# Patient Record
Sex: MALE | Race: OTHER | NOT HISPANIC OR LATINO | URBAN - METROPOLITAN AREA
[De-identification: names, ages, dates, MRNs, and addresses within clinical notes are randomized per-mention and may not be internally consistent; named-entity substitution may affect disease eponyms.]

---

## 2017-12-04 ENCOUNTER — INPATIENT (INPATIENT)
Facility: HOSPITAL | Age: 32
LOS: 73 days | Discharge: HOME HEALTH | DRG: 853 | End: 2018-02-16
Attending: INTERNAL MEDICINE | Admitting: INTERNAL MEDICINE
Payer: MEDICARE

## 2017-12-04 VITALS
DIASTOLIC BLOOD PRESSURE: 40 MMHG | SYSTOLIC BLOOD PRESSURE: 72 MMHG | WEIGHT: 160.06 LBS | TEMPERATURE: 99 F | RESPIRATION RATE: 18 BRPM | HEART RATE: 120 BPM | OXYGEN SATURATION: 100 %
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
## 2017-12-04 LAB
ALBUMIN SERPL ELPH-MCNC: 2 G/DL — LOW (ref 3.4–5)
ALP SERPL-CCNC: 123 U/L — HIGH (ref 40–120)
ALT FLD-CCNC: 11 U/L — LOW (ref 12–42)
ANION GAP SERPL CALC-SCNC: 14 MMOL/L — SIGNIFICANT CHANGE UP (ref 9–16)
APPEARANCE UR: CLEAR — SIGNIFICANT CHANGE UP
APTT BLD: 28.8 SEC — SIGNIFICANT CHANGE UP (ref 27.5–36.5)
AST SERPL-CCNC: 6 U/L — LOW (ref 15–37)
BASOPHILS NFR BLD AUTO: 0.4 % — SIGNIFICANT CHANGE UP (ref 0–2)
BILIRUB SERPL-MCNC: 0.7 MG/DL — SIGNIFICANT CHANGE UP (ref 0.2–1.2)
BILIRUB UR-MCNC: NEGATIVE — SIGNIFICANT CHANGE UP
BUN SERPL-MCNC: 20 MG/DL — SIGNIFICANT CHANGE UP (ref 7–23)
CALCIUM SERPL-MCNC: 8.9 MG/DL — SIGNIFICANT CHANGE UP (ref 8.5–10.5)
CHLORIDE SERPL-SCNC: 95 MMOL/L — LOW (ref 96–108)
CO2 SERPL-SCNC: 21 MMOL/L — LOW (ref 22–31)
COLOR SPEC: YELLOW — SIGNIFICANT CHANGE UP
CREAT SERPL-MCNC: 1.16 MG/DL — SIGNIFICANT CHANGE UP (ref 0.5–1.3)
DIFF PNL FLD: (no result)
EOSINOPHIL NFR BLD AUTO: 0.2 % — SIGNIFICANT CHANGE UP (ref 0–6)
GLUCOSE SERPL-MCNC: 228 MG/DL — HIGH (ref 70–99)
GLUCOSE UR QL: NEGATIVE — SIGNIFICANT CHANGE UP
HCT VFR BLD CALC: 29.6 % — LOW (ref 39–50)
HGB BLD-MCNC: 9.1 G/DL — LOW (ref 13–17)
IMM GRANULOCYTES NFR BLD AUTO: 1.1 % — SIGNIFICANT CHANGE UP (ref 0–1.5)
INR BLD: 1.81 — HIGH (ref 0.88–1.16)
KETONES UR-MCNC: NEGATIVE — SIGNIFICANT CHANGE UP
LACTATE SERPL-SCNC: 2.9 MMOL/L — HIGH (ref 0.4–2)
LACTATE SERPL-SCNC: 2.9 MMOL/L — HIGH (ref 0.4–2)
LEUKOCYTE ESTERASE UR-ACNC: (no result)
LIDOCAIN IGE QN: 78 U/L — SIGNIFICANT CHANGE UP (ref 73–393)
LYMPHOCYTES # BLD AUTO: 5.2 % — LOW (ref 13–44)
MCHC RBC-ENTMCNC: 22.9 PG — LOW (ref 27–34)
MCHC RBC-ENTMCNC: 30.7 G/DL — LOW (ref 32–36)
MCV RBC AUTO: 74.6 FL — LOW (ref 80–100)
MONOCYTES NFR BLD AUTO: 6.9 % — SIGNIFICANT CHANGE UP (ref 2–14)
NEUTROPHILS NFR BLD AUTO: 86.2 % — HIGH (ref 43–77)
NITRITE UR-MCNC: POSITIVE
PH UR: 8.5 — HIGH (ref 5–8)
PLATELET # BLD AUTO: 410 K/UL — HIGH (ref 150–400)
POTASSIUM SERPL-MCNC: 4 MMOL/L — SIGNIFICANT CHANGE UP (ref 3.5–5.3)
POTASSIUM SERPL-SCNC: 4 MMOL/L — SIGNIFICANT CHANGE UP (ref 3.5–5.3)
PROT SERPL-MCNC: 6.9 G/DL — SIGNIFICANT CHANGE UP (ref 6.4–8.2)
PROT UR-MCNC: 30 MG/DL
PROTHROM AB SERPL-ACNC: 20.1 SEC — HIGH (ref 9.8–12.7)
RBC # BLD: 3.97 M/UL — LOW (ref 4.2–5.8)
RBC # FLD: 16.9 % — SIGNIFICANT CHANGE UP (ref 10.3–16.9)
SODIUM SERPL-SCNC: 130 MMOL/L — LOW (ref 132–145)
SP GR SPEC: 1.01 — SIGNIFICANT CHANGE UP (ref 1–1.03)
UROBILINOGEN FLD QL: 1 E.U./DL — SIGNIFICANT CHANGE UP
WBC # BLD: 21.8 K/UL — HIGH (ref 3.8–10.5)
WBC # FLD AUTO: 21.8 K/UL — HIGH (ref 3.8–10.5)

## 2017-12-04 PROCEDURE — 93010 ELECTROCARDIOGRAM REPORT: CPT

## 2017-12-04 PROCEDURE — 99291 CRITICAL CARE FIRST HOUR: CPT | Mod: 25

## 2017-12-04 PROCEDURE — 71010: CPT | Mod: 26

## 2017-12-04 RX ORDER — SODIUM CHLORIDE 9 MG/ML
3 INJECTION INTRAMUSCULAR; INTRAVENOUS; SUBCUTANEOUS ONCE
Qty: 0 | Refills: 0 | Status: COMPLETED | OUTPATIENT
Start: 2017-12-04 | End: 2017-12-04

## 2017-12-04 RX ORDER — SODIUM CHLORIDE 9 MG/ML
500 INJECTION INTRAMUSCULAR; INTRAVENOUS; SUBCUTANEOUS
Qty: 0 | Refills: 0 | Status: COMPLETED | OUTPATIENT
Start: 2017-12-04 | End: 2017-12-04

## 2017-12-04 RX ORDER — VANCOMYCIN HCL 1 G
1000 VIAL (EA) INTRAVENOUS ONCE
Qty: 0 | Refills: 0 | Status: COMPLETED | OUTPATIENT
Start: 2017-12-04 | End: 2017-12-04

## 2017-12-04 RX ORDER — SODIUM CHLORIDE 9 MG/ML
2000 INJECTION INTRAMUSCULAR; INTRAVENOUS; SUBCUTANEOUS ONCE
Qty: 0 | Refills: 0 | Status: COMPLETED | OUTPATIENT
Start: 2017-12-04 | End: 2017-12-04

## 2017-12-04 RX ORDER — PIPERACILLIN AND TAZOBACTAM 4; .5 G/20ML; G/20ML
3.38 INJECTION, POWDER, LYOPHILIZED, FOR SOLUTION INTRAVENOUS ONCE
Qty: 0 | Refills: 0 | Status: COMPLETED | OUTPATIENT
Start: 2017-12-04 | End: 2017-12-04

## 2017-12-04 RX ORDER — ACETAMINOPHEN 500 MG
975 TABLET ORAL ONCE
Qty: 0 | Refills: 0 | Status: COMPLETED | OUTPATIENT
Start: 2017-12-04 | End: 2017-12-04

## 2017-12-04 RX ADMIN — SODIUM CHLORIDE 3 MILLILITER(S): 9 INJECTION INTRAMUSCULAR; INTRAVENOUS; SUBCUTANEOUS at 22:24

## 2017-12-04 RX ADMIN — PIPERACILLIN AND TAZOBACTAM 200 GRAM(S): 4; .5 INJECTION, POWDER, LYOPHILIZED, FOR SOLUTION INTRAVENOUS at 22:24

## 2017-12-04 RX ADMIN — SODIUM CHLORIDE 2000 MILLILITER(S): 9 INJECTION INTRAMUSCULAR; INTRAVENOUS; SUBCUTANEOUS at 23:31

## 2017-12-04 RX ADMIN — SODIUM CHLORIDE 2000 MILLILITER(S): 9 INJECTION INTRAMUSCULAR; INTRAVENOUS; SUBCUTANEOUS at 22:59

## 2017-12-04 RX ADMIN — Medication 250 MILLIGRAM(S): at 22:37

## 2017-12-04 RX ADMIN — SODIUM CHLORIDE 2000 MILLILITER(S): 9 INJECTION INTRAMUSCULAR; INTRAVENOUS; SUBCUTANEOUS at 22:14

## 2017-12-04 RX ADMIN — SODIUM CHLORIDE 2000 MILLILITER(S): 9 INJECTION INTRAMUSCULAR; INTRAVENOUS; SUBCUTANEOUS at 21:59

## 2017-12-04 RX ADMIN — SODIUM CHLORIDE 2000 MILLILITER(S): 9 INJECTION INTRAMUSCULAR; INTRAVENOUS; SUBCUTANEOUS at 22:29

## 2017-12-04 RX ADMIN — SODIUM CHLORIDE 2000 MILLILITER(S): 9 INJECTION INTRAMUSCULAR; INTRAVENOUS; SUBCUTANEOUS at 22:40

## 2017-12-04 RX ADMIN — Medication 975 MILLIGRAM(S): at 23:04

## 2017-12-04 NOTE — ED PROVIDER NOTE - OBJECTIVE STATEMENT
Comes to the ED to have his pressure ulcers checked.  Found to have septic vitals in triage and moved to the resus room for rapid evaluation.  Wheelchair bound, Hx of SCI, hep B, DM, indwelling suprapubic cath.  Has not seen a doctor in > 3 months, claims due to insurance coverage.  Last hospitalization in august in [email or web address]  Denies fever, chills, weakness.  Found to be incontinence of stool on arrival.

## 2017-12-04 NOTE — ED PROVIDER NOTE - CRITICAL CARE PROVIDED
documentation/interpretation of diagnostic studies/direct patient care (not related to procedure)/additional history taking/consultation with other physicians

## 2017-12-04 NOTE — ED ADULT NURSE NOTE - OBJECTIVE STATEMENT
pt. came into ER in his motorized wheelchair, for wound check of his sacrum. pt. states he was admitted to a hospital in august, since then pt. has sacral ulcer and suprapubic cathter. code sepsis was called on patient. pt. is fully oriented.

## 2017-12-04 NOTE — ED PROVIDER NOTE - MEDICAL DECISION MAKING DETAILS
septic vitals in triage - comes for wound care.  Spontaneously drainage pressure ulcer in the sacrum.  Hypotensive, tachycardia, low grade fever.  States that his BP is normally 100s/60s.  Denies dizziness, fever, chills, cough.  Found to be incontinent of diarrhea (last hospital admission in august 2017).  Labs, fluids, abx, cxr, ekg, cultures, urine.  Suprapubic catheter dislodged on arrival.

## 2017-12-04 NOTE — ED ADULT NURSE REASSESSMENT NOTE - NS ED NURSE REASSESS COMMENT FT1
EMS unable to take patients wheelchair. Patient sticker applied to wheel chair and left on loading dock of hospital.  of shift aware of situation will move wheelchair when appropriate spot is found.

## 2017-12-04 NOTE — ED PROVIDER NOTE - DIAGNOSTIC INTERPRETATION
ER Physician: Bunny Marti  CHEST XRAY INTERPRETATION: lungs clear, heart shadow normal, bony structures intact

## 2017-12-04 NOTE — ED ADULT NURSE NOTE - FALL HARM RISK TYPE OF ASSESSMENT
Injectable Influenza Immunization Documentation    1.  Is the person to be vaccinated sick today?   No    2. Does the person to be vaccinated have an allergy to a component   of the vaccine?   No    3. Has the person to be vaccinated ever had a serious reaction   to influenza vaccine in the past?   No    4. Has the person to be vaccinated ever had Guillain-Barré syndrome?   No    Form completed by Karlee Bergman MA          Admission

## 2017-12-04 NOTE — ED ADULT NURSE NOTE - CHIEF COMPLAINT QUOTE
pt came in, wheelchair bound, for medical evaluation/wound check. States he has Stage IV pressure ulcers to his sacrum. Pt tachycardic and hypotensive at triage. Unkempt/pale appearance.

## 2017-12-04 NOTE — ED PROVIDER NOTE - PROGRESS NOTE DETAILS
BP improved after about 500 cs of fluid - 140s/80s with HR in the 110s.  Febrile rectally to 103.  Cleaned by staff.  Receiving BS abx - vanco/zosyn.  CXR clear.  Urine with evidence of infection but source likely drainage scaral ulcer.  Patient last admitted about 3 months at [facility] for "placement"  Patient without advanced directives but describes full code.  NO family.  Lives with roommate.  Denies recent antibiotic use.  Do not suspect c. diff - no risk factors acutely.  Discussed with admitting team - will not isolate at this time.  Will attempt to send stool sample here.  elevated wbc, inr, lactate (2.9).  Normal renal function.  H/H 9/30.  Spoke with Dr. Walters - accepted to the stepdown unit. lactate unchanged, BP stable (MAP >65), UO 200cc.  Reached out to Dr. Walters again.  Stable for transfer

## 2017-12-04 NOTE — ED ADULT TRIAGE NOTE - CHIEF COMPLAINT QUOTE
pt came in, wheelchair bound, for medical evaluation/wound check. States he has Stage IV pressure ulcers to his sacrum. pt came in, wheelchair bound, for medical evaluation/wound check. States he has Stage IV pressure ulcers to his sacrum. Pt tachycardic and hypotensive at triage. Unkempt/pale appearance.

## 2017-12-05 LAB
ANION GAP SERPL CALC-SCNC: 16 MMOL/L — SIGNIFICANT CHANGE UP (ref 5–17)
ANION GAP SERPL CALC-SCNC: 16 MMOL/L — SIGNIFICANT CHANGE UP (ref 5–17)
APTT BLD: 27.1 SEC — LOW (ref 27.5–37.4)
BLD GP AB SCN SERPL QL: NEGATIVE — SIGNIFICANT CHANGE UP
BUN SERPL-MCNC: 13 MG/DL — SIGNIFICANT CHANGE UP (ref 7–23)
BUN SERPL-MCNC: 14 MG/DL — SIGNIFICANT CHANGE UP (ref 7–23)
CALCIUM SERPL-MCNC: 7.3 MG/DL — LOW (ref 8.4–10.5)
CALCIUM SERPL-MCNC: 7.6 MG/DL — LOW (ref 8.4–10.5)
CHLORIDE SERPL-SCNC: 103 MMOL/L — SIGNIFICANT CHANGE UP (ref 96–108)
CHLORIDE SERPL-SCNC: 106 MMOL/L — SIGNIFICANT CHANGE UP (ref 96–108)
CO2 SERPL-SCNC: 17 MMOL/L — LOW (ref 22–31)
CO2 SERPL-SCNC: 17 MMOL/L — LOW (ref 22–31)
CREAT ?TM UR-MCNC: 13 MG/DL — SIGNIFICANT CHANGE UP
CREAT SERPL-MCNC: 0.74 MG/DL — SIGNIFICANT CHANGE UP (ref 0.5–1.3)
CREAT SERPL-MCNC: 0.74 MG/DL — SIGNIFICANT CHANGE UP (ref 0.5–1.3)
CRP SERPL-MCNC: 17.4 MG/DL — HIGH (ref 0–0.4)
ERYTHROCYTE [SEDIMENTATION RATE] IN BLOOD: 59 MM/HR — HIGH
GLUCOSE BLDC GLUCOMTR-MCNC: 137 MG/DL — HIGH (ref 70–99)
GLUCOSE BLDC GLUCOMTR-MCNC: 142 MG/DL — HIGH (ref 70–99)
GLUCOSE BLDC GLUCOMTR-MCNC: 165 MG/DL — HIGH (ref 70–99)
GLUCOSE BLDC GLUCOMTR-MCNC: 224 MG/DL — HIGH (ref 70–99)
GLUCOSE SERPL-MCNC: 150 MG/DL — HIGH (ref 70–99)
GLUCOSE SERPL-MCNC: 174 MG/DL — HIGH (ref 70–99)
HCT VFR BLD CALC: 25.8 % — LOW (ref 39–50)
HGB BLD-MCNC: 8 G/DL — LOW (ref 13–17)
INR BLD: 1.76 — HIGH (ref 0.88–1.16)
LACTATE SERPL-SCNC: 2.1 MMOL/L — HIGH (ref 0.5–2)
LYMPHOCYTES # BLD AUTO: 13 % — SIGNIFICANT CHANGE UP (ref 13–44)
MAGNESIUM SERPL-MCNC: 1.3 MG/DL — LOW (ref 1.6–2.6)
MCHC RBC-ENTMCNC: 22.7 PG — LOW (ref 27–34)
MCHC RBC-ENTMCNC: 31 G/DL — LOW (ref 32–36)
MCV RBC AUTO: 73.3 FL — LOW (ref 80–100)
MONOCYTES NFR BLD AUTO: 12 % — SIGNIFICANT CHANGE UP (ref 2–14)
NEUTROPHILS NFR BLD AUTO: 61 % — SIGNIFICANT CHANGE UP (ref 43–77)
OSMOLALITY SERPL: 287 MOSM/KG — SIGNIFICANT CHANGE UP (ref 280–301)
OSMOLALITY UR: 159 MOSMOL/KG — SIGNIFICANT CHANGE UP (ref 100–650)
PHOSPHATE SERPL-MCNC: 3.1 MG/DL — SIGNIFICANT CHANGE UP (ref 2.5–4.5)
PLATELET # BLD AUTO: 386 K/UL — SIGNIFICANT CHANGE UP (ref 150–400)
POTASSIUM SERPL-MCNC: 3.3 MMOL/L — LOW (ref 3.5–5.3)
POTASSIUM SERPL-MCNC: 3.3 MMOL/L — LOW (ref 3.5–5.3)
POTASSIUM SERPL-SCNC: 3.3 MMOL/L — LOW (ref 3.5–5.3)
POTASSIUM SERPL-SCNC: 3.3 MMOL/L — LOW (ref 3.5–5.3)
PROTHROM AB SERPL-ACNC: 19.8 SEC — HIGH (ref 9.8–12.7)
RBC # BLD: 3.52 M/UL — LOW (ref 4.2–5.8)
RBC # FLD: 17.3 % — HIGH (ref 10.3–16.9)
RH IG SCN BLD-IMP: POSITIVE — SIGNIFICANT CHANGE UP
SODIUM SERPL-SCNC: 136 MMOL/L — SIGNIFICANT CHANGE UP (ref 135–145)
SODIUM SERPL-SCNC: 139 MMOL/L — SIGNIFICANT CHANGE UP (ref 135–145)
SODIUM UR-SCNC: 40 MMOL/L — SIGNIFICANT CHANGE UP
WBC # BLD: 25.2 K/UL — HIGH (ref 3.8–10.5)
WBC # FLD AUTO: 25.2 K/UL — HIGH (ref 3.8–10.5)

## 2017-12-05 PROCEDURE — 71010: CPT | Mod: 26

## 2017-12-05 PROCEDURE — 99291 CRITICAL CARE FIRST HOUR: CPT

## 2017-12-05 RX ORDER — PIPERACILLIN AND TAZOBACTAM 4; .5 G/20ML; G/20ML
4.5 INJECTION, POWDER, LYOPHILIZED, FOR SOLUTION INTRAVENOUS EVERY 8 HOURS
Qty: 0 | Refills: 0 | Status: DISCONTINUED | OUTPATIENT
Start: 2017-12-05 | End: 2017-12-05

## 2017-12-05 RX ORDER — INSULIN GLARGINE 100 [IU]/ML
7 INJECTION, SOLUTION SUBCUTANEOUS EVERY MORNING
Qty: 0 | Refills: 0 | Status: DISCONTINUED | OUTPATIENT
Start: 2017-12-05 | End: 2017-12-13

## 2017-12-05 RX ORDER — MAGNESIUM SULFATE 500 MG/ML
4 VIAL (ML) INJECTION ONCE
Qty: 0 | Refills: 0 | Status: COMPLETED | OUTPATIENT
Start: 2017-12-05 | End: 2017-12-05

## 2017-12-05 RX ORDER — INSULIN LISPRO 100/ML
VIAL (ML) SUBCUTANEOUS EVERY 6 HOURS
Qty: 0 | Refills: 0 | Status: DISCONTINUED | OUTPATIENT
Start: 2017-12-05 | End: 2017-12-05

## 2017-12-05 RX ORDER — DEXTROSE 50 % IN WATER 50 %
25 SYRINGE (ML) INTRAVENOUS ONCE
Qty: 0 | Refills: 0 | Status: DISCONTINUED | OUTPATIENT
Start: 2017-12-05 | End: 2017-12-05

## 2017-12-05 RX ORDER — OXYBUTYNIN CHLORIDE 5 MG
5 TABLET ORAL
Qty: 0 | Refills: 0 | Status: DISCONTINUED | OUTPATIENT
Start: 2017-12-05 | End: 2017-12-05

## 2017-12-05 RX ORDER — DEXTROSE 50 % IN WATER 50 %
1 SYRINGE (ML) INTRAVENOUS ONCE
Qty: 0 | Refills: 0 | Status: DISCONTINUED | OUTPATIENT
Start: 2017-12-05 | End: 2018-02-16

## 2017-12-05 RX ORDER — GLUCAGON INJECTION, SOLUTION 0.5 MG/.1ML
1 INJECTION, SOLUTION SUBCUTANEOUS ONCE
Qty: 0 | Refills: 0 | Status: DISCONTINUED | OUTPATIENT
Start: 2017-12-05 | End: 2018-02-16

## 2017-12-05 RX ORDER — POTASSIUM CHLORIDE 20 MEQ
20 PACKET (EA) ORAL
Qty: 0 | Refills: 0 | Status: COMPLETED | OUTPATIENT
Start: 2017-12-05 | End: 2017-12-05

## 2017-12-05 RX ORDER — ATORVASTATIN CALCIUM 80 MG/1
20 TABLET, FILM COATED ORAL AT BEDTIME
Qty: 0 | Refills: 0 | Status: DISCONTINUED | OUTPATIENT
Start: 2017-12-05 | End: 2018-02-16

## 2017-12-05 RX ORDER — PIPERACILLIN AND TAZOBACTAM 4; .5 G/20ML; G/20ML
3.38 INJECTION, POWDER, LYOPHILIZED, FOR SOLUTION INTRAVENOUS EVERY 6 HOURS
Qty: 0 | Refills: 0 | Status: DISCONTINUED | OUTPATIENT
Start: 2017-12-05 | End: 2017-12-15

## 2017-12-05 RX ORDER — OXYBUTYNIN CHLORIDE 5 MG
5 TABLET ORAL
Qty: 0 | Refills: 0 | Status: DISCONTINUED | OUTPATIENT
Start: 2017-12-05 | End: 2018-02-09

## 2017-12-05 RX ORDER — HEPARIN SODIUM 5000 [USP'U]/ML
5000 INJECTION INTRAVENOUS; SUBCUTANEOUS EVERY 8 HOURS
Qty: 0 | Refills: 0 | Status: DISCONTINUED | OUTPATIENT
Start: 2017-12-05 | End: 2018-01-31

## 2017-12-05 RX ORDER — LEVETIRACETAM 250 MG/1
500 TABLET, FILM COATED ORAL
Qty: 0 | Refills: 0 | Status: DISCONTINUED | OUTPATIENT
Start: 2017-12-05 | End: 2018-02-16

## 2017-12-05 RX ORDER — SODIUM CHLORIDE 9 MG/ML
1000 INJECTION, SOLUTION INTRAVENOUS
Qty: 0 | Refills: 0 | Status: DISCONTINUED | OUTPATIENT
Start: 2017-12-05 | End: 2018-02-16

## 2017-12-05 RX ORDER — VANCOMYCIN HCL 1 G
1000 VIAL (EA) INTRAVENOUS EVERY 8 HOURS
Qty: 0 | Refills: 0 | Status: DISCONTINUED | OUTPATIENT
Start: 2017-12-05 | End: 2017-12-05

## 2017-12-05 RX ORDER — NYSTATIN CREAM 100000 [USP'U]/G
1 CREAM TOPICAL
Qty: 0 | Refills: 0 | Status: DISCONTINUED | OUTPATIENT
Start: 2017-12-05 | End: 2018-02-16

## 2017-12-05 RX ORDER — SODIUM CHLORIDE 9 MG/ML
1000 INJECTION, SOLUTION INTRAVENOUS
Qty: 0 | Refills: 0 | Status: DISCONTINUED | OUTPATIENT
Start: 2017-12-05 | End: 2017-12-05

## 2017-12-05 RX ORDER — DEXTROSE 50 % IN WATER 50 %
25 SYRINGE (ML) INTRAVENOUS ONCE
Qty: 0 | Refills: 0 | Status: DISCONTINUED | OUTPATIENT
Start: 2017-12-05 | End: 2018-02-16

## 2017-12-05 RX ORDER — DEXTROSE 50 % IN WATER 50 %
12.5 SYRINGE (ML) INTRAVENOUS ONCE
Qty: 0 | Refills: 0 | Status: DISCONTINUED | OUTPATIENT
Start: 2017-12-05 | End: 2018-02-16

## 2017-12-05 RX ORDER — VANCOMYCIN HCL 1 G
VIAL (EA) INTRAVENOUS
Qty: 0 | Refills: 0 | Status: DISCONTINUED | OUTPATIENT
Start: 2017-12-05 | End: 2017-12-05

## 2017-12-05 RX ORDER — ALBUMIN HUMAN 25 %
250 VIAL (ML) INTRAVENOUS ONCE
Qty: 0 | Refills: 0 | Status: COMPLETED | OUTPATIENT
Start: 2017-12-05 | End: 2017-12-05

## 2017-12-05 RX ORDER — ASCORBIC ACID 60 MG
250 TABLET,CHEWABLE ORAL DAILY
Qty: 0 | Refills: 0 | Status: DISCONTINUED | OUTPATIENT
Start: 2017-12-05 | End: 2018-02-16

## 2017-12-05 RX ORDER — DEXTROSE 50 % IN WATER 50 %
1 SYRINGE (ML) INTRAVENOUS ONCE
Qty: 0 | Refills: 0 | Status: DISCONTINUED | OUTPATIENT
Start: 2017-12-05 | End: 2017-12-05

## 2017-12-05 RX ORDER — NOREPINEPHRINE BITARTRATE/D5W 8 MG/250ML
0.01 PLASTIC BAG, INJECTION (ML) INTRAVENOUS
Qty: 8 | Refills: 0 | Status: DISCONTINUED | OUTPATIENT
Start: 2017-12-05 | End: 2017-12-06

## 2017-12-05 RX ORDER — DEXTROSE 50 % IN WATER 50 %
12.5 SYRINGE (ML) INTRAVENOUS ONCE
Qty: 0 | Refills: 0 | Status: DISCONTINUED | OUTPATIENT
Start: 2017-12-05 | End: 2017-12-05

## 2017-12-05 RX ORDER — GLUCAGON INJECTION, SOLUTION 0.5 MG/.1ML
1 INJECTION, SOLUTION SUBCUTANEOUS ONCE
Qty: 0 | Refills: 0 | Status: DISCONTINUED | OUTPATIENT
Start: 2017-12-05 | End: 2017-12-05

## 2017-12-05 RX ORDER — SODIUM HYPOCHLORITE 0.125 %
1 SOLUTION, NON-ORAL MISCELLANEOUS DAILY
Qty: 0 | Refills: 0 | Status: COMPLETED | OUTPATIENT
Start: 2017-12-05 | End: 2017-12-11

## 2017-12-05 RX ORDER — SODIUM CHLORIDE 9 MG/ML
1000 INJECTION, SOLUTION INTRAVENOUS
Qty: 0 | Refills: 0 | Status: DISCONTINUED | OUTPATIENT
Start: 2017-12-05 | End: 2017-12-06

## 2017-12-05 RX ORDER — INSULIN LISPRO 100/ML
VIAL (ML) SUBCUTANEOUS
Qty: 0 | Refills: 0 | Status: DISCONTINUED | OUTPATIENT
Start: 2017-12-05 | End: 2018-02-16

## 2017-12-05 RX ADMIN — LEVETIRACETAM 500 MILLIGRAM(S): 250 TABLET, FILM COATED ORAL at 18:31

## 2017-12-05 RX ADMIN — Medication 5 MILLIGRAM(S): at 18:29

## 2017-12-05 RX ADMIN — NYSTATIN CREAM 1 APPLICATION(S): 100000 CREAM TOPICAL at 18:30

## 2017-12-05 RX ADMIN — Medication 25 GRAM(S): at 07:04

## 2017-12-05 RX ADMIN — Medication 100 MILLIEQUIVALENT(S): at 08:00

## 2017-12-05 RX ADMIN — PIPERACILLIN AND TAZOBACTAM 200 GRAM(S): 4; .5 INJECTION, POWDER, LYOPHILIZED, FOR SOLUTION INTRAVENOUS at 06:13

## 2017-12-05 RX ADMIN — Medication 4: at 07:03

## 2017-12-05 RX ADMIN — PIPERACILLIN AND TAZOBACTAM 200 GRAM(S): 4; .5 INJECTION, POWDER, LYOPHILIZED, FOR SOLUTION INTRAVENOUS at 11:55

## 2017-12-05 RX ADMIN — Medication 1.36 MICROGRAM(S)/KG/MIN: at 02:57

## 2017-12-05 RX ADMIN — INSULIN GLARGINE 7 UNIT(S): 100 INJECTION, SOLUTION SUBCUTANEOUS at 08:45

## 2017-12-05 RX ADMIN — Medication 250 MILLIGRAM(S): at 06:10

## 2017-12-05 RX ADMIN — ATORVASTATIN CALCIUM 20 MILLIGRAM(S): 80 TABLET, FILM COATED ORAL at 22:43

## 2017-12-05 RX ADMIN — Medication 1.36 MICROGRAM(S)/KG/MIN: at 08:00

## 2017-12-05 RX ADMIN — Medication 2: at 11:55

## 2017-12-05 RX ADMIN — Medication 1 APPLICATION(S): at 14:25

## 2017-12-05 RX ADMIN — Medication 250 MILLIGRAM(S): at 14:24

## 2017-12-05 RX ADMIN — HEPARIN SODIUM 5000 UNIT(S): 5000 INJECTION INTRAVENOUS; SUBCUTANEOUS at 14:25

## 2017-12-05 RX ADMIN — Medication 250 MILLIGRAM(S): at 14:25

## 2017-12-05 RX ADMIN — PIPERACILLIN AND TAZOBACTAM 200 GRAM(S): 4; .5 INJECTION, POWDER, LYOPHILIZED, FOR SOLUTION INTRAVENOUS at 18:30

## 2017-12-05 RX ADMIN — HEPARIN SODIUM 5000 UNIT(S): 5000 INJECTION INTRAVENOUS; SUBCUTANEOUS at 22:43

## 2017-12-05 RX ADMIN — Medication 100 MILLIEQUIVALENT(S): at 05:52

## 2017-12-05 RX ADMIN — SODIUM CHLORIDE 100 MILLILITER(S): 9 INJECTION, SOLUTION INTRAVENOUS at 10:10

## 2017-12-05 RX ADMIN — Medication 100 MILLIEQUIVALENT(S): at 06:42

## 2017-12-05 RX ADMIN — HEPARIN SODIUM 5000 UNIT(S): 5000 INJECTION INTRAVENOUS; SUBCUTANEOUS at 07:00

## 2017-12-05 RX ADMIN — Medication 125 MILLILITER(S): at 02:26

## 2017-12-05 NOTE — H&P ADULT - NSHPREVIEWOFSYSTEMS_GEN_ALL_CORE
REVIEW OF SYSTEMS      General:	(+) fatigue    Skin/Breast:  no rash  	  Ophthalmologic:  no vision change  	  ENMT:	no sore throat, congestion, runny nose    Respiratory and Thorax:   no cough or SOB  	  Cardiovascular:	no CP or palpitations    Gastrointestinal:	no diarrhea, abd pain, vomiting.    Genitourinary:	(+) foul smelling urine    Musculoskeletal:	no joint pain    Neurological:	no headache

## 2017-12-05 NOTE — DIETITIAN INITIAL EVALUATION ADULT. - OTHER INFO
33 y/o male admitted with septic shock.Noted paraplegic.Can feed self.No N/V/D.Noted complaints of pain.

## 2017-12-05 NOTE — H&P ADULT - PMH
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

## 2017-12-05 NOTE — H&P ADULT - NSHPPHYSICALEXAM_GEN_ALL_CORE
General:  NAD  HENT:  EOMI, PERRL.  No sinus tenderness.  oropharynx WNL.  MM dry  Neck:  Trachea midline.  No JVD, LAD, or thyromegaly.  Heart:  S1S2 no MRG  Lungs:  CTAB no wheezing  Abdomen:  NABS.  soft, nontender, nondistended.  no guarding.  no ascites.  no organomegaly.  (+) suprapubic cath without surrounding erythema or purulence.  draining clear yellow urine.  Vascular:  + peripheral pulses  Neuro:  AOx3, no facial asymmetry.  No sensation or strength below nipple line.  UE strength 5/5.  Skin:  No rash.  (+) sacral ulcer General:  NAD  HENT:  EOMI, PERRL.  No sinus tenderness.  oropharynx WNL.  MM dry  Neck:  Trachea midline.  No JVD, LAD, or thyromegaly.  Heart:  S1S2 no MRG  Lungs:  CTAB no wheezing  Abdomen:  NABS.  soft, nontender, nondistended.  no guarding.  no ascites.  no organomegaly.  (+) suprapubic cath without surrounding erythema or purulence.  draining clear yellow urine.  Vascular:  + peripheral pulses  Neuro:  AOx3, no facial asymmetry.  No sensation or strength below nipple line.  UE strength 5/5.  Skin:  No rash.  (+) sacral ulcer with exposed bone.  bandages packed in ulcer with yellow discharge.  (+) surrounding erythema and warmth.

## 2017-12-05 NOTE — DIETITIAN INITIAL EVALUATION ADULT. - NS AS NUTRI INTERV MEALS SNACK
Protein - modified diet/General/healthful diet/Energy - modified diet/advance to [facility] with snack/Carbohydrate - modified diet

## 2017-12-05 NOTE — PROCEDURE NOTE - NSPOSTCAREGUIDE_GEN_A_CORE
Care for catheter as per unit/ICU protocols
Verbal/written post procedure instructions were given to patient/caregiver/Care for catheter as per unit/ICU protocols

## 2017-12-05 NOTE — H&P ADULT - NSHPSOCIALHISTORY_GEN_ALL_CORE
tobacco- 1 PPD x 12 years  Etoh- none  drugs- none  lives with- roommate and 24 hour home health aid  occupation- unemployed

## 2017-12-05 NOTE — CONSULT NOTE ADULT - SUBJECTIVE AND OBJECTIVE BOX
HPI: Asked late this afternoon to see this 32M paraplegic patient with diabetes and sacral and right ischial decubitus ulcers, admitted last night with chills, hyperglycemia, UTI, white count, and pressor requirement, to rule out his decubitus ulcers as a source for sepsis. Patient was treated on vanc and zosyn, IVF, pressors, and fluid. Since admission, he has had improvement in pressor requirement and has been weaned to low dose levophed, sugars are in control in the 150s, and his overall clinical status is much improved. Of note, he has a history of osteomyelitis, for which he was inadequately treated/ didn't complete abx course. He also reports that he didn't get adequate wound care as an outpatient. He has an indwelling catheter and his cxr was clear on admission.    EXAM  afebrile 122/72, HR 90, RR 22, 02 sat 98%  AOx3, comfortable, conversing comfortably    large stage 4 sacral decubitus ulcer measuring 12 x 15 x 20 cm, soiled with stool, no evidence of purulence, no evidence of cellulitis, no evidence of fibrinous exudate. Thoroughly cleaned at bedside and wound re-packed with clean kerlix gauze and duoderm    large stage 4 ischial decubitus ulcer, right hip, measuring 8 cm x 12 cm x 5cm, soiled with stool, no evidence of purulence, no evidence of cellulitis, + fibrinous exudate in wound bed. Thoroughly cleaned at bedside.     BEDSIDE DEBRIDEMENT PERFORMED: fibrinous exudate in right ischial decubitus ulcer sharply debrided with scissors and forceps to rid wound of devitalized tissue. Endpoint was healthy pink granulation tissue. No bleeding, Tolerated well. Wound re-packed with clean kerlix gauze and duoderm.    A/P: The patient's decubitus ulcers are unlikely to be the source of his acute illness/ sepsis. They appear clean and healthy overall, with no evidence of active soft tissue infection.  -recommend 4 times a day wound packing changes-- fecal soilage appears to be an issue here due to the location of the wounds  -empiric abx per micu team  -consider inadequately treated oteomyelitis vs UTI as possible other etiologies of infection  -frequent turning  -pressure ulcer precautions  -duoderm can be helpful to try to occlude the wounds from stool soilage    Iram Early MD  Plastic Surgery

## 2017-12-05 NOTE — PATIENT PROFILE ADULT. - GENERAL INFO COMMENT, PROFILE
Patient experiencing chills and got on the bus with his motorized wheelchair and went to [facility]

## 2017-12-05 NOTE — PROCEDURE NOTE - NSPROCDETAILS_GEN_ALL_CORE
sutured in place/positive blood return obtained via catheter/all materials/supplies accounted for at end of procedure/location identified, draped/prepped, sterile technique used, needle inserted/introduced/connected to a pressurized flush line/hemostasis with direct pressure, dressing applied/Seldinger technique/ultrasound guidance

## 2017-12-05 NOTE — PROCEDURE NOTE - NSINDICATIONS_GEN_A_CORE
monitoring purposes/critical patient
critical illness/emergency venous access/hemodynamic monitoring/volume resuscitation/venous access
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
## 2017-12-05 NOTE — H&P ADULT - ASSESSMENT
Patient is a 32 year old paraplegic M with a history of spinal cord injury (wheelchair bound), sacral pressure ulcer with osteo earlier in 2017,  DM-2, indwelling suprapubic catheter, hepatitis B with septic shock secondary to infected sacral pressure ulcer. Patient is a 32 year old paraplegic M with a history of spinal cord injury (wheelchair bound), sacral pressure ulcer with osteo earlier in 2017,  DM-2, indwelling suprapubic catheter, hepatitis B with septic shock secondary to infected sacral pressure ulcer.    ID:  #septic shock   -secondary to infected sacral ulcer.  -In ED: Tmax 103.7, , BP 72/40, WBC 20k, lactate 2.9.  -s/p 4.5 L NS and vanc/zosyn.  C/w vanc/zosyn.  Vanc trough after 4th dose  -f/u blood cx  -BP improved on levo gtt.  Keep MAPs >65  -C/w IVF at maintenance  -f/u surgery re: debridement  -Tylenol for fever    #UTI  -patient does not have sensation or control of urination, however (+) UA with LE, nitrites, bacteria and WBC  -C/w abx as above  -f/u urine cx and blood cx    #hepatitis B  -obtain collateral from PCP  -AST/ALT WNL    NEURO:  no sedation.    AOx3    #traumatic brain injury / paraplegia  -chronic.    -frequent turning  -wound care    #seizure d/o  -Patient reports history of at least 2 seizures.  Unclear etiology.   -C/w home Keppra    CARDIAC:  #septic shock  -Treat as above    #sinus tachycardia  -Improved with IVF.  Secondary to septic shock.    -f/u EKG    #HTN  -Currently in septic shock.  Holding home regimen (unclear)    RESP:  No respiratory symptoms.  Stable on room air.  f/u CXR    GI:  -Consider rectal tube as patient is incontinent and has sacral ulcer    #elevated alk phos  -unclear etiology.  possibly musculoskeletal.  f/u CMP    No bowel regimen at this time    RENAL:  BUN, creatinine WNL  Monitor UOP  (+) suprapubic cath to bowles    HEME:  Leukocytosis secondary to septic shock.  WBC 20k.  Trend CBC    #microcytic anemia  hgb 9.1.  MCV 74.  No sign of bleeding.  maintain active T&S  -f/u iron studies    ENDO:  #type 2 diabetes mellitus  -f/u A1C  -accuchecks Q6H and ISS for now  -hold home oral antihyperglycemic regimen    No history of thyroidal disease.    F:  None  E:  replete PRN  N:  NPO for now  Ppx:  hep SQ  Dispo:  MICU  Code status:  full code Patient is a 32 year old paraplegic M with a history of spinal cord injury (wheelchair bound), sacral pressure ulcer with osteo earlier in 2017,  DM-2, indwelling suprapubic catheter, hepatitis B with septic shock secondary to infected sacral pressure ulcer.    ID:  #septic shock   -secondary to infected sacral ulcer, with likely sacral osteo  -In ED: Tmax 103.7, , BP 72/40, WBC 20k, lactate 2.9.  -s/p 4.5 L NS and vanc/zosyn.  C/w vanc/zosyn.  Vanc trough after 4th dose  -f/u blood cx  -BP improved on levo gtt.  Keep MAPs >65  -C/w IVF at maintenance  -f/u surgery re: debridement  -Tylenol for fever  -f/u ESR/CRP    #UTI  -patient does not have sensation or control of urination, however (+) UA with LE, nitrites, bacteria and WBC  -C/w abx as above  -f/u urine cx and blood cx    #hepatitis B  -obtain collateral from PCP  -AST/ALT WNL  -INR elevated ~1.8, stable    NEURO:  no sedation.    AOx3    #traumatic brain injury / paraplegia  -chronic.    -frequent turning  -wound care    #seizure d/o  -Patient reports history of at least 2 seizures.  Unclear etiology.   -C/w home Keppra 500 mg BID    CARDIAC:  #septic shock  -Treat as above    #sinus tachycardia  -Improved with IVF.  Secondary to septic shock.    -f/u EKG    #HTN  -Currently in septic shock.  Holding home regimen (unclear)    c/w home lipitor    RESP:  No respiratory symptoms.  Stable on room air.  f/u CXR    GI:  -Consider rectal tube as patient is incontinent and has sacral ulcer    #elevated alk phos  -unclear etiology.  possibly musculoskeletal.  f/u CMP    No bowel regimen at this time    RENAL:  BUN, creatinine WNL  Monitor UOP  (+) suprapubic cath to bowles  c/w home oxybutynin    HEME:  Leukocytosis secondary to septic shock.  WBC 20k.  Trend CBC    #microcytic anemia  hgb 9.1.  MCV 74.  No sign of bleeding.  maintain active T&S  -f/u iron studies    ENDO:  #type 2 diabetes mellitus  -f/u A1C  -accuchecks Q6H and ISS for now.  Dosed Lantus 7 units in a.m.  Will likely need more now that not NPO  -hold home oral antihyperglycemic regimen (metformin, glimperide)    No history of thyroidal disease.    F:  None  E:  replete PRN  N:  consistent carb diet   Ppx:  hep SQ  Dispo:  MICU  Code status:  full code Patient is a 32 year old paraplegic M with a history of spinal cord injury (wheelchair bound), sacral pressure ulcer with osteo earlier in 2017,  DM-2, indwelling suprapubic catheter, hepatitis B with septic shock secondary to infected sacral pressure ulcer.    ID:  #septic shock   -secondary to infected sacral ulcer, with likely sacral osteo  -In ED: Tmax 103.7, , BP 72/40, WBC 20k, lactate 2.9.  -s/p 4.5 L NS and vanc/zosyn.  C/w vanc/zosyn.  Vanc trough after 4th dose  -f/u blood cx  -BP improved on levo gtt.  Keep MAPs >65  -C/w IVF at maintenance  -f/u surgery re: debridement  -Tylenol for fever  -f/u ESR/CRP    #UTI  -patient does not have sensation or control of urination, however (+) UA with LE, nitrites, bacteria and WBC  -C/w abx as above  -f/u urine cx and blood cx    #hepatitis B  -obtain collateral from PCP  -AST/ALT WNL  -INR elevated ~1.8, stable    NEURO:  no sedation.    AOx3    #traumatic brain injury / paraplegia  -chronic.    -frequent turning  -wound care    #seizure d/o  -Patient reports history of at least 2 seizures.  Unclear etiology.   -C/w home Keppra 500 mg BID    CARDIAC:  #septic shock  -Treat as above    #sinus tachycardia  -Improved with IVF.  Secondary to septic shock.    -f/u EKG    #HTN  -Currently in septic shock.  Holding home regimen (unclear)    c/w home lipitor    RESP:  No respiratory symptoms.  Stable on room air.  f/u CXR    GI:  -Consider rectal tube as patient is incontinent and has sacral ulcer    #elevated alk phos  -unclear etiology.  possibly musculoskeletal.  f/u CMP    No bowel regimen at this time    RENAL:  BUN, creatinine WNL  Monitor UOP  (+) suprapubic cath to bowels  c/w home oxybutynin    HEME:  Leukocytosis secondary to septic shock.  WBC 20k.  Trend CBC    #microcytic anemia  hgb 9.1.  MCV 74.  No sign of bleeding.  maintain active T&S  -f/u iron studies    ENDO:  #type 2 diabetes mellitus  -f/u A1C  -accuchecks Q6H and ISS for now.  Dosed Lantus 7 units in a.m.  Will likely need more now that not NPO  -hold home oral antihyperglycemic regimen (metformin, glimperide)    No history of thyroidal disease.    F:  LR at 100/hour  E:  replete PRN  N:  consistent carb diet   Ppx:  hep SQ  Dispo:  MICU  Code status:  full code

## 2017-12-05 NOTE — ADVANCED PRACTICE NURSE CONSULT - REASON FOR CONSULT
[facility] nurse consult to assess multiple pressure ulcers (injuries) that were present on admission. Patient is a 32 year old male with a history of spinal cord injury (paraplegic x ~5 years), sacral pressure ulcer with hx of sacral osteo in April and Aug 2017,  DM-2, indwelling suprapubic catheter, CVA in 2016, hepatitis B who presented for wound check for his pressure ulcers.  Sacral pressure injury diagnosed in April 2017 which developed osteo at that time and a second time in August.

## 2017-12-05 NOTE — H&P ADULT - NSHPLABSRESULTS_GEN_ALL_CORE
12-04    130<L>  |  95<L>  |  20  ----------------------------<  228<H>  4.0   |  21<L>  |  1.16    Ca    8.9      04 Dec 2017 22:22    TPro  6.9  /  Alb  2.0<L>  /  TBili  0.7  /  DBili  x   /  AST  6<L>  /  ALT  11<L>  /  AlkPhos  123<H>  12-04 12-04                          9.1    21.8  )-----------( 410      ( 04 Dec 2017 22:22 )             29.6

## 2017-12-05 NOTE — PROGRESS NOTE ADULT - SUBJECTIVE AND OBJECTIVE BOX
INTERVAL HPI/OVERNIGHT EVENTS: Plastic surgery called regarding debridement for sacral wound; pending recs. Continued vanc/zosyn.     VITAL SIGNS:  T(F): 96.5 (17 @ 13:00)  HR: 74 (17 @ 15:00)  BP: 103/62 (17 @ 14:00)  RR: 17 (17 @ 15:00)  SpO2: 100% (17 @ 15:00)  Wt(kg): --    PHYSICAL EXAM:    Constitutional: Well developed, well nourished  General: laying comfortably  ENMT: moist mucous membranes, no mucosal pallor, clear throat, uvula midline  Neck: supple  Respiratory: CTABL, no rales, no crackles, no wheezing  Cardiovascular: +S1, +S2, no murmurs, rubs or gallops, regular rate and rhythm  Gastrointestinal: abdomen soft, non distended, non tender, +BS  Extremities: no edema, no calf pain to palpation  Vascular: cap refill <3s in all extremities, radial and DP pulses 2+, sacral stage 4 decubitus dressed in clean dry dressing, R ischial wound dressed in clean dry dressing, R foot wrapped in clean, dry dressing  Neurological: AAO x3, no sensation below nipple line, paraplegic  Skin: scar mid-abdomen    MEDICATIONS  (STANDING):  ascorbic acid 250 milliGRAM(s) Oral daily  atorvastatin 20 milliGRAM(s) Oral at bedtime  Dakins Solution - 1/4 Strength 1 Application(s) Topical daily  dextrose 5%. 1000 milliLiter(s) (50 mL/Hr) IV Continuous <Continuous>  dextrose 50% Injectable 12.5 Gram(s) IV Push once  dextrose 50% Injectable 25 Gram(s) IV Push once  dextrose 50% Injectable 25 Gram(s) IV Push once  heparin  Injectable 5000 Unit(s) SubCutaneous every 8 hours  insulin glargine Injectable (LANTUS) 7 Unit(s) SubCutaneous every morning  insulin lispro (HumaLOG) corrective regimen sliding scale   SubCutaneous every 6 hours  lactated ringers. 1000 milliLiter(s) (100 mL/Hr) IV Continuous <Continuous>  levETIRAcetam 500 milliGRAM(s) Oral two times a day  norepinephrine Infusion 0.01 MICROgram(s)/kG/Min (1.361 mL/Hr) IV Continuous <Continuous>  nystatin Powder 1 Application(s) Topical two times a day  oxybutynin 5 milliGRAM(s) Oral two times a day  piperacillin/tazobactam IVPB. 3.375 Gram(s) IV Intermittent every 6 hours  vancomycin  IVPB 1000 milliGRAM(s) IV Intermittent every 8 hours    MEDICATIONS  (PRN):  dextrose Gel 1 Dose(s) Oral once PRN Blood Glucose LESS THAN 70 milliGRAM(s)/deciliter  glucagon  Injectable 1 milliGRAM(s) IntraMuscular once PRN Glucose LESS THAN 70 milligrams/deciliter      Allergies    Capzasin Back and Body (Unknown)    Intolerances        LABS:                        8.0    25.2  )-----------( 386      ( 05 Dec 2017 04:56 )             25.8         139  |  106  |  13  ----------------------------<  174<H>  3.3<L>   |  17<L>  |  0.74    Ca    7.6<L>      05 Dec 2017 04:58  Phos  3.1       Mg     1.3         TPro  6.9  /  Alb  2.0<L>  /  TBili  0.7  /  DBili  x   /  AST  6<L>  /  ALT  11<L>  /  AlkPhos  123<H>  12    PT/INR - ( 05 Dec 2017 07:04 )   PT: 19.8 sec;   INR: 1.76          PTT - ( 05 Dec 2017 07:04 )  PTT:27.1 sec  Urinalysis Basic - ( 04 Dec 2017 22:22 )    Color: Yellow / Appearance: Clear / S.010 / pH: x  Gluc: x / Ketone: NEGATIVE  / Bili: NEGATIVE / Urobili: 1.0 E.U./dL   Blood: x / Protein: 30 mg/dL / Nitrite: POSITIVE   Leuk Esterase: Large / RBC: x / WBC > 10 /HPF   Sq Epi: x / Non Sq Epi: x / Bacteria: Many /HPF        RADIOLOGY & ADDITIONAL TESTS: < from: Xray Chest 1 View AP- PORTABLE-Urgent (17 @ 04:34) >  EXAM:  XR CHEST 1 VIEW PORT URGENT                          PROCEDURE DATE:  2017                     INTERPRETATION:    Portable AP Radiograph dated 2017 4:34 AM    CLINICAL INFORMATION: 32 years, Male, central line placement    PRIOR STUDIES: Chest x-ray 2017 at 10:52 PM    FINDINGS:  Lines, Catheters and Support Devices: Interval placement of a right-sided   central line with its tip projecting over the right atrium.    Heart Size, Mediastinum and Hilar Contours: Heart size is stable. Stable   mediastinal and hilar contours.      Lungs: No focal consolidation. No definite pleural effusions. No evidence   of pneumothorax.     Bones/Soft Tissues: Redemonstrated is multilevel posterior fusion   hardware. Also seen is a smallgeometric metallic density projecting over   the superiormost aspect of the fusion hardware        IMPRESSION:  Interval placement of a right-sided central line with its tip projecting   over the right atrium.    No evidence of pneumothorax.    Lungsremain clear.                "Thank you for the opportunity to participate in the care of this   patient."    DAGMAR CARBAJAL M.D., RADIOLOGY RESIDENT  This document has been electronically signed.  JEN SOUTH M.D., ATTENDING RADIOLOGIST  This document has been electronically signed. Dec  5 2017  2:38PM                  < end of copied text >  Reviewed. INTERVAL HPI/OVERNIGHT EVENTS: Plastic surgery called regarding debridement for sacral wound; pending recs. Continued vanc/zosyn.     VITAL SIGNS:  T(F): 96.5 (17 @ 13:00)  HR: 74 (17 @ 15:00)  BP: 103/62 (17 @ 14:00)  RR: 17 (17 @ 15:00)  SpO2: 100% (17 @ 15:00)  Wt(kg): --    PHYSICAL EXAM:    Constitutional: Well developed, well nourished  General: laying comfortably  ENMT: moist mucous membranes, no mucosal pallor, clear throat, uvula midline  Neck: supple  Respiratory: CTABL, no rales, no crackles, no wheezing  Cardiovascular: +S1, +S2, no murmurs, rubs or gallops, regular rate and rhythm  Gastrointestinal: abdomen soft, non distended, non tender, +BS  Extremities: no edema, no calf pain to palpation  Vascular: cap refill <3s in all extremities, radial and DP pulses 2+, sacral stage 4 decubitus dressed in clean dry dressing, L ischial wound dressed in clean dry dressing, L foot wrapped in clean, dry dressing  Neurological: AAO x3, no sensation below nipple line, paraplegic  Skin: scar mid-abdomen    MEDICATIONS  (STANDING):  ascorbic acid 250 milliGRAM(s) Oral daily  atorvastatin 20 milliGRAM(s) Oral at bedtime  Dakins Solution - 1/4 Strength 1 Application(s) Topical daily  dextrose 5%. 1000 milliLiter(s) (50 mL/Hr) IV Continuous <Continuous>  dextrose 50% Injectable 12.5 Gram(s) IV Push once  dextrose 50% Injectable 25 Gram(s) IV Push once  dextrose 50% Injectable 25 Gram(s) IV Push once  heparin  Injectable 5000 Unit(s) SubCutaneous every 8 hours  insulin glargine Injectable (LANTUS) 7 Unit(s) SubCutaneous every morning  insulin lispro (HumaLOG) corrective regimen sliding scale   SubCutaneous every 6 hours  lactated ringers. 1000 milliLiter(s) (100 mL/Hr) IV Continuous <Continuous>  levETIRAcetam 500 milliGRAM(s) Oral two times a day  norepinephrine Infusion 0.01 MICROgram(s)/kG/Min (1.361 mL/Hr) IV Continuous <Continuous>  nystatin Powder 1 Application(s) Topical two times a day  oxybutynin 5 milliGRAM(s) Oral two times a day  piperacillin/tazobactam IVPB. 3.375 Gram(s) IV Intermittent every 6 hours  vancomycin  IVPB 1000 milliGRAM(s) IV Intermittent every 8 hours    MEDICATIONS  (PRN):  dextrose Gel 1 Dose(s) Oral once PRN Blood Glucose LESS THAN 70 milliGRAM(s)/deciliter  glucagon  Injectable 1 milliGRAM(s) IntraMuscular once PRN Glucose LESS THAN 70 milligrams/deciliter      Allergies    Capzasin Back and Body (Unknown)    Intolerances        LABS:                        8.0    25.2  )-----------( 386      ( 05 Dec 2017 04:56 )             25.8         139  |  106  |  13  ----------------------------<  174<H>  3.3<L>   |  17<L>  |  0.74    Ca    7.6<L>      05 Dec 2017 04:58  Phos  3.1       Mg     1.3         TPro  6.9  /  Alb  2.0<L>  /  TBili  0.7  /  DBili  x   /  AST  6<L>  /  ALT  11<L>  /  AlkPhos  123<H>  12    PT/INR - ( 05 Dec 2017 07:04 )   PT: 19.8 sec;   INR: 1.76          PTT - ( 05 Dec 2017 07:04 )  PTT:27.1 sec  Urinalysis Basic - ( 04 Dec 2017 22:22 )    Color: Yellow / Appearance: Clear / S.010 / pH: x  Gluc: x / Ketone: NEGATIVE  / Bili: NEGATIVE / Urobili: 1.0 E.U./dL   Blood: x / Protein: 30 mg/dL / Nitrite: POSITIVE   Leuk Esterase: Large / RBC: x / WBC > 10 /HPF   Sq Epi: x / Non Sq Epi: x / Bacteria: Many /HPF        RADIOLOGY & ADDITIONAL TESTS: < from: Xray Chest 1 View AP- PORTABLE-Urgent (17 @ 04:34) >  EXAM:  XR CHEST 1 VIEW PORT URGENT                          PROCEDURE DATE:  2017                     INTERPRETATION:    Portable AP Radiograph dated 2017 4:34 AM    CLINICAL INFORMATION: 32 years, Male, central line placement    PRIOR STUDIES: Chest x-ray 2017 at 10:52 PM    FINDINGS:  Lines, Catheters and Support Devices: Interval placement of a right-sided   central line with its tip projecting over the right atrium.    Heart Size, Mediastinum and Hilar Contours: Heart size is stable. Stable   mediastinal and hilar contours.      Lungs: No focal consolidation. No definite pleural effusions. No evidence   of pneumothorax.     Bones/Soft Tissues: Redemonstrated is multilevel posterior fusion   hardware. Also seen is a smallgeometric metallic density projecting over   the superiormost aspect of the fusion hardware        IMPRESSION:  Interval placement of a right-sided central line with its tip projecting   over the right atrium.    No evidence of pneumothorax.    Lungsremain clear.                "Thank you for the opportunity to participate in the care of this   patient."    DAGMAR CARBAJAL M.D., RADIOLOGY RESIDENT  This document has been electronically signed.  JEN SOUTH M.D., ATTENDING RADIOLOGIST  This document has been electronically signed. Dec  5 2017  2:38PM                  < end of copied text >  Reviewed.

## 2017-12-05 NOTE — H&P ADULT - HISTORY OF PRESENT ILLNESS
Patient is a 32 year old M with a history of spinal cord injury (wheelchair bound), sacral pressure ulcer,  DM, indwelling suprapubic catheter, hepatitis B who presented for wound check for his pressure ulcers.  He has not followed up with his PCP in several months due to insurance issues.  He reported diarrhea but denied symptoms including F/C, CP, SOB, abdominal pain, nausea.  In the ED, he was initially afebrile but tachycardic () and hypotensive (72/40), RR 18, 100% on RA.  He had a leukocytosis with WBC 21.8, neutrophil predominance, anemia with hgb 9.1, hyponatremia Na+ 140, bicarb 21, AG 14, alk phos 123, lactate 2.9, and UA positive for leuk esterase, nitrites, WBC and bacteria.  He receieved 500 cc NS and his HR came down to 111 and BP rogers to systolic 140s.  BP then dropped again.  He spiked to 103.7.  He received 4.5 L NS, vanc/zosyn, tylenol and albumin.  He was transferred to [facility].    At [facility], he was started on levo gtt.  HR 84, BP 93/57, RR 20, 97%.  C diff studies were ordered.  Central line placed.          Wheelchair bound, Hx of SCI, hep B, DM, indwelling suprapubic cath. Patient is a 32 year old M with a history of spinal cord injury (paraplegic x ~5 years), sacral pressure ulcer with hx of sacral osteo in April and Aug 2017,  DM-2, indwelling suprapubic catheter, CVA in 2016, hepatitis B who presented for wound check for his pressure ulcers.  He has not followed up with his PCP in several months due to insurance issues.  He denied symptoms including F/C, CP, SOB, abdominal pain, diarrhea, nausea.  A few days ago he noted foul smelling urine but has not noticed changes in its appearance.  He has a sacral ulcer diagnosed in April 2017 which developed osteo at that time and a second time in August.  He was treated most recently with cefpodoxime (he believes).  He refused PICC at that time.  He has a 24 hour home health aid who has not mentioned any changes in his ulcer.  He does not have sensation below the nipple line.  He has fecal incontinence at baseline.  In the ED, he was initially afebrile but tachycardic () and hypotensive (72/40), RR 18, 100% on RA.  He was noted to have a purulent sacral ulcer.  He had a leukocytosis with WBC 21.8, neutrophil predominance, anemia with hgb 9.1, hyponatremia Na+ 140, bicarb 21, AG 14, alk phos 123, lactate 2.9, and UA positive for leuk esterase, nitrites, WBC and bacteria.  He receieved 500 cc NS and his HR came down to 111 and BP rogers to systolic 140s.  BP then dropped again.  He spiked to 103.7.  He received 4.5 L NS, vanc/zosyn, tylenol and albumin.  He was transferred to [facility].    At [facility], he was started on levo gtt.  HR 84, BP 93/57, RR 20, 97%.  C diff studies were ordered.  R IJ and A-line placed.

## 2017-12-05 NOTE — PATIENT PROFILE ADULT. - PRIOR FUNCTIONAL LEVEL COMMENT, PROFILE
Patient is a paraplegic for 8 years and has full use of his hands.  Patient needs assistance with putting on pants and shoes, assisting with bathing backside, patient is able to transfer from bed to wheelchair independently at home.  Patient has 24-hour home care assistance

## 2017-12-05 NOTE — PROGRESS NOTE ADULT - ASSESSMENT
Patient is a 32 year old paraplegic M with a history of spinal cord injury (wheelchair bound), sacral pressure ulcer with osteo x2 earlier in 2017,  DM-2, indwelling suprapubic catheter, hepatitis B with septic shock secondary to infected purulent sacral 4th degree pressure ulcer.    ID:  #septic shock   -secondary to infected 4th degree sacral ulcer, purulent with likely sacral osteo  -wound cultures sent, f/u  -In ED: Tmax 103.7, , BP 72/40, WBC 20k, lactate 2.9.  -s/p 4.5 L NS and vanc/zosyn.  C/w vanc/zosyn.  Vanc trough before 4th dose 12/5 10PM  -f/u blood cx NGTD  -BP improved on levo gtt.  Keep MAPs >65; wean as tolerated  -C/w IVF at maintenance  -f/u plastic surgery re: debridement  -Tylenol for fever  -f/u ESR/CRP    #R/o osteomyelitis of sacrum  -purulent wound on sacrum  --previous osteo in this area x2 in 2017  -plastic surgery consulted for possible debridement    #UTI  -patient does not have sensation or control of urination (neurogenic bladder)  -suprapubic catheter in place  - (+) UA with LE, nitrites, bacteria and WBC  -C/w abx as above  -f/u urine cx and blood cx    #hepatitis B  -obtain collateral from PCP  -AST/ALT WNL  -INR elevated ~1.8, stable    NEURO:  no sedation.    AOx3    #traumatic brain injury / paraplegia  -chronic.    -frequent turning  -wound care following, and recs followed    #seizure d/o  -Patient reports history of at least 2 seizures.  Unclear etiology.   -C/w home Keppra 500 mg BID    #history of CVA  -per patient described as hemorrhagic, call PMD for more info  -hold on ASA for now    CARDIAC:  #septic shock  -Treat as above    #sinus tachycardia - resolved  -Improved with IVF.  Secondary to septic shock.    -f/u EKG    #HTN  -Currently in septic shock. no reported BP home regimen per patient    #HLD  c/w home lipitor    RESP:  No respiratory symptoms.  Stable on room air.   CXR wnl, no further CXR necessary at this point    GI:  -rectal tube as patient is incontinent and has sacral ulcer    #elevated alk phos  -unclear etiology.  possibly musculoskeletal.  f/u CMP; trend    No bowel regimen at this time    RENAL:  #neurogenic bladder 2/2 to paraplegia  BUN, creatinine WNL  Monitor UOP  (+) suprapubic cath to bowles; may need to exchange and get urology if uCX +  c/w home oxybutynin    HEME:  #microcytic anemia  hgb 9.1.  MCV 74.  No sign of bleeding.  maintain active T&S  -f/u iron studies    ENDO:  #type 2 diabetes mellitus  -f/u A1C  -accuchecks Q6H and ISS for now.  Dosed Lantus 7 units in a.m.  Will likely need more when on diet all day, NPO after midnight, for possible debridement, so will keep 7 units for tomorrow  -hold home oral antihyperglycemic regimen (metformin, glimperide)    F:  LR at 100/hour  E:  replete PRN  N:  consistent carb diet no snacks  Ppx:  hep SQ  Dispo:  MICU  Code status:  full code Patient is a 32 year old paraplegic M with a history of spinal cord injury (wheelchair bound), sacral pressure ulcer with osteo x2 earlier in 2017,  DM-2, indwelling suprapubic catheter, hepatitis B with septic shock secondary to infected purulent sacral 4th degree pressure ulcer.    ID:  #septic shock   -secondary to infected 4th degree sacral ulcer, purulent with likely sacral osteo  -wound cultures sent, f/u  -In ED: Tmax 103.7, , BP 72/40, WBC 20k, lactate 2.9.  -s/p 4.5 L NS and vanc/zosyn.  C/w vanc/zosyn.  Vanc trough before 4th dose 12/5 10PM  -f/u blood cx NGTD  -BP improved on levo gtt.  Keep MAPs >65; wean as tolerated  -C/w IVF at maintenance  -f/u plastic surgery re: debridement  -Tylenol for fever  -f/u ESR/CRP    #R/o osteomyelitis of sacrum  -purulent wound on sacrum  --previous osteo in this area x2 in 2017  -plastic surgery consulted for possible debridement    #UTI  -patient does not have sensation or control of urination (neurogenic bladder)  -suprapubic catheter in place  - (+) UA with LE, nitrites, bacteria and WBC  -C/w abx as above  -f/u urine cx and blood cx    #hepatitis B  -obtain collateral from PCP  -AST/ALT WNL  -INR elevated ~1.8, stable    DERMATOLOGY:  #Ulcers  -plastic surgery consulted  -wound cultures sent  -blood cultures sent  Per wound care:  Sacral and ischial tuberosity pressure injuries - irrigate with wound cleanser, pack lightly with wet to damp Dakin's 1/4 strength solution kerlix and cover with foam dressing daily x 7 days.  Left foot lateral aspect ulcer - cleanse with wound cleanser, apply foam dressing every 3 days or prn if soiled or wet.    #Tinea corporis  Per wound care:  Fungal rash on bilateral buttocks - apply nystatin powder, seal with liquid skin barrier.  Fungal rash on bilateral inguinal area - apply nystatin powder.       NEURO:  no sedation.    AOx3    #traumatic brain injury / paraplegia  -chronic.    -frequent turning  -wound care following, and recs followed    #seizure d/o  -Patient reports history of at least 2 seizures.  Unclear etiology.   -C/w home Keppra 500 mg BID    #history of CVA  -per patient described as hemorrhagic, call PMD for more info  -hold on ASA for now    CARDIAC:  #septic shock  -Treat as above    #sinus tachycardia - resolved  -Improved with IVF.  Secondary to septic shock.    -f/u EKG    #HTN  -Currently in septic shock. no reported BP home regimen per patient    #HLD  c/w home lipitor    RESP:  No respiratory symptoms.  Stable on room air.   CXR wnl, no further CXR necessary at this point    GI:  -rectal tube as patient is incontinent and has sacral ulcer    #elevated alk phos  -unclear etiology.  possibly musculoskeletal.  f/u CMP; trend    No bowel regimen at this time    RENAL:  #neurogenic bladder 2/2 to paraplegia  BUN, creatinine WNL  Monitor UOP  (+) suprapubic cath to bowles; may need to exchange and get urology if uCX +  c/w home oxybutynin    HEME:  #microcytic anemia  hgb 9.1.  MCV 74.  No sign of bleeding.  maintain active T&S  -f/u iron studies    ENDO:  #type 2 diabetes mellitus  -f/u A1C  -accuchecks Q6H and ISS for now.  Dosed Lantus 7 units in a.m.  Will likely need more when on diet all day, NPO after midnight, for possible debridement, so will keep 7 units for tomorrow  -hold home oral antihyperglycemic regimen (metformin, glimperide)    F:  LR at 100/hour  E:  replete PRN  N:  consistent carb diet no snacks  Ppx:  hep SQ  Dispo:  MICU  Code status:  full code Patient is a 32 year old paraplegic M with a history of spinal cord injury (wheelchair bound), sacral pressure ulcer with osteo x2 earlier in 2017,  DM-2, indwelling suprapubic catheter, hepatitis B with septic shock secondary to infected purulent sacral 4th degree pressure ulcer.    ID:  #septic shock   -secondary to infected 4th degree sacral ulcer, purulent with likely sacral osteo  -wound cultures sent, f/u  -In ED: Tmax 103.7, , BP 72/40, WBC 20k, lactate 2.9.  -s/p 4.5 L NS and vanc/zosyn.  C/w vanc/zosyn.  Vanc trough before 4th dose 12/5 10PM  -f/u blood cx NGTD  -BP improved on levo gtt.  Keep MAPs >65; wean as tolerated  -C/w IVF at maintenance  -f/u plastic surgery re: debridement  -Tylenol for fever  -f/u ESR/CRP  -collateral from Dr. Alford at [facility] on previous osteo and outpatient tx    #R/o osteomyelitis of sacrum  -purulent wound on sacrum  --previous osteo in this area x2 in 2017  -plastic surgery consulted for possible debridement    #UTI  -patient does not have sensation or control of urination (neurogenic bladder)  -suprapubic catheter in place  - (+) UA with LE, nitrites, bacteria and WBC  -C/w abx as above  -f/u urine cx and blood cx    #hepatitis B  -obtain collateral from PCP  -AST/ALT WNL  -INR elevated ~1.8, stable    DERMATOLOGY:  #Ulcers  -plastic surgery consulted  -wound cultures sent  -blood cultures sent  Per wound care:  Sacral and ischial tuberosity pressure injuries - irrigate with wound cleanser, pack lightly with wet to damp Dakin's 1/4 strength solution kerlix and cover with foam dressing daily x 7 days.  Left foot lateral aspect ulcer - cleanse with wound cleanser, apply foam dressing every 3 days or prn if soiled or wet.    #Tinea corporis  Per wound care:  Fungal rash on bilateral buttocks - apply nystatin powder, seal with liquid skin barrier.  Fungal rash on bilateral inguinal area - apply nystatin powder.       NEURO:  no sedation.    AOx3    #traumatic brain injury / paraplegia  -chronic.    -frequent turning  -wound care following, and recs followed    #seizure d/o  -Patient reports history of at least 2 seizures.  Unclear etiology.   -C/w home Keppra 500 mg BID    #history of CVA  -per patient described as hemorrhagic, call PMD for more info  -hold on ASA for now    CARDIAC:  #septic shock  -Treat as above    #sinus tachycardia - resolved  -Improved with IVF.  Secondary to septic shock.    -f/u EKG    #HTN  -Currently in septic shock. no reported BP home regimen per patient    #HLD  c/w home lipitor    RESP:  No respiratory symptoms.  Stable on room air.   CXR wnl, no further CXR necessary at this point    GI:  -rectal tube as patient is incontinent and has sacral ulcer    #elevated alk phos  -unclear etiology.  possibly musculoskeletal.  f/u CMP; trend    No bowel regimen at this time    RENAL:  #neurogenic bladder 2/2 to paraplegia  BUN, creatinine WNL  Monitor UOP  (+) suprapubic cath to bowles; may need to exchange and get urology if uCX +  c/w home oxybutynin    HEME:  #microcytic anemia  hgb 9.1.  MCV 74.  No sign of bleeding.  maintain active T&S  -f/u iron studies    ENDO:  #type 2 diabetes mellitus  -f/u A1C  -accuchecks Q6H and ISS for now.  Dosed Lantus 7 units in a.m.  Will likely need more when on diet all day, NPO after midnight, for possible debridement, so will keep 7 units for tomorrow  -hold home oral antihyperglycemic regimen (metformin, glimperide)    PSYCH:  #paranoia  -patient states many of the wound care people that come to his home and people he sees as outpatient are corrupt and trying to steal his money.  -monitor  -patient resistant to seeing a psychiatrist    F:  LR at 100/hour  E:  replete PRN  N:  consistent carb diet no snacks  Ppx:  hep SQ  Dispo:  MICU  Code status:  full code Patient is a 32 year old paraplegic M with a history of spinal cord injury (wheelchair bound), sacral pressure ulcer with osteo x2 earlier in 2017,  DM-2, indwelling suprapubic catheter, hepatitis B with septic shock secondary to infected purulent sacral 4th degree pressure ulcer.    ID:  #septic shock   -secondary to infected 4th degree sacral ulcer, purulent with likely sacral osteo  -wound cultures sent, f/u  -In ED: Tmax 103.7, , BP 72/40, WBC 20k, lactate 2.9.  -s/p 4.5 L NS and vanc/zosyn.  C/w vanc/zosyn.  Vanc trough before 4th dose 12/5 10PM  -f/u blood cx NGTD  -BP improved on levo gtt.  Keep MAPs >65; wean as tolerated  -C/w IVF at maintenance  -f/u plastic surgery re: debridement  -Tylenol for fever  -f/u ESR/CRP  -collateral from Dr. Alford at [facility] ([facility] on previous osteo and outpatient tx    #R/o osteomyelitis of sacrum  -purulent wound on sacrum  --previous osteo in this area x2 in 2017  -plastic surgery consulted for possible debridement    #UTI  -patient does not have sensation or control of urination (neurogenic bladder)  -suprapubic catheter in place  - (+) UA with LE, nitrites, bacteria and WBC  -C/w abx as above  -f/u urine cx and blood cx    #hepatitis B  -obtain collateral from PCP  -AST/ALT WNL  -INR elevated ~1.8, stable    DERMATOLOGY:  #Ulcers  -plastic surgery consulted  -wound cultures sent  -blood cultures sent  Per wound care:  Sacral and ischial tuberosity pressure injuries - irrigate with wound cleanser, pack lightly with wet to damp Dakin's 1/4 strength solution kerlix and cover with foam dressing daily x 7 days.  Left foot lateral aspect ulcer - cleanse with wound cleanser, apply foam dressing every 3 days or prn if soiled or wet.    #Tinea corporis  Per wound care:  Fungal rash on bilateral buttocks - apply nystatin powder, seal with liquid skin barrier.  Fungal rash on bilateral inguinal area - apply nystatin powder.       NEURO:  no sedation.    AOx3    #traumatic brain injury / paraplegia  -chronic.    -frequent turning  -wound care following, and recs followed    #seizure d/o  -Patient reports history of at least 2 seizures.  Unclear etiology.   -C/w home Keppra 500 mg BID    #history of CVA  -per patient described as hemorrhagic, call PMD for more info  -hold on ASA for now    CARDIAC:  #septic shock  -Treat as above    #sinus tachycardia - resolved  -Improved with IVF.  Secondary to septic shock.    -f/u EKG    #HTN  -Currently in septic shock. no reported BP home regimen per patient    #HLD  c/w home lipitor    RESP:  No respiratory symptoms.  Stable on room air.   CXR wnl, no further CXR necessary at this point    GI:  -rectal tube as patient is incontinent and has sacral ulcer    #elevated alk phos  -unclear etiology.  possibly musculoskeletal.  f/u CMP; trend    No bowel regimen at this time    RENAL:  #neurogenic bladder 2/2 to paraplegia  BUN, creatinine WNL  Monitor UOP  (+) suprapubic cath to bowles; may need to exchange and get urology if uCX +  c/w home oxybutynin    HEME:  #microcytic anemia  hgb 9.1.  MCV 74.  No sign of bleeding.  maintain active T&S  -f/u iron studies    ENDO:  #type 2 diabetes mellitus  -f/u A1C  -accuchecks Q6H and ISS for now.  Dosed Lantus 7 units in a.m.  Will likely need more when on diet all day, NPO after midnight, for possible debridement, so will keep 7 units for tomorrow  -hold home oral antihyperglycemic regimen (metformin, glimperide)    PSYCH:  #paranoia  -patient states many of the wound care people that come to his home and people he sees as outpatient are corrupt and trying to steal his money.  -monitor  -patient resistant to seeing a psychiatrist  -social work offered to address concerns with home health aide, patient does not want to see     F:  LR at 100/hour  E:  replete PRN  N:  consistent carb diet no snacks  Ppx:  hep SQ  Dispo:  MICU  Code status:  full code Patient is a 32 year old paraplegic M with a history of spinal cord injury (wheelchair bound), sacral pressure ulcer with osteo x2 earlier in 2017,  DM-2, indwelling suprapubic catheter, hepatitis B with septic shock secondary to infected purulent sacral 4th degree pressure ulcer.    ID:  #septic shock   -secondary to infected 4th degree sacral ulcer, purulent with likely sacral osteo  -wound cultures sent, f/u  -In ED: Tmax 103.7, , BP 72/40, WBC 20k, lactate 2.9.  -s/p 4.5 L NS and vanc/zosyn.  C/w vanc/zosyn.  Vanc trough before 4th dose 12/5 10PM  -f/u blood cx NGTD  -BP improved on levo gtt.  Keep MAPs >65; wean as tolerated  -C/w IVF at maintenance  -f/u plastic surgery re: debridement  -Tylenol for fever  -f/u ESR/CRP  -collateral from Dr. Alford at [facility] ([facility] on previous osteo and outpatient tx    #R/o osteomyelitis of sacrum  -purulent wound on sacrum  --previous osteo in this area x2 in 2017  -plastic surgery consulted for possible debridement    #UTI  -patient does not have sensation or control of urination (neurogenic bladder)  -suprapubic catheter in place  - (+) UA with LE, nitrites, bacteria and WBC  -C/w abx as above  -f/u urine cx and blood cx    #hepatitis B  -obtain collateral from PCP  -AST/ALT WNL  -INR elevated ~1.8, stable    DERMATOLOGY:  #Ulcers  -plastic surgery consulted  -wound cultures sent  -blood cultures sent  Per wound care:  Sacral and ischial tuberosity pressure injuries - irrigate with wound cleanser, pack lightly with wet to damp Dakin's 1/4 strength solution kerlix and cover with foam dressing daily x 7 days.  Left foot lateral aspect ulcer - cleanse with wound cleanser, apply foam dressing every 3 days or prn if soiled or wet.    #Tinea corporis  Per wound care:  Fungal rash on bilateral buttocks - apply nystatin powder, seal with liquid skin barrier.  Fungal rash on bilateral inguinal area - apply nystatin powder.       NEURO:  no sedation.    AOx3    #traumatic brain injury / paraplegia  -chronic.    -frequent turning  -wound care following, and recs followed    #seizure d/o  -Patient reports history of at least 2 seizures.  Unclear etiology.   -C/w home Keppra 500 mg BID    #history of CVA  -per patient described as hemorrhagic, call PMD for more info  -hold on ASA for now    CARDIAC:  #septic shock  -Treat as above    #sinus tachycardia - resolved  -Improved with IVF.  Secondary to septic shock.    -f/u EKG    #HTN  -Currently in septic shock. no reported BP home regimen per patient    #HLD  c/w home lipitor    RESP:  No respiratory symptoms.  Stable on room air.   CXR wnl, no further CXR necessary at this point    GI:  -rectal tube as patient is incontinent and has sacral ulcer    #elevated alk phos  -unclear etiology.  possibly musculoskeletal.  f/u CMP; trend    No bowel regimen at this time    RENAL:  #neurogenic bladder 2/2 to paraplegia  BUN, creatinine WNL  Monitor UOP  (+) suprapubic cath to bowles; may need to exchange and get urology if uCX +  c/w home oxybutynin    HEME:  #microcytic anemia  hgb 9.1.  MCV 74.  No sign of bleeding.  maintain active T&S  -f/u iron studies    ENDO:  #type 2 diabetes mellitus  -f/u A1C  -accuchecks Q6H and ISS for now.  Dosed Lantus 7 units in a.m.  Will likely need more when on diet all day, NPO after midnight, for possible debridement, so will keep 7 units for tomorrow  -hold home oral antihyperglycemic regimen (metformin, glimperide)    PSYCH:  #paranoia  -patient states many of the wound care people that come to his home and people he sees as outpatient are corrupt and trying to steal his money.  -monitor  -patient resistant to seeing a psychiatrist  -social work offered to address concerns with home health aide, patient does not want to see     F:  LR at 100/hour  E:  replete PRN  N:  consistent carb diet no snacks  Ppx:  hep SQ  Dispo:  MICU  Code status:  full code  Critical care time rendered 40 minutes
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
## 2017-12-06 DIAGNOSIS — Z93.59 OTHER CYSTOSTOMY STATUS: ICD-10-CM

## 2017-12-06 DIAGNOSIS — A41.9 SEPSIS, UNSPECIFIED ORGANISM: ICD-10-CM

## 2017-12-06 LAB
ANION GAP SERPL CALC-SCNC: 14 MMOL/L — SIGNIFICANT CHANGE UP (ref 5–17)
ANISOCYTOSIS BLD QL: SLIGHT — SIGNIFICANT CHANGE UP
APTT BLD: 27.1 SEC — LOW (ref 27.5–37.4)
BASOPHILS NFR BLD AUTO: 0 % — SIGNIFICANT CHANGE UP (ref 0–2)
BUN SERPL-MCNC: 9 MG/DL — SIGNIFICANT CHANGE UP (ref 7–23)
CALCIUM SERPL-MCNC: 8.1 MG/DL — LOW (ref 8.4–10.5)
CHLORIDE SERPL-SCNC: 99 MMOL/L — SIGNIFICANT CHANGE UP (ref 96–108)
CO2 SERPL-SCNC: 21 MMOL/L — LOW (ref 22–31)
CREAT SERPL-MCNC: 0.79 MG/DL — SIGNIFICANT CHANGE UP (ref 0.5–1.3)
EOSINOPHIL NFR BLD AUTO: 2 % — SIGNIFICANT CHANGE UP (ref 0–6)
GLUCOSE BLDC GLUCOMTR-MCNC: 104 MG/DL — HIGH (ref 70–99)
GLUCOSE BLDC GLUCOMTR-MCNC: 153 MG/DL — HIGH (ref 70–99)
GLUCOSE BLDC GLUCOMTR-MCNC: 176 MG/DL — HIGH (ref 70–99)
GLUCOSE BLDC GLUCOMTR-MCNC: 182 MG/DL — HIGH (ref 70–99)
GLUCOSE SERPL-MCNC: 183 MG/DL — HIGH (ref 70–99)
GRAM STN FLD: SIGNIFICANT CHANGE UP
GRAM STN FLD: SIGNIFICANT CHANGE UP
HCT VFR BLD CALC: 22.8 % — LOW (ref 39–50)
HGB BLD-MCNC: 7.1 G/DL — LOW (ref 13–17)
HYPOCHROMIA BLD QL: SLIGHT — SIGNIFICANT CHANGE UP
INR BLD: 1.42 — HIGH (ref 0.88–1.16)
LYMPHOCYTES # BLD AUTO: 17 % — SIGNIFICANT CHANGE UP (ref 13–44)
MAGNESIUM SERPL-MCNC: 1.9 MG/DL — SIGNIFICANT CHANGE UP (ref 1.6–2.6)
MANUAL DIF COMMENT BLD-IMP: SIGNIFICANT CHANGE UP
MANUAL SMEAR VERIFICATION: SIGNIFICANT CHANGE UP
MCHC RBC-ENTMCNC: 23 PG — LOW (ref 27–34)
MCHC RBC-ENTMCNC: 31.1 G/DL — LOW (ref 32–36)
MCV RBC AUTO: 73.8 FL — LOW (ref 80–100)
MICROCYTES BLD QL: SLIGHT — SIGNIFICANT CHANGE UP
MONOCYTES NFR BLD AUTO: 2 % — SIGNIFICANT CHANGE UP (ref 2–14)
NEUTROPHILS NFR BLD AUTO: 69 % — SIGNIFICANT CHANGE UP (ref 43–77)
NEUTS BAND # BLD: 10 % — SIGNIFICANT CHANGE UP
OVALOCYTES BLD QL SMEAR: SLIGHT — SIGNIFICANT CHANGE UP
PHOSPHATE SERPL-MCNC: 3.1 MG/DL — SIGNIFICANT CHANGE UP (ref 2.5–4.5)
PLAT MORPH BLD: (no result)
PLATELET # BLD AUTO: 305 K/UL — SIGNIFICANT CHANGE UP (ref 150–400)
POIKILOCYTOSIS BLD QL AUTO: SLIGHT — SIGNIFICANT CHANGE UP
POTASSIUM SERPL-MCNC: 3.7 MMOL/L — SIGNIFICANT CHANGE UP (ref 3.5–5.3)
POTASSIUM SERPL-SCNC: 3.7 MMOL/L — SIGNIFICANT CHANGE UP (ref 3.5–5.3)
PROTHROM AB SERPL-ACNC: 15.9 SEC — HIGH (ref 9.8–12.7)
RBC # BLD: 3.09 M/UL — LOW (ref 4.2–5.8)
RBC # FLD: 17.2 % — HIGH (ref 10.3–16.9)
RBC BLD AUTO: (no result)
SODIUM SERPL-SCNC: 134 MMOL/L — LOW (ref 135–145)
SPECIMEN SOURCE: SIGNIFICANT CHANGE UP
SPECIMEN SOURCE: SIGNIFICANT CHANGE UP
SPHEROCYTES BLD QL SMEAR: SLIGHT — SIGNIFICANT CHANGE UP
VANCOMYCIN TROUGH SERPL-MCNC: 16.9 UG/ML — SIGNIFICANT CHANGE UP (ref 10–20)
WBC # BLD: 9.2 K/UL — SIGNIFICANT CHANGE UP (ref 3.8–10.5)
WBC # FLD AUTO: 9.2 K/UL — SIGNIFICANT CHANGE UP (ref 3.8–10.5)

## 2017-12-06 PROCEDURE — 99233 SBSQ HOSP IP/OBS HIGH 50: CPT | Mod: GC

## 2017-12-06 PROCEDURE — 99223 1ST HOSP IP/OBS HIGH 75: CPT | Mod: GC

## 2017-12-06 RX ORDER — VANCOMYCIN HCL 1 G
1000 VIAL (EA) INTRAVENOUS ONCE
Qty: 0 | Refills: 0 | Status: COMPLETED | OUTPATIENT
Start: 2017-12-06 | End: 2017-12-06

## 2017-12-06 RX ORDER — VANCOMYCIN HCL 1 G
VIAL (EA) INTRAVENOUS
Qty: 0 | Refills: 0 | Status: DISCONTINUED | OUTPATIENT
Start: 2017-12-06 | End: 2017-12-07

## 2017-12-06 RX ORDER — POTASSIUM CHLORIDE 20 MEQ
40 PACKET (EA) ORAL ONCE
Qty: 0 | Refills: 0 | Status: COMPLETED | OUTPATIENT
Start: 2017-12-06 | End: 2017-12-06

## 2017-12-06 RX ORDER — VANCOMYCIN HCL 1 G
1000 VIAL (EA) INTRAVENOUS EVERY 8 HOURS
Qty: 0 | Refills: 0 | Status: DISCONTINUED | OUTPATIENT
Start: 2017-12-06 | End: 2017-12-07

## 2017-12-06 RX ADMIN — HEPARIN SODIUM 5000 UNIT(S): 5000 INJECTION INTRAVENOUS; SUBCUTANEOUS at 06:04

## 2017-12-06 RX ADMIN — Medication 250 MILLIGRAM(S): at 11:54

## 2017-12-06 RX ADMIN — HEPARIN SODIUM 5000 UNIT(S): 5000 INJECTION INTRAVENOUS; SUBCUTANEOUS at 13:24

## 2017-12-06 RX ADMIN — ATORVASTATIN CALCIUM 20 MILLIGRAM(S): 80 TABLET, FILM COATED ORAL at 22:32

## 2017-12-06 RX ADMIN — Medication 250 MILLIGRAM(S): at 05:30

## 2017-12-06 RX ADMIN — Medication 40 MILLIEQUIVALENT(S): at 11:54

## 2017-12-06 RX ADMIN — Medication 2: at 11:54

## 2017-12-06 RX ADMIN — INSULIN GLARGINE 7 UNIT(S): 100 INJECTION, SOLUTION SUBCUTANEOUS at 08:00

## 2017-12-06 RX ADMIN — SODIUM CHLORIDE 100 MILLILITER(S): 9 INJECTION, SOLUTION INTRAVENOUS at 03:00

## 2017-12-06 RX ADMIN — Medication 2: at 06:17

## 2017-12-06 RX ADMIN — Medication 250 MILLIGRAM(S): at 13:23

## 2017-12-06 RX ADMIN — Medication 1 APPLICATION(S): at 11:55

## 2017-12-06 RX ADMIN — PIPERACILLIN AND TAZOBACTAM 200 GRAM(S): 4; .5 INJECTION, POWDER, LYOPHILIZED, FOR SOLUTION INTRAVENOUS at 00:03

## 2017-12-06 RX ADMIN — Medication 5 MILLIGRAM(S): at 06:18

## 2017-12-06 RX ADMIN — PIPERACILLIN AND TAZOBACTAM 200 GRAM(S): 4; .5 INJECTION, POWDER, LYOPHILIZED, FOR SOLUTION INTRAVENOUS at 12:22

## 2017-12-06 RX ADMIN — NYSTATIN CREAM 1 APPLICATION(S): 100000 CREAM TOPICAL at 06:18

## 2017-12-06 RX ADMIN — Medication: at 17:47

## 2017-12-06 RX ADMIN — NYSTATIN CREAM 1 APPLICATION(S): 100000 CREAM TOPICAL at 17:48

## 2017-12-06 RX ADMIN — Medication 250 MILLIGRAM(S): at 22:32

## 2017-12-06 RX ADMIN — PIPERACILLIN AND TAZOBACTAM 200 GRAM(S): 4; .5 INJECTION, POWDER, LYOPHILIZED, FOR SOLUTION INTRAVENOUS at 06:36

## 2017-12-06 RX ADMIN — LEVETIRACETAM 500 MILLIGRAM(S): 250 TABLET, FILM COATED ORAL at 06:04

## 2017-12-06 RX ADMIN — Medication 5 MILLIGRAM(S): at 17:18

## 2017-12-06 RX ADMIN — PIPERACILLIN AND TAZOBACTAM 200 GRAM(S): 4; .5 INJECTION, POWDER, LYOPHILIZED, FOR SOLUTION INTRAVENOUS at 19:56

## 2017-12-06 RX ADMIN — HEPARIN SODIUM 5000 UNIT(S): 5000 INJECTION INTRAVENOUS; SUBCUTANEOUS at 22:32

## 2017-12-06 RX ADMIN — LEVETIRACETAM 500 MILLIGRAM(S): 250 TABLET, FILM COATED ORAL at 17:18

## 2017-12-06 NOTE — CONSULT NOTE ADULT - ASSESSMENT
32M w/ recurrent sacral decub and osteomyelitis presents w/ septic shock like 2/2 infected ulcer/osteo vs UTI w/ SP catheter

## 2017-12-06 NOTE — CONSULT NOTE ADULT - SUBJECTIVE AND OBJECTIVE BOX
HPI:  32M h a history of spinal cord injury (paraplegic x ~5 years), sacral pressure ulcer with hx of sacral osteo in April and Aug 2017,  DM-2, indwelling suprapubic catheter, CVA in 2016, hepatitis B who presented for wound check for his pressure ulcers.  He has not followed up with his PCP in several months due to insurance issues.  He denied symptoms including F/C, CP, SOB, abdominal pain, diarrhea, nausea.  A few days ago he noted foul smelling urine but has not noticed changes in its appearance.  He has a sacral ulcer diagnosed in April 2017 which developed osteo at that time and a second time in August.  He was treated most recently with cefpodoxime (he believes).  He refused PICC at that time.  He has a 24 hour home health aid who has not mentioned any changes in his ulcer.  He does not have sensation below the nipple line.  He has fecal incontinence at baseline.  In the ED, he was initially afebrile but tachycardic () and hypotensive (72/40), RR 18, 100% on RA.  He was noted to have a purulent sacral ulcer.  He had a leukocytosis with WBC 21.8, neutrophil predominance, anemia with hgb 9.1, hyponatremia Na+ 140, bicarb 21, AG 14, alk phos 123, lactate 2.9, and UA positive for leuk esterase, nitrites, WBC and bacteria.  He receieved 500 cc NS and his HR came down to 111 and BP rogers to systolic 140s.  BP then dropped again.  He spiked to 103.7.  He received 4.5 L NS, vanc/zosyn, tylenol and albumin.  He was transferred to [facility].    At [facility], he was started on levo gtt.  HR 84, BP 93/57, RR 20, 97%.  C diff studies were ordered.  R IJ and A-line placed. 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 1000 milliLiter(s) (50 mL/Hr) IV Continuous <Continuous>  dextrose 50% Injectable 12.5 Gram(s) IV Push once  dextrose 50% Injectable 25 Gram(s) IV Push once  dextrose 50% Injectable 25 Gram(s) IV Push once  heparin  Injectable 5000 Unit(s) SubCutaneous every 8 hours  insulin glargine Injectable (LANTUS) 7 Unit(s) SubCutaneous every morning  insulin lispro (HumaLOG) corrective regimen sliding scale   SubCutaneous Before meals and at bedtime  lactated ringers. 1000 milliLiter(s) (100 mL/Hr) IV Continuous <Continuous>  levETIRAcetam 500 milliGRAM(s) Oral two times a day  norepinephrine Infusion 0.01 MICROgram(s)/kG/Min (1.361 mL/Hr) IV Continuous <Continuous>  nystatin Powder 1 Application(s) Topical two times a day  oxybutynin 5 milliGRAM(s) Oral two times a day  piperacillin/tazobactam IVPB. 3.375 Gram(s) IV Intermittent every 6 hours  vancomycin  IVPB      vancomycin  IVPB 1000 milliGRAM(s) IV Intermittent every 8 hours    MEDICATIONS  (PRN):  bisacodyl Suppository 10 milliGRAM(s) Rectal daily PRN Constipation  dextrose Gel 1 Dose(s) Oral once PRN Blood Glucose LESS THAN 70 milliGRAM(s)/deciliter  glucagon  Injectable 1 milliGRAM(s) IntraMuscular once PRN Glucose LESS THAN 70 milligrams/deciliter      Allergies    Capzasin Back and Body (Unknown)    Intolerances HPI:  32M w/ previous spinal cord injury (paraplegic x ~5 years), sacral pressure ulcer with hx of sacral osteo in April and Aug 2017,  DM-2, indwelling suprapubic catheter, who presented for wound check for his pressure ulcers and found to be in septic shock, concerning for recurrent infection from sacral ulcer w/ possible osteomyelitis.  He reports getting multiple previous infections and recalls being on zosyn and meropenem.  He cannot recall which bacteria grew.  He expresses a lot of frustration w/ his home care.    At [facility], he was started on levo gtt.  HR 84, BP 93/57, RR 20, 97%.  C diff studies were ordered.  R IJ and A-line placed. 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 1000 milliLiter(s) (50 mL/Hr) IV Continuous <Continuous>  dextrose 50% Injectable 12.5 Gram(s) IV Push once  dextrose 50% Injectable 25 Gram(s) IV Push once  dextrose 50% Injectable 25 Gram(s) IV Push once  heparin  Injectable 5000 Unit(s) SubCutaneous every 8 hours  insulin glargine Injectable (LANTUS) 7 Unit(s) SubCutaneous every morning  insulin lispro (HumaLOG) corrective regimen sliding scale   SubCutaneous Before meals and at bedtime  lactated ringers. 1000 milliLiter(s) (100 mL/Hr) IV Continuous <Continuous>  levETIRAcetam 500 milliGRAM(s) Oral two times a day  norepinephrine Infusion 0.01 MICROgram(s)/kG/Min (1.361 mL/Hr) IV Continuous <Continuous>  nystatin Powder 1 Application(s) Topical two times a day  oxybutynin 5 milliGRAM(s) Oral two times a day  piperacillin/tazobactam IVPB. 3.375 Gram(s) IV Intermittent every 6 hours  vancomycin  IVPB      vancomycin  IVPB 1000 milliGRAM(s) IV Intermittent every 8 hours    MEDICATIONS  (PRN):  bisacodyl Suppository 10 milliGRAM(s) Rectal daily PRN Constipation  dextrose Gel 1 Dose(s) Oral once PRN Blood Glucose LESS THAN 70 milliGRAM(s)/deciliter  glucagon  Injectable 1 milliGRAM(s) IntraMuscular once PRN Glucose LESS THAN 70 milligrams/deciliter      Allergies    Capzasin Back and Body (Unknown)    Intolerances    VITALS  Vital Signs Last 24 Hrs  T(C): 36.6 (06 Dec 2017 09:41), Max: 37.2 (06 Dec 2017 06:00)  T(F): 97.8 (06 Dec 2017 09:41), Max: 99 (06 Dec 2017 06:00)  HR: 80 (06 Dec 2017 10:00) (60 - 100)  BP: 106/56 (06 Dec 2017 10:00) (79/45 - 110/49)  BP(mean): 79 (06 Dec 2017 10:00) (55 - 79)  RR: 21 (06 Dec 2017 10:00) (6 - 28)  SpO2: 99% (06 Dec 2017 10:00) (95% - 100%)    I&O's Summary    05 Dec 2017 07:  -  06 Dec 2017 07:00  --------------------------------------------------------  IN: 3383.4 mL / OUT: 5085 mL / NET: -1701.6 mL    06 Dec 2017 07:01  -  06 Dec 2017 11:07  --------------------------------------------------------  IN: 204 mL / OUT: 750 mL / NET: -546 mL        CAPILLARY BLOOD GLUCOSE      POCT Blood Glucose.: 176 mg/dL (06 Dec 2017 06:07)  POCT Blood Glucose.: 137 mg/dL (05 Dec 2017 22:24)  POCT Blood Glucose.: 142 mg/dL (05 Dec 2017 15:17)    PHYSICAL EXAM  General: A&Ox3; NAD; hungry  Head: NC/AT; PERRL; EOMI; anicteric sclera; MMM  Neck: Supple; no JVD; TLC in place on right  Respiratory: CTA B/L; no wheezes/crackles   Cardiovascular: Regular rhythm/rate; S1/S2   Gastrointestinal: Soft; mild distension at bases; bowel sounds normal  Extremities: WWP; no edema or cyanosis; radial/pedal pulses palpable  Neurological:  Sensation and motor loss below T4-5 level; CNII-XII intact  Skin: Sacral decub ulcer w/ bone exposure and purulence drainage    LABS                        7.1    9.2   )-----------( 305      ( 06 Dec 2017 06:16 )             22.8     12-    134<L>  |  99  |  9   ----------------------------<  183<H>  3.7   |  21<L>  |  0.79    Ca    8.1<L>      06 Dec 2017 06:15  Phos  3.1     12-  Mg     1.9     12-    TPro  6.9  /  Alb  2.0<L>  /  TBili  0.7  /  DBili  x   /  AST  6<L>  /  ALT  11<L>  /  AlkPhos  123<H>  12-04    PT/INR - ( 06 Dec 2017 06:16 )   PT: 15.9 sec;   INR: 1.42          PTT - ( 06 Dec 2017 06:16 )  PTT:27.1 sec  Urinalysis Basic - ( 04 Dec 2017 22:22 )    Color: Yellow / Appearance: Clear / S.010 / pH: x  Gluc: x / Ketone: NEGATIVE  / Bili: NEGATIVE / Urobili: 1.0 E.U./dL   Blood: x / Protein: 30 mg/dL / Nitrite: POSITIVE   Leuk Esterase: Large / RBC: x / WBC > 10 /HPF   Sq Epi: x / Non Sq Epi: x / Bacteria: Many /HPF    Lactate, Blood: 2.1 mmoL/L (17 @ 03:13)  Lactate, Blood: 2.9 mmoL/L (17 @ 23:30)  Lactate, Blood: 2.9 mmoL/L (17 @ 22:22)    Wound culture: Gram negative rods    CXR: Spinal hardware w/ no acute lung pathology

## 2017-12-06 NOTE — CONSULT NOTE ADULT - PROBLEM SELECTOR RECOMMENDATION 9
-draining well  -will need SPT change  -f/u u/a  -continue present care -draining well, changed at [facility] prior to transfer to [facility]  -will reevaluate in am  -f/u u/a  -continue present care -draining well, changed at [facility] prior to transfer to [facility]  -will reevaluate in am- may need upsize of wilbur (used to have 22 [place])  -f/u u/a  -continue present care -upsized to 22F 12/7, no issues  -continue present care

## 2017-12-06 NOTE — CONSULT NOTE ADULT - SUBJECTIVE AND OBJECTIVE BOX
HPI:  Patient is a 32 year old M with a history of spinal cord injury (paraplegic x ~5 years), sacral pressure ulcer with hx of sacral osteo in April and Aug 2017,  DM-2, indwelling suprapubic catheter, CVA in 2016, hepatitis B who presented for wound check for his pressure ulcers.  He has not followed up with his PCP in several months due to insurance issues. Admitted for septic shock from probably sacral ulcer/osteo. +indwelling SPT not followed up recently. Called to evaluate.        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 1000 milliLiter(s) (50 mL/Hr) IV Continuous <Continuous>  dextrose 50% Injectable 12.5 Gram(s) IV Push once  dextrose 50% Injectable 25 Gram(s) IV Push once  dextrose 50% Injectable 25 Gram(s) IV Push once  heparin  Injectable 5000 Unit(s) SubCutaneous every 8 hours  insulin glargine Injectable (LANTUS) 7 Unit(s) SubCutaneous every morning  insulin lispro (HumaLOG) corrective regimen sliding scale   SubCutaneous Before meals and at bedtime  levETIRAcetam 500 milliGRAM(s) Oral two times a day  nystatin Powder 1 Application(s) Topical two times a day  oxybutynin 5 milliGRAM(s) Oral two times a day  piperacillin/tazobactam IVPB. 3.375 Gram(s) IV Intermittent every 6 hours  vancomycin  IVPB      vancomycin  IVPB 1000 milliGRAM(s) IV Intermittent every 8 hours    MEDICATIONS  (PRN):  bisacodyl Suppository 10 milliGRAM(s) Rectal daily PRN Constipation  dextrose Gel 1 Dose(s) Oral once PRN Blood Glucose LESS THAN 70 milliGRAM(s)/deciliter  glucagon  Injectable 1 milliGRAM(s) IntraMuscular once PRN Glucose LESS THAN 70 milligrams/deciliter      Allergies    Capzasin Back and Body (Unknown)    Intolerances        SOCIAL HISTORY:    FAMILY HISTORY:  Family history of brain cancer (Father)      Vital Signs Last 24 Hrs  T(C): 36.7 (06 Dec 2017 18:28), Max: 37.2 (06 Dec 2017 06:00)  T(F): 98.1 (06 Dec 2017 18:28), Max: 99 (06 Dec 2017 06:00)  HR: 78 (06 Dec 2017 21:00) (72 - 100)  BP: 106/56 (06 Dec 2017 10:00) (92/46 - 110/49)  BP(mean): 79 (06 Dec 2017 10:00) (62 - 79)  RR: 18 (06 Dec 2017 21:00) (15 - 28)  SpO2: 100% (06 Dec 2017 21:00) (95% - 100%)    On PE:  General:  Abdomen:    :    EXT:    LABS:                        7.1    9.2   )-----------( 305      ( 06 Dec 2017 06:16 )             22.8     12-06    134<L>  |  99  |  9   ----------------------------<  183<H>  3.7   |  21<L>  |  0.79    Ca    8.1<L>      06 Dec 2017 06:15  Phos  3.1     12-06  Mg     1.9     12-06    TPro  6.9  /  Alb  2.0<L>  /  TBili  0.7  /  DBili  x   /  AST  6<L>  /  ALT  11<L>  /  AlkPhos  123<H>  12-04    PT/INR - ( 06 Dec 2017 06:16 )   PT: 15.9 sec;   INR: 1.42          PTT - ( 06 Dec 2017 06:16 )  PTT:27.1 sec  Urinalysis Basic - ( 04 Dec 2017 22:22 )    Color: Yellow / Appearance: Clear / S.010 / pH: x  Gluc: x / Ketone: NEGATIVE  / Bili: NEGATIVE / Urobili: 1.0 E.U./dL   Blood: x / Protein: 30 mg/dL / Nitrite: POSITIVE   Leuk Esterase: Large / RBC: x / WBC > 10 /HPF   Sq Epi: x / Non Sq Epi: x / Bacteria: Many /HPF        RADIOLOGY & ADDITIONAL STUDIES: HPI:  Patient is a 32 year old M with a history of spinal cord injury (paraplegic x ~5 years), sacral pressure ulcer with hx of sacral osteo in April and Aug 2017,  DM-2, indwelling suprapubic catheter, CVA in 2016, hepatitis B who presented for wound check for his pressure ulcers.  He has not followed up with his PCP in several months due to insurance issues. Admitted for septic shock from probably sacral ulcer/osteo. +indwelling SPT not followed up recently. SPT changed in [facility] prior to transfer to [facility]. Called to evaluate.        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 1000 milliLiter(s) (50 mL/Hr) IV Continuous <Continuous>  dextrose 50% Injectable 12.5 Gram(s) IV Push once  dextrose 50% Injectable 25 Gram(s) IV Push once  dextrose 50% Injectable 25 Gram(s) IV Push once  heparin  Injectable 5000 Unit(s) SubCutaneous every 8 hours  insulin glargine Injectable (LANTUS) 7 Unit(s) SubCutaneous every morning  insulin lispro (HumaLOG) corrective regimen sliding scale   SubCutaneous Before meals and at bedtime  levETIRAcetam 500 milliGRAM(s) Oral two times a day  nystatin Powder 1 Application(s) Topical two times a day  oxybutynin 5 milliGRAM(s) Oral two times a day  piperacillin/tazobactam IVPB. 3.375 Gram(s) IV Intermittent every 6 hours  vancomycin  IVPB      vancomycin  IVPB 1000 milliGRAM(s) IV Intermittent every 8 hours    MEDICATIONS  (PRN):  bisacodyl Suppository 10 milliGRAM(s) Rectal daily PRN Constipation  dextrose Gel 1 Dose(s) Oral once PRN Blood Glucose LESS THAN 70 milliGRAM(s)/deciliter  glucagon  Injectable 1 milliGRAM(s) IntraMuscular once PRN Glucose LESS THAN 70 milligrams/deciliter      Allergies    Capzasin Back and Body (Unknown)    Intolerances        SOCIAL HISTORY:    FAMILY HISTORY:  Family history of brain cancer (Father)      Vital Signs Last 24 Hrs  T(C): 36.7 (06 Dec 2017 18:28), Max: 37.2 (06 Dec 2017 06:00)  T(F): 98.1 (06 Dec 2017 18:28), Max: 99 (06 Dec 2017 06:00)  HR: 78 (06 Dec 2017 21:00) (72 - 100)  BP: 106/56 (06 Dec 2017 10:00) (92/46 - 110/49)  BP(mean): 79 (06 Dec 2017 10:00) (62 - 79)  RR: 18 (06 Dec 2017 21:00) (15 - 28)  SpO2: 100% (06 Dec 2017 21:00) (95% - 100%)    On PE:  General:  Abdomen:     : +SPT      LABS:                        7.1    9.2   )-----------( 305      ( 06 Dec 2017 06:16 )             22.8     12-06    134<L>  |  99  |  9   ----------------------------<  183<H>  3.7   |  21<L>  |  0.79    Ca    8.1<L>      06 Dec 2017 06:15  Phos  3.1     12-06  Mg     1.9     12-06    TPro  6.9  /  Alb  2.0<L>  /  TBili  0.7  /  DBili  x   /  AST  6<L>  /  ALT  11<L>  /  AlkPhos  123<H>  12-04    PT/INR - ( 06 Dec 2017 06:16 )   PT: 15.9 sec;   INR: 1.42          PTT - ( 06 Dec 2017 06:16 )  PTT:27.1 sec  Urinalysis Basic - ( 04 Dec 2017 22:22 )    Color: Yellow / Appearance: Clear / S.010 / pH: x  Gluc: x / Ketone: NEGATIVE  / Bili: NEGATIVE / Urobili: 1.0 E.U./dL   Blood: x / Protein: 30 mg/dL / Nitrite: POSITIVE   Leuk Esterase: Large / RBC: x / WBC > 10 /HPF   Sq Epi: x / Non Sq Epi: x / Bacteria: Many /HPF        RADIOLOGY & ADDITIONAL STUDIES: HPI:  Patient is a 32 year old M with a history of spinal cord injury (paraplegic x ~5 years), sacral pressure ulcer with hx of sacral osteo in April and Aug 2017,  DM-2, indwelling suprapubic catheter, CVA in 2016, hepatitis B who presented for wound check for his pressure ulcers.  He has not followed up with his PCP in several months due to insurance issues. Admitted for septic shock from probably sacral ulcer/osteo. +indwelling SPT not followed up recently. SPT changed in [facility] prior to transfer to [facility]. Called to evaluate leakage around SPT.        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 1000 milliLiter(s) (50 mL/Hr) IV Continuous <Continuous>  dextrose 50% Injectable 12.5 Gram(s) IV Push once  dextrose 50% Injectable 25 Gram(s) IV Push once  dextrose 50% Injectable 25 Gram(s) IV Push once  heparin  Injectable 5000 Unit(s) SubCutaneous every 8 hours  insulin glargine Injectable (LANTUS) 7 Unit(s) SubCutaneous every morning  insulin lispro (HumaLOG) corrective regimen sliding scale   SubCutaneous Before meals and at bedtime  levETIRAcetam 500 milliGRAM(s) Oral two times a day  nystatin Powder 1 Application(s) Topical two times a day  oxybutynin 5 milliGRAM(s) Oral two times a day  piperacillin/tazobactam IVPB. 3.375 Gram(s) IV Intermittent every 6 hours  vancomycin  IVPB      vancomycin  IVPB 1000 milliGRAM(s) IV Intermittent every 8 hours    MEDICATIONS  (PRN):  bisacodyl Suppository 10 milliGRAM(s) Rectal daily PRN Constipation  dextrose Gel 1 Dose(s) Oral once PRN Blood Glucose LESS THAN 70 milliGRAM(s)/deciliter  glucagon  Injectable 1 milliGRAM(s) IntraMuscular once PRN Glucose LESS THAN 70 milligrams/deciliter      Allergies    Capzasin Back and Body (Unknown)    Intolerances        SOCIAL HISTORY:    FAMILY HISTORY:  Family history of brain cancer (Father)      Vital Signs Last 24 Hrs  T(C): 36.7 (06 Dec 2017 18:28), Max: 37.2 (06 Dec 2017 06:00)  T(F): 98.1 (06 Dec 2017 18:28), Max: 99 (06 Dec 2017 06:00)  HR: 78 (06 Dec 2017 21:00) (72 - 100)  BP: 106/56 (06 Dec 2017 10:00) (92/46 - 110/49)  BP(mean): 79 (06 Dec 2017 10:00) (62 - 79)  RR: 18 (06 Dec 2017 21:00) (15 - 28)  SpO2: 100% (06 Dec 2017 21:00) (95% - 100%)    On PE:  General: alert and awake  Abdomen:  soft, ND    : +SPT #18 Uzbek with noted leakage around site of insertion      LABS:                        7.1    9.2   )-----------( 305      ( 06 Dec 2017 06:16 )             22.8     12-    134<L>  |  99  |  9   ----------------------------<  183<H>  3.7   |  21<L>  |  0.79    Ca    8.1<L>      06 Dec 2017 06:15  Phos  3.1     12-06  Mg     1.9     12-06    TPro  6.9  /  Alb  2.0<L>  /  TBili  0.7  /  DBili  x   /  AST  6<L>  /  ALT  11<L>  /  AlkPhos  123<H>  12-04    PT/INR - ( 06 Dec 2017 06:16 )   PT: 15.9 sec;   INR: 1.42          PTT - ( 06 Dec 2017 06:16 )  PTT:27.1 sec  Urinalysis Basic - ( 04 Dec 2017 22:22 )    Color: Yellow / Appearance: Clear / S.010 / pH: x  Gluc: x / Ketone: NEGATIVE  / Bili: NEGATIVE / Urobili: 1.0 E.U./dL   Blood: x / Protein: 30 mg/dL / Nitrite: POSITIVE   Leuk Esterase: Large / RBC: x / WBC > 10 /HPF   Sq Epi: x / Non Sq Epi: x / Bacteria: Many /HPF        RADIOLOGY & ADDITIONAL STUDIES:

## 2017-12-06 NOTE — CONSULT NOTE ADULT - PROBLEM SELECTOR RECOMMENDATION 9
W/ shock, requiring levophed.  Gram negative rods on sacral culture at less than 12 hours.  Concern for MRSA and drug resistant bacteria.  WBC >25 on admission, now normalized indicates that abx choice is appropriate.  - C/w vancomycin and zosyn pending further culture growth and sensitivities.  If decompensates, could consider aminoglycoside for syngergistic coverage of possible MDR G-.  - F/u BCx / wound Cx

## 2017-12-06 NOTE — CONSULT NOTE ADULT - ATTENDING COMMENTS
Agree with above.  Suspect septic shock secondary to acute osteomyelitis. Patient appears to be improving clinically give decrease in pressors and decrease WBC.  Wound culture with gram negative rods on gram stain.  Waiting for blood cultures and preliminary results from wound culture.  If no evidence of gram positive infection, can stop vancomycin.  Continue vancomycin and zosyn now.  Check vancomycin trough prior to 4th dose

## 2017-12-06 NOTE — PROGRESS NOTE ADULT - ASSESSMENT
Patient is a 32 year old paraplegic M with a history of spinal cord injury (wheelchair bound), sacral pressure ulcer with osteo x2 earlier in 2017,  DM-2, indwelling suprapubic catheter, hepatitis B with septic shock secondary to infected purulent sacral 4th degree pressure ulcer.    ID:  #septic shock   -secondary to infected 4th degree sacral ulcer, purulent with likely sacral osteo  -wound cultures sent, f/u  -In ED: Tmax 103.7, , BP 72/40, WBC 20k, lactate 2.9.  -s/p 4.5 L NS and vanc/zosyn.  C/w vanc/zosyn.  Vanc trough before 4th dose 12/5 10PM  -f/u blood cx NGTD  -BP improved on levo gtt.  Keep MAPs >65; wean as tolerated  -C/w IVF at maintenance  -f/u plastic surgery re: debridement  -Tylenol for fever  -f/u ESR/CRP  -collateral from Dr. Alford at [facility] on previous osteo and outpatient tx    #R/o osteomyelitis of sacrum  -purulent wound on sacrum  --previous osteo in this area x2 in 2017  -plastic surgery consulted for possible debridement    #UTI  -patient does not have sensation or control of urination (neurogenic bladder)  -suprapubic catheter in place  - (+) UA with LE, nitrites, bacteria and WBC  -C/w abx as above  -f/u urine cx and blood cx    #hepatitis B  -obtain collateral from PCP  -AST/ALT WNL  -INR elevated ~1.8, stable    DERMATOLOGY:  #Ulcers  -plastic surgery consulted  -wound cultures sent  -blood cultures sent  Per wound care:  Sacral and ischial tuberosity pressure injuries - irrigate with wound cleanser, pack lightly with wet to damp Dakin's 1/4 strength solution kerlix and cover with foam dressing daily x 7 days.  Left foot lateral aspect ulcer - cleanse with wound cleanser, apply foam dressing every 3 days or prn if soiled or wet.    #Tinea corporis  Per wound care:  Fungal rash on bilateral buttocks - apply nystatin powder, seal with liquid skin barrier.  Fungal rash on bilateral inguinal area - apply nystatin powder.       NEURO:  no sedation.    AOx3    #traumatic brain injury / paraplegia  -chronic.    -frequent turning  -wound care following, and recs followed    #seizure d/o  -Patient reports history of at least 2 seizures.  Unclear etiology.   -C/w home Keppra 500 mg BID    #history of CVA  -per patient described as hemorrhagic, call PMD for more info  -hold on ASA for now    CARDIAC:  #septic shock  -Treat as above    #sinus tachycardia - resolved  -Improved with IVF.  Secondary to septic shock.    -f/u EKG    #HTN  -Currently in septic shock. no reported BP home regimen per patient    #HLD  c/w home lipitor    RESP:  No respiratory symptoms.  Stable on room air.   CXR wnl, no further CXR necessary at this point    GI:  -rectal tube as patient is incontinent and has sacral ulcer    #elevated alk phos  -unclear etiology.  possibly musculoskeletal.  f/u CMP; trend    No bowel regimen at this time    RENAL:  #neurogenic bladder 2/2 to paraplegia  BUN, creatinine WNL  Monitor UOP  (+) suprapubic cath to bowles; may need to exchange and get urology if uCX +  c/w home oxybutynin    HEME:  #microcytic anemia  hgb 9.1.  MCV 74.  No sign of bleeding.  maintain active T&S  -f/u iron studies    ENDO:  #type 2 diabetes mellitus  -f/u A1C  -accuchecks Q6H and ISS for now.  Dosed Lantus 7 units in a.m.  Will likely need more when on diet all day, NPO after midnight, for possible debridement, so will keep 7 units for tomorrow  -hold home oral antihyperglycemic regimen (metformin, glimperide)    PSYCH:  #paranoia  -patient states many of the wound care people that come to his home and people he sees as outpatient are corrupt and trying to steal his money.  -monitor  -patient resistant to seeing a psychiatrist  -social work offered to address concerns with home health aide, patient does not want to see     F:  LR at 100/hour  E:  replete PRN  N:  consistent carb diet no snacks  Ppx:  hep SQ  Dispo:  MICU  Code status:  full code  Critical care time rendered 40 minutes Patient is a 32 year old paraplegic M with a history of spinal cord injury (wheelchair bound), sacral pressure ulcer with osteo x2 earlier in 2017,  DM-2, indwelling suprapubic catheter, hepatitis B with septic shock secondary to infected purulent sacral 4th degree pressure ulcer with underlying inadequately treated OM.    ID:  #septic shock   -secondary to infected 4th degree sacral ulcer, purulent with likely sacral osteo  -wound cultures sent growing gram negative rods and gram positive in chains, still concern for MRSA  -In ED: Tmax 103.7, , BP 72/40, WBC 20k, lactate 2.9.  -s/p 4.5 L NS and vanc/zosyn.  C/w vanc/zosyn.  Vanc trough before 4th doses  -f/u blood cx NGTD  -BP improved on levo gtt.  Keep MAPs >65; wean as tolerated  -C/w IVF at maintenance  -plastic surgery debrided the L ischial wound and did not debride the sacral wound. They do not think that there is skin infection in these areas, consider inadequately treated oteomyelitis vs UTI as possible other etiologies of infection  -Tylenol for fever  -ID following: agree with antibiotic, If decompensates, could consider aminoglycoside for syngergistic coverage of possible MDR G-.  -collateral from Dr. Alford at [facility] ([facility] on previous osteo and outpatient tx    #R/o osteomyelitis of sacrum  -purulent wound on sacrum  --previous osteo in this area x2 in 2017  -possible inadequately treated oteomyelitis     #UTI  -patient does not have sensation or control of urination (neurogenic bladder)  -suprapubic catheter in place, per patient exchanged at [facility] before arrival, uro team examined bowles no change since was changed at [facility] per patient  - (+) UA with LE, nitrites, bacteria and WBC  -C/w abx as above  -urine cx and blood cx NGTD    #hepatitis B  -obtain collateral from PCP  -AST/ALT WNL  -INR elevated ~1.8, stable    DERMATOLOGY:  #Ulcers  -plastic surgery consulted - duoderm for sacral wound to prevent stool from getting in it  -wound cultures sent  -blood cultures sent  Per wound care:  Sacral and ischial tuberosity pressure injuries - irrigate with wound cleanser, pack lightly with wet to damp Dakin's 1/4 strength solution kerlix and cover with foam dressing daily x 7 days.  Left foot lateral aspect ulcer - cleanse with wound cleanser, apply foam dressing every 3 days or prn if soiled or wet.    #Tinea corporis  Per wound care:  Fungal rash on bilateral buttocks - apply nystatin powder, seal with liquid skin barrier.  Fungal rash on bilateral inguinal area - apply nystatin powder.       NEURO:  no sedation.    AOx3    #traumatic brain injury / paraplegia  -chronic.    -frequent turning  -wound care following, and recs followed    #seizure d/o  -Patient reports history of at least 2 seizures.  Unclear etiology.   -C/w home Keppra 500 mg BID    #history of CVA  -per patient described as hemorrhagic, call PMD for more info  -hold on ASA for now    CARDIAC:  #septic shock  -Treat as above    #sinus tachycardia - resolved  -Improved with IVF.  Secondary to septic shock.      #HTN  -still on pressors, but very low dose norepi.  no reported BP home regimen per patient    #HLD  c/w home lipitor    RESP:  No respiratory symptoms.  Stable on room air.   CXR wnl, no further CXR necessary at this point    GI:  -rectal tube as patient is incontinent and has sacral ulcer    #elevated alk phos  -unclear etiology.  possibly musculoskeletal.  CMP; trend    #Constipation  dulcolax PRN suppository  -cannot do rectal tube as stool is hard    RENAL:  #neurogenic bladder 2/2 to paraplegia  BUN, creatinine WNL  Monitor UOP  (+) suprapubic cath to bowles; bowles exchanged at [facility] per patient, urology saw patient  c/w home oxybutynin    HEME:  #microcytic anemia  hgb 9.1.  MCV 74.  No sign of bleeding.  maintain active T&S  -f/u iron studies    ENDO:  #type 2 diabetes mellitus  -f/u A1C  -accuchecks Q6H and ISS for now.  Dosed Lantus 7 units in a.m.  Will likely need more when on diet all day, NPO after midnight, for possible debridement, so will keep 7 units for tomorrow  -hold home oral antihyperglycemic regimen (metformin, glimperide)    PSYCH:  #paranoia   -patient states many of the wound care people that come to his home and people he sees as outpatient are corrupt and trying to steal his money.  -monitor  -patient resistant to seeing a psychiatrist  -social work offered to address concerns with home health aide, patient does not want to see     F:  no IVF  E:  replete PRN  N:  consistent carb diet no snacks  Ppx:  hep SQ  Dispo:  MICU  Code status:  full code  Critical care time rendered 40 minutes Patient is a 32 year old paraplegic M with a history of spinal cord injury (wheelchair bound), sacral pressure ulcer with osteo x2 earlier in 2017,  DM-2, indwelling suprapubic catheter, hepatitis B with septic shock secondary to infected purulent sacral 4th degree pressure ulcer with underlying inadequately treated OM.    ID:  #septic shock   -secondary to infected 4th degree sacral ulcer, purulent with likely sacral osteo  -wound cultures sent growing gram negative rods and gram positive in chains, still concern for MRSA  -In ED: Tmax 103.7, , BP 72/40, WBC 20k, lactate 2.9.  -s/p 4.5 L NS and vanc/zosyn.  C/w vanc/zosyn.  Vanc trough before 4th doses  -f/u blood cx NGTD  -BP improved on levo gtt.  Keep MAPs >65; wean as tolerated  -C/w IVF at maintenance  -plastic surgery debrided the R ischial wound and did not debride the sacral wound. They do not think that there is skin infection in these areas, consider inadequately treated oteomyelitis vs UTI as possible other etiologies of infection  -Tylenol for fever  -ID following: agree with antibiotic, If decompensates, could consider aminoglycoside for syngergistic coverage of possible MDR G-.  -collateral from Dr. Alford at [facility] ([facility] on previous osteo and outpatient tx    #R/o osteomyelitis of sacrum  -purulent wound on sacrum  --previous osteo in this area x2 in 2017  -possible inadequately treated oteomyelitis     #UTI  -patient does not have sensation or control of urination (neurogenic bladder)  -suprapubic catheter in place, per patient exchanged at [facility] before arrival, uro team examined bowles no change since was changed at [facility] per patient  - (+) UA with LE, nitrites, bacteria and WBC  -C/w abx as above  -urine cx and blood cx NGTD    #hepatitis B  -obtain collateral from PCP  -AST/ALT WNL  -INR elevated ~1.8, stable    DERMATOLOGY:  #Ulcers  -plastic surgery consulted - duoderm for sacral wound to prevent stool from getting in it  -wound cultures sent  -blood cultures sent  Per wound care:  Sacral and ischial tuberosity pressure injuries - irrigate with wound cleanser, pack lightly with wet to damp Dakin's 1/4 strength solution kerlix and cover with foam dressing daily x 7 days.  Left foot lateral aspect ulcer - cleanse with wound cleanser, apply foam dressing every 3 days or prn if soiled or wet.    #Tinea corporis  Per wound care:  Fungal rash on bilateral buttocks - apply nystatin powder, seal with liquid skin barrier.  Fungal rash on bilateral inguinal area - apply nystatin powder.       NEURO:  no sedation.    AOx3    #traumatic brain injury / paraplegia  -chronic.    -frequent turning  -wound care following, and recs followed    #seizure d/o  -Patient reports history of at least 2 seizures.  Unclear etiology.   -C/w home Keppra 500 mg BID    #history of CVA  -per patient described as hemorrhagic, call PMD for more info  -hold on ASA for now    CARDIAC:  #septic shock  -Treat as above    #sinus tachycardia - resolved  -Improved with IVF.  Secondary to septic shock.      #HTN  -still on pressors, but very low dose norepi.  no reported BP home regimen per patient    #HLD  c/w home lipitor    RESP:  No respiratory symptoms.  Stable on room air.   CXR wnl, no further CXR necessary at this point    GI:  -rectal tube as patient is incontinent and has sacral ulcer    #elevated alk phos  -unclear etiology.  possibly musculoskeletal.  CMP; trend    #Constipation  dulcolax PRN suppository  -cannot do rectal tube as stool is hard    RENAL:  #neurogenic bladder 2/2 to paraplegia  BUN, creatinine WNL  Monitor UOP  (+) suprapubic cath to bowles; bowles exchanged at [facility] per patient, urology saw patient  c/w home oxybutynin    HEME:  #microcytic anemia  hgb 9.1.  MCV 74.  No sign of bleeding.  maintain active T&S  -f/u iron studies    ENDO:  #type 2 diabetes mellitus  -f/u A1C  -accuchecks Q6H and ISS for now.  Dosed Lantus 7 units in a.m.  Will likely need more when on diet all day, NPO after midnight, for possible debridement, so will keep 7 units for tomorrow  -hold home oral antihyperglycemic regimen (metformin, glimperide)    PSYCH:  #paranoia   -patient states many of the wound care people that come to his home and people he sees as outpatient are corrupt and trying to steal his money.  -monitor  -patient resistant to seeing a psychiatrist  -social work offered to address concerns with home health aide, patient does not want to see     F:  no IVF  E:  replete PRN  N:  consistent carb diet no snacks  Ppx:  hep SQ  Dispo:  MICU  Code status:  full code  Critical care time rendered 40 minutes Patient is a 32 year old paraplegic M with a history of spinal cord injury (wheelchair bound), sacral pressure ulcer with osteo x2 (outpatient provider said they have records of March osteo) earlier in 2017,  DM-2, indwelling suprapubic catheter, hepatitis B with septic shock secondary to infected purulent sacral 4th degree pressure ulcer with underlying inadequately treated OM.    ID:  #septic shock   -secondary to infected 4th degree sacral ulcer, purulent with likely sacral osteo  -wound cultures sent growing gram negative rods and gram positive in chains, still concern for MRSA  -In ED: Tmax 103.7, , BP 72/40, WBC 20k, lactate 2.9.  -s/p 4.5 L NS and vanc/zosyn.  C/w vanc/zosyn.  Vanc trough before 4th doses  -f/u blood cx NGTD  -BP improved on levo gtt.  Keep MAPs >65; wean as tolerated  -C/w IVF at maintenance  -plastic surgery debrided the R ischial wound and did not debride the sacral wound. They do not think that there is skin infection in these areas, consider inadequately treated oteomyelitis vs UTI as possible other etiologies of infection  -Tylenol for fever  -ID following: agree with antibiotic, If decompensates, could consider aminoglycoside for syngergistic coverage of possible MDR G-.  -collateral from Dr. Alford at [facility] ([facility]) on previous osteo and outpatient tx - discussed with clinical nurse, per records last seen in October dx in May 2017 with osteomyelitis and treated with daptomycin with PICC. Dr. Timo Laguerre ID follows pt. In October patient requested referral to  for wound care, but clinic unsure if he followed up    #R/o osteomyelitis of sacrum  -purulent wound on sacrum  --previous osteo in this area x2 in 2017  -possible inadequately treated osteomyelitis     #UTI  -patient does not have sensation or control of urination (neurogenic bladder)  -suprapubic catheter in place, per patient exchanged at [facility] before arrival, uro team examined bowles no change since was changed at [facility] per patient  - (+) UA with LE, nitrites, bacteria and WBC  -C/w abx as above  -urine cx and blood cx NGTD  -per outpatient provider, Dr. Mark Millard6 exchanges catheter monthly    #hepatitis B  -obtain collateral from PCP  -AST/ALT WNL  -INR elevated ~1.8, stable    DERMATOLOGY:  #Ulcers  -plastic surgery consulted - duoderm for sacral wound to prevent stool from getting in it  -wound cultures sent  -blood cultures sent  Per wound care:  Sacral and ischial tuberosity pressure injuries - irrigate with wound cleanser, pack lightly with wet to damp Dakin's 1/4 strength solution kerlix and cover with foam dressing daily x 7 days.  Left foot lateral aspect ulcer - cleanse with wound cleanser, apply foam dressing every 3 days or prn if soiled or wet.    #Tinea corporis  Per wound care:  Fungal rash on bilateral buttocks - apply nystatin powder, seal with liquid skin barrier.  Fungal rash on bilateral inguinal area - apply nystatin powder.       NEURO:  no sedation.    AOx3    #traumatic brain injury / paraplegia  -chronic.    -frequent turning  -wound care following, and recs followed    #seizure d/o  -Patient reports history of at least 2 seizures.  Unclear etiology.   -C/w home Keppra 500 mg BID    #history of CVA  -per patient described as hemorrhagic, call PMD for more info  -hold on ASA for now    CARDIAC:  #septic shock  -Treat as above    #sinus tachycardia - resolved  -Improved with IVF.  Secondary to septic shock.      #HTN  -still on pressors, but very low dose norepi.  no reported BP home regimen per patient    #HLD  c/w home lipitor    RESP:  No respiratory symptoms.  Stable on room air.   CXR wnl, no further CXR necessary at this point    GI:  -rectal tube if patient starts to have watery BMs    #elevated alk phos  -unclear etiology.  possibly musculoskeletal.  CMP; trend    #Constipation  dulcolax PRN suppository  -cannot do rectal tube as stool is hard    RENAL:  #neurogenic bladder 2/2 to paraplegia  BUN, creatinine WNL  Monitor UOP  (+) suprapubic cath to bowles; bowles exchanged at [facility] per patient, urology saw patient  -Dr. mark Millard changes monthly as outpatient  c/w home oxybutynin    HEME:  #microcytic anemia  hgb 9.1.  MCV 74.  No sign of bleeding.  maintain active T&S  -f/u iron studies    ENDO:  #type 2 diabetes mellitus  -f/u A1C  -accuchecks Q6H and ISS for now.  Dosed Lantus 7 units in a.m.  Will likely need more when on diet all day, NPO after midnight, for possible debridement, so will keep 7 units for tomorrow  -hold home oral antihyperglycemic regimen (metformin, glimperide)    PSYCH:  #paranoia   -previously patient states many of the wound care people that come to his home and people he sees as outpatient are corrupt and trying to steal his money.  -monitor, today patient not paranoid  -patient resistant to seeing a psychiatrist  -social work offered to address concerns with home health aide, patient does not want to see     F:  no IVF  E:  replete PRN  N:  consistent carb diet no snacks  Ppx:  hep SQ  Dispo:  MICU  Code status:  full code  Critical care time rendered 40 minutes

## 2017-12-06 NOTE — PROGRESS NOTE ADULT - SUBJECTIVE AND OBJECTIVE BOX
**INCOMPLETE    INTERVAL HPI/OVERNIGHT EVENTS: Overnight transferred to MICU from CCU boarding.  Vanc trough WNL.  Dosed vanc.  Per plastics, sacral and ischial wounds unlikely to be source of sepsis.  Cannot rule out osteo.      VITAL SIGNS:  T(F): 97 (17 @ 01:20)  HR: 94 (17 @ 04:00)  BP: 99/45 (17 @ 04:00)  RR: 25 (17 @ 04:00)  SpO2: 95% (17 @ 04:00)  Wt(kg): --    PHYSICAL EXAM:    Constitutional: Well developed, well nourished  General: laying comfortably  Eyes: PERRL  ENMT: moist mucous membranes, no mucosal pallor, clear throat, uvula midline  Neck: supple  Respiratory: CTABL, no rales, no crackles, no wheezing  Cardiovascular: +S1, +S2, no murmurs, rubs or gallops, regular rate and rhythm  Gastrointestinal: abdomen soft, non distended, non tender, +BS  Extremities: no edema, no calf pain to palpation  Vascular: cap refill <3s in all extremities, radial and DP pulses 2+  Neurological: AAO x3  Skin: no rashes    MEDICATIONS  (STANDING):  ascorbic acid 250 milliGRAM(s) Oral daily  atorvastatin 20 milliGRAM(s) Oral at bedtime  Dakins Solution - 1/4 Strength 1 Application(s) Topical daily  dextrose 5%. 1000 milliLiter(s) (50 mL/Hr) IV Continuous <Continuous>  dextrose 50% Injectable 12.5 Gram(s) IV Push once  dextrose 50% Injectable 25 Gram(s) IV Push once  dextrose 50% Injectable 25 Gram(s) IV Push once  heparin  Injectable 5000 Unit(s) SubCutaneous every 8 hours  insulin glargine Injectable (LANTUS) 7 Unit(s) SubCutaneous every morning  insulin lispro (HumaLOG) corrective regimen sliding scale   SubCutaneous Before meals and at bedtime  lactated ringers. 1000 milliLiter(s) (100 mL/Hr) IV Continuous <Continuous>  levETIRAcetam 500 milliGRAM(s) Oral two times a day  norepinephrine Infusion 0.01 MICROgram(s)/kG/Min (1.361 mL/Hr) IV Continuous <Continuous>  nystatin Powder 1 Application(s) Topical two times a day  oxybutynin 5 milliGRAM(s) Oral two times a day  piperacillin/tazobactam IVPB. 3.375 Gram(s) IV Intermittent every 6 hours  vancomycin  IVPB      vancomycin  IVPB 1000 milliGRAM(s) IV Intermittent every 8 hours    MEDICATIONS  (PRN):  dextrose Gel 1 Dose(s) Oral once PRN Blood Glucose LESS THAN 70 milliGRAM(s)/deciliter  glucagon  Injectable 1 milliGRAM(s) IntraMuscular once PRN Glucose LESS THAN 70 milligrams/deciliter      Allergies    Capzasin Back and Body (Unknown)    Intolerances        LABS:                        8.0    25.2  )-----------( 386      ( 05 Dec 2017 04:56 )             25.8     12-    139  |  106  |  13  ----------------------------<  174<H>  3.3<L>   |  17<L>  |  0.74    Ca    7.6<L>      05 Dec 2017 04:58  Phos  3.1     12-  Mg     1.3     12    TPro  6.9  /  Alb  2.0<L>  /  TBili  0.7  /  DBili  x   /  AST  6<L>  /  ALT  11<L>  /  AlkPhos  123<H>  12-04    PT/INR - ( 05 Dec 2017 07:04 )   PT: 19.8 sec;   INR: 1.76          PTT - ( 05 Dec 2017 07:04 )  PTT:27.1 sec  Urinalysis Basic - ( 04 Dec 2017 22:22 )    Color: Yellow / Appearance: Clear / S.010 / pH: x  Gluc: x / Ketone: NEGATIVE  / Bili: NEGATIVE / Urobili: 1.0 E.U./dL   Blood: x / Protein: 30 mg/dL / Nitrite: POSITIVE   Leuk Esterase: Large / RBC: x / WBC > 10 /HPF   Sq Epi: x / Non Sq Epi: x / Bacteria: Many /HPF        RADIOLOGY & ADDITIONAL TESTS: Reviewed. ***incomplete    INTERVAL HPI/OVERNIGHT EVENTS: Overnight transferred to MICU from CCU boarding.  Vanc trough WNL.  Dosed vanc.  Per plastics, sacral and ischial wounds unlikely to be source of sepsis.  Cannot rule out osteo.  The patient this morning is saying he has not had a BM in a while and is requesting a rectal tube.    VITAL SIGNS:  T(F): 97 (17 @ 01:20)  HR: 94 (17 @ 04:00)  BP: 99/45 (17 @ 04:00)  RR: 25 (17 @ 04:00)  SpO2: 95% (17 @ 04:00)  Wt(kg): --    PHYSICAL EXAM:    Constitutional: Well developed, well nourished  General: laying comfortably  Eyes: PERRL  ENMT: moist mucous membranes, no mucosal pallor, clear throat, uvula midline  Neck: supple  Respiratory: CTABL, no rales, no crackles, no wheezing  Cardiovascular: +S1, +S2, no murmurs, rubs or gallops, regular rate and rhythm  Gastrointestinal: abdomen soft, non distended, non tender, +BS midline scar  Extremities: no edema, no calf pain to palpation  Vascular: cap refill <3s in all extremities, radial and DP pulses 2+  Neurological: AAO x3, no sensation below nipple lines or movement  Skin: no rashes, C/D/I dressing on sacrum and L ischium and L foot    MEDICATIONS  (STANDING):  ascorbic acid 250 milliGRAM(s) Oral daily  atorvastatin 20 milliGRAM(s) Oral at bedtime  Dakins Solution - 1/4 Strength 1 Application(s) Topical daily  dextrose 5%. 1000 milliLiter(s) (50 mL/Hr) IV Continuous <Continuous>  dextrose 50% Injectable 12.5 Gram(s) IV Push once  dextrose 50% Injectable 25 Gram(s) IV Push once  dextrose 50% Injectable 25 Gram(s) IV Push once  heparin  Injectable 5000 Unit(s) SubCutaneous every 8 hours  insulin glargine Injectable (LANTUS) 7 Unit(s) SubCutaneous every morning  insulin lispro (HumaLOG) corrective regimen sliding scale   SubCutaneous Before meals and at bedtime  lactated ringers. 1000 milliLiter(s) (100 mL/Hr) IV Continuous <Continuous>  levETIRAcetam 500 milliGRAM(s) Oral two times a day  norepinephrine Infusion 0.01 MICROgram(s)/kG/Min (1.361 mL/Hr) IV Continuous <Continuous>  nystatin Powder 1 Application(s) Topical two times a day  oxybutynin 5 milliGRAM(s) Oral two times a day  piperacillin/tazobactam IVPB. 3.375 Gram(s) IV Intermittent every 6 hours  vancomycin  IVPB      vancomycin  IVPB 1000 milliGRAM(s) IV Intermittent every 8 hours    MEDICATIONS  (PRN):  dextrose Gel 1 Dose(s) Oral once PRN Blood Glucose LESS THAN 70 milliGRAM(s)/deciliter  glucagon  Injectable 1 milliGRAM(s) IntraMuscular once PRN Glucose LESS THAN 70 milligrams/deciliter      Allergies    Capzasin Back and Body (Unknown)    Intolerances        LABS:                        8.0    25.2  )-----------( 386      ( 05 Dec 2017 04:56 )             25.8     12-    139  |  106  |  13  ----------------------------<  174<H>  3.3<L>   |  17<L>  |  0.74    Ca    7.6<L>      05 Dec 2017 04:58  Phos  3.1     12-  Mg     1.3         TPro  6.9  /  Alb  2.0<L>  /  TBili  0.7  /  DBili  x   /  AST  6<L>  /  ALT  11<L>  /  AlkPhos  123<H>  12-    PT/INR - ( 05 Dec 2017 07:04 )   PT: 19.8 sec;   INR: 1.76          PTT - ( 05 Dec 2017 07:04 )  PTT:27.1 sec  Urinalysis Basic - ( 04 Dec 2017 22:22 )    Color: Yellow / Appearance: Clear / S.010 / pH: x  Gluc: x / Ketone: NEGATIVE  / Bili: NEGATIVE / Urobili: 1.0 E.U./dL   Blood: x / Protein: 30 mg/dL / Nitrite: POSITIVE   Leuk Esterase: Large / RBC: x / WBC > 10 /HPF   Sq Epi: x / Non Sq Epi: x / Bacteria: Many /HPF        RADIOLOGY & ADDITIONAL TESTS: Reviewed. ***incomplete    INTERVAL HPI/OVERNIGHT EVENTS: Overnight transferred to MICU from CCU boarding.  Vanc trough WNL.  Dosed vanc.  Per plastics, sacral and ischial wounds unlikely to be source of sepsis.  Cannot rule out osteo.  The patient this morning is saying he has not had a BM in a while and is requesting a rectal tube.    VITAL SIGNS:  T(F): 97 (17 @ 01:20)  HR: 94 (17 @ 04:00)  BP: 99/45 (17 @ 04:00)  RR: 25 (17 @ 04:00)  SpO2: 95% (17 @ 04:00)  Wt(kg): --    PHYSICAL EXAM:    Constitutional: Well developed, well nourished  General: laying comfortably  Eyes: PERRL  ENMT: moist mucous membranes, no mucosal pallor, clear throat, uvula midline  Neck: supple  Respiratory: CTABL, no rales, no crackles, no wheezing  Cardiovascular: +S1, +S2, no murmurs, rubs or gallops, regular rate and rhythm  Gastrointestinal: abdomen soft, non distended, non tender, +BS midline scar  Extremities: no edema, no calf pain to palpation  Vascular: cap refill <3s in all extremities, radial and DP pulses 2+  Neurological: AAO x3, no sensation below nipple lines or movement  Skin: no rashes, C/D/I dressing on sacrum and R ischium and L foot    MEDICATIONS  (STANDING):  ascorbic acid 250 milliGRAM(s) Oral daily  atorvastatin 20 milliGRAM(s) Oral at bedtime  Dakins Solution - 1/4 Strength 1 Application(s) Topical daily  dextrose 5%. 1000 milliLiter(s) (50 mL/Hr) IV Continuous <Continuous>  dextrose 50% Injectable 12.5 Gram(s) IV Push once  dextrose 50% Injectable 25 Gram(s) IV Push once  dextrose 50% Injectable 25 Gram(s) IV Push once  heparin  Injectable 5000 Unit(s) SubCutaneous every 8 hours  insulin glargine Injectable (LANTUS) 7 Unit(s) SubCutaneous every morning  insulin lispro (HumaLOG) corrective regimen sliding scale   SubCutaneous Before meals and at bedtime  lactated ringers. 1000 milliLiter(s) (100 mL/Hr) IV Continuous <Continuous>  levETIRAcetam 500 milliGRAM(s) Oral two times a day  norepinephrine Infusion 0.01 MICROgram(s)/kG/Min (1.361 mL/Hr) IV Continuous <Continuous>  nystatin Powder 1 Application(s) Topical two times a day  oxybutynin 5 milliGRAM(s) Oral two times a day  piperacillin/tazobactam IVPB. 3.375 Gram(s) IV Intermittent every 6 hours  vancomycin  IVPB      vancomycin  IVPB 1000 milliGRAM(s) IV Intermittent every 8 hours    MEDICATIONS  (PRN):  dextrose Gel 1 Dose(s) Oral once PRN Blood Glucose LESS THAN 70 milliGRAM(s)/deciliter  glucagon  Injectable 1 milliGRAM(s) IntraMuscular once PRN Glucose LESS THAN 70 milligrams/deciliter      Allergies    Capzasin Back and Body (Unknown)    Intolerances        LABS:                        8.0    25.2  )-----------( 386      ( 05 Dec 2017 04:56 )             25.8     12-    139  |  106  |  13  ----------------------------<  174<H>  3.3<L>   |  17<L>  |  0.74    Ca    7.6<L>      05 Dec 2017 04:58  Phos  3.1     12-  Mg     1.3         TPro  6.9  /  Alb  2.0<L>  /  TBili  0.7  /  DBili  x   /  AST  6<L>  /  ALT  11<L>  /  AlkPhos  123<H>  12-    PT/INR - ( 05 Dec 2017 07:04 )   PT: 19.8 sec;   INR: 1.76          PTT - ( 05 Dec 2017 07:04 )  PTT:27.1 sec  Urinalysis Basic - ( 04 Dec 2017 22:22 )    Color: Yellow / Appearance: Clear / S.010 / pH: x  Gluc: x / Ketone: NEGATIVE  / Bili: NEGATIVE / Urobili: 1.0 E.U./dL   Blood: x / Protein: 30 mg/dL / Nitrite: POSITIVE   Leuk Esterase: Large / RBC: x / WBC > 10 /HPF   Sq Epi: x / Non Sq Epi: x / Bacteria: Many /HPF        RADIOLOGY & ADDITIONAL TESTS: Reviewed. INTERVAL HPI/OVERNIGHT EVENTS: Overnight transferred to MICU from CCU boarding.  Vanc trough WNL.  Dosed vanc.  Per plastics, sacral and ischial wounds unlikely to be source of sepsis.  Cannot rule out osteo.  The patient this morning is saying he has not had a BM in a while and is requesting a rectal tube.    VITAL SIGNS:  T(F): 97 (17 @ 01:20)  HR: 94 (17 @ 04:00)  BP: 99/45 (17 @ 04:00)  RR: 25 (17 @ 04:00)  SpO2: 95% (17 @ 04:00)  Wt(kg): --    PHYSICAL EXAM:    Constitutional: Well developed, well nourished  General: laying comfortably  Eyes: PERRL  ENMT: moist mucous membranes, no mucosal pallor, clear throat, uvula midline  Neck: supple  Respiratory: CTABL, no rales, no crackles, no wheezing  Cardiovascular: +S1, +S2, no murmurs, rubs or gallops, regular rate and rhythm  Gastrointestinal: abdomen soft, non distended, non tender, +BS midline scar  Extremities: no edema, no calf pain to palpation  Vascular: cap refill <3s in all extremities, radial and DP pulses 2+  Neurological: AAO x3, no sensation below nipple lines or movement  Skin: no rashes, C/D/I dressing on sacrum and R ischium and L foot    MEDICATIONS  (STANDING):  ascorbic acid 250 milliGRAM(s) Oral daily  atorvastatin 20 milliGRAM(s) Oral at bedtime  Dakins Solution - 1/4 Strength 1 Application(s) Topical daily  dextrose 5%. 1000 milliLiter(s) (50 mL/Hr) IV Continuous <Continuous>  dextrose 50% Injectable 12.5 Gram(s) IV Push once  dextrose 50% Injectable 25 Gram(s) IV Push once  dextrose 50% Injectable 25 Gram(s) IV Push once  heparin  Injectable 5000 Unit(s) SubCutaneous every 8 hours  insulin glargine Injectable (LANTUS) 7 Unit(s) SubCutaneous every morning  insulin lispro (HumaLOG) corrective regimen sliding scale   SubCutaneous Before meals and at bedtime  lactated ringers. 1000 milliLiter(s) (100 mL/Hr) IV Continuous <Continuous>  levETIRAcetam 500 milliGRAM(s) Oral two times a day  norepinephrine Infusion 0.01 MICROgram(s)/kG/Min (1.361 mL/Hr) IV Continuous <Continuous>  nystatin Powder 1 Application(s) Topical two times a day  oxybutynin 5 milliGRAM(s) Oral two times a day  piperacillin/tazobactam IVPB. 3.375 Gram(s) IV Intermittent every 6 hours  vancomycin  IVPB      vancomycin  IVPB 1000 milliGRAM(s) IV Intermittent every 8 hours    MEDICATIONS  (PRN):  dextrose Gel 1 Dose(s) Oral once PRN Blood Glucose LESS THAN 70 milliGRAM(s)/deciliter  glucagon  Injectable 1 milliGRAM(s) IntraMuscular once PRN Glucose LESS THAN 70 milligrams/deciliter      Allergies    Capzasin Back and Body (Unknown)    Intolerances        LABS:                        8.0    25.2  )-----------( 386      ( 05 Dec 2017 04:56 )             25.8     12-    139  |  106  |  13  ----------------------------<  174<H>  3.3<L>   |  17<L>  |  0.74    Ca    7.6<L>      05 Dec 2017 04:58  Phos  3.1     12-  Mg     1.3     12-    TPro  6.9  /  Alb  2.0<L>  /  TBili  0.7  /  DBili  x   /  AST  6<L>  /  ALT  11<L>  /  AlkPhos  123<H>  12-04    PT/INR - ( 05 Dec 2017 07:04 )   PT: 19.8 sec;   INR: 1.76          PTT - ( 05 Dec 2017 07:04 )  PTT:27.1 sec  Urinalysis Basic - ( 04 Dec 2017 22:22 )    Color: Yellow / Appearance: Clear / S.010 / pH: x  Gluc: x / Ketone: NEGATIVE  / Bili: NEGATIVE / Urobili: 1.0 E.U./dL   Blood: x / Protein: 30 mg/dL / Nitrite: POSITIVE   Leuk Esterase: Large / RBC: x / WBC > 10 /HPF   Sq Epi: x / Non Sq Epi: x / Bacteria: Many /HPF        RADIOLOGY & ADDITIONAL TESTS: Reviewed.

## 2017-12-07 DIAGNOSIS — Z29.9 ENCOUNTER FOR PROPHYLACTIC MEASURES, UNSPECIFIED: ICD-10-CM

## 2017-12-07 DIAGNOSIS — N31.9 NEUROMUSCULAR DYSFUNCTION OF BLADDER, UNSPECIFIED: ICD-10-CM

## 2017-12-07 DIAGNOSIS — M46.28 OSTEOMYELITIS OF VERTEBRA, SACRAL AND SACROCOCCYGEAL REGION: ICD-10-CM

## 2017-12-07 DIAGNOSIS — N39.0 URINARY TRACT INFECTION, SITE NOT SPECIFIED: ICD-10-CM

## 2017-12-07 DIAGNOSIS — B19.10 UNSPECIFIED VIRAL HEPATITIS B WITHOUT HEPATIC COMA: ICD-10-CM

## 2017-12-07 DIAGNOSIS — G82.20 PARAPLEGIA, UNSPECIFIED: ICD-10-CM

## 2017-12-07 DIAGNOSIS — D50.9 IRON DEFICIENCY ANEMIA, UNSPECIFIED: ICD-10-CM

## 2017-12-07 DIAGNOSIS — E78.5 HYPERLIPIDEMIA, UNSPECIFIED: ICD-10-CM

## 2017-12-07 DIAGNOSIS — E11.9 TYPE 2 DIABETES MELLITUS WITHOUT COMPLICATIONS: ICD-10-CM

## 2017-12-07 DIAGNOSIS — R63.8 OTHER SYMPTOMS AND SIGNS CONCERNING FOOD AND FLUID INTAKE: ICD-10-CM

## 2017-12-07 LAB
-  AMIKACIN: SIGNIFICANT CHANGE UP
-  AMPICILLIN/SULBACTAM: SIGNIFICANT CHANGE UP
-  AMPICILLIN: SIGNIFICANT CHANGE UP
-  AZTREONAM: SIGNIFICANT CHANGE UP
-  CEFAZOLIN: SIGNIFICANT CHANGE UP
-  CEFEPIME: SIGNIFICANT CHANGE UP
-  CEFOXITIN: SIGNIFICANT CHANGE UP
-  CEFTAZIDIME: SIGNIFICANT CHANGE UP
-  CEFTRIAXONE: SIGNIFICANT CHANGE UP
-  CIPROFLOXACIN: SIGNIFICANT CHANGE UP
-  ERTAPENEM: SIGNIFICANT CHANGE UP
-  GENTAMICIN: SIGNIFICANT CHANGE UP
-  LEVOFLOXACIN: SIGNIFICANT CHANGE UP
-  MEROPENEM: SIGNIFICANT CHANGE UP
-  NITROFURANTOIN: SIGNIFICANT CHANGE UP
-  PIPERACILLIN/TAZOBACTAM: SIGNIFICANT CHANGE UP
-  TOBRAMYCIN: SIGNIFICANT CHANGE UP
-  TRIMETHOPRIM/SULFAMETHOXAZOLE: SIGNIFICANT CHANGE UP
ALBUMIN SERPL ELPH-MCNC: 2.5 G/DL — LOW (ref 3.3–5)
ALP SERPL-CCNC: 78 U/L — SIGNIFICANT CHANGE UP (ref 40–120)
ALT FLD-CCNC: 7 U/L — LOW (ref 10–45)
ANION GAP SERPL CALC-SCNC: 12 MMOL/L — SIGNIFICANT CHANGE UP (ref 5–17)
APTT BLD: 29.5 SEC — SIGNIFICANT CHANGE UP (ref 27.5–37.4)
AST SERPL-CCNC: 7 U/L — LOW (ref 10–40)
BILIRUB SERPL-MCNC: 0.2 MG/DL — SIGNIFICANT CHANGE UP (ref 0.2–1.2)
BLD GP AB SCN SERPL QL: NEGATIVE — SIGNIFICANT CHANGE UP
BUN SERPL-MCNC: 6 MG/DL — LOW (ref 7–23)
CALCIUM SERPL-MCNC: 8.4 MG/DL — SIGNIFICANT CHANGE UP (ref 8.4–10.5)
CHLORIDE SERPL-SCNC: 104 MMOL/L — SIGNIFICANT CHANGE UP (ref 96–108)
CO2 SERPL-SCNC: 24 MMOL/L — SIGNIFICANT CHANGE UP (ref 22–31)
CREAT SERPL-MCNC: 0.71 MG/DL — SIGNIFICANT CHANGE UP (ref 0.5–1.3)
FERRITIN SERPL-MCNC: 141.3 NG/ML — SIGNIFICANT CHANGE UP (ref 30–400)
GLUCOSE BLDC GLUCOMTR-MCNC: 138 MG/DL — HIGH (ref 70–99)
GLUCOSE BLDC GLUCOMTR-MCNC: 147 MG/DL — HIGH (ref 70–99)
GLUCOSE BLDC GLUCOMTR-MCNC: 167 MG/DL — HIGH (ref 70–99)
GLUCOSE BLDC GLUCOMTR-MCNC: 173 MG/DL — HIGH (ref 70–99)
GLUCOSE SERPL-MCNC: 108 MG/DL — HIGH (ref 70–99)
HBA1C BLD-MCNC: 6 % — HIGH (ref 4–5.6)
HCT VFR BLD CALC: 23.6 % — LOW (ref 39–50)
HGB BLD-MCNC: 7.3 G/DL — LOW (ref 13–17)
INR BLD: 1.32 — HIGH (ref 0.88–1.16)
IRON SATN MFR SERPL: 29 % — SIGNIFICANT CHANGE UP (ref 16–55)
IRON SATN MFR SERPL: 46 UG/DL — SIGNIFICANT CHANGE UP (ref 45–165)
MAGNESIUM SERPL-MCNC: 1.8 MG/DL — SIGNIFICANT CHANGE UP (ref 1.6–2.6)
MCHC RBC-ENTMCNC: 23.2 PG — LOW (ref 27–34)
MCHC RBC-ENTMCNC: 30.9 G/DL — LOW (ref 32–36)
MCV RBC AUTO: 74.9 FL — LOW (ref 80–100)
METHOD TYPE: SIGNIFICANT CHANGE UP
PHOSPHATE SERPL-MCNC: 4.1 MG/DL — SIGNIFICANT CHANGE UP (ref 2.5–4.5)
PLATELET # BLD AUTO: 296 K/UL — SIGNIFICANT CHANGE UP (ref 150–400)
POTASSIUM SERPL-MCNC: 4.1 MMOL/L — SIGNIFICANT CHANGE UP (ref 3.5–5.3)
POTASSIUM SERPL-SCNC: 4.1 MMOL/L — SIGNIFICANT CHANGE UP (ref 3.5–5.3)
PROT SERPL-MCNC: 6.1 G/DL — SIGNIFICANT CHANGE UP (ref 6–8.3)
PROTHROM AB SERPL-ACNC: 14.7 SEC — HIGH (ref 9.8–12.7)
RBC # BLD: 3.15 M/UL — LOW (ref 4.2–5.8)
RBC # FLD: 17.2 % — HIGH (ref 10.3–16.9)
RH IG SCN BLD-IMP: POSITIVE — SIGNIFICANT CHANGE UP
SODIUM SERPL-SCNC: 140 MMOL/L — SIGNIFICANT CHANGE UP (ref 135–145)
TIBC SERPL-MCNC: 160 UG/DL — LOW (ref 220–430)
UIBC SERPL-MCNC: 114 UG/DL — SIGNIFICANT CHANGE UP (ref 110–370)
VANCOMYCIN TROUGH SERPL-MCNC: 22.6 UG/ML — HIGH (ref 10–20)
WBC # BLD: 6.2 K/UL — SIGNIFICANT CHANGE UP (ref 3.8–10.5)
WBC # FLD AUTO: 6.2 K/UL — SIGNIFICANT CHANGE UP (ref 3.8–10.5)

## 2017-12-07 PROCEDURE — 99233 SBSQ HOSP IP/OBS HIGH 50: CPT | Mod: GC

## 2017-12-07 PROCEDURE — 99233 SBSQ HOSP IP/OBS HIGH 50: CPT

## 2017-12-07 RX ORDER — MAGNESIUM SULFATE 500 MG/ML
1 VIAL (ML) INJECTION ONCE
Qty: 0 | Refills: 0 | Status: COMPLETED | OUTPATIENT
Start: 2017-12-07 | End: 2017-12-07

## 2017-12-07 RX ORDER — VANCOMYCIN HCL 1 G
750 VIAL (EA) INTRAVENOUS EVERY 8 HOURS
Qty: 0 | Refills: 0 | Status: COMPLETED | OUTPATIENT
Start: 2017-12-07 | End: 2017-12-07

## 2017-12-07 RX ORDER — VANCOMYCIN HCL 1 G
750 VIAL (EA) INTRAVENOUS ONCE
Qty: 0 | Refills: 0 | Status: COMPLETED | OUTPATIENT
Start: 2017-12-07 | End: 2017-12-07

## 2017-12-07 RX ADMIN — NYSTATIN CREAM 1 APPLICATION(S): 100000 CREAM TOPICAL at 06:53

## 2017-12-07 RX ADMIN — LEVETIRACETAM 500 MILLIGRAM(S): 250 TABLET, FILM COATED ORAL at 06:49

## 2017-12-07 RX ADMIN — LEVETIRACETAM 500 MILLIGRAM(S): 250 TABLET, FILM COATED ORAL at 18:17

## 2017-12-07 RX ADMIN — PIPERACILLIN AND TAZOBACTAM 200 GRAM(S): 4; .5 INJECTION, POWDER, LYOPHILIZED, FOR SOLUTION INTRAVENOUS at 18:16

## 2017-12-07 RX ADMIN — HEPARIN SODIUM 5000 UNIT(S): 5000 INJECTION INTRAVENOUS; SUBCUTANEOUS at 15:47

## 2017-12-07 RX ADMIN — Medication 5 MILLIGRAM(S): at 18:17

## 2017-12-07 RX ADMIN — Medication 150 MILLIGRAM(S): at 07:28

## 2017-12-07 RX ADMIN — Medication 150 MILLIGRAM(S): at 15:39

## 2017-12-07 RX ADMIN — HEPARIN SODIUM 5000 UNIT(S): 5000 INJECTION INTRAVENOUS; SUBCUTANEOUS at 22:28

## 2017-12-07 RX ADMIN — PIPERACILLIN AND TAZOBACTAM 200 GRAM(S): 4; .5 INJECTION, POWDER, LYOPHILIZED, FOR SOLUTION INTRAVENOUS at 06:48

## 2017-12-07 RX ADMIN — INSULIN GLARGINE 7 UNIT(S): 100 INJECTION, SOLUTION SUBCUTANEOUS at 07:28

## 2017-12-07 RX ADMIN — ATORVASTATIN CALCIUM 20 MILLIGRAM(S): 80 TABLET, FILM COATED ORAL at 22:28

## 2017-12-07 RX ADMIN — Medication 5 MILLIGRAM(S): at 06:49

## 2017-12-07 RX ADMIN — PIPERACILLIN AND TAZOBACTAM 200 GRAM(S): 4; .5 INJECTION, POWDER, LYOPHILIZED, FOR SOLUTION INTRAVENOUS at 00:56

## 2017-12-07 RX ADMIN — Medication 250 MILLIGRAM(S): at 11:24

## 2017-12-07 RX ADMIN — PIPERACILLIN AND TAZOBACTAM 200 GRAM(S): 4; .5 INJECTION, POWDER, LYOPHILIZED, FOR SOLUTION INTRAVENOUS at 11:27

## 2017-12-07 RX ADMIN — Medication 1 APPLICATION(S): at 11:00

## 2017-12-07 RX ADMIN — HEPARIN SODIUM 5000 UNIT(S): 5000 INJECTION INTRAVENOUS; SUBCUTANEOUS at 06:49

## 2017-12-07 RX ADMIN — Medication 2: at 11:43

## 2017-12-07 RX ADMIN — NYSTATIN CREAM 1 APPLICATION(S): 100000 CREAM TOPICAL at 22:29

## 2017-12-07 RX ADMIN — Medication 100 GRAM(S): at 07:28

## 2017-12-07 NOTE — PROGRESS NOTE ADULT - SUBJECTIVE AND OBJECTIVE BOX
TRANSFER NOTE:     HOSPITAL COURSE:   Hospital Course:  32 year old paraplegic M with a history of spinal cord injury (wheelchair bound), sacral pressure ulcer with osteo x2 (outpatient provider said they have records of March osteo s/p daptomycin of unknown length) earlier in 2017,  DM-2, indwelling suprapubic catheter, hepatitis B with septic shock secondary to infected purulent sacral 4th degree pressure ulcer with underlying inadequately treated OM. The patient was initially admitted to the MICU because he required levophed for hypotension. A line and central line were placed. Blood cultures NGTD and wound cultures were are growing gram negative rods and gram positive cocci in chains. Urine culture grew >100,000 proteus and the patient stated he has a staghorn calculus. The patient was initiated on vancomycin and zosyn.  Plastic surgery is following and debrided the R ischial wound at bedside, they did not need to debride the sacral wound. Per nursing reports, the patient has stool coming saturating his packing of his sacral wound which is adjacent to his anus, plastics was notified there may be a concern for fistula. A line and central line have been removed. The patient is stable and ready for step-down to the floor.     INTERVAL HPI/OVERNIGHT EVENTS:     VITAL SIGNS:  T(F): 97.5 (12-07-17 @ 14:47)  HR: 66 (12-07-17 @ 14:00)  BP: 121/57 (12-07-17 @ 14:00)  RR: 24 (12-07-17 @ 14:00)  SpO2: 96% (12-07-17 @ 14:00)  Wt(kg): --    PHYSICAL EXAM:  General: No acute distress.   HEENT: Normocephalic, Atraumtic. PEERL. MMM  Respiratory: Normal breath sounds b/l. No wheezes, crackles, rhonchi.  Cardiovascular: Normal S1 and S2. RRR. No rubs, murmurs.   Gastrointestinal: Soft, non-distended, non-tender. Positive bowel sounds.   Extremities: No LE edema.   Skin: No rashes or ulcers. No clubbing/cyanosis.   Vascular: 2+ dorsalis pedis pulses b/l  Neurological: No focal deficits. AAOx3.       MEDICATIONS  (STANDING):  ascorbic acid 250 milliGRAM(s) Oral daily  atorvastatin 20 milliGRAM(s) Oral at bedtime  Dakins Solution - 1/4 Strength 1 Application(s) Topical daily  dextrose 5%. 1000 milliLiter(s) (50 mL/Hr) IV Continuous <Continuous>  dextrose 50% Injectable 12.5 Gram(s) IV Push once  dextrose 50% Injectable 25 Gram(s) IV Push once  dextrose 50% Injectable 25 Gram(s) IV Push once  heparin  Injectable 5000 Unit(s) SubCutaneous every 8 hours  insulin glargine Injectable (LANTUS) 7 Unit(s) SubCutaneous every morning  insulin lispro (HumaLOG) corrective regimen sliding scale   SubCutaneous Before meals and at bedtime  levETIRAcetam 500 milliGRAM(s) Oral two times a day  nystatin Powder 1 Application(s) Topical two times a day  oxybutynin 5 milliGRAM(s) Oral two times a day  piperacillin/tazobactam IVPB. 3.375 Gram(s) IV Intermittent every 6 hours  vancomycin  IVPB 750 milliGRAM(s) IV Intermittent every 8 hours    MEDICATIONS  (PRN):  bisacodyl Suppository 10 milliGRAM(s) Rectal daily PRN Constipation  dextrose Gel 1 Dose(s) Oral once PRN Blood Glucose LESS THAN 70 milliGRAM(s)/deciliter  glucagon  Injectable 1 milliGRAM(s) IntraMuscular once PRN Glucose LESS THAN 70 milligrams/deciliter      Allergies    Capzasin Back and Body (Unknown)    Intolerances        LABS:                        7.3    6.2   )-----------( 296      ( 07 Dec 2017 05:35 )             23.6     12-07    140  |  104  |  6<L>  ----------------------------<  108<H>  4.1   |  24  |  0.71    Ca    8.4      07 Dec 2017 05:36  Phos  4.1     12-07  Mg     1.8     12-07    TPro  6.1  /  Alb  2.5<L>  /  TBili  0.2  /  DBili  x   /  AST  7<L>  /  ALT  7<L>  /  AlkPhos  78  12-07    PT/INR - ( 07 Dec 2017 05:35 )   PT: 14.7 sec;   INR: 1.32          PTT - ( 07 Dec 2017 05:35 )  PTT:29.5 sec      RADIOLOGY & ADDITIONAL TESTS: TRANSFER TO [email or web address]]:     HOSPITAL COURSE:   32 year old paraplegic M with a history of spinal cord injury (wheelchair bound), sacral pressure ulcer with osteo x2 (outpatient provider said they have records of March osteo s/p daptomycin of unknown length) earlier in 2017,  DM-2, indwelling suprapubic catheter, hepatitis B with septic shock secondary to infected purulent sacral 4th degree pressure ulcer with underlying inadequately treated OM. The patient was initially admitted to the MICU because he required levophed for hypotension. A line and central line were placed. Blood cultures NGTD and wound cultures were are growing gram negative rods and gram positive cocci in chains. Urine culture grew >100,000 proteus and the patient stated he has a staghorn calculus. The patient was initiated on vancomycin and zosyn.  Plastic surgery is following and debrided the R ischial wound at bedside, they did not need to debride the sacral wound. Per nursing reports, the patient has stool coming saturating his packing of his sacral wound which is adjacent to his anus, plastics is not concerned for fistula, but is concerned the area is difficult to keep clean and the patient may benefit from elective colostomy. Gen surg consulted for possible elective colostomy. A line and central line have been removed. The patient is stable and ready for step-down to the floor.     Subjective: Patient seen and examined at bedside. Patient in no acute distress. Pt denies all ROS.     VITAL SIGNS:  T(F): 97.5 (12-07-17 @ 14:47)  HR: 66 (12-07-17 @ 14:00)  BP: 121/57 (12-07-17 @ 14:00)  RR: 24 (12-07-17 @ 14:00)  SpO2: 96% (12-07-17 @ 14:00)  Wt(kg): --    PHYSICAL EXAM:  General: Young gentleman sitting in bed comfortably.   HEENT: Normocephalic, Atraumtic. PEERL. MMM  Respiratory: Normal breath sounds b/l. No wheezes, crackles, rhonchi.  Cardiovascular: Normal S1 and S2. RRR. No rubs, murmurs.   Gastrointestinal: Soft, non-distended, non-tender. Healed midline abdominal incision.   Extremities: No LE edema.   Skin: No rashes or ulcers. No clubbing/cyanosis.   Vascular: 2+ dorsalis pedis pulses b/l  Neurological: Paraplegic, no sensation below the nipples. Pt able to move b/l upper extremities.       MEDICATIONS  (STANDING):  ascorbic acid 250 milliGRAM(s) Oral daily  atorvastatin 20 milliGRAM(s) Oral at bedtime  Dakins Solution - 1/4 Strength 1 Application(s) Topical daily  dextrose 5%. 1000 milliLiter(s) (50 mL/Hr) IV Continuous <Continuous>  dextrose 50% Injectable 12.5 Gram(s) IV Push once  dextrose 50% Injectable 25 Gram(s) IV Push once  dextrose 50% Injectable 25 Gram(s) IV Push once  heparin  Injectable 5000 Unit(s) SubCutaneous every 8 hours  insulin glargine Injectable (LANTUS) 7 Unit(s) SubCutaneous every morning  insulin lispro (HumaLOG) corrective regimen sliding scale   SubCutaneous Before meals and at bedtime  levETIRAcetam 500 milliGRAM(s) Oral two times a day  nystatin Powder 1 Application(s) Topical two times a day  oxybutynin 5 milliGRAM(s) Oral two times a day  piperacillin/tazobactam IVPB. 3.375 Gram(s) IV Intermittent every 6 hours  vancomycin  IVPB 750 milliGRAM(s) IV Intermittent every 8 hours    MEDICATIONS  (PRN):  bisacodyl Suppository 10 milliGRAM(s) Rectal daily PRN Constipation  dextrose Gel 1 Dose(s) Oral once PRN Blood Glucose LESS THAN 70 milliGRAM(s)/deciliter  glucagon  Injectable 1 milliGRAM(s) IntraMuscular once PRN Glucose LESS THAN 70 milligrams/deciliter      Allergies    Capzasin Back and Body (Unknown)    Intolerances        LABS:                        7.3    6.2   )-----------( 296      ( 07 Dec 2017 05:35 )             23.6     12-07    140  |  104  |  6<L>  ----------------------------<  108<H>  4.1   |  24  |  0.71    Ca    8.4      07 Dec 2017 05:36  Phos  4.1     12-07  Mg     1.8     12-07    TPro  6.1  /  Alb  2.5<L>  /  TBili  0.2  /  DBili  x   /  AST  7<L>  /  ALT  7<L>  /  AlkPhos  78  12-07    PT/INR - ( 07 Dec 2017 05:35 )   PT: 14.7 sec;   INR: 1.32          PTT - ( 07 Dec 2017 05:35 )  PTT:29.5 sec      RADIOLOGY & ADDITIONAL TESTS: TRANSFER TO [facility]:     HOSPITAL COURSE:   32 year old paraplegic M with a history of spinal cord injury (wheelchair bound), sacral pressure ulcer with osteo x2 (outpatient provider said they have records of March osteo s/p daptomycin of unknown length) earlier in 2017,  DM-2, indwelling suprapubic catheter, hepatitis B with septic shock secondary to infected purulent sacral 4th degree pressure ulcer with underlying inadequately treated OM. The patient was initially admitted to the MICU because he required levophed for hypotension. A line and central line were placed. Blood cultures NGTD and wound cultures were are growing gram negative rods and gram positive cocci in chains. Urine culture grew >100,000 proteus and the patient stated he has a staghorn calculus. The patient was initiated on vancomycin and zosyn.  Plastic surgery is following and debrided the R ischial wound at bedside, they did not need to debride the sacral wound. Per nursing reports, the patient has stool coming saturating his packing of his sacral wound which is adjacent to his anus, plastics is not concerned for fistula, but is concerned the area is difficult to keep clean and the patient may benefit from elective colostomy. Gen surg consulted for possible elective colostomy. A line and central line have been removed. The patient is stable and ready for step-down to the floor.     Subjective: Patient seen and examined at bedside. Patient in no acute distress. Pt denies all ROS.     VITAL SIGNS:  T(F): 97.5 (12-07-17 @ 14:47)  HR: 66 (12-07-17 @ 14:00)  BP: 121/57 (12-07-17 @ 14:00)  RR: 24 (12-07-17 @ 14:00)  SpO2: 96% (12-07-17 @ 14:00)  Wt(kg): --    PHYSICAL EXAM:  General: Young gentleman sitting in bed comfortably.   HEENT: Normocephalic, Atraumtic. PEERL. MMM  Respiratory: Normal breath sounds b/l. No wheezes, crackles, rhonchi.  Cardiovascular: Normal S1 and S2. RRR. No rubs, murmurs.   Gastrointestinal: Soft, non-distended, non-tender. Healed midline abdominal incision.   Extremities: No LE edema.   Skin: stage 4 sacral ulcer packed w/ gauze.   Vascular: 2+ dorsalis pedis pulses b/l  Neurological: Paraplegic, no sensation below the nipples. Pt able to move b/l upper extremities.       MEDICATIONS  (STANDING):  ascorbic acid 250 milliGRAM(s) Oral daily  atorvastatin 20 milliGRAM(s) Oral at bedtime  Dakins Solution - 1/4 Strength 1 Application(s) Topical daily  dextrose 5%. 1000 milliLiter(s) (50 mL/Hr) IV Continuous <Continuous>  dextrose 50% Injectable 12.5 Gram(s) IV Push once  dextrose 50% Injectable 25 Gram(s) IV Push once  dextrose 50% Injectable 25 Gram(s) IV Push once  heparin  Injectable 5000 Unit(s) SubCutaneous every 8 hours  insulin glargine Injectable (LANTUS) 7 Unit(s) SubCutaneous every morning  insulin lispro (HumaLOG) corrective regimen sliding scale   SubCutaneous Before meals and at bedtime  levETIRAcetam 500 milliGRAM(s) Oral two times a day  nystatin Powder 1 Application(s) Topical two times a day  oxybutynin 5 milliGRAM(s) Oral two times a day  piperacillin/tazobactam IVPB. 3.375 Gram(s) IV Intermittent every 6 hours  vancomycin  IVPB 750 milliGRAM(s) IV Intermittent every 8 hours    MEDICATIONS  (PRN):  bisacodyl Suppository 10 milliGRAM(s) Rectal daily PRN Constipation  dextrose Gel 1 Dose(s) Oral once PRN Blood Glucose LESS THAN 70 milliGRAM(s)/deciliter  glucagon  Injectable 1 milliGRAM(s) IntraMuscular once PRN Glucose LESS THAN 70 milligrams/deciliter      Allergies    Capzasin Back and Body (Unknown)    Intolerances        LABS:                        7.3    6.2   )-----------( 296      ( 07 Dec 2017 05:35 )             23.6     12-07    140  |  104  |  6<L>  ----------------------------<  108<H>  4.1   |  24  |  0.71    Ca    8.4      07 Dec 2017 05:36  Phos  4.1     12-07  Mg     1.8     12-07    TPro  6.1  /  Alb  2.5<L>  /  TBili  0.2  /  DBili  x   /  AST  7<L>  /  ALT  7<L>  /  AlkPhos  78  12-07    PT/INR - ( 07 Dec 2017 05:35 )   PT: 14.7 sec;   INR: 1.32          PTT - ( 07 Dec 2017 05:35 )  PTT:29.5 sec      RADIOLOGY & ADDITIONAL TESTS: Reviewed.

## 2017-12-07 NOTE — PHYSICAL THERAPY INITIAL EVALUATION ADULT - GENERAL OBSERVATIONS, REHAB EVAL
Received semi-supine in bed with +suprapubic cathter, on room air, +IV, in no apparent distress. Patient denies pain

## 2017-12-07 NOTE — PROGRESS NOTE ADULT - PROBLEM SELECTOR PLAN 4
Pt w/ AST/ALT within normal limits. INR elevated 1.81 on admission  -INR of 1.32 today  -Stable  -will need to obtain Pt reports that he got hepatitis B in 2012 while at a nursing home via feculent matter. Reports that he has never been treated. AST/ALT within normal limits. INR elevated 1.81 on admission  -INR of 1.32 today  -will check hepatitis panel

## 2017-12-07 NOTE — PROGRESS NOTE ADULT - ASSESSMENT
Patient is a 32 year old paraplegic M with a history of spinal cord injury (wheelchair bound), sacral pressure ulcer with osteo x2 (outpatient provider said they have records of March osteo s/p daptomycin of unknown length) earlier in 2017,  DM-2, indwelling suprapubic catheter, hepatitis B with septic shock secondary to infected purulent sacral 4th degree pressure ulcer with underlying inadequately treated OM.    ID:  #septic shock   -secondary to infected 4th degree sacral ulcer, purulent with likely sacral osteo  -wound cultures sent growing gram negative rods and gram positive in chains, still concern for MRSA  -In ED: Tmax 103.7, , BP 72/40, WBC 20k, lactate 2.9.  -s/p 4.5 L NS and vanc/zosyn.  C/w vanc/zosyn.  Vanc trough before 4th doses  -f/u blood cx NGTD  -BP improved on levo gtt.  Keep MAPs >65; wean as tolerated  -C/w IVF at maintenance  -plastic surgery debrided the R ischial wound and did not debride the sacral wound. They do not think that there is skin infection in these areas, consider inadequately treated oteomyelitis vs UTI as possible other etiologies of infection  -Tylenol for fever  -ID following: agree with antibiotic, If decompensates, could consider aminoglycoside for syngergistic coverage of possible MDR G-.  -collateral from Dr. Alford at [facility] ([facility]) on previous osteo and outpatient tx - discussed with clinical nurse, per records last seen in October dx in May 2017 with osteomyelitis and treated with daptomycin with PICC. Dr. Timo Laguerre ID follows pt. In October patient requested referral to  for wound care, but clinic unsure if he followed up    #R/o osteomyelitis of sacrum  -purulent wound on sacrum  --previous osteo in this area x2 in 2017  -possible inadequately treated osteomyelitis     #UTI  -patient does not have sensation or control of urination (neurogenic bladder)  -suprapubic catheter in place, per patient exchanged at [facility] before arrival, uro team examined bowles no change since was changed at [facility] per patient  - (+) UA with LE, nitrites, bacteria and WBC  -C/w abx as above  -urine cx and blood cx NGTD  -per outpatient provider, Dr. Mark Millard6 exchanges catheter monthly    #hepatitis B  -obtain collateral from PCP  -AST/ALT WNL  -INR elevated ~1.8, stable    DERMATOLOGY:  #Ulcers  -plastic surgery consulted - duoderm for sacral wound to prevent stool from getting in it  -wound cultures sent  -blood cultures sent  Per wound care:  Sacral and ischial tuberosity pressure injuries - irrigate with wound cleanser, pack lightly with wet to damp Dakin's 1/4 strength solution kerlix and cover with foam dressing daily x 7 days.  Left foot lateral aspect ulcer - cleanse with wound cleanser, apply foam dressing every 3 days or prn if soiled or wet.    #Tinea corporis  Per wound care:  Fungal rash on bilateral buttocks - apply nystatin powder, seal with liquid skin barrier.  Fungal rash on bilateral inguinal area - apply nystatin powder.       NEURO:  no sedation.    AOx3    #traumatic brain injury / paraplegia  -chronic.    -frequent turning  -wound care following, and recs followed    #seizure d/o  -Patient reports history of at least 2 seizures.  Unclear etiology.   -C/w home Keppra 500 mg BID    #history of CVA  -per patient described as hemorrhagic, call PMD for more info  -hold on ASA for now    CARDIAC:  #septic shock  -Treat as above    #sinus tachycardia - resolved  -Improved with IVF.  Secondary to septic shock.      #HTN  -still on pressors, but very low dose norepi.  no reported BP home regimen per patient    #HLD  c/w home lipitor    RESP:  No respiratory symptoms.  Stable on room air.   CXR wnl, no further CXR necessary at this point    GI:  -rectal tube if patient starts to have watery BMs    #elevated alk phos  -unclear etiology.  possibly musculoskeletal.  CMP; trend    #Constipation  dulcolax PRN suppository  -cannot do rectal tube as stool is hard    RENAL:  #neurogenic bladder 2/2 to paraplegia  BUN, creatinine WNL  Monitor UOP  (+) suprapubic cath to bowles; bowles exchanged at [facility] per patient, urology saw patient  -Dr. mark Millard changes monthly as outpatient  c/w home oxybutynin    HEME:  #microcytic anemia  hgb 9.1.  MCV 74.  No sign of bleeding.  maintain active T&S  -f/u iron studies    ENDO:  #type 2 diabetes mellitus  -f/u A1C  -accuchecks Q6H and ISS for now.  Dosed Lantus 7 units in a.m.  Will likely need more when on diet all day, NPO after midnight, for possible debridement, so will keep 7 units for tomorrow  -hold home oral antihyperglycemic regimen (metformin, glimperide)    PSYCH:  #paranoia   -previously patient states many of the wound care people that come to his home and people he sees as outpatient are corrupt and trying to steal his money.  -monitor, today patient not paranoid  -patient resistant to seeing a psychiatrist  -social work offered to address concerns with home health aide, patient does not want to see     F:  no IVF  E:  replete PRN  N:  consistent carb diet no snacks  Ppx:  hep SQ  Dispo:  MICU  Code status:  full code  Critical care time rendered 40 minutes Patient is a 32 year old paraplegic M with a history of spinal cord injury (wheelchair bound), sacral pressure ulcer with osteo x2 (outpatient provider said they have records of March osteo s/p daptomycin of unknown length) earlier in 2017,  DM-2, indwelling suprapubic catheter, hepatitis B with septic shock secondary to infected purulent sacral 4th degree pressure ulcer with underlying inadequately treated OM being treated with vancomycin and zosyn.    ID:  #septic shock   -In ED: Tmax 103.7, , BP 72/40, WBC 20k, lactate 2.9. s/p 4.5 L NS and vanc/zosyn. patient initiated on levophed, but now has been tapered off  -secondary to infected 4th degree sacral ulcer, purulent with likely sacral osteo  -wound cultures sent growing gram negative rods and gram positive in chains, still concern for MRSA  - C/w vanc/zosyn. Afebrile  -blood cx NGTD  -plastic surgery debrided the R ischial wound and did not debride the sacral wound. They do not think that there is skin infection in these areas, consider inadequately treated oteomyelitis vs UTI as possible other etiologies of infection  -ID following: agree with antibiotic, If decompensates, could consider aminoglycoside for syngergistic coverage of possible MDR G-.  -collateral from Dr. Alford at [facility] ([facility]) on previous osteo and outpatient tx - discussed with clinical nurse, per records last seen in October dx in May 2017 with osteomyelitis and treated with daptomycin with PICC. Dr. Timo Laguerre ID follows pt. In October patient requested referral to  for wound care, but clinic unsure if he followed up    #R/o osteomyelitis of sacrum  -purulent wound on sacrum  --previous osteo in this area x2 in 2017  -possible inadequately treated osteomyelitis   -c/w vanc/zosyn  -based on nurse reports the packing of sacral wound had stool coming from the inside of wound, concern for possible fistula, will discuss with plastics if they want CT or imaging to r/o fistula    #UTI  -patient does not have sensation or control of urination (neurogenic bladder)  -suprapubic catheter in place, per patient exchanged at [facility] before arrival, uro team examined bowles no change since was changed at [facility] per patient  - (+) UA with LE, nitrites, bacteria and WBC  -Ucx + Porteus >100,000, per patient has history of staghorn calculus  -urology changed bowles as it was leaking  -C/w abx as above  -urine cx and blood cx NGTD  -per outpatient provider, Dr. Mark Millard exchanges catheter monthly    #hepatitis B  -AST/ALT WNL  -INR elevated ~1.8, stable    DERMATOLOGY:  #Ulcers  -plastic surgery consulted - duoderm for sacral wound to prevent stool from getting in it  -wound cultures gram negative rods and gram positive cocci in chains  -blood cultures NGTD  Per wound care:  Sacral and ischial tuberosity pressure injuries - irrigate with wound cleanser, pack lightly with wet to damp Dakin's 1/4 strength solution kerlix and cover with foam dressing daily x 7 days.  Left foot lateral aspect ulcer - cleanse with wound cleanser, apply foam dressing every 3 days or prn if soiled or wet.    #Tinea corporis  Per wound care:  Fungal rash on bilateral buttocks - apply nystatin powder, seal with liquid skin barrier.  Fungal rash on bilateral inguinal area - apply nystatin powder.       NEURO:  no sedation.    AOx3    #traumatic brain injury / paraplegia  -chronic.    -frequent turning  -wound care following, and recs followed    #seizure d/o  -Patient reports history of at least 2 seizures.  Unclear etiology.   -C/w home Keppra 500 mg BID    #history of CVA  -per patient described as hemorrhagic  -hold on ASA for now    CARDIAC:  #septic shock  -Treat as above    #sinus tachycardia - resolved  -Improved with IVF.  Secondary to septic shock.      #HTN  -off pressors  no reported BP home regimen per patient    #HLD  c/w home lipitor    RESP:  No respiratory symptoms.  Stable on room air.   CXR wnl, no further CXR necessary at this point    GI:  -rectal tube if patient starts to have watery BMs    #elevated alk phos  -unclear etiology.  possibly musculoskeletal.  CMP; trend    #Constipation  dulcolax PRN suppository  -cannot do rectal tube as stool is hard    RENAL:  #neurogenic bladder 2/2 to paraplegia  BUN, creatinine WNL  Monitor UOP  (+) suprapubic cath to bowles; bowles exchanged by urology today as it was leaking  -Dr. mark Millard changes monthly as outpatient  c/w home oxybutynin    HEME:  #microcytic anemia  hgb 9.1.  MCV 74.  No sign of bleeding.  maintain active T&S  - iron studies unremarkable    ENDO:  #type 2 diabetes mellitus  -f/u A1C  -accuchecks Q6H and ISS for now.  Dosed Lantus 7 units in a.m, required 2 Units additional today  -hold home oral antihyperglycemic regimen (metformin, glimperide)    F:  no IVF  E:  replete PRN  N:  consistent carb diet no snacks  Ppx:  hep SQ  Dispo:  MICU stepdown to [facility], SCDs  Code status:  full code  Critical care time rendered 40 minutes Patient is a 32 year old paraplegic M with a history of spinal cord injury (wheelchair bound), sacral pressure ulcer with osteo x2 (outpatient provider said they have records of March osteo s/p daptomycin of unknown length) earlier in 2017,  DM-2, indwelling suprapubic catheter, hepatitis B with septic shock secondary to infected purulent sacral 4th degree pressure ulcer with underlying inadequately treated OM being treated with vancomycin and zosyn.    ID:  #septic shock   -In ED: Tmax 103.7, , BP 72/40, WBC 20k, lactate 2.9. s/p 4.5 L NS and vanc/zosyn. patient initiated on levophed, but now has been tapered off  -secondary to infected 4th degree sacral ulcer, purulent with likely sacral osteo  -wound cultures sent growing gram negative rods and gram positive in chains, still concern for MRSA  - C/w vanc/zosyn. Afebrile  -blood cx NGTD  -plastic surgery debrided the R ischial wound and did not debride the sacral wound. They do not think that there is skin infection in these areas, consider inadequately treated oteomyelitis vs UTI as possible other etiologies of infection  -ID following: agree with antibiotic, If decompensates, could consider aminoglycoside for syngergistic coverage of possible MDR G-.  -collateral from Dr. Alford at [facility] ([facility]) on previous osteo and outpatient tx - discussed with clinical nurse, per records last seen in October dx in May 2017 with osteomyelitis and treated with daptomycin with PICC. Dr. Timo Laguerre ID follows pt. In October patient requested referral to  for wound care, but clinic unsure if he followed up    #R/o osteomyelitis of sacrum  -purulent wound on sacrum  --previous osteo in this area x2 in 2017  -possible inadequately treated osteomyelitis   -c/w vanc/zosyn  -based on nurse reports the packing of sacral wound had stool coming from the inside of wound, concern for possible fistula, will discuss with plastics if they want CT or imaging to r/o fistula    #UTI  -patient does not have sensation or control of urination (neurogenic bladder)  -suprapubic catheter in place, per patient exchanged at [facility] before arrival, uro team examined bowles no change since was changed at [facility] per patient  - (+) UA with LE, nitrites, bacteria and WBC  -Ucx + Porteus >100,000, per patient has history of staghorn calculus  -urology changed bowles as it was leaking  -C/w abx as above  -urine cx and blood cx NGTD  -per outpatient provider, Dr. Mark Millard exchanges catheter monthly    #hepatitis B  -AST/ALT WNL  -INR elevated ~1.8, stable    DERMATOLOGY:  #Ulcers  -plastic surgery consulted - duoderm for sacral wound to prevent stool from getting in it  -wound cultures gram negative rods and gram positive cocci in chains  -blood cultures NGTD  Per wound care:  Sacral and ischial tuberosity pressure injuries - irrigate with wound cleanser, pack lightly with wet to damp Dakin's 1/4 strength solution kerlix and cover with foam dressing daily x 7 days.  Left foot lateral aspect ulcer - cleanse with wound cleanser, apply foam dressing every 3 days or prn if soiled or wet.    #Tinea corporis  Per wound care:  Fungal rash on bilateral buttocks - apply nystatin powder, seal with liquid skin barrier.  Fungal rash on bilateral inguinal area - apply nystatin powder.       NEURO:  no sedation.    AOx3    #traumatic brain injury / paraplegia  -chronic.    -frequent turning  -wound care following, and recs followed    #seizure d/o  -Patient reports history of at least 2 seizures.  Unclear etiology.   -C/w home Keppra 500 mg BID    #history of CVA  -per patient described as hemorrhagic  -hold on ASA for now    CARDIAC:  #septic shock, resolving  -Treat as above    #sinus tachycardia - resolved  -Improved with IVF.  Secondary to septic shock.      #HTN  -off pressors  no reported BP home regimen per patient    #HLD  c/w home lipitor    RESP:  No respiratory symptoms.  Stable on room air.   CXR wnl, no further CXR necessary at this point    GI:  -rectal tube if patient starts to have watery BMs    #elevated alk phos  -unclear etiology.  possibly musculoskeletal.  CMP; trend    #Constipation  dulcolax PRN suppository  -cannot do rectal tube as stool is hard    RENAL:  #neurogenic bladder 2/2 to paraplegia  BUN, creatinine WNL  Monitor UOP  (+) suprapubic cath to bowles; bowles exchanged by urology today as it was leaking  -Dr. mark Millard changes monthly as outpatient  c/w home oxybutynin    HEME:  #microcytic anemia  hgb 9.1.  MCV 74.  No sign of bleeding.  maintain active T&S  - iron studies unremarkable    ENDO:  #type 2 diabetes mellitus  -f/u A1C  -accuchecks Q6H and ISS for now.  Dosed Lantus 7 units in a.m, required 2 Units additional today  -hold home oral antihyperglycemic regimen (metformin, glimperide)    F:  no IVF  E:  replete PRN  N:  consistent carb diet no snacks  Ppx:  hep SQ  Dispo:  MICU stepdown to [facility], SCDs  Code status:  full code  Critical care time rendered 40 minutes Patient is a 32 year old paraplegic M with a history of spinal cord injury (wheelchair bound), sacral pressure ulcer with osteo x2 (outpatient provider said they have records of March osteo s/p daptomycin of unknown length) earlier in 2017,  DM-2, indwelling suprapubic catheter, hepatitis B with septic shock secondary to infected purulent sacral 4th degree pressure ulcer with underlying inadequately treated OM being treated with vancomycin and zosyn.    ID:  #septic shock   -In ED: Tmax 103.7, , BP 72/40, WBC 20k, lactate 2.9. s/p 4.5 L NS and vanc/zosyn. patient initiated on levophed, but now has been tapered off  -secondary to infected 4th degree sacral ulcer, purulent with likely sacral osteo  -wound cultures sent growing gram negative rods and gram positive in chains, still concern for MRSA  - C/w vanc/zosyn. Afebrile  -blood cx NGTD  -plastic surgery debrided the R ischial wound and did not debride the sacral wound. They do not think that there is skin infection in these areas, consider inadequately treated oteomyelitis vs UTI as possible other etiologies of infection  -ID following: agree with antibiotic, If decompensates, could consider aminoglycoside for syngergistic coverage of possible MDR G-.  -collateral from Dr. Alford at [facility] ([facility]) on previous osteo and outpatient tx - discussed with clinical nurse, per records last seen in October dx in May 2017 with osteomyelitis and treated with daptomycin with PICC. Dr. Timo Laguerre ID follows pt. In October patient requested referral to  for wound care, but clinic unsure if he followed up    #R/o osteomyelitis of sacrum  -purulent wound on sacrum  --previous osteo in this area x2 in 2017  -possible inadequately treated osteomyelitis   -c/w vanc/zosyn  -based on nurse reports the packing of sacral wound had stool coming from the inside of wound, concern for possible fistula, will discussed with plastics, not concerned for fistula at this time based on exam but do say wound is exceptionally difficult to keep clean given the area of the wound and the depth of the wound, will c/s gen surg for possible elective colostomy if this might be an option to prevent more recurrent OM infections    #UTI  -patient does not have sensation or control of urination (neurogenic bladder)  -suprapubic catheter in place, per patient exchanged at [facility] before arrival, uro team examined bowles no change since was changed at [facility] per patient  - (+) UA with LE, nitrites, bacteria and WBC  -Ucx + Porteus >100,000, per patient has history of staghorn calculus  -urology changed bowles as it was leaking  -C/w abx as above  -urine cx and blood cx NGTD  -per outpatient provider, Dr. Mark Millard exchanges catheter monthly    #hepatitis B  -AST/ALT WNL  -INR elevated ~1.8, stable    DERMATOLOGY:  #Ulcers  -plastic surgery consulted - duoderm for sacral wound to prevent stool from getting in it  -wound cultures gram negative rods and gram positive cocci in chains  -blood cultures NGTD  Per wound care:  Sacral and ischial tuberosity pressure injuries - irrigate with wound cleanser, pack lightly with wet to damp Dakin's 1/4 strength solution kerlix and cover with foam dressing daily x 7 days.  Left foot lateral aspect ulcer - cleanse with wound cleanser, apply foam dressing every 3 days or prn if soiled or wet.    #Tinea corporis  Per wound care:  Fungal rash on bilateral buttocks - apply nystatin powder, seal with liquid skin barrier.  Fungal rash on bilateral inguinal area - apply nystatin powder.       NEURO:  no sedation.    AOx3    #traumatic brain injury / paraplegia  -chronic.    -frequent turning  -wound care following, and recs followed    #seizure d/o  -Patient reports history of at least 2 seizures.  Unclear etiology.   -C/w home Keppra 500 mg BID    #history of CVA  -per patient described as hemorrhagic  -hold on ASA for now    CARDIAC:  #septic shock, resolving  -Treat as above    #sinus tachycardia - resolved  -Improved with IVF.  Secondary to septic shock.      #HTN  -off pressors  no reported BP home regimen per patient    #HLD  c/w home lipitor    RESP:  No respiratory symptoms.  Stable on room air.   CXR wnl, no further CXR necessary at this point    GI:  -rectal tube if patient starts to have watery BMs    #elevated alk phos  -unclear etiology.  possibly musculoskeletal.  CMP; trend    #Constipation  dulcolax PRN suppository  -cannot do rectal tube as stool is hard    RENAL:  #neurogenic bladder 2/2 to paraplegia  BUN, creatinine WNL  Monitor UOP  (+) suprapubic cath to bowles; bowles exchanged by urology today as it was leaking  -Dr. mark Millard changes monthly as outpatient  c/w home oxybutynin    HEME:  #microcytic anemia  hgb 9.1.  MCV 74.  No sign of bleeding.  maintain active T&S  - iron studies unremarkable    ENDO:  #type 2 diabetes mellitus  -f/u A1C  -accuchecks Q6H and ISS for now.  Dosed Lantus 7 units in a.m, required 2 Units additional today  -hold home oral antihyperglycemic regimen (metformin, glimperide)    F:  no IVF  E:  replete PRN  N:  consistent carb diet no snacks  Ppx:  hep SQ  Dispo:  MICU stepdown to [facility], SCDs  Code status:  full code  Critical care time rendered 40 minutes

## 2017-12-07 NOTE — PROGRESS NOTE ADULT - SUBJECTIVE AND OBJECTIVE BOX
INTERVAL HPI/OVERNIGHT EVENTS:    Patient was seen and examined at bedside.  Feels well today.  Off pressors.  Concerned about his "Hep B" infection    CONSTITUTIONAL:  Negative fever or chills, feels well, good appetite  EYES:  Negative  blurry vision or double vision  CARDIOVASCULAR:  Negative for chest pain or palpitations  RESPIRATORY:  Negative for cough, wheezing, or SOB   GASTROINTESTINAL:  Negative for nausea, vomiting, diarrhea, constipation, or abdominal pain  GENITOURINARY:  Negative frequency, urgency or dysuria  NEUROLOGIC:  No headache, confusion, dizziness, lightheadedness      ANTIBIOTICS/RELEVANT:    MEDICATIONS  (STANDING):  ascorbic acid 250 milliGRAM(s) Oral daily  atorvastatin 20 milliGRAM(s) Oral at bedtime  Dakins Solution - 1/4 Strength 1 Application(s) Topical daily  dextrose 5%. 1000 milliLiter(s) (50 mL/Hr) IV Continuous <Continuous>  dextrose 50% Injectable 12.5 Gram(s) IV Push once  dextrose 50% Injectable 25 Gram(s) IV Push once  dextrose 50% Injectable 25 Gram(s) IV Push once  heparin  Injectable 5000 Unit(s) SubCutaneous every 8 hours  insulin glargine Injectable (LANTUS) 7 Unit(s) SubCutaneous every morning  insulin lispro (HumaLOG) corrective regimen sliding scale   SubCutaneous Before meals and at bedtime  levETIRAcetam 500 milliGRAM(s) Oral two times a day  nystatin Powder 1 Application(s) Topical two times a day  oxybutynin 5 milliGRAM(s) Oral two times a day  piperacillin/tazobactam IVPB. 3.375 Gram(s) IV Intermittent every 6 hours  vancomycin  IVPB 750 milliGRAM(s) IV Intermittent every 8 hours    MEDICATIONS  (PRN):  bisacodyl Suppository 10 milliGRAM(s) Rectal daily PRN Constipation  dextrose Gel 1 Dose(s) Oral once PRN Blood Glucose LESS THAN 70 milliGRAM(s)/deciliter  glucagon  Injectable 1 milliGRAM(s) IntraMuscular once PRN Glucose LESS THAN 70 milligrams/deciliter        Vital Signs Last 24 Hrs  T(C): 36.3 (07 Dec 2017 09:24), Max: 36.8 (06 Dec 2017 22:16)  T(F): 97.3 (07 Dec 2017 09:24), Max: 98.2 (06 Dec 2017 22:16)  HR: 72 (07 Dec 2017 12:00) (60 - 92)  BP: 111/63 (07 Dec 2017 12:00) (82/62 - 115/53)  BP(mean): 88 (07 Dec 2017 12:00) (65 - 88)  RR: 26 (07 Dec 2017 12:00) (17 - 31)  SpO2: 99% (07 Dec 2017 12:00) (96% - 100%)    PHYSICAL EXAM:  Constitutional: no distress  Eyes:  non-toxic   Ear/Nose/Throat: no sinus tenderness on percussion	  Neck:  supple  Respiratory: clear to auscultation   Cardiovascular: S1S2 RRR, no murmurs  Gastrointestinal:soft, (+) BS, no HSM, non-tender  Extremities:no edema  Vascular: DP Pulse:	right normal; left normal      LABS:                        7.3    6.2   )-----------( 296      ( 07 Dec 2017 05:35 )             23.6     12-07    140  |  104  |  6<L>  ----------------------------<  108<H>  4.1   |  24  |  0.71    Ca    8.4      07 Dec 2017 05:36  Phos  4.1     12-07  Mg     1.8     12-07    TPro  6.1  /  Alb  2.5<L>  /  TBili  0.2  /  DBili  x   /  AST  7<L>  /  ALT  7<L>  /  AlkPhos  78  12-07    PT/INR - ( 07 Dec 2017 05:35 )   PT: 14.7 sec;   INR: 1.32          PTT - ( 07 Dec 2017 05:35 )  PTT:29.5 sec      MICROBIOLOGY:  Culture - Abscess with Gram Stain (12.05.17 @ 17:06)    Gram Stain:   Rare Gram Negative Rods  Numerous White blood cells    Specimen Source: .Abscess sacrum    Culture Results:   Few Gram Negative Rods (Three Types)  Rare Enterococcus species  More than three types of organisms recovered may indicate colonization  and/or contamination.  Please call Microbiology immediately if identification and/or  suceptibility is indicated.      Culture - Abscess with Gram Stain (12.05.17 @ 16:59)    Gram Stain:   Moderate Gram positive cocci in pairs  Few Gram Positive Rods  Rare Gram Negative Rods  Moderate White blood cells    Specimen Source: .Abscess R ischium    Culture Results:   Few Gram Negative Rods (Three Types)  Few Enterococcus species  Few Corynebacterium species  More than three types of organisms recovered may indicate colonization  and/or contamination.  Please call Microbiology immediately if identification and/or  suceptibility is indicated.    Culture - Urine (12.05.17 @ 16:13)    Specimen Source: .Urine Clean Catch (Midstream)    Culture Results:   >100,000 CFU/ml Proteus mirabilis        Culture - Blood (12.05.17 @ 14:48)    Specimen Source: .Blood Blood-Peripheral    Culture Results:   No growth to date.    RADIOLOGY & ADDITIONAL STUDIES:

## 2017-12-07 NOTE — PROGRESS NOTE ADULT - PROBLEM SELECTOR PLAN 3
Pt w/ no sensation or control of urination with suprapubic catheter changed once a month.   -Urology changed bowles today as it was leaking   -c/w oxybutynin 5mg BID

## 2017-12-07 NOTE — PHYSICAL THERAPY INITIAL EVALUATION ADULT - MANUAL MUSCLE TESTING RESULTS, REHAB EVAL
Bilateral Upper Extremity Strength 5/5 throughout based on manual muscle testing; Patient with no trunk control, and no active movement below T4 level

## 2017-12-07 NOTE — PROGRESS NOTE ADULT - PROBLEM SELECTOR PLAN 1
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 Wound cultures growing gram negative rods and gram positive in chains. Upon arrival pts wound was packed w/ feculent material and required debridement by plastic surgery. Concern for possible Pt w/ a stage 4 infected sacral ulcer w/ purulence. Wound cultures growing gram negative rods and gram positive in chains. Upon arrival pts wound was packed w/ feculent material and required debridement by plastic surgery.   -will continue w/ Vancomycin + Zosyn   -awaiting specificities of wound cultures   -possible concern for possible fistula given nurse was concerned there may be stool coming from inside the wound. General Surgery consulted if there is a role in having an elective colostomy to prevent recurrent OM infections.

## 2017-12-07 NOTE — CONSULT NOTE ADULT - ASSESSMENT
32M paraplegic with multiple stage 4 pressure wounds, admitted to the MICU with sepsis most likely 2/2 UTI.  - No evidence of fistula between anorectum and sacral wound from exam.  - fecal soilage from close proximity.  - Continue wound care as per Plastics and Wound RN  - No acute surgical intervention at this point in time  - Possible placement of diverting transverse colostomy in the future.  - Pt is currently malnourished and poor surgical candidate.  - Optimize nutrition and DM control.  - Would recommend air mattress.  - Pt seen with ACS chief and discussed with Dr. Garcia  - General surgery team 4c will continue to follow. 32M paraplegic with multiple stage 4 pressure wounds, admitted to the MICU with sepsis most likely 2/2 UTI.  - No evidence of fistula between anorectum and sacral wound from exam.  - fecal soilage from close proximity.  - Continue wound care as per Plastics and Wound RN  - No acute surgical intervention at this point in time  - Possible placement of diverting transverse colostomy in the future.  - Would require CT A/P for preop planning.  - Pt is currently malnourished and poor surgical candidate.  - Optimize nutrition and DM control.  - Would recommend air mattress.  - Pt seen with ACS chief and discussed with Dr. Garcia  - General surgery team 4c will continue to follow.

## 2017-12-07 NOTE — PROGRESS NOTE ADULT - PROBLEM SELECTOR PLAN 5
Patient on metformin and glimeperide at home.   -ISS   -Lantus 7 units in AM Patient on metformin and glimeperide at home. HgA1C of 6.0.   -ISS   -Lantus 7 units in AM

## 2017-12-07 NOTE — CONSULT NOTE ADULT - SUBJECTIVE AND OBJECTIVE BOX
HPI:  32M paraplegic with diabetes and two stage 4 pressure wounds, admitted to the MICU with sepsis requiring pressor support. Original concern for sepsis 2/2 sacral wounds.  Patient was treated on vanc and zosyn, IVF, pressors, and fluid. He is now off pressors, tolerating a diet, hd stable and afebrile.  His Blood cultures from admission have had no growth to date, his wound cx are polymicrobial and most likely contaminated by the feculent spillage, and the urine culture has grown proteus >100K.  Urology has upsized his suprapubic catheter but he currently reports that he continues to leak around the catheter. He currently denies any F/C/N/V/CP/SOB.  Of note pt reports inadequate nursing care at home and constantly struggling with keeping his wounds clean and cared for.        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 1000 milliLiter(s) (50 mL/Hr) IV Continuous <Continuous>  dextrose 50% Injectable 12.5 Gram(s) IV Push once  dextrose 50% Injectable 25 Gram(s) IV Push once  dextrose 50% Injectable 25 Gram(s) IV Push once  heparin  Injectable 5000 Unit(s) SubCutaneous every 8 hours  insulin glargine Injectable (LANTUS) 7 Unit(s) SubCutaneous every morning  insulin lispro (HumaLOG) corrective regimen sliding scale   SubCutaneous Before meals and at bedtime  levETIRAcetam 500 milliGRAM(s) Oral two times a day  nystatin Powder 1 Application(s) Topical two times a day  oxybutynin 5 milliGRAM(s) Oral two times a day  piperacillin/tazobactam IVPB. 3.375 Gram(s) IV Intermittent every 6 hours  vancomycin  IVPB 750 milliGRAM(s) IV Intermittent every 8 hours    MEDICATIONS  (PRN):  bisacodyl Suppository 10 milliGRAM(s) Rectal daily PRN Constipation  dextrose Gel 1 Dose(s) Oral once PRN Blood Glucose LESS THAN 70 milliGRAM(s)/deciliter  glucagon  Injectable 1 milliGRAM(s) IntraMuscular once PRN Glucose LESS THAN 70 milligrams/deciliter      Vital Signs Last 24 Hrs  T(C): 36.4 (07 Dec 2017 14:47), Max: 36.8 (06 Dec 2017 22:16)  T(F): 97.5 (07 Dec 2017 14:47), Max: 98.2 (06 Dec 2017 22:16)  HR: 66 (07 Dec 2017 14:00) (60 - 102)  BP: 121/57 (07 Dec 2017 14:00) (82/62 - 121/57)  BP(mean): 79 (07 Dec 2017 14:00) (65 - 88)  RR: 24 (07 Dec 2017 14:00) (17 - 31)  SpO2: 96% (07 Dec 2017 14:00) (96% - 100%)    PHYSICAL EXAM:  Gen: Age appropriate, unkempt male in NAD  CV: RRR  Resp: No increased work of breathing.  Abd: midline scar xiphoid-pubis well healed, soft/nd (patient is insensate below nipple line)  Ext: WWP, no peripheral edema  Perineum: large stage 4 sacral decubitus ulcer tracking b/l, good granulating bed throughout with healthy bleeding tissure, no surrounding induration however irritated skin throughout perineum.  Sacral ulcer  from anus by 2-3cm tissue, on digital rectal exam no fistulous tract appreciated between anus and wound. When dressing changed only minimal feculant material noted on packing, that of which was closest in proximity to anus.  large stage 4 ischial decubitus ulcer, clean with healthy granulation bed.  Both wounds repacked and dressed.        LABS:                        7.3    6.2   )-----------( 296      ( 07 Dec 2017 05:35 )             23.6     12-07    140  |  104  |  6<L>  ----------------------------<  108<H>  4.1   |  24  |  0.71    Ca    8.4      07 Dec 2017 05:36  Phos  4.1     12-07  Mg     1.8     12-07    TPro  6.1  /  Alb  2.5<L>  /  TBili  0.2  /  DBili  x   /  AST  7<L>  /  ALT  7<L>  /  AlkPhos  78  12-07    PT/INR - ( 07 Dec 2017 05:35 )   PT: 14.7 sec;   INR: 1.32          PTT - ( 07 Dec 2017 05:35 )  PTT:29.5 sec

## 2017-12-07 NOTE — PROGRESS NOTE ADULT - ASSESSMENT
Patient is a 32 year old paraplegic M with a history of spinal cord injury (wheelchair bound), sacral pressure ulcer with osteo x2 (outpatient provider said they have records of March osteo s/p daptomycin of unknown length) earlier in 2017,  DM-2, indwelling suprapubic catheter, hepatitis B with septic shock secondary to infected purulent sacral 4th degree pressure ulcer with underlying inadequately treated OM being treated with vancomycin and zosyn. Patient is a 32 year old paraplegic M with a history of spinal cord injury (wheelchair bound), sacral pressure ulcer with osteo x2 (outpatient provider said they have records of March osteo s/p daptomycin of unknown length) earlier in 2017,  DM-2, indwelling suprapubic catheter, hepatitis B who presented w/ septic shock secondary to infected purulent sacral 4th degree pressure ulcer with underlying inadequately treated OM being treated with vancomycin and zosyn.

## 2017-12-07 NOTE — PROGRESS NOTE ADULT - SUBJECTIVE AND OBJECTIVE BOX
**INCOMPLETE    INTERVAL HPI/OVERNIGHT EVENTS: patient noted to have urine leakage from cath site.  [place] still draining >100/hour.  Was given 18 Korean in ED per patient.  Refused exchange 12/6 but uro left a 14F at bedside.  uro called, will reassess in am as it is draining ok.  recc UA but we have a positive UA...UrCx grew >100k proteus.  patient says he has a staghorn calculus.  A-line not functioning right; reading BP lower than manual.      VITAL SIGNS:  T(F): 97.8 (12-07-17 @ 01:28)  HR: 80 (12-07-17 @ 06:00)  BP: 94/49 (12-07-17 @ 06:00)  RR: 23 (12-07-17 @ 06:00)  SpO2: 98% (12-07-17 @ 06:00)  Wt(kg): --    PHYSICAL EXAM:    Constitutional: Well developed, well nourished  General: laying comfortably  Eyes: PERRL  ENMT: moist mucous membranes, no mucosal pallor, clear throat, uvula midline  Neck: supple  Respiratory: CTABL, no rales, no crackles, no wheezing  Cardiovascular: +S1, +S2, no murmurs, rubs or gallops, regular rate and rhythm  Gastrointestinal: abdomen soft, non distended, non tender, +BS  Extremities: no edema, no calf pain to palpation  Vascular: cap refill <3s in all extremities, radial and DP pulses 2+  Neurological: AAO x3, no sensation under nipple line  Skin: C/D/I R ischial dressing, sacral dressing and L foot dressing, midline scar on abdomen    MEDICATIONS  (STANDING):  ascorbic acid 250 milliGRAM(s) Oral daily  atorvastatin 20 milliGRAM(s) Oral at bedtime  Dakins Solution - 1/4 Strength 1 Application(s) Topical daily  dextrose 5%. 1000 milliLiter(s) (50 mL/Hr) IV Continuous <Continuous>  dextrose 50% Injectable 12.5 Gram(s) IV Push once  dextrose 50% Injectable 25 Gram(s) IV Push once  dextrose 50% Injectable 25 Gram(s) IV Push once  heparin  Injectable 5000 Unit(s) SubCutaneous every 8 hours  insulin glargine Injectable (LANTUS) 7 Unit(s) SubCutaneous every morning  insulin lispro (HumaLOG) corrective regimen sliding scale   SubCutaneous Before meals and at bedtime  levETIRAcetam 500 milliGRAM(s) Oral two times a day  nystatin Powder 1 Application(s) Topical two times a day  oxybutynin 5 milliGRAM(s) Oral two times a day  piperacillin/tazobactam IVPB. 3.375 Gram(s) IV Intermittent every 6 hours  vancomycin  IVPB      vancomycin  IVPB 1000 milliGRAM(s) IV Intermittent every 8 hours    MEDICATIONS  (PRN):  bisacodyl Suppository 10 milliGRAM(s) Rectal daily PRN Constipation  dextrose Gel 1 Dose(s) Oral once PRN Blood Glucose LESS THAN 70 milliGRAM(s)/deciliter  glucagon  Injectable 1 milliGRAM(s) IntraMuscular once PRN Glucose LESS THAN 70 milligrams/deciliter      Allergies    Capzasin Back and Body (Unknown)    Intolerances        LABS:                        7.3    6.2   )-----------( 296      ( 07 Dec 2017 05:35 )             23.6     12-06    134<L>  |  99  |  9   ----------------------------<  183<H>  3.7   |  21<L>  |  0.79    Ca    8.1<L>      06 Dec 2017 06:15  Phos  3.1     12-06  Mg     1.9     12-06      PT/INR - ( 07 Dec 2017 05:35 )   PT: 14.7 sec;   INR: 1.32          PTT - ( 07 Dec 2017 05:35 )  PTT:29.5 sec      RADIOLOGY & ADDITIONAL TESTS: Reviewed. **INCOMPLETE    INTERVAL HPI/OVERNIGHT EVENTS: patient noted to have urine leakage from cath site.  Hig[place]both[place] still draining >100/hour.  Was given 18 Kinyarwanda in ED per patient.  Refused exchange 12/6 but uro left a 14F at bedside.  uro called, will reassess in am as it is draining ok.  recc UA but we have a positive UA...UrCx grew >100k proteus.  patient says he has a staghorn calculus.  A-line not functioning right; reading BP lower than manual.  Vancomycin dosing adjusted.    VITAL SIGNS:  T(F): 97.8 (12-07-17 @ 01:28)  HR: 80 (12-07-17 @ 06:00)  BP: 94/49 (12-07-17 @ 06:00)  RR: 23 (12-07-17 @ 06:00)  SpO2: 98% (12-07-17 @ 06:00)  Wt(kg): --    PHYSICAL EXAM:    Constitutional: Well developed, well nourished  General: laying comfortably  Eyes: PERRL  ENMT: moist mucous membranes, no mucosal pallor, clear throat, uvula midline  Neck: supple  Respiratory: CTABL, no rales, no crackles, no wheezing  Cardiovascular: +S1, +S2, no murmurs, rubs or gallops, regular rate and rhythm  Gastrointestinal: abdomen soft, non distended, non tender, +BS  Extremities: no edema, no calf pain to palpation  Vascular: cap refill <3s in all extremities, radial and DP pulses 2+  Neurological: AAO x3, no sensation under nipple line  Skin: C/D/I R ischial dressing, sacral dressing and L foot dressing, midline scar on abdomen    MEDICATIONS  (STANDING):  ascorbic acid 250 milliGRAM(s) Oral daily  atorvastatin 20 milliGRAM(s) Oral at bedtime  Dakins Solution - 1/4 Strength 1 Application(s) Topical daily  dextrose 5%. 1000 milliLiter(s) (50 mL/Hr) IV Continuous <Continuous>  dextrose 50% Injectable 12.5 Gram(s) IV Push once  dextrose 50% Injectable 25 Gram(s) IV Push once  dextrose 50% Injectable 25 Gram(s) IV Push once  heparin  Injectable 5000 Unit(s) SubCutaneous every 8 hours  insulin glargine Injectable (LANTUS) 7 Unit(s) SubCutaneous every morning  insulin lispro (HumaLOG) corrective regimen sliding scale   SubCutaneous Before meals and at bedtime  levETIRAcetam 500 milliGRAM(s) Oral two times a day  nystatin Powder 1 Application(s) Topical two times a day  oxybutynin 5 milliGRAM(s) Oral two times a day  piperacillin/tazobactam IVPB. 3.375 Gram(s) IV Intermittent every 6 hours  vancomycin  IVPB      vancomycin  IVPB 1000 milliGRAM(s) IV Intermittent every 8 hours    MEDICATIONS  (PRN):  bisacodyl Suppository 10 milliGRAM(s) Rectal daily PRN Constipation  dextrose Gel 1 Dose(s) Oral once PRN Blood Glucose LESS THAN 70 milliGRAM(s)/deciliter  glucagon  Injectable 1 milliGRAM(s) IntraMuscular once PRN Glucose LESS THAN 70 milligrams/deciliter      Allergies    Capzasin Back and Body (Unknown)    Intolerances        LABS:                        7.3    6.2   )-----------( 296      ( 07 Dec 2017 05:35 )             23.6     12-06    134<L>  |  99  |  9   ----------------------------<  183<H>  3.7   |  21<L>  |  0.79    Ca    8.1<L>      06 Dec 2017 06:15  Phos  3.1     12-06  Mg     1.9     12-06      PT/INR - ( 07 Dec 2017 05:35 )   PT: 14.7 sec;   INR: 1.32          PTT - ( 07 Dec 2017 05:35 )  PTT:29.5 sec      RADIOLOGY & ADDITIONAL TESTS: Reviewed. **INCOMPLETE  TRANSFER NOTE:  Hospital Course:  32 year old paraplegic M with a history of spinal cord injury (wheelchair bound), sacral pressure ulcer with osteo x2 (outpatient provider said they have records of March osteo s/p daptomycin of unknown length) earlier in 2017,  DM-2, indwelling suprapubic catheter, hepatitis B with septic shock secondary to infected purulent sacral 4th degree pressure ulcer with underlying inadequately treated OM. Plastic surgery is following and debrided the R ischial wound at bedside, they did not need to debride the sacral wound. Per nursing reports, the patient       INTERVAL HPI/OVERNIGHT EVENTS: patient noted to have urine leakage from cath site.  Hig[place]botham still draining >100/hour.  Was given 18 Qatari in ED per patient.  Refused exchange 12/6 but uro left a 14F at bedside.  uro called, will reassess in am as it is draining ok.  recc UA but we have a positive UA...UrCx grew >100k proteus.  patient says he has a staghorn calculus.  A-line not functioning right; reading BP lower than manual.  Vancomycin dosing adjusted.    VITAL SIGNS:  T(F): 97.8 (12-07-17 @ 01:28)  HR: 80 (12-07-17 @ 06:00)  BP: 94/49 (12-07-17 @ 06:00)  RR: 23 (12-07-17 @ 06:00)  SpO2: 98% (12-07-17 @ 06:00)  Wt(kg): --    PHYSICAL EXAM:    Constitutional: Well developed, well nourished  General: laying comfortably  Eyes: PERRL  ENMT: moist mucous membranes, no mucosal pallor, clear throat, uvula midline  Neck: supple  Respiratory: CTABL, no rales, no crackles, no wheezing  Cardiovascular: +S1, +S2, no murmurs, rubs or gallops, regular rate and rhythm  Gastrointestinal: abdomen soft, non distended, non tender, +BS  Extremities: no edema, no calf pain to palpation  Vascular: cap refill <3s in all extremities, radial and DP pulses 2+  Neurological: AAO x3, no sensation under nipple line  Skin: C/D/I R ischial dressing, sacral dressing and L foot dressing, midline scar on abdomen    MEDICATIONS  (STANDING):  ascorbic acid 250 milliGRAM(s) Oral daily  atorvastatin 20 milliGRAM(s) Oral at bedtime  Dakins Solution - 1/4 Strength 1 Application(s) Topical daily  dextrose 5%. 1000 milliLiter(s) (50 mL/Hr) IV Continuous <Continuous>  dextrose 50% Injectable 12.5 Gram(s) IV Push once  dextrose 50% Injectable 25 Gram(s) IV Push once  dextrose 50% Injectable 25 Gram(s) IV Push once  heparin  Injectable 5000 Unit(s) SubCutaneous every 8 hours  insulin glargine Injectable (LANTUS) 7 Unit(s) SubCutaneous every morning  insulin lispro (HumaLOG) corrective regimen sliding scale   SubCutaneous Before meals and at bedtime  levETIRAcetam 500 milliGRAM(s) Oral two times a day  nystatin Powder 1 Application(s) Topical two times a day  oxybutynin 5 milliGRAM(s) Oral two times a day  piperacillin/tazobactam IVPB. 3.375 Gram(s) IV Intermittent every 6 hours  vancomycin  IVPB      vancomycin  IVPB 1000 milliGRAM(s) IV Intermittent every 8 hours    MEDICATIONS  (PRN):  bisacodyl Suppository 10 milliGRAM(s) Rectal daily PRN Constipation  dextrose Gel 1 Dose(s) Oral once PRN Blood Glucose LESS THAN 70 milliGRAM(s)/deciliter  glucagon  Injectable 1 milliGRAM(s) IntraMuscular once PRN Glucose LESS THAN 70 milligrams/deciliter      Allergies    Capzasin Back and Body (Unknown)    Intolerances        LABS:                        7.3    6.2   )-----------( 296      ( 07 Dec 2017 05:35 )             23.6     12-06    134<L>  |  99  |  9   ----------------------------<  183<H>  3.7   |  21<L>  |  0.79    Ca    8.1<L>      06 Dec 2017 06:15  Phos  3.1     12-06  Mg     1.9     12-06      PT/INR - ( 07 Dec 2017 05:35 )   PT: 14.7 sec;   INR: 1.32          PTT - ( 07 Dec 2017 05:35 )  PTT:29.5 sec      RADIOLOGY & ADDITIONAL TESTS: Reviewed. TRANSFER NOTE:  Hospital Course:  32 year old paraplegic M with a history of spinal cord injury (wheelchair bound), sacral pressure ulcer with osteo x2 (outpatient provider said they have records of March osteo s/p daptomycin of unknown length) earlier in 2017,  DM-2, indwelling suprapubic catheter, hepatitis B with septic shock secondary to infected purulent sacral 4th degree pressure ulcer with underlying inadequately treated OM. The patient was initially admitted to the MICU because he required levophed for hypotension. A line and central line were placed. Blood cultures NGTD and wound cultures were are growing gram negative rods and gram positive cocci in chains. Urine culture grew >100,000 proteus and the patient stated he has a staghorn calculus. The patient was initiated on vancomycin and zosyn.  Plastic surgery is following and debrided the R ischial wound at bedside, they did not need to debride the sacral wound. Per nursing reports, the patient has stool coming saturating his packing of his sacral wound which is adjacent to his anus, plastics was notified there may be a concern for fistula. A line and central line have been removed. The patient is stable and ready for step-down to the floor.       INTERVAL HPI/OVERNIGHT EVENTS: patient noted to have urine leakage from cath site.  Bowles still draining >100/hour.  Was given 18 Yoruba in ED per patient.  Refused exchange 12/6 but uro left a 14F at bedside.  uro called, will reassess in am as it is draining ok.  recc UA but we have a positive UA...UrCx grew >100k proteus.  patient says he has a staghorn calculus.  A-line not functioning right; reading BP lower than manual.  Vancomycin dosing adjusted.    VITAL SIGNS:  T(F): 97.8 (12-07-17 @ 01:28)  HR: 80 (12-07-17 @ 06:00)  BP: 94/49 (12-07-17 @ 06:00)  RR: 23 (12-07-17 @ 06:00)  SpO2: 98% (12-07-17 @ 06:00)  Wt(kg): --    PHYSICAL EXAM:    Constitutional: Well developed, well nourished  General: laying comfortably  Eyes: PERRL  ENMT: moist mucous membranes, no mucosal pallor, clear throat, uvula midline  Neck: supple  Respiratory: CTABL, no rales, no crackles, no wheezing  Cardiovascular: +S1, +S2, no murmurs, rubs or gallops, regular rate and rhythm  Gastrointestinal: abdomen soft, non distended, non tender, +BS  : suprapubic bowles in place with towel saturated with urine  Extremities: no edema, no calf pain to palpation  Vascular: cap refill <3s in all extremities, radial and DP pulses 2+  Neurological: AAO x3, no sensation under nipple line, not moving legs, moving arms b/l  Skin: C/D/I R ischial dressing, sacral dressing and L foot dressing, midline scar on abdomen    MEDICATIONS  (STANDING):  ascorbic acid 250 milliGRAM(s) Oral daily  atorvastatin 20 milliGRAM(s) Oral at bedtime  Dakins Solution - 1/4 Strength 1 Application(s) Topical daily  dextrose 5%. 1000 milliLiter(s) (50 mL/Hr) IV Continuous <Continuous>  dextrose 50% Injectable 12.5 Gram(s) IV Push once  dextrose 50% Injectable 25 Gram(s) IV Push once  dextrose 50% Injectable 25 Gram(s) IV Push once  heparin  Injectable 5000 Unit(s) SubCutaneous every 8 hours  insulin glargine Injectable (LANTUS) 7 Unit(s) SubCutaneous every morning  insulin lispro (HumaLOG) corrective regimen sliding scale   SubCutaneous Before meals and at bedtime  levETIRAcetam 500 milliGRAM(s) Oral two times a day  nystatin Powder 1 Application(s) Topical two times a day  oxybutynin 5 milliGRAM(s) Oral two times a day  piperacillin/tazobactam IVPB. 3.375 Gram(s) IV Intermittent every 6 hours  vancomycin  IVPB      vancomycin  IVPB 1000 milliGRAM(s) IV Intermittent every 8 hours    MEDICATIONS  (PRN):  bisacodyl Suppository 10 milliGRAM(s) Rectal daily PRN Constipation  dextrose Gel 1 Dose(s) Oral once PRN Blood Glucose LESS THAN 70 milliGRAM(s)/deciliter  glucagon  Injectable 1 milliGRAM(s) IntraMuscular once PRN Glucose LESS THAN 70 milligrams/deciliter      Allergies    Capzasin Back and Body (Unknown)    Intolerances        LABS:                        7.3    6.2   )-----------( 296      ( 07 Dec 2017 05:35 )             23.6     12-06    134<L>  |  99  |  9   ----------------------------<  183<H>  3.7   |  21<L>  |  0.79    Ca    8.1<L>      06 Dec 2017 06:15  Phos  3.1     12-06  Mg     1.9     12-06      PT/INR - ( 07 Dec 2017 05:35 )   PT: 14.7 sec;   INR: 1.32          PTT - ( 07 Dec 2017 05:35 )  PTT:29.5 sec      RADIOLOGY & ADDITIONAL TESTS: Reviewed. TRANSFER NOTE:  Hospital Course:  32 year old paraplegic M with a history of spinal cord injury (wheelchair bound), sacral pressure ulcer with osteo x2 (outpatient provider said they have records of March osteo s/p daptomycin of unknown length) earlier in 2017,  DM-2, indwelling suprapubic catheter, hepatitis B with septic shock secondary to infected purulent sacral 4th degree pressure ulcer with underlying inadequately treated OM. The patient was initially admitted to the MICU because he required levophed for hypotension. A line and central line were placed. Blood cultures NGTD and wound cultures were are growing gram negative rods and gram positive cocci in chains. Urine culture grew >100,000 proteus and the patient stated he has a staghorn calculus. The patient was initiated on vancomycin and zosyn.  Plastic surgery is following and debrided the R ischial wound at bedside, they did not need to debride the sacral wound. Per nursing reports, the patient has stool coming saturating his packing of his sacral wound which is adjacent to his anus, plastics is not concerned for fistula, but is concerned the area is difficult to keep clean and the patient may benefit from elective colostomy. Gen surg consulted for possible elective colostomy. A line and central line have been removed. The patient is stable and ready for step-down to the floor.       INTERVAL HPI/OVERNIGHT EVENTS: patient noted to have urine leakage from cath site.  Bowles still draining >100/hour.  Was given 18 Belarusian in ED per patient.  Refused exchange 12/6 but uro left a 14F at bedside.  uro called, will reassess in am as it is draining ok.  recc UA but we have a positive UA...UrCx grew >100k proteus.  patient says he has a staghorn calculus.  A-line not functioning right; reading BP lower than manual.  Vancomycin dosing adjusted.    VITAL SIGNS:  T(F): 97.8 (12-07-17 @ 01:28)  HR: 80 (12-07-17 @ 06:00)  BP: 94/49 (12-07-17 @ 06:00)  RR: 23 (12-07-17 @ 06:00)  SpO2: 98% (12-07-17 @ 06:00)  Wt(kg): --    PHYSICAL EXAM:    Constitutional: Well developed, well nourished  General: laying comfortably  Eyes: PERRL  ENMT: moist mucous membranes, no mucosal pallor, clear throat, uvula midline  Neck: supple  Respiratory: CTABL, no rales, no crackles, no wheezing  Cardiovascular: +S1, +S2, no murmurs, rubs or gallops, regular rate and rhythm  Gastrointestinal: abdomen soft, non distended, non tender, +BS  : suprapubic bowles in place with towel saturated with urine  Extremities: no edema, no calf pain to palpation  Vascular: cap refill <3s in all extremities, radial and DP pulses 2+  Neurological: AAO x3, no sensation under nipple line, not moving legs, moving arms b/l  Skin: C/D/I R ischial dressing, sacral dressing and L foot dressing, midline scar on abdomen    MEDICATIONS  (STANDING):  ascorbic acid 250 milliGRAM(s) Oral daily  atorvastatin 20 milliGRAM(s) Oral at bedtime  Dakins Solution - 1/4 Strength 1 Application(s) Topical daily  dextrose 5%. 1000 milliLiter(s) (50 mL/Hr) IV Continuous <Continuous>  dextrose 50% Injectable 12.5 Gram(s) IV Push once  dextrose 50% Injectable 25 Gram(s) IV Push once  dextrose 50% Injectable 25 Gram(s) IV Push once  heparin  Injectable 5000 Unit(s) SubCutaneous every 8 hours  insulin glargine Injectable (LANTUS) 7 Unit(s) SubCutaneous every morning  insulin lispro (HumaLOG) corrective regimen sliding scale   SubCutaneous Before meals and at bedtime  levETIRAcetam 500 milliGRAM(s) Oral two times a day  nystatin Powder 1 Application(s) Topical two times a day  oxybutynin 5 milliGRAM(s) Oral two times a day  piperacillin/tazobactam IVPB. 3.375 Gram(s) IV Intermittent every 6 hours  vancomycin  IVPB      vancomycin  IVPB 1000 milliGRAM(s) IV Intermittent every 8 hours    MEDICATIONS  (PRN):  bisacodyl Suppository 10 milliGRAM(s) Rectal daily PRN Constipation  dextrose Gel 1 Dose(s) Oral once PRN Blood Glucose LESS THAN 70 milliGRAM(s)/deciliter  glucagon  Injectable 1 milliGRAM(s) IntraMuscular once PRN Glucose LESS THAN 70 milligrams/deciliter      Allergies    Capzasin Back and Body (Unknown)    Intolerances        LABS:                        7.3    6.2   )-----------( 296      ( 07 Dec 2017 05:35 )             23.6     12-06    134<L>  |  99  |  9   ----------------------------<  183<H>  3.7   |  21<L>  |  0.79    Ca    8.1<L>      06 Dec 2017 06:15  Phos  3.1     12-06  Mg     1.9     12-06      PT/INR - ( 07 Dec 2017 05:35 )   PT: 14.7 sec;   INR: 1.32          PTT - ( 07 Dec 2017 05:35 )  PTT:29.5 sec      RADIOLOGY & ADDITIONAL TESTS: Reviewed.

## 2017-12-07 NOTE — PROGRESS NOTE ADULT - PROBLEM SELECTOR PLAN 8
-c/w home lipitor medication    #Seizure Disorder:   pt w/ a reported hx of seizure disorder  -c/w home medication of keppra 500mg BID

## 2017-12-07 NOTE — PHYSICAL THERAPY INITIAL EVALUATION ADULT - PATIENT/FAMILY/SIGNIFICANT OTHER GOALS STATEMENT, PT EVAL
Patient is willing and motivated to participate in physical therapy and would like to practice transfers to his wheelchair
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
## 2017-12-07 NOTE — PHYSICAL THERAPY INITIAL EVALUATION ADULT - CRITERIA FOR SKILLED THERAPEUTIC INTERVENTIONS
impairments found/therapy frequency/functional limitations in following categories/risk reduction/prevention/anticipated equipment needs at discharge/rehab potential/anticipated discharge recommendation

## 2017-12-07 NOTE — PHYSICAL THERAPY INITIAL EVALUATION ADULT - PERTINENT HX OF CURRENT PROBLEM, REHAB EVAL
Patient is a 32 year old M who presents to the MICU with sepsis requiring pressor support. Original concern for sepsis 2/2 sacral wounds. Relevant PMH includes complete T4 spinal cord injury, diabetes and two stage 4 pressure wounds

## 2017-12-07 NOTE — PHYSICAL THERAPY INITIAL EVALUATION ADULT - IMPAIRMENTS FOUND, PT EVAL
gait, locomotion, and balance/integumentary integrity/aerobic capacity/endurance/neuromotor development and sensory integration/muscle strength/sensory integrity

## 2017-12-07 NOTE — PROGRESS NOTE ADULT - PROBLEM SELECTOR PLAN 2
Pt w/ a positive UA on admission with LE, nitrites, bacteria and WBC. Urine culture positive for proteus >100,000. Per patient he has a history of staghorn calculus.   -c/w vanc + zosyn   -pt w/ suprapubic catheter changed by urology today.   -as per outpatient provider, Dr. Mark Millard he exchanges the catheter monthly
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
## 2017-12-07 NOTE — PROGRESS NOTE ADULT - PROBLEM SELECTOR PLAN 7
Pt w/ a Hgb of 9.1 and MCV of 74 on admission. Hgb has been slowly downtrending but no active signs of bleeding. Iron studies showing iron deficiency.   -will maintain active type and screen  -Hgb of 7.3 today  -will transfuse for Hgb <7

## 2017-12-07 NOTE — PHYSICAL THERAPY INITIAL EVALUATION ADULT - ADDITIONAL COMMENTS
Patient lives with a roommate in an elevator apartment building with a handicapped accessible ramp to enter. Prior to admission, patient required assistance from his aide for all mobility and ADLs, and endorses using a standing column and Azucena lift for mobility. Patient states he hasn't performed a slide board transfer to his wheelchair since June as he states he has been back and forth between different hospitals and rehab

## 2017-12-07 NOTE — PROGRESS NOTE ADULT - ASSESSMENT
IMPRESSION:  Sepsis secondary to infected sacral ulcer    Recommend:  1.  Continue Vancomycin and Zosyn.  Agree with vancomycin dose of 750 mg IV q12 given elevated trough  2.  Abscess cultures show multiple decubiti, I have asked micro lab to speciate this out.

## 2017-12-08 DIAGNOSIS — F25.9 SCHIZOAFFECTIVE DISORDER, UNSPECIFIED: ICD-10-CM

## 2017-12-08 DIAGNOSIS — F29 UNSPECIFIED PSYCHOSIS NOT DUE TO A SUBSTANCE OR KNOWN PHYSIOLOGICAL CONDITION: ICD-10-CM

## 2017-12-08 DIAGNOSIS — E11.65 TYPE 2 DIABETES MELLITUS WITH HYPERGLYCEMIA: ICD-10-CM

## 2017-12-08 LAB
-  AMIKACIN: SIGNIFICANT CHANGE UP
-  AMPICILLIN/SULBACTAM: SIGNIFICANT CHANGE UP
-  AMPICILLIN: SIGNIFICANT CHANGE UP
-  AZTREONAM: SIGNIFICANT CHANGE UP
-  CEFAZOLIN: SIGNIFICANT CHANGE UP
-  CEFEPIME: SIGNIFICANT CHANGE UP
-  CEFOXITIN: SIGNIFICANT CHANGE UP
-  CEFTAZIDIME: SIGNIFICANT CHANGE UP
-  CEFTRIAXONE: SIGNIFICANT CHANGE UP
-  CIPROFLOXACIN: SIGNIFICANT CHANGE UP
-  ERTAPENEM: SIGNIFICANT CHANGE UP
-  GENTAMICIN: SIGNIFICANT CHANGE UP
-  IMIPENEM: SIGNIFICANT CHANGE UP
-  LEVOFLOXACIN: SIGNIFICANT CHANGE UP
-  MEROPENEM: SIGNIFICANT CHANGE UP
-  NITROFURANTOIN: SIGNIFICANT CHANGE UP
-  PIPERACILLIN/TAZOBACTAM: SIGNIFICANT CHANGE UP
-  TOBRAMYCIN: SIGNIFICANT CHANGE UP
-  TRIMETHOPRIM/SULFAMETHOXAZOLE: SIGNIFICANT CHANGE UP
-  VANCOMYCIN: SIGNIFICANT CHANGE UP
-  VANCOMYCIN: SIGNIFICANT CHANGE UP
ANION GAP SERPL CALC-SCNC: 14 MMOL/L — SIGNIFICANT CHANGE UP (ref 5–17)
BUN SERPL-MCNC: 4 MG/DL — LOW (ref 7–23)
CALCIUM SERPL-MCNC: 8.8 MG/DL — SIGNIFICANT CHANGE UP (ref 8.4–10.5)
CHLORIDE SERPL-SCNC: 99 MMOL/L — SIGNIFICANT CHANGE UP (ref 96–108)
CO2 SERPL-SCNC: 24 MMOL/L — SIGNIFICANT CHANGE UP (ref 22–31)
CREAT SERPL-MCNC: 0.71 MG/DL — SIGNIFICANT CHANGE UP (ref 0.5–1.3)
CULTURE RESULTS: SIGNIFICANT CHANGE UP
GLUCOSE BLDC GLUCOMTR-MCNC: 144 MG/DL — HIGH (ref 70–99)
GLUCOSE BLDC GLUCOMTR-MCNC: 147 MG/DL — HIGH (ref 70–99)
GLUCOSE BLDC GLUCOMTR-MCNC: 175 MG/DL — HIGH (ref 70–99)
GLUCOSE BLDC GLUCOMTR-MCNC: 289 MG/DL — HIGH (ref 70–99)
GLUCOSE SERPL-MCNC: 148 MG/DL — HIGH (ref 70–99)
HAV IGM SER-ACNC: SIGNIFICANT CHANGE UP
HBV CORE IGM SER-ACNC: SIGNIFICANT CHANGE UP
HBV SURFACE AG SER-ACNC: SIGNIFICANT CHANGE UP
HCT VFR BLD CALC: 26.3 % — LOW (ref 39–50)
HCV AB S/CO SERPL IA: 0.16 S/CO — SIGNIFICANT CHANGE UP
HCV AB SERPL-IMP: SIGNIFICANT CHANGE UP
HGB BLD-MCNC: 7.8 G/DL — LOW (ref 13–17)
MAGNESIUM SERPL-MCNC: 1.8 MG/DL — SIGNIFICANT CHANGE UP (ref 1.6–2.6)
MCHC RBC-ENTMCNC: 22.7 PG — LOW (ref 27–34)
MCHC RBC-ENTMCNC: 29.7 G/DL — LOW (ref 32–36)
MCV RBC AUTO: 76.7 FL — LOW (ref 80–100)
METHOD TYPE: SIGNIFICANT CHANGE UP
ORGANISM # SPEC MICROSCOPIC CNT: SIGNIFICANT CHANGE UP
PHOSPHATE SERPL-MCNC: 4.3 MG/DL — SIGNIFICANT CHANGE UP (ref 2.5–4.5)
PLATELET # BLD AUTO: 345 K/UL — SIGNIFICANT CHANGE UP (ref 150–400)
POTASSIUM SERPL-MCNC: 4.1 MMOL/L — SIGNIFICANT CHANGE UP (ref 3.5–5.3)
POTASSIUM SERPL-SCNC: 4.1 MMOL/L — SIGNIFICANT CHANGE UP (ref 3.5–5.3)
RBC # BLD: 3.43 M/UL — LOW (ref 4.2–5.8)
RBC # FLD: 17.2 % — HIGH (ref 10.3–16.9)
SODIUM SERPL-SCNC: 137 MMOL/L — SIGNIFICANT CHANGE UP (ref 135–145)
SPECIMEN SOURCE: SIGNIFICANT CHANGE UP
VANCOMYCIN TROUGH SERPL-MCNC: 22.7 UG/ML — HIGH (ref 10–20)
WBC # BLD: 5.7 K/UL — SIGNIFICANT CHANGE UP (ref 3.8–10.5)
WBC # FLD AUTO: 5.7 K/UL — SIGNIFICANT CHANGE UP (ref 3.8–10.5)

## 2017-12-08 PROCEDURE — 99233 SBSQ HOSP IP/OBS HIGH 50: CPT | Mod: GC

## 2017-12-08 PROCEDURE — 99223 1ST HOSP IP/OBS HIGH 75: CPT

## 2017-12-08 PROCEDURE — 99233 SBSQ HOSP IP/OBS HIGH 50: CPT

## 2017-12-08 RX ORDER — ARIPIPRAZOLE 15 MG/1
5 TABLET ORAL AT BEDTIME
Qty: 0 | Refills: 0 | Status: DISCONTINUED | OUTPATIENT
Start: 2017-12-08 | End: 2018-01-04

## 2017-12-08 RX ADMIN — PIPERACILLIN AND TAZOBACTAM 200 GRAM(S): 4; .5 INJECTION, POWDER, LYOPHILIZED, FOR SOLUTION INTRAVENOUS at 00:32

## 2017-12-08 RX ADMIN — INSULIN GLARGINE 7 UNIT(S): 100 INJECTION, SOLUTION SUBCUTANEOUS at 09:19

## 2017-12-08 RX ADMIN — Medication 5 MILLIGRAM(S): at 07:22

## 2017-12-08 RX ADMIN — LEVETIRACETAM 500 MILLIGRAM(S): 250 TABLET, FILM COATED ORAL at 07:22

## 2017-12-08 RX ADMIN — Medication 2: at 22:57

## 2017-12-08 RX ADMIN — HEPARIN SODIUM 5000 UNIT(S): 5000 INJECTION INTRAVENOUS; SUBCUTANEOUS at 13:10

## 2017-12-08 RX ADMIN — Medication 1 APPLICATION(S): at 12:11

## 2017-12-08 RX ADMIN — NYSTATIN CREAM 1 APPLICATION(S): 100000 CREAM TOPICAL at 17:23

## 2017-12-08 RX ADMIN — PIPERACILLIN AND TAZOBACTAM 200 GRAM(S): 4; .5 INJECTION, POWDER, LYOPHILIZED, FOR SOLUTION INTRAVENOUS at 19:19

## 2017-12-08 RX ADMIN — ATORVASTATIN CALCIUM 20 MILLIGRAM(S): 80 TABLET, FILM COATED ORAL at 22:56

## 2017-12-08 RX ADMIN — PIPERACILLIN AND TAZOBACTAM 200 GRAM(S): 4; .5 INJECTION, POWDER, LYOPHILIZED, FOR SOLUTION INTRAVENOUS at 07:22

## 2017-12-08 RX ADMIN — HEPARIN SODIUM 5000 UNIT(S): 5000 INJECTION INTRAVENOUS; SUBCUTANEOUS at 07:22

## 2017-12-08 RX ADMIN — Medication 250 MILLIGRAM(S): at 12:11

## 2017-12-08 RX ADMIN — Medication 150 MILLIGRAM(S): at 00:54

## 2017-12-08 RX ADMIN — LEVETIRACETAM 500 MILLIGRAM(S): 250 TABLET, FILM COATED ORAL at 17:23

## 2017-12-08 RX ADMIN — HEPARIN SODIUM 5000 UNIT(S): 5000 INJECTION INTRAVENOUS; SUBCUTANEOUS at 22:56

## 2017-12-08 RX ADMIN — Medication 2: at 00:03

## 2017-12-08 RX ADMIN — NYSTATIN CREAM 1 APPLICATION(S): 100000 CREAM TOPICAL at 07:22

## 2017-12-08 RX ADMIN — PIPERACILLIN AND TAZOBACTAM 200 GRAM(S): 4; .5 INJECTION, POWDER, LYOPHILIZED, FOR SOLUTION INTRAVENOUS at 13:10

## 2017-12-08 RX ADMIN — Medication 5 MILLIGRAM(S): at 17:23

## 2017-12-08 RX ADMIN — Medication 6: at 13:11

## 2017-12-08 NOTE — PROGRESS NOTE ADULT - PROBLEM SELECTOR PLAN 3
Pt w/ no sensation or control of urination with suprapubic catheter changed once a month.   -Urology changed bowles on 12/7 as it was leaking   -c/w oxybutynin 5mg BID
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
## 2017-12-08 NOTE — PROGRESS NOTE ADULT - SUBJECTIVE AND OBJECTIVE BOX
INTERVAL HPI/OVERNIGHT EVENTS: RAHEL    CONSTITUTIONAL: No weakness, fevers or chills  EYES/ENT: No visual changes;  No vertigo or throat pain   NECK: No pain or stiffness  RESPIRATORY: No cough, wheezing, hemoptysis; No shortness of breath  CARDIOVASCULAR: No chest pain or palpitations  GASTROINTESTINAL: No sensation below with chest; denies n/v/d/c  GENITOURINARY: SP cath  NEUROLOGICAL: Numbness below chest  SKIN: Stage 4 sacral decub    ANTIBIOTICS/RELEVANT:    MEDICATIONS  (STANDING):  ascorbic acid 250 milliGRAM(s) Oral daily  atorvastatin 20 milliGRAM(s) Oral at bedtime  Dakins Solution - 1/4 Strength 1 Application(s) Topical daily  dextrose 5%. 1000 milliLiter(s) (50 mL/Hr) IV Continuous <Continuous>  dextrose 50% Injectable 12.5 Gram(s) IV Push once  dextrose 50% Injectable 25 Gram(s) IV Push once  dextrose 50% Injectable 25 Gram(s) IV Push once  heparin  Injectable 5000 Unit(s) SubCutaneous every 8 hours  insulin glargine Injectable (LANTUS) 7 Unit(s) SubCutaneous every morning  insulin lispro (HumaLOG) corrective regimen sliding scale   SubCutaneous Before meals and at bedtime  levETIRAcetam 500 milliGRAM(s) Oral two times a day  nystatin Powder 1 Application(s) Topical two times a day  oxybutynin 5 milliGRAM(s) Oral two times a day  piperacillin/tazobactam IVPB. 3.375 Gram(s) IV Intermittent every 6 hours    MEDICATIONS  (PRN):  bisacodyl Suppository 10 milliGRAM(s) Rectal daily PRN Constipation  dextrose Gel 1 Dose(s) Oral once PRN Blood Glucose LESS THAN 70 milliGRAM(s)/deciliter  glucagon  Injectable 1 milliGRAM(s) IntraMuscular once PRN Glucose LESS THAN 70 milligrams/deciliter    VITALS  Vital Signs Last 24 Hrs  T(C): 36.4 (08 Dec 2017 09:30), Max: 37.1 (07 Dec 2017 21:00)  T(F): 97.6 (08 Dec 2017 09:30), Max: 98.8 (07 Dec 2017 21:00)  HR: 62 (08 Dec 2017 09:30) (62 - 102)  BP: 113/65 (08 Dec 2017 09:30) (100/55 - 121/57)  BP(mean): 83 (07 Dec 2017 15:30) (78 - 88)  RR: 18 (08 Dec 2017 09:30) (18 - 32)  SpO2: 100% (08 Dec 2017 09:30) (92% - 100%)    I&O's Summary    07 Dec 2017 07:01  -  08 Dec 2017 07:00  --------------------------------------------------------  IN: 500 mL / OUT: 3665 mL / NET: -3165 mL        CAPILLARY BLOOD GLUCOSE      POCT Blood Glucose.: 147 mg/dL (08 Dec 2017 08:00)  POCT Blood Glucose.: 173 mg/dL (07 Dec 2017 23:34)  POCT Blood Glucose.: 147 mg/dL (07 Dec 2017 18:17)  POCT Blood Glucose.: 167 mg/dL (07 Dec 2017 11:35)    PHYSICAL EXAM  General: A&Ox3; NAD; hungry  Head: NC/AT; PERRL; EOMI; anicteric sclera; MMM  Neck: Supple; no JVD; TLC in place on right  Respiratory: CTA B/L; no wheezes/crackles   Cardiovascular: Regular rhythm/rate; S1/S2   Gastrointestinal: Soft; mild distension at bases; bowel sounds normal  Extremities: WWP; no edema or cyanosis; radial/pedal pulses palpable  Neurological:  Sensation and motor loss below T4-5 level; CNII-XII intact  Skin: Sacral decub ulcer w/ bone exposure and purulence drainage      LABS:                        7.8    5.7   )-----------( 345      ( 08 Dec 2017 08:34 )             26.3     12-08    137  |  99  |  4<L>  ----------------------------<  148<H>  4.1   |  24  |  0.71    Ca    8.8      08 Dec 2017 08:32  Phos  4.3     12-08  Mg     1.8     12-08    TPro  6.1  /  Alb  2.5<L>  /  TBili  0.2  /  DBili  x   /  AST  7<L>  /  ALT  7<L>  /  AlkPhos  78  12-07    PT/INR - ( 07 Dec 2017 05:35 )   PT: 14.7 sec;   INR: 1.32          PTT - ( 07 Dec 2017 05:35 )  PTT:29.5 sec    MICROBIOLOGY:  Culture - Abscess with Gram Stain (12.05.17 @ 17:06)    Gram Stain:   Rare Gram Negative Rods  Numerous White blood cells    Specimen Source: .Abscess sacrum    Culture Results:   Few Gram Negative Rods (Three Types)  Rare Enterococcus species  More than three types of organisms recovered may indicate colonization  and/or contamination.  Please call Microbiology immediately if identification and/or  suceptibility is indicated.    Culture - Urine (12.05.17 @ 16:13)    -  Amikacin: S 16    -  Ampicillin: R >16    -  Ampicillin/Sulbactam: S 8/4    -  Aztreonam: S <=4    -  Cefazolin: S 8    -  Cefepime: S <=2    -  Cefoxitin: S <=4    -  Ceftazidime: S <=1    -  Ceftriaxone: S <=1    -  Ciprofloxacin: R >2    -  Ertapenem: S <=0.5    -  Gentamicin: I 8    -  Levofloxacin: R >4    -  Meropenem: S <=1    -  Nitrofurantoin: R >64    -  Piperacillin/Tazobactam: S <=8    -  Tobramycin: R >8    -  Trimethoprim/Sulfamethoxazole: S <=0.5/9.5    Specimen Source: .Urine Clean Catch (Midstream)    Culture Results:   >100,000 CFU/ml Proteus mirabilis  >100,000 CFU/ml Klebsiella pneumoniae    Organism Identification: Proteus mirabilis    Organism: Proteus mirabilis    Method Type: YENI INTERVAL HPI/OVERNIGHT EVENTS: RAHEL    CONSTITUTIONAL: No weakness, fevers or chills  EYES/ENT: No visual changes;  No vertigo or throat pain   NECK: No pain or stiffness  RESPIRATORY: No cough, wheezing, hemoptysis; No shortness of breath  CARDIOVASCULAR: No chest pain or palpitations  GASTROINTESTINAL: No sensation below with chest; denies n/v/d/c  GENITOURINARY: SP cath  NEUROLOGICAL: Numbness below chest  SKIN: Stage 4 sacral decub    ANTIBIOTICS/RELEVANT:    MEDICATIONS  (STANDING):  ascorbic acid 250 milliGRAM(s) Oral daily  atorvastatin 20 milliGRAM(s) Oral at bedtime  Dakins Solution - 1/4 Strength 1 Application(s) Topical daily  dextrose 5%. 1000 milliLiter(s) (50 mL/Hr) IV Continuous <Continuous>  dextrose 50% Injectable 12.5 Gram(s) IV Push once  dextrose 50% Injectable 25 Gram(s) IV Push once  dextrose 50% Injectable 25 Gram(s) IV Push once  heparin  Injectable 5000 Unit(s) SubCutaneous every 8 hours  insulin glargine Injectable (LANTUS) 7 Unit(s) SubCutaneous every morning  insulin lispro (HumaLOG) corrective regimen sliding scale   SubCutaneous Before meals and at bedtime  levETIRAcetam 500 milliGRAM(s) Oral two times a day  nystatin Powder 1 Application(s) Topical two times a day  oxybutynin 5 milliGRAM(s) Oral two times a day  piperacillin/tazobactam IVPB. 3.375 Gram(s) IV Intermittent every 6 hours    MEDICATIONS  (PRN):  bisacodyl Suppository 10 milliGRAM(s) Rectal daily PRN Constipation  dextrose Gel 1 Dose(s) Oral once PRN Blood Glucose LESS THAN 70 milliGRAM(s)/deciliter  glucagon  Injectable 1 milliGRAM(s) IntraMuscular once PRN Glucose LESS THAN 70 milligrams/deciliter    VITALS  Vital Signs Last 24 Hrs  T(C): 36.4 (08 Dec 2017 09:30), Max: 37.1 (07 Dec 2017 21:00)  T(F): 97.6 (08 Dec 2017 09:30), Max: 98.8 (07 Dec 2017 21:00)  HR: 62 (08 Dec 2017 09:30) (62 - 102)  BP: 113/65 (08 Dec 2017 09:30) (100/55 - 121/57)  BP(mean): 83 (07 Dec 2017 15:30) (78 - 88)  RR: 18 (08 Dec 2017 09:30) (18 - 32)  SpO2: 100% (08 Dec 2017 09:30) (92% - 100%)    I&O's Summary    07 Dec 2017 07:01  -  08 Dec 2017 07:00  --------------------------------------------------------  IN: 500 mL / OUT: 3665 mL / NET: -3165 mL        CAPILLARY BLOOD GLUCOSE      POCT Blood Glucose.: 147 mg/dL (08 Dec 2017 08:00)  POCT Blood Glucose.: 173 mg/dL (07 Dec 2017 23:34)  POCT Blood Glucose.: 147 mg/dL (07 Dec 2017 18:17)  POCT Blood Glucose.: 167 mg/dL (07 Dec 2017 11:35)    PHYSICAL EXAM  General: A&Ox3; NAD; hungry  Head: NC/AT; PERRL; EOMI; anicteric sclera; MMM  Neck: Supple; no JVD; TLC in place on right  Respiratory: CTA B/L; no wheezes/crackles   Cardiovascular: Regular rhythm/rate; S1/S2   Gastrointestinal: Soft; mild distension at bases; bowel sounds normal  Extremities: WWP; no edema or cyanosis; radial/pedal pulses palpable  Neurological:  Sensation and motor loss below T4-5 level; CNII-XII intact  Skin: Sacral decub ulcer w/ bone exposure and purulence drainage      LABS:                        7.8    5.7   )-----------( 345      ( 08 Dec 2017 08:34 )             26.3     12-08    137  |  99  |  4<L>  ----------------------------<  148<H>  4.1   |  24  |  0.71    Ca    8.8      08 Dec 2017 08:32  Phos  4.3     12-08  Mg     1.8     12-08    TPro  6.1  /  Alb  2.5<L>  /  TBili  0.2  /  DBili  x   /  AST  7<L>  /  ALT  7<L>  /  AlkPhos  78  12-07    PT/INR - ( 07 Dec 2017 05:35 )   PT: 14.7 sec;   INR: 1.32          PTT - ( 07 Dec 2017 05:35 )  PTT:29.5 sec    MICROBIOLOGY:  Culture - Abscess with Gram Stain (12.05.17 @ 17:06)    Gram Stain:   Rare Gram Negative Rods  Numerous White blood cells    -  Ampicillin: R >16    -  Ampicillin: S <=8    -  Ampicillin: R >16    -  Ampicillin/Sulbactam: S <=8/4    -  Ampicillin/Sulbactam: S <=8/4    -  Ampicillin/Sulbactam: S <=8/4    -  Cefazolin: S <=8    -  Cefazolin: R >16    -  Cefazolin: R >16    -  Ceftriaxone: S <=1    -  Ceftriaxone: S <=1    -  Ceftriaxone: S <=1    -  Ciprofloxacin: S <=1    -  Ciprofloxacin: S <=1    -  Ciprofloxacin: S <=1    -  Gentamicin: S <=4    -  Gentamicin: S <=4    -  Gentamicin: S <=4    -  Piperacillin/Tazobactam: S <=16    -  Piperacillin/Tazobactam: S <=16    -  Piperacillin/Tazobactam: S <=16    -  Tobramycin: S <=4    -  Tobramycin: S <=4    -  Tobramycin: S <=4    -  Trimethoprim/Sulfamethoxazole: S <=2/38    -  Trimethoprim/Sulfamethoxazole: S <=2/38    -  Trimethoprim/Sulfamethoxazole: S <=2/38    Specimen Source: .Abscess sacrum    Culture Results:   Few Citrobacter braakii  Rare Enterococcus species  Identification and susceptibility to follow.  Few Klebsiella ozaenae  Few Citrobacter youngae    Organism Identification: Citrobacter braakii  Klebsiella ozaenae  Citrobacter youngae    Organism: Klebsiella ozaenae    Organism: Citrobacter braakii    Organism: Citrobacter youngae    Method Type: YENI    Method Type: YENI    Method Type: YENI        Culture - Urine (12.05.17 @ 16:13)    -  Amikacin: S 16    -  Ampicillin: R >16    -  Ampicillin/Sulbactam: S 8/4    -  Aztreonam: S <=4    -  Cefazolin: S 8    -  Cefepime: S <=2    -  Cefoxitin: S <=4    -  Ceftazidime: S <=1    -  Ceftriaxone: S <=1    -  Ciprofloxacin: R >2    -  Ertapenem: S <=0.5    -  Gentamicin: I 8    -  Levofloxacin: R >4    -  Meropenem: S <=1    -  Nitrofurantoin: R >64    -  Piperacillin/Tazobactam: S <=8    -  Tobramycin: R >8    -  Trimethoprim/Sulfamethoxazole: S <=0.5/9.5    Specimen Source: .Urine Clean Catch (Midstream)    Culture Results:   >100,000 CFU/ml Proteus mirabilis  >100,000 CFU/ml Klebsiella pneumoniae    Organism Identification: Proteus mirabilis    Organism: Proteus mirabilis    Method Type: YENI

## 2017-12-08 NOTE — PROGRESS NOTE ADULT - ATTENDING COMMENTS
Agree with above.  I presume the sepsis is secondary to acute osteomyelitis.  He has improved clinically.  IV antibiotics are usually preferred in these infections, especially since he presented in septic shock.  He is adamant about going home on oral antibiotics.      He can stay on zosyn while in hospital    Based on culture data, can be discharged on cipro 500 mg PO q12 + Augmentin 875 mg PO q12 with close outpatient follow up.  Would treat for another 3 weeks, maybe longer if needed    He should follow up with his ID doctor or if he choses, he has my card and can follow up with me on [address]    Will sign off.  Please reconsult with any questions

## 2017-12-08 NOTE — BEHAVIORAL HEALTH ASSESSMENT NOTE - SUMMARY
Based on my assessment it appears that patient exhibit acute psychotic symptoms including paranoia in context of medication noncompliance, he has been refusing home aid although he is not able to care for himself due to his medical condition which questions his capacity to understand the consequences of his decision at this time. As the result he has put him in a dangerous and potentially fatal condition which led to current admission. It is unclear whether patient endorsed suicidal ideation as he refuses to assessment, however this needs to be investigated further.  At this time patient seems to be an imminent danger to himself due to his possible psychotic episode/suicidality. Therefore he is not cleared to go home unto further psychiatric evaluation and diagnosis is obtained.

## 2017-12-08 NOTE — BEHAVIORAL HEALTH ASSESSMENT NOTE - CASE SUMMARY
Pt seen; chart reviewed and discussed with team.  Pt with longstanding psychiatric illness (schizoaffective disorder),one prior suicide attempt, h/o paraplegia (x5 five years), currently admitted to hospital due to sepsis secondary to osteomyelitis of sacral area, with reported noncompliance with at-home care. Although pt is minimally engageable at present time, per team, he is taking his medications. Hopefully, if he takes his maintenance psychiatric medication, his mood ,affect and cooperativeness will improve, thus avoiding need for inpt psychiatric placement.   If, however, pt does not improve sufficiently to make it safe for him to return home with home care, will need to explore possible admit to medical-psychiatric inpt unit (not available at [facility]).

## 2017-12-08 NOTE — PROGRESS NOTE ADULT - PROBLEM SELECTOR PLAN 4
Hepatitis B ruled out. Pt reports that he got hepatitis B in 2012 while at a nursing home via feculent matter. Reports that he has never been treated. AST/ALT within normal limits. INR elevated 1.81 on admission  -Patient is hepatitis B negative.

## 2017-12-08 NOTE — PROGRESS NOTE ADULT - PROBLEM SELECTOR PLAN 1
w/ shock, now resolved.  Gram negative rods on sacral culture at less than 12 hours.  Concern for MRSA and drug resistant bacteria.  WBC >25 on admission, now normalized indicates that abx choice is likely appropriate but spec/sen is still pending.  Wound is polymicrobial  - C/w vancomycin and zosyn pending spec/sens.    - If decompensates, could consider aminoglycoside for synergistic coverage of possible MDR G-.  - F/u BCx / wound Cx.  - ID will continue to follow  - Patient has already mentioned that he will decide against PICC if recommended, long term abx choice will depend on spec/sens. w/ shock, now resolved.  Gram negative rods on sacral culture at less than 12 hours.  Concern for MRSA and drug resistant bacteria.  WBC >25 on admission, now normalized indicates that abx choice is likely appropriate.  - Citrobacter braakii, Citrobacter youngae, Klebsiella ozaenae all sensitive to zosyn, ceftriaxone, cipro  - Entrococcus and cornybacterium (sens/spec pending)  - C/w vancomycin and zosyn -- may consider deescalating zosyn, will d/w attending  - F/u BCx / wound Cx  - ID will continue to follow  - Patient has already mentioned that he will decide against PICC if recommended, long term abx choice will depend on spec/sens. w/ shock, now resolved.  Gram negative rods on sacral culture at less than 12 hours.  Concern for MRSA and drug resistant bacteria.  WBC >25 on admission, now normalized indicates that abx choice is likely appropriate.  - Citrobacter braakii, Citrobacter youngae, Klebsiella ozaenae all sensitive to zosyn, ceftriaxone, cipro  - Entrococcus and cornybacterium (sens/spec pending) -- likely sensitive to zosyn  - D/c vancomycin; c/w zosyn pending G+ spec/sen   - Likely osteo, will need long term IV abx but patient already refusing PICC line.  Will discuss w/ team re: abx regimen choices.  - F/u BCx / wound Cx  - ID will continue to follow

## 2017-12-08 NOTE — PROGRESS NOTE ADULT - PROBLEM SELECTOR PLAN 7
Pt w/ a Hgb of 9.1 and MCV of 74 on admission. Hgb has been slowly downtrending but no active signs of bleeding. Iron studies showing iron deficiency.   -will maintain active type and screen  -Hgb of 7.8 today  -will transfuse for Hgb <7

## 2017-12-08 NOTE — PROGRESS NOTE ADULT - SUBJECTIVE AND OBJECTIVE BOX
INTERVAL HPI/OVERNIGHT EVENTS: No acute events overnight.     Subjective: patient seen and examined at bedside. No acute distress. Patient denies all ROS.     VITAL SIGNS:  T(F): 97.6 (12-08-17 @ 09:30)  HR: 62 (12-08-17 @ 09:30)  BP: 113/65 (12-08-17 @ 09:30)  RR: 18 (12-08-17 @ 09:30)  SpO2: 100% (12-08-17 @ 09:30)  Wt(kg): --    PHYSICAL EXAM:  General: No acute distress.   HEENT: Normocephalic, Atraumtic. PEERL. MMM  Respiratory: Normal breath sounds b/l. No wheezes, crackles, rhonchi.  Cardiovascular: Normal S1 and S2. RRR. No rubs, murmurs.   Gastrointestinal: Soft, non-distended, non-tender. Positive bowel sounds.   Extremities: No LE edema.   Skin: No rashes or ulcers. No clubbing/cyanosis.   Vascular: 2+ dorsalis pedis pulses b/l  Neurological: No focal deficits. AAOx3.       MEDICATIONS  (STANDING):  ascorbic acid 250 milliGRAM(s) Oral daily  atorvastatin 20 milliGRAM(s) Oral at bedtime  Dakins Solution - 1/4 Strength 1 Application(s) Topical daily  dextrose 5%. 1000 milliLiter(s) (50 mL/Hr) IV Continuous <Continuous>  dextrose 50% Injectable 12.5 Gram(s) IV Push once  dextrose 50% Injectable 25 Gram(s) IV Push once  dextrose 50% Injectable 25 Gram(s) IV Push once  heparin  Injectable 5000 Unit(s) SubCutaneous every 8 hours  insulin glargine Injectable (LANTUS) 7 Unit(s) SubCutaneous every morning  insulin lispro (HumaLOG) corrective regimen sliding scale   SubCutaneous Before meals and at bedtime  levETIRAcetam 500 milliGRAM(s) Oral two times a day  nystatin Powder 1 Application(s) Topical two times a day  oxybutynin 5 milliGRAM(s) Oral two times a day  piperacillin/tazobactam IVPB. 3.375 Gram(s) IV Intermittent every 6 hours    MEDICATIONS  (PRN):  bisacodyl Suppository 10 milliGRAM(s) Rectal daily PRN Constipation  dextrose Gel 1 Dose(s) Oral once PRN Blood Glucose LESS THAN 70 milliGRAM(s)/deciliter  glucagon  Injectable 1 milliGRAM(s) IntraMuscular once PRN Glucose LESS THAN 70 milligrams/deciliter      Allergies    Capzasin Back and Body (Unknown)    Intolerances        LABS:                        7.8    5.7   )-----------( 345      ( 08 Dec 2017 08:34 )             26.3     12-08    137  |  99  |  4<L>  ----------------------------<  148<H>  4.1   |  24  |  0.71    Ca    8.8      08 Dec 2017 08:32  Phos  4.3     12-08  Mg     1.8     12-08    TPro  6.1  /  Alb  2.5<L>  /  TBili  0.2  /  DBili  x   /  AST  7<L>  /  ALT  7<L>  /  AlkPhos  78  12-07    PT/INR - ( 07 Dec 2017 05:35 )   PT: 14.7 sec;   INR: 1.32          PTT - ( 07 Dec 2017 05:35 )  PTT:29.5 sec      RADIOLOGY & ADDITIONAL TESTS:

## 2017-12-08 NOTE — PROGRESS NOTE ADULT - ASSESSMENT
Patient is a 32 year old paraplegic M with a history of spinal cord injury (wheelchair bound), sacral pressure ulcer with osteo x2 (outpatient provider said they have records of March osteo s/p daptomycin of unknown length) earlier in 2017,  DM-2, indwelling suprapubic catheter, hepatitis B who presented w/ septic shock secondary to infected purulent sacral 4th degree pressure ulcer with underlying inadequately treated OM being treated with vancomycin and zosyn.

## 2017-12-08 NOTE — PROGRESS NOTE ADULT - PROBLEM SELECTOR PLAN 1
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 Wound cultures growing gram negative rods and gram positive in chains. Upon arrival pts wound was packed w/ feculent material and required debridement by plastic surgery.   -will continue w/ Vancomycin + Zosyn   -awaiting specificities of wound cultures   -general surgery does not believe there is a fistula between wound and rectum causing leakage of stool. --No surgical intervention at this time. 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 Wound cultures growing gram negative rods and gram positive in chains. Upon arrival pts wound was packed w/ feculent material and required debridement by plastic surgery.   -will continue w/ Zosyn. vancomycin discontinued.   -Wound cultures sensitive to Zosyn. Vanc Discontinued.   -general surgery does not believe there is a fistula between wound and rectum causing leakage of stool. --No surgical intervention at this time.

## 2017-12-08 NOTE — BEHAVIORAL HEALTH ASSESSMENT NOTE - HPI (INCLUDE ILLNESS QUALITY, SEVERITY, DURATION, TIMING, CONTEXT, MODIFYING FACTORS, ASSOCIATED SIGNS AND SYMPTOMS)
Patient is a 32 year old male, single, domiciled, PPH of schizoaffective (as per charts), one prior SA, h/o paraplegia (x5 five years), currently admitted to hospital due to sepsis secondary to osteomyelitis of sacral area. A psychiatric consult is requested by primary care to r/o depression, suicidality and psychosis.    As per chart review and collaterals: Patient has history of schizoaffective disorder and (at least) one suicide attempt by jumping off the window in a psychiatric unit ([facility]) which led to current paraplegia. He has been living in his apartment which share with a roommate, his aunt and uncle living same building and have been main caregivers taking care of his medication and daily needs. He also has 2 home aid (24hrs/7days) provided by agency ([facility],  a brach of medicaid). Reportedly patient has been non compliant with home regimen of 25 mg of Abilify daily (unknown period) and has been progressively acting disorganized and paranoid lately: refusing to let his home-aids in x 5 days (although he can not take care of self due to paraplegia), throwing stuff and screaming at aids when tried to come in. Crisis team has been called and patient refused to let in as well. Eventually SW called 911 who entered the house and found the patient covered in his feces and brought him to the hospital. Patient was found to have infected bed-sore, osteomyelitis (this is second time for same pattern), and was in sepsis. He has been guarded to primary team, although let the surgeons do debridement and takes Abx. but is refusing to go to Rehab Afterwards. He also has been refusing to call a psychiatrist or family for collateral stating that his aunt and uncle would like to harm him. There also has been a report one point patient told one of home aids that he keeps a knife in his bed and collecting his pills (unclear details).    On bedside assessment: patient is calm, entitled refuses to talk once he hears that riders from psychiatric service. Repeatedly asked the writer to leave the room and refuses to answer any question. No suicidal or homicidal statement was made, patient did not appear internally preoccupied or responding to internal stimuli.    Collateral: PCP Dr. Carie Agarwal ([phone]) called and the message was left on answering with callback number.  Chela Bourgeois (JUAN PABLO): [phone].

## 2017-12-08 NOTE — PROGRESS NOTE ADULT - SUBJECTIVE AND OBJECTIVE BOX
SUBJECTIVE: Pt seen and examined at bedside. No overnight events. Currently no issues or complaints    Vital Signs Last 24 Hrs  T(C): 37 (08 Dec 2017 15:33), Max: 37.1 (07 Dec 2017 21:00)  T(F): 98.6 (08 Dec 2017 15:33), Max: 98.8 (07 Dec 2017 21:00)  HR: 52 (08 Dec 2017 15:33) (52 - 90)  BP: 136/61 (08 Dec 2017 15:33) (100/55 - 136/61)  BP(mean): --  RR: 18 (08 Dec 2017 15:33) (18 - 22)  SpO2: 97% (08 Dec 2017 15:33) (97% - 100%)    PHYSICAL EXAM  Gen: in no acute distress  CV: normocardic, normotensive  Resp: non-labored breathing on RA  Abd: soft, ND, NT throughot, well healed midline scar xiphoid-pubis   Ext: WWP, no peripheral edema    LABS:                        7.8    5.7   )-----------( 345      ( 08 Dec 2017 08:34 )             26.3     12-08    137  |  99  |  4<L>  ----------------------------<  148<H>  4.1   |  24  |  0.71    Ca    8.8      08 Dec 2017 08:32  Phos  4.3     12-08  Mg     1.8     12-08    TPro  6.1  /  Alb  2.5<L>  /  TBili  0.2  /  DBili  x   /  AST  7<L>  /  ALT  7<L>  /  AlkPhos  78  12-07    PT/INR - ( 07 Dec 2017 05:35 )   PT: 14.7 sec;   INR: 1.32          PTT - ( 07 Dec 2017 05:35 )  PTT:29.5 sec    ASSESSMENT AND PLAN  32M paraplegic with multiple stage 4 pressure wounds, admitted to the MICU with sepsis most likely 2/2 UTI on 12/4/2017.   Condition improving, patient currently hemodynamically stable, afebrile.     - Continue wound care as per Plastics and Wound RN  - No acute surgical intervention at this point  - will discuss possible placement of diverting transverse colostomy in the future (currently patient is a poor candidate giving malnutrition)  - Would require CT A/P for preop planning.  - Would appreciate ID recs  - Continue excellent care per primary team  - Surgery team 4 following, please call with any questions  - Discussed with the attending

## 2017-12-08 NOTE — BEHAVIORAL HEALTH ASSESSMENT NOTE - NSBHATTESTSEENBY_PSY_A_CORE
NP with telephonic supervision from Attending Psychiatrist Attending Psychiatrist supervising NP/Trainee, meeting pt...

## 2017-12-08 NOTE — PROGRESS NOTE ADULT - PROBLEM SELECTOR PLAN 2
Pt w/ a positive UA on admission with LE, nitrites, bacteria and WBC. Urine culture positive for proteus >100,000. Per patient he has a history of staghorn calculus.   -c/w vanc + zosyn   -pt w/ suprapubic catheter changed by urology today.   -as per outpatient provider, Dr. Mark Millard he exchanges the catheter monthly Pt w/ a positive UA on admission with LE, nitrites, bacteria and WBC. Urine culture positive for proteus >100,000. Per patient he has a history of staghorn calculus.   -c/w Zosyn   -pt w/ suprapubic catheter changed by urology today.   -as per outpatient provider, Dr. Mark Millard he exchanges the catheter monthly

## 2017-12-08 NOTE — PROGRESS NOTE ADULT - ATTENDING COMMENTS
Pt seen and examined, VSS, exam as above, labs reviewed, agree with plan above.  1. Septic shock from stage 4 decub with osteomyelitis- refusing PICC line and IV abx, ID agreeable to cipro/augmentin  2. Staghorn renal stone- urology eval  3. Neurogenic bladder- will d/w urology if pt can resume self cath'ing  4. Paraplegia- pt wants to go home with home care, outpt  has brought up concerns regarding safety at home- will d/w psych and ethics

## 2017-12-08 NOTE — BEHAVIORAL HEALTH ASSESSMENT NOTE - PROBLEM SELECTOR PLAN 1
-Start Abilify 5mg QHS PO  -Pt needs further psychiatric evaluation and is not clear to leave the hospital at this time.  -Psychiatry will follow. Call with questions.

## 2017-12-09 LAB
ANION GAP SERPL CALC-SCNC: 12 MMOL/L — SIGNIFICANT CHANGE UP (ref 5–17)
BUN SERPL-MCNC: 5 MG/DL — LOW (ref 7–23)
CALCIUM SERPL-MCNC: 9.1 MG/DL — SIGNIFICANT CHANGE UP (ref 8.4–10.5)
CHLORIDE SERPL-SCNC: 98 MMOL/L — SIGNIFICANT CHANGE UP (ref 96–108)
CO2 SERPL-SCNC: 24 MMOL/L — SIGNIFICANT CHANGE UP (ref 22–31)
CREAT SERPL-MCNC: 0.72 MG/DL — SIGNIFICANT CHANGE UP (ref 0.5–1.3)
GLUCOSE BLDC GLUCOMTR-MCNC: 131 MG/DL — HIGH (ref 70–99)
GLUCOSE BLDC GLUCOMTR-MCNC: 160 MG/DL — HIGH (ref 70–99)
GLUCOSE BLDC GLUCOMTR-MCNC: 181 MG/DL — HIGH (ref 70–99)
GLUCOSE BLDC GLUCOMTR-MCNC: 284 MG/DL — HIGH (ref 70–99)
GLUCOSE SERPL-MCNC: 119 MG/DL — HIGH (ref 70–99)
HCT VFR BLD CALC: 26 % — LOW (ref 39–50)
HGB BLD-MCNC: 7.8 G/DL — LOW (ref 13–17)
MAGNESIUM SERPL-MCNC: 1.8 MG/DL — SIGNIFICANT CHANGE UP (ref 1.6–2.6)
MCHC RBC-ENTMCNC: 22.6 PG — LOW (ref 27–34)
MCHC RBC-ENTMCNC: 30 G/DL — LOW (ref 32–36)
MCV RBC AUTO: 75.4 FL — LOW (ref 80–100)
PHOSPHATE SERPL-MCNC: 4.3 MG/DL — SIGNIFICANT CHANGE UP (ref 2.5–4.5)
PLATELET # BLD AUTO: 310 K/UL — SIGNIFICANT CHANGE UP (ref 150–400)
POTASSIUM SERPL-MCNC: 4 MMOL/L — SIGNIFICANT CHANGE UP (ref 3.5–5.3)
POTASSIUM SERPL-SCNC: 4 MMOL/L — SIGNIFICANT CHANGE UP (ref 3.5–5.3)
RBC # BLD: 3.45 M/UL — LOW (ref 4.2–5.8)
RBC # FLD: 17.2 % — HIGH (ref 10.3–16.9)
SODIUM SERPL-SCNC: 134 MMOL/L — LOW (ref 135–145)
WBC # BLD: 6.6 K/UL — SIGNIFICANT CHANGE UP (ref 3.8–10.5)
WBC # FLD AUTO: 6.6 K/UL — SIGNIFICANT CHANGE UP (ref 3.8–10.5)

## 2017-12-09 PROCEDURE — 99233 SBSQ HOSP IP/OBS HIGH 50: CPT | Mod: GC

## 2017-12-09 RX ORDER — MAGNESIUM SULFATE 500 MG/ML
1 VIAL (ML) INJECTION ONCE
Qty: 0 | Refills: 0 | Status: COMPLETED | OUTPATIENT
Start: 2017-12-09 | End: 2017-12-09

## 2017-12-09 RX ADMIN — Medication 2: at 21:10

## 2017-12-09 RX ADMIN — INSULIN GLARGINE 7 UNIT(S): 100 INJECTION, SOLUTION SUBCUTANEOUS at 08:32

## 2017-12-09 RX ADMIN — LEVETIRACETAM 500 MILLIGRAM(S): 250 TABLET, FILM COATED ORAL at 17:10

## 2017-12-09 RX ADMIN — HEPARIN SODIUM 5000 UNIT(S): 5000 INJECTION INTRAVENOUS; SUBCUTANEOUS at 05:23

## 2017-12-09 RX ADMIN — Medication 5 MILLIGRAM(S): at 17:10

## 2017-12-09 RX ADMIN — ATORVASTATIN CALCIUM 20 MILLIGRAM(S): 80 TABLET, FILM COATED ORAL at 21:10

## 2017-12-09 RX ADMIN — HEPARIN SODIUM 5000 UNIT(S): 5000 INJECTION INTRAVENOUS; SUBCUTANEOUS at 21:10

## 2017-12-09 RX ADMIN — LEVETIRACETAM 500 MILLIGRAM(S): 250 TABLET, FILM COATED ORAL at 05:23

## 2017-12-09 RX ADMIN — Medication 1 APPLICATION(S): at 11:19

## 2017-12-09 RX ADMIN — PIPERACILLIN AND TAZOBACTAM 200 GRAM(S): 4; .5 INJECTION, POWDER, LYOPHILIZED, FOR SOLUTION INTRAVENOUS at 13:00

## 2017-12-09 RX ADMIN — PIPERACILLIN AND TAZOBACTAM 200 GRAM(S): 4; .5 INJECTION, POWDER, LYOPHILIZED, FOR SOLUTION INTRAVENOUS at 19:00

## 2017-12-09 RX ADMIN — NYSTATIN CREAM 1 APPLICATION(S): 100000 CREAM TOPICAL at 23:00

## 2017-12-09 RX ADMIN — Medication 1 ENEMA: at 10:55

## 2017-12-09 RX ADMIN — Medication 2: at 17:12

## 2017-12-09 RX ADMIN — PIPERACILLIN AND TAZOBACTAM 200 GRAM(S): 4; .5 INJECTION, POWDER, LYOPHILIZED, FOR SOLUTION INTRAVENOUS at 07:47

## 2017-12-09 RX ADMIN — Medication 100 GRAM(S): at 09:12

## 2017-12-09 RX ADMIN — Medication 5 MILLIGRAM(S): at 05:23

## 2017-12-09 RX ADMIN — Medication 6: at 12:05

## 2017-12-09 RX ADMIN — NYSTATIN CREAM 1 APPLICATION(S): 100000 CREAM TOPICAL at 09:00

## 2017-12-09 RX ADMIN — HEPARIN SODIUM 5000 UNIT(S): 5000 INJECTION INTRAVENOUS; SUBCUTANEOUS at 14:00

## 2017-12-09 RX ADMIN — PIPERACILLIN AND TAZOBACTAM 200 GRAM(S): 4; .5 INJECTION, POWDER, LYOPHILIZED, FOR SOLUTION INTRAVENOUS at 01:40

## 2017-12-09 RX ADMIN — Medication 250 MILLIGRAM(S): at 10:56

## 2017-12-09 NOTE — PROGRESS NOTE ADULT - PROBLEM SELECTOR PLAN 2
Pt w/ a positive UA on admission with LE, nitrites, bacteria and WBC. Urine culture positive for proteus >100,000. Per patient he has a history of staghorn calculus.   -c/w Zosyn   -pt w/ suprapubic catheter changed by urology today.   -as per outpatient provider, Dr. Mark Millard he exchanges the catheter monthly

## 2017-12-09 NOTE — PROGRESS NOTE ADULT - PROBLEM SELECTOR PLAN 1
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 Wound cultures growing gram negative rods and gram positive in chains. Upon arrival pts wound was packed w/ feculent material and required debridement by plastic surgery.   -will continue w/ Zosyn. vancomycin discontinued.   -Wound cultures sensitive to Zosyn. Vanc Discontinued.   -general surgery does not believe there is a fistula between wound and rectum causing leakage of stool. --No surgical intervention at this time.

## 2017-12-09 NOTE — PROGRESS NOTE ADULT - PROBLEM SELECTOR PLAN 8
-c/w home lipitor medication    #Seizure Disorder:   pt w/ a reported hx of seizure disorder  -c/w home medication of keppra 500mg BID    #Psychiatry Eval: Pt evaluated by psych and recommended he needs to be admitted to medical-psychiatric inpatient unit.   -pt started on Aripiprazole 5mg daily --pt refuses to take medications reporting "I am sane"

## 2017-12-10 DIAGNOSIS — F25.9 SCHIZOAFFECTIVE DISORDER, UNSPECIFIED: ICD-10-CM

## 2017-12-10 DIAGNOSIS — G40.909 EPILEPSY, UNSPECIFIED, NOT INTRACTABLE, WITHOUT STATUS EPILEPTICUS: ICD-10-CM

## 2017-12-10 LAB
ANION GAP SERPL CALC-SCNC: 14 MMOL/L — SIGNIFICANT CHANGE UP (ref 5–17)
BUN SERPL-MCNC: 8 MG/DL — SIGNIFICANT CHANGE UP (ref 7–23)
CALCIUM SERPL-MCNC: 9.1 MG/DL — SIGNIFICANT CHANGE UP (ref 8.4–10.5)
CHLORIDE SERPL-SCNC: 98 MMOL/L — SIGNIFICANT CHANGE UP (ref 96–108)
CO2 SERPL-SCNC: 23 MMOL/L — SIGNIFICANT CHANGE UP (ref 22–31)
CREAT SERPL-MCNC: 0.77 MG/DL — SIGNIFICANT CHANGE UP (ref 0.5–1.3)
CULTURE RESULTS: SIGNIFICANT CHANGE UP
CULTURE RESULTS: SIGNIFICANT CHANGE UP
GLUCOSE BLDC GLUCOMTR-MCNC: 134 MG/DL — HIGH (ref 70–99)
GLUCOSE BLDC GLUCOMTR-MCNC: 135 MG/DL — HIGH (ref 70–99)
GLUCOSE BLDC GLUCOMTR-MCNC: 182 MG/DL — HIGH (ref 70–99)
GLUCOSE BLDC GLUCOMTR-MCNC: 277 MG/DL — HIGH (ref 70–99)
GLUCOSE SERPL-MCNC: 157 MG/DL — HIGH (ref 70–99)
HCT VFR BLD CALC: 28.3 % — LOW (ref 39–50)
HGB BLD-MCNC: 8.2 G/DL — LOW (ref 13–17)
MAGNESIUM SERPL-MCNC: 2 MG/DL — SIGNIFICANT CHANGE UP (ref 1.6–2.6)
MCHC RBC-ENTMCNC: 22.2 PG — LOW (ref 27–34)
MCHC RBC-ENTMCNC: 29 G/DL — LOW (ref 32–36)
MCV RBC AUTO: 76.5 FL — LOW (ref 80–100)
PLATELET # BLD AUTO: 340 K/UL — SIGNIFICANT CHANGE UP (ref 150–400)
POTASSIUM SERPL-MCNC: 4.9 MMOL/L — SIGNIFICANT CHANGE UP (ref 3.5–5.3)
POTASSIUM SERPL-SCNC: 4.9 MMOL/L — SIGNIFICANT CHANGE UP (ref 3.5–5.3)
RBC # BLD: 3.7 M/UL — LOW (ref 4.2–5.8)
RBC # FLD: 17.5 % — HIGH (ref 10.3–16.9)
SODIUM SERPL-SCNC: 135 MMOL/L — SIGNIFICANT CHANGE UP (ref 135–145)
SPECIMEN SOURCE: SIGNIFICANT CHANGE UP
SPECIMEN SOURCE: SIGNIFICANT CHANGE UP
WBC # BLD: 10.2 K/UL — SIGNIFICANT CHANGE UP (ref 3.8–10.5)
WBC # FLD AUTO: 10.2 K/UL — SIGNIFICANT CHANGE UP (ref 3.8–10.5)

## 2017-12-10 PROCEDURE — 99233 SBSQ HOSP IP/OBS HIGH 50: CPT

## 2017-12-10 RX ADMIN — ATORVASTATIN CALCIUM 20 MILLIGRAM(S): 80 TABLET, FILM COATED ORAL at 21:42

## 2017-12-10 RX ADMIN — NYSTATIN CREAM 1 APPLICATION(S): 100000 CREAM TOPICAL at 18:00

## 2017-12-10 RX ADMIN — Medication 6: at 11:56

## 2017-12-10 RX ADMIN — HEPARIN SODIUM 5000 UNIT(S): 5000 INJECTION INTRAVENOUS; SUBCUTANEOUS at 14:00

## 2017-12-10 RX ADMIN — LEVETIRACETAM 500 MILLIGRAM(S): 250 TABLET, FILM COATED ORAL at 05:11

## 2017-12-10 RX ADMIN — NYSTATIN CREAM 1 APPLICATION(S): 100000 CREAM TOPICAL at 05:11

## 2017-12-10 RX ADMIN — HEPARIN SODIUM 5000 UNIT(S): 5000 INJECTION INTRAVENOUS; SUBCUTANEOUS at 21:42

## 2017-12-10 RX ADMIN — Medication 250 MILLIGRAM(S): at 11:12

## 2017-12-10 RX ADMIN — HEPARIN SODIUM 5000 UNIT(S): 5000 INJECTION INTRAVENOUS; SUBCUTANEOUS at 05:11

## 2017-12-10 RX ADMIN — Medication 5 MILLIGRAM(S): at 05:11

## 2017-12-10 RX ADMIN — PIPERACILLIN AND TAZOBACTAM 200 GRAM(S): 4; .5 INJECTION, POWDER, LYOPHILIZED, FOR SOLUTION INTRAVENOUS at 07:36

## 2017-12-10 RX ADMIN — PIPERACILLIN AND TAZOBACTAM 200 GRAM(S): 4; .5 INJECTION, POWDER, LYOPHILIZED, FOR SOLUTION INTRAVENOUS at 00:45

## 2017-12-10 RX ADMIN — Medication 1 APPLICATION(S): at 12:00

## 2017-12-10 RX ADMIN — Medication 5 MILLIGRAM(S): at 17:00

## 2017-12-10 RX ADMIN — PIPERACILLIN AND TAZOBACTAM 200 GRAM(S): 4; .5 INJECTION, POWDER, LYOPHILIZED, FOR SOLUTION INTRAVENOUS at 13:00

## 2017-12-10 RX ADMIN — PIPERACILLIN AND TAZOBACTAM 200 GRAM(S): 4; .5 INJECTION, POWDER, LYOPHILIZED, FOR SOLUTION INTRAVENOUS at 19:00

## 2017-12-10 RX ADMIN — INSULIN GLARGINE 7 UNIT(S): 100 INJECTION, SOLUTION SUBCUTANEOUS at 08:05

## 2017-12-10 RX ADMIN — LEVETIRACETAM 500 MILLIGRAM(S): 250 TABLET, FILM COATED ORAL at 17:00

## 2017-12-10 RX ADMIN — Medication 2: at 21:42

## 2017-12-10 NOTE — PROGRESS NOTE ADULT - SUBJECTIVE AND OBJECTIVE BOX
Patient is a 32y old  Male who presents with a chief complaint of Patient experiencing symptoms of infection "chills" that has happened before when his sacral wound became infected.  Patient has been paraplegic, after sustaining a fall out of a window, since 2011.  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  Sacral ulcer developed April 2017 and Ulcer to the right ischium and left foot. (05 Dec 2017 09:11)      INTERVAL HPI/OVERNIGHT EVENTS:    suprapubic cath leaking, no pain/fevers.   wants to go home.     ROS  (- ) headache  ( -  )fevers/chills  ( - ) dyspnea  (  - ) cough  (  - ) chest pain  (  - ) palpatations  ( - ) dizziness/lightheadedness  (  - ) nausea/vomiting  (  - ) abd pain  (  - ) diarrhea  (  - ) melena  (  - ) hematochezia  (  - ) dysuria   ( - ) hematuria  ( - ) back pain  ( + ) tolerating POs  ( + ) BM  ROS: 12 point review of systems otherwise negative              MEDICATIONS  (STANDING):  ARIPiprazole 5 milliGRAM(s) Oral at bedtime  ascorbic acid 250 milliGRAM(s) Oral daily  atorvastatin 20 milliGRAM(s) Oral at bedtime  Dakins Solution - 1/4 Strength 1 Application(s) Topical daily  dextrose 5%. 1000 milliLiter(s) (50 mL/Hr) IV Continuous <Continuous>  dextrose 50% Injectable 12.5 Gram(s) IV Push once  dextrose 50% Injectable 25 Gram(s) IV Push once  dextrose 50% Injectable 25 Gram(s) IV Push once  heparin  Injectable 5000 Unit(s) SubCutaneous every 8 hours  insulin glargine Injectable (LANTUS) 7 Unit(s) SubCutaneous every morning  insulin lispro (HumaLOG) corrective regimen sliding scale   SubCutaneous Before meals and at bedtime  levETIRAcetam 500 milliGRAM(s) Oral two times a day  nystatin Powder 1 Application(s) Topical two times a day  oxybutynin 5 milliGRAM(s) Oral two times a day  piperacillin/tazobactam IVPB. 3.375 Gram(s) IV Intermittent every 6 hours    MEDICATIONS  (PRN):  bisacodyl Suppository 10 milliGRAM(s) Rectal daily PRN Constipation  dextrose Gel 1 Dose(s) Oral once PRN Blood Glucose LESS THAN 70 milliGRAM(s)/deciliter  glucagon  Injectable 1 milliGRAM(s) IntraMuscular once PRN Glucose LESS THAN 70 milligrams/deciliter      Allergies    Capzasin Back and Body (Unknown)    Intolerances          Vital Signs Last 24 Hrs  T(C): 36.9 (10 Dec 2017 08:52), Max: 36.9 (10 Dec 2017 08:52)  T(F): 98.4 (10 Dec 2017 08:52), Max: 98.4 (10 Dec 2017 08:52)  HR: 85 (10 Dec 2017 08:52) (78 - 85)  BP: 100/60 (10 Dec 2017 08:52) (98/63 - 101/63)  BP(mean): --  RR: 14 (10 Dec 2017 08:52) (14 - 16)  SpO2: 99% (10 Dec 2017 08:52) (98% - 99%)  CAPILLARY BLOOD GLUCOSE      POCT Blood Glucose.: 277 mg/dL (10 Dec 2017 11:24)  POCT Blood Glucose.: 135 mg/dL (10 Dec 2017 07:28)  POCT Blood Glucose.: 181 mg/dL (09 Dec 2017 20:54)      12-09 @ 07:01  -  12-10 @ 07:00  --------------------------------------------------------  IN: 0 mL / OUT: 1700 mL / NET: -1700 mL    12-10 @ 07:01  -  12-10 @ 16:50  --------------------------------------------------------  IN: 0 mL / OUT: 1500 mL / NET: -1500 mL        Physical Exam:    Daily     Daily   General:well appearing  HEENT: PEERL. MMM  Respiratory: cta bl, Normal breath sounds b/l. No wheezes, crackles, rhonchi.  Cardiovascular: Normal S1 and S2. RRR. No rubs, murmurs.   Gastrointestinal: Soft, non-distended, non-tender. Positive bowel sounds.   Extremities: No LE edema.   Skin: Stage 4 sacral ulcer w/ gauze packing    Vascular: 2+ dorsalis pedis pulses b/l  : SP catheter in place  Neurological: cn ii-xii intact, aao x 3, no sensation below t12, 4+/5 str upper ext. LE 0/5 str ( paraplegic).     LABS:                        8.2    10.2  )-----------( 340      ( 10 Dec 2017 06:28 )             28.3     12-10    135  |  98  |  8   ----------------------------<  157<H>  4.9   |  23  |  0.77    Ca    9.1      10 Dec 2017 06:30  Phos  4.3     12-09  Mg     2.0     12-10              RADIOLOGY & ADDITIONAL TESTS:

## 2017-12-10 NOTE — PROGRESS NOTE ADULT - PROBLEM SELECTOR PLAN 2
doubt true UTI as klebsiella was resistant to pip-tazo and pt is clinically improved.   suprapubic catheter replaced , but now leaking today.  will have  reassess

## 2017-12-11 DIAGNOSIS — F48.9 NONPSYCHOTIC MENTAL DISORDER, UNSPECIFIED: ICD-10-CM

## 2017-12-11 LAB
ANION GAP SERPL CALC-SCNC: 12 MMOL/L — SIGNIFICANT CHANGE UP (ref 5–17)
BUN SERPL-MCNC: 13 MG/DL — SIGNIFICANT CHANGE UP (ref 7–23)
CALCIUM SERPL-MCNC: 9.1 MG/DL — SIGNIFICANT CHANGE UP (ref 8.4–10.5)
CHLORIDE SERPL-SCNC: 96 MMOL/L — SIGNIFICANT CHANGE UP (ref 96–108)
CO2 SERPL-SCNC: 24 MMOL/L — SIGNIFICANT CHANGE UP (ref 22–31)
CREAT SERPL-MCNC: 0.8 MG/DL — SIGNIFICANT CHANGE UP (ref 0.5–1.3)
GLUCOSE BLDC GLUCOMTR-MCNC: 137 MG/DL — HIGH (ref 70–99)
GLUCOSE BLDC GLUCOMTR-MCNC: 155 MG/DL — HIGH (ref 70–99)
GLUCOSE BLDC GLUCOMTR-MCNC: 248 MG/DL — HIGH (ref 70–99)
GLUCOSE BLDC GLUCOMTR-MCNC: 271 MG/DL — HIGH (ref 70–99)
GLUCOSE SERPL-MCNC: 162 MG/DL — HIGH (ref 70–99)
HCT VFR BLD CALC: 28.9 % — LOW (ref 39–50)
HGB BLD-MCNC: 8.5 G/DL — LOW (ref 13–17)
MAGNESIUM SERPL-MCNC: 1.9 MG/DL — SIGNIFICANT CHANGE UP (ref 1.6–2.6)
MCHC RBC-ENTMCNC: 22.7 PG — LOW (ref 27–34)
MCHC RBC-ENTMCNC: 29.4 G/DL — LOW (ref 32–36)
MCV RBC AUTO: 77.3 FL — LOW (ref 80–100)
PLATELET # BLD AUTO: 312 K/UL — SIGNIFICANT CHANGE UP (ref 150–400)
POTASSIUM SERPL-MCNC: 4.5 MMOL/L — SIGNIFICANT CHANGE UP (ref 3.5–5.3)
POTASSIUM SERPL-SCNC: 4.5 MMOL/L — SIGNIFICANT CHANGE UP (ref 3.5–5.3)
RBC # BLD: 3.74 M/UL — LOW (ref 4.2–5.8)
RBC # FLD: 18.1 % — HIGH (ref 10.3–16.9)
SODIUM SERPL-SCNC: 132 MMOL/L — LOW (ref 135–145)
WBC # BLD: 8.6 K/UL — SIGNIFICANT CHANGE UP (ref 3.8–10.5)
WBC # FLD AUTO: 8.6 K/UL — SIGNIFICANT CHANGE UP (ref 3.8–10.5)

## 2017-12-11 PROCEDURE — 99233 SBSQ HOSP IP/OBS HIGH 50: CPT

## 2017-12-11 RX ADMIN — PIPERACILLIN AND TAZOBACTAM 200 GRAM(S): 4; .5 INJECTION, POWDER, LYOPHILIZED, FOR SOLUTION INTRAVENOUS at 15:13

## 2017-12-11 RX ADMIN — NYSTATIN CREAM 1 APPLICATION(S): 100000 CREAM TOPICAL at 11:22

## 2017-12-11 RX ADMIN — Medication: at 12:55

## 2017-12-11 RX ADMIN — LEVETIRACETAM 500 MILLIGRAM(S): 250 TABLET, FILM COATED ORAL at 05:17

## 2017-12-11 RX ADMIN — Medication 250 MILLIGRAM(S): at 11:22

## 2017-12-11 RX ADMIN — NYSTATIN CREAM 1 APPLICATION(S): 100000 CREAM TOPICAL at 17:35

## 2017-12-11 RX ADMIN — PIPERACILLIN AND TAZOBACTAM 200 GRAM(S): 4; .5 INJECTION, POWDER, LYOPHILIZED, FOR SOLUTION INTRAVENOUS at 01:00

## 2017-12-11 RX ADMIN — HEPARIN SODIUM 5000 UNIT(S): 5000 INJECTION INTRAVENOUS; SUBCUTANEOUS at 05:17

## 2017-12-11 RX ADMIN — LEVETIRACETAM 500 MILLIGRAM(S): 250 TABLET, FILM COATED ORAL at 17:35

## 2017-12-11 RX ADMIN — Medication 5 MILLIGRAM(S): at 05:17

## 2017-12-11 RX ADMIN — PIPERACILLIN AND TAZOBACTAM 200 GRAM(S): 4; .5 INJECTION, POWDER, LYOPHILIZED, FOR SOLUTION INTRAVENOUS at 07:27

## 2017-12-11 RX ADMIN — PIPERACILLIN AND TAZOBACTAM 200 GRAM(S): 4; .5 INJECTION, POWDER, LYOPHILIZED, FOR SOLUTION INTRAVENOUS at 18:37

## 2017-12-11 RX ADMIN — Medication 1 APPLICATION(S): at 11:22

## 2017-12-11 RX ADMIN — INSULIN GLARGINE 7 UNIT(S): 100 INJECTION, SOLUTION SUBCUTANEOUS at 09:17

## 2017-12-11 RX ADMIN — HEPARIN SODIUM 5000 UNIT(S): 5000 INJECTION INTRAVENOUS; SUBCUTANEOUS at 15:08

## 2017-12-11 RX ADMIN — Medication 5 MILLIGRAM(S): at 17:35

## 2017-12-11 RX ADMIN — Medication: at 09:18

## 2017-12-11 NOTE — PROGRESS NOTE ADULT - PROBLEM SELECTOR PLAN 2
Pt w/ a positive UA on admission with LE, nitrites, bacteria and WBC. Urine culture positive for proteus >100,000. Per patient he has a history of staghorn calculus.   -c/w Zosyn   -pt w/ suprapubic catheter --will call urology today for concern of leakage  -as per outpatient provider, Dr. Mark Millard he exchanges the catheter monthly

## 2017-12-11 NOTE — PROGRESS NOTE BEHAVIORAL HEALTH - CASE SUMMARY
See above Pt seen with Dr. Dumont; chart reviewed; case discussed with team. Pt remains irritable, sarcastic and denigrating regarding care being rendered. Threatens to kamille clinicians and homecare personnel. As stated above, he is currently compliant with IV antibiotics, though refusing PICC line, thus complicating discharge planning. He is also refusing Abilify. Despite this, pt with no evidence of suicidal or homicidal ideation/intent/plan, is neither verbally nor physically threatening. Also, it is unclear regarding whether this is pt's baseline. Pt does not appear to be appropriate for treatment (rx) over objection or involuntary psychiatric admission at present time, though a case might be made for inability to care for self.  Recommend requesting Ethics evaluation regarding dispo planning.  Also recommend SW involvement to ascertain whether pt will be accepted by an alternative homecare agency should he go directly home.

## 2017-12-11 NOTE — PROGRESS NOTE ADULT - ASSESSMENT
33yo paraplegic M with a history of SCI (wheelchair bound), sacral pressure ulcer with osteo, DM, indwelling suprapubic catheter, hepatitis B admitted with septic shock likely 2/2 infected sacral wounds s/p debridement    Pt doing well. No general surgical intervention. Recommend wound care consult for long term management of decubitus ulcers  If concern for new fistula, please re-consult General Surgery  Will sign off for now. Please re-consult with any questions or concerns. Pt may follow up with Dr. Garcia as an outpatient

## 2017-12-11 NOTE — PROGRESS NOTE BEHAVIORAL HEALTH - OTHER
Entitled, Sarcastic, Unpleasant Paraplegic, deferred Frustrated Pt uncooperative, unable to assess. Malodorous Entitled, Sarcastic, Unpleasant, but not threatening Able to move hands and arms, is paraplegic Pt uncooperative, unable to assess. Does not appear to be responding to internal stimuli

## 2017-12-11 NOTE — PROGRESS NOTE ADULT - SUBJECTIVE AND OBJECTIVE BOX
INTERVAL HPI/OVERNIGHT EVENTS: No acute events overnight.     Subjective: Patient seen and examined at bedside this morning. Patient in no acute distress. Patient denies all ROS. Complaining of leakage at the suprapubic catheter site.     VITAL SIGNS:  T(F): 97.5 (12-11-17 @ 05:24)  HR: 74 (12-11-17 @ 05:24)  BP: 106/68 (12-11-17 @ 05:24)  RR: 16 (12-11-17 @ 05:24)  SpO2: 99% (12-11-17 @ 05:24)  Wt(kg): --    PHYSICAL EXAM:  General: No acute distress.   HEENT: Normocephalic, Atraumtic. PEERL. MMM  Respiratory: Normal breath sounds b/l. No wheezes, crackles, rhonchi.  Cardiovascular: Normal S1 and S2. RRR. No rubs, murmurs.   Gastrointestinal: Soft, non-distended, non-tender. Positive bowel sounds.   Extremities: No LE edema.   Skin: No rashes or ulcers. No clubbing/cyanosis.   Vascular: 2+ dorsalis pedis pulses b/l  Neurological: No focal deficits. AAOx3.       MEDICATIONS  (STANDING):  ARIPiprazole 5 milliGRAM(s) Oral at bedtime  ascorbic acid 250 milliGRAM(s) Oral daily  atorvastatin 20 milliGRAM(s) Oral at bedtime  Dakins Solution - 1/4 Strength 1 Application(s) Topical daily  dextrose 5%. 1000 milliLiter(s) (50 mL/Hr) IV Continuous <Continuous>  dextrose 50% Injectable 12.5 Gram(s) IV Push once  dextrose 50% Injectable 25 Gram(s) IV Push once  dextrose 50% Injectable 25 Gram(s) IV Push once  heparin  Injectable 5000 Unit(s) SubCutaneous every 8 hours  insulin glargine Injectable (LANTUS) 7 Unit(s) SubCutaneous every morning  insulin lispro (HumaLOG) corrective regimen sliding scale   SubCutaneous Before meals and at bedtime  levETIRAcetam 500 milliGRAM(s) Oral two times a day  nystatin Powder 1 Application(s) Topical two times a day  oxybutynin 5 milliGRAM(s) Oral two times a day  piperacillin/tazobactam IVPB. 3.375 Gram(s) IV Intermittent every 6 hours    MEDICATIONS  (PRN):  bisacodyl Suppository 10 milliGRAM(s) Rectal daily PRN Constipation  dextrose Gel 1 Dose(s) Oral once PRN Blood Glucose LESS THAN 70 milliGRAM(s)/deciliter  glucagon  Injectable 1 milliGRAM(s) IntraMuscular once PRN Glucose LESS THAN 70 milligrams/deciliter      Allergies    Capzasin Back and Body (Unknown)    Intolerances        LABS:                        8.5    8.6   )-----------( 312      ( 11 Dec 2017 06:29 )             28.9     12-11    132<L>  |  96  |  13  ----------------------------<  162<H>  4.5   |  24  |  0.80    Ca    9.1      11 Dec 2017 06:29  Mg     1.9     12-11        RADIOLOGY & ADDITIONAL TESTS: No new images.

## 2017-12-11 NOTE — PROGRESS NOTE BEHAVIORAL HEALTH - RISK ASSESSMENT
Moderate risk to self: Unable to care, refuses home care, unable to elaborate for an alternative plan.  Low risk to others Moderate risk to self: Unable to care, refuses home care, unable to elaborate for an alternative plan.  Low risk to others.

## 2017-12-11 NOTE — PROGRESS NOTE ADULT - ATTENDING COMMENTS
Pt seen and examined, VSS, exam as above, labs reviewed, agree with plan above.  1. Septic shock from stage 4 decub with osteomyelitis- refusing PICC line and IV abx, ID agreeable to cipro/augmentin as outpt but IV while inpatient  2. Staghorn renal stone- urology eval  3. Neurogenic bladder- will d/w urology if pt can resume self cath'ing vs suprapubic  4. Paraplegia- pt wants to go home with home care, outpt  has brought up concerns regarding safety at home- will d/w psych regarding capacity  5. Schizoaffective- paranoid, not taking his meds, awaiting psych re-eval  Rest of plan as above

## 2017-12-11 NOTE — PROGRESS NOTE BEHAVIORAL HEALTH - NSBHFUPINTERVALHXFT_PSY_A_CORE
Patient was evaluated at bedside with attending, charts reviewed, and collateral obtained from primary team.     Patient continues to refuse Abilify but is taking his IV antibiotics, and comply with wound care. As per primary team he is entitled, and difficult at times, refuses Sub Acute Rehab (post discharge care) or to have IV line placed to continue the 6 weeks of IV antibiotic at home reportedly stating "It is going to get infected (sacral ulcer) again, so what is the point", but has not elaborated further about alternative plans. No report of agitation, aggression or self-injurious behaviour. No report of SI/HI.     On bedside assessment pt remains calm but is extremely guarded, unwilling to cooperate with psychiatric evaluation and repeatedly asks the writer and psychiatry attending to leave "I don't need psychiatrists, so leave!". Several attempts made to explain the reason for psychiatric evaluation (e.g safety concerns, medication adjustment etc) but he refuses to answer any questions. When asked if there is any particular concerns/reason that he refuses the interview he does not provide reasons. He refuses to answer/explain why he has been on Abilify at [facility], denies his past psychiatric history, stating he has been sent to psychiatric hospital (in past) because psychiatrist randomly went to his home "they need to make a living right?", states that the psychiatrists are not actual doctors and when writer (and attending) explain that they are MD he laughs and make sarcastic statements. He also accuses HomeCare agency(ies) of neglect and that he is planing to get a  and kamille them. As per medical team he has been making these allegations in past few days as well. Patient was evaluated at bedside with attending, charts reviewed, and collateral obtained from primary team.     Patient continues to refuse Abilify but is taking his IV antibiotics, and complying with wound care. As per primary team, he is entitled, and difficult at times, refuses Sub Acute Rehab (post discharge care) or to have IV line placed to continue the 6 weeks of IV antibiotic at home reportedly stating "It is going to get infected (sacral ulcer) again, so what is the point", but has not elaborated further about alternative plans. No report of agitation, aggression or self-injurious behaviour. No report of SI/HI.     On bedside assessment pt remains calm but is extremely guarded, unwilling to cooperate with psychiatric evaluation and repeatedly asks the writer and psychiatry attending to leave "I don't need psychiatrists, so leave!". Several attempts made to explain the reason for psychiatric evaluation (e.g safety concerns, medication adjustment etc) but he refuses to answer any questions. When asked if there is any particular concerns/reason that he refuses the interview he does not provide reasons. He refuses to answer/explain why he has been on Abilify at first place, denies his past psychiatric history, stating he has been sent to psychiatric hospital (in past) because psychiatrist randomly went to his home "they need to make a living right?", states that the psychiatrists are not actual doctors and when writer (and attending) explain that they are MD's, he laughs and make sarcastic statements. He also accuses HomeCare agency(ies) of neglect and states that he is planning to get a  and kamille them. As per medical team he has been making these allegations in past few days as well.

## 2017-12-11 NOTE — PROGRESS NOTE ADULT - SUBJECTIVE AND OBJECTIVE BOX
SUBJECTIVE: Patient seen and examined bedside. No complaints this AM. Denies pain in the affected area--largely insensate. No nausea, vomiting. Afebrile.     heparin  Injectable 5000 Unit(s) SubCutaneous every 8 hours  piperacillin/tazobactam IVPB. 3.375 Gram(s) IV Intermittent every 6 hours      Vital Signs Last 24 Hrs  T(C): 36.4 (11 Dec 2017 05:24), Max: 36.8 (10 Dec 2017 17:00)  T(F): 97.5 (11 Dec 2017 05:24), Max: 98.2 (10 Dec 2017 17:00)  HR: 74 (11 Dec 2017 05:24) (74 - 98)  BP: 106/68 (11 Dec 2017 05:24) (91/60 - 106/68)  BP(mean): --  RR: 16 (11 Dec 2017 05:24) (15 - 16)  SpO2: 99% (11 Dec 2017 05:24) (98% - 99%)  I&O's Detail    10 Dec 2017 07:01  -  11 Dec 2017 07:00  --------------------------------------------------------  IN:  Total IN: 0 mL    OUT:    Indwelling Catheter - Suprapubic: 3100 mL  Total OUT: 3100 mL    Total NET: -3100 mL          General: NAD, resting comfortably in bed. Cachectic  Pulm: Nonlabored breathing, no respiratory distress on RA  Abd: soft, NT/ND. Large well-healed exlap incision.  : Extensive sacral ulcers, appears clean and well debrided. WTD dressings in place. No feculent or purulent drainage          LABS:                        8.5    8.6   )-----------( 312      ( 11 Dec 2017 06:29 )             28.9     12-11    132<L>  |  96  |  13  ----------------------------<  162<H>  4.5   |  24  |  0.80    Ca    9.1      11 Dec 2017 06:29  Mg     1.9     12-11            RADIOLOGY & ADDITIONAL STUDIES:

## 2017-12-11 NOTE — PROGRESS NOTE BEHAVIORAL HEALTH - SUMMARY
Based on my assessment that patient appear to endorse paranoid thoughts, is difficult to assess and uncooperative. However it's not clear whether his paranoia is secondary to a psychotic episode or if it is more due to his personality traits. He does not refuse medical care at this time although exhibit poor judgment and insight for post-discharge care. He has not verbalized active or passive SI/HI throughout hospital course but is unable to appreciate his previous actions/decisions (refusing help) which led to current admission secondary to a potentially fatal wound-infection. While at the hospital, he has not made any gestures/comment to qualify himself as an acute danger to self or others. However it is unclear whether his previous refusal of treatment/care (while at home) which subsequently had put his life in danger, was associated with passive wishes to die.     Further investigation and evaluation would be required to clarify patients condition. Although he does not meet the criteria for involuntary administration of psychotropic medication at this time, but he is not psychiatricly clear to be discharged home unless he is able to understand/elaborate for a safe discharge plan in place.

## 2017-12-11 NOTE — CHART NOTE - NSCHARTNOTEFT_GEN_A_CORE
Admitting Diagnosis:   Patient is a 32y old  Male who presents with a chief complaint of Patient experiencing symptoms of infection "chills" that has happened before when his sacral wound became infected.  Patient has been paraplegic, after sustaining a fall out of a window, since 2011.  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  Sacral ulcer developed April 2017 and Ulcer to the right ischium and left foot. 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  Paraplegia  Type 2 diabetes mellitus with hyperglycemia, without long-term current use of insulin      Current Nutrition Order:DASH with glucerna shakex3       PO Intake: Good (%) [   ]  Fair (50-75%) [ x  ] Poor (<25%) [   ]    GI Issues: none    Pain:yes    Skin Integrity:stage4 reported    Labs:   12-11    132<L>  |  96  |  13  ----------------------------<  162<H>  4.5   |  24  |  0.80    Ca    9.1      11 Dec 2017 06:29  Mg     1.9     12-11      CAPILLARY BLOOD GLUCOSE      POCT Blood Glucose.: 271 mg/dL (11 Dec 2017 11:33)  POCT Blood Glucose.: 155 mg/dL (11 Dec 2017 07:38)  POCT Blood Glucose.: 182 mg/dL (10 Dec 2017 21:20)  POCT Blood Glucose.: 134 mg/dL (10 Dec 2017 18:36)      Medications:  MEDICATIONS  (STANDING):  ARIPiprazole 5 milliGRAM(s) Oral at bedtime  ascorbic acid 250 milliGRAM(s) Oral daily  atorvastatin 20 milliGRAM(s) Oral at bedtime  dextrose 5%. 1000 milliLiter(s) (50 mL/Hr) IV Continuous <Continuous>  dextrose 50% Injectable 12.5 Gram(s) IV Push once  dextrose 50% Injectable 25 Gram(s) IV Push once  dextrose 50% Injectable 25 Gram(s) IV Push once  heparin  Injectable 5000 Unit(s) SubCutaneous every 8 hours  insulin glargine Injectable (LANTUS) 7 Unit(s) SubCutaneous every morning  insulin lispro (HumaLOG) corrective regimen sliding scale   SubCutaneous Before meals and at bedtime  levETIRAcetam 500 milliGRAM(s) Oral two times a day  nystatin Powder 1 Application(s) Topical two times a day  oxybutynin 5 milliGRAM(s) Oral two times a day  piperacillin/tazobactam IVPB. 3.375 Gram(s) IV Intermittent every 6 hours    MEDICATIONS  (PRN):  bisacodyl Suppository 10 milliGRAM(s) Rectal daily PRN Constipation  dextrose Gel 1 Dose(s) Oral once PRN Blood Glucose LESS THAN 70 milliGRAM(s)/deciliter  glucagon  Injectable 1 milliGRAM(s) IntraMuscular once PRN Glucose LESS THAN 70 milligrams/deciliter      Weight:72.6kg on admission  Daily   no updated weights  Daily     Weight Change: no updated weights    Estimated energy needs: based on IBW:51kg g32-40vrom:1275-1530kcal and 1.2-1.4gmprotein:61-71gmprotein and 30-35 cc fluid:1535-1785cc    Subjective: 33 y/o male with pain from PU.Eating fair amounts.No N/V/D reported    Previous Nutrition Diagnosis:Increased nutrient needs r/t increased demand for protein and nutrients AEB: PU    Active [x  ]  Resolved [   ]    If resolved, new PES:     Goal:Meet 80% consistently    Recommendations:Updated weights    Education: reviewed    Risk Level: High [   ] Moderate [  x ] Low [   ]

## 2017-12-11 NOTE — PROGRESS NOTE ADULT - PROBLEM SELECTOR PLAN 1
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 Wound cultures growing gram negative rods and gram positive in chains. Upon arrival pts wound was packed w/ feculent material and required debridement by plastic surgery.   -will continue w/ Zosyn  -general surgery does not believe there is a fistula between wound and rectum causing leakage of stool. --No surgical intervention at this time.

## 2017-12-11 NOTE — PROGRESS NOTE BEHAVIORAL HEALTH - NSBHFUPINTERVALCCFT_PSY_A_CORE
Patient has been refusing Abilify (home regimen, non-complaint) but continues to comply with medical care on the unit (wound care, labs, antibiotics). Patient has been refusing Abilify (home regimen, non-compliant) but continues to comply with medical care on the unit (wound care, labs, antibiotics).

## 2017-12-12 LAB
GLUCOSE BLDC GLUCOMTR-MCNC: 163 MG/DL — HIGH (ref 70–99)
GLUCOSE BLDC GLUCOMTR-MCNC: 180 MG/DL — HIGH (ref 70–99)
GLUCOSE BLDC GLUCOMTR-MCNC: 307 MG/DL — HIGH (ref 70–99)
GLUCOSE BLDC GLUCOMTR-MCNC: 343 MG/DL — HIGH (ref 70–99)

## 2017-12-12 PROCEDURE — 99233 SBSQ HOSP IP/OBS HIGH 50: CPT

## 2017-12-12 RX ADMIN — LEVETIRACETAM 500 MILLIGRAM(S): 250 TABLET, FILM COATED ORAL at 18:51

## 2017-12-12 RX ADMIN — Medication 2: at 09:30

## 2017-12-12 RX ADMIN — NYSTATIN CREAM 1 APPLICATION(S): 100000 CREAM TOPICAL at 18:52

## 2017-12-12 RX ADMIN — Medication 4: at 01:08

## 2017-12-12 RX ADMIN — PIPERACILLIN AND TAZOBACTAM 200 GRAM(S): 4; .5 INJECTION, POWDER, LYOPHILIZED, FOR SOLUTION INTRAVENOUS at 18:51

## 2017-12-12 RX ADMIN — Medication 5 MILLIGRAM(S): at 06:21

## 2017-12-12 RX ADMIN — INSULIN GLARGINE 7 UNIT(S): 100 INJECTION, SOLUTION SUBCUTANEOUS at 09:30

## 2017-12-12 RX ADMIN — ATORVASTATIN CALCIUM 20 MILLIGRAM(S): 80 TABLET, FILM COATED ORAL at 22:01

## 2017-12-12 RX ADMIN — HEPARIN SODIUM 5000 UNIT(S): 5000 INJECTION INTRAVENOUS; SUBCUTANEOUS at 06:21

## 2017-12-12 RX ADMIN — PIPERACILLIN AND TAZOBACTAM 200 GRAM(S): 4; .5 INJECTION, POWDER, LYOPHILIZED, FOR SOLUTION INTRAVENOUS at 12:32

## 2017-12-12 RX ADMIN — Medication 8: at 22:01

## 2017-12-12 RX ADMIN — PIPERACILLIN AND TAZOBACTAM 200 GRAM(S): 4; .5 INJECTION, POWDER, LYOPHILIZED, FOR SOLUTION INTRAVENOUS at 07:22

## 2017-12-12 RX ADMIN — HEPARIN SODIUM 5000 UNIT(S): 5000 INJECTION INTRAVENOUS; SUBCUTANEOUS at 15:10

## 2017-12-12 RX ADMIN — HEPARIN SODIUM 5000 UNIT(S): 5000 INJECTION INTRAVENOUS; SUBCUTANEOUS at 01:09

## 2017-12-12 RX ADMIN — PIPERACILLIN AND TAZOBACTAM 200 GRAM(S): 4; .5 INJECTION, POWDER, LYOPHILIZED, FOR SOLUTION INTRAVENOUS at 01:52

## 2017-12-12 RX ADMIN — ATORVASTATIN CALCIUM 20 MILLIGRAM(S): 80 TABLET, FILM COATED ORAL at 00:59

## 2017-12-12 RX ADMIN — Medication 5 MILLIGRAM(S): at 18:52

## 2017-12-12 RX ADMIN — LEVETIRACETAM 500 MILLIGRAM(S): 250 TABLET, FILM COATED ORAL at 06:21

## 2017-12-12 RX ADMIN — HEPARIN SODIUM 5000 UNIT(S): 5000 INJECTION INTRAVENOUS; SUBCUTANEOUS at 22:01

## 2017-12-12 RX ADMIN — Medication 2: at 18:51

## 2017-12-12 RX ADMIN — Medication 250 MILLIGRAM(S): at 12:15

## 2017-12-12 RX ADMIN — NYSTATIN CREAM 1 APPLICATION(S): 100000 CREAM TOPICAL at 09:31

## 2017-12-12 RX ADMIN — Medication 8: at 12:15

## 2017-12-12 NOTE — PROGRESS NOTE ADULT - SUBJECTIVE AND OBJECTIVE BOX
INTERVAL HPI/OVERNIGHT EVENTS: No acute events overnight. Patient refusing to be cleaned last night as per evening nurse.     Subjective: Patient seen and examined at bedside this morning. Patient in no acute distress. Patient denies all ROS.     VITAL SIGNS:  T(F): 98.2 (12-12-17 @ 09:11)  HR: 102 (12-12-17 @ 09:11)  BP: 98/60 (12-12-17 @ 09:11)  RR: 19 (12-12-17 @ 09:11)  SpO2: 98% (12-12-17 @ 09:11)  Wt(kg): --    PHYSICAL EXAM:  General: No acute distress.   HEENT: Normocephalic, Atraumtic. PEERL. MMM  Respiratory: Normal breath sounds b/l. No wheezes, crackles, rhonchi.  Cardiovascular: Normal S1 and S2. RRR. No rubs, murmurs.   Gastrointestinal: Soft, non-distended, non-tender. Positive bowel sounds.   Extremities: No LE edema.   Skin: Stage 4 sacral ulcer packed w/ gauze.   Vascular: 2+ dorsalis pedis pulses b/l  Neurological: Flat affect, not interested in engaging. AAOx3. Paraplegia. No sensation below nipple line. Strength 5/5 b/l upper extremities.       MEDICATIONS  (STANDING):  ARIPiprazole 5 milliGRAM(s) Oral at bedtime  ascorbic acid 250 milliGRAM(s) Oral daily  atorvastatin 20 milliGRAM(s) Oral at bedtime  dextrose 5%. 1000 milliLiter(s) (50 mL/Hr) IV Continuous <Continuous>  dextrose 50% Injectable 12.5 Gram(s) IV Push once  dextrose 50% Injectable 25 Gram(s) IV Push once  dextrose 50% Injectable 25 Gram(s) IV Push once  heparin  Injectable 5000 Unit(s) SubCutaneous every 8 hours  insulin glargine Injectable (LANTUS) 7 Unit(s) SubCutaneous every morning  insulin lispro (HumaLOG) corrective regimen sliding scale   SubCutaneous Before meals and at bedtime  levETIRAcetam 500 milliGRAM(s) Oral two times a day  nystatin Powder 1 Application(s) Topical two times a day  oxybutynin 5 milliGRAM(s) Oral two times a day  piperacillin/tazobactam IVPB. 3.375 Gram(s) IV Intermittent every 6 hours    MEDICATIONS  (PRN):  bisacodyl Suppository 10 milliGRAM(s) Rectal daily PRN Constipation  dextrose Gel 1 Dose(s) Oral once PRN Blood Glucose LESS THAN 70 milliGRAM(s)/deciliter  glucagon  Injectable 1 milliGRAM(s) IntraMuscular once PRN Glucose LESS THAN 70 milligrams/deciliter      Allergies    Capzasin Back and Body (Unknown)    Intolerances        LABS:                        8.5    8.6   )-----------( 312      ( 11 Dec 2017 06:29 )             28.9     12-11    132<L>  |  96  |  13  ----------------------------<  162<H>  4.5   |  24  |  0.80    Ca    9.1      11 Dec 2017 06:29  Mg     1.9     12-11            RADIOLOGY & ADDITIONAL TESTS: No new images.

## 2017-12-12 NOTE — PROGRESS NOTE ADULT - ASSESSMENT
Patient is a 32 year old paraplegic M with a history of spinal cord injury (wheelchair bound), sacral pressure ulcer with osteo x2 (outpatient provider said they have records of March osteo s/p daptomycin of unknown length) earlier in 2017,  DM-2, indwelling suprapubic catheter, hepatitis B who presented w/ septic shock secondary to infected purulent sacral 4th degree pressure ulcer with underlying inadequately treated OM being treated with zosyn.

## 2017-12-12 NOTE — PROGRESS NOTE BEHAVIORAL HEALTH - NSBHCONSULTRECOMMENDOTHER_PSY_A_CORE FT
Please confirm if possible whether pt's aunt is his HCP.  Follow up Dr. Hogna's recommendations.  Please contact [facility] Legal Services/Risk Management to initiate Treatment over Objection Process. This will likely require hospital-appointed  to provide papers for submission; bedside evaluation by ; appointment of surrogate (possibly aunt).

## 2017-12-12 NOTE — DISCHARGE NOTE ADULT - HOSPITAL COURSE
32 year old paraplegic M with a history of spinal cord injury (wheelchair bound), sacral pressure ulcer with osteo x2 (outpatient provider said they have records of March osteo s/p daptomycin of unknown length) earlier in 2017,  DM-2, indwelling suprapubic catheter, reported history of hepatitis B with septic shock secondary to infected purulent sacral 4th degree pressure ulcer with underlying inadequately treated OM. The patient was initially admitted to the MICU because he required levophed for hypotension. A line and central line were placed. Blood cultures NGTD and wound cultures were are growing gram negative rods and gram positive cocci in chains. Urine culture grew >100,000 proteus and the patient stated he has a staghorn calculus. The patient was initiated on vancomycin and zosyn and ultimately vancomycin was discontinue.  Plastic surgery debrided the R ischial wound at bedside, they did not need to debride the sacral wound. There was a concern that there may have been a fistula therefore general surgery was consulted. Surgery did not believe there to be a fistula and did not recommend any surgical intervention at this time. Once improved patient was deemed stable for transfer to the regional medical floors. Patient was continued on his Zosyn. It was recommended patient receive a picc line and go to subacute rehab to finish the duration of his antibiotics but he refused saying that he would like to go home. Psych was consulted given concern about patients mental status and they believed him to be extremely paranoid and refusing to engage in conversation. Pt was started on Abilify for which he refused to take. Psych recommended with the assistance of ethics that patient be deemed for treatment over objection of medicine with the assistance of the Mental [employer] Legal Service . A 32M paraplegic with PMH of spinal cord injury (wheelchair bound), sacral pressure ulcer with osteo x2 (outpatient provider said they have records of March osteo s/p daptomycin of unknown length) earlier in 2017,  DM2, indwelling suprapubic catheter who presented w/ septic shock secondary to infected purulent sacral 4th degree pressure ulcer with underlying inadequately treated OM with septic shock now resolved. Patient was previously on RMF for extended period of time with persistent OM treated with antibiotics including Vanc, Zosyn, Cipro, Augmentin, but currently being treated with Zosyn (since 1/29). Prior wound cultures have grown citrobacter, enterococcus, klebsiella.  Recent blood cultures negative.  Decision was made for patient to have colostomy to prevent stool contamination of sacral wound.  Now s/p colostomy c/b ileus requiring NGT decompression.  Ostomy has had nearly no stool output.  Followed by surgical team which recommended medical management.  At this point no improvement of severe abdominal distention (CT shows severely dilated colon full of stool) transferred to  for cholinergic therapy.  Given neostigmine 2/8 without output from ostomy.  Surgery attempted to disimpact, unsuccessful.  Started q4 tap water enemas with some improvement.  Holding oxybutynin 5mg BID d/t cholinergic effect.  Received second dose of neostigmine today without complication, large bowel movement (roughly 1L stool.)  Patient hemodynamically stable and ready for transfer to 7U for further management of care. 32M paraplegic with PMH of spinal cord injury (wheelchair bound), sacral pressure ulcer with osteo x2 (outpatient provider said they have records of March osteo s/p daptomycin of unknown length) earlier in 2017,  DM2, indwelling suprapubic catheter who presented w/ septic shock secondary to infected purulent sacral 4th degree pressure ulcer with underlying inadequately treated OM with septic shock now resolved. Patient was previously on RMF for extended period of time with persistent OM treated with antibiotics including Vanc, Zosyn, Cipro, Augmentin.  Last abx Zosyn (1/29-2/12). Prior wound cultures have grown citrobacter, enterococcus, klebsiella.  Decision was made for patient to have colostomy to prevent stool contamination of sacral wound.  Now s/p colostomy c/b ileus requiring NGT decompression.  Ostomy has had nearly no stool output.  Followed by surgical team which recommended medical management.  At this point no improvement of severe abdominal distention (CT shows severely dilated colon full of stool) transferred to  for cholinergic therapy.  Given neostigmine 2/8 without output from ostomy.  Surgery attempted to disimpact, unsuccessful.  Started q4 tap water enemas with some improvement.  Holding oxybutynin 5mg BID d/t cholinergic effect.  Received second dose of neostigmine without complication, large bowel movement (roughly 1L stool.)  Stepped down to [facility] where antibiotic course completed and continued to pass adequate stool from colostomy. 32M paraplegic with PMH of spinal cord injury (wheelchair bound), sacral pressure ulcer with osteo x2 (outpatient provider said they have records of March osteo s/p daptomycin of unknown length) earlier in 2017,  DM2, indwelling suprapubic catheter who presented w/ septic shock secondary to infected purulent sacral 4th degree pressure ulcer with underlying inadequately treated OM with septic shock now resolved. Patient was previously on RMF for extended period of time with persistent OM treated with antibiotics including Vanc, Zosyn, Cipro, Augmentin.  Last abx Zosyn (1/29-2/12). Prior wound cultures have grown citrobacter, enterococcus, klebsiella, corynebacterium.  Course c/b ESBL Klebsiella and Proteus UTI. Decision was made for patient to have colostomy to prevent stool contamination of sacral wound.  Now s/p colostomy c/b ileus requiring NGT decompression.  Ostomy has had nearly no stool output.  Followed by surgical team which recommended medical management.  At this point no improvement of severe abdominal distention (CT shows severely dilated colon full of stool) transferred to MICU for cholinergic therapy.  Given neostigmine 2/8 without output from ostomy.  Surgery attempted to disimpact, unsuccessful.  Started q4 tap water enemas with some improvement.  Holding oxybutynin 5mg BID d/t cholinergic effect.  Received second dose of neostigmine without complication, large bowel movement (roughly 1L stool.)  Stepped down to [facility] where antibiotic course completed and continued to pass adequate stool from colostomy. 32M paraplegic with PMH of spinal cord injury (wheelchair bound), sacral pressure ulcer with osteo x2 (outpatient provider said they have records of March osteo s/p daptomycin of unknown length) earlier in 2017,  DM2, indwelling suprapubic catheter who presented w/ septic shock secondary to infected purulent sacral 4th degree pressure ulcer with underlying inadequately treated OM with septic shock now resolved. Patient was previously on RMF for extended period of time with persistent OM treated with antibiotics including Vanc, Zosyn, Cipro, Augmentin.  Last abx Zosyn (1/29-2/12). Prior wound cultures have grown citrobacter, enterococcus, klebsiella, corynebacterium.  Course c/b ESBL Klebsiella and Proteus UTI. Decision was made for patient to have colostomy to prevent stool contamination of sacral wound.  Now s/p colostomy c/b ileus requiring NGT decompression.  Ostomy has had nearly no stool output.  Followed by surgical team which recommended medical management.  At this point no improvement of severe abdominal distention (CT shows severely dilated colon full of stool) transferred to MICU for cholinergic therapy.  Given neostigmine 2/8 without output from ostomy.  Surgery attempted to disimpact, unsuccessful.  Started q4 tap water enemas with some improvement.  Holding oxybutynin 5mg BID d/t cholinergic effect.  Received second dose of neostigmine without complication, large bowel movement (roughly 1L stool.)  Stepped down to [facility] where antibiotic course completed and continued to pass adequate stool from colostomy.  Hemodynamically stable for discharge home with home care, wound care, and close follow up.

## 2017-12-12 NOTE — PROGRESS NOTE BEHAVIORAL HEALTH - SUMMARY
32-year-old male with hx of schizoaffective disorder, paraplegia, sepsis, sacral decubitus, now admitted wi 32-year-old male with hx of schizoaffective disorder, paraplegia/wheelchair bound, sepsis, sacral decubitus, sacral pressure ulcer with osteomyelitis x 2, again admitted with osteomyelitis. Pt initially required ICU for sepsis/hypotension, is now on IV antibiotics. Pt refusing PICC line and MAURILIO for completion of 6-week antibiotic course, wants to go home. Although compliant with IV antibiotics and general medical care, pt is increasingly paranoid, refusing antipsychotic medication.  Pt's insight and judgement regarding his medical situation and need for appropriate dispo/d/c planning is impaired.  Pt will likely require treatment (antipsychotic rx) over objection (TOO) while he remains a patient on medicine unit.  Team advised regarding protocol for initiating TOO.

## 2017-12-12 NOTE — DISCHARGE NOTE ADULT - PROVIDER TOKENS
FREE:[LAST:[Prashanth],FIRST:[Vinicius Youngblood],PHONE:[phone]],FAX:[(   [phone]    -],[facility]

## 2017-12-12 NOTE — PROGRESS NOTE BEHAVIORAL HEALTH - AXIS III
Sepsis, paraplegia, sacral bed sore Sepsis, paraplegia, sacral decubitus Sepsis, paraplegia, sacral decubitus, DM2

## 2017-12-12 NOTE — PROGRESS NOTE ADULT - PROBLEM SELECTOR PLAN 1
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 Wound cultures growing gram negative rods and gram positive in chains. Upon arrival pts wound was packed w/ feculent material and required debridement by plastic surgery.   -will continue w/ Zosyn  -general surgery does not believe there is a fistula between wound and rectum causing leakage of stool. --No surgical intervention at this time.  -wound care consulted--appreciate recs

## 2017-12-12 NOTE — PROGRESS NOTE ADULT - PROBLEM SELECTOR PLAN 2
Pt w/ a positive UA on admission with LE, nitrites, bacteria and WBC. Urine culture positive for proteus >100,000. Per patient he has a history of staghorn calculus.   -c/w Zosyn   -pt w/ suprapubic catheter  -as per outpatient provider, Dr. Mark Millard he exchanges the catheter monthly

## 2017-12-12 NOTE — PROGRESS NOTE BEHAVIORAL HEALTH - NSBHFUPINTERVALHXFT_PSY_A_CORE
Pt seen; chart reviewed. Discussed with Dr. Huddleston and team and Dr. Hogan. This writer also discussed protocol for treatment over objection (TOO) of medicine patients with  from [employer] Legal Service ( handles cases on inpatient psychiatry only.)  Per team, patient remains ornery, sarcastic, increasingly paranoid regarding care being rendered. He continues to refuse antipsychotic medication (Abilify), still refusing PICC line, though is compliant with non-psychiatric medications including IV antibiotic.  On interview now, pt observed listening to music with headphones. Efforts to engage patient quickly rejected: "You are from psychiatry. I have nothing to say to you. Now leave!"

## 2017-12-12 NOTE — DISCHARGE NOTE ADULT - NSFTFATTENDCERTRD_GEN_ALL_CORE
My signature below certifies that the above stated patient is homebound and upon completion of the Face-To-Face encounter, has the need for intermittent skilled nursing, physical therapy and/or speech or occupational therapy services in their home for their current diagnosis as outlined in their initial plan of care. These services will continue to be monitored by myself or another physician.

## 2017-12-12 NOTE — DISCHARGE NOTE ADULT - ADDITIONAL INSTRUCTIONS
You have an appointment with [facility] 2/21/18 at 2:30pm. You will have home care also.  It is vital that you regularly follow up with your primary care provider.  Take all medications as prescribed. You have an appointment with [facility] 2/21/18 at 2:30pm. You will have home care also.  It is vital that you regularly follow up with your primary care provider(Dr. Alford, information below).  Take all medications as prescribed. You have an appointment with [facility] 2/21/18 at 2:30pm. You will have home care also.  It is vital that you regularly follow up with your primary care provider(Dr. Alford, 2/26/18 at 11:45am).  Take all medications as prescribed.

## 2017-12-12 NOTE — DISCHARGE NOTE ADULT - MEDICATION SUMMARY - MEDICATIONS TO TAKE
I will START or STAY ON the medications listed below when I get home from the hospital:    levETIRAcetam 500 mg oral tablet  -- 1 tab(s) by mouth 2 times a day  -- Indication: For Seizures    metFORMIN 500 mg oral tablet  -- 1 tab(s) by mouth 2 times a day (with meals)  -- Indication: For Diabetes    insulin glargine  -- 12 unit(s) subcutaneous once a day (at bedtime)  -- Indication: For Diabetes    atorvastatin 20 mg oral tablet  -- 1 tab(s) by mouth once a day (at bedtime)  -- Indication: For Hyperlipidemia    haloperidol 10 mg oral tablet  -- 1 tab(s) by mouth once a day (at bedtime)  -- Indication: For Paranoia (psychosis)    famotidine 20 mg oral tablet  -- 1 tab(s) by mouth once a day  -- Indication: For GERD    polyethylene glycol 3350 oral powder for reconstitution  -- 17 gram(s) by mouth 2 times a day  -- Indication: For Constipation, unspecified constipation type    ascorbic acid 250 mg oral tablet  -- 1 tab(s) by mouth once a day  -- Indication: For WOUND Care I will START or STAY ON the medications listed below when I get home from the hospital:    Keppra 500 mg oral tablet  -- 1 tab(s) by mouth 2 times a day  -- Indication: For Psychosis    Glucophage 1000 mg oral tablet  -- 1 tab(s) by mouth 2 times a day  -- Indication: For Diabetes Mellitus    Lipitor 20 mg oral tablet  -- 1 tab(s) by mouth once a day (at bedtime)  -- Indication: For Hyperlipidemia    haloperidol 10 mg oral tablet  -- 1 tab(s) by mouth once a day (at bedtime)  -- Indication: For Psychosis    famotidine 20 mg oral tablet  -- 1 tab(s) by mouth once a day  -- Indication: For GERD    polyethylene glycol 3350 oral powder for reconstitution  -- 17 gram(s) by mouth once a day   -- Indication: For Constipation, unspecified constipation type    ascorbic acid 250 mg oral tablet  -- 1 tab(s) by mouth once a day  -- Indication: For WOUND Care

## 2017-12-12 NOTE — DISCHARGE NOTE ADULT - PATIENT PORTAL LINK FT
“You can access the Follow[facility] Patient Portal, offered by [facility], by registering with the following website: [email or web address]”

## 2017-12-12 NOTE — DISCHARGE NOTE ADULT - HOME CARE AGENCY
[facility] [phone] ([facility]) [facility] [phone] ([facility]) [facility] [phone] (Home /[facility], start of care date 2/17/18) [facility] [phone] (Home PT/[facility], start of care date 2/17/18)

## 2017-12-12 NOTE — DISCHARGE NOTE ADULT - MEDICATION SUMMARY - MEDICATIONS TO STOP TAKING
I will STOP taking the medications listed below when I get home from the hospital:    glimepiride 1 mg oral tablet  -- 1 tab(s) by mouth once a day I will STOP taking the medications listed below when I get home from the hospital:    oxybutynin extended release  -- 15 milligram(s) by mouth 2 times a day    glimepiride 1 mg oral tablet  -- 1 tab(s) by mouth once a day

## 2017-12-12 NOTE — PROGRESS NOTE BEHAVIORAL HEALTH - NSBHFUPINTERVALCCFT_PSY_A_CORE
"You are from psychiatry. I have nothing to say to you. Now leave!" "You are from psychiatry. I have nothing to say to you. This will be included in my report to my . Now leave!"

## 2017-12-12 NOTE — PROGRESS NOTE ADULT - ATTENDING COMMENTS
Pt seen and examined, VSS, exam as above, labs reviewed, agree with plan above.  1. Septic shock from stage 4 decub with osteomyelitis- refusing PICC line and IV abx, ID agreeable to cipro/augmentin as outpt but IV while inpatient  2. Staghorn renal stone- urology eval  3. Neurogenic bladder- suprapubic  4. Paraplegia- pt has home care with aids 24 hrs per day which he refuses and a wheelchair; unsafe to return home at this time  5. Schizoaffective- paranoid, psychotic, not taking his meds- will plan with legal services a court date or  to come inpatient to discuss treatment over objection  6. Uncontrolled DM with hyperglycemia- will uptitrate his insulin  Rest of plan as above

## 2017-12-12 NOTE — DISCHARGE NOTE ADULT - OTHER SIGNIFICANT FINDINGS
< from: CT Abdomen and Pelvis w/ Oral Cont (02.07.18 @ 15:03) >  IMPRESSION: Status post Bravo's procedure with diverting descending   colon colostomy. Markedly dilated entire colon which could be due to   ostomy malfunction or ileus. Probable  competent ileocecal valve. Small   amount of ascites.  Flat IVC likely due to hypovolemia.    < end of copied text >  < from: CT Pelvis w/ IV Cont (01.27.18 @ 14:27) >  Impression: Findings consistent with abscess replacing the lower sacrum   and coccyx. Osteomyelitis of the adjacent S3 segment cannot be excluded.   The collection appears to be communicating with the skin surface in the   inferior gluteal cleft.    < end of copied text >

## 2017-12-12 NOTE — DISCHARGE NOTE ADULT - CARE PLAN
Principal Discharge DX:	Sacral osteomyelitis  Goal:	To provide treatment.  Instructions for follow-up, activity and diet:	You have a stage 4 infected sacral ulcer with purulence. You have had osteomy  Secondary Diagnosis:	Neurogenic bladder  Secondary Diagnosis:	Type 2 diabetes mellitus  Secondary Diagnosis:	Urinary tract infection Principal Discharge DX:	Sacral osteomyelitis  Goal:	To provide treatment.  Instructions for follow-up, activity and diet:	You have a stage 4 infected sacral ulcer with purulence. You have had ostomy  Secondary Diagnosis:	Neurogenic bladder  Secondary Diagnosis:	Type 2 diabetes mellitus  Secondary Diagnosis:	Urinary tract infection Principal Discharge DX:	Sacral osteomyelitis  Goal:	To provide treatment.  Assessment and plan of treatment:	You have a stage 4 infected sacral ulcer with purulence. You have had ostomy  Secondary Diagnosis:	Neurogenic bladder  Secondary Diagnosis:	Type 2 diabetes mellitus  Secondary Diagnosis:	Urinary tract infection Principal Discharge DX:	Sacral osteomyelitis  Goal:	To provide treatment.  Assessment and plan of treatment:	You have a stage 4 infected sacral ulcer with purulence. Your infection has been treated with several different antibiotics and we believe that it no longer requires antibiotics at this time.  You will require continued wound care and additional care to prevent further progression of your wound  Secondary Diagnosis:	Neurogenic bladder  Assessment and plan of treatment:	You have neurogenic bladder with a suprapubic catheter.  Your catheter was changed by our urology team.  It will have to be changed in frequent intervals when you go home  Secondary Diagnosis:	Type 2 diabetes mellitus  Assessment and plan of treatment:	Please take all medications as prescribed and follow up with your outpatient provider  Secondary Diagnosis:	Urinary tract infection  Assessment and plan of treatment:	You were treated for a urinary tract infection while you were in the hospital.  You no longer require antibioitics.  Secondary Diagnosis:	Colostomy in place  Assessment and plan of treatment:	You had a colostomy placed to prevent excess stool from contaminating your sacral wound.  You will have to replace your colostomy bag when it fills.  Secondary Diagnosis:	Septic shock  Assessment and plan of treatment:	You had septic shock(life threatening condition related to infection) as a result of your infected sacral wound.  Your condition has resolved and you no longer have sepsis.  We believe that you are stable for discharge. Principal Discharge DX:	Sacral osteomyelitis  Goal:	To provide treatment.  Assessment and plan of treatment:	You have a stage 4 infected sacral ulcer with purulence. Your infection has been treated with several different antibiotics and we believe that it no longer requires antibiotics at this time.  You will require continued wound care and additional care to prevent further progression of your wound. We have setup an appointment for your wound care with time listed below.  Secondary Diagnosis:	Neurogenic bladder  Assessment and plan of treatment:	You have neurogenic bladder with a suprapubic catheter.  Your catheter was changed by our urology team.  It will have to be changed in frequent intervals when you go home  Secondary Diagnosis:	Type 2 diabetes mellitus  Assessment and plan of treatment:	Please take all medications as prescribed and follow up with your outpatient provider  Secondary Diagnosis:	Urinary tract infection  Assessment and plan of treatment:	You were treated for a urinary tract infection while you were in the hospital.  You no longer require antibioitics.  Secondary Diagnosis:	Colostomy in place  Assessment and plan of treatment:	You had a colostomy placed to prevent excess stool from contaminating your sacral wound.  You will have to replace your colostomy bag when it fills.  Secondary Diagnosis:	Septic shock  Assessment and plan of treatment:	You had septic shock(life threatening condition related to infection) as a result of your infected sacral wound.  Your condition has resolved and you no longer have sepsis.  We believe that you are stable for discharge.

## 2017-12-12 NOTE — CHART NOTE - NSCHARTNOTEFT_GEN_A_CORE
HOSPITAL COURSE:     32 year old paraplegic M with a history of spinal cord injury (wheelchair bound), sacral pressure ulcer with osteo x2 (outpatient provider said they have records of March osteo s/p daptomycin of unknown length) earlier in 2017,  DM-2, indwelling suprapubic catheter, reported history of hepatitis B with septic shock secondary to infected purulent sacral 4th degree pressure ulcer with underlying inadequately treated OM. The patient was initially admitted to the MICU because he required levophed for hypotension. A line and central line were placed. Blood cultures NGTD and wound cultures were are growing gram negative rods and gram positive cocci in chains. Urine culture grew >100,000 proteus and the patient stated he has a staghorn calculus. The patient was initiated on vancomycin and zosyn and ultimately vancomycin was discontinue.  Plastic surgery debrided the R ischial wound at bedside, they did not need to debride the sacral wound. There was a concern that there may have been a fistula therefore general surgery was consulted. Surgery did not believe there to be a fistula and did not recommend any surgical intervention at this time. Once improved patient was deemed stable for transfer to the regional medical floors. Patient was continued on his Zosyn. It was recommended patient receive a picc line and go to subacute rehab to finish the duration of his antibiotics but he refused saying that he would like to go home. Psych was consulted given concern about patients mental status and they believed him to be extremely paranoid and refusing to engage in conversation. Pt was started on Abilify for which he refused to take. Psych recommended with the assistance of ethics that patient be deemed for treatment over objection of medicine with the assistance of the Mental Hygiene Legal Service .

## 2017-12-12 NOTE — PROGRESS NOTE BEHAVIORAL HEALTH - OTHER
Entitled, Sarcastic, Unpleasant, but not threatening Able to move hands and arms, is paraplegic Paraplegic, deferred Frustrated Pt uncooperative, unable to assess. Does not appear to be responding to internal stimuli "I am fine!!!" Sarcastic

## 2017-12-13 LAB
ANION GAP SERPL CALC-SCNC: 12 MMOL/L — SIGNIFICANT CHANGE UP (ref 5–17)
BUN SERPL-MCNC: 16 MG/DL — SIGNIFICANT CHANGE UP (ref 7–23)
CALCIUM SERPL-MCNC: 9.4 MG/DL — SIGNIFICANT CHANGE UP (ref 8.4–10.5)
CHLORIDE SERPL-SCNC: 99 MMOL/L — SIGNIFICANT CHANGE UP (ref 96–108)
CO2 SERPL-SCNC: 24 MMOL/L — SIGNIFICANT CHANGE UP (ref 22–31)
CREAT SERPL-MCNC: 0.78 MG/DL — SIGNIFICANT CHANGE UP (ref 0.5–1.3)
GLUCOSE BLDC GLUCOMTR-MCNC: 135 MG/DL — HIGH (ref 70–99)
GLUCOSE BLDC GLUCOMTR-MCNC: 201 MG/DL — HIGH (ref 70–99)
GLUCOSE BLDC GLUCOMTR-MCNC: 289 MG/DL — HIGH (ref 70–99)
GLUCOSE BLDC GLUCOMTR-MCNC: 340 MG/DL — HIGH (ref 70–99)
GLUCOSE BLDC GLUCOMTR-MCNC: 355 MG/DL — HIGH (ref 70–99)
GLUCOSE SERPL-MCNC: 185 MG/DL — HIGH (ref 70–99)
HCT VFR BLD CALC: 28 % — LOW (ref 39–50)
HGB BLD-MCNC: 8.2 G/DL — LOW (ref 13–17)
MAGNESIUM SERPL-MCNC: 2 MG/DL — SIGNIFICANT CHANGE UP (ref 1.6–2.6)
MCHC RBC-ENTMCNC: 23.2 PG — LOW (ref 27–34)
MCHC RBC-ENTMCNC: 29.3 G/DL — LOW (ref 32–36)
MCV RBC AUTO: 79.1 FL — LOW (ref 80–100)
PLATELET # BLD AUTO: 300 K/UL — SIGNIFICANT CHANGE UP (ref 150–400)
POTASSIUM SERPL-MCNC: 4.8 MMOL/L — SIGNIFICANT CHANGE UP (ref 3.5–5.3)
POTASSIUM SERPL-SCNC: 4.8 MMOL/L — SIGNIFICANT CHANGE UP (ref 3.5–5.3)
RBC # BLD: 3.54 M/UL — LOW (ref 4.2–5.8)
RBC # FLD: 19.4 % — HIGH (ref 10.3–16.9)
SODIUM SERPL-SCNC: 135 MMOL/L — SIGNIFICANT CHANGE UP (ref 135–145)
WBC # BLD: 8 K/UL — SIGNIFICANT CHANGE UP (ref 3.8–10.5)
WBC # FLD AUTO: 8 K/UL — SIGNIFICANT CHANGE UP (ref 3.8–10.5)

## 2017-12-13 PROCEDURE — 99233 SBSQ HOSP IP/OBS HIGH 50: CPT

## 2017-12-13 PROCEDURE — 93010 ELECTROCARDIOGRAM REPORT: CPT

## 2017-12-13 RX ORDER — ONDANSETRON 8 MG/1
4 TABLET, FILM COATED ORAL ONCE
Qty: 0 | Refills: 0 | Status: COMPLETED | OUTPATIENT
Start: 2017-12-13 | End: 2017-12-13

## 2017-12-13 RX ORDER — ONDANSETRON 8 MG/1
4 TABLET, FILM COATED ORAL ONCE
Qty: 0 | Refills: 0 | Status: DISCONTINUED | OUTPATIENT
Start: 2017-12-13 | End: 2017-12-13

## 2017-12-13 RX ORDER — INSULIN GLARGINE 100 [IU]/ML
17 INJECTION, SOLUTION SUBCUTANEOUS EVERY MORNING
Qty: 0 | Refills: 0 | Status: DISCONTINUED | OUTPATIENT
Start: 2017-12-14 | End: 2018-01-12

## 2017-12-13 RX ORDER — INSULIN GLARGINE 100 [IU]/ML
10 INJECTION, SOLUTION SUBCUTANEOUS ONCE
Qty: 0 | Refills: 0 | Status: COMPLETED | OUTPATIENT
Start: 2017-12-13 | End: 2017-12-13

## 2017-12-13 RX ADMIN — PIPERACILLIN AND TAZOBACTAM 200 GRAM(S): 4; .5 INJECTION, POWDER, LYOPHILIZED, FOR SOLUTION INTRAVENOUS at 01:05

## 2017-12-13 RX ADMIN — PIPERACILLIN AND TAZOBACTAM 200 GRAM(S): 4; .5 INJECTION, POWDER, LYOPHILIZED, FOR SOLUTION INTRAVENOUS at 18:39

## 2017-12-13 RX ADMIN — LEVETIRACETAM 500 MILLIGRAM(S): 250 TABLET, FILM COATED ORAL at 06:28

## 2017-12-13 RX ADMIN — INSULIN GLARGINE 7 UNIT(S): 100 INJECTION, SOLUTION SUBCUTANEOUS at 09:27

## 2017-12-13 RX ADMIN — ATORVASTATIN CALCIUM 20 MILLIGRAM(S): 80 TABLET, FILM COATED ORAL at 22:42

## 2017-12-13 RX ADMIN — Medication 5 MILLIGRAM(S): at 17:04

## 2017-12-13 RX ADMIN — HEPARIN SODIUM 5000 UNIT(S): 5000 INJECTION INTRAVENOUS; SUBCUTANEOUS at 13:25

## 2017-12-13 RX ADMIN — PIPERACILLIN AND TAZOBACTAM 200 GRAM(S): 4; .5 INJECTION, POWDER, LYOPHILIZED, FOR SOLUTION INTRAVENOUS at 12:32

## 2017-12-13 RX ADMIN — INSULIN GLARGINE 10 UNIT(S): 100 INJECTION, SOLUTION SUBCUTANEOUS at 13:24

## 2017-12-13 RX ADMIN — ONDANSETRON 4 MILLIGRAM(S): 8 TABLET, FILM COATED ORAL at 03:22

## 2017-12-13 RX ADMIN — NYSTATIN CREAM 1 APPLICATION(S): 100000 CREAM TOPICAL at 06:30

## 2017-12-13 RX ADMIN — HEPARIN SODIUM 5000 UNIT(S): 5000 INJECTION INTRAVENOUS; SUBCUTANEOUS at 22:42

## 2017-12-13 RX ADMIN — Medication 5 MILLIGRAM(S): at 06:27

## 2017-12-13 RX ADMIN — NYSTATIN CREAM 1 APPLICATION(S): 100000 CREAM TOPICAL at 17:04

## 2017-12-13 RX ADMIN — PIPERACILLIN AND TAZOBACTAM 200 GRAM(S): 4; .5 INJECTION, POWDER, LYOPHILIZED, FOR SOLUTION INTRAVENOUS at 06:35

## 2017-12-13 RX ADMIN — Medication 250 MILLIGRAM(S): at 12:28

## 2017-12-13 RX ADMIN — Medication 4: at 09:25

## 2017-12-13 RX ADMIN — LEVETIRACETAM 500 MILLIGRAM(S): 250 TABLET, FILM COATED ORAL at 17:04

## 2017-12-13 RX ADMIN — Medication 8: at 13:23

## 2017-12-13 RX ADMIN — HEPARIN SODIUM 5000 UNIT(S): 5000 INJECTION INTRAVENOUS; SUBCUTANEOUS at 06:27

## 2017-12-13 RX ADMIN — Medication 10: at 22:42

## 2017-12-13 NOTE — PROGRESS NOTE BEHAVIORAL HEALTH - OTHER
Entitled, Sarcastic, Unpleasant, but not threatening Able to move hands and arms, is paraplegic Paraplegic, deferred "I am fine!!!" Sarcastic Pt uncooperative, unable to assess. Does not appear to be responding to internal stimuli Averts eyes

## 2017-12-13 NOTE — PROGRESS NOTE BEHAVIORAL HEALTH - NSBHFUPINTERVALHXFT_PSY_A_CORE
Pt seen; chart reviewed; Pt observed listening to music with headphones on. Unengageable, immediately asking me to leave. Pt seen; chart reviewed; case discussed at length with Shahriar Black Esq., earlier today.     Pt observed listening to music with headphones on. Unengageable, averts his eyes, acts as if there is nobody else in the room. Then says, "Leave!"    Per team, is increasingly more ornery and resistant to care, today did not allow Dr. Huddleston to inspect his wound. Pt seen; chart reviewed; case discussed at length with Shahriar Black Esq., earlier today.     Pt observed listening to music with headphones on. Unengageable, averts his eyes, acts as if there is nobody else in the room. Then says, "Leave!"    Per team, is increasingly more ornery and resistant to care, today did not allow Dr. Huddleston to inspect his wound.    Case discussed at length via phone with  Shahriar Black. Reasons for pursuing treatment over objection (impaired capacity regarding medical care, as well Pt seen; chart reviewed; case discussed at length with Shahriar Black Esq., earlier today.     Pt observed listening to music with headphones on. Unengageable, averts his eyes, acts as if there is nobody else in the room. Then says, "Leave!"    Per team, is increasingly more ornery and resistant to care, today did not allow Dr. Huddleston to inspect his wound.    Case discussed at length via phone with  Shahriar Black. This writer and Mr. Black explored reasons for pursuing treatment over objection (impaired capacity regarding medical care, as well as dispo/discharge planning); potential medications (po/IV/long-acting injectable antipsychotics, benztropine, lorazepam); and protocol for bedside hearing.

## 2017-12-13 NOTE — PROGRESS NOTE ADULT - PROBLEM SELECTOR PLAN 3
Pt w/ no sensation or control of urination with suprapubic catheter changed once a month.   -Urology changed bowles on 12/7 as it was leaking; leaking again  -c/w oxybutynin 5mg BID

## 2017-12-13 NOTE — PROGRESS NOTE ADULT - ATTENDING COMMENTS
Pt seen, refused any discussion or exam.  1. Septic shock from stage 4 decub with osteomyelitis- refusing PICC line and IV abx, ID agreeable to cipro/augmentin as outpt but IV while inpatient.  Refusing wound care and foul smelling today  2. Staghorn renal stone- urology eval on hold as pt refusing basic care  3. Neurogenic bladder- suprapubic cath  4. Paraplegia- pt has home care with aids 24 hrs per day which he refuses and a wheelchair; unsafe to return home at this time  5. Schizoaffective- paranoid, psychotic, not taking his meds- d/w psych and legal services, will have  to come inpatient to discuss treatment over objection. Refusing to talk to me or obtain basic care for his wound.  Asked for new drs.  Pt is paranoid and psychotic.  6. Uncontrolled DM with hyperglycemia- will uptitrate his insulin today  Rest of plan as above

## 2017-12-13 NOTE — PROGRESS NOTE ADULT - PROBLEM SELECTOR PLAN 2
Pt w/ a positive UA on admission with LE, nitrites, bacteria and WBC. Urine culture positive for proteus >100,000. Per patient he has a history of staghorn calculus.   -c/w Zosyn   -pt w/ suprapubic catheter  -as per outpatient provider, Dr. Mark Millard he exchanges the catheter monthly  -Will consult Urology today about possible change

## 2017-12-13 NOTE — PROGRESS NOTE ADULT - SUBJECTIVE AND OBJECTIVE BOX
O/N Events:  Subjective/ROS: Denies HA, CP, SOB, n/v, changes in bowel/urinary habits.  12pt ROS otherwise negative.    VITALS  Vital Signs Last 24 Hrs  T(C): 36.8 (13 Dec 2017 06:15), Max: 36.8 (13 Dec 2017 06:15)  T(F): 98.3 (13 Dec 2017 06:15), Max: 98.3 (13 Dec 2017 06:15)  HR: 83 (13 Dec 2017 06:15) (83 - 91)  BP: 100/63 (13 Dec 2017 06:15) (100/60 - 100/69)  BP(mean): --  RR: 19 (13 Dec 2017 06:15) (16 - 19)  SpO2: 96% (13 Dec 2017 06:15) (96% - 99%)    CAPILLARY BLOOD GLUCOSE      POCT Blood Glucose.: 201 mg/dL (13 Dec 2017 07:41)  POCT Blood Glucose.: 289 mg/dL (13 Dec 2017 00:01)  POCT Blood Glucose.: 343 mg/dL (12 Dec 2017 21:25)  POCT Blood Glucose.: 180 mg/dL (12 Dec 2017 17:10)  POCT Blood Glucose.: 307 mg/dL (12 Dec 2017 11:57)      PHYSICAL EXAM  General: A&Ox3; NAD  Head: NC/AT; MMM; PERRL; EOMI;  Neck: Supple; no JVD  Respiratory: CTA B/L; no wheezes/crackles   Cardiovascular: Regular rhythm/rate; S1/S2   Gastrointestinal: Soft; NTND; normoactive BS  Extremities: WWP; no edema/cyanosis  Neurological:  CNII-XII grossly intact; no obvious focal deficits    MEDICATIONS  (STANDING):  ARIPiprazole 5 milliGRAM(s) Oral at bedtime  ascorbic acid 250 milliGRAM(s) Oral daily  atorvastatin 20 milliGRAM(s) Oral at bedtime  dextrose 5%. 1000 milliLiter(s) (50 mL/Hr) IV Continuous <Continuous>  dextrose 50% Injectable 12.5 Gram(s) IV Push once  dextrose 50% Injectable 25 Gram(s) IV Push once  dextrose 50% Injectable 25 Gram(s) IV Push once  heparin  Injectable 5000 Unit(s) SubCutaneous every 8 hours  insulin glargine Injectable (LANTUS) 7 Unit(s) SubCutaneous every morning  insulin lispro (HumaLOG) corrective regimen sliding scale   SubCutaneous Before meals and at bedtime  levETIRAcetam 500 milliGRAM(s) Oral two times a day  nystatin Powder 1 Application(s) Topical two times a day  oxybutynin 5 milliGRAM(s) Oral two times a day  piperacillin/tazobactam IVPB. 3.375 Gram(s) IV Intermittent every 6 hours    MEDICATIONS  (PRN):  bisacodyl Suppository 10 milliGRAM(s) Rectal daily PRN Constipation  dextrose Gel 1 Dose(s) Oral once PRN Blood Glucose LESS THAN 70 milliGRAM(s)/deciliter  glucagon  Injectable 1 milliGRAM(s) IntraMuscular once PRN Glucose LESS THAN 70 milligrams/deciliter      Capzasin Back and Body (Unknown)      LABS                        8.2    8.0   )-----------( 300      ( 13 Dec 2017 06:31 )             28.0     12-13    135  |  99  |  16  ----------------------------<  185<H>  4.8   |  24  |  0.78    Ca    9.4      13 Dec 2017 06:31  Mg     2.0     12-13                IMAGING/EKG/ETC  EKG:  Xray:  CT:  MRI: O/N Events: RAHEL   Subjective/ROS: Denies HA, CP, SOB, n/v, changes in bowel/urinary habits.  12pt ROS otherwise negative.    VITALS  Vital Signs Last 24 Hrs  T(C): 36.8 (13 Dec 2017 06:15), Max: 36.8 (13 Dec 2017 06:15)  T(F): 98.3 (13 Dec 2017 06:15), Max: 98.3 (13 Dec 2017 06:15)  HR: 83 (13 Dec 2017 06:15) (83 - 91)  BP: 100/63 (13 Dec 2017 06:15) (100/60 - 100/69)  BP(mean): --  RR: 19 (13 Dec 2017 06:15) (16 - 19)  SpO2: 96% (13 Dec 2017 06:15) (96% - 99%)    CAPILLARY BLOOD GLUCOSE      POCT Blood Glucose.: 201 mg/dL (13 Dec 2017 07:41)  POCT Blood Glucose.: 289 mg/dL (13 Dec 2017 00:01)  POCT Blood Glucose.: 343 mg/dL (12 Dec 2017 21:25)  POCT Blood Glucose.: 180 mg/dL (12 Dec 2017 17:10)  POCT Blood Glucose.: 307 mg/dL (12 Dec 2017 11:57)      PHYSICAL EXAM  General: A&Ox3; NAD  Head: NC/AT; MMM; PERRL; EOMI;  Neck: Supple; no JVD  Respiratory: CTA B/L; no wheezes/crackles   Cardiovascular: Regular rhythm/rate; S1/S2   Gastrointestinal: Soft; NTND; normoactive BS  Back: Patient refusing to allow inspection of wound at this time  Extremities: WWP; no edema/cyanosis  Neurological:  Flat affect, not interested in engaging. Paraplegia. No sensation below nipple line. Strength 5/5 b/l upper extremities.     MEDICATIONS  (STANDING):  ARIPiprazole 5 milliGRAM(s) Oral at bedtime  ascorbic acid 250 milliGRAM(s) Oral daily  atorvastatin 20 milliGRAM(s) Oral at bedtime  dextrose 5%. 1000 milliLiter(s) (50 mL/Hr) IV Continuous <Continuous>  dextrose 50% Injectable 12.5 Gram(s) IV Push once  dextrose 50% Injectable 25 Gram(s) IV Push once  dextrose 50% Injectable 25 Gram(s) IV Push once  heparin  Injectable 5000 Unit(s) SubCutaneous every 8 hours  insulin glargine Injectable (LANTUS) 7 Unit(s) SubCutaneous every morning  insulin lispro (HumaLOG) corrective regimen sliding scale   SubCutaneous Before meals and at bedtime  levETIRAcetam 500 milliGRAM(s) Oral two times a day  nystatin Powder 1 Application(s) Topical two times a day  oxybutynin 5 milliGRAM(s) Oral two times a day  piperacillin/tazobactam IVPB. 3.375 Gram(s) IV Intermittent every 6 hours    MEDICATIONS  (PRN):  bisacodyl Suppository 10 milliGRAM(s) Rectal daily PRN Constipation  dextrose Gel 1 Dose(s) Oral once PRN Blood Glucose LESS THAN 70 milliGRAM(s)/deciliter  glucagon  Injectable 1 milliGRAM(s) IntraMuscular once PRN Glucose LESS THAN 70 milligrams/deciliter      Capzasin Back and Body (Unknown)      LABS                        8.2    8.0   )-----------( 300      ( 13 Dec 2017 06:31 )             28.0     12-13    135  |  99  |  16  ----------------------------<  185<H>  4.8   |  24  |  0.78    Ca    9.4      13 Dec 2017 06:31  Mg     2.0     12-13                IMAGING/EKG/ETC  EKG:  Xray:  CT:  MRI:

## 2017-12-13 NOTE — PROGRESS NOTE ADULT - PROBLEM SELECTOR PLAN 7
Pt w/ a Hgb of 9.1 and MCV of 74 on admission. Hgb has been slowly downtrending but no active signs of bleeding. Iron studies showing iron deficiency.   -will maintain active type and screen  -Hgb of 8.2 today  -will transfuse for Hgb <7

## 2017-12-14 DIAGNOSIS — F20.9 SCHIZOPHRENIA, UNSPECIFIED: ICD-10-CM

## 2017-12-14 LAB
GLUCOSE BLDC GLUCOMTR-MCNC: 169 MG/DL — HIGH (ref 70–99)
GLUCOSE BLDC GLUCOMTR-MCNC: 195 MG/DL — HIGH (ref 70–99)
GLUCOSE BLDC GLUCOMTR-MCNC: 204 MG/DL — HIGH (ref 70–99)
GLUCOSE BLDC GLUCOMTR-MCNC: 232 MG/DL — HIGH (ref 70–99)

## 2017-12-14 PROCEDURE — 99233 SBSQ HOSP IP/OBS HIGH 50: CPT

## 2017-12-14 RX ADMIN — NYSTATIN CREAM 1 APPLICATION(S): 100000 CREAM TOPICAL at 10:45

## 2017-12-14 RX ADMIN — HEPARIN SODIUM 5000 UNIT(S): 5000 INJECTION INTRAVENOUS; SUBCUTANEOUS at 21:30

## 2017-12-14 RX ADMIN — HEPARIN SODIUM 5000 UNIT(S): 5000 INJECTION INTRAVENOUS; SUBCUTANEOUS at 05:29

## 2017-12-14 RX ADMIN — HEPARIN SODIUM 5000 UNIT(S): 5000 INJECTION INTRAVENOUS; SUBCUTANEOUS at 14:47

## 2017-12-14 RX ADMIN — PIPERACILLIN AND TAZOBACTAM 200 GRAM(S): 4; .5 INJECTION, POWDER, LYOPHILIZED, FOR SOLUTION INTRAVENOUS at 06:56

## 2017-12-14 RX ADMIN — Medication 250 MILLIGRAM(S): at 13:11

## 2017-12-14 RX ADMIN — LEVETIRACETAM 500 MILLIGRAM(S): 250 TABLET, FILM COATED ORAL at 18:22

## 2017-12-14 RX ADMIN — Medication 4: at 18:22

## 2017-12-14 RX ADMIN — PIPERACILLIN AND TAZOBACTAM 200 GRAM(S): 4; .5 INJECTION, POWDER, LYOPHILIZED, FOR SOLUTION INTRAVENOUS at 14:47

## 2017-12-14 RX ADMIN — Medication 5 MILLIGRAM(S): at 18:22

## 2017-12-14 RX ADMIN — Medication 5 MILLIGRAM(S): at 05:29

## 2017-12-14 RX ADMIN — NYSTATIN CREAM 1 APPLICATION(S): 100000 CREAM TOPICAL at 18:45

## 2017-12-14 RX ADMIN — INSULIN GLARGINE 17 UNIT(S): 100 INJECTION, SOLUTION SUBCUTANEOUS at 09:31

## 2017-12-14 RX ADMIN — Medication 2: at 09:31

## 2017-12-14 RX ADMIN — PIPERACILLIN AND TAZOBACTAM 200 GRAM(S): 4; .5 INJECTION, POWDER, LYOPHILIZED, FOR SOLUTION INTRAVENOUS at 01:21

## 2017-12-14 RX ADMIN — PIPERACILLIN AND TAZOBACTAM 200 GRAM(S): 4; .5 INJECTION, POWDER, LYOPHILIZED, FOR SOLUTION INTRAVENOUS at 20:43

## 2017-12-14 RX ADMIN — ATORVASTATIN CALCIUM 20 MILLIGRAM(S): 80 TABLET, FILM COATED ORAL at 21:30

## 2017-12-14 RX ADMIN — Medication 4: at 13:11

## 2017-12-14 RX ADMIN — LEVETIRACETAM 500 MILLIGRAM(S): 250 TABLET, FILM COATED ORAL at 05:29

## 2017-12-14 RX ADMIN — Medication 2: at 21:30

## 2017-12-14 NOTE — PROGRESS NOTE ADULT - ASSESSMENT
32M paraplegic PMH spinal cord injury (wheelchair bound), sacral pressure ulcer with osteo x2 (outpatient provider said they have records of March osteo s/p daptomycin of unknown length) earlier in 2017,  DM2, indwelling suprapubic catheter who presented w/ septic shock secondary to infected purulent sacral 4th degree pressure ulcer with underlying inadequately treated OM, hemodynamically stable being treated for sacral osteomyelitis

## 2017-12-14 NOTE — PROGRESS NOTE ADULT - ATTENDING COMMENTS
Pt seen, refused any discussion or exam.  1. Septic shock from stage 4 decub with osteomyelitis- refusing PICC line and IV abx, ID agreeable to cipro/augmentin as outpt but IV while inpatient.  Refusing wound care again and foul smelling down dia b/c of lack of care x 3 days  2. Staghorn renal stone- urology eval on hold as pt refusing basic care  3. Neurogenic bladder- suprapubic cath  4. Paraplegia- pt has home care with aids 24 hrs per day which he refuses and a wheelchair; unsafe to return home at this time  5. Schizoaffective- paranoid, psychotic, not taking his meds- d/w psych and legal services, will have  to come inpatient to discuss treatment over objection. Refusing to talk to me or obtain basic care for his wound.  Asked for new drs.  Pt is paranoid and psychotic.  Discussed with psych who will eval him for med-psych unit as well  6. Uncontrolled DM with hyperglycemia- will uptitrate his insulin today as hyperglycemic  Rest of plan as above

## 2017-12-14 NOTE — PROGRESS NOTE BEHAVIORAL HEALTH - NSBHATTESTSEENBY_PSY_A_CORE
Trainee with telephonic supervision from Attending Psychiatrist Attending Psychiatrist supervising NP/Trainee, meeting pt...

## 2017-12-14 NOTE — PROGRESS NOTE ADULT - PROBLEM SELECTOR PLAN 8
F: None  E: Replete PRN  N: Regular Diet F: no IVF  E: K>4 Mg>2, monitor and replete as needed  N: diabetic diet  Ppx: SQH  D: RMF  FULL CODE

## 2017-12-14 NOTE — PROGRESS NOTE BEHAVIORAL HEALTH - SUMMARY
Based on evaluation and collaterals, patient ape ars to endorse active psychotic symptoms including "sever" paranoia in context of non-compliance, and relapse of psychotic episode. He exhibit limited understanding about the seriousness of  his behaviour (refusal to accept/seek help) at home that lef to potentially fatal wound infection. He is uncooperative with evaluation despite several attempts and denies past psychiatric hx (despite having SA and h/o admission).     Base on my assessment and clinical judgment, I believe he impose a danger to himself due to inability to care for himself in context of psychotic episode and questionable capacity to make decision for his mental health. Patient fulfils the criteria for involuntary psychiatric admission at this point for safety purposes, however he requires a medical-psychiatric unit due to his medical needs or to continue psychiatric treatment at bedside in medical unit. Hence request for court order for treatment over objection is requested.

## 2017-12-14 NOTE — PROGRESS NOTE BEHAVIORAL HEALTH - CASE SUMMARY
Pt seen; chart reviewed and discussed with team (Dr.'s Huddleston and Jabier). Agree with above. Pt with increasing behavioral instability as well as care-refusal, based on paranoia and psychotic distortions of his medical situation.  Plan to pursue TOO via bedside evaluation.  At the same time, agree with plan to attempt 2-PC admit to med-psych unit.

## 2017-12-14 NOTE — PROGRESS NOTE ADULT - PROBLEM SELECTOR PLAN 1
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 Wound cultures growing gram negative rods and gram positive in chains. Upon arrival pts wound was packed w/ feculent material and required debridement by plastic surgery.   -c/w Zosyn(12/5-)  -general surgery does not believe there is a fistula between wound and rectum causing leakage of stool, no surgical intervention at this time.  -wound care consulted and stated that no consulted needed since surgical team is following

## 2017-12-14 NOTE — CHART NOTE - NSCHARTNOTEFT_GEN_A_CORE
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  Paraplegia  Type 2 diabetes mellitus with hyperglycemia, without long-term current use of insulin      Current Nutrition Order:DASH/ with glucerna apulakeTID(660kcal and 30gmprotein       PO Intake: Good (%) [   ]  Fair (50-75%) [ x  ] Poor (<25%) [   ]    GI Issues: complaints of N/V    Pain:yes    Skin Integrity:stage 4    Labs:   12-13    135  |  99  |  16  ----------------------------<  185<H>  4.8   |  24  |  0.78    Ca    9.4      13 Dec 2017 06:31  Mg     2.0     12-13      CAPILLARY BLOOD GLUCOSE      POCT Blood Glucose.: 232 mg/dL (14 Dec 2017 11:29)  POCT Blood Glucose.: 169 mg/dL (14 Dec 2017 07:45)  POCT Blood Glucose.: 355 mg/dL (13 Dec 2017 22:00)  POCT Blood Glucose.: 135 mg/dL (13 Dec 2017 17:06)      Medications:  MEDICATIONS  (STANDING):  ARIPiprazole 5 milliGRAM(s) Oral at bedtime  ascorbic acid 250 milliGRAM(s) Oral daily  atorvastatin 20 milliGRAM(s) Oral at bedtime  dextrose 5%. 1000 milliLiter(s) (50 mL/Hr) IV Continuous <Continuous>  dextrose 50% Injectable 12.5 Gram(s) IV Push once  dextrose 50% Injectable 25 Gram(s) IV Push once  dextrose 50% Injectable 25 Gram(s) IV Push once  heparin  Injectable 5000 Unit(s) SubCutaneous every 8 hours  insulin glargine Injectable (LANTUS) 17 Unit(s) SubCutaneous every morning  insulin lispro (HumaLOG) corrective regimen sliding scale   SubCutaneous Before meals and at bedtime  levETIRAcetam 500 milliGRAM(s) Oral two times a day  nystatin Powder 1 Application(s) Topical two times a day  oxybutynin 5 milliGRAM(s) Oral two times a day  piperacillin/tazobactam IVPB. 3.375 Gram(s) IV Intermittent every 6 hours    MEDICATIONS  (PRN):  bisacodyl Suppository 10 milliGRAM(s) Rectal daily PRN Constipation  dextrose Gel 1 Dose(s) Oral once PRN Blood Glucose LESS THAN 70 milliGRAM(s)/deciliter  glucagon  Injectable 1 milliGRAM(s) IntraMuscular once PRN Glucose LESS THAN 70 milligrams/deciliter      Weight:72.6kg 12/5  Daily   no updated weights  Daily   IBW used    Estimated energy needs: Based on IBW:51kg [id number]:1530kcal-1785kcal and 1.4gmprotein:71gmprotein and 30-35cc fluids:1535-1781cc flds     Subjective: 33 y/o male admitted with sepsis from stage 4 PU. Noted Psych intervention and refusal of care Patient diabetic and has refused to comply with diabetic diet.  Previous Nutrition Diagnosis Increased nutrient needs r/t increased demand for kcal/protein and nutrients AEB: Stage 4    Active [ x  ]  Resolved [   ]    If resolved, new PES:     Goal: meet 80% consistently    Recommendations:Change to CCHO diet with snacks and glucerna shake BID,Add 2 pro-Stat(200kcal and 30gmprotein  Education: unable to participate    Risk Level: High [   ] Moderate [ x  ] Low [   ]

## 2017-12-14 NOTE — PROGRESS NOTE ADULT - SUBJECTIVE AND OBJECTIVE BOX
INTERVAL HPI/OVERNIGHT EVENTS:  Overnight patient refused medication and refused bathing.  At the bedside the morning patient is angry and refusing exam.  States "I am fine."     ICU Vital Signs Last 24 Hrs  T(C): 36.4 (14 Dec 2017 09:29), Max: 37 (13 Dec 2017 15:53)  T(F): 97.6 (14 Dec 2017 09:29), Max: 98.6 (13 Dec 2017 15:53)  HR: 88 (14 Dec 2017 09:29) (71 - 88)  BP: 96/59 (14 Dec 2017 09:29) (96/59 - 108/68)  BP(mean): --  ABP: --  ABP(mean): --  RR: 17 (14 Dec 2017 09:29) (16 - 18)  SpO2: 100% (14 Dec 2017 09:29) (97% - 100%)    PHYSICAL EXAM:  Constitutional: NAD, lying in bed comfortably, non-toxic   PATIENT REFUSED PHYSICAL EXAM, but general appearance is that he is in no distress, non-toxic and comfortable.  	    MEDICATIONS  (STANDING):  ARIPiprazole 5 milliGRAM(s) Oral at bedtime  ascorbic acid 250 milliGRAM(s) Oral daily  atorvastatin 20 milliGRAM(s) Oral at bedtime  dextrose 5%. 1000 milliLiter(s) (50 mL/Hr) IV Continuous <Continuous>  dextrose 50% Injectable 12.5 Gram(s) IV Push once  dextrose 50% Injectable 25 Gram(s) IV Push once  dextrose 50% Injectable 25 Gram(s) IV Push once  heparin  Injectable 5000 Unit(s) SubCutaneous every 8 hours  insulin glargine Injectable (LANTUS) 17 Unit(s) SubCutaneous every morning  insulin lispro (HumaLOG) corrective regimen sliding scale   SubCutaneous Before meals and at bedtime  levETIRAcetam 500 milliGRAM(s) Oral two times a day  nystatin Powder 1 Application(s) Topical two times a day  oxybutynin 5 milliGRAM(s) Oral two times a day  piperacillin/tazobactam IVPB. 3.375 Gram(s) IV Intermittent every 6 hours    MEDICATIONS  (PRN):  bisacodyl Suppository 10 milliGRAM(s) Rectal daily PRN Constipation  dextrose Gel 1 Dose(s) Oral once PRN Blood Glucose LESS THAN 70 milliGRAM(s)/deciliter  glucagon  Injectable 1 milliGRAM(s) IntraMuscular once PRN Glucose LESS THAN 70 milligrams/deciliter      Allergies    Capzasin Back and Body (Unknown)    Intolerances        LABS:                        8.2    8.0   )-----------( 300      ( 13 Dec 2017 06:31 )             28.0     12-13    135  |  99  |  16  ----------------------------<  185<H>  4.8   |  24  |  0.78    Ca    9.4      13 Dec 2017 06:31  Mg     2.0     12-13    RADIOLOGY & ADDITIONAL TESTS: Reviewed

## 2017-12-14 NOTE — PROGRESS NOTE ADULT - PROBLEM SELECTOR PLAN 3
Pt w/ no sensation or control of urination with suprapubic catheter changed once a month.   -Urology changed cath on 12/7 as it was leaking; leaking again  -c/w oxybutynin 5mg BID

## 2017-12-14 NOTE — PROGRESS NOTE ADULT - PROBLEM SELECTOR PLAN 4
Patient on metformin and glimeperide at home. HgA1C of 6.0.   -ISS   -Lantus 7 units in AM Patient on metformin and glimeperide at home. HgA1C of 6.0.   -ISS+Lantus 17U  -monitor FSG

## 2017-12-14 NOTE — PROGRESS NOTE ADULT - PROBLEM SELECTOR PLAN 2
Pt w/ a positive UA on admission with LE, nitrites, bacteria and WBC. Urine culture positive for proteus >100,000. Per patient he has a history of staghorn calculus.   -c/w Zosyn   -pt w/ suprapubic catheter  -as per outpatient provider, Dr. Mark Millard he exchanges the catheter monthly  -Catheter changed on 12/7

## 2017-12-14 NOTE — PROGRESS NOTE BEHAVIORAL HEALTH - NSBHFUPINTERVALHXFT_PSY_A_CORE
Patient is evaluated at bedside, MR is reviewed and collateral obtained from primary team .     Pt is malodorous, uncooperative refuses psychiatric evaluation despite being explained about the reason and importance (r/o disorganized thoughts, psychosis, having capacity to care) specially in light of recent incident (sepsis due to refusal of home healthcare etc). Patient continues to remain guarded, acuses the home aid about "using drugs" in his house "there was a white powder there on table", or taking off the doorknob "they have done criminal activities in my house and I have evidence". But when writer asked why he did not ask anyone else/call 911 for help when he was sitting in his feces (hence having infected sacral ulcer) he is unable to elaborate and states "I don't want to talk about it, so leave". He continues to perseverate that he is going to [facility], and possibly doctors, and that will leave AMA after felt better. He continues to denies that her current admission is due to sepsis/osteomyelitis, or that he had septic shock (or near shock) when admitted. He  perseverates that he had "heart attack" despite being explained to (by this writer) about his infected wound and osteomyelitis which led to sepsis and current admission. He repeatedly asked the writer to leave the room and I did and when writer was leaving the room and out the door he states ”piss off”.     As per primary team patient has been refusing wound-care intermittently since past few days, is guarded and paranoid at times (specially toward primary team attending), claims (told RN today) that has been cleaned yesterday however that has not been true and he has been malodorous and refusing to be cleaned  for many days now. No report of suicidal or homicidal threats, agitation or hallucination has been reported, he takes his medical treatment (antibiotics) but non-complaint w/wound care and self hygiene and refuses Abilify and post DC-rehab or IV antibiotic. Patient is evaluated at bedside, MR is reviewed and collateral obtained from primary team .     Pt is malodorous, uncooperative refuses psychiatric evaluation despite being explained about the reason and importance (r/o disorganized thoughts, psychosis, having capacity to care) specially in light of recent incident including sepsis due to refusal of home healthcare etc). Patient continues to remain guarded, acuses the home aid about "using drugs" in his house "there was a white powder there on table", or taking off the doorknob "they have done criminal activities in my house and I have evidence". But when writer asked why he did not ask anyone else/call 911 for help when he was sitting in his feces (hence having infected sacral ulcer) he is unable to elaborate and states "I don't want to talk about it, so leave". He continues to perseverate that he is going to [facility], and possibly doctors, and that will leave AMA after felt better. He continues to denies that her current admission is due to sepsis/osteomyelitis, or that he had septic shock (or near shock) when admitted. He  perseverates that he had "heart attack" despite being explained to (by this writer) about his infected wound and osteomyelitis which led to sepsis and current admission. He repeatedly asked the writer to leave the room and I did and when writer was leaving the room and out the door he states ”piss off”.     As per primary team patient has been refusing wound-care intermittently since past few days, is guarded and paranoid at times (specially toward primary team attending), claims (told RN today) that has been cleaned yesterday however that has not been true and he has been malodorous and refusing to be cleaned  for many days now. No report of suicidal or homicidal threats, agitation or hallucination has been reported, he takes his medical treatment (antibiotics) but non-complaint w/wound care and self hygiene and refuses Abilify and post DC-rehab or IV antibiotic. Patient is evaluated at bedside, MR is reviewed and collateral obtained from primary team .     Pt is malodorous, uncooperative, refuses psychiatric evaluation despite being explained about the reason and importance (r/o disorganized thoughts, psychosis, having capacity to care) especially in light of recent incident including sepsis due to refusal of home healthcare etc). Patient continues to remain guarded, accuses the home aide of "using drugs" in his house "there was a white powder there on table", or taking off the doorknob "they have done criminal activities in my house and I have evidence". But when writer asked why he did not ask anyone else/call 911 for help when he was sitting in his feces (hence having infected sacral ulcer) he is unable to elaborate and states "I don't want to talk about it, so leave". He continues to perseverate that he is going to [facility], and possibly doctors, and that he will leave AMA once he feels better. He continues to deny that his current admission is due to sepsis/osteomyelitis, or that he had septic shock (or near shock) when admitted. He perseverates that he had a "heart attack" despite being explained to (by this writer) about his infected wound and osteomyelitis which led to sepsis and current admission. He repeatedly asked the writer to leave the room and I did and when writer was leaving the room and out the door he states ”piss off”.     As per primary team patient has been refusing wound-care intermittently since past few days, is guarded and paranoid at times (specially toward primary team attending), claims (told RN today) that has been cleaned yesterday however that has not been true and he has been malodorous and refusing to be cleaned  for many days now. No report of suicidal or homicidal threats, agitation or hallucination has been reported, he takes his medical treatment (antibiotics) but non-complaint w/wound care and self hygiene and refuses Abilify and post DC-rehab or IV antibiotic.

## 2017-12-14 NOTE — PROGRESS NOTE ADULT - PROBLEM SELECTOR PLAN 6
Pt w/ a Hgb of 9.1 and MCV of 74 on admission. Hgb has been slowly downtrending but no active signs of bleeding. Iron studies showing iron deficiency.   -will maintain active type and screen  -Hgb of 8.2 today  -will transfuse for Hgb <7 Pt w/ a Hgb of 9.1 and MCV of 74 on admission. Hgb has been slowly downtrending but no active signs of bleeding. Iron studies showing iron deficiency.   -will maintain active type and screen  -Hgb stable, no evidence of active bleeding  -will transfuse for Hgb <7

## 2017-12-14 NOTE — ADVANCED PRACTICE NURSE CONSULT - REASON FOR CONSULT
WOC nurse consult to re-assess pressure ulcers (injuries). WOCN assessed patient on 12/5. Surgery team currently following patient. Spoke with house staff on 12/14 regarding WOC nurse consult not indicated at this time if surgical team is following patient.

## 2017-12-14 NOTE — PROGRESS NOTE BEHAVIORAL HEALTH - OTHER
Entitled, Sarcastic, Unpleasant, but not threatening Averts eyes Able to move hands and arms, is paraplegic Paraplegic, deferred Sarcastic Pt uncooperative, unable to assess. Does not appear to be responding to internal stimuli

## 2017-12-14 NOTE — PROGRESS NOTE BEHAVIORAL HEALTH - RISK ASSESSMENT
-High risk for self harm: sever psychosis, poor compliance with psychiatric meds, poor insight, poor judgment, medical comorbidity (care dependent on others), past serious suicidal attempt.

## 2017-12-15 LAB
ANION GAP SERPL CALC-SCNC: 14 MMOL/L — SIGNIFICANT CHANGE UP (ref 5–17)
BASOPHILS NFR BLD AUTO: 0.3 % — SIGNIFICANT CHANGE UP (ref 0–2)
BUN SERPL-MCNC: 17 MG/DL — SIGNIFICANT CHANGE UP (ref 7–23)
CALCIUM SERPL-MCNC: 9.4 MG/DL — SIGNIFICANT CHANGE UP (ref 8.4–10.5)
CHLORIDE SERPL-SCNC: 98 MMOL/L — SIGNIFICANT CHANGE UP (ref 96–108)
CO2 SERPL-SCNC: 22 MMOL/L — SIGNIFICANT CHANGE UP (ref 22–31)
CREAT SERPL-MCNC: 0.82 MG/DL — SIGNIFICANT CHANGE UP (ref 0.5–1.3)
EOSINOPHIL NFR BLD AUTO: 1.8 % — SIGNIFICANT CHANGE UP (ref 0–6)
GLUCOSE BLDC GLUCOMTR-MCNC: 204 MG/DL — HIGH (ref 70–99)
GLUCOSE SERPL-MCNC: 182 MG/DL — HIGH (ref 70–99)
HCT VFR BLD CALC: 29.3 % — LOW (ref 39–50)
HGB BLD-MCNC: 8.5 G/DL — LOW (ref 13–17)
LYMPHOCYTES # BLD AUTO: 27.3 % — SIGNIFICANT CHANGE UP (ref 13–44)
MCHC RBC-ENTMCNC: 23 PG — LOW (ref 27–34)
MCHC RBC-ENTMCNC: 29 G/DL — LOW (ref 32–36)
MCV RBC AUTO: 79.4 FL — LOW (ref 80–100)
MONOCYTES NFR BLD AUTO: 9.1 % — SIGNIFICANT CHANGE UP (ref 2–14)
NEUTROPHILS NFR BLD AUTO: 61.5 % — SIGNIFICANT CHANGE UP (ref 43–77)
PLATELET # BLD AUTO: 270 K/UL — SIGNIFICANT CHANGE UP (ref 150–400)
POTASSIUM SERPL-MCNC: 4.3 MMOL/L — SIGNIFICANT CHANGE UP (ref 3.5–5.3)
POTASSIUM SERPL-SCNC: 4.3 MMOL/L — SIGNIFICANT CHANGE UP (ref 3.5–5.3)
RBC # BLD: 3.69 M/UL — LOW (ref 4.2–5.8)
RBC # FLD: 20.7 % — HIGH (ref 10.3–16.9)
SODIUM SERPL-SCNC: 134 MMOL/L — LOW (ref 135–145)
WBC # BLD: 6.8 K/UL — SIGNIFICANT CHANGE UP (ref 3.8–10.5)
WBC # FLD AUTO: 6.8 K/UL — SIGNIFICANT CHANGE UP (ref 3.8–10.5)

## 2017-12-15 PROCEDURE — 99233 SBSQ HOSP IP/OBS HIGH 50: CPT

## 2017-12-15 RX ORDER — CIPROFLOXACIN LACTATE 400MG/40ML
500 VIAL (ML) INTRAVENOUS EVERY 12 HOURS
Qty: 0 | Refills: 0 | Status: DISCONTINUED | OUTPATIENT
Start: 2017-12-15 | End: 2018-01-13

## 2017-12-15 RX ORDER — INSULIN LISPRO 100/ML
2 VIAL (ML) SUBCUTANEOUS
Qty: 0 | Refills: 0 | Status: DISCONTINUED | OUTPATIENT
Start: 2017-12-15 | End: 2017-12-24

## 2017-12-15 RX ADMIN — Medication 5 MILLIGRAM(S): at 18:57

## 2017-12-15 RX ADMIN — HEPARIN SODIUM 5000 UNIT(S): 5000 INJECTION INTRAVENOUS; SUBCUTANEOUS at 21:20

## 2017-12-15 RX ADMIN — LEVETIRACETAM 500 MILLIGRAM(S): 250 TABLET, FILM COATED ORAL at 18:57

## 2017-12-15 RX ADMIN — HEPARIN SODIUM 5000 UNIT(S): 5000 INJECTION INTRAVENOUS; SUBCUTANEOUS at 06:32

## 2017-12-15 RX ADMIN — NYSTATIN CREAM 1 APPLICATION(S): 100000 CREAM TOPICAL at 17:57

## 2017-12-15 RX ADMIN — Medication 1 TABLET(S): at 21:47

## 2017-12-15 RX ADMIN — Medication 250 MILLIGRAM(S): at 14:21

## 2017-12-15 RX ADMIN — PIPERACILLIN AND TAZOBACTAM 200 GRAM(S): 4; .5 INJECTION, POWDER, LYOPHILIZED, FOR SOLUTION INTRAVENOUS at 07:10

## 2017-12-15 RX ADMIN — LEVETIRACETAM 500 MILLIGRAM(S): 250 TABLET, FILM COATED ORAL at 06:32

## 2017-12-15 RX ADMIN — ATORVASTATIN CALCIUM 20 MILLIGRAM(S): 80 TABLET, FILM COATED ORAL at 21:20

## 2017-12-15 RX ADMIN — INSULIN GLARGINE 17 UNIT(S): 100 INJECTION, SOLUTION SUBCUTANEOUS at 08:18

## 2017-12-15 RX ADMIN — PIPERACILLIN AND TAZOBACTAM 200 GRAM(S): 4; .5 INJECTION, POWDER, LYOPHILIZED, FOR SOLUTION INTRAVENOUS at 02:20

## 2017-12-15 RX ADMIN — Medication 4: at 08:19

## 2017-12-15 RX ADMIN — Medication 5 MILLIGRAM(S): at 06:32

## 2017-12-15 RX ADMIN — Medication 500 MILLIGRAM(S): at 21:47

## 2017-12-15 NOTE — PROGRESS NOTE ADULT - SUBJECTIVE AND OBJECTIVE BOX
INTERVAL HPI/OVERNIGHT EVENTS:  No acute events reported overnight. This morning at bedside patient says that he does not want to speak and refused physical exam.      ICU Vital Signs Last 24 Hrs  T(C): 36.4 (15 Dec 2017 06:00), Max: 36.9 (14 Dec 2017 20:30)  T(F): 97.6 (15 Dec 2017 06:00), Max: 98.5 (14 Dec 2017 20:30)  HR: 85 (15 Dec 2017 06:00) (85 - 89)  BP: 104/68 (15 Dec 2017 06:00) (96/59 - 104/68)  BP(mean): --  ABP: --  ABP(mean): --  RR: 16 (15 Dec 2017 06:00) (16 - 17)  SpO2: 98% (15 Dec 2017 06:00) (98% - 100%)    PHYSICAL EXAM:  Constitutional: NAD, lying in bed comfortably, non-toxic   PATIENT REFUSED PHYSICAL EXAM, but general appearance is that he is in no distress, non-toxic and comfortable.      MEDICATIONS  (STANDING):  ARIPiprazole 5 milliGRAM(s) Oral at bedtime  ascorbic acid 250 milliGRAM(s) Oral daily  atorvastatin 20 milliGRAM(s) Oral at bedtime  dextrose 5%. 1000 milliLiter(s) (50 mL/Hr) IV Continuous <Continuous>  dextrose 50% Injectable 12.5 Gram(s) IV Push once  dextrose 50% Injectable 25 Gram(s) IV Push once  dextrose 50% Injectable 25 Gram(s) IV Push once  heparin  Injectable 5000 Unit(s) SubCutaneous every 8 hours  insulin glargine Injectable (LANTUS) 17 Unit(s) SubCutaneous every morning  insulin lispro (HumaLOG) corrective regimen sliding scale   SubCutaneous Before meals and at bedtime  levETIRAcetam 500 milliGRAM(s) Oral two times a day  nystatin Powder 1 Application(s) Topical two times a day  oxybutynin 5 milliGRAM(s) Oral two times a day  piperacillin/tazobactam IVPB. 3.375 Gram(s) IV Intermittent every 6 hours    MEDICATIONS  (PRN):  bisacodyl Suppository 10 milliGRAM(s) Rectal daily PRN Constipation  dextrose Gel 1 Dose(s) Oral once PRN Blood Glucose LESS THAN 70 milliGRAM(s)/deciliter  glucagon  Injectable 1 milliGRAM(s) IntraMuscular once PRN Glucose LESS THAN 70 milligrams/deciliter      Allergies  Capzasin Back and Body (Unknown)  Intolerances        LABS:                        8.5    6.8   )-----------( 270      ( 15 Dec 2017 06:25 )             29.3     12-15    134<L>  |  98  |  17  ----------------------------<  182<H>  4.3   |  22  |  0.82    Ca    9.4      15 Dec 2017 06:25      RADIOLOGY & ADDITIONAL TESTS: Reviewed

## 2017-12-15 NOTE — CHART NOTE - NSCHARTNOTEFT_GEN_A_CORE
IV access lost and patient refusing a new IV line.  Will start PO antibiotics as a temporary measure.

## 2017-12-15 NOTE — PROGRESS NOTE ADULT - PROBLEM SELECTOR PLAN 6
Pt w/ a Hgb of 9.1 and MCV of 74 on admission. Hgb has been slowly downtrending but no active signs of bleeding. Iron studies showing iron deficiency.   -will maintain active type and screen  -Hgb stable, no evidence of active bleeding  -will transfuse for Hgb <7

## 2017-12-15 NOTE — PROGRESS NOTE ADULT - ATTENDING COMMENTS
Pt seen, refused any discussion or exam.  1. Septic shock from stage 4 decub with osteomyelitis- refusing PICC line and IV abx, ID agreeable to cipro/augmentin as outpt but IV while inpatient.  Refusing wound care again and foul smelling down dia b/c of lack of care for many days  2. Staghorn renal stone- urology eval on hold as pt refusing basic care  3. Neurogenic bladder- suprapubic cath  4. Paraplegia- pt has home care with aids 24 hrs per day which he refuses and a wheelchair; unsafe to return home at this time  5. Schizoaffective- paranoid, psychotic, not taking his meds- d/w ethics, psych, and legal services, will have  to come inpatient to discuss treatment over objection. Refusing to talk to me or obtain basic care for his wound.  Asked for new drs.  Pt is paranoid and psychotic.  Discussed with psych who will eval him for med-psych unit as well  6. Uncontrolled DM with hyperglycemia- will uptitrate his insulin today as hyperglycemic  Rest of plan as above

## 2017-12-15 NOTE — PROGRESS NOTE ADULT - PROBLEM SELECTOR PLAN 8
F: no IVF  E: K>4 Mg>2, monitor and replete as needed  N: diabetic diet  Ppx: SQH  D: RMF  FULL CODE

## 2017-12-15 NOTE — PROGRESS NOTE ADULT - PROBLEM SELECTOR PLAN 1
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 Wound cultures growing gram negative rods and gram positive in chains. Upon arrival pts wound was packed w/ feculent material and required debridement by plastic surgery.   -c/w Zosyn(12/5-), likely minimum 6wk duration  -general surgery does not believe there is a fistula between wound and rectum causing leakage of stool, no surgical intervention at this time.  -wound care consulted and stated that no consulted needed since surgical team is following

## 2017-12-15 NOTE — PROGRESS NOTE ADULT - PROBLEM SELECTOR PLAN 4
Patient on metformin and glimeperide at home. HgA1C of 6.0.   -ISS+Lantus 17U, FSG not controlled will add NPH today and change Lantus to evening dosing with adjusted dose  -monitor FSG

## 2017-12-16 PROCEDURE — 99233 SBSQ HOSP IP/OBS HIGH 50: CPT

## 2017-12-16 RX ADMIN — NYSTATIN CREAM 1 APPLICATION(S): 100000 CREAM TOPICAL at 18:04

## 2017-12-16 RX ADMIN — ATORVASTATIN CALCIUM 20 MILLIGRAM(S): 80 TABLET, FILM COATED ORAL at 21:56

## 2017-12-16 RX ADMIN — Medication 500 MILLIGRAM(S): at 21:56

## 2017-12-16 RX ADMIN — Medication 5 MILLIGRAM(S): at 05:36

## 2017-12-16 RX ADMIN — HEPARIN SODIUM 5000 UNIT(S): 5000 INJECTION INTRAVENOUS; SUBCUTANEOUS at 14:24

## 2017-12-16 RX ADMIN — Medication 1 TABLET(S): at 09:28

## 2017-12-16 RX ADMIN — Medication 250 MILLIGRAM(S): at 12:52

## 2017-12-16 RX ADMIN — Medication 500 MILLIGRAM(S): at 09:28

## 2017-12-16 RX ADMIN — HEPARIN SODIUM 5000 UNIT(S): 5000 INJECTION INTRAVENOUS; SUBCUTANEOUS at 05:36

## 2017-12-16 RX ADMIN — LEVETIRACETAM 500 MILLIGRAM(S): 250 TABLET, FILM COATED ORAL at 18:04

## 2017-12-16 RX ADMIN — ARIPIPRAZOLE 5 MILLIGRAM(S): 15 TABLET ORAL at 21:56

## 2017-12-16 RX ADMIN — Medication 5 MILLIGRAM(S): at 18:04

## 2017-12-16 RX ADMIN — Medication 1 TABLET(S): at 21:56

## 2017-12-16 RX ADMIN — LEVETIRACETAM 500 MILLIGRAM(S): 250 TABLET, FILM COATED ORAL at 05:36

## 2017-12-16 RX ADMIN — HEPARIN SODIUM 5000 UNIT(S): 5000 INJECTION INTRAVENOUS; SUBCUTANEOUS at 21:55

## 2017-12-16 NOTE — PROGRESS NOTE ADULT - SUBJECTIVE AND OBJECTIVE BOX
Pt seen at bedside, immediately asked to leave AMA and explained that he may not due so.  Pt threatened to throw his food at me, so exam deferred.  Pt generally does not allow for exam by myself.  VS- refusing  Exam- refusing  Labs- refusing  31 yo M with   1. Septic shock from stage 4 decub with osteomyelitis- refusing IV so on cipro/augmentin.  Refusing wound care again and foul smelling down dia b/c of lack of care for many days  2. Staghorn renal stone- urology eval on hold as pt refusing basic care  3. Neurogenic bladder- suprapubic cath, on oxybutynin  4. Paraplegia- pt has home care with aids 24 hrs per day which he refuses and a wheelchair; unsafe to return home at this time and does not have right to leave AMA.  5. Schizoaffective- paranoid, psychotic, not taking his meds- d/w ethics, psych, and legal services, will have  to come inpatient to discuss treatment over objection. Asked for new drs and to leave AMA.  Pt is paranoid and psychotic.  Discussed with psych who will eval him for med-psych unit as well.  Pt  is at risk to causing harm to himself by refusing medical care and medications.  Will f/u with  on Mon  6. Uncontrolled DM with hyperglycemia- refusing insulin  7. Seizure d/o- c/w home meds  8. UTI- completed tx  9. Anemia- refusing labs

## 2017-12-16 NOTE — PROVIDER CONTACT NOTE (OTHER) - SITUATION
Multiple attempts made to check vital signs and blood glucose finger sticks, Pt refusing blood glucose finger stick checks, temperature checks, and pulse ox check.

## 2017-12-16 NOTE — CHART NOTE - NSCHARTNOTEFT_GEN_A_CORE
PGY-3 Chart Note    Patient seen this morning at 6:50AM. Informed by RN that patient is now refusing IV antibiotics and will only take PO which was ordered last night. The patient is now refusing to have vital signs taken. On interview, he continues to refuse stating that he knows how to take his pulse and it is fine and knows when he has a fever, so will tell the staff if and when that happens. He continues to refuse vital signs to be taken, continues to refuse heplock to be placed, continues to refuse physical examination. On conclusion of conversation, he stated that he no longer wants to be in the hospital and is requesting to leave AMA. He would like the forms made available to him for his anticipated discharge of early next week. He was told that he continues to require inpatient care and does not have adequate services at home for him. He said that he does not care and still wants to leave. Will discuss updates with psychiatry and medicine attending.

## 2017-12-17 LAB
ANION GAP SERPL CALC-SCNC: 15 MMOL/L — SIGNIFICANT CHANGE UP (ref 5–17)
BUN SERPL-MCNC: 19 MG/DL — SIGNIFICANT CHANGE UP (ref 7–23)
CALCIUM SERPL-MCNC: 9.6 MG/DL — SIGNIFICANT CHANGE UP (ref 8.4–10.5)
CHLORIDE SERPL-SCNC: 98 MMOL/L — SIGNIFICANT CHANGE UP (ref 96–108)
CO2 SERPL-SCNC: 23 MMOL/L — SIGNIFICANT CHANGE UP (ref 22–31)
CREAT SERPL-MCNC: 0.71 MG/DL — SIGNIFICANT CHANGE UP (ref 0.5–1.3)
GLUCOSE SERPL-MCNC: 147 MG/DL — HIGH (ref 70–99)
HCT VFR BLD CALC: 31.4 % — LOW (ref 39–50)
HGB BLD-MCNC: 9.2 G/DL — LOW (ref 13–17)
MAGNESIUM SERPL-MCNC: 2 MG/DL — SIGNIFICANT CHANGE UP (ref 1.6–2.6)
MCHC RBC-ENTMCNC: 23.4 PG — LOW (ref 27–34)
MCHC RBC-ENTMCNC: 29.3 G/DL — LOW (ref 32–36)
MCV RBC AUTO: 79.9 FL — LOW (ref 80–100)
PLATELET # BLD AUTO: 282 K/UL — SIGNIFICANT CHANGE UP (ref 150–400)
POTASSIUM SERPL-MCNC: 4.2 MMOL/L — SIGNIFICANT CHANGE UP (ref 3.5–5.3)
POTASSIUM SERPL-SCNC: 4.2 MMOL/L — SIGNIFICANT CHANGE UP (ref 3.5–5.3)
RBC # BLD: 3.93 M/UL — LOW (ref 4.2–5.8)
RBC # FLD: 21.5 % — HIGH (ref 10.3–16.9)
SODIUM SERPL-SCNC: 136 MMOL/L — SIGNIFICANT CHANGE UP (ref 135–145)
WBC # BLD: 9.4 K/UL — SIGNIFICANT CHANGE UP (ref 3.8–10.5)
WBC # FLD AUTO: 9.4 K/UL — SIGNIFICANT CHANGE UP (ref 3.8–10.5)

## 2017-12-17 PROCEDURE — 99233 SBSQ HOSP IP/OBS HIGH 50: CPT

## 2017-12-17 RX ORDER — SODIUM HYPOCHLORITE 0.125 %
1 SOLUTION, NON-ORAL MISCELLANEOUS DAILY
Qty: 0 | Refills: 0 | Status: DISCONTINUED | OUTPATIENT
Start: 2017-12-17 | End: 2017-12-21

## 2017-12-17 RX ADMIN — Medication 500 MILLIGRAM(S): at 09:35

## 2017-12-17 RX ADMIN — Medication 250 MILLIGRAM(S): at 12:37

## 2017-12-17 RX ADMIN — Medication 5 MILLIGRAM(S): at 18:09

## 2017-12-17 RX ADMIN — HEPARIN SODIUM 5000 UNIT(S): 5000 INJECTION INTRAVENOUS; SUBCUTANEOUS at 21:28

## 2017-12-17 RX ADMIN — Medication 5 MILLIGRAM(S): at 06:04

## 2017-12-17 RX ADMIN — HEPARIN SODIUM 5000 UNIT(S): 5000 INJECTION INTRAVENOUS; SUBCUTANEOUS at 06:02

## 2017-12-17 RX ADMIN — Medication 1 APPLICATION(S): at 14:13

## 2017-12-17 RX ADMIN — LEVETIRACETAM 500 MILLIGRAM(S): 250 TABLET, FILM COATED ORAL at 18:09

## 2017-12-17 RX ADMIN — Medication 1 TABLET(S): at 09:35

## 2017-12-17 RX ADMIN — ATORVASTATIN CALCIUM 20 MILLIGRAM(S): 80 TABLET, FILM COATED ORAL at 21:28

## 2017-12-17 RX ADMIN — NYSTATIN CREAM 1 APPLICATION(S): 100000 CREAM TOPICAL at 06:07

## 2017-12-17 RX ADMIN — Medication 500 MILLIGRAM(S): at 21:28

## 2017-12-17 RX ADMIN — HEPARIN SODIUM 5000 UNIT(S): 5000 INJECTION INTRAVENOUS; SUBCUTANEOUS at 14:12

## 2017-12-17 RX ADMIN — Medication 1 TABLET(S): at 21:28

## 2017-12-17 RX ADMIN — LEVETIRACETAM 500 MILLIGRAM(S): 250 TABLET, FILM COATED ORAL at 06:04

## 2017-12-17 RX ADMIN — NYSTATIN CREAM 1 APPLICATION(S): 100000 CREAM TOPICAL at 18:09

## 2017-12-17 NOTE — PROGRESS NOTE ADULT - ATTENDING COMMENTS
Pt seen at bedside, immediately asked to leave AMA and explained that he may not do so.  Told him to bring friend or aunt/uncle in to discuss a safe d/c plan further but pt refused.  VS- refusing  Exam- refusing  Labs- refusing  31 yo M with   1. Septic shock from stage 4 decub with osteomyelitis- refusing IV so on cipro/augmentin.  Got wound care today but refused being cleaned, foul smelling down dia b/c of lack of care for many days  2. Staghorn renal stone- urology eval on hold as pt refusing basic care  3. Neurogenic bladder- suprapubic cath, on oxybutynin  4. Paraplegia- pt has home care with aids 24 hrs per day which he refuses and a wheelchair; unsafe to return home at this time and does not have right to leave AMA.  5. Schizoaffective- paranoid, psychotic, not taking his meds- d/w ethics, psych, and legal services, will have  to come inpatient to discuss treatment over objection. Asked for new drs and to leave AMA.  Pt is paranoid and psychotic.  Discussed with psych who will eval him for med-psych unit as well.  Pt  is at risk to causing harm to himself by refusing medical care and medications.  Will f/u with  on Mon  6. Uncontrolled DM with hyperglycemia- refusing insulin  7. Seizure d/o- c/w home meds  8. UTI- completed tx  9. Anemia- refusing labs

## 2017-12-17 NOTE — PROGRESS NOTE ADULT - PROBLEM SELECTOR PLAN 2
Pt w/ a positive UA on admission with LE, nitrites, bacteria and WBC. Urine culture positive for proteus >100,000. Per patient he has a history of staghorn calculus.   -c/w Cipro/Augmentin  -pt w/ suprapubic catheter  -as per outpatient provider, Dr. Mark Millard he exchanges the catheter monthly  -Catheter changed on 12/7

## 2017-12-17 NOTE — PROGRESS NOTE ADULT - PROBLEM SELECTOR PLAN 3
Pt w/ no sensation or control of urination with suprapubic catheter changed once a month.   -Urology changed cath on 12/7 as it was leaking; leaking again  -Per nursing, there is currently pus in the suprapubic area but patient is refusing others from further assessing. Will attempt to have Urology look at it.   -c/w oxybutynin 5mg BID

## 2017-12-17 NOTE — PROGRESS NOTE ADULT - PROBLEM SELECTOR PLAN 1
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 Wound cultures growing gram negative rods and gram positive in chains. Upon arrival pts wound was packed w/ feculent material and required debridement by plastic surgery.   -c/w Cipro/Augmentin, likely minimum 6wk duration  -general surgery does not believe there is a fistula between wound and rectum causing leakage of stool, no surgical intervention at this time.  -wound care consulted and stated that no consulted needed since surgical team is following

## 2017-12-17 NOTE — PROGRESS NOTE ADULT - SUBJECTIVE AND OBJECTIVE BOX
INTERVAL HPI/OVERNIGHT EVENTS:  No acute events reported overnight. This morning at bedside patient refusing examination, all medical treatment aside from antibiotics. Patient also persists in asking for AMA papers and has decided he will leave on Tuesday after receiving blood results. Patient did have bloods drawn this morning.     Vital Signs Last 24 Hrs - Patient refusing majority of vital sign assessment.   T(C): --  T(F): --  HR: 85 (17 Dec 2017 08:45) (80 - 85)  BP: 107/68 (17 Dec 2017 08:45) (105/66 - 109/67)  BP(mean): --  RR: --  SpO2: --    PHYSICAL EXAM:  Constitutional: NAD, lying in bed comfortably, non-toxic   PATIENT REFUSED PHYSICAL EXAM, but general appearance is that he is in no distress, non-toxic and comfortable.      MEDICATIONS  (STANDING):  ARIPiprazole 5 milliGRAM(s) Oral at bedtime  ascorbic acid 250 milliGRAM(s) Oral daily  atorvastatin 20 milliGRAM(s) Oral at bedtime  dextrose 5%. 1000 milliLiter(s) (50 mL/Hr) IV Continuous <Continuous>  dextrose 50% Injectable 12.5 Gram(s) IV Push once  dextrose 50% Injectable 25 Gram(s) IV Push once  dextrose 50% Injectable 25 Gram(s) IV Push once  heparin  Injectable 5000 Unit(s) SubCutaneous every 8 hours  insulin glargine Injectable (LANTUS) 17 Unit(s) SubCutaneous every morning  insulin lispro (HumaLOG) corrective regimen sliding scale   SubCutaneous Before meals and at bedtime  levETIRAcetam 500 milliGRAM(s) Oral two times a day  nystatin Powder 1 Application(s) Topical two times a day  oxybutynin 5 milliGRAM(s) Oral two times a day  piperacillin/tazobactam IVPB. 3.375 Gram(s) IV Intermittent every 6 hours    MEDICATIONS  (PRN):  bisacodyl Suppository 10 milliGRAM(s) Rectal daily PRN Constipation  dextrose Gel 1 Dose(s) Oral once PRN Blood Glucose LESS THAN 70 milliGRAM(s)/deciliter  glucagon  Injectable 1 milliGRAM(s) IntraMuscular once PRN Glucose LESS THAN 70 milligrams/deciliter      Allergies  Capzasin Back and Body (Unknown)  Intolerances      LABS:                        9.2    9.4   )-----------( 282      ( 17 Dec 2017 08:45 )             31.4       136  |  98  |  19  ----------------------------<  147<H>  4.2   |  23  |  0.71    Ca    9.6      17 Dec 2017 08:45  Mg     2.0     12-17                 RADIOLOGY & ADDITIONAL TESTS: Reviewed

## 2017-12-18 LAB
ANION GAP SERPL CALC-SCNC: 13 MMOL/L — SIGNIFICANT CHANGE UP (ref 5–17)
BUN SERPL-MCNC: 21 MG/DL — SIGNIFICANT CHANGE UP (ref 7–23)
CALCIUM SERPL-MCNC: 9.6 MG/DL — SIGNIFICANT CHANGE UP (ref 8.4–10.5)
CHLORIDE SERPL-SCNC: 100 MMOL/L — SIGNIFICANT CHANGE UP (ref 96–108)
CO2 SERPL-SCNC: 24 MMOL/L — SIGNIFICANT CHANGE UP (ref 22–31)
CREAT SERPL-MCNC: 0.9 MG/DL — SIGNIFICANT CHANGE UP (ref 0.5–1.3)
GLUCOSE SERPL-MCNC: 147 MG/DL — HIGH (ref 70–99)
HCT VFR BLD CALC: 29.3 % — LOW (ref 39–50)
HGB BLD-MCNC: 8.6 G/DL — LOW (ref 13–17)
MCHC RBC-ENTMCNC: 23.3 PG — LOW (ref 27–34)
MCHC RBC-ENTMCNC: 29.4 G/DL — LOW (ref 32–36)
MCV RBC AUTO: 79.4 FL — LOW (ref 80–100)
PLATELET # BLD AUTO: 263 K/UL — SIGNIFICANT CHANGE UP (ref 150–400)
POTASSIUM SERPL-MCNC: 4.3 MMOL/L — SIGNIFICANT CHANGE UP (ref 3.5–5.3)
POTASSIUM SERPL-SCNC: 4.3 MMOL/L — SIGNIFICANT CHANGE UP (ref 3.5–5.3)
RBC # BLD: 3.69 M/UL — LOW (ref 4.2–5.8)
RBC # FLD: 20.8 % — HIGH (ref 10.3–16.9)
SODIUM SERPL-SCNC: 137 MMOL/L — SIGNIFICANT CHANGE UP (ref 135–145)
WBC # BLD: 6.9 K/UL — SIGNIFICANT CHANGE UP (ref 3.8–10.5)
WBC # FLD AUTO: 6.9 K/UL — SIGNIFICANT CHANGE UP (ref 3.8–10.5)

## 2017-12-18 PROCEDURE — 99233 SBSQ HOSP IP/OBS HIGH 50: CPT

## 2017-12-18 RX ADMIN — Medication 1 TABLET(S): at 09:53

## 2017-12-18 RX ADMIN — Medication 500 MILLIGRAM(S): at 20:39

## 2017-12-18 RX ADMIN — Medication 1 TABLET(S): at 20:44

## 2017-12-18 RX ADMIN — ATORVASTATIN CALCIUM 20 MILLIGRAM(S): 80 TABLET, FILM COATED ORAL at 20:39

## 2017-12-18 RX ADMIN — Medication 5 MILLIGRAM(S): at 20:39

## 2017-12-18 RX ADMIN — NYSTATIN CREAM 1 APPLICATION(S): 100000 CREAM TOPICAL at 05:10

## 2017-12-18 RX ADMIN — Medication 5 MILLIGRAM(S): at 05:09

## 2017-12-18 RX ADMIN — LEVETIRACETAM 500 MILLIGRAM(S): 250 TABLET, FILM COATED ORAL at 05:09

## 2017-12-18 RX ADMIN — Medication 250 MILLIGRAM(S): at 20:38

## 2017-12-18 RX ADMIN — Medication 1 APPLICATION(S): at 17:33

## 2017-12-18 RX ADMIN — NYSTATIN CREAM 1 APPLICATION(S): 100000 CREAM TOPICAL at 20:40

## 2017-12-18 RX ADMIN — LEVETIRACETAM 500 MILLIGRAM(S): 250 TABLET, FILM COATED ORAL at 20:38

## 2017-12-18 RX ADMIN — HEPARIN SODIUM 5000 UNIT(S): 5000 INJECTION INTRAVENOUS; SUBCUTANEOUS at 05:09

## 2017-12-18 RX ADMIN — HEPARIN SODIUM 5000 UNIT(S): 5000 INJECTION INTRAVENOUS; SUBCUTANEOUS at 20:38

## 2017-12-18 RX ADMIN — Medication 500 MILLIGRAM(S): at 09:13

## 2017-12-18 NOTE — PROGRESS NOTE ADULT - PROBLEM SELECTOR PLAN 1
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 Wound cultures growing gram negative rods and gram positive in chains. Upon arrival pts wound was packed w/ feculent material and required debridement by plastic surgery.   -c/w augmentin and cipro as patient refuses IV abx  -general surgery does not believe there is a fistula between wound and rectum causing leakage of stool, no surgical intervention at this time.  -wound care consulted and stated that no consulted needed since surgical team is following Pt w/ a stage 4 infected sacral ulcer w/ purulence. Wound cultures growing gram negative rods and gram positive in chains. Upon arrival pts wound was packed w/ feculent material and required debridement by plastic surgery.   -c/w augmentin and cipro as patient refuses IV abx(12/5 zosyn started)  -general surgery does not believe there is a fistula between wound and rectum causing leakage of stool, no surgical intervention at this time.  -wound care consulted and stated that no consulted needed since surgical team is following

## 2017-12-18 NOTE — PROGRESS NOTE ADULT - PROBLEM SELECTOR PLAN 4
Patient on metformin and glimeperide at home. HgA1C of 6.0.   -ISS+Lantus 17U, FSG not controlled will add NPH today and change Lantus to evening dosing with adjusted dose  -monitor FSG Patient on metformin and glimeperide at home. HgA1C of 6.0.   -ISS+Lantus 17U+ Lispro 2U  -monitor FSG

## 2017-12-18 NOTE — PROGRESS NOTE ADULT - SUBJECTIVE AND OBJECTIVE BOX
INTERVAL HPI/OVERNIGHT EVENTS:  No acute events overnight, patient remained hemodynamically stable.  10 system ROS negative.      ICU Vital Signs Last 24 Hrs  T(C): --  T(F): --  HR: 87 (18 Dec 2017 05:26) (87 - 89)  BP: 111/71 (18 Dec 2017 05:26) (107/69 - 111/71)  BP(mean): --  ABP: --  ABP(mean): --  RR: 18 (18 Dec 2017 05:26) (18 - 18)  SpO2: 97% (18 Dec 2017 05:26) (97% - 97%)    PHYSICAL EXAM:  Constitutional: NAD, lying in bed comfortably, non-toxic   PATIENT REFUSED PHYSICAL EXAM, but general appearance is that he is in no distress, non-toxic and comfortable.      MEDICATIONS  (STANDING):  amoxicillin  875 milliGRAM(s)/clavulanate 1 Tablet(s) Oral every 12 hours  ARIPiprazole 5 milliGRAM(s) Oral at bedtime  ascorbic acid 250 milliGRAM(s) Oral daily  atorvastatin 20 milliGRAM(s) Oral at bedtime  ciprofloxacin     Tablet 500 milliGRAM(s) Oral every 12 hours  Dakins Solution - Full Strength 1 Application(s) Topical daily  dextrose 5%. 1000 milliLiter(s) (50 mL/Hr) IV Continuous <Continuous>  dextrose 50% Injectable 12.5 Gram(s) IV Push once  dextrose 50% Injectable 25 Gram(s) IV Push once  dextrose 50% Injectable 25 Gram(s) IV Push once  heparin  Injectable 5000 Unit(s) SubCutaneous every 8 hours  insulin glargine Injectable (LANTUS) 17 Unit(s) SubCutaneous every morning  insulin lispro (HumaLOG) corrective regimen sliding scale   SubCutaneous Before meals and at bedtime  insulin lispro Injectable (HumaLOG) 2 Unit(s) SubCutaneous three times a day before meals  levETIRAcetam 500 milliGRAM(s) Oral two times a day  nystatin Powder 1 Application(s) Topical two times a day  oxybutynin 5 milliGRAM(s) Oral two times a day    MEDICATIONS  (PRN):  bisacodyl Suppository 10 milliGRAM(s) Rectal daily PRN Constipation  dextrose Gel 1 Dose(s) Oral once PRN Blood Glucose LESS THAN 70 milliGRAM(s)/deciliter  glucagon  Injectable 1 milliGRAM(s) IntraMuscular once PRN Glucose LESS THAN 70 milligrams/deciliter      Allergies    Capzasin Back and Body (Unknown)    Intolerances        LABS:                        8.6    6.9   )-----------( 263      ( 18 Dec 2017 06:15 )             29.3     12-18    137  |  100  |  21  ----------------------------<  147<H>  4.3   |  24  |  0.90    Ca    9.6      18 Dec 2017 06:15  Mg     2.0     12-17    RADIOLOGY & ADDITIONAL TESTS: Reviewed

## 2017-12-18 NOTE — PROGRESS NOTE ADULT - ATTENDING COMMENTS
Pt seen at bedside, immediately asked to leave AMA and explained that he may not do so.  Will not speak further.  VS- refusing  Exam- refusing  Labs- refusing  31 yo M with   1. Septic shock from stage 4 decub with osteomyelitis- refusing IV so on cipro/augmentin.  Got wound care today but refused being cleaned, foul smelling down dia b/c of lack of care for many days  2. Staghorn renal stone- urology eval on hold as pt refusing basic care  3. Neurogenic bladder- suprapubic cath, on oxybutynin  4. Paraplegia- pt has home care with aids 24 hrs per day which he refuses and a wheelchair; unsafe to return home at this time and does not have right to leave AMA.  5. Schizoaffective- paranoid, psychotic, not taking his meds- d/w ethics, psych, and legal services, will have  to come inpatient to discuss treatment over objection. Asked for new drs and to leave AMA.  Pt is paranoid and psychotic.  Discussed with psych who will eval him for med-psych unit as well.  Pt  is at risk to causing harm to himself by refusing medical care and medications.  Will f/u with  on Mon  6. Uncontrolled DM with hyperglycemia- refusing insulin  7. Seizure d/o- c/w home meds  8. UTI- completed tx  9. Anemia- refusing labs

## 2017-12-18 NOTE — ADVANCED PRACTICE NURSE CONSULT - ASSESSMENT
Re-assessment of pressure injuries to sacrum and left ischium. Both wounds clean, bone visible in left ischial wound. Sacral wound measures 3x3.5x5 cm with undermining from 7-3 o'clock, deepest aspect at 3 o'clock, 7 cm. Left ischial wound measures 5.5x2.5cm. Pt on Envella bed for pressure injury relief.

## 2017-12-18 NOTE — PROGRESS NOTE ADULT - PROBLEM SELECTOR PLAN 2
RESOLVED. Pt w/ a positive UA on admission with LE, nitrites, bacteria and WBC. Urine culture positive for proteus >100,000. Per patient he has a history of staghorn calculus.   -pt w/ suprapubic catheter  -as per outpatient provider, Dr. Mark Millard he exchanges the catheter monthly  -Catheter changed on 12/7

## 2017-12-19 PROCEDURE — 99233 SBSQ HOSP IP/OBS HIGH 50: CPT

## 2017-12-19 RX ADMIN — Medication 1 TABLET(S): at 21:05

## 2017-12-19 RX ADMIN — Medication 1 TABLET(S): at 09:45

## 2017-12-19 RX ADMIN — LEVETIRACETAM 500 MILLIGRAM(S): 250 TABLET, FILM COATED ORAL at 05:57

## 2017-12-19 RX ADMIN — ATORVASTATIN CALCIUM 20 MILLIGRAM(S): 80 TABLET, FILM COATED ORAL at 21:05

## 2017-12-19 RX ADMIN — Medication 500 MILLIGRAM(S): at 21:05

## 2017-12-19 RX ADMIN — Medication 250 MILLIGRAM(S): at 11:32

## 2017-12-19 RX ADMIN — Medication 1 APPLICATION(S): at 11:32

## 2017-12-19 RX ADMIN — HEPARIN SODIUM 5000 UNIT(S): 5000 INJECTION INTRAVENOUS; SUBCUTANEOUS at 13:28

## 2017-12-19 RX ADMIN — Medication 500 MILLIGRAM(S): at 09:45

## 2017-12-19 RX ADMIN — Medication 5 MILLIGRAM(S): at 05:57

## 2017-12-19 RX ADMIN — LEVETIRACETAM 500 MILLIGRAM(S): 250 TABLET, FILM COATED ORAL at 17:40

## 2017-12-19 RX ADMIN — HEPARIN SODIUM 5000 UNIT(S): 5000 INJECTION INTRAVENOUS; SUBCUTANEOUS at 05:57

## 2017-12-19 RX ADMIN — Medication 5 MILLIGRAM(S): at 17:40

## 2017-12-19 NOTE — PROGRESS NOTE ADULT - PROBLEM SELECTOR PLAN 1
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 Wound cultures growing gram negative rods and gram positive in chains. Upon arrival pts wound was packed w/ feculent material and required debridement by plastic surgery.   -c/w augmentin and cipro as patient refuses IV abx(12/5 zosyn started)  -general surgery does not believe there is a fistula between wound and rectum causing leakage of stool, no surgical intervention at this time.  -wound care following

## 2017-12-19 NOTE — PROGRESS NOTE ADULT - PROBLEM SELECTOR PLAN 3
Patient on metformin and glimeperide at home. HgA1C of 6.0.   -ISS+Lantus 17U+ Lispro 2U  -monitor FSG

## 2017-12-19 NOTE — PROGRESS NOTE ADULT - SUBJECTIVE AND OBJECTIVE BOX
INTERVAL HPI/OVERNIGHT EVENTS:  No acute events overnight, patient remained hemodynamically stable. This morning at bedside 10 system ROS negative but pt states "I am leaving AMA."    ICU Vital Signs Last 24 Hrs  T(C): --  T(F): --  HR: 85 (19 Dec 2017 06:05) (73 - 85)  BP: 107/68 (19 Dec 2017 06:05) (107/68 - 125/78)  BP(mean): --  ABP: --  ABP(mean): --  RR: 16 (19 Dec 2017 06:05) (16 - 18)  SpO2: --    PHYSICAL EXAM:  Constitutional: NAD, lying in bed comfortably, non-toxic   Neuro: alert and oriented  Extremities: No edema  PATIENT REFUSED PHYSICAL EXAM, but general appearance is that he is in no distress, non-toxic and comfortable.      MEDICATIONS  (STANDING):  amoxicillin  875 milliGRAM(s)/clavulanate 1 Tablet(s) Oral every 12 hours  ARIPiprazole 5 milliGRAM(s) Oral at bedtime  ascorbic acid 250 milliGRAM(s) Oral daily  atorvastatin 20 milliGRAM(s) Oral at bedtime  ciprofloxacin     Tablet 500 milliGRAM(s) Oral every 12 hours  Dakins Solution - Full Strength 1 Application(s) Topical daily  dextrose 5%. 1000 milliLiter(s) (50 mL/Hr) IV Continuous <Continuous>  dextrose 50% Injectable 12.5 Gram(s) IV Push once  dextrose 50% Injectable 25 Gram(s) IV Push once  dextrose 50% Injectable 25 Gram(s) IV Push once  heparin  Injectable 5000 Unit(s) SubCutaneous every 8 hours  insulin glargine Injectable (LANTUS) 17 Unit(s) SubCutaneous every morning  insulin lispro (HumaLOG) corrective regimen sliding scale   SubCutaneous Before meals and at bedtime  insulin lispro Injectable (HumaLOG) 2 Unit(s) SubCutaneous three times a day before meals  levETIRAcetam 500 milliGRAM(s) Oral two times a day  nystatin Powder 1 Application(s) Topical two times a day  oxybutynin 5 milliGRAM(s) Oral two times a day    MEDICATIONS  (PRN):  bisacodyl Suppository 10 milliGRAM(s) Rectal daily PRN Constipation  dextrose Gel 1 Dose(s) Oral once PRN Blood Glucose LESS THAN 70 milliGRAM(s)/deciliter  glucagon  Injectable 1 milliGRAM(s) IntraMuscular once PRN Glucose LESS THAN 70 milligrams/deciliter      Allergies    Capzasin Back and Body (Unknown)    Intolerances        LABS:                        8.6    6.9   )-----------( 263      ( 18 Dec 2017 06:15 )             29.3     12-18    137  |  100  |  21  ----------------------------<  147<H>  4.3   |  24  |  0.90    Ca    9.6      18 Dec 2017 06:15  Mg     2.0     12-17            RADIOLOGY & ADDITIONAL TESTS: Reviewed

## 2017-12-19 NOTE — PROGRESS NOTE ADULT - ATTENDING COMMENTS
Pt seen at bedside, immediately asked to leave AMA and explained that he may not do so.  Will not speak further.  VS- refusing  Exam- refusing  Labs- refusing  33 yo M with   1. Septic shock from stage 4 decub with osteomyelitis- refusing IV so on cipro/augmentin.  Got wound care today but refused being cleaned, foul smelling down dia b/c of lack of care for many days  2. Staghorn renal stone- urology eval on hold as pt refusing basic care  3. Neurogenic bladder- suprapubic cath, on oxybutynin  4. Paraplegia- pt has home care with aids 24 hrs per day which he refuses and a wheelchair; unsafe to return home at this time and does not have right to leave AMA.  5. Schizoaffective- paranoid, psychotic, not taking his meds- d/w ethics, psych, and legal services, will have  to come inpatient to discuss treatment over objection. Asked for new drs and to leave AMA.  Pt is paranoid and psychotic.  Discussed with psych who will eval him for med-psych unit as well, but has been rejected by mutliple facilities.  Pt  is at risk to causing harm to himself by refusing medical care and medications.  In discussion with  regarding court case and documents are almost prepared for court.  Reviewed them yesterday with  and last document is POA which is in process of being recovered.   6. Uncontrolled DM with hyperglycemia- refusing insulin  7. Seizure d/o- c/w home meds  8. UTI- completed tx, will d/w epidemiology re: d/c isolation for ESBL klebsiella  9. Anemia- refusing labs

## 2017-12-20 PROCEDURE — 99233 SBSQ HOSP IP/OBS HIGH 50: CPT

## 2017-12-20 RX ADMIN — Medication 1 APPLICATION(S): at 13:22

## 2017-12-20 RX ADMIN — ATORVASTATIN CALCIUM 20 MILLIGRAM(S): 80 TABLET, FILM COATED ORAL at 21:47

## 2017-12-20 RX ADMIN — Medication 250 MILLIGRAM(S): at 13:21

## 2017-12-20 RX ADMIN — LEVETIRACETAM 500 MILLIGRAM(S): 250 TABLET, FILM COATED ORAL at 06:22

## 2017-12-20 RX ADMIN — HEPARIN SODIUM 5000 UNIT(S): 5000 INJECTION INTRAVENOUS; SUBCUTANEOUS at 13:21

## 2017-12-20 RX ADMIN — NYSTATIN CREAM 1 APPLICATION(S): 100000 CREAM TOPICAL at 17:23

## 2017-12-20 RX ADMIN — Medication 500 MILLIGRAM(S): at 09:18

## 2017-12-20 RX ADMIN — Medication 500 MILLIGRAM(S): at 21:47

## 2017-12-20 RX ADMIN — HEPARIN SODIUM 5000 UNIT(S): 5000 INJECTION INTRAVENOUS; SUBCUTANEOUS at 21:48

## 2017-12-20 RX ADMIN — NYSTATIN CREAM 1 APPLICATION(S): 100000 CREAM TOPICAL at 06:21

## 2017-12-20 RX ADMIN — Medication 5 MILLIGRAM(S): at 06:22

## 2017-12-20 RX ADMIN — Medication 1 TABLET(S): at 09:18

## 2017-12-20 RX ADMIN — LEVETIRACETAM 500 MILLIGRAM(S): 250 TABLET, FILM COATED ORAL at 17:23

## 2017-12-20 RX ADMIN — Medication 10 MILLIGRAM(S): at 13:21

## 2017-12-20 RX ADMIN — Medication 1 TABLET(S): at 21:47

## 2017-12-20 RX ADMIN — Medication 5 MILLIGRAM(S): at 17:23

## 2017-12-20 NOTE — PROGRESS NOTE ADULT - PROBLEM SELECTOR PLAN 1
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 Wound cultures growing gram negative rods and gram positive in chains. Upon arrival pts wound was packed w/ feculent material and required debridement by plastic surgery.   -c/w augmentin and cipro as patient refuses IV abx(12/5 zosyn started)  -general surgery does not believe there is a fistula between wound and rectum causing leakage of stool, no surgical intervention at this time.  -wound care following  -EKG  to look for prolonged QT 2/2 cipro

## 2017-12-20 NOTE — PROGRESS NOTE ADULT - ATTENDING COMMENTS
Pt seen at bedside, immediately asked to leave AMA and explained that he may not do so.  Screamed some profanities and asked me to leave.  VS- refusing  Exam- refusing  Labs- refusing  33 yo M with   1. Septic shock from stage 4 decub with osteomyelitis- refusing IV so on cipro/augmentin.  Getting wound care but refused being cleaned, foul smelling down dia b/c of lack of care for many days.  Will get EKG b/c on cipro  2. Staghorn renal stone- urology eval on hold as pt refusing basic care  3. Neurogenic bladder- suprapubic cath, on oxybutynin  4. Paraplegia- pt has home care with aids 24 hrs per day which he refuses and a wheelchair; unsafe to return home at this time and does not have right to leave AMA.  5. Schizoaffective- paranoid, psychotic, not taking his meds- d/w ethics, psych, and legal services, will have  to come inpatient to discuss treatment over objection. Asked for new drs and to leave AMA.  Pt is paranoid and psychotic.  Discussed with psych who will eval him for med-psych unit as well, but has been rejected by mutliple facilities.  Pt  is at risk to causing harm to himself by refusing medical care and medications.  In discussion with  regarding court case and documents are almost prepared for court.  Psych to do full capacity evaluation.  Pt's  and POA are aware of admission and  has been in contact.  6. Uncontrolled DM with hyperglycemia- refusing insulin  7. Seizure d/o- c/w home meds  8. UTI- completed tx, will d/w epidemiology re: d/c isolation for ESBL klebsiella  9. Anemia- refusing labs

## 2017-12-20 NOTE — PROGRESS NOTE ADULT - SUBJECTIVE AND OBJECTIVE BOX
INTERVAL HPI/OVERNIGHT EVENTS:  No reported events from overnight team or nursing except for continued refusal of all medical treatment, patient remained hemodynamically stable. This morning at bedside 10 system ROS negative.     ICU Vital Signs Last 24 Hrs  T(C): --  T(F): --  HR: 73 (19 Dec 2017 08:57) (73 - 73)  BP: 96/62 (19 Dec 2017 21:21) (94/63 - 96/62)  BP(mean): --  ABP: --  ABP(mean): --  RR: 17 (19 Dec 2017 21:21) (16 - 17)  SpO2: --    PHYSICAL EXAM:  Constitutional: NAD, lying in bed comfortably, non-toxic   Neuro: alert and oriented  Extremities: No edema  PATIENT REFUSED PHYSICAL EXAM, but general appearance is that he is in no distress, non-toxic and comfortable.     MEDICATIONS  (STANDING):  amoxicillin  875 milliGRAM(s)/clavulanate 1 Tablet(s) Oral every 12 hours  ARIPiprazole 5 milliGRAM(s) Oral at bedtime  ascorbic acid 250 milliGRAM(s) Oral daily  atorvastatin 20 milliGRAM(s) Oral at bedtime  ciprofloxacin     Tablet 500 milliGRAM(s) Oral every 12 hours  Dakins Solution - Full Strength 1 Application(s) Topical daily  dextrose 5%. 1000 milliLiter(s) (50 mL/Hr) IV Continuous <Continuous>  dextrose 50% Injectable 12.5 Gram(s) IV Push once  dextrose 50% Injectable 25 Gram(s) IV Push once  dextrose 50% Injectable 25 Gram(s) IV Push once  heparin  Injectable 5000 Unit(s) SubCutaneous every 8 hours  insulin glargine Injectable (LANTUS) 17 Unit(s) SubCutaneous every morning  insulin lispro (HumaLOG) corrective regimen sliding scale   SubCutaneous Before meals and at bedtime  insulin lispro Injectable (HumaLOG) 2 Unit(s) SubCutaneous three times a day before meals  levETIRAcetam 500 milliGRAM(s) Oral two times a day  nystatin Powder 1 Application(s) Topical two times a day  oxybutynin 5 milliGRAM(s) Oral two times a day    MEDICATIONS  (PRN):  bisacodyl Suppository 10 milliGRAM(s) Rectal daily PRN Constipation  dextrose Gel 1 Dose(s) Oral once PRN Blood Glucose LESS THAN 70 milliGRAM(s)/deciliter  glucagon  Injectable 1 milliGRAM(s) IntraMuscular once PRN Glucose LESS THAN 70 milligrams/deciliter      Allergies    Capzasin Back and Body (Unknown)    Intolerances        LABS:                RADIOLOGY & ADDITIONAL TESTS: Reviewed

## 2017-12-21 PROCEDURE — 99233 SBSQ HOSP IP/OBS HIGH 50: CPT

## 2017-12-21 RX ADMIN — NYSTATIN CREAM 1 APPLICATION(S): 100000 CREAM TOPICAL at 18:41

## 2017-12-21 RX ADMIN — HEPARIN SODIUM 5000 UNIT(S): 5000 INJECTION INTRAVENOUS; SUBCUTANEOUS at 15:55

## 2017-12-21 RX ADMIN — ATORVASTATIN CALCIUM 20 MILLIGRAM(S): 80 TABLET, FILM COATED ORAL at 21:39

## 2017-12-21 RX ADMIN — Medication 500 MILLIGRAM(S): at 09:07

## 2017-12-21 RX ADMIN — NYSTATIN CREAM 1 APPLICATION(S): 100000 CREAM TOPICAL at 05:21

## 2017-12-21 RX ADMIN — Medication 1 TABLET(S): at 21:39

## 2017-12-21 RX ADMIN — Medication 5 MILLIGRAM(S): at 18:41

## 2017-12-21 RX ADMIN — HEPARIN SODIUM 5000 UNIT(S): 5000 INJECTION INTRAVENOUS; SUBCUTANEOUS at 21:38

## 2017-12-21 RX ADMIN — Medication 250 MILLIGRAM(S): at 12:43

## 2017-12-21 RX ADMIN — Medication 500 MILLIGRAM(S): at 21:39

## 2017-12-21 RX ADMIN — LEVETIRACETAM 500 MILLIGRAM(S): 250 TABLET, FILM COATED ORAL at 05:22

## 2017-12-21 RX ADMIN — Medication 1 TABLET(S): at 09:07

## 2017-12-21 RX ADMIN — HEPARIN SODIUM 5000 UNIT(S): 5000 INJECTION INTRAVENOUS; SUBCUTANEOUS at 05:22

## 2017-12-21 RX ADMIN — LEVETIRACETAM 500 MILLIGRAM(S): 250 TABLET, FILM COATED ORAL at 18:41

## 2017-12-21 RX ADMIN — Medication 5 MILLIGRAM(S): at 05:22

## 2017-12-21 NOTE — PROGRESS NOTE BEHAVIORAL HEALTH - SUMMARY
Based on evaluation and collaterals, patient ape ars to endorse active psychotic symptoms including "sever" paranoia in context of non-compliance, and relapse of psychotic episode. He exhibit limited understanding about the seriousness of  his behaviour (refusal to accept/seek help) at home that lef to potentially fatal wound infection. He is uncooperative with evaluation despite several attempts and denies past psychiatric hx (despite having SA and h/o admission).     Base on my assessment and clinical judgment, I believe he impose a danger to himself due to inability to care for himself in context of psychotic episode and questionable capacity to make decision for his mental health. Patient fulfils the criteria for involuntary psychiatric admission at this point for safety purposes, however he requires a medical-psychiatric unit due to his medical needs or to continue psychiatric treatment at bedside in medical unit. Hence request for court order for treatment over objection is requested. Based on evaluation and collaterals, patient continues to exhibit sever paranoid symptoms, is disorganized and exhibit impaired insight and judgment about his mental status and partially medical care as well. He does not apear to have capacity to understand the seriousness of his mentall illness and the impact it has been having on his medical and social life. He remains non complaint with psychiatric care and medical care (partial).  Base on my assessment and clinical judgment he impose a danger to himself due to inability to care for himself in context of psychotic episode and lack of capacity to make decision for his mental health and possibly medical care as well. Patient fulfils the criteria for involuntary psychiatric admission at this point for safety purposes, however he requires a medical-psychiatric unit due to his medical needs or to continue psychiatric treatment at bedside in medical unit. Hence request for court order for treatment over objection is requested.

## 2017-12-21 NOTE — PROGRESS NOTE BEHAVIORAL HEALTH - PROBLEM SELECTOR PROBLEM 4
Sacral osteomyelitis
Sacral osteomyelitis
Type 2 diabetes mellitus with hyperglycemia, without long-term current use of insulin

## 2017-12-21 NOTE — PROGRESS NOTE ADULT - SUBJECTIVE AND OBJECTIVE BOX
CC:osteomyelitis    INTERVAL HPI/OVERNIGHT EVENTS:  No reported events from overnight team or nursing, patient remained hemodynamically stable. Patient continues to refuse labs     ROS: 10 system ROS otherwise negative except for those noted in HPI    VITAL SIGNS:  T(F): --  HR: 87 (12-20-17 @ 21:29)  BP: 106/85 (12-21-17 @ 05:28)  RR: 18 (12-20-17 @ 21:29)  SpO2: 98% (12-20-17 @ 21:29)  Wt(kg): --    PHYSICAL EXAM:  Constitutional: NAD, lying in bed comfortably, non-toxic   Neuro: alert and oriented  Extremities: No edema  PATIENT REFUSED PHYSICAL EXAM, but general appearance is that he is in no distress, non-toxic and comfortable.      MEDICATIONS  (STANDING):  amoxicillin  875 milliGRAM(s)/clavulanate 1 Tablet(s) Oral every 12 hours  ARIPiprazole 5 milliGRAM(s) Oral at bedtime  ascorbic acid 250 milliGRAM(s) Oral daily  atorvastatin 20 milliGRAM(s) Oral at bedtime  ciprofloxacin     Tablet 500 milliGRAM(s) Oral every 12 hours  Dakins Solution - Full Strength 1 Application(s) Topical daily  dextrose 5%. 1000 milliLiter(s) (50 mL/Hr) IV Continuous <Continuous>  dextrose 50% Injectable 12.5 Gram(s) IV Push once  dextrose 50% Injectable 25 Gram(s) IV Push once  dextrose 50% Injectable 25 Gram(s) IV Push once  heparin  Injectable 5000 Unit(s) SubCutaneous every 8 hours  insulin glargine Injectable (LANTUS) 17 Unit(s) SubCutaneous every morning  insulin lispro (HumaLOG) corrective regimen sliding scale   SubCutaneous Before meals and at bedtime  insulin lispro Injectable (HumaLOG) 2 Unit(s) SubCutaneous three times a day before meals  levETIRAcetam 500 milliGRAM(s) Oral two times a day  nystatin Powder 1 Application(s) Topical two times a day  oxybutynin 5 milliGRAM(s) Oral two times a day    MEDICATIONS  (PRN):  bisacodyl Suppository 10 milliGRAM(s) Rectal daily PRN Constipation  dextrose Gel 1 Dose(s) Oral once PRN Blood Glucose LESS THAN 70 milliGRAM(s)/deciliter  glucagon  Injectable 1 milliGRAM(s) IntraMuscular once PRN Glucose LESS THAN 70 milligrams/deciliter      Allergies    Capzasin Back and Body (Unknown)    Intolerances        LABS:                RADIOLOGY & ADDITIONAL TESTS: Reviewed

## 2017-12-21 NOTE — PROGRESS NOTE BEHAVIORAL HEALTH - NSBHFUPINTERVALHXFT_PSY_A_CORE
Patient was evaluated at bedside, MR is reviewed and collateral obtained from the primary team.     -Collateral: As per  on the unit, patient's  contacted the hospital in a search for the patient, as his family (aunt/uncle) have been worried for his disappearance (were not aware he is in the hospital), and have hired a  to find him. As per , he has been in process of suing [facility] where he has a short few days admission and discharged which afterwards he jumped out of the window from his apartment (with subsequent paraplegia) and cut his neck as well (SA) and reportedly patient has been under psychosocial stressor (family related) at the time. Apparently family (?and patient) believed that patient has been discharged early at that admission and resulted in harming himself.     -Collateral from the home-aid agency ([facility]): He called this writer to provide some information (no information was disclosed). He has been in touch with SW on the unit previously. He states that has been working with pt for past 6 months (coordinating home care) and from the information provided to him by aids patient is currently in decompensated state (mentally) and far from his baseline. Reportedly he started decompensating since Summer when his family traveled to [facility] for holidays, but unlike previous time he did not restart his meds upon their return and hence continued to get worse. Reportedly pt (when at baseline) used to have close relationship with his aunt and she is an important figure (also health care proxy) in his life. Patient was evaluated at bedside, MR is reviewed and collateral obtained from the primary team.     -Collateral: As per  on the unit, patient's  contacted the hospital in a search for the patient, as his family (aunt/uncle) have been worried for his disappearance (were not aware he is in the hospital), and have hired a  to find him. As per , he has been in process of suing [facility] where he has a short few days admission and discharged which afterwards he jumped out of the window from his apartment (with subsequent paraplegia) and cut his neck as well (SA) and reportedly patient has been under psychosocial stressor (family related) at the time. Apparently family (?and patient) believed that patient has been discharged early at that admission and resulted in harming himself.     -Collateral from the home-aid agency ([facility]): He called this writer to provide some information (no information was disclosed). He has been in touch with SW on the unit previously. He states that has been working with pt for past 6 months (coordinating home care) and from the information provided to him by aids patient is currently in decompensated state (mentally) and far from his baseline. Reportedly he started decompensating since Summer when his family traveled to [facility] for holidays, but unlike previous time he did not restart his meds upon their return and hence continued to get worse. Reportedly pt (when at baseline) used to have close relationship with his aunt and she is an important figure (also health care proxy) in his life.     On bedside assessment, pt is malodorous, uncooperative, refuses psychiatric evaluation despite being explained about the reason and calls this writer names and ask to leave. As per medical team he takes PO antibiotics , refuses IV Abx and let the wound care clean the wound at times but refuses to be examined or to clean is body. Patient was evaluated at bedside, MR is reviewed and collateral obtained from the primary team.     -Collateral: As per  on the unit, patient's  contacted the hospital in a search for the patient, as his family (aunt/uncle) have been worried for his disappearance (were not aware he is in the hospital), and have hired a  to find him. As per , patient/familly (Aunt who is allegedly health care proxy + patient) have hired this  and are in process of suing the [facility] where he was admitted in psychiatry unit in past (?2010) and allegedly discharged after few days and upon discharge home he jumps out of the window (resulting in current paraplegia) and cut his neck as well (SA). Reportedly patient has been under psychosocial stressor (family related) at the time as well, but allegedly family (?and/or the patient) believed that he has been discharged "too early" during that admission and resulted in harming himself.     -Collateral from the home-aid agency ([facility]): He called this writer to provide some information (no information was disclosed). He has been in touch with SW on the unit previously. He states that has been working with pt for past 6 months (coordinating home care) and from the information provided to him by aids patient is currently in decompensated state (mentally) and far from his baseline. Reportedly he started decompensating since Summer when his family traveled to [facility] for holidays, but unlike previous time he did not restart his meds upon their return and hence continued to get worse. Reportedly pt (when at baseline) used to have close relationship with his aunt and she is an important figure (also health care proxy) in his life.     On bedside assessment, pt is malodorous, uncooperative, refuses psychiatric evaluation despite being explained about the reason and calls this writer names and ask to leave. As per medical team he takes PO antibiotics , refuses IV Abx and let the wound care clean the wound at times but refuses to be examined or to clean is body.

## 2017-12-21 NOTE — PROGRESS NOTE ADULT - PROBLEM SELECTOR PLAN 1
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 Wound cultures growing gram negative rods and gram positive in chains. Upon arrival pts wound was packed w/ feculent material and required debridement by plastic surgery.   -c/w augmentin and cipro as patient refuses IV abx(12/5 zosyn started)  -general surgery does not believe there is a fistula between wound and rectum causing leakage of stool, no surgical intervention at this time.  -wound care following  -EKG  to look for prolonged QT 2/2 cipro Pt w/ a stage 4 infected sacral ulcer w/ purulence. Wound cultures growing gram negative rods and gram positive in chains. Upon arrival pts wound was packed w/ feculent material and required debridement by plastic surgery.   -c/w augmentin and cipro as patient refuses IV abx(12/5 zosyn started)  -general surgery does not believe there is a fistula between wound and rectum causing leakage of stool, no surgical intervention at this time.  -wound care following  -EKG  to look for prolonged QT 2/2 cipro, patient refused

## 2017-12-21 NOTE — CHART NOTE - NSCHARTNOTEFT_GEN_A_CORE
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  Paraplegia  Type 2 diabetes mellitus with hyperglycemia, without long-term current use of insulin      Current Nutrition Order:CCHO/no snack/DASH with glucerna shake TID(660kcal and 30gmprotein)       PO Intake: Good (%) [ x  ]  Fair (50-75%) [   ] Poor (<25%) [   ]    GI Issues: none    Pain:yes    Skin Integrity:stage 4    Labs:         CAPILLARY BLOOD GLUCOSE          Medications:  MEDICATIONS  (STANDING):  amoxicillin  875 milliGRAM(s)/clavulanate 1 Tablet(s) Oral every 12 hours  ARIPiprazole 5 milliGRAM(s) Oral at bedtime  ascorbic acid 250 milliGRAM(s) Oral daily  atorvastatin 20 milliGRAM(s) Oral at bedtime  ciprofloxacin     Tablet 500 milliGRAM(s) Oral every 12 hours  dextrose 5%. 1000 milliLiter(s) (50 mL/Hr) IV Continuous <Continuous>  dextrose 50% Injectable 12.5 Gram(s) IV Push once  dextrose 50% Injectable 25 Gram(s) IV Push once  dextrose 50% Injectable 25 Gram(s) IV Push once  heparin  Injectable 5000 Unit(s) SubCutaneous every 8 hours  insulin glargine Injectable (LANTUS) 17 Unit(s) SubCutaneous every morning  insulin lispro (HumaLOG) corrective regimen sliding scale   SubCutaneous Before meals and at bedtime  insulin lispro Injectable (HumaLOG) 2 Unit(s) SubCutaneous three times a day before meals  levETIRAcetam 500 milliGRAM(s) Oral two times a day  nystatin Powder 1 Application(s) Topical two times a day  oxybutynin 5 milliGRAM(s) Oral two times a day    MEDICATIONS  (PRN):  bisacodyl Suppository 10 milliGRAM(s) Rectal daily PRN Constipation  dextrose Gel 1 Dose(s) Oral once PRN Blood Glucose LESS THAN 70 milliGRAM(s)/deciliter  glucagon  Injectable 1 milliGRAM(s) IntraMuscular once PRN Glucose LESS THAN 70 milligrams/deciliter      Weight:72.6kg  Daily     Daily     Weight Change: no updated weights    Estimated energy needs: based on IBW of 51kg [id number]:1530-1785kcal and 1.4-1.6gmprotein:71-82gmprotein and 1530-1785cc fluids    Subjective: 33 y/o male with stage 4 PU. Eating 80% of trays. Pending D/C .No N/V/D.     Previous Nutrition Diagnosis: Increased nutrient needs r/t increased demand for kcal/protein and nutrients AEB;Stage 4 PU    Active [  x ]  Resolved [   ]    If resolved, new PES:     Goal:Meet 80% consistently    Recommendations:1.Updated weights 2.Change diet to CCHO with snack/DASH with 2 glucerna shakes (440kcal and 20gmprotein) and 2 Pro-stat(200kcal and 30gmprotein)  Education: not able to educate.Unwilling to cooperate    Risk Level: High [   ] Moderate [  x ] Low [   ]

## 2017-12-21 NOTE — PROGRESS NOTE ADULT - ATTENDING COMMENTS
Pt seen at bedside, immediately asked me to get out.   VS- reviewed  Exam- refusing  Labs- refusing  31 yo M with   1. Septic shock from stage 4 decub with osteomyelitis- refusing IV so on cipro/augmentin.  Getting wound care but refused being cleaned, foul smelling down dia b/c of lack of care for many days.    2. Staghorn renal stone- urology eval on hold as pt refusing basic care  3. Neurogenic bladder- suprapubic cath, on oxybutynin  4. Paraplegia- pt has home care with aids 24 hrs per day which he refuses and a wheelchair; unsafe to return home at this time and does not have right to leave AMA.  5. Schizoaffective- paranoid, psychotic, not taking his meds- d/w ethics, psych, and legal services, will have  to come inpatient to discuss treatment over objection. Asked for new drs and to leave AMA.  Pt is paranoid and psychotic.  Discussed with psych who will eval him for med-psych unit as well, but [facility].  Pt  is at risk to causing harm to himself by refusing medical care and medications.  In discussion with  regarding court case and documents are almost prepared for court.  Psych to do full capacity evaluation.  Pt's  and POA are aware of admission and  has been in contact.  6. Uncontrolled DM with hyperglycemia- refusing insulin  7. Seizure d/o- c/w home meds  8. UTI- completed tx, will d/w epidemiology re: d/c isolation for ESBL klebsiella  9. Anemia- refusing labs most days

## 2017-12-21 NOTE — PROGRESS NOTE BEHAVIORAL HEALTH - OTHER
Sarcastic Pt uncooperative, unable to assess. Does not appear to be responding to internal stimuli Entitled, Sarcastic, Unpleasant, but not threatening Averts eyes Able to move hands and arms, is paraplegic Paraplegic, deferred

## 2017-12-22 LAB
ANION GAP SERPL CALC-SCNC: 13 MMOL/L — SIGNIFICANT CHANGE UP (ref 5–17)
BASOPHILS NFR BLD AUTO: 0.9 % — SIGNIFICANT CHANGE UP (ref 0–2)
BUN SERPL-MCNC: 23 MG/DL — SIGNIFICANT CHANGE UP (ref 7–23)
CALCIUM SERPL-MCNC: 9.6 MG/DL — SIGNIFICANT CHANGE UP (ref 8.4–10.5)
CHLORIDE SERPL-SCNC: 101 MMOL/L — SIGNIFICANT CHANGE UP (ref 96–108)
CO2 SERPL-SCNC: 24 MMOL/L — SIGNIFICANT CHANGE UP (ref 22–31)
CREAT SERPL-MCNC: 0.84 MG/DL — SIGNIFICANT CHANGE UP (ref 0.5–1.3)
EOSINOPHIL NFR BLD AUTO: 2.5 % — SIGNIFICANT CHANGE UP (ref 0–6)
GLUCOSE SERPL-MCNC: 147 MG/DL — HIGH (ref 70–99)
HCT VFR BLD CALC: 30.8 % — LOW (ref 39–50)
HGB BLD-MCNC: 9 G/DL — LOW (ref 13–17)
LYMPHOCYTES # BLD AUTO: 38.2 % — SIGNIFICANT CHANGE UP (ref 13–44)
MCHC RBC-ENTMCNC: 22.9 PG — LOW (ref 27–34)
MCHC RBC-ENTMCNC: 29.2 G/DL — LOW (ref 32–36)
MCV RBC AUTO: 78.4 FL — LOW (ref 80–100)
MONOCYTES NFR BLD AUTO: 10.5 % — SIGNIFICANT CHANGE UP (ref 2–14)
NEUTROPHILS NFR BLD AUTO: 47.9 % — SIGNIFICANT CHANGE UP (ref 43–77)
PLATELET # BLD AUTO: 267 K/UL — SIGNIFICANT CHANGE UP (ref 150–400)
POTASSIUM SERPL-MCNC: 4.6 MMOL/L — SIGNIFICANT CHANGE UP (ref 3.5–5.3)
POTASSIUM SERPL-SCNC: 4.6 MMOL/L — SIGNIFICANT CHANGE UP (ref 3.5–5.3)
RBC # BLD: 3.93 M/UL — LOW (ref 4.2–5.8)
RBC # FLD: 20.1 % — HIGH (ref 10.3–16.9)
SODIUM SERPL-SCNC: 138 MMOL/L — SIGNIFICANT CHANGE UP (ref 135–145)
WBC # BLD: 5.6 K/UL — SIGNIFICANT CHANGE UP (ref 3.8–10.5)
WBC # FLD AUTO: 5.6 K/UL — SIGNIFICANT CHANGE UP (ref 3.8–10.5)

## 2017-12-22 PROCEDURE — 99233 SBSQ HOSP IP/OBS HIGH 50: CPT

## 2017-12-22 RX ADMIN — Medication 1 TABLET(S): at 11:41

## 2017-12-22 RX ADMIN — Medication 1 TABLET(S): at 21:58

## 2017-12-22 RX ADMIN — Medication 500 MILLIGRAM(S): at 21:58

## 2017-12-22 RX ADMIN — HEPARIN SODIUM 5000 UNIT(S): 5000 INJECTION INTRAVENOUS; SUBCUTANEOUS at 15:04

## 2017-12-22 RX ADMIN — LEVETIRACETAM 500 MILLIGRAM(S): 250 TABLET, FILM COATED ORAL at 06:10

## 2017-12-22 RX ADMIN — HEPARIN SODIUM 5000 UNIT(S): 5000 INJECTION INTRAVENOUS; SUBCUTANEOUS at 21:58

## 2017-12-22 RX ADMIN — LEVETIRACETAM 500 MILLIGRAM(S): 250 TABLET, FILM COATED ORAL at 18:12

## 2017-12-22 RX ADMIN — NYSTATIN CREAM 1 APPLICATION(S): 100000 CREAM TOPICAL at 18:14

## 2017-12-22 RX ADMIN — Medication 500 MILLIGRAM(S): at 11:42

## 2017-12-22 RX ADMIN — Medication 5 MILLIGRAM(S): at 18:12

## 2017-12-22 RX ADMIN — NYSTATIN CREAM 1 APPLICATION(S): 100000 CREAM TOPICAL at 06:10

## 2017-12-22 RX ADMIN — Medication 5 MILLIGRAM(S): at 06:10

## 2017-12-22 RX ADMIN — HEPARIN SODIUM 5000 UNIT(S): 5000 INJECTION INTRAVENOUS; SUBCUTANEOUS at 06:10

## 2017-12-22 RX ADMIN — ATORVASTATIN CALCIUM 20 MILLIGRAM(S): 80 TABLET, FILM COATED ORAL at 21:58

## 2017-12-22 RX ADMIN — Medication 250 MILLIGRAM(S): at 11:42

## 2017-12-22 NOTE — PROGRESS NOTE ADULT - ATTENDING COMMENTS
Pt seen at bedside, immediately asked me to get out.   VS- reviewed  Exam- refusing  Labs- reviewed  31 yo M with   1. Septic shock from stage 4 decub with osteomyelitis- refusing IV so on cipro/augmentin.  Getting wound care but refused being cleaned, foul smelling b/c of lack of care for many days.    2. Staghorn renal stone- urology eval on hold as pt refusing basic care  3. Neurogenic bladder- suprapubic cath, on oxybutynin  4. Paraplegia- pt has home care with aids 24 hrs per day which he refuses and a wheelchair; unsafe to return home at this time and does not have right to leave AMA.  5. Schizoaffective- paranoid, psychotic, not taking his meds- d/w ethics, psych, and legal services, will have  to come inpatient to discuss treatment over objection. Asked for new drs and to leave AMA.  Pt is paranoid and psychotic.  Discussed with psych who will eval him for med-psych unit as well, but has been rejected by multiple facilities.  Pt  is at risk to causing harm to himself by refusing medical care and medications.    6. Uncontrolled DM with hyperglycemia- refusing insulin  7. Seizure d/o- c/w home meds  8. UTI- completed tx, will d/w epidemiology re: d/c isolation for ESBL klebsiella  9. Anemia- refusing labs most days Pt seen at bedside, immediately asked me to get out.   VS- reviewed  Exam- refusing  Labs- reviewed  31 yo M with   1. Septic shock from stage 4 decub with osteomyelitis- refusing IV so on cipro/augmentin.  Getting wound care but refused being cleaned, foul smelling b/c of lack of care for many days.    2. Staghorn renal stone- urology eval on hold as pt refusing basic care  3. Neurogenic bladder- suprapubic cath, on oxybutynin  4. Paraplegia- pt has home care with aids 24 hrs per day which he refuses and a wheelchair; unsafe to return home at this time and does not have right to leave AMA.  5. Schizoaffective- paranoid, psychotic, not taking his meds- d/w ethics, psych, and legal services, will have  to come inpatient to discuss treatment over objection. Asked for new drs and to leave AMA.  Pt is paranoid and psychotic.  Discussed with psych who will eval him for med-psych unit as well, but has been rejected by multiple facilities.  Pt  is at risk to causing harm to himself by refusing medical care and medications.  Awaiting court date.  6. Uncontrolled DM with hyperglycemia- refusing insulin  7. Seizure d/o- c/w home meds  8. UTI- completed tx, on isolation for ESBL klebsiella  9. Anemia- refusing labs most days

## 2017-12-22 NOTE — PROGRESS NOTE ADULT - PROBLEM SELECTOR PLAN 6
-c/w home lipitor medication    #Seizure Disorder:   pt w/ a reported hx of seizure disorder  -c/w home medication of keppra 500mg BID    #Psychiatry Eval: Pt evaluated by psych and recommended he needs to be admitted to medical-psychiatric inpatient unit.   -Aripiprazole 5mg daily --pt refuses to take medications reporting "I am sane"

## 2017-12-22 NOTE — PROGRESS NOTE ADULT - SUBJECTIVE AND OBJECTIVE BOX
INTERVAL HPI/OVERNIGHT EVENTS:  No reported events from overnight team or nursing, patient remained hemodynamically stable. This morning at bedside 10 system ROS negative. Patient is less angry this morning.     ICU Vital Signs Last 24 Hrs  T(C): --  T(F): --  HR: 86 (21 Dec 2017 17:31) (86 - 86)  BP: 115/74 (22 Dec 2017 06:25) (107/69 - 115/74)  BP(mean): --  ABP: --  ABP(mean): --  RR: --  SpO2: --      PHYSICAL EXAM:  Constitutional: NAD, lying in bed comfortably, non-toxic   Neuro: alert and oriented  Extremities: No edema  PATIENT REFUSED PHYSICAL EXAM, but general appearance is that he is in no distress, non-toxic and comfortable.        MEDICATIONS  (STANDING):  amoxicillin  875 milliGRAM(s)/clavulanate 1 Tablet(s) Oral every 12 hours  ARIPiprazole 5 milliGRAM(s) Oral at bedtime  ascorbic acid 250 milliGRAM(s) Oral daily  atorvastatin 20 milliGRAM(s) Oral at bedtime  ciprofloxacin     Tablet 500 milliGRAM(s) Oral every 12 hours  dextrose 5%. 1000 milliLiter(s) (50 mL/Hr) IV Continuous <Continuous>  dextrose 50% Injectable 12.5 Gram(s) IV Push once  dextrose 50% Injectable 25 Gram(s) IV Push once  dextrose 50% Injectable 25 Gram(s) IV Push once  heparin  Injectable 5000 Unit(s) SubCutaneous every 8 hours  insulin glargine Injectable (LANTUS) 17 Unit(s) SubCutaneous every morning  insulin lispro (HumaLOG) corrective regimen sliding scale   SubCutaneous Before meals and at bedtime  insulin lispro Injectable (HumaLOG) 2 Unit(s) SubCutaneous three times a day before meals  levETIRAcetam 500 milliGRAM(s) Oral two times a day  nystatin Powder 1 Application(s) Topical two times a day  oxybutynin 5 milliGRAM(s) Oral two times a day    MEDICATIONS  (PRN):  bisacodyl Suppository 10 milliGRAM(s) Rectal daily PRN Constipation  dextrose Gel 1 Dose(s) Oral once PRN Blood Glucose LESS THAN 70 milliGRAM(s)/deciliter  glucagon  Injectable 1 milliGRAM(s) IntraMuscular once PRN Glucose LESS THAN 70 milligrams/deciliter      Allergies    Capzasin Back and Body (Unknown)    Intolerances        LABS:                        9.0    5.6   )-----------( 267      ( 22 Dec 2017 06:10 )             30.8     12-22    138  |  101  |  23  ----------------------------<  147<H>  4.6   |  24  |  0.84    Ca    9.6      22 Dec 2017 06:09            RADIOLOGY & ADDITIONAL TESTS: Reviewed

## 2017-12-23 PROCEDURE — 99233 SBSQ HOSP IP/OBS HIGH 50: CPT

## 2017-12-23 RX ADMIN — HEPARIN SODIUM 5000 UNIT(S): 5000 INJECTION INTRAVENOUS; SUBCUTANEOUS at 21:33

## 2017-12-23 RX ADMIN — Medication 1 TABLET(S): at 21:33

## 2017-12-23 RX ADMIN — HEPARIN SODIUM 5000 UNIT(S): 5000 INJECTION INTRAVENOUS; SUBCUTANEOUS at 14:17

## 2017-12-23 RX ADMIN — LEVETIRACETAM 500 MILLIGRAM(S): 250 TABLET, FILM COATED ORAL at 06:49

## 2017-12-23 RX ADMIN — Medication 1 TABLET(S): at 10:00

## 2017-12-23 RX ADMIN — NYSTATIN CREAM 1 APPLICATION(S): 100000 CREAM TOPICAL at 06:48

## 2017-12-23 RX ADMIN — Medication 250 MILLIGRAM(S): at 12:39

## 2017-12-23 RX ADMIN — HEPARIN SODIUM 5000 UNIT(S): 5000 INJECTION INTRAVENOUS; SUBCUTANEOUS at 06:48

## 2017-12-23 RX ADMIN — Medication 5 MILLIGRAM(S): at 06:49

## 2017-12-23 RX ADMIN — ATORVASTATIN CALCIUM 20 MILLIGRAM(S): 80 TABLET, FILM COATED ORAL at 21:32

## 2017-12-23 RX ADMIN — Medication 5 MILLIGRAM(S): at 17:23

## 2017-12-23 RX ADMIN — LEVETIRACETAM 500 MILLIGRAM(S): 250 TABLET, FILM COATED ORAL at 17:24

## 2017-12-23 RX ADMIN — Medication 500 MILLIGRAM(S): at 10:01

## 2017-12-23 RX ADMIN — Medication 500 MILLIGRAM(S): at 21:33

## 2017-12-23 RX ADMIN — NYSTATIN CREAM 1 APPLICATION(S): 100000 CREAM TOPICAL at 17:23

## 2017-12-23 NOTE — PROGRESS NOTE ADULT - SUBJECTIVE AND OBJECTIVE BOX
Patient is a 32y old  Male who presents with a chief complaint of Chills (12 Dec 2017 18:17)      24 hour events:     ROS:    Vital Signs Last 24 Hrs  T(C): --  T(F): --  HR: 101 (23 Dec 2017 08:56) (101 - 101)  BP: 112/67 (23 Dec 2017 08:56) (112/67 - 112/67)  BP(mean): --  RR: --  SpO2: --  I&O's Summary    22 Dec 2017 07:01  -  23 Dec 2017 07:00  --------------------------------------------------------  IN: 0 mL / OUT: 1450 mL / NET: -1450 mL    23 Dec 2017 07:01  -  23 Dec 2017 09:18  --------------------------------------------------------  IN: 0 mL / OUT: 1200 mL / NET: -1200 mL      CAPILLARY BLOOD GLUCOSE        PHYSICAL EXAM:      Constitutional:    Eyes:    ENMT:    Neck:    Breasts:    Back:    Respiratory:    Cardiovascular:    Gastrointestinal:    Genitourinary:    Rectal:    Extremities:    Vascular:    Neurological:    Skin:    Lymph Nodes:    Musculoskeletal:    Psychiatric:                              9.0    5.6   )-----------( 267      ( 22 Dec 2017 06:10 )             30.8     12-22    138  |  101  |  23  ----------------------------<  147<H>  4.6   |  24  |  0.84    Ca    9.6      22 Dec 2017 06:09      Imaging:     MEDICATIONS  (STANDING):  amoxicillin  875 milliGRAM(s)/clavulanate 1 Tablet(s) Oral every 12 hours  ARIPiprazole 5 milliGRAM(s) Oral at bedtime  ascorbic acid 250 milliGRAM(s) Oral daily  atorvastatin 20 milliGRAM(s) Oral at bedtime  ciprofloxacin     Tablet 500 milliGRAM(s) Oral every 12 hours  dextrose 5%. 1000 milliLiter(s) (50 mL/Hr) IV Continuous <Continuous>  dextrose 50% Injectable 12.5 Gram(s) IV Push once  dextrose 50% Injectable 25 Gram(s) IV Push once  dextrose 50% Injectable 25 Gram(s) IV Push once  heparin  Injectable 5000 Unit(s) SubCutaneous every 8 hours  insulin glargine Injectable (LANTUS) 17 Unit(s) SubCutaneous every morning  insulin lispro (HumaLOG) corrective regimen sliding scale   SubCutaneous Before meals and at bedtime  insulin lispro Injectable (HumaLOG) 2 Unit(s) SubCutaneous three times a day before meals  levETIRAcetam 500 milliGRAM(s) Oral two times a day  nystatin Powder 1 Application(s) Topical two times a day  oxybutynin 5 milliGRAM(s) Oral two times a day    MEDICATIONS  (PRN):  bisacodyl Suppository 10 milliGRAM(s) Rectal daily PRN Constipation  dextrose Gel 1 Dose(s) Oral once PRN Blood Glucose LESS THAN 70 milliGRAM(s)/deciliter  glucagon  Injectable 1 milliGRAM(s) IntraMuscular once PRN Glucose LESS THAN 70 milligrams/deciliter      This is a 32y Male with a history of Hepatitis B infection without delta agent without hepatic coma, unspecified chronicity  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  Paraplegia  Type 2 diabetes mellitus with hyperglycemia, without long-term current use of insulin   admitted for WOUND CHECK  .  HEALTH ISSUES - PROBLEM Dx:  Schizophrenia  Axis II diagnosis  Schizoaffective disorder, unspecified type: Schizoaffective disorder, unspecified type  Nonintractable epilepsy without status epilepticus, unspecified epilepsy type: Nonintractable epilepsy without status epilepticus, unspecified epilepsy type  Schizoaffective disorder  Type 2 diabetes mellitus with hyperglycemia, without long-term current use of insulin: Type 2 diabetes mellitus with hyperglycemia, without long-term current use of insulin  Psychosis: Psychosis  Hyperlipidemia: Hyperlipidemia  Microcytic anemia: Microcytic anemia  Neurogenic bladder: Neurogenic bladder  Need for prophylactic measure: Need for prophylactic measure  Nutrition, metabolism, and development symptoms: Nutrition, metabolism, and development symptoms  Type 2 diabetes mellitus: Type 2 diabetes mellitus  Paraplegia: Paraplegia  Urinary tract infection: Urinary tract infection  Sacral osteomyelitis: Sacral osteomyelitis  Suprapubic catheter: Suprapubic catheter  Severe sepsis: Severe sepsis Patient is a 32y old  Male who presents with a chief complaint of Chills (12 Dec 2017 18:17)      24 hour events: None    ROS: c/o wanting to be in wheelchair as opposed to bed to improve decubs, wants brain MRI, wants AMA papers  Denies pain, D/C    Vital Signs Last 24 Hrs  T(C): --  T(F): --  HR: 101 (23 Dec 2017 08:56) (101 - 101)  BP: 112/67 (23 Dec 2017 08:56) (112/67 - 112/67)  BP(mean): --  RR: --  SpO2: --  I&O's Summary    22 Dec 2017 07:01  -  23 Dec 2017 07:00  --------------------------------------------------------  IN: 0 mL / OUT: 1450 mL / NET: -1450 mL    23 Dec 2017 07:01  -  23 Dec 2017 09:18  --------------------------------------------------------  IN: 0 mL / OUT: 1200 mL / NET: -1200 mL      CAPILLARY BLOOD GLUCOSE        PHYSICAL EXAM:      Constitutional: NAD, off affect    Respiratory: CTAB    Cardiovascular: RRR    Gastrointestinal: midline scar    Genitourinary: cath in place                                9.0    5.6   )-----------( 267      ( 22 Dec 2017 06:10 )             30.8     12-22    138  |  101  |  23  ----------------------------<  147<H>  4.6   |  24  |  0.84    Ca    9.6      22 Dec 2017 06:09      Imaging:     MEDICATIONS  (STANDING):  amoxicillin  875 milliGRAM(s)/clavulanate 1 Tablet(s) Oral every 12 hours  ARIPiprazole 5 milliGRAM(s) Oral at bedtime  ascorbic acid 250 milliGRAM(s) Oral daily  atorvastatin 20 milliGRAM(s) Oral at bedtime  ciprofloxacin     Tablet 500 milliGRAM(s) Oral every 12 hours  dextrose 5%. 1000 milliLiter(s) (50 mL/Hr) IV Continuous <Continuous>  dextrose 50% Injectable 12.5 Gram(s) IV Push once  dextrose 50% Injectable 25 Gram(s) IV Push once  dextrose 50% Injectable 25 Gram(s) IV Push once  heparin  Injectable 5000 Unit(s) SubCutaneous every 8 hours  insulin glargine Injectable (LANTUS) 17 Unit(s) SubCutaneous every morning  insulin lispro (HumaLOG) corrective regimen sliding scale   SubCutaneous Before meals and at bedtime  insulin lispro Injectable (HumaLOG) 2 Unit(s) SubCutaneous three times a day before meals  levETIRAcetam 500 milliGRAM(s) Oral two times a day  nystatin Powder 1 Application(s) Topical two times a day  oxybutynin 5 milliGRAM(s) Oral two times a day    MEDICATIONS  (PRN):  bisacodyl Suppository 10 milliGRAM(s) Rectal daily PRN Constipation  dextrose Gel 1 Dose(s) Oral once PRN Blood Glucose LESS THAN 70 milliGRAM(s)/deciliter  glucagon  Injectable 1 milliGRAM(s) IntraMuscular once PRN Glucose LESS THAN 70 milligrams/deciliter      This is a 32y Male with a history of Hepatitis B infection without delta agent without hepatic coma, unspecified chronicity  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  Paraplegia  Type 2 diabetes mellitus with hyperglycemia, without long-term current use of insulin   admitted for WOUND CHECK  .  HEALTH ISSUES - PROBLEM Dx:  Schizophrenia - refusing tx, awaiting court procedure for guardianship  Nonintractable epilepsy without status epilepticus, unspecified epilepsy type: Nonintractable epilepsy without status epilepticus, unspecified epilepsy type - keppra  Type 2 diabetes mellitus with hyperglycemia, without long-term current use of insulin: Type 2 diabetes mellitus with hyperglycemia, without long-term current use of insulin - refusing FS, gluc on BMP yesterday at goal, continue current regimen  Psychosis: Psychosis - awaiting guardianship  Hyperlipidemia: Hyperlipidemia - statin  Neurogenic bladder: Neurogenic bladder - s/p suprapubic cath, care per   Need for prophylactic measure: Need for prophylactic measure - [facility]  Paraplegia: Paraplegia - supportive care, wound care  Sacral osteomyelitis: Sacral osteomyelitis - abscess Cx growing bacteria sensitive to cipro  Severe sepsis: Severe sepsis - resolved

## 2017-12-23 NOTE — PROVIDER CONTACT NOTE (OTHER) - SITUATION
Pt refusing vital signs, blood glucose, and insulin. Multiple attempts made. Pt educated on importance of vital signs, blood glucose, and insulin.

## 2017-12-24 LAB — GLUCOSE BLDC GLUCOMTR-MCNC: 233 MG/DL — HIGH (ref 70–99)

## 2017-12-24 PROCEDURE — 99233 SBSQ HOSP IP/OBS HIGH 50: CPT

## 2017-12-24 RX ORDER — METFORMIN HYDROCHLORIDE 850 MG/1
500 TABLET ORAL
Qty: 0 | Refills: 0 | Status: DISCONTINUED | OUTPATIENT
Start: 2017-12-24 | End: 2018-01-05

## 2017-12-24 RX ADMIN — METFORMIN HYDROCHLORIDE 500 MILLIGRAM(S): 850 TABLET ORAL at 18:21

## 2017-12-24 RX ADMIN — METFORMIN HYDROCHLORIDE 500 MILLIGRAM(S): 850 TABLET ORAL at 13:40

## 2017-12-24 RX ADMIN — Medication 500 MILLIGRAM(S): at 22:14

## 2017-12-24 RX ADMIN — HEPARIN SODIUM 5000 UNIT(S): 5000 INJECTION INTRAVENOUS; SUBCUTANEOUS at 06:45

## 2017-12-24 RX ADMIN — LEVETIRACETAM 500 MILLIGRAM(S): 250 TABLET, FILM COATED ORAL at 18:21

## 2017-12-24 RX ADMIN — ATORVASTATIN CALCIUM 20 MILLIGRAM(S): 80 TABLET, FILM COATED ORAL at 22:14

## 2017-12-24 RX ADMIN — Medication 5 MILLIGRAM(S): at 06:45

## 2017-12-24 RX ADMIN — NYSTATIN CREAM 1 APPLICATION(S): 100000 CREAM TOPICAL at 06:45

## 2017-12-24 RX ADMIN — LEVETIRACETAM 500 MILLIGRAM(S): 250 TABLET, FILM COATED ORAL at 06:45

## 2017-12-24 RX ADMIN — Medication 1 TABLET(S): at 22:14

## 2017-12-24 RX ADMIN — Medication 4: at 22:16

## 2017-12-24 RX ADMIN — Medication 5 MILLIGRAM(S): at 18:21

## 2017-12-24 RX ADMIN — NYSTATIN CREAM 1 APPLICATION(S): 100000 CREAM TOPICAL at 18:21

## 2017-12-24 RX ADMIN — Medication 1 TABLET(S): at 09:32

## 2017-12-24 RX ADMIN — Medication 250 MILLIGRAM(S): at 11:09

## 2017-12-24 RX ADMIN — HEPARIN SODIUM 5000 UNIT(S): 5000 INJECTION INTRAVENOUS; SUBCUTANEOUS at 22:13

## 2017-12-24 RX ADMIN — Medication 500 MILLIGRAM(S): at 09:32

## 2017-12-24 NOTE — PROGRESS NOTE ADULT - PROBLEM SELECTOR PLAN 2
Pt w/ no sensation or control of urination with suprapubic catheter changed once a month.   -Urology changed cath on 12/7 as it was leaking  -c/w oxybutynin 5mg BID

## 2017-12-24 NOTE — PROGRESS NOTE ADULT - SUBJECTIVE AND OBJECTIVE BOX
Patient is a 32y old  Male who presents with a chief complaint of Chills (12 Dec 2017 18:17)      24 hour events: None    ROS: Requesting AMA paperwork, fMRI, recent lab results, changing attending of record, remeasuring of wound, cath removal    Vital Signs Last 24 Hrs  T(C): --  T(F): --  HR: 87 (23 Dec 2017 16:00) (87 - 87)  BP: 98/61 (23 Dec 2017 16:00) (98/61 - 98/61)  BP(mean): --  RR: --  SpO2: --  I&O's Summary    23 Dec 2017 07:01  -  24 Dec 2017 07:00  --------------------------------------------------------  IN: 0 mL / OUT: 2000 mL / NET: -2000 mL      CAPILLARY BLOOD GLUCOSE        PHYSICAL EXAM:      Constitutional: NAD, off affect    Respiratory: CTAB    Cardiovascular: RRR    Extremities: no c/c/e            Imaging:     MEDICATIONS  (STANDING):  amoxicillin  875 milliGRAM(s)/clavulanate 1 Tablet(s) Oral every 12 hours  ARIPiprazole 5 milliGRAM(s) Oral at bedtime  ascorbic acid 250 milliGRAM(s) Oral daily  atorvastatin 20 milliGRAM(s) Oral at bedtime  ciprofloxacin     Tablet 500 milliGRAM(s) Oral every 12 hours  dextrose 5%. 1000 milliLiter(s) (50 mL/Hr) IV Continuous <Continuous>  dextrose 50% Injectable 12.5 Gram(s) IV Push once  dextrose 50% Injectable 25 Gram(s) IV Push once  dextrose 50% Injectable 25 Gram(s) IV Push once  heparin  Injectable 5000 Unit(s) SubCutaneous every 8 hours  insulin glargine Injectable (LANTUS) 17 Unit(s) SubCutaneous every morning  insulin lispro (HumaLOG) corrective regimen sliding scale   SubCutaneous Before meals and at bedtime  insulin lispro Injectable (HumaLOG) 2 Unit(s) SubCutaneous three times a day before meals  levETIRAcetam 500 milliGRAM(s) Oral two times a day  nystatin Powder 1 Application(s) Topical two times a day  oxybutynin 5 milliGRAM(s) Oral two times a day    MEDICATIONS  (PRN):  bisacodyl Suppository 10 milliGRAM(s) Rectal daily PRN Constipation  dextrose Gel 1 Dose(s) Oral once PRN Blood Glucose LESS THAN 70 milliGRAM(s)/deciliter  glucagon  Injectable 1 milliGRAM(s) IntraMuscular once PRN Glucose LESS THAN 70 milligrams/deciliter      This is a 32y Male with a history of Hepatitis B infection without delta agent without hepatic coma, unspecified chronicity  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  Paraplegia  Type 2 diabetes mellitus with hyperglycemia, without long-term current use of insulin   admitted for WOUND CHECK  .  HEALTH ISSUES - PROBLEM Dx:  Schizophrenia - refusing tx  Nonintractable epilepsy without status epilepticus, unspecified epilepsy type: Nonintractable epilepsy without status epilepticus, unspecified epilepsy type - keppra  Type 2 diabetes mellitus with hyperglycemia, without long-term current use of insulin: Type 2 diabetes mellitus with hyperglycemia, without long-term current use of insulin - add metformin per pt request as refusing insulin, no plans for procedures/imaging while awaiting guardianship  Hyperlipidemia: Hyperlipidemia - continue statin  Microcytic anemia: Microcytic anemia - Hb stable when pt last allowed meds  Neurogenic bladder: Neurogenic bladder - s/p suprapubic cath  Need for prophylactic measure: Need for prophylactic measure - [facility]  Paraplegia: Paraplegia - supportive care  Urinary tract infection: Urinary tract infection - s/p abx  Sacral osteomyelitis: Sacral osteomyelitis - continue cipro, augmentin  Awaiting court proceeding for guardianship

## 2017-12-24 NOTE — PROGRESS NOTE ADULT - SUBJECTIVE AND OBJECTIVE BOX
INTERVAL HPI/OVERNIGHT EVENTS:  No reported events from overnight team or nursing, patient remained hemodynamically stable. This morning at bedside patient at baseline, continues to resist medical treatment but otherwise nontoxic and in no distress. 10 system ROS negative     Vital Signs Last 24 Hrs  T(C): --  T(F): --  HR: 87 (23 Dec 2017 16:00) (87 - 101)  BP: 98/61 (23 Dec 2017 16:00) (98/61 - 112/67)  BP(mean): --  ABP: --  ABP(mean): --  RR: --  SpO2: --    PHYSICAL EXAM:  Constitutional: NAD, lying in bed comfortably, non-toxic   Neuro: alert and oriented  Extremities: No edema  PATIENT REFUSED PHYSICAL EXAM, but general appearance is that he is in no distress, non-toxic and comfortable.        MEDICATIONS  (STANDING):  amoxicillin  875 milliGRAM(s)/clavulanate 1 Tablet(s) Oral every 12 hours  ARIPiprazole 5 milliGRAM(s) Oral at bedtime  ascorbic acid 250 milliGRAM(s) Oral daily  atorvastatin 20 milliGRAM(s) Oral at bedtime  ciprofloxacin     Tablet 500 milliGRAM(s) Oral every 12 hours  dextrose 5%. 1000 milliLiter(s) (50 mL/Hr) IV Continuous <Continuous>  dextrose 50% Injectable 12.5 Gram(s) IV Push once  dextrose 50% Injectable 25 Gram(s) IV Push once  dextrose 50% Injectable 25 Gram(s) IV Push once  heparin  Injectable 5000 Unit(s) SubCutaneous every 8 hours  insulin glargine Injectable (LANTUS) 17 Unit(s) SubCutaneous every morning  insulin lispro (HumaLOG) corrective regimen sliding scale   SubCutaneous Before meals and at bedtime  insulin lispro Injectable (HumaLOG) 2 Unit(s) SubCutaneous three times a day before meals  levETIRAcetam 500 milliGRAM(s) Oral two times a day  nystatin Powder 1 Application(s) Topical two times a day  oxybutynin 5 milliGRAM(s) Oral two times a day    MEDICATIONS  (PRN):  bisacodyl Suppository 10 milliGRAM(s) Rectal daily PRN Constipation  dextrose Gel 1 Dose(s) Oral once PRN Blood Glucose LESS THAN 70 milliGRAM(s)/deciliter  glucagon  Injectable 1 milliGRAM(s) IntraMuscular once PRN Glucose LESS THAN 70 milligrams/deciliter      Allergies    Capzasin Back and Body (Unknown)    Intolerances        LABS:                RADIOLOGY & ADDITIONAL TESTS: Reviewed INTERVAL HPI/OVERNIGHT EVENTS:  No reported events from overnight team or nursing, patient remained hemodynamically stable. This morning at bedside patient at baseline, continues to resist medical treatment but otherwise nontoxic and in no distress. 10 system ROS negative     Vital Signs Last 24 Hrs  T(C): --  T(F): --  HR: 87 (23 Dec 2017 16:00) (87 - 101)  BP: 98/61 (23 Dec 2017 16:00) (98/61 - 112/67)  BP(mean): --  ABP: --  ABP(mean): --  RR: --  SpO2: --    PHYSICAL EXAM:  Constitutional: NAD, lying in bed comfortably, non-toxic   Neuro: alert and oriented  CV: rrr no m/r/g  Pulm: lungs clear b/l  Extremities: No edema  PATIENT REFUSED PARTS OF PHYSICAL EXAM, but general appearance is that he is in no distress, non-toxic and comfortable.        MEDICATIONS  (STANDING):  amoxicillin  875 milliGRAM(s)/clavulanate 1 Tablet(s) Oral every 12 hours  ARIPiprazole 5 milliGRAM(s) Oral at bedtime  ascorbic acid 250 milliGRAM(s) Oral daily  atorvastatin 20 milliGRAM(s) Oral at bedtime  ciprofloxacin     Tablet 500 milliGRAM(s) Oral every 12 hours  dextrose 5%. 1000 milliLiter(s) (50 mL/Hr) IV Continuous <Continuous>  dextrose 50% Injectable 12.5 Gram(s) IV Push once  dextrose 50% Injectable 25 Gram(s) IV Push once  dextrose 50% Injectable 25 Gram(s) IV Push once  heparin  Injectable 5000 Unit(s) SubCutaneous every 8 hours  insulin glargine Injectable (LANTUS) 17 Unit(s) SubCutaneous every morning  insulin lispro (HumaLOG) corrective regimen sliding scale   SubCutaneous Before meals and at bedtime  insulin lispro Injectable (HumaLOG) 2 Unit(s) SubCutaneous three times a day before meals  levETIRAcetam 500 milliGRAM(s) Oral two times a day  nystatin Powder 1 Application(s) Topical two times a day  oxybutynin 5 milliGRAM(s) Oral two times a day    MEDICATIONS  (PRN):  bisacodyl Suppository 10 milliGRAM(s) Rectal daily PRN Constipation  dextrose Gel 1 Dose(s) Oral once PRN Blood Glucose LESS THAN 70 milliGRAM(s)/deciliter  glucagon  Injectable 1 milliGRAM(s) IntraMuscular once PRN Glucose LESS THAN 70 milligrams/deciliter      Allergies    Capzasin Back and Body (Unknown)    Intolerances        LABS:                RADIOLOGY & ADDITIONAL TESTS: Reviewed

## 2017-12-24 NOTE — PROGRESS NOTE ADULT - PROBLEM SELECTOR PLAN 1
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 Upon arrival pts wound was packed w/ feculent material and required debridement by plastic surgery.   -wound cx had citrobacter, enterococcus, klebsiella  -c/w augmentin and cipro as patient refuses IV abx(12/5 zosyn started)  -general surgery does not believe there is a fistula between wound and rectum causing leakage of stool, no surgical intervention at this time.  -wound care following

## 2017-12-24 NOTE — PROGRESS NOTE ADULT - ASSESSMENT
32M paraplegic PMH spinal cord injury (wheelchair bound), sacral pressure ulcer with osteo x2 (outpatient provider said they have records of March osteo s/p daptomycin of unknown length) earlier in 2017,  DM2, indwelling suprapubic catheter who presented w/ septic shock secondary to infected purulent sacral 4th degree pressure ulcer with underlying inadequately treated OM, hemodynamically stable being treated for sacral osteomyelitis awaiting placement at [facility]

## 2017-12-24 NOTE — PROGRESS NOTE ADULT - PROBLEM SELECTOR PLAN 7
F: no IVF  E: K>4 Mg>2, monitor and replete as needed  N: diabetic diet  Ppx: [facility]  D: RMF, awaiting placement at med[facility]psych facility   FULL CODE

## 2017-12-25 LAB — PCP SPEC-MCNC: SIGNIFICANT CHANGE UP

## 2017-12-25 PROCEDURE — 99233 SBSQ HOSP IP/OBS HIGH 50: CPT

## 2017-12-25 RX ADMIN — HEPARIN SODIUM 5000 UNIT(S): 5000 INJECTION INTRAVENOUS; SUBCUTANEOUS at 22:28

## 2017-12-25 RX ADMIN — Medication 500 MILLIGRAM(S): at 22:26

## 2017-12-25 RX ADMIN — NYSTATIN CREAM 1 APPLICATION(S): 100000 CREAM TOPICAL at 05:37

## 2017-12-25 RX ADMIN — Medication 1 TABLET(S): at 08:57

## 2017-12-25 RX ADMIN — NYSTATIN CREAM 1 APPLICATION(S): 100000 CREAM TOPICAL at 18:41

## 2017-12-25 RX ADMIN — Medication 5 MILLIGRAM(S): at 17:46

## 2017-12-25 RX ADMIN — ATORVASTATIN CALCIUM 20 MILLIGRAM(S): 80 TABLET, FILM COATED ORAL at 22:26

## 2017-12-25 RX ADMIN — LEVETIRACETAM 500 MILLIGRAM(S): 250 TABLET, FILM COATED ORAL at 05:35

## 2017-12-25 RX ADMIN — LEVETIRACETAM 500 MILLIGRAM(S): 250 TABLET, FILM COATED ORAL at 17:46

## 2017-12-25 RX ADMIN — METFORMIN HYDROCHLORIDE 500 MILLIGRAM(S): 850 TABLET ORAL at 17:46

## 2017-12-25 RX ADMIN — Medication 500 MILLIGRAM(S): at 08:57

## 2017-12-25 RX ADMIN — Medication 1 TABLET(S): at 22:26

## 2017-12-25 RX ADMIN — HEPARIN SODIUM 5000 UNIT(S): 5000 INJECTION INTRAVENOUS; SUBCUTANEOUS at 05:35

## 2017-12-25 RX ADMIN — INSULIN GLARGINE 17 UNIT(S): 100 INJECTION, SOLUTION SUBCUTANEOUS at 08:57

## 2017-12-25 RX ADMIN — Medication 5 MILLIGRAM(S): at 05:35

## 2017-12-25 RX ADMIN — METFORMIN HYDROCHLORIDE 500 MILLIGRAM(S): 850 TABLET ORAL at 08:57

## 2017-12-25 NOTE — PROGRESS NOTE ADULT - SUBJECTIVE AND OBJECTIVE BOX
NTERVAL HPI/OVERNIGHT EVENTS:  No reported events from overnight team or nursing, patient remained hemodynamically stable. This morning at bedside patient at baseline, continues to resist medical treatment but otherwise nontoxic and in no distress.    Vital Signs Last 24 Hrs  T(C): 36.2 (25 Dec 2017 05:00), Max: 36.6 (24 Dec 2017 22:10)  T(F): 97.1 (25 Dec 2017 05:00), Max: 97.8 (24 Dec 2017 22:10)  HR: 80 (25 Dec 2017 09:09) (80 - 82)  BP: 107/70 (25 Dec 2017 09:09) (98/62 - 107/70)  RR: 17 (25 Dec 2017 09:09) (16 - 17)  SpO2: 100% (25 Dec 2017 09:09) (99% - 100%)    PHYSICAL EXAM:  Constitutional: NAD, lying in bed comfortably, non-toxic   Neuro: alert and oriented  CV: --  Pulm: --  Extremities: no edema as per visual inspection  PATIENT REFUSED PARTS OF PHYSICAL EXAM, but general appearance is that he is in no distress, non-toxic and comfortable.      MEDICATIONS  (STANDING):  amoxicillin  875 milliGRAM(s)/clavulanate 1 Tablet(s) Oral every 12 hours  ARIPiprazole 5 milliGRAM(s) Oral at bedtime-- Patient refusing  ascorbic acid 250 milliGRAM(s) Oral daily  atorvastatin 20 milliGRAM(s) Oral at bedtime  ciprofloxacin     Tablet 500 milliGRAM(s) Oral every 12 hours  dextrose 5%. 1000 milliLiter(s) (50 mL/Hr) IV Continuous <Continuous>  dextrose 50% Injectable 12.5 Gram(s) IV Push once  dextrose 50% Injectable 25 Gram(s) IV Push once  dextrose 50% Injectable 25 Gram(s) IV Push once  heparin  Injectable 5000 Unit(s) SubCutaneous every 8 hours  insulin glargine Injectable (LANTUS) 17 Unit(s) SubCutaneous every morning  insulin lispro (HumaLOG) corrective regimen sliding scale   SubCutaneous Before meals and at bedtime  levETIRAcetam 500 milliGRAM(s) Oral two times a day  metFORMIN 500 milliGRAM(s) Oral two times a day with meals  nystatin Powder 1 Application(s) Topical two times a day  oxybutynin 5 milliGRAM(s) Oral two times a day    MEDICATIONS  (PRN):  bisacodyl Suppository 10 milliGRAM(s) Rectal daily PRN Constipation  dextrose Gel 1 Dose(s) Oral once PRN Blood Glucose LESS THAN 70 milliGRAM(s)/deciliter  glucagon  Injectable 1 milliGRAM(s) IntraMuscular once PRN Glucose LESS THAN 70 milligrams/deciliter      .  LABS:

## 2017-12-25 NOTE — PROGRESS NOTE ADULT - PROBLEM SELECTOR PLAN 5
Pt w/ a Hgb of 9.1 and MCV of 74 on admission. Hgb has been slowly downtrending but no active signs of bleeding. Iron studies showing iron deficiency.   - No evidence of active bleeding

## 2017-12-25 NOTE — PROGRESS NOTE ADULT - ASSESSMENT
32M paraplegic PMH spinal cord injury (wheelchair bound), sacral pressure ulcer with osteo x2 (outpatient provider said they have records of March osteo s/p daptomycin of unknown length) earlier in 2017,  DM2, indwelling suprapubic catheter who presented w/ septic shock secondary to infected purulent sacral 4th degree pressure ulcer with underlying inadequately treated OM, hemodynamically stable being treated for sacral osteomyelitis awaiting placement at [facility]

## 2017-12-25 NOTE — PROGRESS NOTE ADULT - PROBLEM SELECTOR PLAN 3
Patient on metformin and glimeperide at home. HgA1C of 6.0.   - had been on ISS+Lantus 17U while hospitalized, patient now refusing insulin and FS.   - Continue metformin 500mg BID  - monitor FSG

## 2017-12-25 NOTE — PROGRESS NOTE ADULT - SUBJECTIVE AND OBJECTIVE BOX
Patient is a 32y old  Male who presents with a chief complaint of Chills (12 Dec 2017 18:17)      24 hour events: None    ROS: c/o wanting fMRI, CT abd, urinary cath removal, transfer to wheelchair, AMA papers    Vital Signs Last 24 Hrs  T(C): 36.2 (25 Dec 2017 05:00), Max: 36.6 (24 Dec 2017 22:10)  T(F): 97.1 (25 Dec 2017 05:00), Max: 97.8 (24 Dec 2017 22:10)  HR: 80 (25 Dec 2017 09:09) (80 - 82)  BP: 107/70 (25 Dec 2017 09:09) (98/62 - 107/70)  BP(mean): --  RR: 17 (25 Dec 2017 09:09) (16 - 17)  SpO2: 100% (25 Dec 2017 09:09) (99% - 100%)  I&O's Summary    24 Dec 2017 07:01  -  25 Dec 2017 07:00  --------------------------------------------------------  IN: 0 mL / OUT: 1800 mL / NET: -1800 mL      CAPILLARY BLOOD GLUCOSE      POCT Blood Glucose.: 233 mg/dL (24 Dec 2017 22:07)    PHYSICAL EXAM:      Constitutional: NAD, unusual affect    Respiratory: CTAB    Cardiovascular: RRR    Gastrointestinal: NABS, NTND          Imaging:     MEDICATIONS  (STANDING):  amoxicillin  875 milliGRAM(s)/clavulanate 1 Tablet(s) Oral every 12 hours  ARIPiprazole 5 milliGRAM(s) Oral at bedtime  ascorbic acid 250 milliGRAM(s) Oral daily  atorvastatin 20 milliGRAM(s) Oral at bedtime  ciprofloxacin     Tablet 500 milliGRAM(s) Oral every 12 hours  dextrose 5%. 1000 milliLiter(s) (50 mL/Hr) IV Continuous <Continuous>  dextrose 50% Injectable 12.5 Gram(s) IV Push once  dextrose 50% Injectable 25 Gram(s) IV Push once  dextrose 50% Injectable 25 Gram(s) IV Push once  heparin  Injectable 5000 Unit(s) SubCutaneous every 8 hours  insulin glargine Injectable (LANTUS) 17 Unit(s) SubCutaneous every morning  insulin lispro (HumaLOG) corrective regimen sliding scale   SubCutaneous Before meals and at bedtime  levETIRAcetam 500 milliGRAM(s) Oral two times a day  metFORMIN 500 milliGRAM(s) Oral two times a day with meals  nystatin Powder 1 Application(s) Topical two times a day  oxybutynin 5 milliGRAM(s) Oral two times a day    MEDICATIONS  (PRN):  bisacodyl Suppository 10 milliGRAM(s) Rectal daily PRN Constipation  dextrose Gel 1 Dose(s) Oral once PRN Blood Glucose LESS THAN 70 milliGRAM(s)/deciliter  glucagon  Injectable 1 milliGRAM(s) IntraMuscular once PRN Glucose LESS THAN 70 milligrams/deciliter      This is a 32y Male with a history of Hepatitis B infection without delta agent without hepatic coma, unspecified chronicity  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  Paraplegia  Type 2 diabetes mellitus with hyperglycemia, without long-term current use of insulin   admitted for WOUND CHECK  .  HEALTH ISSUES - PROBLEM Dx:  Schizophrenia - refusing meds, awaiting guardianship proceeding  Nonintractable epilepsy without status epilepticus, unspecified epilepsy type: Nonintractable epilepsy without status epilepticus, unspecified epilepsy type - keppra  Type 2 diabetes mellitus with hyperglycemia, without long-term current use of insulin: Type 2 diabetes mellitus with hyperglycemia, without long-term current use of insulin - continue metformin as pt refusing insulin  Hyperlipidemia: Hyperlipidemia - statin  Neurogenic bladder: Neurogenic bladder - s/p cath by , stable  Need for prophylactic measure: Need for prophylactic measure - [facility]  Paraplegia: Paraplegia - supportive care  Sacral osteomyelitis: Sacral osteomyelitis - cipro, augmentin

## 2017-12-26 PROCEDURE — 99233 SBSQ HOSP IP/OBS HIGH 50: CPT

## 2017-12-26 RX ADMIN — METFORMIN HYDROCHLORIDE 500 MILLIGRAM(S): 850 TABLET ORAL at 17:30

## 2017-12-26 RX ADMIN — Medication 500 MILLIGRAM(S): at 09:09

## 2017-12-26 RX ADMIN — ATORVASTATIN CALCIUM 20 MILLIGRAM(S): 80 TABLET, FILM COATED ORAL at 21:57

## 2017-12-26 RX ADMIN — Medication 5 MILLIGRAM(S): at 17:30

## 2017-12-26 RX ADMIN — NYSTATIN CREAM 1 APPLICATION(S): 100000 CREAM TOPICAL at 17:30

## 2017-12-26 RX ADMIN — NYSTATIN CREAM 1 APPLICATION(S): 100000 CREAM TOPICAL at 05:53

## 2017-12-26 RX ADMIN — Medication 1 TABLET(S): at 09:09

## 2017-12-26 RX ADMIN — LEVETIRACETAM 500 MILLIGRAM(S): 250 TABLET, FILM COATED ORAL at 17:30

## 2017-12-26 RX ADMIN — HEPARIN SODIUM 5000 UNIT(S): 5000 INJECTION INTRAVENOUS; SUBCUTANEOUS at 05:46

## 2017-12-26 RX ADMIN — INSULIN GLARGINE 17 UNIT(S): 100 INJECTION, SOLUTION SUBCUTANEOUS at 09:09

## 2017-12-26 RX ADMIN — Medication 5 MILLIGRAM(S): at 05:45

## 2017-12-26 RX ADMIN — HEPARIN SODIUM 5000 UNIT(S): 5000 INJECTION INTRAVENOUS; SUBCUTANEOUS at 21:57

## 2017-12-26 RX ADMIN — Medication 500 MILLIGRAM(S): at 20:36

## 2017-12-26 RX ADMIN — METFORMIN HYDROCHLORIDE 500 MILLIGRAM(S): 850 TABLET ORAL at 09:09

## 2017-12-26 RX ADMIN — Medication 1 TABLET(S): at 20:36

## 2017-12-26 RX ADMIN — LEVETIRACETAM 500 MILLIGRAM(S): 250 TABLET, FILM COATED ORAL at 05:45

## 2017-12-26 NOTE — PROGRESS NOTE ADULT - ATTENDING COMMENTS
Pt seen at bedside, asked for AMA papers, removal of his SPC, all his bloodwork, and to be given a wound vac and placed in his wheelchair.   VS- refused  Exam- refusing with me  Labs- none  31 yo M with   1. Septic shock from stage 4 decub with osteomyelitis- refusing IV so on cipro/augmentin.  Getting wound care but refused being cleaned, foul smelling b/c of lack of care for many days.    2. Staghorn renal stone- urology eval on hold as pt refusing basic care  3. Neurogenic bladder- suprapubic cath, on oxybutynin  4. Paraplegia- pt has home care with aids 24 hrs per day which he refuses and a wheelchair; unsafe to return home at this time and does not have right to leave AMA.  5. Schizoaffective- paranoid, psychotic, not taking his meds- d/w ethics, psych, and legal services, will have  to come inpatient to rule on treatment over objection. Pt is paranoid and psychotic, much worsened from baseline per collateral.  Pt  is at risk to causing harm to himself by refusing medical care and medications and is psychiatrically quite acutely ill.  Awaiting court date.  6. Uncontrolled DM with hyperglycemia- refusing insulin, started on metformin  7. Seizure d/o- c/w home meds  8. UTI- completed tx, on isolation for ESBL klebsiella  9. Anemia- does not need daily labs

## 2017-12-26 NOTE — PROGRESS NOTE ADULT - SUBJECTIVE AND OBJECTIVE BOX
INTERVAL HPI/OVERNIGHT EVENTS:  No reported events from overnight team or nursing, patient remained hemodynamically stable. This morning at bedside patient calm, 10 system ROS negative    Vital Signs Last 24 Hrs  T(C): 36.9 (25 Dec 2017 22:38), Max: 36.9 (25 Dec 2017 22:38)  T(F): 98.4 (25 Dec 2017 22:38), Max: 98.4 (25 Dec 2017 22:38)  HR: 82 (26 Dec 2017 05:44) (68 - 102)  BP: 116/72 (26 Dec 2017 05:44) (101/65 - 116/72)  BP(mean): --  ABP: --  ABP(mean): --  RR: 16 (25 Dec 2017 22:38) (16 - 16)  SpO2: 100% (25 Dec 2017 22:38) (100% - 100%)    PHYSICAL EXAM:  Constitutional: NAD, lying in bed comfortably, non-toxic   Neuro: alert and oriented  CV: rrr no m/r/g  Pulm: lungs clear b/l  Extremities: No edema  PATIENT REFUSED PARTS OF PHYSICAL EXAM, but general appearance is that he is in no distress, non-toxic and comfortable.        MEDICATIONS  (STANDING):  amoxicillin  875 milliGRAM(s)/clavulanate 1 Tablet(s) Oral every 12 hours  ARIPiprazole 5 milliGRAM(s) Oral at bedtime  ascorbic acid 250 milliGRAM(s) Oral daily  atorvastatin 20 milliGRAM(s) Oral at bedtime  ciprofloxacin     Tablet 500 milliGRAM(s) Oral every 12 hours  dextrose 5%. 1000 milliLiter(s) (50 mL/Hr) IV Continuous <Continuous>  dextrose 50% Injectable 12.5 Gram(s) IV Push once  dextrose 50% Injectable 25 Gram(s) IV Push once  dextrose 50% Injectable 25 Gram(s) IV Push once  heparin  Injectable 5000 Unit(s) SubCutaneous every 8 hours  insulin glargine Injectable (LANTUS) 17 Unit(s) SubCutaneous every morning  insulin lispro (HumaLOG) corrective regimen sliding scale   SubCutaneous Before meals and at bedtime  levETIRAcetam 500 milliGRAM(s) Oral two times a day  metFORMIN 500 milliGRAM(s) Oral two times a day with meals  nystatin Powder 1 Application(s) Topical two times a day  oxybutynin 5 milliGRAM(s) Oral two times a day    MEDICATIONS  (PRN):  bisacodyl Suppository 10 milliGRAM(s) Rectal daily PRN Constipation  dextrose Gel 1 Dose(s) Oral once PRN Blood Glucose LESS THAN 70 milliGRAM(s)/deciliter  glucagon  Injectable 1 milliGRAM(s) IntraMuscular once PRN Glucose LESS THAN 70 milligrams/deciliter      Allergies    Capzasin Back and Body (Unknown)    Intolerances    LABS:    RADIOLOGY & ADDITIONAL TESTS: Reviewed

## 2017-12-26 NOTE — PROGRESS NOTE ADULT - PROBLEM SELECTOR PLAN 3
Patient on metformin and glimeperide at home. HgA1C of 6.0.   -ISS+Lantus 17U, will start metformin 500mg BID  -monitor FSG Patient on metformin and glimeperide at home. HgA1C of 6.0.   -ISS+Lantus 17U+metformin 500mg BID  -monitor FSG

## 2017-12-26 NOTE — PROGRESS NOTE ADULT - ASSESSMENT
32M paraplegic PMH spinal cord injury (wheelchair bound), sacral pressure ulcer with osteo x2 (outpatient provider said they have records of March osteo s/p daptomycin of unknown length) earlier in 2017,  DM2, indwelling suprapubic catheter who presented w/ septic shock secondary to infected purulent sacral 4th degree pressure ulcer with underlying inadequately treated OM, hemodynamically stable being treated for sacral osteomyelitis awaiting placement at [facility]

## 2017-12-27 PROCEDURE — 99233 SBSQ HOSP IP/OBS HIGH 50: CPT

## 2017-12-27 PROCEDURE — 99356: CPT

## 2017-12-27 RX ADMIN — Medication 500 MILLIGRAM(S): at 21:06

## 2017-12-27 RX ADMIN — Medication 500 MILLIGRAM(S): at 09:01

## 2017-12-27 RX ADMIN — ATORVASTATIN CALCIUM 20 MILLIGRAM(S): 80 TABLET, FILM COATED ORAL at 21:21

## 2017-12-27 RX ADMIN — Medication 5 MILLIGRAM(S): at 05:58

## 2017-12-27 RX ADMIN — NYSTATIN CREAM 1 APPLICATION(S): 100000 CREAM TOPICAL at 06:00

## 2017-12-27 RX ADMIN — LEVETIRACETAM 500 MILLIGRAM(S): 250 TABLET, FILM COATED ORAL at 05:58

## 2017-12-27 RX ADMIN — HEPARIN SODIUM 5000 UNIT(S): 5000 INJECTION INTRAVENOUS; SUBCUTANEOUS at 21:21

## 2017-12-27 RX ADMIN — Medication 5 MILLIGRAM(S): at 17:05

## 2017-12-27 RX ADMIN — LEVETIRACETAM 500 MILLIGRAM(S): 250 TABLET, FILM COATED ORAL at 17:07

## 2017-12-27 RX ADMIN — Medication 1 TABLET(S): at 21:06

## 2017-12-27 RX ADMIN — Medication 250 MILLIGRAM(S): at 11:22

## 2017-12-27 RX ADMIN — Medication 1 TABLET(S): at 11:22

## 2017-12-27 RX ADMIN — METFORMIN HYDROCHLORIDE 500 MILLIGRAM(S): 850 TABLET ORAL at 17:06

## 2017-12-27 RX ADMIN — HEPARIN SODIUM 5000 UNIT(S): 5000 INJECTION INTRAVENOUS; SUBCUTANEOUS at 13:53

## 2017-12-27 RX ADMIN — HEPARIN SODIUM 5000 UNIT(S): 5000 INJECTION INTRAVENOUS; SUBCUTANEOUS at 05:58

## 2017-12-27 RX ADMIN — METFORMIN HYDROCHLORIDE 500 MILLIGRAM(S): 850 TABLET ORAL at 09:01

## 2017-12-27 NOTE — PROGRESS NOTE ADULT - ASSESSMENT
32M paraplegic PMH spinal cord injury (wheelchair bound), sacral pressure ulcer with osteo x2 (outpatient provider said they have records of March osteo s/p daptomycin of unknown length) earlier in 2017,  DM2, indwelling suprapubic catheter who presented w/ septic shock secondary to infected purulent sacral 4th degree pressure ulcer with underlying inadequately treated OM, hemodynamically stable being treated for sacral osteomyelitis awaiting placement at [facility]

## 2017-12-27 NOTE — PROGRESS NOTE ADULT - PROBLEM SELECTOR PLAN 3
Patient on metformin and glimeperide at home. HgA1C of 6.0.   -ISS+Lantus 17U+metformin 500mg BID  -monitor FSG

## 2017-12-27 NOTE — PROGRESS NOTE ADULT - SUBJECTIVE AND OBJECTIVE BOX
INTERVAL HPI/OVERNIGHT EVENTS:  No reported events from overnight team or nursing, patient remained hemodynamically stable. This morning at bedside patient has no acute complaints. 10 system ROS negative    Vital Signs Last 24 Hrs  T(C): 37.1 (26 Dec 2017 20:40), Max: 37.1 (26 Dec 2017 20:40)  T(F): 98.7 (26 Dec 2017 20:40), Max: 98.7 (26 Dec 2017 20:40)  HR: 89 (27 Dec 2017 06:49) (76 - 89)  BP: 103/67 (27 Dec 2017 06:49) (100/67 - 103/67)  BP(mean): --  ABP: --  ABP(mean): --  RR: 18 (26 Dec 2017 20:40) (18 - 18)  SpO2: --      PHYSICAL EXAM:  Constitutional: NAD, lying in bed comfortably, non-toxic   Neuro: alert and oriented  CV: rrr no m/r/g  Pulm: lungs clear b/l  Extremities: No edema  PATIENT REFUSED PARTS OF PHYSICAL EXAM, but general appearance is that he is in no distress, non-toxic and comfortable.      MEDICATIONS  (STANDING):  amoxicillin  875 milliGRAM(s)/clavulanate 1 Tablet(s) Oral every 12 hours  ARIPiprazole 5 milliGRAM(s) Oral at bedtime  ascorbic acid 250 milliGRAM(s) Oral daily  atorvastatin 20 milliGRAM(s) Oral at bedtime  ciprofloxacin     Tablet 500 milliGRAM(s) Oral every 12 hours  dextrose 5%. 1000 milliLiter(s) (50 mL/Hr) IV Continuous <Continuous>  dextrose 50% Injectable 12.5 Gram(s) IV Push once  dextrose 50% Injectable 25 Gram(s) IV Push once  dextrose 50% Injectable 25 Gram(s) IV Push once  heparin  Injectable 5000 Unit(s) SubCutaneous every 8 hours  insulin glargine Injectable (LANTUS) 17 Unit(s) SubCutaneous every morning  insulin lispro (HumaLOG) corrective regimen sliding scale   SubCutaneous Before meals and at bedtime  levETIRAcetam 500 milliGRAM(s) Oral two times a day  metFORMIN 500 milliGRAM(s) Oral two times a day with meals  nystatin Powder 1 Application(s) Topical two times a day  oxybutynin 5 milliGRAM(s) Oral two times a day    MEDICATIONS  (PRN):  bisacodyl Suppository 10 milliGRAM(s) Rectal daily PRN Constipation  dextrose Gel 1 Dose(s) Oral once PRN Blood Glucose LESS THAN 70 milliGRAM(s)/deciliter  glucagon  Injectable 1 milliGRAM(s) IntraMuscular once PRN Glucose LESS THAN 70 milligrams/deciliter      Allergies    Capzasin Back and Body (Unknown)    Intolerances        LABS:                RADIOLOGY & ADDITIONAL TESTS: Reviewed

## 2017-12-27 NOTE — CHART NOTE - NSCHARTNOTEFT_GEN_A_CORE
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  Paraplegia  Type 2 diabetes mellitus with hyperglycemia, without long-term current use of insulin      Current Nutrition Order:[facility] no snack/DASH with glucerna shake TID(660kcala nd 30gmprotein       PO Intake: Good (%) [   x]  Fair (50-75%) [   ] Poor (<25%) [   ]    GI Issues: none    Pain:yes    Skin Integrity:stage 4    Labs: [id number]        CAPILLARY BLOOD GLUCOSE          Medications:  MEDICATIONS  (STANDING):  amoxicillin  875 milliGRAM(s)/clavulanate 1 Tablet(s) Oral every 12 hours  ARIPiprazole 5 milliGRAM(s) Oral at bedtime  ascorbic acid 250 milliGRAM(s) Oral daily  atorvastatin 20 milliGRAM(s) Oral at bedtime  ciprofloxacin     Tablet 500 milliGRAM(s) Oral every 12 hours  dextrose 5%. 1000 milliLiter(s) (50 mL/Hr) IV Continuous <Continuous>  dextrose 50% Injectable 12.5 Gram(s) IV Push once  dextrose 50% Injectable 25 Gram(s) IV Push once  dextrose 50% Injectable 25 Gram(s) IV Push once  heparin  Injectable 5000 Unit(s) SubCutaneous every 8 hours  insulin glargine Injectable (LANTUS) 17 Unit(s) SubCutaneous every morning  insulin lispro (HumaLOG) corrective regimen sliding scale   SubCutaneous Before meals and at bedtime  levETIRAcetam 500 milliGRAM(s) Oral two times a day  metFORMIN 500 milliGRAM(s) Oral two times a day with meals  nystatin Powder 1 Application(s) Topical two times a day  oxybutynin 5 milliGRAM(s) Oral two times a day    MEDICATIONS  (PRN):  bisacodyl Suppository 10 milliGRAM(s) Rectal daily PRN Constipation  dextrose Gel 1 Dose(s) Oral once PRN Blood Glucose LESS THAN 70 milliGRAM(s)/deciliter  glucagon  Injectable 1 milliGRAM(s) IntraMuscular once PRN Glucose LESS THAN 70 milligrams/deciliter      Weight:72.6kg  Daily   Daily     Weight Change: no updated weights    Estimated energy needs: Based on IBW:51kg [id number]:1530-1785kcal and 1.4-1.6gmprotein:71-82gmprotein and 1530-1785cc fluids([id number])    Subjective: 31 y/o male admitted with complaints of chills.Refusal to take FS and diet compliance noted.Eating %.    Previous Nutrition Diagnosis:Increased nutrient needs r/t increased demand for protein and nutrients AEB: stage 4 PU  Active [ x  ]  Resolved [   ]    If resolved, new PES:     Goal:Meet 80% consistently    Recommendations: Updated weights and change diet to C CHO with snack/DASH with glucerna shake BID(440kcal and 20gmprotein and 2 Pro-stat(200kal and 30gmprotein).D/C previous order.  Education: Patient declined any education.    Risk Level: High [   ] Moderate [  x ] Low [   ]

## 2017-12-27 NOTE — PROGRESS NOTE ADULT - ATTENDING COMMENTS
Pt seen at bedside, asked for AMA papers and argued about being discharged.  VS- reviewed  Exam- refusing with me  Labs- none  33 yo M with   1. Septic shock from stage 4 decub with osteomyelitis- refusing IV so on cipro/augmentin.  Getting wound care but refused being cleaned, foul smelling b/c of lack of care    2. Staghorn renal stone- urology eval on hold as pt refusing basic care  3. Neurogenic bladder- suprapubic cath, on oxybutynin  4. Paraplegia- pt has home care with aids 24 hrs per day which he refuses and a wheelchair; unsafe to return home at this time and does not have right to leave AMA.  5. Schizoaffective- paranoid, psychotic, not taking his meds- d/w pt's court appointed  today for 70 min discussing case and pt's care.  Pt  is at risk for causing harm to himself by refusing medical care and medications and is psychiatrically quite acutely ill.  Court date is 1/3/18 at 10:30 am.  6. Uncontrolled DM with hyperglycemia- refusing insulin, started on metformin  7. Seizure d/o- c/w home meds  8. UTI- completed tx, on isolation for ESBL klebsiella  9. Anemia- does not need daily labs

## 2017-12-28 LAB — GLUCOSE BLDC GLUCOMTR-MCNC: 112 MG/DL — HIGH (ref 70–99)

## 2017-12-28 PROCEDURE — 99233 SBSQ HOSP IP/OBS HIGH 50: CPT

## 2017-12-28 RX ADMIN — HEPARIN SODIUM 5000 UNIT(S): 5000 INJECTION INTRAVENOUS; SUBCUTANEOUS at 05:38

## 2017-12-28 RX ADMIN — Medication 1 TABLET(S): at 09:12

## 2017-12-28 RX ADMIN — Medication 5 MILLIGRAM(S): at 17:51

## 2017-12-28 RX ADMIN — LEVETIRACETAM 500 MILLIGRAM(S): 250 TABLET, FILM COATED ORAL at 05:39

## 2017-12-28 RX ADMIN — NYSTATIN CREAM 1 APPLICATION(S): 100000 CREAM TOPICAL at 18:00

## 2017-12-28 RX ADMIN — HEPARIN SODIUM 5000 UNIT(S): 5000 INJECTION INTRAVENOUS; SUBCUTANEOUS at 21:14

## 2017-12-28 RX ADMIN — Medication 1 TABLET(S): at 21:13

## 2017-12-28 RX ADMIN — Medication 500 MILLIGRAM(S): at 21:13

## 2017-12-28 RX ADMIN — METFORMIN HYDROCHLORIDE 500 MILLIGRAM(S): 850 TABLET ORAL at 17:51

## 2017-12-28 RX ADMIN — LEVETIRACETAM 500 MILLIGRAM(S): 250 TABLET, FILM COATED ORAL at 17:51

## 2017-12-28 RX ADMIN — Medication 5 MILLIGRAM(S): at 05:38

## 2017-12-28 RX ADMIN — Medication 500 MILLIGRAM(S): at 09:12

## 2017-12-28 RX ADMIN — METFORMIN HYDROCHLORIDE 500 MILLIGRAM(S): 850 TABLET ORAL at 07:00

## 2017-12-28 RX ADMIN — ATORVASTATIN CALCIUM 20 MILLIGRAM(S): 80 TABLET, FILM COATED ORAL at 21:13

## 2017-12-28 RX ADMIN — Medication 250 MILLIGRAM(S): at 09:12

## 2017-12-28 NOTE — PROGRESS NOTE ADULT - SUBJECTIVE AND OBJECTIVE BOX
INTERVAL HPI/OVERNIGHT EVENTS: RAHEL o/n. No acute complaints at bedside.     VITAL SIGNS:  T(F): --  HR: 80 (12-27-17 @ 21:00)  BP: 111/70 (12-27-17 @ 21:00)  RR: --  SpO2: --  Wt(kg): --    PHYSICAL EXAM:      Constitutional: NAD, well-groomed, well-developed  HEENT: PERRLA, EOMI, Normal Hearing, MMM  Back: Normal spine flexure, No CVA tenderness  Respiratory: CTAB  Cardiovascular: S1 and S2, RRR, no M/G/R  Gastrointestinal: BS+, soft, NT/ND  Extremities: No peripheral edema  Skin: stage 4 decubitus ulcer, unchanged.         MEDICATIONS  (STANDING):  amoxicillin  875 milliGRAM(s)/clavulanate 1 Tablet(s) Oral every 12 hours  ARIPiprazole 5 milliGRAM(s) Oral at bedtime  ascorbic acid 250 milliGRAM(s) Oral daily  atorvastatin 20 milliGRAM(s) Oral at bedtime  ciprofloxacin     Tablet 500 milliGRAM(s) Oral every 12 hours  dextrose 5%. 1000 milliLiter(s) (50 mL/Hr) IV Continuous <Continuous>  dextrose 50% Injectable 12.5 Gram(s) IV Push once  dextrose 50% Injectable 25 Gram(s) IV Push once  dextrose 50% Injectable 25 Gram(s) IV Push once  heparin  Injectable 5000 Unit(s) SubCutaneous every 8 hours  insulin glargine Injectable (LANTUS) 17 Unit(s) SubCutaneous every morning  insulin lispro (HumaLOG) corrective regimen sliding scale   SubCutaneous Before meals and at bedtime  levETIRAcetam 500 milliGRAM(s) Oral two times a day  metFORMIN 500 milliGRAM(s) Oral two times a day with meals  nystatin Powder 1 Application(s) Topical two times a day  oxybutynin 5 milliGRAM(s) Oral two times a day    MEDICATIONS  (PRN):  bisacodyl Suppository 10 milliGRAM(s) Rectal daily PRN Constipation  dextrose Gel 1 Dose(s) Oral once PRN Blood Glucose LESS THAN 70 milliGRAM(s)/deciliter  glucagon  Injectable 1 milliGRAM(s) IntraMuscular once PRN Glucose LESS THAN 70 milligrams/deciliter      Allergies    Capzasin Back and Body (Unknown)    Intolerances        LABS:                RADIOLOGY & ADDITIONAL TESTS:

## 2017-12-28 NOTE — PROGRESS NOTE ADULT - ASSESSMENT
32M paraplegic PMH spinal cord injury (wheelchair bound), sacral pressure ulcer with osteo x2 (outpatient provider said they have records of March osteo s/p daptomycin of unknown length) earlier in 2017,  DM2, indwelling suprapubic catheter who presented w/ septic shock secondary to infected purulent sacral 4th degree pressure ulcer with underlying inadequately treated OM, hemodynamically stable being treated for sacral osteomyelitis awaiting placement at [facility]

## 2017-12-28 NOTE — PROGRESS NOTE ADULT - ATTENDING COMMENTS
Pt seen at bedside, asked for AMA papers and argued about being discharged, then told me to leave.  Told me that the court papers are full of lies.  VS- reviewed  Exam- refusing with me  Labs- none  33 yo M with   1. Septic shock from stage 4 decub with osteomyelitis- refusing IV so on cipro/augmentin for a total of 1 month or more bsed on healing.  Getting wound care but refused being cleaned, foul smelling b/c of lack of care    2. Staghorn renal stone- urology eval on hold as pt refusing basic care  3. Neurogenic bladder- suprapubic cath, on oxybutynin  4. Paraplegia- pt has home care with aids 24 hrs per day which he refuses and a wheelchair; unsafe to return home at this time and does not have right to leave AMA.  5. Schizoaffective- paranoid, psychotic, not taking his meds.  Pt  is at risk for causing harm to himself by refusing medical care and medications and is psychiatrically quite acutely ill.  Court date is 1/3/18 at 10:30 am.  6. Uncontrolled DM with hyperglycemia- refusing insulin, started on metformin  7. Seizure d/o- c/w home meds  8. UTI- completed tx, on isolation for ESBL klebsiella  9. Anemia- does not need daily labs

## 2017-12-29 LAB — GLUCOSE BLDC GLUCOMTR-MCNC: 95 MG/DL — SIGNIFICANT CHANGE UP (ref 70–99)

## 2017-12-29 PROCEDURE — 99233 SBSQ HOSP IP/OBS HIGH 50: CPT

## 2017-12-29 RX ADMIN — Medication 5 MILLIGRAM(S): at 06:05

## 2017-12-29 RX ADMIN — METFORMIN HYDROCHLORIDE 500 MILLIGRAM(S): 850 TABLET ORAL at 09:01

## 2017-12-29 RX ADMIN — HEPARIN SODIUM 5000 UNIT(S): 5000 INJECTION INTRAVENOUS; SUBCUTANEOUS at 06:04

## 2017-12-29 RX ADMIN — LEVETIRACETAM 500 MILLIGRAM(S): 250 TABLET, FILM COATED ORAL at 06:05

## 2017-12-29 RX ADMIN — Medication 1 TABLET(S): at 21:21

## 2017-12-29 RX ADMIN — Medication 5 MILLIGRAM(S): at 17:31

## 2017-12-29 RX ADMIN — ATORVASTATIN CALCIUM 20 MILLIGRAM(S): 80 TABLET, FILM COATED ORAL at 21:21

## 2017-12-29 RX ADMIN — HEPARIN SODIUM 5000 UNIT(S): 5000 INJECTION INTRAVENOUS; SUBCUTANEOUS at 17:31

## 2017-12-29 RX ADMIN — INSULIN GLARGINE 17 UNIT(S): 100 INJECTION, SOLUTION SUBCUTANEOUS at 09:01

## 2017-12-29 RX ADMIN — LEVETIRACETAM 500 MILLIGRAM(S): 250 TABLET, FILM COATED ORAL at 17:31

## 2017-12-29 RX ADMIN — Medication 500 MILLIGRAM(S): at 09:01

## 2017-12-29 RX ADMIN — NYSTATIN CREAM 1 APPLICATION(S): 100000 CREAM TOPICAL at 17:31

## 2017-12-29 RX ADMIN — HEPARIN SODIUM 5000 UNIT(S): 5000 INJECTION INTRAVENOUS; SUBCUTANEOUS at 21:21

## 2017-12-29 RX ADMIN — Medication 1 TABLET(S): at 09:01

## 2017-12-29 RX ADMIN — Medication 500 MILLIGRAM(S): at 21:21

## 2017-12-29 RX ADMIN — Medication 250 MILLIGRAM(S): at 17:30

## 2017-12-29 RX ADMIN — NYSTATIN CREAM 1 APPLICATION(S): 100000 CREAM TOPICAL at 06:04

## 2017-12-29 NOTE — PROGRESS NOTE ADULT - ATTENDING COMMENTS
dx:  sacral osteomyelitis- refused picc. now stable on cipro/augmentin. will need ID follow up.   sacral decub unstageable - seen by plastics and gen surg. no acute surgical intervention recommended at this time. c/w local woundcare. off loading.     schizophrenia - pt refusing anti-psychotics. I spoke with psychiatry today (Dr SIMI Dumont) who re-confirmed that pt lacks decision making capacity. has a court hearing at bedside 1/3/18.   neurogenic bladder - cw suprapubic bowles  DM ii - lantus / metformin  epilepsy- keppra

## 2017-12-29 NOTE — PROGRESS NOTE ADULT - SUBJECTIVE AND OBJECTIVE BOX
INTERVAL HPI/OVERNIGHT EVENTS: RAHEL o/n. No acute complaints at bedside. ROS negative.     VITAL SIGNS:  T(F): 97.5 (12-29-17 @ 06:00)  HR: 76 (12-29-17 @ 06:00)  BP: 105/68 (12-29-17 @ 06:00)  RR: 16 (12-29-17 @ 06:00)  SpO2: 100% (12-29-17 @ 06:00)  Wt(kg): --    PHYSICAL EXAM:      Constitutional: NAD, well-groomed, well-developed  HEENT: PERRLA, EOMI, Normal Hearing, MMM  Neck: No LAD, No JVD  Back: Normal spine flexure, No CVA tenderness  Respiratory: CTAB  Cardiovascular: S1 and S2, RRR, no M/G/R  Gastrointestinal: BS+, soft, NT/ND  Extremities: No peripheral edema  Vascular: 2+ peripheral pulses  Neurological: A/O x 3, no focal deficits  Skin: stage 4 decubitus ulcer         MEDICATIONS  (STANDING):  amoxicillin  875 milliGRAM(s)/clavulanate 1 Tablet(s) Oral every 12 hours  ARIPiprazole 5 milliGRAM(s) Oral at bedtime  ascorbic acid 250 milliGRAM(s) Oral daily  atorvastatin 20 milliGRAM(s) Oral at bedtime  ciprofloxacin     Tablet 500 milliGRAM(s) Oral every 12 hours  dextrose 5%. 1000 milliLiter(s) (50 mL/Hr) IV Continuous <Continuous>  dextrose 50% Injectable 12.5 Gram(s) IV Push once  dextrose 50% Injectable 25 Gram(s) IV Push once  dextrose 50% Injectable 25 Gram(s) IV Push once  heparin  Injectable 5000 Unit(s) SubCutaneous every 8 hours  insulin glargine Injectable (LANTUS) 17 Unit(s) SubCutaneous every morning  insulin lispro (HumaLOG) corrective regimen sliding scale   SubCutaneous Before meals and at bedtime  levETIRAcetam 500 milliGRAM(s) Oral two times a day  metFORMIN 500 milliGRAM(s) Oral two times a day with meals  nystatin Powder 1 Application(s) Topical two times a day  oxybutynin 5 milliGRAM(s) Oral two times a day    MEDICATIONS  (PRN):  bisacodyl Suppository 10 milliGRAM(s) Rectal daily PRN Constipation  dextrose Gel 1 Dose(s) Oral once PRN Blood Glucose LESS THAN 70 milliGRAM(s)/deciliter  glucagon  Injectable 1 milliGRAM(s) IntraMuscular once PRN Glucose LESS THAN 70 milligrams/deciliter      Allergies    Capzasin Back and Body (Unknown)    Intolerances        LABS:                RADIOLOGY & ADDITIONAL TESTS: INTERVAL HPI/OVERNIGHT EVENTS: RAHEL o/n. No acute complaints at bedside. ROS negative.     VITAL SIGNS:  T(F): 97.5 (12-29-17 @ 06:00)  HR: 76 (12-29-17 @ 06:00)  BP: 105/68 (12-29-17 @ 06:00)  RR: 16 (12-29-17 @ 06:00)  SpO2: 100% (12-29-17 @ 06:00)  Wt(kg): --    PHYSICAL EXAM:      Constitutional: NAD, well-groomed, well-developed  HEENT: PERRLA, EOMI, Normal Hearing, MMM  Neck: No LAD, No JVD  Back: Normal spine flexure, No CVA tenderness  Respiratory: CTAB  Cardiovascular: S1 and S2, RRR, no M/G/R  Gastrointestinal: BS+, soft, NT/ND  Extremities: No peripheral edema  Vascular: 2+ peripheral pulses  Neurological: A/O x 3, paralysis below nipple line, unchanged from previous exam.   Skin: stage 4 decubitus ulcer         MEDICATIONS  (STANDING):  amoxicillin  875 milliGRAM(s)/clavulanate 1 Tablet(s) Oral every 12 hours  ARIPiprazole 5 milliGRAM(s) Oral at bedtime  ascorbic acid 250 milliGRAM(s) Oral daily  atorvastatin 20 milliGRAM(s) Oral at bedtime  ciprofloxacin     Tablet 500 milliGRAM(s) Oral every 12 hours  dextrose 5%. 1000 milliLiter(s) (50 mL/Hr) IV Continuous <Continuous>  dextrose 50% Injectable 12.5 Gram(s) IV Push once  dextrose 50% Injectable 25 Gram(s) IV Push once  dextrose 50% Injectable 25 Gram(s) IV Push once  heparin  Injectable 5000 Unit(s) SubCutaneous every 8 hours  insulin glargine Injectable (LANTUS) 17 Unit(s) SubCutaneous every morning  insulin lispro (HumaLOG) corrective regimen sliding scale   SubCutaneous Before meals and at bedtime  levETIRAcetam 500 milliGRAM(s) Oral two times a day  metFORMIN 500 milliGRAM(s) Oral two times a day with meals  nystatin Powder 1 Application(s) Topical two times a day  oxybutynin 5 milliGRAM(s) Oral two times a day    MEDICATIONS  (PRN):  bisacodyl Suppository 10 milliGRAM(s) Rectal daily PRN Constipation  dextrose Gel 1 Dose(s) Oral once PRN Blood Glucose LESS THAN 70 milliGRAM(s)/deciliter  glucagon  Injectable 1 milliGRAM(s) IntraMuscular once PRN Glucose LESS THAN 70 milligrams/deciliter      Allergies    Capzasin Back and Body (Unknown)    Intolerances        LABS:                RADIOLOGY & ADDITIONAL TESTS:

## 2017-12-29 NOTE — PROGRESS NOTE ADULT - ASSESSMENT
32M paraplegic PMH spinal cord injury (wheelchair bound), sacral pressure ulcer with osteo x2 (outpatient provider said they have records of March osteo s/p daptomycin of unknown length) earlier in 2017,  DM2, indwelling suprapubic catheter who presented w/ septic shock secondary to infected purulent sacral 4th degree pressure ulcer with underlying inadequately treated OM, hemodynamically stable being treated for sacral osteomyelitis awaiting placement at [facility]

## 2017-12-30 LAB
ANION GAP SERPL CALC-SCNC: 13 MMOL/L — SIGNIFICANT CHANGE UP (ref 5–17)
BASOPHILS NFR BLD AUTO: 0.6 % — SIGNIFICANT CHANGE UP (ref 0–2)
BLD GP AB SCN SERPL QL: NEGATIVE — SIGNIFICANT CHANGE UP
BUN SERPL-MCNC: 20 MG/DL — SIGNIFICANT CHANGE UP (ref 7–23)
CALCIUM SERPL-MCNC: 9.8 MG/DL — SIGNIFICANT CHANGE UP (ref 8.4–10.5)
CHLORIDE SERPL-SCNC: 99 MMOL/L — SIGNIFICANT CHANGE UP (ref 96–108)
CO2 SERPL-SCNC: 25 MMOL/L — SIGNIFICANT CHANGE UP (ref 22–31)
CREAT SERPL-MCNC: 0.78 MG/DL — SIGNIFICANT CHANGE UP (ref 0.5–1.3)
EOSINOPHIL NFR BLD AUTO: 2 % — SIGNIFICANT CHANGE UP (ref 0–6)
GLUCOSE SERPL-MCNC: 117 MG/DL — HIGH (ref 70–99)
HCT VFR BLD CALC: 31.8 % — LOW (ref 39–50)
HGB BLD-MCNC: 9.6 G/DL — LOW (ref 13–17)
LYMPHOCYTES # BLD AUTO: 38.2 % — SIGNIFICANT CHANGE UP (ref 13–44)
MAGNESIUM SERPL-MCNC: 1.8 MG/DL — SIGNIFICANT CHANGE UP (ref 1.6–2.6)
MCHC RBC-ENTMCNC: 22.9 PG — LOW (ref 27–34)
MCHC RBC-ENTMCNC: 30.2 G/DL — LOW (ref 32–36)
MCV RBC AUTO: 75.9 FL — LOW (ref 80–100)
MONOCYTES NFR BLD AUTO: 11.4 % — SIGNIFICANT CHANGE UP (ref 2–14)
NEUTROPHILS NFR BLD AUTO: 47.8 % — SIGNIFICANT CHANGE UP (ref 43–77)
PLATELET # BLD AUTO: 257 K/UL — SIGNIFICANT CHANGE UP (ref 150–400)
POTASSIUM SERPL-MCNC: 4.2 MMOL/L — SIGNIFICANT CHANGE UP (ref 3.5–5.3)
POTASSIUM SERPL-SCNC: 4.2 MMOL/L — SIGNIFICANT CHANGE UP (ref 3.5–5.3)
RBC # BLD: 4.19 M/UL — LOW (ref 4.2–5.8)
RBC # FLD: 18.6 % — HIGH (ref 10.3–16.9)
RH IG SCN BLD-IMP: POSITIVE — SIGNIFICANT CHANGE UP
SODIUM SERPL-SCNC: 137 MMOL/L — SIGNIFICANT CHANGE UP (ref 135–145)
WBC # BLD: 6.6 K/UL — SIGNIFICANT CHANGE UP (ref 3.8–10.5)
WBC # FLD AUTO: 6.6 K/UL — SIGNIFICANT CHANGE UP (ref 3.8–10.5)

## 2017-12-30 PROCEDURE — 99233 SBSQ HOSP IP/OBS HIGH 50: CPT | Mod: GC

## 2017-12-30 RX ADMIN — HEPARIN SODIUM 5000 UNIT(S): 5000 INJECTION INTRAVENOUS; SUBCUTANEOUS at 13:40

## 2017-12-30 RX ADMIN — NYSTATIN CREAM 1 APPLICATION(S): 100000 CREAM TOPICAL at 06:10

## 2017-12-30 RX ADMIN — Medication 1 TABLET(S): at 21:24

## 2017-12-30 RX ADMIN — NYSTATIN CREAM 1 APPLICATION(S): 100000 CREAM TOPICAL at 17:40

## 2017-12-30 RX ADMIN — Medication 500 MILLIGRAM(S): at 08:20

## 2017-12-30 RX ADMIN — Medication 250 MILLIGRAM(S): at 10:57

## 2017-12-30 RX ADMIN — Medication 5 MILLIGRAM(S): at 17:40

## 2017-12-30 RX ADMIN — METFORMIN HYDROCHLORIDE 500 MILLIGRAM(S): 850 TABLET ORAL at 17:40

## 2017-12-30 RX ADMIN — HEPARIN SODIUM 5000 UNIT(S): 5000 INJECTION INTRAVENOUS; SUBCUTANEOUS at 21:24

## 2017-12-30 RX ADMIN — Medication 5 MILLIGRAM(S): at 06:10

## 2017-12-30 RX ADMIN — ATORVASTATIN CALCIUM 20 MILLIGRAM(S): 80 TABLET, FILM COATED ORAL at 21:24

## 2017-12-30 RX ADMIN — LEVETIRACETAM 500 MILLIGRAM(S): 250 TABLET, FILM COATED ORAL at 06:10

## 2017-12-30 RX ADMIN — METFORMIN HYDROCHLORIDE 500 MILLIGRAM(S): 850 TABLET ORAL at 08:20

## 2017-12-30 RX ADMIN — LEVETIRACETAM 500 MILLIGRAM(S): 250 TABLET, FILM COATED ORAL at 17:40

## 2017-12-30 RX ADMIN — Medication 1 TABLET(S): at 08:20

## 2017-12-30 RX ADMIN — Medication 500 MILLIGRAM(S): at 21:24

## 2017-12-30 RX ADMIN — HEPARIN SODIUM 5000 UNIT(S): 5000 INJECTION INTRAVENOUS; SUBCUTANEOUS at 06:10

## 2017-12-30 NOTE — PROGRESS NOTE ADULT - ASSESSMENT
32M paraplegic PMH spinal cord injury (wheelchair bound), sacral pressure ulcer with osteo x2 (outpatient provider said they have records of March osteo s/p daptomycin of unknown length) earlier in 2017,  DM2, indwelling suprapubic catheter who presented w/ septic shock secondary to infected purulent sacral 4th degree pressure ulcer with underlying inadequately treated OM, hemodynamically stable being treated for sacral osteomyelitis awaiting placement at [facility]

## 2017-12-30 NOTE — PROGRESS NOTE ADULT - SUBJECTIVE AND OBJECTIVE BOX
INTERVAL HPI/OVERNIGHT EVENTS: RAHEL o/n. ROS negative this AM. Patient wishes to transfer from bed to chair.     VITAL SIGNS:  T(F): 97.7 (12-30-17 @ 05:20)  HR: 88 (12-30-17 @ 05:20)  BP: 99/65 (12-30-17 @ 05:20)  RR: 17 (12-30-17 @ 05:20)  SpO2: 100% (12-30-17 @ 05:20)  Wt(kg): --    Constitutional: NAD, well-groomed, well-developed  HEENT: PERRLA, EOMI, Normal Hearing, MMM  Respiratory: CTAB  Cardiovascular: S1 and S2, RRR, no M/G/R  Gastrointestinal: BS+, soft, minimally distended, dull to percussion. NTD to palpation. Well healed midline laparotomy scar.   Extremities: No peripheral edema  Vascular: 2+ peripheral pulses  Neurological: A/O x 3, paralysis below nipple line, unchanged from previous exam.   Skin: stage 4 decubitus ulcer     MEDICATIONS  (STANDING):  amoxicillin  875 milliGRAM(s)/clavulanate 1 Tablet(s) Oral every 12 hours  ARIPiprazole 5 milliGRAM(s) Oral at bedtime  ascorbic acid 250 milliGRAM(s) Oral daily  atorvastatin 20 milliGRAM(s) Oral at bedtime  ciprofloxacin     Tablet 500 milliGRAM(s) Oral every 12 hours  dextrose 5%. 1000 milliLiter(s) (50 mL/Hr) IV Continuous <Continuous>  dextrose 50% Injectable 12.5 Gram(s) IV Push once  dextrose 50% Injectable 25 Gram(s) IV Push once  dextrose 50% Injectable 25 Gram(s) IV Push once  heparin  Injectable 5000 Unit(s) SubCutaneous every 8 hours  insulin glargine Injectable (LANTUS) 17 Unit(s) SubCutaneous every morning  insulin lispro (HumaLOG) corrective regimen sliding scale   SubCutaneous Before meals and at bedtime  levETIRAcetam 500 milliGRAM(s) Oral two times a day  metFORMIN 500 milliGRAM(s) Oral two times a day with meals  nystatin Powder 1 Application(s) Topical two times a day  oxybutynin 5 milliGRAM(s) Oral two times a day    MEDICATIONS  (PRN):  bisacodyl Suppository 10 milliGRAM(s) Rectal daily PRN Constipation  dextrose Gel 1 Dose(s) Oral once PRN Blood Glucose LESS THAN 70 milliGRAM(s)/deciliter  glucagon  Injectable 1 milliGRAM(s) IntraMuscular once PRN Glucose LESS THAN 70 milligrams/deciliter      Allergies    Capzasin Back and Body (Unknown)    Intolerances        LABS:                        9.6    6.6   )-----------( 257      ( 30 Dec 2017 07:39 )             31.8     12-30    137  |  99  |  20  ----------------------------<  117<H>  4.2   |  25  |  0.78    Ca    9.8      30 Dec 2017 07:39  Mg     1.8     12-30            RADIOLOGY & ADDITIONAL TESTS: Reviewed. INTERVAL HPI/OVERNIGHT EVENTS: RAHEL o/n. ROS negative this AM. Patient wishes to transfer from bed to chair.     VITAL SIGNS:  T(F): 97.7 (12-30-17 @ 05:20)  HR: 88 (12-30-17 @ 05:20)  BP: 99/65 (12-30-17 @ 05:20)  RR: 17 (12-30-17 @ 05:20)  SpO2: 100% (12-30-17 @ 05:20)  Wt(kg): --    Constitutional: NAD, well-groomed, well-developed  HEENT: PERRLA, EOMI, Normal Hearing, MMM  Respiratory: CTAB  Cardiovascular: S1 and S2, RRR, no M/G/R  Gastrointestinal: BS+, soft, minimally distended, dull to percussion. NTD to palpation. Well healed midline laparotomy scar.   Extremities: No peripheral edema  Vascular: 2+ peripheral pulses  Neurological: A/O x 3, paralysis below nipple line, 5/5 str upper ext bl, cn ii-xii intact, unchanged from previous exam.   Skin: stage 4-unstageable decubitus ulcer     MEDICATIONS  (STANDING):  amoxicillin  875 milliGRAM(s)/clavulanate 1 Tablet(s) Oral every 12 hours  ARIPiprazole 5 milliGRAM(s) Oral at bedtime  ascorbic acid 250 milliGRAM(s) Oral daily  atorvastatin 20 milliGRAM(s) Oral at bedtime  ciprofloxacin     Tablet 500 milliGRAM(s) Oral every 12 hours  dextrose 5%. 1000 milliLiter(s) (50 mL/Hr) IV Continuous <Continuous>  dextrose 50% Injectable 12.5 Gram(s) IV Push once  dextrose 50% Injectable 25 Gram(s) IV Push once  dextrose 50% Injectable 25 Gram(s) IV Push once  heparin  Injectable 5000 Unit(s) SubCutaneous every 8 hours  insulin glargine Injectable (LANTUS) 17 Unit(s) SubCutaneous every morning  insulin lispro (HumaLOG) corrective regimen sliding scale   SubCutaneous Before meals and at bedtime  levETIRAcetam 500 milliGRAM(s) Oral two times a day  metFORMIN 500 milliGRAM(s) Oral two times a day with meals  nystatin Powder 1 Application(s) Topical two times a day  oxybutynin 5 milliGRAM(s) Oral two times a day    MEDICATIONS  (PRN):  bisacodyl Suppository 10 milliGRAM(s) Rectal daily PRN Constipation  dextrose Gel 1 Dose(s) Oral once PRN Blood Glucose LESS THAN 70 milliGRAM(s)/deciliter  glucagon  Injectable 1 milliGRAM(s) IntraMuscular once PRN Glucose LESS THAN 70 milligrams/deciliter      Allergies    Capzasin Back and Body (Unknown)    Intolerances        LABS:                        9.6    6.6   )-----------( 257      ( 30 Dec 2017 07:39 )             31.8     12-30    137  |  99  |  20  ----------------------------<  117<H>  4.2   |  25  |  0.78    Ca    9.8      30 Dec 2017 07:39  Mg     1.8     12-30            RADIOLOGY & ADDITIONAL TESTS: Reviewed.

## 2017-12-30 NOTE — PROGRESS NOTE ADULT - ATTENDING COMMENTS
dx:  sacral osteomyelitis- refused picc. now stable on cipro/augmentin. will need ID follow up.   sacral decub unstageable - seen by plastics and gen surg. no acute surgical intervention recommended at this time. c/w local woundcare. off load wound.     schizophrenia - pt refusing anti-psychotics. I spoke with psychiatry  (Dr SIMI Dumont) who re-confirmed that pt lacks decision making capacity. has a court hearing at bedside 1/3/18.   neurogenic bladder - cw suprapubic bowles  DM ii - lantus / metformin  epilepsy- keppra .

## 2017-12-30 NOTE — PROGRESS NOTE ADULT - PROBLEM SELECTOR PLAN 6
-c/w home lipitor medication    #Seizure Disorder:   pt w/ a reported hx of seizure disorder  -c/w home medication of keppra 500mg BID    #Psychiatry Eval: Pt evaluated by psych and recommended he needs to be admitted to medical-psychiatric inpatient unit. Patient does not have capacity to make medical decisions  -Aripiprazole 5mg daily --pt refuses to take medications reporting "I am sane". States that he came to the hospital for medicine not psychiatry. Is refusing IV antibiotics.

## 2017-12-31 DIAGNOSIS — G40.802 OTHER EPILEPSY, NOT INTRACTABLE, WITHOUT STATUS EPILEPTICUS: ICD-10-CM

## 2017-12-31 DIAGNOSIS — F29 UNSPECIFIED PSYCHOSIS NOT DUE TO A SUBSTANCE OR KNOWN PHYSIOLOGICAL CONDITION: ICD-10-CM

## 2017-12-31 LAB
ANION GAP SERPL CALC-SCNC: 13 MMOL/L — SIGNIFICANT CHANGE UP (ref 5–17)
BUN SERPL-MCNC: 17 MG/DL — SIGNIFICANT CHANGE UP (ref 7–23)
CALCIUM SERPL-MCNC: 9.8 MG/DL — SIGNIFICANT CHANGE UP (ref 8.4–10.5)
CHLORIDE SERPL-SCNC: 100 MMOL/L — SIGNIFICANT CHANGE UP (ref 96–108)
CO2 SERPL-SCNC: 25 MMOL/L — SIGNIFICANT CHANGE UP (ref 22–31)
CREAT SERPL-MCNC: 0.76 MG/DL — SIGNIFICANT CHANGE UP (ref 0.5–1.3)
GLUCOSE SERPL-MCNC: 102 MG/DL — HIGH (ref 70–99)
HCT VFR BLD CALC: 34.2 % — LOW (ref 39–50)
HGB BLD-MCNC: 10.1 G/DL — LOW (ref 13–17)
MAGNESIUM SERPL-MCNC: 1.8 MG/DL — SIGNIFICANT CHANGE UP (ref 1.6–2.6)
MCHC RBC-ENTMCNC: 22.5 PG — LOW (ref 27–34)
MCHC RBC-ENTMCNC: 29.5 G/DL — LOW (ref 32–36)
MCV RBC AUTO: 76.2 FL — LOW (ref 80–100)
PLATELET # BLD AUTO: 265 K/UL — SIGNIFICANT CHANGE UP (ref 150–400)
POTASSIUM SERPL-MCNC: 4.6 MMOL/L — SIGNIFICANT CHANGE UP (ref 3.5–5.3)
POTASSIUM SERPL-SCNC: 4.6 MMOL/L — SIGNIFICANT CHANGE UP (ref 3.5–5.3)
RBC # BLD: 4.49 M/UL — SIGNIFICANT CHANGE UP (ref 4.2–5.8)
RBC # FLD: 18.5 % — HIGH (ref 10.3–16.9)
SODIUM SERPL-SCNC: 138 MMOL/L — SIGNIFICANT CHANGE UP (ref 135–145)
WBC # BLD: 5.4 K/UL — SIGNIFICANT CHANGE UP (ref 3.8–10.5)
WBC # FLD AUTO: 5.4 K/UL — SIGNIFICANT CHANGE UP (ref 3.8–10.5)

## 2017-12-31 PROCEDURE — 99233 SBSQ HOSP IP/OBS HIGH 50: CPT | Mod: GC

## 2017-12-31 RX ADMIN — LEVETIRACETAM 500 MILLIGRAM(S): 250 TABLET, FILM COATED ORAL at 17:08

## 2017-12-31 RX ADMIN — Medication 5 MILLIGRAM(S): at 06:52

## 2017-12-31 RX ADMIN — Medication 250 MILLIGRAM(S): at 08:56

## 2017-12-31 RX ADMIN — Medication 5 MILLIGRAM(S): at 17:08

## 2017-12-31 RX ADMIN — LEVETIRACETAM 500 MILLIGRAM(S): 250 TABLET, FILM COATED ORAL at 05:41

## 2017-12-31 RX ADMIN — Medication 500 MILLIGRAM(S): at 22:08

## 2017-12-31 RX ADMIN — HEPARIN SODIUM 5000 UNIT(S): 5000 INJECTION INTRAVENOUS; SUBCUTANEOUS at 22:08

## 2017-12-31 RX ADMIN — Medication 1 TABLET(S): at 09:19

## 2017-12-31 RX ADMIN — NYSTATIN CREAM 1 APPLICATION(S): 100000 CREAM TOPICAL at 17:08

## 2017-12-31 RX ADMIN — NYSTATIN CREAM 1 APPLICATION(S): 100000 CREAM TOPICAL at 05:42

## 2017-12-31 RX ADMIN — Medication 1 TABLET(S): at 22:08

## 2017-12-31 RX ADMIN — HEPARIN SODIUM 5000 UNIT(S): 5000 INJECTION INTRAVENOUS; SUBCUTANEOUS at 05:37

## 2017-12-31 RX ADMIN — METFORMIN HYDROCHLORIDE 500 MILLIGRAM(S): 850 TABLET ORAL at 08:56

## 2017-12-31 RX ADMIN — Medication 500 MILLIGRAM(S): at 08:56

## 2017-12-31 RX ADMIN — HEPARIN SODIUM 5000 UNIT(S): 5000 INJECTION INTRAVENOUS; SUBCUTANEOUS at 13:14

## 2017-12-31 RX ADMIN — METFORMIN HYDROCHLORIDE 500 MILLIGRAM(S): 850 TABLET ORAL at 17:08

## 2017-12-31 RX ADMIN — ATORVASTATIN CALCIUM 20 MILLIGRAM(S): 80 TABLET, FILM COATED ORAL at 22:08

## 2017-12-31 NOTE — PROGRESS NOTE ADULT - PROBLEM SELECTOR PLAN 3
per psych  pt will need treatment over objection and involuntary psych admission  pt has bedside court hearing 1/3/18

## 2017-12-31 NOTE — PROGRESS NOTE ADULT - ASSESSMENT
32M paraplegic PMH spinal cord injury (wheelchair bound), sacral pressure ulcer with osteo x2 (outpatient provider said they have records of March osteo s/p daptomycin of unknown length) earlier in 2017,  DM2, indwelling suprapubic catheter who presented w/ septic shock secondary to infected purulent sacral 4th degree pressure ulcer with underlying inadequately treated OM, hemodynamically stable being treated for sacral osteomyelitis awaiting placement at [facility]

## 2017-12-31 NOTE — PROGRESS NOTE ADULT - SUBJECTIVE AND OBJECTIVE BOX
Patient is a 32y old  Male who presents with a chief complaint of Chills (12 Dec 2017 18:17)      INTERVAL HPI/OVERNIGHT EVENTS:    feels well  wants to get oob into wheel chair      ROS  (- ) headache  ( -  )fevers/chills  ( - ) dyspnea  (  - ) cough  (  - ) chest pain  (  - ) palpatations  ( - ) dizziness/lightheadedness  (  - ) nausea/vomiting  (  - ) abd pain  (  - ) diarrhea  (  - ) melena  (  - ) hematochezia     ( - ) back pain  ( + ) tolerating POs  ( + ) BM  ROS: 12 point review of systems otherwise negative              MEDICATIONS  (STANDING):  amoxicillin  875 milliGRAM(s)/clavulanate 1 Tablet(s) Oral every 12 hours  ARIPiprazole 5 milliGRAM(s) Oral at bedtime  ascorbic acid 250 milliGRAM(s) Oral daily  atorvastatin 20 milliGRAM(s) Oral at bedtime  ciprofloxacin     Tablet 500 milliGRAM(s) Oral every 12 hours  dextrose 5%. 1000 milliLiter(s) (50 mL/Hr) IV Continuous <Continuous>  dextrose 50% Injectable 12.5 Gram(s) IV Push once  dextrose 50% Injectable 25 Gram(s) IV Push once  dextrose 50% Injectable 25 Gram(s) IV Push once  heparin  Injectable 5000 Unit(s) SubCutaneous every 8 hours  insulin glargine Injectable (LANTUS) 17 Unit(s) SubCutaneous every morning  insulin lispro (HumaLOG) corrective regimen sliding scale   SubCutaneous Before meals and at bedtime  levETIRAcetam 500 milliGRAM(s) Oral two times a day  metFORMIN 500 milliGRAM(s) Oral two times a day with meals  nystatin Powder 1 Application(s) Topical two times a day  oxybutynin 5 milliGRAM(s) Oral two times a day    MEDICATIONS  (PRN):  bisacodyl Suppository 10 milliGRAM(s) Rectal daily PRN Constipation  dextrose Gel 1 Dose(s) Oral once PRN Blood Glucose LESS THAN 70 milliGRAM(s)/deciliter  glucagon  Injectable 1 milliGRAM(s) IntraMuscular once PRN Glucose LESS THAN 70 milligrams/deciliter      Allergies    Capzasin Back and Body (Unknown)    Intolerances          Vital Signs Last 24 Hrs  T(C): --  T(F): --  HR: 91 (31 Dec 2017 15:58) (88 - 91)  BP: 106/64 (31 Dec 2017 15:58) (102/66 - 106/72)  BP(mean): --  RR: --  SpO2: --  CAPILLARY BLOOD GLUCOSE          12-31 @ 07:01  -  12-31 @ 17:53  --------------------------------------------------------  IN: 0 mL / OUT: 3100 mL / NET: -3100 mL        Physical Exam:    Daily     Daily   General:  Well appearing   HEENT:  Nonicteric, PERRLA, neck supple  CV:  [id number] , RRR, no murmur, no JVD  Lungs:  CTA B/L, no wheezes, rales, rhonchi  Abdomen:  positive BS, Soft, non-tender, non distended, no hepatosplenomegaly  Extremities:  2+ DP/radial pulses b/l, no cyanosis, no edema  Back: no cva or midline tenderness  Skin:   stage 4 pressure injuries of sacrum and left ischial tuberosity, (+) granulation tissue, no purulence  :  suprapubic  bowles , exit site clean, dry  Neuro:  AAOx3,  CN II-XII grossly intact, 5/5 str upper ext bl, paralyzed below t4  No Restraints                    LABS:                        10.1   5.4   )-----------( 265      ( 31 Dec 2017 07:15 )             34.2     12-31    138  |  100  |  17  ----------------------------<  102<H>  4.6   |  25  |  0.76    Ca    9.8      31 Dec 2017 07:15  Mg     1.8     12-31              RADIOLOGY & ADDITIONAL TESTS:

## 2018-01-01 LAB
HCT VFR BLD CALC: 31.1 % — LOW (ref 39–50)
HGB BLD-MCNC: 9.1 G/DL — LOW (ref 13–17)
MCHC RBC-ENTMCNC: 22.3 PG — LOW (ref 27–34)
MCHC RBC-ENTMCNC: 29.3 G/DL — LOW (ref 32–36)
MCV RBC AUTO: 76.2 FL — LOW (ref 80–100)
PLATELET # BLD AUTO: 237 K/UL — SIGNIFICANT CHANGE UP (ref 150–400)
RBC # BLD: 4.08 M/UL — LOW (ref 4.2–5.8)
RBC # FLD: 18.2 % — HIGH (ref 10.3–16.9)
WBC # BLD: 6.4 K/UL — SIGNIFICANT CHANGE UP (ref 3.8–10.5)
WBC # FLD AUTO: 6.4 K/UL — SIGNIFICANT CHANGE UP (ref 3.8–10.5)

## 2018-01-01 PROCEDURE — 99233 SBSQ HOSP IP/OBS HIGH 50: CPT | Mod: GC

## 2018-01-01 RX ADMIN — Medication 500 MILLIGRAM(S): at 08:34

## 2018-01-01 RX ADMIN — METFORMIN HYDROCHLORIDE 500 MILLIGRAM(S): 850 TABLET ORAL at 17:44

## 2018-01-01 RX ADMIN — ATORVASTATIN CALCIUM 20 MILLIGRAM(S): 80 TABLET, FILM COATED ORAL at 21:41

## 2018-01-01 RX ADMIN — Medication 1 TABLET(S): at 08:34

## 2018-01-01 RX ADMIN — NYSTATIN CREAM 1 APPLICATION(S): 100000 CREAM TOPICAL at 06:50

## 2018-01-01 RX ADMIN — LEVETIRACETAM 500 MILLIGRAM(S): 250 TABLET, FILM COATED ORAL at 06:49

## 2018-01-01 RX ADMIN — HEPARIN SODIUM 5000 UNIT(S): 5000 INJECTION INTRAVENOUS; SUBCUTANEOUS at 06:49

## 2018-01-01 RX ADMIN — HEPARIN SODIUM 5000 UNIT(S): 5000 INJECTION INTRAVENOUS; SUBCUTANEOUS at 21:41

## 2018-01-01 RX ADMIN — Medication 5 MILLIGRAM(S): at 06:49

## 2018-01-01 RX ADMIN — METFORMIN HYDROCHLORIDE 500 MILLIGRAM(S): 850 TABLET ORAL at 08:33

## 2018-01-01 RX ADMIN — NYSTATIN CREAM 1 APPLICATION(S): 100000 CREAM TOPICAL at 17:44

## 2018-01-01 RX ADMIN — HEPARIN SODIUM 5000 UNIT(S): 5000 INJECTION INTRAVENOUS; SUBCUTANEOUS at 13:36

## 2018-01-01 RX ADMIN — Medication 1 TABLET(S): at 21:41

## 2018-01-01 RX ADMIN — Medication 5 MILLIGRAM(S): at 17:44

## 2018-01-01 RX ADMIN — Medication 500 MILLIGRAM(S): at 21:41

## 2018-01-01 RX ADMIN — LEVETIRACETAM 500 MILLIGRAM(S): 250 TABLET, FILM COATED ORAL at 17:44

## 2018-01-01 RX ADMIN — Medication 250 MILLIGRAM(S): at 08:34

## 2018-01-01 NOTE — PROGRESS NOTE ADULT - SUBJECTIVE AND OBJECTIVE BOX
INTERVAL HPI/OVERNIGHT EVENTS: RAHEL o/n. ROS negative this morning. Patient continues to refuse insulin, abilify and IV antibiotics. Patient intermittently refusing grooming/care/vital signs from nursing staff. Desires removal of home suprapubic catheter.     VITAL SIGNS:  T(F): 98.2 (01-01-18 @ 05:49)  HR: 90 (01-01-18 @ 05:49)  BP: 98/61 (01-01-18 @ 05:49)  RR: 90 (01-01-18 @ 05:49)  SpO2: 100% (01-01-18 @ 05:49)  Wt(kg): --    Constitutional: NAD  HEENT: PERRLA, EOMI, Normal Hearing, MMM  Respiratory: CTAB  Cardiovascular: S1 and S2, RRR, no M/G/R  Gastrointestinal: BS+, soft, minimally distended, dull to percussion. NTD to palpation. Well healed midline laparotomy scar.   Extremities: No peripheral edema  Vascular: 2+ peripheral pulses  Neurological: A/O x 3, paralysis below nipple line, 5/5 str upper ext bl, cn ii-xii intact, unchanged from previous exam.   Skin: stage 4-unstageable decubitus ulcer     MEDICATIONS  (STANDING):  amoxicillin  875 milliGRAM(s)/clavulanate 1 Tablet(s) Oral every 12 hours  ARIPiprazole 5 milliGRAM(s) Oral at bedtime  ascorbic acid 250 milliGRAM(s) Oral daily  atorvastatin 20 milliGRAM(s) Oral at bedtime  ciprofloxacin     Tablet 500 milliGRAM(s) Oral every 12 hours  dextrose 5%. 1000 milliLiter(s) (50 mL/Hr) IV Continuous <Continuous>  dextrose 50% Injectable 12.5 Gram(s) IV Push once  dextrose 50% Injectable 25 Gram(s) IV Push once  dextrose 50% Injectable 25 Gram(s) IV Push once  heparin  Injectable 5000 Unit(s) SubCutaneous every 8 hours  insulin glargine Injectable (LANTUS) 17 Unit(s) SubCutaneous every morning  insulin lispro (HumaLOG) corrective regimen sliding scale   SubCutaneous Before meals and at bedtime  levETIRAcetam 500 milliGRAM(s) Oral two times a day  metFORMIN 500 milliGRAM(s) Oral two times a day with meals  nystatin Powder 1 Application(s) Topical two times a day  oxybutynin 5 milliGRAM(s) Oral two times a day    MEDICATIONS  (PRN):  bisacodyl Suppository 10 milliGRAM(s) Rectal daily PRN Constipation  dextrose Gel 1 Dose(s) Oral once PRN Blood Glucose LESS THAN 70 milliGRAM(s)/deciliter  glucagon  Injectable 1 milliGRAM(s) IntraMuscular once PRN Glucose LESS THAN 70 milligrams/deciliter      Allergies    Capzasin Back and Body (Unknown)    Intolerances        LABS:                        9.1    6.4   )-----------( 237      ( 01 Jan 2018 08:17 )             31.1     12-31    138  |  100  |  17  ----------------------------<  102<H>  4.6   |  25  |  0.76    Ca    9.8      31 Dec 2017 07:15  Mg     1.8     12-31            RADIOLOGY & ADDITIONAL TESTS:

## 2018-01-01 NOTE — PROGRESS NOTE ADULT - ASSESSMENT
32M paraplegic PMH spinal cord injury (wheelchair bound), sacral pressure ulcer with osteo x2 (outpatient provider said they have records of March osteo s/p daptomycin of unknown length) earlier in 2017,  DM2, indwelling suprapubic catheter who presented w/ septic shock secondary to infected purulent sacral 4th degree pressure ulcer with underlying inadequately treated OM, hemodynamically stable being treated for sacral osteomyelitis awaiting placement at [facility]

## 2018-01-01 NOTE — PROGRESS NOTE ADULT - ATTENDING COMMENTS
dx:  sacral osteomyelitis- refused picc. now stable on cipro/augmentin. will need ID follow up.   sacral decub unstageable - seen by plastics and gen surg. no acute surgical intervention recommended at this time. c/w local wound care. off load wound.     schizophrenia - pt refusing anti-psychotics. I spoke with psychiatry  (Dr SIMI Dumont) who re-confirmed that pt lacks decision making capacity. has a court hearing at bedside 1/3/18.   neurogenic bladder - cw suprapubic bowles  DM ii - lantus / metformin  epilepsy- keppra .

## 2018-01-02 LAB
HCT VFR BLD CALC: 29.7 % — LOW (ref 39–50)
HGB BLD-MCNC: 8.7 G/DL — LOW (ref 13–17)
MCHC RBC-ENTMCNC: 22.3 PG — LOW (ref 27–34)
MCHC RBC-ENTMCNC: 29.3 G/DL — LOW (ref 32–36)
MCV RBC AUTO: 76.2 FL — LOW (ref 80–100)
PLATELET # BLD AUTO: 220 K/UL — SIGNIFICANT CHANGE UP (ref 150–400)
RBC # BLD: 3.9 M/UL — LOW (ref 4.2–5.8)
RBC # FLD: 18.2 % — HIGH (ref 10.3–16.9)
WBC # BLD: 5.7 K/UL — SIGNIFICANT CHANGE UP (ref 3.8–10.5)
WBC # FLD AUTO: 5.7 K/UL — SIGNIFICANT CHANGE UP (ref 3.8–10.5)

## 2018-01-02 PROCEDURE — 99233 SBSQ HOSP IP/OBS HIGH 50: CPT

## 2018-01-02 RX ADMIN — HEPARIN SODIUM 5000 UNIT(S): 5000 INJECTION INTRAVENOUS; SUBCUTANEOUS at 13:23

## 2018-01-02 RX ADMIN — HEPARIN SODIUM 5000 UNIT(S): 5000 INJECTION INTRAVENOUS; SUBCUTANEOUS at 06:07

## 2018-01-02 RX ADMIN — Medication 500 MILLIGRAM(S): at 21:03

## 2018-01-02 RX ADMIN — Medication 250 MILLIGRAM(S): at 13:23

## 2018-01-02 RX ADMIN — Medication 5 MILLIGRAM(S): at 18:24

## 2018-01-02 RX ADMIN — NYSTATIN CREAM 1 APPLICATION(S): 100000 CREAM TOPICAL at 06:07

## 2018-01-02 RX ADMIN — HEPARIN SODIUM 5000 UNIT(S): 5000 INJECTION INTRAVENOUS; SUBCUTANEOUS at 21:04

## 2018-01-02 RX ADMIN — Medication 1 TABLET(S): at 21:04

## 2018-01-02 RX ADMIN — Medication 500 MILLIGRAM(S): at 08:24

## 2018-01-02 RX ADMIN — LEVETIRACETAM 500 MILLIGRAM(S): 250 TABLET, FILM COATED ORAL at 18:24

## 2018-01-02 RX ADMIN — LEVETIRACETAM 500 MILLIGRAM(S): 250 TABLET, FILM COATED ORAL at 06:07

## 2018-01-02 RX ADMIN — ATORVASTATIN CALCIUM 20 MILLIGRAM(S): 80 TABLET, FILM COATED ORAL at 21:04

## 2018-01-02 RX ADMIN — METFORMIN HYDROCHLORIDE 500 MILLIGRAM(S): 850 TABLET ORAL at 18:24

## 2018-01-02 RX ADMIN — Medication 5 MILLIGRAM(S): at 06:07

## 2018-01-02 RX ADMIN — Medication 1 TABLET(S): at 08:24

## 2018-01-02 RX ADMIN — METFORMIN HYDROCHLORIDE 500 MILLIGRAM(S): 850 TABLET ORAL at 08:24

## 2018-01-02 RX ADMIN — NYSTATIN CREAM 1 APPLICATION(S): 100000 CREAM TOPICAL at 18:24
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
## 2018-01-02 NOTE — CHART NOTE - NSCHARTNOTEFT_GEN_A_CORE
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  Paraplegia  Type 2 diabetes mellitus with hyperglycemia, without long-term current use of insulin      Current Nutrition Order:[facility] with no snack/DASH and glucerna shakes TID(660kcal and 30gmprotein       PO Intake: Good (%) [  x ]  Fair (50-75%) [   ] Poor (<25%) [   ]    GI Issues: none    Pain:yes    Skin Integrity:stage 4    Labs:         CAPILLARY BLOOD GLUCOSE          Medications:  MEDICATIONS  (STANDING):  amoxicillin  875 milliGRAM(s)/clavulanate 1 Tablet(s) Oral every 12 hours  ARIPiprazole 5 milliGRAM(s) Oral at bedtime  ascorbic acid 250 milliGRAM(s) Oral daily  atorvastatin 20 milliGRAM(s) Oral at bedtime  ciprofloxacin     Tablet 500 milliGRAM(s) Oral every 12 hours  dextrose 5%. 1000 milliLiter(s) (50 mL/Hr) IV Continuous <Continuous>  dextrose 50% Injectable 12.5 Gram(s) IV Push once  dextrose 50% Injectable 25 Gram(s) IV Push once  dextrose 50% Injectable 25 Gram(s) IV Push once  heparin  Injectable 5000 Unit(s) SubCutaneous every 8 hours  insulin glargine Injectable (LANTUS) 17 Unit(s) SubCutaneous every morning  insulin lispro (HumaLOG) corrective regimen sliding scale   SubCutaneous Before meals and at bedtime  levETIRAcetam 500 milliGRAM(s) Oral two times a day  metFORMIN 500 milliGRAM(s) Oral two times a day with meals  nystatin Powder 1 Application(s) Topical two times a day  oxybutynin 5 milliGRAM(s) Oral two times a day    MEDICATIONS  (PRN):  bisacodyl Suppository 10 milliGRAM(s) Rectal daily PRN Constipation  dextrose Gel 1 Dose(s) Oral once PRN Blood Glucose LESS THAN 70 milliGRAM(s)/deciliter  glucagon  Injectable 1 milliGRAM(s) IntraMuscular once PRN Glucose LESS THAN 70 milligrams/deciliter      Weight:72.6kg  Daily     Daily     Weight Change: no updated wieghts    Estimated energy needs: Based on IBW:[id number]:1530-1785kcal and 1.4-1.6gmprotein:71-82gmprotein and 1530-1785cc fluids    Subjective: 31 y/o Psychotic male admitted with fevers/chills.Eating %.Noted psych.intervention.    Previous Nutrition Diagnosis:Increased nutrient needs r/t increased demand for protein and nutrients AEB;Stage 4 PU  Active [ x  ]  Resolved [   ]    If resolved, new PES:     Goal:Meet 80% of needs consistently    Recommendations:updated weights    Education: not appropriate    Risk Level: High [   ] Moderate [ x  ] Low [   ]

## 2018-01-02 NOTE — PROVIDER CONTACT NOTE (OTHER) - SITUATION
Pateint refused oxygen saturation and temperature.  Patient's /69, .  Patient refused FS and lantus this morning.

## 2018-01-02 NOTE — PROGRESS NOTE ADULT - SUBJECTIVE AND OBJECTIVE BOX
INTERVAL HPI/OVERNIGHT EVENTS: RAHEL o/n. No acute complaints this AM. Patient anticipating court date tomorrow. Continues to refuse nursing/PCA care, vital signs per floor policy, insulin and abilify.     VITAL SIGNS:  T(F): --  HR: 109 (01-02-18 @ 09:05)  BP: 110/69 (01-02-18 @ 09:05)  RR: --  SpO2: --  Wt(kg): --    Constitutional: NAD  HEENT: PERRLA, EOMI, Normal Hearing, MMM  Respiratory: CTAB  Cardiovascular: S1 and S2, RRR, no M/G/R  Gastrointestinal: BS+, soft, minimally distended, dull to percussion. NTD to palpation. Well healed midline laparotomy scar.   Extremities: No peripheral edema  Vascular: 2+ peripheral pulses  Neurological: A/O x 3, paralysis below nipple line, 5/5 str upper ext bl, cn ii-xii intact, unchanged from previous exam.   Skin: stage 4-unstageable decubitus ulcer       MEDICATIONS  (STANDING):  amoxicillin  875 milliGRAM(s)/clavulanate 1 Tablet(s) Oral every 12 hours  ARIPiprazole 5 milliGRAM(s) Oral at bedtime  ascorbic acid 250 milliGRAM(s) Oral daily  atorvastatin 20 milliGRAM(s) Oral at bedtime  ciprofloxacin     Tablet 500 milliGRAM(s) Oral every 12 hours  dextrose 5%. 1000 milliLiter(s) (50 mL/Hr) IV Continuous <Continuous>  dextrose 50% Injectable 12.5 Gram(s) IV Push once  dextrose 50% Injectable 25 Gram(s) IV Push once  dextrose 50% Injectable 25 Gram(s) IV Push once  heparin  Injectable 5000 Unit(s) SubCutaneous every 8 hours  insulin glargine Injectable (LANTUS) 17 Unit(s) SubCutaneous every morning  insulin lispro (HumaLOG) corrective regimen sliding scale   SubCutaneous Before meals and at bedtime  levETIRAcetam 500 milliGRAM(s) Oral two times a day  metFORMIN 500 milliGRAM(s) Oral two times a day with meals  nystatin Powder 1 Application(s) Topical two times a day  oxybutynin 5 milliGRAM(s) Oral two times a day    MEDICATIONS  (PRN):  bisacodyl Suppository 10 milliGRAM(s) Rectal daily PRN Constipation  dextrose Gel 1 Dose(s) Oral once PRN Blood Glucose LESS THAN 70 milliGRAM(s)/deciliter  glucagon  Injectable 1 milliGRAM(s) IntraMuscular once PRN Glucose LESS THAN 70 milligrams/deciliter      Allergies    Capzasin Back and Body (Unknown)    Intolerances        LABS:                        8.7    5.7   )-----------( 220      ( 02 Jan 2018 06:41 )             29.7                 RADIOLOGY & ADDITIONAL TESTS: INTERVAL HPI/OVERNIGHT EVENTS: RAHEL o/n. No acute complaints this AM. Patient anticipating court date tomorrow. Continues to refuse nursing/PCA care, vital signs per floor policy, insulin and abilify.     VITAL SIGNS:  T(F): --  HR: 109 (01-02-18 @ 09:05)  BP: 110/69 (01-02-18 @ 09:05)  RR: --  SpO2: --  Wt(kg): --    Constitutional: NAD  HEENT: PERRLA, EOMI, Normal Hearing, MMM  Respiratory: CTAB  Cardiovascular: S1 and S2, RRR, no M/G/R  Gastrointestinal: BS+, soft, minimally distended,   NTD to palpation. Well healed midline laparotomy scar. (+) suprapubic bowles. site clean /dry  Extremities: No peripheral edema  Vascular: 2+ peripheral pulses  Neurological: A/O x 3, paralysis below nipple line, 5/5 str upper ext bl, cn ii-xii intact, unchanged from previous exam.   Skin: left IT w/ 3cm x4 cm x 2.5 cm ulcer. clean, no discharge, or surrounding erythema      MEDICATIONS  (STANDING):  amoxicillin  875 milliGRAM(s)/clavulanate 1 Tablet(s) Oral every 12 hours  ARIPiprazole 5 milliGRAM(s) Oral at bedtime  ascorbic acid 250 milliGRAM(s) Oral daily  atorvastatin 20 milliGRAM(s) Oral at bedtime  ciprofloxacin     Tablet 500 milliGRAM(s) Oral every 12 hours  dextrose 5%. 1000 milliLiter(s) (50 mL/Hr) IV Continuous <Continuous>  dextrose 50% Injectable 12.5 Gram(s) IV Push once  dextrose 50% Injectable 25 Gram(s) IV Push once  dextrose 50% Injectable 25 Gram(s) IV Push once  heparin  Injectable 5000 Unit(s) SubCutaneous every 8 hours  insulin glargine Injectable (LANTUS) 17 Unit(s) SubCutaneous every morning  insulin lispro (HumaLOG) corrective regimen sliding scale   SubCutaneous Before meals and at bedtime  levETIRAcetam 500 milliGRAM(s) Oral two times a day  metFORMIN 500 milliGRAM(s) Oral two times a day with meals  nystatin Powder 1 Application(s) Topical two times a day  oxybutynin 5 milliGRAM(s) Oral two times a day    MEDICATIONS  (PRN):  bisacodyl Suppository 10 milliGRAM(s) Rectal daily PRN Constipation  dextrose Gel 1 Dose(s) Oral once PRN Blood Glucose LESS THAN 70 milliGRAM(s)/deciliter  glucagon  Injectable 1 milliGRAM(s) IntraMuscular once PRN Glucose LESS THAN 70 milligrams/deciliter      Allergies    Capzasin Back and Body (Unknown)    Intolerances        LABS:                        8.7    5.7   )-----------( 220      ( 02 Jan 2018 06:41 )             29.7                 RADIOLOGY & ADDITIONAL TESTS:

## 2018-01-02 NOTE — PROGRESS NOTE ADULT - ATTENDING COMMENTS
dx:  infected decubitus ulcers- refused picc. now stable on cipro/augmentin. will need ID follow up.   left IT  decub - stage iv  - seen by plastics and gen surg. no acute surgical intervention recommended at this time. c/w local wound care. off load wound.   sacral wound is healed  schizophrenia - pt refusing anti-psychotics.   has a court hearing at bedside 1/3/18.   neurogenic bladder - cw suprapubic bowles  DM ii - can cont metformin only  epilepsy- keppra .

## 2018-01-02 NOTE — PROGRESS NOTE ADULT - PROBLEM SELECTOR PLAN 1
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 Upon arrival pts wound was packed w/ feculent material and required debridement by plastic surgery.   - wound cx had citrobacter, enterococcus, klebsiella  - c/w augmentin and cipro as patient refuses IV abx(12/5 zosyn started)  - general surgery does not believe there is a fistula between wound and rectum causing leakage of stool, no surgical intervention at this time.  - wound care following
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
## 2018-01-02 NOTE — PROGRESS NOTE BEHAVIORAL HEALTH - NSBHCONSULTMEDS_PSY_A_CORE FT
32-year-old male with reported hx of schizoaffective disorder, rendered paraplegic s/p suicide attempt, admitted in septic shock, s/p discontinuation of ADL and wound care, in context of noncompliance with antipsychotic medication resulting in psychosis. Pt was increasingly more noncompliant with medications and medical care. Over the last week, he has been more amenable to po medications, bloodwork, physical exam and vital sign checks.    Despite this, pt remains dismissive, paranoid/suspicious (i.e. taping and photographing caregivers). He refuses to acknowledge his psychiatric diagnosis/hx or benefits of psychotropic medication.    Given pt's significant hx of psychosis and self-harm, as well as tenuous compliance now (which may be due to pending bedside court hearing +/- change of medical attending), continuing with plan for court hearing is strongly recommended. 32-year-old male with reported hx of schizoaffective disorder, rendered paraplegic s/p suicide attempt, admitted in septic shock, s/p discontinuation of ADL and wound care, in context of noncompliance with antipsychotic medication resulting in psychosis.     Since admission, pt was increasingly more noncompliant with medications and medical care. Over the last week, he has been more amenable to po medications, bloodwork, physical exam and vital sign checks.    Despite this, pt remains dismissive, paranoid/suspicious (i.e. taping and photographing caregivers). He refuses to acknowledge his psychiatric diagnosis/hx or benefits of psychotropic medication. He also refuses to take any psychotropic medication.    Given pt's significant hx of psychosis and self-harm, as well as tenuous compliance now (which may be due to pending bedside court hearing +/- change of medical attending), continuing with plan for court hearing regarding treatment over objection is strongly recommended.    In addition, due to pt's lack of cooperation with interview, it is not currently possible to assess pt's capacity regarding his medical and psychiatric diagnoses. Pt is not currently requesting to leave AMA. So it is also not possible to assess his capacity regarding dispo planning (home with [facility] vs [facility], which pt has been refusing to date).

## 2018-01-02 NOTE — PROGRESS NOTE ADULT - PROBLEM SELECTOR PLAN 3
Patient on metformin and glimeperide at home. HgA1C of 6.0.   - patient continues to refuse insulin/fingersticks   -ISS+Lantus 17U+metformin 500mg BID  -monitor FSG

## 2018-01-02 NOTE — PROGRESS NOTE ADULT - ASSESSMENT
32M paraplegic PMH spinal cord injury (wheelchair bound), sacral pressure ulcer with osteo x2 (outpatient provider said they have records of March osteo s/p daptomycin of unknown length) earlier in 2017,  DM2, indwelling suprapubic catheter who presented w/ septic shock secondary to infected purulent sacral 4th degree pressure ulcer with underlying inadequately treated OM, hemodynamically stable being treated for sacral osteomyelitis awaiting placement at [facility]

## 2018-01-03 PROCEDURE — 99233 SBSQ HOSP IP/OBS HIGH 50: CPT | Mod: GT

## 2018-01-03 PROCEDURE — 99233 SBSQ HOSP IP/OBS HIGH 50: CPT

## 2018-01-03 PROCEDURE — 99356: CPT

## 2018-01-03 RX ADMIN — LEVETIRACETAM 500 MILLIGRAM(S): 250 TABLET, FILM COATED ORAL at 05:46

## 2018-01-03 RX ADMIN — NYSTATIN CREAM 1 APPLICATION(S): 100000 CREAM TOPICAL at 17:20

## 2018-01-03 RX ADMIN — NYSTATIN CREAM 1 APPLICATION(S): 100000 CREAM TOPICAL at 09:07

## 2018-01-03 RX ADMIN — Medication 5 MILLIGRAM(S): at 17:20

## 2018-01-03 RX ADMIN — HEPARIN SODIUM 5000 UNIT(S): 5000 INJECTION INTRAVENOUS; SUBCUTANEOUS at 21:03

## 2018-01-03 RX ADMIN — Medication 250 MILLIGRAM(S): at 11:25

## 2018-01-03 RX ADMIN — LEVETIRACETAM 500 MILLIGRAM(S): 250 TABLET, FILM COATED ORAL at 17:37

## 2018-01-03 RX ADMIN — ATORVASTATIN CALCIUM 20 MILLIGRAM(S): 80 TABLET, FILM COATED ORAL at 21:03

## 2018-01-03 RX ADMIN — METFORMIN HYDROCHLORIDE 500 MILLIGRAM(S): 850 TABLET ORAL at 17:20

## 2018-01-03 RX ADMIN — Medication 1 TABLET(S): at 09:04

## 2018-01-03 RX ADMIN — HEPARIN SODIUM 5000 UNIT(S): 5000 INJECTION INTRAVENOUS; SUBCUTANEOUS at 05:46

## 2018-01-03 RX ADMIN — METFORMIN HYDROCHLORIDE 500 MILLIGRAM(S): 850 TABLET ORAL at 09:04

## 2018-01-03 RX ADMIN — Medication 5 MILLIGRAM(S): at 05:46

## 2018-01-03 RX ADMIN — Medication 500 MILLIGRAM(S): at 09:04

## 2018-01-03 RX ADMIN — Medication 500 MILLIGRAM(S): at 21:03

## 2018-01-03 RX ADMIN — Medication 1 TABLET(S): at 21:03

## 2018-01-03 RX ADMIN — HEPARIN SODIUM 5000 UNIT(S): 5000 INJECTION INTRAVENOUS; SUBCUTANEOUS at 13:10

## 2018-01-03 NOTE — PROGRESS NOTE ADULT - ATTENDING COMMENTS
pt refused vitals today    exam  well appearing  s1s2 nml, rrr  cta bl  abd soft, (+) bs, nontender/nondistended (+) SP cathter , site clean/dry  stage iv sacral wound and left IT decub with dressings in place, skin surrounding  wounds appears clean.   aao x 3, cn ii-xii intact, 5/5 str all 4 ext, t4 paralysis      dx:  infected decubitus ulcers/possible sacral osteomyelitis- refused picc. now stable on cipro/augmentin. will need ID follow up.   left IT  decub and sacral decub- stage iv  - seen by plastics and gen surg. no acute surgical intervention recommended at this time. c/w local wound care. off load wound.   wounds are improved  psychosis - pt refusing anti-psychotics.   has a court hearing at bedside 1/3/18.   neurogenic bladder - c/w suprapubic bowles, oxybutinin  DM ii - can cont metformin  epilepsy- keppra .

## 2018-01-03 NOTE — PROGRESS NOTE BEHAVIORAL HEALTH - NSBHCONSULTMEDS_PSY_A_CORE FT
Haldol 2 mg po qhs; Haldol 2 mg IM qhs prn refusal to take po.  For IM medication administration, please have Security present.

## 2018-01-03 NOTE — PROGRESS NOTE BEHAVIORAL HEALTH - NSBHFUPINTERVALHXFT_PSY_A_CORE
Bedside court hearing for treatment over objection (TOO) held this afternoon. Present at hearing were  Doron and his team (including ), court-appointed /, Dr Garcia, this writer, and Dr. Risa Dumont, PGY IV psychiatry resident. Pt determined by  to LACK CAPACITY regarding medical decision making due to current paranoia/psychosis and refusal to take psychotropic medications. Bedside court hearing for treatment over objection (TOO) held this afternoon. Present at hearing were  Doron and his team (including ), court-appointed /, Dr Garcia, this writer, and Dr. Risa Dumont, PGY IV psychiatry resident.     Pt determined by  to LACK CAPACITY regarding medical decision making due to current paranoia/psychosis and refusal to take psychotropic medications. Bedside court hearing for treatment over objection (TOO) held this afternoon (90 minutes). Present at hearing were  Doron and his team (including ), court-appointed /, Dr Garcia, this writer, and Dr. Risa Dumont, PGY IV psychiatry resident.     Pt determined by  to LACK CAPACITY regarding medical decision making due to current paranoia/psychosis and refusal to take psychotropic medications.

## 2018-01-03 NOTE — PROGRESS NOTE ADULT - PROBLEM SELECTOR PLAN 6
-c/w home lipitor medication    #Seizure Disorder:   pt w/ a reported hx of seizure disorder  -c/w home medication of keppra 500mg BID    #Psychiatry Eval: Pt evaluated by psych and recommended he needs to be admitted to medical-psychiatric inpatient unit. Patient does not have capacity to make medical decisions. Court date this afternoon at bedside.   -Aripiprazole 5mg daily --pt refuses to take medications reporting "I am sane". States that he came to the hospital for medicine not psychiatry. Is refusing IV antibiotics.

## 2018-01-03 NOTE — PROGRESS NOTE ADULT - SUBJECTIVE AND OBJECTIVE BOX
INTERVAL HPI/OVERNIGHT EVENTS: Patient refusing vital signs, fingersticks, insulin, abilify. Rod tthis morning states that he is healthy and feels good. ROS unobtainable as patient states that when "I feel something I will let you know".    VITAL SIGNS:  T(F): --  HR: --  BP: --  RR: --  SpO2: --  Wt(kg): --    Patient refusing physical exam this AM     MEDICATIONS  (STANDING):  amoxicillin  875 milliGRAM(s)/clavulanate 1 Tablet(s) Oral every 12 hours  ARIPiprazole 5 milliGRAM(s) Oral at bedtime  ascorbic acid 250 milliGRAM(s) Oral daily  atorvastatin 20 milliGRAM(s) Oral at bedtime  ciprofloxacin     Tablet 500 milliGRAM(s) Oral every 12 hours  dextrose 5%. 1000 milliLiter(s) (50 mL/Hr) IV Continuous <Continuous>  dextrose 50% Injectable 12.5 Gram(s) IV Push once  dextrose 50% Injectable 25 Gram(s) IV Push once  dextrose 50% Injectable 25 Gram(s) IV Push once  heparin  Injectable 5000 Unit(s) SubCutaneous every 8 hours  insulin glargine Injectable (LANTUS) 17 Unit(s) SubCutaneous every morning  insulin lispro (HumaLOG) corrective regimen sliding scale   SubCutaneous Before meals and at bedtime  levETIRAcetam 500 milliGRAM(s) Oral two times a day  metFORMIN 500 milliGRAM(s) Oral two times a day with meals  nystatin Powder 1 Application(s) Topical two times a day  oxybutynin 5 milliGRAM(s) Oral two times a day    MEDICATIONS  (PRN):  bisacodyl Suppository 10 milliGRAM(s) Rectal daily PRN Constipation  dextrose Gel 1 Dose(s) Oral once PRN Blood Glucose LESS THAN 70 milliGRAM(s)/deciliter  glucagon  Injectable 1 milliGRAM(s) IntraMuscular once PRN Glucose LESS THAN 70 milligrams/deciliter      Allergies    Capzasin Back and Body (Unknown)    Intolerances        LABS:                        8.7    5.7   )-----------( 220      ( 02 Jan 2018 06:41 )             29.7                 RADIOLOGY & ADDITIONAL TESTS:

## 2018-01-03 NOTE — PROGRESS NOTE BEHAVIORAL HEALTH - OTHER
Upper body strength appears WNL. Pt is paraplegic. Paraplegic Disengaged, dismissive "There is nothing wrong with me." Paranoid delusions including that he is involved in organized crime plot

## 2018-01-03 NOTE — PROGRESS NOTE ADULT - ASSESSMENT
32M paraplegic PMH spinal cord injury (wheelchair bound), sacral pressure ulcer with osteo x2 (outpatient provider said they have records of March osteo s/p daptomycin of unknown length) earlier in 2017,  DM2, indwelling suprapubic catheter who presented w/ septic shock secondary to infected purulent sacral 4th degree pressure ulcer with underlying inadequately treated OM, hemodynamically stable being treated for sacral osteomyelitis awaiting placement at [facility]

## 2018-01-03 NOTE — PROGRESS NOTE BEHAVIORAL HEALTH - NSBHCONSULTRECOMMENDOTHER_PSY_A_CORE FT
Judge Sal recommended that pt be followed by male hospitalist, given his improved compliance since Dr. Garcia began following him.

## 2018-01-03 NOTE — PROGRESS NOTE BEHAVIORAL HEALTH - SUMMARY
32-year-old male with reported hx of schizoaffective disorder, rendered paraplegic s/p suicide attempt, admitted in septic shock, s/p discontinuation of ADL and wound care, in context of noncompliance with antipsychotic medication resulting in psychosis.   Pt determined to LACK CAPACITY regarding medical decision making including need for psychotropic medication.  Will confer with [facility] team regarding how to place court-ordered orders for medications. Will likely start with haldol 2 mg po qhs, haldol 2 mg IM qhs prn refusal to take po medication.

## 2018-01-04 LAB
BLD GP AB SCN SERPL QL: NEGATIVE — SIGNIFICANT CHANGE UP
RH IG SCN BLD-IMP: POSITIVE — SIGNIFICANT CHANGE UP

## 2018-01-04 PROCEDURE — 99233 SBSQ HOSP IP/OBS HIGH 50: CPT

## 2018-01-04 PROCEDURE — 93010 ELECTROCARDIOGRAM REPORT: CPT

## 2018-01-04 RX ORDER — HALOPERIDOL DECANOATE 100 MG/ML
2 INJECTION INTRAMUSCULAR AT BEDTIME
Qty: 0 | Refills: 0 | Status: DISCONTINUED | OUTPATIENT
Start: 2018-01-04 | End: 2018-01-04

## 2018-01-04 RX ORDER — HALOPERIDOL DECANOATE 100 MG/ML
2 INJECTION INTRAMUSCULAR AT BEDTIME
Qty: 0 | Refills: 0 | Status: DISCONTINUED | OUTPATIENT
Start: 2018-01-04 | End: 2018-01-05

## 2018-01-04 RX ADMIN — Medication 250 MILLIGRAM(S): at 15:37

## 2018-01-04 RX ADMIN — HEPARIN SODIUM 5000 UNIT(S): 5000 INJECTION INTRAVENOUS; SUBCUTANEOUS at 15:00

## 2018-01-04 RX ADMIN — NYSTATIN CREAM 1 APPLICATION(S): 100000 CREAM TOPICAL at 09:42

## 2018-01-04 RX ADMIN — ATORVASTATIN CALCIUM 20 MILLIGRAM(S): 80 TABLET, FILM COATED ORAL at 22:01

## 2018-01-04 RX ADMIN — HEPARIN SODIUM 5000 UNIT(S): 5000 INJECTION INTRAVENOUS; SUBCUTANEOUS at 05:50

## 2018-01-04 RX ADMIN — NYSTATIN CREAM 1 APPLICATION(S): 100000 CREAM TOPICAL at 17:05

## 2018-01-04 RX ADMIN — HEPARIN SODIUM 5000 UNIT(S): 5000 INJECTION INTRAVENOUS; SUBCUTANEOUS at 22:01

## 2018-01-04 RX ADMIN — Medication 1 TABLET(S): at 09:41

## 2018-01-04 RX ADMIN — HALOPERIDOL DECANOATE 2 MILLIGRAM(S): 100 INJECTION INTRAMUSCULAR at 22:11

## 2018-01-04 RX ADMIN — Medication 5 MILLIGRAM(S): at 05:50

## 2018-01-04 RX ADMIN — Medication 500 MILLIGRAM(S): at 09:41

## 2018-01-04 RX ADMIN — Medication 500 MILLIGRAM(S): at 22:01

## 2018-01-04 RX ADMIN — LEVETIRACETAM 500 MILLIGRAM(S): 250 TABLET, FILM COATED ORAL at 05:50

## 2018-01-04 RX ADMIN — Medication 1 TABLET(S): at 22:00

## 2018-01-04 RX ADMIN — METFORMIN HYDROCHLORIDE 500 MILLIGRAM(S): 850 TABLET ORAL at 17:04

## 2018-01-04 RX ADMIN — METFORMIN HYDROCHLORIDE 500 MILLIGRAM(S): 850 TABLET ORAL at 09:41

## 2018-01-04 RX ADMIN — LEVETIRACETAM 500 MILLIGRAM(S): 250 TABLET, FILM COATED ORAL at 17:04

## 2018-01-04 RX ADMIN — Medication 5 MILLIGRAM(S): at 17:07

## 2018-01-04 NOTE — PROGRESS NOTE BEHAVIORAL HEALTH - NSBHFUPINTERVALHXFT_PSY_A_CORE
Pt informed earlier today and again now that treatment over objection was granted s/p bedside hearing yesterday. Pt informed that Haldol po is the first medication to be used, but team--perhaps with pt's input--can also start with alternative medications listed,i.e. abilify or risperidone.  Pt responded, "You can stick my fransisca down your throat!" Pt informed earlier today and again now that treatment over objection was granted s/p bedside hearing yesterday. Per earlier notes, pt upset regarding verdict.  Pt informed by this writer that Haldol po is the first medication to be used, but team--perhaps with pt's input--can also start with alternative medications listed,i.e. abilify or risperidone.  Pt responded, "You still don't address me by my proper name. You can stick my fransisca down your throat!"

## 2018-01-04 NOTE — PROGRESS NOTE BEHAVIORAL HEALTH - SUMMARY
32-year-old male with reported hx of schizoaffective disorder, rendered paraplegic s/p suicide attempt, admitted in septic shock, s/p discontinuation of ADL and wound care, in context of noncompliance with antipsychotic medication resulting in psychosis.   Pt determined to LACK CAPACITY regarding medical decision making including need for psychotropic medication.  Will start with haldol 2 mg po qhs/haldol 2 mg IM qhs prn refusal to take po medication.

## 2018-01-04 NOTE — PROGRESS NOTE ADULT - ASSESSMENT
32M paraplegic PMH spinal cord injury (wheelchair bound), sacral pressure ulcer with osteo x2 (outpatient provider said they have records of March osteo s/p daptomycin of unknown length) earlier in 2017,  DM2, indwelling suprapubic catheter who presented w/ septic shock secondary to infected purulent sacral 4th degree pressure ulcer with underlying inadequately treated OM, hemodynamically stable being treated for sacral osteomyelitis awaiting placement at [facility]

## 2018-01-04 NOTE — PROGRESS NOTE ADULT - ATTENDING COMMENTS
pt refused vitals today    exam  well appearing  s1s2 nml, rrr  cta bl  abd soft, (+) bs, nontender/nondistended (+) SP cathter , site clean/dry  stage iv sacral wound and left IT decub with dressings in place, skin surrounding  wounds appears clean.   aao x 3, cn ii-xii intact, 5/5 str all 4 ext, t4 paralysis      dx:  infected decubitus ulcers/possible sacral osteomyelitis- refused picc. now stable on cipro/augmentin. will need ID follow up.   left IT  decub and sacral decub- stage iv  - seen by plastics and gen surg. no acute surgical intervention recommended at this time. c/w local wound care. off load wound.   wounds are improved  psychosis - pt refusing anti-psychotics.   will now treat over objection based on court ruling. haldol qhs . ekg to eval qtc  neurogenic bladder - c/w suprapubic bowles, oxybutinin  DM ii - can cont metformin  epilepsy- keppra .

## 2018-01-04 NOTE — PROGRESS NOTE ADULT - SUBJECTIVE AND OBJECTIVE BOX
INTERVAL HPI/OVERNIGHT EVENTS: RAHEL o/n. This AM patient upset with court verdict. Patient continues to refuse vitals, fingersticks, and insulin. Discussed with patient that he is scheduled to receive 2 mg of haldol PO or IM this evening. Otherwise ROS negative.     VITAL SIGNS:  T(F): --  HR: --  BP: --  RR: --  SpO2: --  Wt(kg): --    PHYSICAL EXAM:      Constitutional: NAD, malodorous  HEENT: PERRLA, EOMI, Normal Hearing, MMM  Respiratory: CTAB  Cardiovascular: S1 and S2, RRR, no M/G/R  Gastrointestinal: BS+, soft, NT/ND  Neurological: A/O x 3, baseline motor/sensory deficits at t4 and below. Exam unchanged.         MEDICATIONS  (STANDING):  amoxicillin  875 milliGRAM(s)/clavulanate 1 Tablet(s) Oral every 12 hours  ascorbic acid 250 milliGRAM(s) Oral daily  atorvastatin 20 milliGRAM(s) Oral at bedtime  ciprofloxacin     Tablet 500 milliGRAM(s) Oral every 12 hours  dextrose 5%. 1000 milliLiter(s) (50 mL/Hr) IV Continuous <Continuous>  dextrose 50% Injectable 12.5 Gram(s) IV Push once  dextrose 50% Injectable 25 Gram(s) IV Push once  dextrose 50% Injectable 25 Gram(s) IV Push once  haloperidol     Tablet 2 milliGRAM(s) Oral at bedtime  haloperidol    Injectable 2 milliGRAM(s) IntraMuscular at bedtime  heparin  Injectable 5000 Unit(s) SubCutaneous every 8 hours  insulin glargine Injectable (LANTUS) 17 Unit(s) SubCutaneous every morning  insulin lispro (HumaLOG) corrective regimen sliding scale   SubCutaneous Before meals and at bedtime  levETIRAcetam 500 milliGRAM(s) Oral two times a day  metFORMIN 500 milliGRAM(s) Oral two times a day with meals  nystatin Powder 1 Application(s) Topical two times a day  oxybutynin 5 milliGRAM(s) Oral two times a day    MEDICATIONS  (PRN):  bisacodyl Suppository 10 milliGRAM(s) Rectal daily PRN Constipation  dextrose Gel 1 Dose(s) Oral once PRN Blood Glucose LESS THAN 70 milliGRAM(s)/deciliter  glucagon  Injectable 1 milliGRAM(s) IntraMuscular once PRN Glucose LESS THAN 70 milligrams/deciliter      Allergies    Capzasin Back and Body (Unknown)    Intolerances        LABS:                RADIOLOGY & ADDITIONAL TESTS:

## 2018-01-04 NOTE — PROGRESS NOTE ADULT - PROBLEM SELECTOR PLAN 6
-c/w home lipitor medication    #Seizure Disorder:   pt w/ a reported hx of seizure disorder  -c/w home medication of keppra 500mg BID    #Psychiatry Eval: Pt evaluated by psych and recommended he needs to be admitted to medical-psychiatric inpatient unit. Patient does not have capacity to make medical decisions. Court date this afternoon at bedside.   -discontinued aripriprazole in favor of haloperidol. Haloperidol 2 mg PO or IM. Patient does not have capacity to refuse and is court ordered to receive the haldol.

## 2018-01-04 NOTE — PROGRESS NOTE BEHAVIORAL HEALTH - NSBHCONSULTMEDS_PSY_A_CORE FT
Haldol 2 mg po qhs COURT-ORDERED MEDICATION. PT CANNOT REFUSE COURT-ORDERED MEDICATION.    If pt refuses to take po Haldol:    Haldol 2 mg IM qhs COURT-ORDERED MEDICATION. PT CANNOT REFUSE COURT-ORDERED MEDICATION. ****PLEASE HAVE SECURITY PRESENT PRIOR TO ADMINISTERING IM MEDICATION.****

## 2018-01-04 NOTE — PROGRESS NOTE BEHAVIORAL HEALTH - OTHER
Upper body strength appears WNL. Pt is paraplegic. Paraplegic "There is nothing wrong with me." Disengaged, dismissive Paranoid delusions including that he is involved in organized crime plot

## 2018-01-05 PROCEDURE — 99233 SBSQ HOSP IP/OBS HIGH 50: CPT

## 2018-01-05 RX ORDER — METFORMIN HYDROCHLORIDE 850 MG/1
1000 TABLET ORAL
Qty: 0 | Refills: 0 | Status: DISCONTINUED | OUTPATIENT
Start: 2018-01-05 | End: 2018-01-31

## 2018-01-05 RX ORDER — HALOPERIDOL DECANOATE 100 MG/ML
5 INJECTION INTRAMUSCULAR AT BEDTIME
Qty: 0 | Refills: 0 | Status: DISCONTINUED | OUTPATIENT
Start: 2018-01-05 | End: 2018-01-09

## 2018-01-05 RX ADMIN — Medication 500 MILLIGRAM(S): at 09:39

## 2018-01-05 RX ADMIN — HALOPERIDOL DECANOATE 5 MILLIGRAM(S): 100 INJECTION INTRAMUSCULAR at 21:22

## 2018-01-05 RX ADMIN — NYSTATIN CREAM 1 APPLICATION(S): 100000 CREAM TOPICAL at 05:26

## 2018-01-05 RX ADMIN — METFORMIN HYDROCHLORIDE 500 MILLIGRAM(S): 850 TABLET ORAL at 09:39

## 2018-01-05 RX ADMIN — Medication 250 MILLIGRAM(S): at 14:52

## 2018-01-05 RX ADMIN — Medication 5 MILLIGRAM(S): at 05:22

## 2018-01-05 RX ADMIN — HEPARIN SODIUM 5000 UNIT(S): 5000 INJECTION INTRAVENOUS; SUBCUTANEOUS at 14:53

## 2018-01-05 RX ADMIN — Medication 1 TABLET(S): at 09:39

## 2018-01-05 RX ADMIN — LEVETIRACETAM 500 MILLIGRAM(S): 250 TABLET, FILM COATED ORAL at 05:22

## 2018-01-05 RX ADMIN — HEPARIN SODIUM 5000 UNIT(S): 5000 INJECTION INTRAVENOUS; SUBCUTANEOUS at 05:22

## 2018-01-05 RX ADMIN — Medication 500 MILLIGRAM(S): at 21:20

## 2018-01-05 RX ADMIN — HEPARIN SODIUM 5000 UNIT(S): 5000 INJECTION INTRAVENOUS; SUBCUTANEOUS at 21:21

## 2018-01-05 RX ADMIN — Medication 1 TABLET(S): at 21:20

## 2018-01-05 RX ADMIN — NYSTATIN CREAM 1 APPLICATION(S): 100000 CREAM TOPICAL at 18:16

## 2018-01-05 RX ADMIN — METFORMIN HYDROCHLORIDE 1000 MILLIGRAM(S): 850 TABLET ORAL at 18:16

## 2018-01-05 RX ADMIN — Medication 5 MILLIGRAM(S): at 18:16

## 2018-01-05 RX ADMIN — ATORVASTATIN CALCIUM 20 MILLIGRAM(S): 80 TABLET, FILM COATED ORAL at 21:20

## 2018-01-05 RX ADMIN — LEVETIRACETAM 500 MILLIGRAM(S): 250 TABLET, FILM COATED ORAL at 18:16

## 2018-01-05 NOTE — PROGRESS NOTE ADULT - ASSESSMENT
32M paraplegic PMH spinal cord injury (wheelchair bound), sacral pressure ulcer with osteo x2 (outpatient provider said they have records of March osteo s/p daptomycin of unknown length) earlier in 2017,  DM2, indwelling suprapubic catheter who presented w/ septic shock secondary to infected purulent sacral 4th degree pressure ulcer with underlying inadequately treated OM, hemodynamically stable being treated for sacral osteomyelitis awaiting placement at [facility]

## 2018-01-05 NOTE — PROGRESS NOTE ADULT - SUBJECTIVE AND OBJECTIVE BOX
INTERVAL HPI/OVERNIGHT EVENTS: RAHEL o/n. Patient took PO haldol w/o incident. ROS negative this AM.     VITAL SIGNS:  T(F): 98.3 (01-04-18 @ 16:06)  HR: 78 (01-04-18 @ 16:06)  BP: 105/70 (01-04-18 @ 16:06)  RR: 19 (01-04-18 @ 16:06)  SpO2: 100% (01-04-18 @ 16:06)  Wt(kg): --    PHYSICAL EXAM:      Constitutional: NAD, malodorous  HEENT: PERRLA, EOMI, Normal Hearing, MMM  Respiratory: CTAB  Cardiovascular: S1 and S2, RRR, no M/G/R  Gastrointestinal: BS+, soft, NT/ND  Neurological: A/O x 3, baseline motor/sensory deficits at t4 and below. Exam unchanged.     MEDICATIONS  (STANDING):  amoxicillin  875 milliGRAM(s)/clavulanate 1 Tablet(s) Oral every 12 hours  ascorbic acid 250 milliGRAM(s) Oral daily  atorvastatin 20 milliGRAM(s) Oral at bedtime  ciprofloxacin     Tablet 500 milliGRAM(s) Oral every 12 hours  dextrose 5%. 1000 milliLiter(s) (50 mL/Hr) IV Continuous <Continuous>  dextrose 50% Injectable 12.5 Gram(s) IV Push once  dextrose 50% Injectable 25 Gram(s) IV Push once  dextrose 50% Injectable 25 Gram(s) IV Push once  haloperidol     Tablet 2 milliGRAM(s) Oral at bedtime  haloperidol    Injectable 2 milliGRAM(s) IntraMuscular at bedtime  heparin  Injectable 5000 Unit(s) SubCutaneous every 8 hours  insulin glargine Injectable (LANTUS) 17 Unit(s) SubCutaneous every morning  insulin lispro (HumaLOG) corrective regimen sliding scale   SubCutaneous Before meals and at bedtime  levETIRAcetam 500 milliGRAM(s) Oral two times a day  metFORMIN 500 milliGRAM(s) Oral two times a day with meals  nystatin Powder 1 Application(s) Topical two times a day  oxybutynin 5 milliGRAM(s) Oral two times a day    MEDICATIONS  (PRN):  bisacodyl Suppository 10 milliGRAM(s) Rectal daily PRN Constipation  dextrose Gel 1 Dose(s) Oral once PRN Blood Glucose LESS THAN 70 milliGRAM(s)/deciliter  glucagon  Injectable 1 milliGRAM(s) IntraMuscular once PRN Glucose LESS THAN 70 milligrams/deciliter      Allergies    Capzasin Back and Body (Unknown)    Intolerances        LABS:                RADIOLOGY & ADDITIONAL TESTS: INTERVAL HPI/OVERNIGHT EVENTS: RAHEL o/n. Patient took PO haldol w/o incident. ROS negative this AM.     VITAL SIGNS:  T(F): 98.3 (01-04-18 @ 16:06)  HR: 78 (01-04-18 @ 16:06)  BP: 105/70 (01-04-18 @ 16:06)  RR: 19 (01-04-18 @ 16:06)  SpO2: 100% (01-04-18 @ 16:06)  Wt(kg): --    PHYSICAL EXAM:      Constitutional: NAD,   HEENT: PERRLA, EOMI, Normal Hearing, MMM  Respiratory: CTAB  Cardiovascular: S1 and S2, RRR, no M/G/R  Gastrointestinal: BS+, soft, NT/ND  Neurological: A/O x 3, baseline motor/sensory deficits at t4 and below (paraplegia). Exam unchanged.   derm: sacral and Left IT dressings in place. clean , dry, no discharge     MEDICATIONS  (STANDING):  amoxicillin  875 milliGRAM(s)/clavulanate 1 Tablet(s) Oral every 12 hours  ascorbic acid 250 milliGRAM(s) Oral daily  atorvastatin 20 milliGRAM(s) Oral at bedtime  ciprofloxacin     Tablet 500 milliGRAM(s) Oral every 12 hours  dextrose 5%. 1000 milliLiter(s) (50 mL/Hr) IV Continuous <Continuous>  dextrose 50% Injectable 12.5 Gram(s) IV Push once  dextrose 50% Injectable 25 Gram(s) IV Push once  dextrose 50% Injectable 25 Gram(s) IV Push once  haloperidol     Tablet 2 milliGRAM(s) Oral at bedtime  haloperidol    Injectable 2 milliGRAM(s) IntraMuscular at bedtime  heparin  Injectable 5000 Unit(s) SubCutaneous every 8 hours  insulin glargine Injectable (LANTUS) 17 Unit(s) SubCutaneous every morning  insulin lispro (HumaLOG) corrective regimen sliding scale   SubCutaneous Before meals and at bedtime  levETIRAcetam 500 milliGRAM(s) Oral two times a day  metFORMIN 500 milliGRAM(s) Oral two times a day with meals  nystatin Powder 1 Application(s) Topical two times a day  oxybutynin 5 milliGRAM(s) Oral two times a day    MEDICATIONS  (PRN):  bisacodyl Suppository 10 milliGRAM(s) Rectal daily PRN Constipation  dextrose Gel 1 Dose(s) Oral once PRN Blood Glucose LESS THAN 70 milliGRAM(s)/deciliter  glucagon  Injectable 1 milliGRAM(s) IntraMuscular once PRN Glucose LESS THAN 70 milligrams/deciliter      Allergies    Capzasin Back and Body (Unknown)    Intolerances        LABS:                RADIOLOGY & ADDITIONAL TESTS:

## 2018-01-05 NOTE — PROGRESS NOTE BEHAVIORAL HEALTH - SUMMARY
32-year-old male with reported hx of schizoaffective disorder, rendered paraplegic s/p suicide attempt, admitted in septic shock, s/p discontinuation of ADL and wound care, in context of noncompliance with antipsychotic medication resulting in psychosis.   Pt determined to LACK CAPACITY regarding medical decision making including need for psychotropic medication.  Will start with haldol 2 mg po qhs/haldol 2 mg IM qhs prn refusal to take po medication. 32-year-old male with reported hx of schizoaffective disorder, rendered paraplegic s/p suicide attempt, admitted in septic shock, s/p discontinuation of ADL and wound care, in context of noncompliance with antipsychotic medication resulting in psychosis.  Pt is determined to LACK CAPACITY regarding medical decision making including need for psychotropic medication. Has approval for TOO (court mandated), is started on haldol 2 mg po qhs/haldol 2 mg IM qhs prn refusal to take po medication. He continues to exhibit paranoid delusions, poor insight and judgment toward his mental illness and requires continued psychiatric care for further stabilization.

## 2018-01-05 NOTE — PROGRESS NOTE BEHAVIORAL HEALTH - NSBHFUPINTERVALHXFT_PSY_A_CORE
Collateral obtained from aunt (Allison Valladares: H#[phone]; C#[phone]): This writer talk to patients and lives in the same building apartment for several years and knows the patient well. She states that she is not the health care proxy of the patient. States patient is first episode of mental illness occurd in 2010 when his father was transfered to nursing home (were living together). Patient started to have increasing mood and anxiety symptoms worrying that the landlord would be evicting him, therefore left the house for a month and then was found in [place] having a nervous breakdown and was brought to [facility] by EMS. Aunt is unaware of further detail of the above incident. Patient was in [place] for a month, found to be over sedated psychotropic medication when and visited him, was discharged home on 29th of December 2010. Reportedly soon after arrival home, patient jumped off the window resulting in bone fracture and paraplegia (unclear intent, unwitnessed by aunt and informed by police). He was then taken back to [place] where he had his spine surgery and then transferred to rehab at [place].         Aunt states when he takes his psychotropic medication he is in normal behavior including let the home aid help him with ADLs, he helps his aunt with computer-related stuff and is in better mood. When aunt went to vacation last summer initially she was talking daily over the phone with him up till July when she started to notice patient being more withdrawn, answering a short sentence like yes or no, and ultimately completed stopped communicating with aunt since August. Ever since he only except money and food from her or gave two home aid for him but refused to see or talk to her with no clear explanation. Aunt believed he has stopped his medication since July (psychotropic) but is unaware about the name of medication.         This writer eyeballed the patient from outside the room as patient is not interested in psychiatric care and to avoid agitating him further at this time. However collateral was obtained from primary care team and EMR reviewed. No report of physical aggression, SI or HI, patient accepted to take his oral Haldol 2 mg yesterday however believes does not require that, initially requested to be on another psychotropic medication but when medical resident ask him which medication he prefers or if he had any adverse reaction to Haldol at present or past patient states: he has been on all psychotropic medication. He never wants to be on any of them in long-term, and that massive G.I. bleeding on them (did not elaborate).         As per primary team he continues to refuse fingerstick and vital signs, takes oral medications, one care needs to visit him today, no reported dystonic reaction, no other subjective report of side effects from Haldol or abnormal movement observed by primary team. Collateral obtained from aunt over the phone (Allison Valladares: H#[phone]; C#[phone]):   Reportedly aunt has been living in the same building as pt for many years (pt has been living in same apt, rental, since childhood), hence aunt knows the patient well. She states that she "is not the health care proxy" of the patient. States patient's first episode of mental illness occurred in Nov 2010, preceded by worsening illness of his father (were living together) and ultimately he was transferred to nursing home. After that, patient started to exhibit increasing mood and anxiety symptoms, worrying excessively that the landlord would be evicting him (no clear reason), he used to work in a restaurant at the time. He suddenly left the house and disappeared for a month and then was [place], found to have "nervous breakdown" and brought to inpatient psychiatric unit at [facility] by EMS (aunt is unaware of further details). During visits his aunt found him to be over sedated on psychotropic medication (unknown) when and visited him, and he ultimately was discharged home on 29th of December 2010. Reportedly upon dc when was at home, aunt receive a call from police that pt has jumped off the window resulting in spine fracture and paraplegia (unclear intent, unwitnessed by aunt and informed by police) and taken back to [place] where he had his spine surgery and then transferred to rehab at [place].    As per Aunt, when pt is on psychotropic medication he exhibit significantly different attitude/behaviour, he let the home aid help him with ADLs, and he helps his aunt with computer-related stuff and is in better mood. When aunt went to vacation last summer, initially she was talking to pt daily over the phone without noticing anything unusual up till July, when she started to notice patient is becoming withdrawn, answering in short sentences and acting disinterested and since August he completely stopped communicating with aunt but accepts the money and food which she brings to him (leave at the door or gives the home aid). Aunt believed he has stopped his medication since July (psychotropic) but is unaware about the name of medication.       This writer eyeballed the patient from outside the room, to avoid agitating the pt as he has been increasingly agitated after approval of TOO with psychiatrists. However collateral was obtained from primary care team and EMR reviewed. No report of physical aggression, SI or HI, patient accepted to take his oral Haldol 2 mg yesterday however believes does not require that, initially requested to be on another psychotropic medication but when medical resident ask him which medication he prefers or if he had any adverse reaction to Haldol at present or past patient states: he has been on all psychotropic medication, states "I had massive G.I. bleeding" on them (did not elaborate specifics)".  As per primary team he continues to refuse fingerstick and vital signs, takes oral medications, wound care will visit him today, no reported dystonic reaction, no other subjective report of side effects from Haldol or abnormal movement observed by primary team.

## 2018-01-05 NOTE — PROGRESS NOTE BEHAVIORAL HEALTH - NSBHFUPINTERVALCCFT_PSY_A_CORE
Per team, pt compliant with po Haldol overnight, though professed hx of SE of internal bleeding from medication. Per team, pt with no evidence of any SE s/p medication.

## 2018-01-05 NOTE — PROGRESS NOTE BEHAVIORAL HEALTH - OTHER
Paranoid delusions including that he is involved in organized crime plot Upper body strength appears WNL. Pt is paraplegic. Paraplegic

## 2018-01-06 LAB
GLUCOSE BLDC GLUCOMTR-MCNC: 103 MG/DL — HIGH (ref 70–99)
GLUCOSE BLDC GLUCOMTR-MCNC: 119 MG/DL — HIGH (ref 70–99)
GLUCOSE BLDC GLUCOMTR-MCNC: 123 MG/DL — HIGH (ref 70–99)

## 2018-01-06 PROCEDURE — 99233 SBSQ HOSP IP/OBS HIGH 50: CPT | Mod: GC

## 2018-01-06 RX ADMIN — HALOPERIDOL DECANOATE 5 MILLIGRAM(S): 100 INJECTION INTRAMUSCULAR at 22:50

## 2018-01-06 RX ADMIN — METFORMIN HYDROCHLORIDE 1000 MILLIGRAM(S): 850 TABLET ORAL at 09:35

## 2018-01-06 RX ADMIN — NYSTATIN CREAM 1 APPLICATION(S): 100000 CREAM TOPICAL at 06:11

## 2018-01-06 RX ADMIN — ATORVASTATIN CALCIUM 20 MILLIGRAM(S): 80 TABLET, FILM COATED ORAL at 21:08

## 2018-01-06 RX ADMIN — HEPARIN SODIUM 5000 UNIT(S): 5000 INJECTION INTRAVENOUS; SUBCUTANEOUS at 21:08

## 2018-01-06 RX ADMIN — HEPARIN SODIUM 5000 UNIT(S): 5000 INJECTION INTRAVENOUS; SUBCUTANEOUS at 06:11

## 2018-01-06 RX ADMIN — METFORMIN HYDROCHLORIDE 1000 MILLIGRAM(S): 850 TABLET ORAL at 17:35

## 2018-01-06 RX ADMIN — LEVETIRACETAM 500 MILLIGRAM(S): 250 TABLET, FILM COATED ORAL at 06:12

## 2018-01-06 RX ADMIN — Medication 250 MILLIGRAM(S): at 11:08

## 2018-01-06 RX ADMIN — LEVETIRACETAM 500 MILLIGRAM(S): 250 TABLET, FILM COATED ORAL at 17:35

## 2018-01-06 RX ADMIN — Medication 5 MILLIGRAM(S): at 06:12

## 2018-01-06 RX ADMIN — Medication 500 MILLIGRAM(S): at 21:08

## 2018-01-06 RX ADMIN — Medication 1 TABLET(S): at 09:35

## 2018-01-06 RX ADMIN — NYSTATIN CREAM 1 APPLICATION(S): 100000 CREAM TOPICAL at 17:36

## 2018-01-06 RX ADMIN — Medication 5 MILLIGRAM(S): at 17:36

## 2018-01-06 RX ADMIN — Medication 1 TABLET(S): at 21:08

## 2018-01-06 RX ADMIN — Medication 500 MILLIGRAM(S): at 09:44

## 2018-01-06 NOTE — PROGRESS NOTE ADULT - PROBLEM SELECTOR PLAN 4
. Pt is able to move b/l upper extremities.   - pt has a automatic wheelchair for which he uses and is able to transfer himself from chair to bed.

## 2018-01-06 NOTE — PROGRESS NOTE ADULT - SUBJECTIVE AND OBJECTIVE BOX
Patient is a 32y old  Male who presents with a chief complaint of Chills (12 Dec 2017 18:17)      INTERVAL HPI/OVERNIGHT EVENTS: No events     Review of Systems: 12 point review of systems otherwise negative  ( - )fevers/chills  ( - ) dyspnea  ( - ) cough  ( - ) chest pain  ( - ) palpatations  ( - ) dizziness/lightheadedness  ( - ) nausea/vomiting  ( - ) abd pain  ( - ) diarrhea  ( - ) melena  ( - ) hematochezia  ( - ) dysuria  ( - ) hematuria  ( - ) leg swelling  ( -) calf tenderness  ( - ) motor weakness  ( - ) extremity numbness  ( - ) back pain  ( + ) tolerating POs  ( + ) BM    MEDICATIONS  (STANDING):  amoxicillin  875 milliGRAM(s)/clavulanate 1 Tablet(s) Oral every 12 hours  ascorbic acid 250 milliGRAM(s) Oral daily  atorvastatin 20 milliGRAM(s) Oral at bedtime  ciprofloxacin     Tablet 500 milliGRAM(s) Oral every 12 hours  dextrose 5%. 1000 milliLiter(s) (50 mL/Hr) IV Continuous <Continuous>  dextrose 50% Injectable 12.5 Gram(s) IV Push once  dextrose 50% Injectable 25 Gram(s) IV Push once  dextrose 50% Injectable 25 Gram(s) IV Push once  haloperidol     Tablet 5 milliGRAM(s) Oral at bedtime  haloperidol    Injectable 5 milliGRAM(s) IntraMuscular at bedtime  heparin  Injectable 5000 Unit(s) SubCutaneous every 8 hours  insulin glargine Injectable (LANTUS) 17 Unit(s) SubCutaneous every morning  insulin lispro (HumaLOG) corrective regimen sliding scale   SubCutaneous Before meals and at bedtime  levETIRAcetam 500 milliGRAM(s) Oral two times a day  metFORMIN 1000 milliGRAM(s) Oral two times a day with meals  nystatin Powder 1 Application(s) Topical two times a day  oxybutynin 5 milliGRAM(s) Oral two times a day    MEDICATIONS  (PRN):  bisacodyl Suppository 10 milliGRAM(s) Rectal daily PRN Constipation  dextrose Gel 1 Dose(s) Oral once PRN Blood Glucose LESS THAN 70 milliGRAM(s)/deciliter  glucagon  Injectable 1 milliGRAM(s) IntraMuscular once PRN Glucose LESS THAN 70 milligrams/deciliter      Allergies    Capzasin Back and Body (Unknown)    Intolerances          Vital Signs Last 24 Hrs  T(C): 36.9 (06 Jan 2018 09:39), Max: 36.9 (06 Jan 2018 09:39)  T(F): 98.4 (06 Jan 2018 09:39), Max: 98.4 (06 Jan 2018 09:39)  HR: 78 (06 Jan 2018 09:39) (78 - 85)  BP: 105/71 (06 Jan 2018 09:39) (100/63 - 114/71)  BP(mean): --  RR: 19 (06 Jan 2018 09:39) (18 - 20)  SpO2: 100% (06 Jan 2018 09:39) (95% - 100%)  CAPILLARY BLOOD GLUCOSE      POCT Blood Glucose.: 123 mg/dL (06 Jan 2018 07:15)      01-05 @ 07:01  -  01-06 @ 07:00  --------------------------------------------------------  IN: 0 mL / OUT: 3800 mL / NET: -3800 mL    01-06 @ 07:01  -  01-06 @ 14:51  --------------------------------------------------------  IN: 0 mL / OUT: 450 mL / NET: -450 mL        Physical Exam:    Daily     Daily   General:  NAD,  HEENT:  Nonicteric, PERRLA  CV:  RRR, no murmur, no JVD  Lungs:  CTA B/L, no wheezes, rales, rhonchi  Abdomen:  Soft, non-tender, no distended, positive BS, no hepatosplenomegaly  :  Neftaly   Neuro:  AAOx3, baseline motor/sensory deficits at T4 and below   No Restraints    LABS:                  RADIOLOGY & ADDITIONAL TESTS:    ---------------------------------------------------------------------------  I personally reviewed: [  ]EKG   [  ]CXR    [  ] CT    [  ]Other  ---------------------------------------------------------------------------  PLEASE CHECK WHEN PRESENT:     [  ]Heart Failure     [  ] Acute     [  ] Acute on Chronic     [  ] Chronic  -------------------------------------------------------------------     [  ]Diastolic [HFpEF]     [  ]Systolic [HFrEF]     [  ]Combined [HFpEF & HFrEF]     [  ]Other:  -------------------------------------------------------------------  [  ]LM     [  ]ATN     [  ]Reneal Medullary Necrosis     [  ]Renal Cortical Necrosis     [  ]Other Pathological Lesions:    [  ]CKD 1  [  ]CKD 2  [  ]CKD 3  [  ]CKD 4  [  ]CKD 5  [  ]Other  -------------------------------------------------------------------  [  ]Other/Unspecified:    --------------------------------------------------------------------    Abdominal Nutritional Status  [  ]Malnutrition: See Nutrition Note  [  ]Cachexia  [  ]Other:   [  ]Supplement Ordered:  [  ]Morbid Obesity (BMI >=40]

## 2018-01-06 NOTE — PROGRESS NOTE ADULT - PROBLEM SELECTOR PLAN 2
-c/w oxybutynin 5mg BID.   catheter changed once a month.   -Urology changed cath on 12/7 as it was leaking

## 2018-01-06 NOTE — PROGRESS NOTE ADULT - PROBLEM SELECTOR PLAN 1
stage 4 infected sacral ulcer w/ purulence  wound cx had citrobacter, enterococcus, klebsiella  c/w Augmentin and Cipro as patient refuses IV abx

## 2018-01-06 NOTE — PROGRESS NOTE ADULT - PROBLEM SELECTOR PLAN 5
pt refusing anti-psychotics.   will now treat over objection based on court ruling. haldol qhs (po preferably , if not willing then will need IM haldol) . ekg to eval qtc serially

## 2018-01-06 NOTE — PROGRESS NOTE ADULT - ASSESSMENT
32M paraplegic PMH spinal cord injury (wheelchair bound), sacral pressure ulcer with osteo x2 (outpatient provider said they have records of March osteo s/p daptomycin of unknown length) earlier in 2017,  DM2, indwelling suprapubic catheter who presented w/ septic shock secondary to infected purulent sacral 4th degree pressure ulcer with underlying inadequately treated OM, hemodynamically stable being treated for sacral osteomyelitis awaiting placement at [facility]

## 2018-01-07 LAB
ANION GAP SERPL CALC-SCNC: 12 MMOL/L — SIGNIFICANT CHANGE UP (ref 5–17)
BUN SERPL-MCNC: 20 MG/DL — SIGNIFICANT CHANGE UP (ref 7–23)
CALCIUM SERPL-MCNC: 9.4 MG/DL — SIGNIFICANT CHANGE UP (ref 8.4–10.5)
CHLORIDE SERPL-SCNC: 98 MMOL/L — SIGNIFICANT CHANGE UP (ref 96–108)
CO2 SERPL-SCNC: 26 MMOL/L — SIGNIFICANT CHANGE UP (ref 22–31)
CREAT SERPL-MCNC: 0.78 MG/DL — SIGNIFICANT CHANGE UP (ref 0.5–1.3)
GLUCOSE BLDC GLUCOMTR-MCNC: 125 MG/DL — HIGH (ref 70–99)
GLUCOSE BLDC GLUCOMTR-MCNC: 145 MG/DL — HIGH (ref 70–99)
GLUCOSE BLDC GLUCOMTR-MCNC: 259 MG/DL — HIGH (ref 70–99)
GLUCOSE BLDC GLUCOMTR-MCNC: 87 MG/DL — SIGNIFICANT CHANGE UP (ref 70–99)
GLUCOSE SERPL-MCNC: 228 MG/DL — HIGH (ref 70–99)
HCT VFR BLD CALC: 32 % — LOW (ref 39–50)
HGB BLD-MCNC: 9.5 G/DL — LOW (ref 13–17)
MAGNESIUM SERPL-MCNC: 1.7 MG/DL — SIGNIFICANT CHANGE UP (ref 1.6–2.6)
MCHC RBC-ENTMCNC: 22.2 PG — LOW (ref 27–34)
MCHC RBC-ENTMCNC: 29.7 G/DL — LOW (ref 32–36)
MCV RBC AUTO: 74.9 FL — LOW (ref 80–100)
PLATELET # BLD AUTO: 236 K/UL — SIGNIFICANT CHANGE UP (ref 150–400)
POTASSIUM SERPL-MCNC: 4.5 MMOL/L — SIGNIFICANT CHANGE UP (ref 3.5–5.3)
POTASSIUM SERPL-SCNC: 4.5 MMOL/L — SIGNIFICANT CHANGE UP (ref 3.5–5.3)
RBC # BLD: 4.27 M/UL — SIGNIFICANT CHANGE UP (ref 4.2–5.8)
RBC # FLD: 17.8 % — HIGH (ref 10.3–16.9)
SODIUM SERPL-SCNC: 136 MMOL/L — SIGNIFICANT CHANGE UP (ref 135–145)
WBC # BLD: 5.5 K/UL — SIGNIFICANT CHANGE UP (ref 3.8–10.5)
WBC # FLD AUTO: 5.5 K/UL — SIGNIFICANT CHANGE UP (ref 3.8–10.5)

## 2018-01-07 PROCEDURE — 99233 SBSQ HOSP IP/OBS HIGH 50: CPT | Mod: GC

## 2018-01-07 RX ADMIN — NYSTATIN CREAM 1 APPLICATION(S): 100000 CREAM TOPICAL at 05:31

## 2018-01-07 RX ADMIN — Medication 500 MILLIGRAM(S): at 21:49

## 2018-01-07 RX ADMIN — METFORMIN HYDROCHLORIDE 1000 MILLIGRAM(S): 850 TABLET ORAL at 17:40

## 2018-01-07 RX ADMIN — Medication 250 MILLIGRAM(S): at 12:52

## 2018-01-07 RX ADMIN — Medication 5 MILLIGRAM(S): at 17:41

## 2018-01-07 RX ADMIN — HEPARIN SODIUM 5000 UNIT(S): 5000 INJECTION INTRAVENOUS; SUBCUTANEOUS at 21:50

## 2018-01-07 RX ADMIN — Medication 500 MILLIGRAM(S): at 09:32

## 2018-01-07 RX ADMIN — METFORMIN HYDROCHLORIDE 1000 MILLIGRAM(S): 850 TABLET ORAL at 09:31

## 2018-01-07 RX ADMIN — Medication 1 TABLET(S): at 09:31

## 2018-01-07 RX ADMIN — ATORVASTATIN CALCIUM 20 MILLIGRAM(S): 80 TABLET, FILM COATED ORAL at 21:49

## 2018-01-07 RX ADMIN — LEVETIRACETAM 500 MILLIGRAM(S): 250 TABLET, FILM COATED ORAL at 05:33

## 2018-01-07 RX ADMIN — HEPARIN SODIUM 5000 UNIT(S): 5000 INJECTION INTRAVENOUS; SUBCUTANEOUS at 13:52

## 2018-01-07 RX ADMIN — HEPARIN SODIUM 5000 UNIT(S): 5000 INJECTION INTRAVENOUS; SUBCUTANEOUS at 05:32

## 2018-01-07 RX ADMIN — NYSTATIN CREAM 1 APPLICATION(S): 100000 CREAM TOPICAL at 17:41

## 2018-01-07 RX ADMIN — Medication 1 TABLET(S): at 21:49

## 2018-01-07 RX ADMIN — Medication 5 MILLIGRAM(S): at 05:35

## 2018-01-07 RX ADMIN — LEVETIRACETAM 500 MILLIGRAM(S): 250 TABLET, FILM COATED ORAL at 17:40

## 2018-01-07 RX ADMIN — HALOPERIDOL DECANOATE 5 MILLIGRAM(S): 100 INJECTION INTRAMUSCULAR at 21:49

## 2018-01-07 NOTE — PROGRESS NOTE ADULT - SUBJECTIVE AND OBJECTIVE BOX
OVERNIGHT EVENTS:    SUBJECTIVE / INTERVAL HPI: Patient seen and examined at bedside.     VITAL SIGNS:  Vital Signs Last 24 Hrs  T(C): 36.8 (07 Jan 2018 05:39), Max: 36.9 (06 Jan 2018 09:39)  T(F): 98.2 (07 Jan 2018 05:39), Max: 98.5 (06 Jan 2018 16:45)  HR: 98 (07 Jan 2018 05:39) (77 - 106)  BP: 97/62 (07 Jan 2018 05:39) (97/62 - 108/63)  BP(mean): --  RR: 17 (07 Jan 2018 05:39) (17 - 19)  SpO2: 81% (07 Jan 2018 05:39) (81% - 100%)    PHYSICAL EXAM:    General: WDWN  HEENT: NC/AT; PERRL, anicteric sclera; MMM  Neck: supple  Cardiovascular: +S1/S2; RRR  Respiratory: CTA B/L; no W/R/R  Gastrointestinal: soft, NT/ND; +BSx4  Extremities: WWP; no edema, clubbing or cyanosis  Vascular: 2+ radial, DP/PT pulses B/L  Neurological: AAOx3; no focal deficits    MEDICATIONS:  MEDICATIONS  (STANDING):  amoxicillin  875 milliGRAM(s)/clavulanate 1 Tablet(s) Oral every 12 hours  ascorbic acid 250 milliGRAM(s) Oral daily  atorvastatin 20 milliGRAM(s) Oral at bedtime  ciprofloxacin     Tablet 500 milliGRAM(s) Oral every 12 hours  dextrose 5%. 1000 milliLiter(s) (50 mL/Hr) IV Continuous <Continuous>  dextrose 50% Injectable 12.5 Gram(s) IV Push once  dextrose 50% Injectable 25 Gram(s) IV Push once  dextrose 50% Injectable 25 Gram(s) IV Push once  haloperidol     Tablet 5 milliGRAM(s) Oral at bedtime  haloperidol    Injectable 5 milliGRAM(s) IntraMuscular at bedtime  heparin  Injectable 5000 Unit(s) SubCutaneous every 8 hours  insulin glargine Injectable (LANTUS) 17 Unit(s) SubCutaneous every morning  insulin lispro (HumaLOG) corrective regimen sliding scale   SubCutaneous Before meals and at bedtime  levETIRAcetam 500 milliGRAM(s) Oral two times a day  metFORMIN 1000 milliGRAM(s) Oral two times a day with meals  nystatin Powder 1 Application(s) Topical two times a day  oxybutynin 5 milliGRAM(s) Oral two times a day    MEDICATIONS  (PRN):  bisacodyl Suppository 10 milliGRAM(s) Rectal daily PRN Constipation  dextrose Gel 1 Dose(s) Oral once PRN Blood Glucose LESS THAN 70 milliGRAM(s)/deciliter  glucagon  Injectable 1 milliGRAM(s) IntraMuscular once PRN Glucose LESS THAN 70 milligrams/deciliter      ALLERGIES:  Allergies    Capzasin Back and Body (Unknown)    Intolerances        LABS:              CAPILLARY BLOOD GLUCOSE      POCT Blood Glucose.: 145 mg/dL (07 Jan 2018 07:42)      RADIOLOGY & ADDITIONAL TESTS: Reviewed.    ASSESSMENT:    PLAN: OVERNIGHT EVENTS: RAHEL    SUBJECTIVE / INTERVAL HPI: Patient seen and examined at bedside. Pt. expressing anger about having to take haldol. Reports he does not feel well however will not specify what is bothering him.     VITAL SIGNS:  Vital Signs Last 24 Hrs  T(C): 36.8 (07 Jan 2018 05:39), Max: 36.9 (06 Jan 2018 09:39)  T(F): 98.2 (07 Jan 2018 05:39), Max: 98.5 (06 Jan 2018 16:45)  HR: 98 (07 Jan 2018 05:39) (77 - 106)  BP: 97/62 (07 Jan 2018 05:39) (97/62 - 108/63)  BP(mean): --  RR: 17 (07 Jan 2018 05:39) (17 - 19)  SpO2: 81% (07 Jan 2018 05:39) (81% - 100%)    PHYSICAL EXAM:  Constitutional: NAD,   HEENT: PERRLA, EOMI, Normal Hearing, MMM  Respiratory: CTAB  Cardiovascular: S1 and S2, RRR, no M/G/R  Gastrointestinal: BS+, soft, NT/ND  Neurological: A/O x 3, baseline motor/sensory deficits at t4 and below (paraplegia). Exam unchanged.   derm: sacral and Left IT dressings in place. clean , dry, no discharge     MEDICATIONS:  MEDICATIONS  (STANDING):  amoxicillin  875 milliGRAM(s)/clavulanate 1 Tablet(s) Oral every 12 hours  ascorbic acid 250 milliGRAM(s) Oral daily  atorvastatin 20 milliGRAM(s) Oral at bedtime  ciprofloxacin     Tablet 500 milliGRAM(s) Oral every 12 hours  dextrose 5%. 1000 milliLiter(s) (50 mL/Hr) IV Continuous <Continuous>  dextrose 50% Injectable 12.5 Gram(s) IV Push once  dextrose 50% Injectable 25 Gram(s) IV Push once  dextrose 50% Injectable 25 Gram(s) IV Push once  haloperidol     Tablet 5 milliGRAM(s) Oral at bedtime  haloperidol    Injectable 5 milliGRAM(s) IntraMuscular at bedtime  heparin  Injectable 5000 Unit(s) SubCutaneous every 8 hours  insulin glargine Injectable (LANTUS) 17 Unit(s) SubCutaneous every morning  insulin lispro (HumaLOG) corrective regimen sliding scale   SubCutaneous Before meals and at bedtime  levETIRAcetam 500 milliGRAM(s) Oral two times a day  metFORMIN 1000 milliGRAM(s) Oral two times a day with meals  nystatin Powder 1 Application(s) Topical two times a day  oxybutynin 5 milliGRAM(s) Oral two times a day    MEDICATIONS  (PRN):  bisacodyl Suppository 10 milliGRAM(s) Rectal daily PRN Constipation  dextrose Gel 1 Dose(s) Oral once PRN Blood Glucose LESS THAN 70 milliGRAM(s)/deciliter  glucagon  Injectable 1 milliGRAM(s) IntraMuscular once PRN Glucose LESS THAN 70 milligrams/deciliter      ALLERGIES:  Allergies    Capzasin Back and Body (Unknown)    Intolerances        LABS:              CAPILLARY BLOOD GLUCOSE      POCT Blood Glucose.: 145 mg/dL (07 Jan 2018 07:42)      RADIOLOGY & ADDITIONAL TESTS: Reviewed.    ASSESSMENT:    PLAN:

## 2018-01-07 NOTE — PROGRESS NOTE ADULT - ATTENDING COMMENTS
Patient was seen and examined by me at bedside. I agree with resident's note, subjective, objective physical exam, assessment and plan with following modifications/additions.     1)  Sacral osteomyelitis- stage 4 infected w/purulence.  c/w Augmentin and Cipro as patient refuses IV abx.     2) Type 2 diabetes mellitus with complication with long term insulin use-  ISS+Lantus 17U+metformin 500mg BID.     3) Neurogenic bladder- c/w oxybutynin 5mg BID.   catheter changed once a month.     4) Paraplegia   5) Other psychotic disorder not due to substance or known physiological condition- pt refusing anti-psychotics.   will now treat over objection based on court ruling. haldol qhs (po preferably , if not willing then will need IM haldol) . ekg to eval qtc serially.

## 2018-01-08 LAB
GLUCOSE BLDC GLUCOMTR-MCNC: 119 MG/DL — HIGH (ref 70–99)
GLUCOSE BLDC GLUCOMTR-MCNC: 137 MG/DL — HIGH (ref 70–99)
GLUCOSE BLDC GLUCOMTR-MCNC: 151 MG/DL — HIGH (ref 70–99)
GLUCOSE BLDC GLUCOMTR-MCNC: 198 MG/DL — HIGH (ref 70–99)

## 2018-01-08 PROCEDURE — 99233 SBSQ HOSP IP/OBS HIGH 50: CPT

## 2018-01-08 RX ADMIN — METFORMIN HYDROCHLORIDE 1000 MILLIGRAM(S): 850 TABLET ORAL at 17:15

## 2018-01-08 RX ADMIN — Medication 250 MILLIGRAM(S): at 13:03

## 2018-01-08 RX ADMIN — METFORMIN HYDROCHLORIDE 1000 MILLIGRAM(S): 850 TABLET ORAL at 09:15

## 2018-01-08 RX ADMIN — Medication 1 TABLET(S): at 09:15

## 2018-01-08 RX ADMIN — HEPARIN SODIUM 5000 UNIT(S): 5000 INJECTION INTRAVENOUS; SUBCUTANEOUS at 13:03

## 2018-01-08 RX ADMIN — LEVETIRACETAM 500 MILLIGRAM(S): 250 TABLET, FILM COATED ORAL at 05:54

## 2018-01-08 RX ADMIN — HEPARIN SODIUM 5000 UNIT(S): 5000 INJECTION INTRAVENOUS; SUBCUTANEOUS at 05:54

## 2018-01-08 RX ADMIN — ATORVASTATIN CALCIUM 20 MILLIGRAM(S): 80 TABLET, FILM COATED ORAL at 21:16

## 2018-01-08 RX ADMIN — Medication 500 MILLIGRAM(S): at 09:14

## 2018-01-08 RX ADMIN — LEVETIRACETAM 500 MILLIGRAM(S): 250 TABLET, FILM COATED ORAL at 17:15

## 2018-01-08 RX ADMIN — Medication 5 MILLIGRAM(S): at 05:54

## 2018-01-08 RX ADMIN — NYSTATIN CREAM 1 APPLICATION(S): 100000 CREAM TOPICAL at 17:15

## 2018-01-08 RX ADMIN — Medication 500 MILLIGRAM(S): at 21:16

## 2018-01-08 RX ADMIN — HALOPERIDOL DECANOATE 5 MILLIGRAM(S): 100 INJECTION INTRAMUSCULAR at 21:16

## 2018-01-08 RX ADMIN — Medication 5 MILLIGRAM(S): at 17:16

## 2018-01-08 RX ADMIN — NYSTATIN CREAM 1 APPLICATION(S): 100000 CREAM TOPICAL at 09:15

## 2018-01-08 RX ADMIN — Medication 1 TABLET(S): at 21:16

## 2018-01-08 RX ADMIN — HEPARIN SODIUM 5000 UNIT(S): 5000 INJECTION INTRAVENOUS; SUBCUTANEOUS at 21:16

## 2018-01-08 NOTE — PROGRESS NOTE BEHAVIORAL HEALTH - OTHER
Upper body strength appears WNL. Pt is paraplegic. Paraplegic Less verbally aggressive, no cursing/expletives Focused on SE of medication

## 2018-01-08 NOTE — PROGRESS NOTE BEHAVIORAL HEALTH - NSBHFUPINTERVALCCFT_PSY_A_CORE
Pt reports he cannot tolerate antipsychotic medication, stating he has had GI bleeding, cardiac arrests in the past.

## 2018-01-08 NOTE — PROGRESS NOTE BEHAVIORAL HEALTH - SUMMARY
32-year-old male with reported hx of schizoaffective disorder, rendered paraplegic s/p suicide attempt, admitted in septic shock, s/p discontinuation of ADL and wound care, in context of noncompliance with antipsychotic medication resulting in psychosis.  Pt is determined to LACK CAPACITY regarding medical decision making including need for psychotropic medication. Has approval for TOO (court mandated), is started on haldol 2 mg po qhs/haldol 2 mg IM qhs prn refusal to take po medication. He continues to exhibit paranoid delusions, poor insight and judgment toward his mental illness and requires continued psychiatric care for further stabilization. 32-year-old male with reported hx of schizoaffective disorder, rendered paraplegic s/p suicide attempt, admitted in septic shock, s/p discontinuation of ADL and wound care, in context of noncompliance with antipsychotic medication resulting in psychosis.  Pt was determined to LACK CAPACITY regarding medical decision making including need for psychotropic medication. Has approval for TOO (court mandated), is currently on haldol 5 mg po qhs/haldol 5 mg IM qhs prn refusal to take po medication. (To date, has been taking po Haldol, thus has not needed IM medication.)   Pt continues to exhibit paranoid delusions, poor insight and judgment toward his mental illness and requires continued psychiatric care for further stabilization. Nonetheless, he is reported to be in somewhat better behavioral control.

## 2018-01-08 NOTE — PROGRESS NOTE ADULT - PROBLEM SELECTOR PLAN 2
Pt w/ no sensation or control of urination with suprapubic catheter changed once a month.   -Urology to exchange catheter   -c/w oxybutynin 5mg BID

## 2018-01-08 NOTE — PROGRESS NOTE BEHAVIORAL HEALTH - NSBHCONSULTMEDS_PSY_A_CORE FT
Continue Haldol to 5 mg PO QHS, with plan to increase dose as warranted/tolerated.     If he refuses the PO medication will need to receive the same dose (5mg) IM qhs.     COURT-ORDERED MEDICATION. PT CANNOT REFUSE COURT-ORDERED MEDICATION.     ****PLEASE HAVE SECURITY PRESENT PRIOR TO ADMINISTERING IM MEDICATION.****.

## 2018-01-08 NOTE — PROGRESS NOTE ADULT - SUBJECTIVE AND OBJECTIVE BOX
INTERVAL HPI/OVERNIGHT EVENTS: RAHEL. Patient took PO Haldol. ROS this AM unremarkable. Patient maintains that antipsychotic medications side effects are intolerable - no signs of adverse effects on exam.     VITAL SIGNS:  T(F): 97.4 (01-08-18 @ 08:42)  HR: 83 (01-08-18 @ 08:42)  BP: 108/72 (01-08-18 @ 08:42)  RR: 18 (01-08-18 @ 08:42)  SpO2: 100% (01-08-18 @ 08:42)  Wt(kg): --            MEDICATIONS  (STANDING):  amoxicillin  875 milliGRAM(s)/clavulanate 1 Tablet(s) Oral every 12 hours  ascorbic acid 250 milliGRAM(s) Oral daily  atorvastatin 20 milliGRAM(s) Oral at bedtime  ciprofloxacin     Tablet 500 milliGRAM(s) Oral every 12 hours  dextrose 5%. 1000 milliLiter(s) (50 mL/Hr) IV Continuous <Continuous>  dextrose 50% Injectable 12.5 Gram(s) IV Push once  dextrose 50% Injectable 25 Gram(s) IV Push once  dextrose 50% Injectable 25 Gram(s) IV Push once  haloperidol     Tablet 5 milliGRAM(s) Oral at bedtime  haloperidol    Injectable 5 milliGRAM(s) IntraMuscular at bedtime  heparin  Injectable 5000 Unit(s) SubCutaneous every 8 hours  insulin glargine Injectable (LANTUS) 17 Unit(s) SubCutaneous every morning  insulin lispro (HumaLOG) corrective regimen sliding scale   SubCutaneous Before meals and at bedtime  levETIRAcetam 500 milliGRAM(s) Oral two times a day  metFORMIN 1000 milliGRAM(s) Oral two times a day with meals  nystatin Powder 1 Application(s) Topical two times a day  oxybutynin 5 milliGRAM(s) Oral two times a day    MEDICATIONS  (PRN):  bisacodyl Suppository 10 milliGRAM(s) Rectal daily PRN Constipation  dextrose Gel 1 Dose(s) Oral once PRN Blood Glucose LESS THAN 70 milliGRAM(s)/deciliter  glucagon  Injectable 1 milliGRAM(s) IntraMuscular once PRN Glucose LESS THAN 70 milligrams/deciliter      Allergies    Capzasin Back and Body (Unknown)    Intolerances        LABS:                        9.5    5.5   )-----------( 236      ( 07 Jan 2018 11:06 )             32.0     01-07    136  |  98  |  20  ----------------------------<  228<H>  4.5   |  26  |  0.78    Ca    9.4      07 Jan 2018 11:06  Mg     1.7     01-07            RADIOLOGY & ADDITIONAL TESTS: INTERVAL HPI/OVERNIGHT EVENTS: RAHEL. Patient took PO Haldol. ROS this AM unremarkable. Patient maintains that antipsychotic medications side effects are intolerable - no signs of adverse effects on exam.     VITAL SIGNS:  T(F): 97.4 (01-08-18 @ 08:42)  HR: 83 (01-08-18 @ 08:42)  BP: 108/72 (01-08-18 @ 08:42)  RR: 18 (01-08-18 @ 08:42)  SpO2: 100% (01-08-18 @ 08:42)  Wt(kg): --    PHYSICAL EXAM:    Constitutional: NAD   HEENT: PERRLA, EOMI, Normal Hearing, MMM  Respiratory: CTAB  Cardiovascular: S1 and S2, RRR, no M/G/R  Gastrointestinal: BS+, soft, NT/ND  Neurological: A/O x 3, baseline motor/sensory deficits at t4 and below (paraplegia). Exam unchanged.   derm: sacral and Left IT dressings in place. clean , dry, no discharge     MEDICATIONS  (STANDING):  amoxicillin  875 milliGRAM(s)/clavulanate 1 Tablet(s) Oral every 12 hours  ascorbic acid 250 milliGRAM(s) Oral daily  atorvastatin 20 milliGRAM(s) Oral at bedtime  ciprofloxacin     Tablet 500 milliGRAM(s) Oral every 12 hours  dextrose 5%. 1000 milliLiter(s) (50 mL/Hr) IV Continuous <Continuous>  dextrose 50% Injectable 12.5 Gram(s) IV Push once  dextrose 50% Injectable 25 Gram(s) IV Push once  dextrose 50% Injectable 25 Gram(s) IV Push once  haloperidol     Tablet 5 milliGRAM(s) Oral at bedtime  haloperidol    Injectable 5 milliGRAM(s) IntraMuscular at bedtime  heparin  Injectable 5000 Unit(s) SubCutaneous every 8 hours  insulin glargine Injectable (LANTUS) 17 Unit(s) SubCutaneous every morning  insulin lispro (HumaLOG) corrective regimen sliding scale   SubCutaneous Before meals and at bedtime  levETIRAcetam 500 milliGRAM(s) Oral two times a day  metFORMIN 1000 milliGRAM(s) Oral two times a day with meals  nystatin Powder 1 Application(s) Topical two times a day  oxybutynin 5 milliGRAM(s) Oral two times a day    MEDICATIONS  (PRN):  bisacodyl Suppository 10 milliGRAM(s) Rectal daily PRN Constipation  dextrose Gel 1 Dose(s) Oral once PRN Blood Glucose LESS THAN 70 milliGRAM(s)/deciliter  glucagon  Injectable 1 milliGRAM(s) IntraMuscular once PRN Glucose LESS THAN 70 milligrams/deciliter      Allergies    Capzasin Back and Body (Unknown)    Intolerances        LABS:                        9.5    5.5   )-----------( 236      ( 07 Jan 2018 11:06 )             32.0     01-07    136  |  98  |  20  ----------------------------<  228<H>  4.5   |  26  |  0.78    Ca    9.4      07 Jan 2018 11:06  Mg     1.7     01-07            RADIOLOGY & ADDITIONAL TESTS:

## 2018-01-08 NOTE — PROGRESS NOTE ADULT - ATTENDING COMMENTS
dx:  infected decubitus ulcers/possible sacral osteomyelitis- refused picc, however now stable on cipro/augmentin. duration of abx to be determined by ID. will need ID follow up.   left IT  decub and sacral decub- stage iv  - seen by plastics and gen surg. no acute surgical intervention recommended at this time. c/w local wound care. off load wound.   wounds are improved  psychosis -  treat over objection based on court ruling. haldol qhs (po preferably , if not willing then will need IM haldol) . ekg to eval qtc serially  neurogenic bladder - c/w suprapubic bowles, oxybutinin  DM ii - can cont metformin  epilepsy- keppra .   tachcardia - etiology unclear, but pt has stated he needed propranolol in the past for his "heartrate", if persistently p>100, can start metoprolol

## 2018-01-08 NOTE — PROGRESS NOTE BEHAVIORAL HEALTH - NSBHFUPINTERVALHXFT_PSY_A_CORE
Pt observed on the phone, talking with an . Chart reviewed and discussed with team.   Per team, pt with aforementioned SE from taking antipsychotic rx.  Nonetheless, no evidence of any untoward SE from Haldol 5 mg po qhs, with which pt is reportedly compliant.  Per RN, although pt remains irritable, he is noted to be a bit more compliant with medical care (including FS). RN also notes that pt has not been as verbally aggressive, i.e. he is not currently cursing or making expletive comments. Pt observed on the phone, talking with an . Chart reviewed and discussed extensively with team.   Per team, pt with aforementioned SE from taking antipsychotic rx.  Nonetheless, no evidence of any untoward SE from Haldol 5 mg po qhs, with which pt is reportedly compliant.  Per RN, although pt remains irritable, he is noted to be a bit more compliant with medical care (including FS). RN also notes that pt has not been as verbally aggressive, i.e. he is not currently cursing or making expletive comments. Pt observed on the phone, talking calmly with an . Chart reviewed and discussed extensively with team.   Per team, pt with aforementioned SE from taking antipsychotic rx.  Nonetheless, no evidence of any untoward SE from Haldol 5 mg po qhs, with which pt is reportedly compliant.  Per RN, although pt remains irritable, he is noted to be a bit more compliant with medical care (including FS). RN also notes that pt has not been as verbally aggressive, i.e. he is not currently cursing or making expletive comments. Pt observed on the phone, talking calmly with an . Chart reviewed and discussed extensively with team, Dr. Jian Villar and Dr. Rajni Huddleston.   Per team, pt with aforementioned SE from taking antipsychotic rx.  Nonetheless, no evidence of any untoward SE from Haldol 5 mg po qhs, with which pt is reportedly compliant.  Per RN, although pt remains irritable, he is noted to be a bit more compliant with medical care (including FS). RN also notes that pt has not been as verbally aggressive, i.e. he is not currently cursing or making expletive comments.

## 2018-01-08 NOTE — PROGRESS NOTE BEHAVIORAL HEALTH - ADDITIONAL DETAILS / COMMENTS
Pt uncooperative, unable to assess full MSE
Pt remains with poor insight and judgement, yet is compliant with court-ordered medication and medical care.
Pt determined to LACK [facility] by .
Not assessed

## 2018-01-08 NOTE — PROGRESS NOTE ADULT - PROBLEM SELECTOR PLAN 6
-c/w home lipitor medication    #Seizure Disorder:   pt w/ a reported hx of seizure disorder  -c/w home medication of keppra 500mg BID    #Psychiatry Eval: Pt evaluated by psych and recommended he needs to be admitted to medical-psychiatric inpatient unit. Patient does not have capacity to make medical decisions. Court date this afternoon at bedside.   -discontinued aripriprazole in favor of haloperidol. Haloperidol 5 mg PO or IM. Patient does not have capacity to refuse and is court ordered to receive the haldol.

## 2018-01-08 NOTE — PROGRESS NOTE BEHAVIORAL HEALTH - NSBHCONSULTRECOMMENDOTHER_PSY_A_CORE FT
Discussed possible transfer to med-psych unit with Dr. Villar and team. Unclear regarding whether transfer to other hospital will be possible, given Discussed possible transfer to med-psych unit with Dr. Villar and team. Unclear regarding whether transfer to other hospital will be possible, given limited number of beds. Have reached out to [facility] regarding possible bed available on med-psych unit.

## 2018-01-09 LAB
GLUCOSE BLDC GLUCOMTR-MCNC: 133 MG/DL — HIGH (ref 70–99)
GLUCOSE BLDC GLUCOMTR-MCNC: 161 MG/DL — HIGH (ref 70–99)
GLUCOSE BLDC GLUCOMTR-MCNC: 97 MG/DL — SIGNIFICANT CHANGE UP (ref 70–99)
GLUCOSE BLDC GLUCOMTR-MCNC: 98 MG/DL — SIGNIFICANT CHANGE UP (ref 70–99)

## 2018-01-09 PROCEDURE — 99233 SBSQ HOSP IP/OBS HIGH 50: CPT | Mod: GC

## 2018-01-09 PROCEDURE — 99233 SBSQ HOSP IP/OBS HIGH 50: CPT

## 2018-01-09 PROCEDURE — 93010 ELECTROCARDIOGRAM REPORT: CPT

## 2018-01-09 RX ORDER — HALOPERIDOL DECANOATE 100 MG/ML
7.5 INJECTION INTRAMUSCULAR AT BEDTIME
Qty: 0 | Refills: 0 | Status: DISCONTINUED | OUTPATIENT
Start: 2018-01-09 | End: 2018-01-11

## 2018-01-09 RX ADMIN — Medication 1 TABLET(S): at 09:38

## 2018-01-09 RX ADMIN — Medication 5 MILLIGRAM(S): at 17:44

## 2018-01-09 RX ADMIN — LEVETIRACETAM 500 MILLIGRAM(S): 250 TABLET, FILM COATED ORAL at 06:34

## 2018-01-09 RX ADMIN — METFORMIN HYDROCHLORIDE 1000 MILLIGRAM(S): 850 TABLET ORAL at 09:37

## 2018-01-09 RX ADMIN — HEPARIN SODIUM 5000 UNIT(S): 5000 INJECTION INTRAVENOUS; SUBCUTANEOUS at 06:34

## 2018-01-09 RX ADMIN — Medication 500 MILLIGRAM(S): at 09:37

## 2018-01-09 RX ADMIN — Medication 5 MILLIGRAM(S): at 06:34

## 2018-01-09 RX ADMIN — LEVETIRACETAM 500 MILLIGRAM(S): 250 TABLET, FILM COATED ORAL at 17:43

## 2018-01-09 RX ADMIN — HALOPERIDOL DECANOATE 7.5 MILLIGRAM(S): 100 INJECTION INTRAMUSCULAR at 23:24

## 2018-01-09 RX ADMIN — Medication 1 TABLET(S): at 22:25

## 2018-01-09 RX ADMIN — Medication 500 MILLIGRAM(S): at 22:24

## 2018-01-09 RX ADMIN — METFORMIN HYDROCHLORIDE 1000 MILLIGRAM(S): 850 TABLET ORAL at 17:44

## 2018-01-09 RX ADMIN — ATORVASTATIN CALCIUM 20 MILLIGRAM(S): 80 TABLET, FILM COATED ORAL at 22:25

## 2018-01-09 RX ADMIN — Medication 250 MILLIGRAM(S): at 13:08

## 2018-01-09 RX ADMIN — HEPARIN SODIUM 5000 UNIT(S): 5000 INJECTION INTRAVENOUS; SUBCUTANEOUS at 13:08

## 2018-01-09 RX ADMIN — NYSTATIN CREAM 1 APPLICATION(S): 100000 CREAM TOPICAL at 17:44

## 2018-01-09 RX ADMIN — HEPARIN SODIUM 5000 UNIT(S): 5000 INJECTION INTRAVENOUS; SUBCUTANEOUS at 22:25

## 2018-01-09 RX ADMIN — NYSTATIN CREAM 1 APPLICATION(S): 100000 CREAM TOPICAL at 06:37

## 2018-01-09 NOTE — CHART NOTE - NSCHARTNOTEFT_GEN_A_CORE
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  Paraplegia  Type 2 diabetes mellitus with hyperglycemia, without long-term current use of insulin      Current Nutrition Order:CCHO/no snack/DASH with glucerna shake TID(660kcal and 30gmprotein)       PO Intake: Good (%) [x   ]  Fair (50-75%) [   ] Poor (<25%) [   ]    GI Issues: none    Pain:yes    Skin Integrity:stage 4    Labs: FS:161/Glucose:228        CAPILLARY BLOOD GLUCOSE      POCT Blood Glucose.: 161 mg/dL (09 Jan 2018 12:10)  POCT Blood Glucose.: 133 mg/dL (09 Jan 2018 07:34)  POCT Blood Glucose.: 198 mg/dL (08 Jan 2018 20:37)  POCT Blood Glucose.: 119 mg/dL (08 Jan 2018 17:14)      Medications:  MEDICATIONS  (STANDING):  amoxicillin  875 milliGRAM(s)/clavulanate 1 Tablet(s) Oral every 12 hours  ascorbic acid 250 milliGRAM(s) Oral daily  atorvastatin 20 milliGRAM(s) Oral at bedtime  ciprofloxacin     Tablet 500 milliGRAM(s) Oral every 12 hours  dextrose 5%. 1000 milliLiter(s) (50 mL/Hr) IV Continuous <Continuous>  dextrose 50% Injectable 12.5 Gram(s) IV Push once  dextrose 50% Injectable 25 Gram(s) IV Push once  dextrose 50% Injectable 25 Gram(s) IV Push once  haloperidol     Tablet 5 milliGRAM(s) Oral at bedtime  haloperidol    Injectable 5 milliGRAM(s) IntraMuscular at bedtime  heparin  Injectable 5000 Unit(s) SubCutaneous every 8 hours  insulin glargine Injectable (LANTUS) 17 Unit(s) SubCutaneous every morning  insulin lispro (HumaLOG) corrective regimen sliding scale   SubCutaneous Before meals and at bedtime  levETIRAcetam 500 milliGRAM(s) Oral two times a day  metFORMIN 1000 milliGRAM(s) Oral two times a day with meals  nystatin Powder 1 Application(s) Topical two times a day  oxybutynin 5 milliGRAM(s) Oral two times a day    MEDICATIONS  (PRN):  bisacodyl Suppository 10 milliGRAM(s) Rectal daily PRN Constipation  dextrose Gel 1 Dose(s) Oral once PRN Blood Glucose LESS THAN 70 milliGRAM(s)/deciliter  glucagon  Injectable 1 milliGRAM(s) IntraMuscular once PRN Glucose LESS THAN 70 milligrams/deciliter      Weight:72.6kg  Daily     Daily     Weight Change: no updated weights    Estimated energy needs: 51kg [id number]:1530kcal-1785kcal and 1.4-1.6gmprotien:71-82gmprotein/1530-1785cc fluids-    Subjective: 31 y/o male admitted with chills.Psych intervention noted.No N/V/D.Eating 80% of meals    Previous Nutrition Diagnosis:Increased nutrient needs r/t increased demand for kcal /protein and nutrients AEB: PU    Active [x   ]  Resolved [   ]    If resolved, new PES:     Goal:meet 80% of needs consistently    Recommendations:Updated weights .Continue with present diet order    Education: not appropriate    Risk Level: High [   ] Moderate [x   ] Low [   ]

## 2018-01-09 NOTE — PROGRESS NOTE ADULT - ASSESSMENT
32M w/ recurrent sacral decub and osteomyelitis who presented w/ septic shock in December 2/2 osteomyelitis    -Recommend repeating ESR/CRP with AM labs  -Recommend continuing Augmentin 875mg Q12h and Cipro 500mg Q12h through 1/16/18

## 2018-01-09 NOTE — PROGRESS NOTE ADULT - ATTENDING COMMENTS
33 yo male with schizoaffective disorder, paraplegia, septic shock 2/2 sacral decubitus ulcer s/p debridement 12/5, cx with polymicrobial growth (Klebsiella, Citrobacter, Enterococcus) as well as anaerobes, received zosyn 12/5-15 then transitioned to PO Augmentin + Cipro (12/15-, declined PICC line). Fevers and leukocytosis resolved. The sacral wound has healthy granulation tissue, some malodor; there is also a L ischial wound with packing--appears clean. No surrounding erythema. Would repeat CRP and ESR to ensure significant improvement. Continue Augmentin and Cipro through 1/16/18 to complete a 6 week course from date of debridement for sacral osteomyelitis. If inflammatory markers still significantly elevated, may need to extend course. Continue local wound care.   Please call with ? [phone]  To see again as requested

## 2018-01-09 NOTE — PROGRESS NOTE ADULT - ATTENDING COMMENTS
dx:  infected decubitus ulcers/possible sacral osteomyelitis- wounds are healing, now stable on cipro/augmentin x 6 wks, but  duration of abx to be determined by ID. will need ID follow up.   left IT  decub and sacral decub- stage iv  - seen by plastics and gen surg. no acute surgical intervention recommended at this time. c/w local wound care. off load wound.   wounds are improved  psychosis -  treat over objection based on court ruling. haldol qhs (po preferably , if not willing then will need IM haldol) . ekg to eval qtc serially  neurogenic bladder - c/w suprapubic bowles, oxybutinin  DM ii - can cont metformin  epilepsy- keppra .   tachcardia - etiology unclear, but pt has stated he needed propranolol in the past for his "heartrate", if persistently p>100, can start metoprolol .

## 2018-01-09 NOTE — PROGRESS NOTE BEHAVIORAL HEALTH - NSBHFUPINTERVALHXFT_PSY_A_CORE
Pt observed resting quietly in bed. Reports he is recording our conversation for his . States that he has contacted  to pursue domestic violence charges regarding his home aides. Also alleges that this writer and the other clinicians involved in the bedside court hearing provided incorrect information regarding circumstances leading to his [facility] medical admit.  Despite ongoing paranoia and hostility, pt is less verbally aggressive, no cursing, no threats expressed.  Pt allowing po Haldol administration; no evidence of EPS or other SE from medication.    Case discussed with unit chief of [facility] med-psych unit. Unit is currently filled to capacity. In addition, because the pt states he is suing the [facility] due to his post-admit suicide attempt and subsequent paraplegia, unit chief states he will be unable to accept pt even if a bed becomes available.

## 2018-01-09 NOTE — PROGRESS NOTE BEHAVIORAL HEALTH - NSBHCONSULTMEDS_PSY_A_CORE FT
Continue court-ordered medication. PT CANNOT REFUSE COURT-ORDERED MEDICATION:    INCREASE HALDOL TO 7.5 MG PO QHS.    IF PT REFUSES PO HALDOL, PLEASE GIVE HALDOL 7.5 MG IM. PLEASE HAVE SECURITY PRESENT PRIOR TO ADMINISTRATION OF IM MEDICATION.

## 2018-01-09 NOTE — PROGRESS NOTE ADULT - SUBJECTIVE AND OBJECTIVE BOX
INTERVAL HPI/OVERNIGHT EVENTS:    CONSTITUTIONAL:  Negative fever or chills, feels well, good appetite  EYES:  Negative  blurry vision or double vision  CARDIOVASCULAR:  Negative for chest pain or palpitations  RESPIRATORY:  Negative for cough, wheezing, or SOB   GASTROINTESTINAL:  Negative for nausea, vomiting, diarrhea, constipation, or abdominal pain  GENITOURINARY:  Negative frequency, urgency or dysuria  NEUROLOGIC:  No headache, confusion, dizziness, lightheadedness      ANTIBIOTICS/RELEVANT:    MEDICATIONS  (STANDING):  amoxicillin  875 milliGRAM(s)/clavulanate 1 Tablet(s) Oral every 12 hours  ascorbic acid 250 milliGRAM(s) Oral daily  atorvastatin 20 milliGRAM(s) Oral at bedtime  ciprofloxacin     Tablet 500 milliGRAM(s) Oral every 12 hours  dextrose 5%. 1000 milliLiter(s) (50 mL/Hr) IV Continuous <Continuous>  dextrose 50% Injectable 12.5 Gram(s) IV Push once  dextrose 50% Injectable 25 Gram(s) IV Push once  dextrose 50% Injectable 25 Gram(s) IV Push once  haloperidol     Tablet 5 milliGRAM(s) Oral at bedtime  haloperidol    Injectable 5 milliGRAM(s) IntraMuscular at bedtime  heparin  Injectable 5000 Unit(s) SubCutaneous every 8 hours  insulin glargine Injectable (LANTUS) 17 Unit(s) SubCutaneous every morning  insulin lispro (HumaLOG) corrective regimen sliding scale   SubCutaneous Before meals and at bedtime  levETIRAcetam 500 milliGRAM(s) Oral two times a day  metFORMIN 1000 milliGRAM(s) Oral two times a day with meals  nystatin Powder 1 Application(s) Topical two times a day  oxybutynin 5 milliGRAM(s) Oral two times a day    MEDICATIONS  (PRN):  bisacodyl Suppository 10 milliGRAM(s) Rectal daily PRN Constipation  dextrose Gel 1 Dose(s) Oral once PRN Blood Glucose LESS THAN 70 milliGRAM(s)/deciliter  glucagon  Injectable 1 milliGRAM(s) IntraMuscular once PRN Glucose LESS THAN 70 milligrams/deciliter        Vital Signs Last 24 Hrs  T(C): 36.6 (09 Jan 2018 08:54), Max: 36.6 (09 Jan 2018 08:54)  T(F): 97.9 (09 Jan 2018 08:54), Max: 97.9 (09 Jan 2018 08:54)  HR: 85 (09 Jan 2018 08:54) (85 - 106)  BP: 101/64 (09 Jan 2018 08:54) (101/64 - 109/69)  BP(mean): --  RR: 19 (09 Jan 2018 08:54) (19 - 19)  SpO2: 97% (09 Jan 2018 08:54) (97% - 97%)    01-09-18 @ 07:01  -  01-09-18 @ 14:02  --------------------------------------------------------  IN: 0 mL / OUT: 2900 mL / NET: -2900 mL      PHYSICAL EXAM:  Constitutional:Well-developed, well nourished  Eyes:ISIDRO, EOMI  Ear/Nose/Throat: no oral lesion, no sinus tenderness on percussion	  Neck:no JVD, no lymphadenopathy, supple  Respiratory: CTA jose  Cardiovascular: S1S2 RRR, no murmurs  Gastrointestinal:soft, (+) BS, no HSM  Extremities:no e/e/c  Vascular: DP Pulse:	right normal; left normal      LABS:                MICROBIOLOGY:    RADIOLOGY & ADDITIONAL STUDIES: INTERVAL HPI/OVERNIGHT EVENTS:  Patient seen and examined at bedside. Reports that he is feeling well and has had no fevers/chills.      ANTIBIOTICS/RELEVANT:  Vancomycin (discontinued)  Zosyn (discontinued)  Augmentin (current)  Cirpo (current)    MEDICATIONS  (STANDING):  amoxicillin  875 milliGRAM(s)/clavulanate 1 Tablet(s) Oral every 12 hours  ascorbic acid 250 milliGRAM(s) Oral daily  atorvastatin 20 milliGRAM(s) Oral at bedtime  ciprofloxacin     Tablet 500 milliGRAM(s) Oral every 12 hours  dextrose 5%. 1000 milliLiter(s) (50 mL/Hr) IV Continuous <Continuous>  dextrose 50% Injectable 12.5 Gram(s) IV Push once  dextrose 50% Injectable 25 Gram(s) IV Push once  dextrose 50% Injectable 25 Gram(s) IV Push once  haloperidol     Tablet 5 milliGRAM(s) Oral at bedtime  haloperidol    Injectable 5 milliGRAM(s) IntraMuscular at bedtime  heparin  Injectable 5000 Unit(s) SubCutaneous every 8 hours  insulin glargine Injectable (LANTUS) 17 Unit(s) SubCutaneous every morning  insulin lispro (HumaLOG) corrective regimen sliding scale   SubCutaneous Before meals and at bedtime  levETIRAcetam 500 milliGRAM(s) Oral two times a day  metFORMIN 1000 milliGRAM(s) Oral two times a day with meals  nystatin Powder 1 Application(s) Topical two times a day  oxybutynin 5 milliGRAM(s) Oral two times a day    MEDICATIONS  (PRN):  bisacodyl Suppository 10 milliGRAM(s) Rectal daily PRN Constipation  dextrose Gel 1 Dose(s) Oral once PRN Blood Glucose LESS THAN 70 milliGRAM(s)/deciliter  glucagon  Injectable 1 milliGRAM(s) IntraMuscular once PRN Glucose LESS THAN 70 milligrams/deciliter        Vital Signs Last 24 Hrs  T(C): 36.6 (09 Jan 2018 08:54), Max: 36.6 (09 Jan 2018 08:54)  T(F): 97.9 (09 Jan 2018 08:54), Max: 97.9 (09 Jan 2018 08:54)  HR: 85 (09 Jan 2018 08:54) (85 - 106)  BP: 101/64 (09 Jan 2018 08:54) (101/64 - 109/69)  BP(mean): --  RR: 19 (09 Jan 2018 08:54) (19 - 19)  SpO2: 97% (09 Jan 2018 08:54) (97% - 97%)    01-09-18 @ 07:01  -  01-09-18 @ 14:02  --------------------------------------------------------  IN: 0 mL / OUT: 2900 mL / NET: -2900 mL      PHYSICAL EXAM  General: A&Ox3; NAD; hungry  Head: NC/AT; PERRL; EOMI; anicteric sclera; MMM  Neck: Supple; no JVD; TLC in place on right  Respiratory: CTA B/L; no wheezes/crackles   Cardiovascular: Regular rhythm/rate; S1/S2   Gastrointestinal: Soft; mild distension at bases; bowel sounds normal  Extremities: WWP; no edema or cyanosis; radial/pedal pulses palpable  Neurological:  Sensation and motor loss below T4-5 level; CNII-XII intact  Skin: Sacral decub ulcer and Left buttock decub ulcer w/ bone exposure          MICROBIOLOGY:  Culture - Abscess with Gram Stain (12.05.17 @ 17:06)    Gram Stain:   Rare Gram Negative Rods  Numerous White blood cells    -  Ampicillin: R >16    -  Ampicillin: S <=8    -  Ampicillin: R >16    -  Ampicillin/Sulbactam: S <=8/4    -  Ampicillin/Sulbactam: S <=8/4    -  Ampicillin/Sulbactam: S <=8/4    -  Cefazolin: S <=8    -  Cefazolin: R >16    -  Cefazolin: R >16    -  Ceftriaxone: S <=1    -  Ceftriaxone: S <=1    -  Ceftriaxone: S <=1    -  Ciprofloxacin: S <=1    -  Ciprofloxacin: S <=1    -  Ciprofloxacin: S <=1    -  Gentamicin: S <=4    -  Gentamicin: S <=4    -  Gentamicin: S <=4    -  Piperacillin/Tazobactam: S <=16    -  Piperacillin/Tazobactam: S <=16    -  Piperacillin/Tazobactam: S <=16    -  Tobramycin: S <=4    -  Tobramycin: S <=4    -  Tobramycin: S <=4    -  Trimethoprim/Sulfamethoxazole: S <=2/38    -  Trimethoprim/Sulfamethoxazole: S <=2/38    -  Trimethoprim/Sulfamethoxazole: S <=2/38    Specimen Source: .Abscess sacrum    Culture Results:   Few Citrobacter braakii  Rare Enterococcus species  Identification and susceptibility to follow.  Few Klebsiella ozaenae  Few Citrobacter youngae    Organism Identification: Citrobacter braakii  Klebsiella ozaenae  Citrobacter youngae    Organism: Klebsiella ozaenae    Organism: Citrobacter braakii    Organism: Citrobacter youngae    Method Type: YENI    Method Type: YENI    Method Type: YENI        Culture - Urine (12.05.17 @ 16:13)    -  Amikacin: S 16    -  Ampicillin: R >16    -  Ampicillin/Sulbactam: S 8/4    -  Aztreonam: S <=4    -  Cefazolin: S 8    -  Cefepime: S <=2    -  Cefoxitin: S <=4    -  Ceftazidime: S <=1    -  Ceftriaxone: S <=1    -  Ciprofloxacin: R >2    -  Ertapenem: S <=0.5    -  Gentamicin: I 8    -  Levofloxacin: R >4    -  Meropenem: S <=1    -  Nitrofurantoin: R >64    -  Piperacillin/Tazobactam: S <=8    -  Tobramycin: R >8    -  Trimethoprim/Sulfamethoxazole: S <=0.5/9.5    Specimen Source: .Urine Clean Catch (Midstream)    Culture Results:   >100,000 CFU/ml Proteus mirabilis  >100,000 CFU/ml Klebsiella pneumoniae    Organism Identification: Proteus mirabilis    Organism: Proteus mirabilis    Method Type: YENI      RADIOLOGY & ADDITIONAL STUDIES: reviewed

## 2018-01-09 NOTE — PROGRESS NOTE ADULT - SUBJECTIVE AND OBJECTIVE BOX
INTERVAL HPI/OVERNIGHT EVENTS: RAHEL o/n. ROS negative for acute complaints this AM. Patient seen and evaluated by wound care for recommendations to L buttock wound.     VITAL SIGNS:  T(F): 97.9 (01-09-18 @ 08:54)  HR: 85 (01-09-18 @ 08:54)  BP: 101/64 (01-09-18 @ 08:54)  RR: 19 (01-09-18 @ 08:54)  SpO2: 97% (01-09-18 @ 08:54)  Wt(kg): --    PHYSICAL EXAM:    Constitutional: NAD   HEENT: PERRLA, EOMI, Normal Hearing, MMM  Respiratory: CTAB  Cardiovascular: S1 and S2, RRR, no M/G/R  Gastrointestinal: BS+, soft, NT/ND  Neurological: A/O x 3, baseline motor/sensory deficits at t4 and below (paraplegia). Exam unchanged.   derm: sacral and Left IT dressings changed by woundcare. clean , dry, no discharge     MEDICATIONS  (STANDING):  amoxicillin  875 milliGRAM(s)/clavulanate 1 Tablet(s) Oral every 12 hours  ascorbic acid 250 milliGRAM(s) Oral daily  atorvastatin 20 milliGRAM(s) Oral at bedtime  ciprofloxacin     Tablet 500 milliGRAM(s) Oral every 12 hours  dextrose 5%. 1000 milliLiter(s) (50 mL/Hr) IV Continuous <Continuous>  dextrose 50% Injectable 12.5 Gram(s) IV Push once  dextrose 50% Injectable 25 Gram(s) IV Push once  dextrose 50% Injectable 25 Gram(s) IV Push once  haloperidol     Tablet 5 milliGRAM(s) Oral at bedtime  haloperidol    Injectable 5 milliGRAM(s) IntraMuscular at bedtime  heparin  Injectable 5000 Unit(s) SubCutaneous every 8 hours  insulin glargine Injectable (LANTUS) 17 Unit(s) SubCutaneous every morning  insulin lispro (HumaLOG) corrective regimen sliding scale   SubCutaneous Before meals and at bedtime  levETIRAcetam 500 milliGRAM(s) Oral two times a day  metFORMIN 1000 milliGRAM(s) Oral two times a day with meals  nystatin Powder 1 Application(s) Topical two times a day  oxybutynin 5 milliGRAM(s) Oral two times a day    MEDICATIONS  (PRN):  bisacodyl Suppository 10 milliGRAM(s) Rectal daily PRN Constipation  dextrose Gel 1 Dose(s) Oral once PRN Blood Glucose LESS THAN 70 milliGRAM(s)/deciliter  glucagon  Injectable 1 milliGRAM(s) IntraMuscular once PRN Glucose LESS THAN 70 milligrams/deciliter      Allergies    Capzasin Back and Body (Unknown)    Intolerances        LABS:                        9.5    5.5   )-----------( 236      ( 07 Jan 2018 11:06 )             32.0     01-07    136  |  98  |  20  ----------------------------<  228<H>  4.5   |  26  |  0.78    Ca    9.4      07 Jan 2018 11:06  Mg     1.7     01-07            RADIOLOGY & ADDITIONAL TESTS: Reviewed. INTERVAL HPI/OVERNIGHT EVENTS: RAHEL o/n. ROS negative for acute complaints this AM. Patient seen and evaluated by wound care for recommendations to L buttock wound.     VITAL SIGNS:  T(F): 97.9 (01-09-18 @ 08:54)  HR: 85 (01-09-18 @ 08:54)  BP: 101/64 (01-09-18 @ 08:54)  RR: 19 (01-09-18 @ 08:54)  SpO2: 97% (01-09-18 @ 08:54)  Wt(kg): --    PHYSICAL EXAM:    Constitutional: NAD   HEENT: PERRLA, EOMI, Normal Hearing, MMM  Respiratory: CTAB  Cardiovascular: S1 and S2, RRR, no M/G/R  Gastrointestinal: BS+, soft, NT/ND (+) SP catheter, site clean/dry  Neurological: A/O x 3, baseline motor/sensory deficits at t4 and below (paraplegia). Exam unchanged.   derm: sacral and Left IT dressings changed by woundcare. clean , dry, no discharge     MEDICATIONS  (STANDING):  amoxicillin  875 milliGRAM(s)/clavulanate 1 Tablet(s) Oral every 12 hours  ascorbic acid 250 milliGRAM(s) Oral daily  atorvastatin 20 milliGRAM(s) Oral at bedtime  ciprofloxacin     Tablet 500 milliGRAM(s) Oral every 12 hours  dextrose 5%. 1000 milliLiter(s) (50 mL/Hr) IV Continuous <Continuous>  dextrose 50% Injectable 12.5 Gram(s) IV Push once  dextrose 50% Injectable 25 Gram(s) IV Push once  dextrose 50% Injectable 25 Gram(s) IV Push once  haloperidol     Tablet 5 milliGRAM(s) Oral at bedtime  haloperidol    Injectable 5 milliGRAM(s) IntraMuscular at bedtime  heparin  Injectable 5000 Unit(s) SubCutaneous every 8 hours  insulin glargine Injectable (LANTUS) 17 Unit(s) SubCutaneous every morning  insulin lispro (HumaLOG) corrective regimen sliding scale   SubCutaneous Before meals and at bedtime  levETIRAcetam 500 milliGRAM(s) Oral two times a day  metFORMIN 1000 milliGRAM(s) Oral two times a day with meals  nystatin Powder 1 Application(s) Topical two times a day  oxybutynin 5 milliGRAM(s) Oral two times a day    MEDICATIONS  (PRN):  bisacodyl Suppository 10 milliGRAM(s) Rectal daily PRN Constipation  dextrose Gel 1 Dose(s) Oral once PRN Blood Glucose LESS THAN 70 milliGRAM(s)/deciliter  glucagon  Injectable 1 milliGRAM(s) IntraMuscular once PRN Glucose LESS THAN 70 milligrams/deciliter      Allergies    Capzasin Back and Body (Unknown)    Intolerances        LABS:                        9.5    5.5   )-----------( 236      ( 07 Jan 2018 11:06 )             32.0     01-07    136  |  98  |  20  ----------------------------<  228<H>  4.5   |  26  |  0.78    Ca    9.4      07 Jan 2018 11:06  Mg     1.7     01-07            RADIOLOGY & ADDITIONAL TESTS: Reviewed.

## 2018-01-09 NOTE — PROGRESS NOTE ADULT - ASSESSMENT
32M paraplegic PMH spinal cord injury (wheelchair bound), sacral pressure ulcer with osteo x2 (outpatient provider said they have records of March osteo s/p daptomycin of unknown length) earlier in 2017,  DM2, indwelling suprapubic catheter who presented w/ septic shock secondary to infected purulent sacral 4th degree pressure ulcer with underlying inadequately treated OM, hemodynamically stable being treated for sacral osteomyelitis awaiting placement at [facility]

## 2018-01-09 NOTE — ADVANCED PRACTICE NURSE CONSULT - ASSESSMENT
Left buttock wound measuring 5x3x4 cm with white slough in base of wound. Wound also undermines at 12-2 o'clock approx 2 cm. Minimal drainage noted on old dressing, pt on air flow mattress for pressure ulcer relief. Except for base of wound, edges/sides of wound pink and non granulating.

## 2018-01-09 NOTE — PROGRESS NOTE ADULT - PROBLEM SELECTOR PLAN 1
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 Upon arrival pts wound was packed w/ feculent material and required debridement by plastic surgery.   - wound cx had citrobacter, enterococcus, klebsiella  - c/w augmentin and cipro as patient refuses IV abx. Patient seen and evaluated by ID who recommend 2 wks of abx. To be reevaluated for continued therapy.   - general surgery does not believe there is a fistula between wound and rectum causing leakage of stool, no surgical intervention at this time.  - wound care following

## 2018-01-09 NOTE — PROGRESS NOTE BEHAVIORAL HEALTH - OTHER
Less hostile, no cursing, no verbal aggression Upper body strength appears WNL. Pt is paraplegic. Paraplegic Less verbally aggressive, no cursing/expletives Focused on legal matters, as well as desire to read court orders

## 2018-01-09 NOTE — PROGRESS NOTE BEHAVIORAL HEALTH - SUMMARY
32-year-old male with reported hx of schizoaffective disorder, rendered paraplegic s/p suicide attempt, admitted in septic shock, s/p discontinuation of ADL and wound care, in context of noncompliance with antipsychotic medication resulting in psychosis.  Pt was determined to LACK CAPACITY regarding medical decision making including need for psychotropic medication. Has approval for TOO (court mandated), is tolerating haldol 5 mg po qhs with no evidence of SE, somewhat better behavioral control. (To date, has been taking po Haldol, thus has not needed IM medication.)   Pt continues to exhibit paranoid delusions, poor insight and judgment toward his mental illness and requires continued psychiatric care for further stabilization. Will increase Haldol to 7.5 mg po/IM qhs.

## 2018-01-10 DIAGNOSIS — F22 DELUSIONAL DISORDERS: ICD-10-CM

## 2018-01-10 LAB
ANION GAP SERPL CALC-SCNC: 13 MMOL/L — SIGNIFICANT CHANGE UP (ref 5–17)
BUN SERPL-MCNC: 19 MG/DL — SIGNIFICANT CHANGE UP (ref 7–23)
CALCIUM SERPL-MCNC: 9.4 MG/DL — SIGNIFICANT CHANGE UP (ref 8.4–10.5)
CHLORIDE SERPL-SCNC: 97 MMOL/L — SIGNIFICANT CHANGE UP (ref 96–108)
CO2 SERPL-SCNC: 26 MMOL/L — SIGNIFICANT CHANGE UP (ref 22–31)
CREAT SERPL-MCNC: 0.77 MG/DL — SIGNIFICANT CHANGE UP (ref 0.5–1.3)
CRP SERPL-MCNC: 4.22 MG/DL — HIGH (ref 0–0.4)
ERYTHROCYTE [SEDIMENTATION RATE] IN BLOOD: 50 MM/HR — HIGH
GLUCOSE BLDC GLUCOMTR-MCNC: 101 MG/DL — HIGH (ref 70–99)
GLUCOSE BLDC GLUCOMTR-MCNC: 111 MG/DL — HIGH (ref 70–99)
GLUCOSE BLDC GLUCOMTR-MCNC: 121 MG/DL — HIGH (ref 70–99)
GLUCOSE BLDC GLUCOMTR-MCNC: 223 MG/DL — HIGH (ref 70–99)
GLUCOSE SERPL-MCNC: 106 MG/DL — HIGH (ref 70–99)
HCT VFR BLD CALC: 31 % — LOW (ref 39–50)
HGB BLD-MCNC: 9.2 G/DL — LOW (ref 13–17)
MAGNESIUM SERPL-MCNC: 1.8 MG/DL — SIGNIFICANT CHANGE UP (ref 1.6–2.6)
MCHC RBC-ENTMCNC: 21.9 PG — LOW (ref 27–34)
MCHC RBC-ENTMCNC: 29.7 G/DL — LOW (ref 32–36)
MCV RBC AUTO: 73.6 FL — LOW (ref 80–100)
PLATELET # BLD AUTO: 220 K/UL — SIGNIFICANT CHANGE UP (ref 150–400)
POTASSIUM SERPL-MCNC: 4.2 MMOL/L — SIGNIFICANT CHANGE UP (ref 3.5–5.3)
POTASSIUM SERPL-SCNC: 4.2 MMOL/L — SIGNIFICANT CHANGE UP (ref 3.5–5.3)
RBC # BLD: 4.21 M/UL — SIGNIFICANT CHANGE UP (ref 4.2–5.8)
RBC # FLD: 17.8 % — HIGH (ref 10.3–16.9)
SODIUM SERPL-SCNC: 136 MMOL/L — SIGNIFICANT CHANGE UP (ref 135–145)
WBC # BLD: 7.4 K/UL — SIGNIFICANT CHANGE UP (ref 3.8–10.5)
WBC # FLD AUTO: 7.4 K/UL — SIGNIFICANT CHANGE UP (ref 3.8–10.5)

## 2018-01-10 PROCEDURE — 99233 SBSQ HOSP IP/OBS HIGH 50: CPT

## 2018-01-10 PROCEDURE — 99232 SBSQ HOSP IP/OBS MODERATE 35: CPT

## 2018-01-10 RX ADMIN — Medication 5 MILLIGRAM(S): at 17:30

## 2018-01-10 RX ADMIN — METFORMIN HYDROCHLORIDE 1000 MILLIGRAM(S): 850 TABLET ORAL at 09:45

## 2018-01-10 RX ADMIN — Medication 1 TABLET(S): at 21:03

## 2018-01-10 RX ADMIN — Medication 5 MILLIGRAM(S): at 05:58

## 2018-01-10 RX ADMIN — HEPARIN SODIUM 5000 UNIT(S): 5000 INJECTION INTRAVENOUS; SUBCUTANEOUS at 05:58

## 2018-01-10 RX ADMIN — ATORVASTATIN CALCIUM 20 MILLIGRAM(S): 80 TABLET, FILM COATED ORAL at 21:03

## 2018-01-10 RX ADMIN — HEPARIN SODIUM 5000 UNIT(S): 5000 INJECTION INTRAVENOUS; SUBCUTANEOUS at 21:03

## 2018-01-10 RX ADMIN — Medication 250 MILLIGRAM(S): at 14:22

## 2018-01-10 RX ADMIN — HEPARIN SODIUM 5000 UNIT(S): 5000 INJECTION INTRAVENOUS; SUBCUTANEOUS at 14:22

## 2018-01-10 RX ADMIN — METFORMIN HYDROCHLORIDE 1000 MILLIGRAM(S): 850 TABLET ORAL at 17:30

## 2018-01-10 RX ADMIN — Medication 500 MILLIGRAM(S): at 21:03

## 2018-01-10 RX ADMIN — LEVETIRACETAM 500 MILLIGRAM(S): 250 TABLET, FILM COATED ORAL at 05:58

## 2018-01-10 RX ADMIN — HALOPERIDOL DECANOATE 7.5 MILLIGRAM(S): 100 INJECTION INTRAMUSCULAR at 21:03

## 2018-01-10 RX ADMIN — Medication 500 MILLIGRAM(S): at 09:46

## 2018-01-10 RX ADMIN — LEVETIRACETAM 500 MILLIGRAM(S): 250 TABLET, FILM COATED ORAL at 17:30

## 2018-01-10 RX ADMIN — Medication 1 TABLET(S): at 09:45

## 2018-01-10 NOTE — PROGRESS NOTE BEHAVIORAL HEALTH - SUMMARY
32-year-old male with reported hx of schizoaffective disorder, rendered paraplegic s/p suicide attempt, admitted in septic shock, s/p discontinuation of ADL and wound care, in context of noncompliance with antipsychotic medication resulting in psychosis.  Pt was determined to LACK CAPACITY regarding medical decision making including need for psychotropic medication. Has approval for TOO (court mandated), is tolerating haldol 5 mg po qhs with no evidence of SE, somewhat better behavioral control. (To date, has been taking po Haldol, thus has not needed IM medication.)     Based on my assessment pt exhibit slight improvement of aggressive behaviour and is more complaint with medical treatment. He continues to exhibit paranoid delusions, poor insight and judgment toward his mental illness. He is focused on discharge and make different allegations which change in detail when reported to different people (as per collaterals), which seems consistent with either paranoia. He also is discharge focus, and it is unclear if his improvement in compliance is with gaol to facilitate the discharge or due to decrease irritability/paranoia. He continues to requires inpatient level of psychiatric care at this time and is not clear for discharge until further stabilization. Will continue to prescribe the Haldol to 7.5 mg po qhs (or IM if refused PO, pt can not refuse).

## 2018-01-10 NOTE — PROGRESS NOTE ADULT - ASSESSMENT
32M paraplegic PMH spinal cord injury (wheelchair bound), sacral pressure ulcer with osteo x2 (outpatient provider said they have records of March osteo s/p daptomycin of unknown length) earlier in 2017,  DM2, indwelling suprapubic catheter who presented w/ septic shock secondary to infected purulent sacral 4th degree pressure ulcer with underlying inadequately treated OM, hemodynamically stable being treated for sacral osteomyelitis awaiting placement at [facility]

## 2018-01-10 NOTE — PROGRESS NOTE BEHAVIORAL HEALTH - OTHER
Less hostile, no cursing, no verbal aggression Upper body strength appears WNL. Pt is paraplegic. Paraplegic Less verbally aggressive, no cursing/expletives Paranoid delusions and preoccupation

## 2018-01-10 NOTE — PROGRESS NOTE ADULT - ATTENDING COMMENTS
dx:  infected decubitus ulcers/possible sacral osteomyelitis- wounds are healing, now stable on cipro/augmentin x 6 wks from debridement   . will need ID follow up.   left IT  decub and sacral decub- stage iv  - seen by plastics and gen surg. no acute surgical intervention recommended at this time. c/w local wound care. off load wound.   wounds are improved  psychosis -  treat over objection based on court ruling. haldol qhs (po preferably , if not willing then will need IM haldol) . ekg to eval qtc serially  neurogenic bladder - c/w suprapubic bowles, oxybutinin  DM ii - can cont metformin  epilepsy- keppra   tachycardia - etiology unclear, but pt has stated he needed propranolol in the past for his "heartrate", if persistently p>100, can start metoprolol .

## 2018-01-10 NOTE — PROGRESS NOTE ADULT - SUBJECTIVE AND OBJECTIVE BOX
INTERVAL HPI/OVERNIGHT EVENTS: RAHEL o/n. ROS negative. No acute complaints.     VITAL SIGNS:  T(F): 97.8 (01-10-18 @ 08:15)  HR: 85 (01-10-18 @ 08:15)  BP: 101/66 (01-10-18 @ 08:15)  RR: 18 (01-10-18 @ 08:15)  SpO2: 99% (01-10-18 @ 08:15)  Wt(kg): --    PHYSICAL EXAM:    Constitutional: NAD   HEENT: PERRLA, EOMI, Normal Hearing, MMM  Respiratory: CTAB  Cardiovascular: S1 and S2, RRR, no M/G/R  Gastrointestinal: BS+, soft, NT/ND (+) SP catheter, site clean/dry  Neurological: A/O x 3, baseline motor/sensory deficits at t4 and below (paraplegia). Exam unchanged.   derm: sacral and Left IT dressings. clean , dry, no discharge     MEDICATIONS  (STANDING):  amoxicillin  875 milliGRAM(s)/clavulanate 1 Tablet(s) Oral every 12 hours  ascorbic acid 250 milliGRAM(s) Oral daily  atorvastatin 20 milliGRAM(s) Oral at bedtime  ciprofloxacin     Tablet 500 milliGRAM(s) Oral every 12 hours  dextrose 5%. 1000 milliLiter(s) (50 mL/Hr) IV Continuous <Continuous>  dextrose 50% Injectable 12.5 Gram(s) IV Push once  dextrose 50% Injectable 25 Gram(s) IV Push once  dextrose 50% Injectable 25 Gram(s) IV Push once  haloperidol     Tablet 7.5 milliGRAM(s) Oral at bedtime  heparin  Injectable 5000 Unit(s) SubCutaneous every 8 hours  insulin glargine Injectable (LANTUS) 17 Unit(s) SubCutaneous every morning  insulin lispro (HumaLOG) corrective regimen sliding scale   SubCutaneous Before meals and at bedtime  levETIRAcetam 500 milliGRAM(s) Oral two times a day  metFORMIN 1000 milliGRAM(s) Oral two times a day with meals  nystatin Powder 1 Application(s) Topical two times a day  oxybutynin 5 milliGRAM(s) Oral two times a day    MEDICATIONS  (PRN):  bisacodyl Suppository 10 milliGRAM(s) Rectal daily PRN Constipation  dextrose Gel 1 Dose(s) Oral once PRN Blood Glucose LESS THAN 70 milliGRAM(s)/deciliter  glucagon  Injectable 1 milliGRAM(s) IntraMuscular once PRN Glucose LESS THAN 70 milligrams/deciliter  haloperidol    Injectable 7.5 milliGRAM(s) IntraMuscular at bedtime PRN REFUSING PO HALDOL      Allergies    Capzasin Back and Body (Unknown)    Intolerances        LABS:                        9.2    7.4   )-----------( 220      ( 10 New 2018 06:46 )             31.0     01-10    136  |  97  |  19  ----------------------------<  106<H>  4.2   |  26  |  0.77    Ca    9.4      10 New 2018 06:46  Mg     1.8     01-10            RADIOLOGY & ADDITIONAL TESTS:
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
## 2018-01-11 LAB
GLUCOSE BLDC GLUCOMTR-MCNC: 108 MG/DL — HIGH (ref 70–99)
GLUCOSE BLDC GLUCOMTR-MCNC: 114 MG/DL — HIGH (ref 70–99)
GLUCOSE BLDC GLUCOMTR-MCNC: 129 MG/DL — HIGH (ref 70–99)
GLUCOSE BLDC GLUCOMTR-MCNC: 156 MG/DL — HIGH (ref 70–99)

## 2018-01-11 PROCEDURE — 99233 SBSQ HOSP IP/OBS HIGH 50: CPT

## 2018-01-11 RX ORDER — ONDANSETRON 8 MG/1
4 TABLET, FILM COATED ORAL ONCE
Qty: 0 | Refills: 0 | Status: COMPLETED | OUTPATIENT
Start: 2018-01-11 | End: 2018-01-11

## 2018-01-11 RX ORDER — POLYETHYLENE GLYCOL 3350 17 G/17G
17 POWDER, FOR SOLUTION ORAL DAILY
Qty: 0 | Refills: 0 | Status: DISCONTINUED | OUTPATIENT
Start: 2018-01-11 | End: 2018-01-12

## 2018-01-11 RX ORDER — HALOPERIDOL DECANOATE 100 MG/ML
10 INJECTION INTRAMUSCULAR AT BEDTIME
Qty: 0 | Refills: 0 | Status: DISCONTINUED | OUTPATIENT
Start: 2018-01-11 | End: 2018-01-11

## 2018-01-11 RX ORDER — HALOPERIDOL DECANOATE 100 MG/ML
10 INJECTION INTRAMUSCULAR AT BEDTIME
Qty: 0 | Refills: 0 | Status: DISCONTINUED | OUTPATIENT
Start: 2018-01-11 | End: 2018-02-16

## 2018-01-11 RX ADMIN — Medication 10 MILLIGRAM(S): at 21:50

## 2018-01-11 RX ADMIN — ATORVASTATIN CALCIUM 20 MILLIGRAM(S): 80 TABLET, FILM COATED ORAL at 21:47

## 2018-01-11 RX ADMIN — Medication 5 MILLIGRAM(S): at 05:15

## 2018-01-11 RX ADMIN — Medication 5 MILLIGRAM(S): at 20:16

## 2018-01-11 RX ADMIN — HEPARIN SODIUM 5000 UNIT(S): 5000 INJECTION INTRAVENOUS; SUBCUTANEOUS at 21:45

## 2018-01-11 RX ADMIN — METFORMIN HYDROCHLORIDE 1000 MILLIGRAM(S): 850 TABLET ORAL at 18:07

## 2018-01-11 RX ADMIN — Medication 1 TABLET(S): at 10:08

## 2018-01-11 RX ADMIN — Medication 500 MILLIGRAM(S): at 10:24

## 2018-01-11 RX ADMIN — LEVETIRACETAM 500 MILLIGRAM(S): 250 TABLET, FILM COATED ORAL at 20:15

## 2018-01-11 RX ADMIN — HEPARIN SODIUM 5000 UNIT(S): 5000 INJECTION INTRAVENOUS; SUBCUTANEOUS at 13:22

## 2018-01-11 RX ADMIN — HALOPERIDOL DECANOATE 10 MILLIGRAM(S): 100 INJECTION INTRAMUSCULAR at 21:47

## 2018-01-11 RX ADMIN — LEVETIRACETAM 500 MILLIGRAM(S): 250 TABLET, FILM COATED ORAL at 05:15

## 2018-01-11 RX ADMIN — Medication 500 MILLIGRAM(S): at 21:47

## 2018-01-11 RX ADMIN — NYSTATIN CREAM 1 APPLICATION(S): 100000 CREAM TOPICAL at 20:15

## 2018-01-11 RX ADMIN — Medication 1 TABLET(S): at 21:47

## 2018-01-11 RX ADMIN — NYSTATIN CREAM 1 APPLICATION(S): 100000 CREAM TOPICAL at 10:05

## 2018-01-11 RX ADMIN — METFORMIN HYDROCHLORIDE 1000 MILLIGRAM(S): 850 TABLET ORAL at 10:06

## 2018-01-11 RX ADMIN — NYSTATIN CREAM 1 APPLICATION(S): 100000 CREAM TOPICAL at 18:08

## 2018-01-11 RX ADMIN — Medication 250 MILLIGRAM(S): at 13:23

## 2018-01-11 RX ADMIN — HEPARIN SODIUM 5000 UNIT(S): 5000 INJECTION INTRAVENOUS; SUBCUTANEOUS at 05:15

## 2018-01-11 NOTE — PROGRESS NOTE BEHAVIORAL HEALTH - PROBLEM SELECTOR PLAN 1
-Continue court-ordered medication. PT CANNOT REFUSE COURT-ORDERED MEDICATION.  Titrate HALDOL to 10 MG PO QHS. IF PT REFUSES PO HALDOL, PLEASE GIVE HALDOL 10 MG IM. PLEASE HAVE SECURITY PRESENT PRIOR TO ADMINISTRATION OF IM MEDICATION.
- Needs psychiatric admission (medical-physiatric unit), or treatment over objection in medical unit.   - Continue Abilify 5mg PO Daily (home medication).  -Would like to have more collateral information about patient's base line (mental status) and previous treatment hx (outpatient treatment, last psychiatric DC summary).   - f/u with court appointment: Treatment over objection   - Will follow up. Call with questions.
- Needs psychiatric admission (medical-physiatric unit), or treatment over objection in medical unit.   - Continue Abilify 5mg PO Daily (home medication).  -Would like to have more collateral information about patient's base line (mental status) and previous treatment hx (outpatient treatment, last psychiatric DC summary).   - f/u with court appointment: Treatment over objection   - Will follow up. Call with questions.
-Continue to encourage to take home regimen: Abilify 5mg QHS PO  -Pt needs further psychiatric evaluation and is not clear to leave the hospital at this time.  -Psychiatry will follow. Call with questions.
-Continue court-ordered medication. PT CANNOT REFUSE COURT-ORDERED MEDICATION.  Continue HALDOL 7.5 MG PO QHS. IF PT REFUSES PO HALDOL, PLEASE GIVE HALDOL 7.5 MG IM. PLEASE HAVE SECURITY PRESENT PRIOR TO ADMINISTRATION OF IM MEDICATION.
Will titrate the Haldol to 5 mg PO QHS. If he refuses the PO medication will needs to receive the same dose (5mg) in IM qhs as COURT-ORDERED MEDICATION. PT CANNOT REFUSE COURT-ORDERED MEDICATION. ****PLEASE HAVE SECURITY PRESENT PRIOR TO ADMINISTERING IM MEDICATION.****

## 2018-01-11 NOTE — PROGRESS NOTE BEHAVIORAL HEALTH - PRIMARY DX
Schizoaffective disorder
Schizoaffective disorder, unspecified type
Schizoaffective disorder
Schizoaffective disorder, unspecified type
Schizoaffective disorder

## 2018-01-11 NOTE — PROGRESS NOTE ADULT - ASSESSMENT
32M paraplegic PMH spinal cord injury (wheelchair bound), sacral pressure ulcer with osteo x2 (outpatient provider said they have records of March osteo s/p daptomycin of unknown length) earlier in 2017,  DM2, indwelling suprapubic catheter who presented w/ septic shock secondary to infected purulent sacral 4th degree pressure ulcer with underlying inadequately treated OM, hemodynamically stable being treated for sacral osteomyelitis awaiting placement at [facility]

## 2018-01-11 NOTE — PROGRESS NOTE BEHAVIORAL HEALTH - CASE SUMMARY
Pt seen; chart reviewed; discussed with Dr. Garcia and team.    Pt tolerating Haldol, is in better behavioral control, less threatening, more cooperative with care. Wants to be discharged home.  Pt requires some additional time, and likely increase in medications, before he will be appropriate for discharge home.  In preparation for going home, SW might start process of discussion with potential homecare agencies.

## 2018-01-11 NOTE — PROGRESS NOTE ADULT - ATTENDING COMMENTS
dx    infected decubitus ulcers/possible sacral osteomyelitis- wounds cont to improved , stable on cipro/augmentin x 6 wks from wound debridement . (~jan 16 2018) . he will need ID follow up.   left IT  decub and sacral decub- stage iv  - seen by plastics and gen surg. no acute surgical intervention recommended at this time. c/w local wound care. off load wound.   wounds are improved  psychosis -  treatment over objection based on court ruling. haldol qhs (po preferably , if not willing then will need IM haldol) . ekg to eval qtc serially  neurogenic bladder - c/w suprapubic bowles, oxybutinin  DM ii - can cont metformin  epilepsy- keppra   tachycardia - etiology unclear, but pt has stated he needed propranolol in the past for his "heartrate", if persistently p>100, can start metoprolol .

## 2018-01-11 NOTE — PROGRESS NOTE BEHAVIORAL HEALTH - NSBHFUPINTERVALHXFT_PSY_A_CORE
Pt was evaluated at bed, charts reviewed and collateral obtained from the resident, SW, and RN.     Pt was laying on bed, holding his phone in hand, when writer started the conversation he stated "wait, I want to record this conversation" and then stating his phone is working slowly today and "you are dorothy that I cant record". When writer asked what is his concern that would like to record the interviews he stated "well I send these to my ". This writer asked about medication tolerance and he states has been sleeping 20hrs a day since taking Haldol although RN denied and he has been awake mostly during the day. Then suddenly pt hold his phone toward this writer stating "I took your picture" and refused to delete it despite being asked to and explaining that taking picture of people without their consent in hospital is not allowed, but pt refuses and dismiss these statements.  This writer left the room and informed the Nurse Manager requesting to follow up on this issue and preventing the pt doing this.  However as per RN and medical resident, pt has been slightly in better behaviour, not cursing, is better cooperative, more complaint with medical treatment for past few days (FS, Vitals) no reported somatic complain or side-effect from haldol. Although he has been accepting the medication orally, continues to states he doesn't need it and only takes it because is mandated by court. Pt was evaluated at bed, charts reviewed and collateral obtained from the resident, SW, and RN.     Pt was laying on bed, holding his phone in hand, when writer started the conversation he stated "wait, I want to record this conversation" and then stating his phone is working slowly today and "you are dorothy that I cant record". When writer asked what is his concern that would like to record the interviews he stated "well I send these to my ". This writer asked about medication tolerance and he states has been sleeping 20hrs a day since taking Haldol although RN denied and he has been awake mostly during the day. Then suddenly pt hold his phone toward this writer stating "I took your picture" and refused to delete it despite being asked to and explaining that taking picture of people without their consent in hospital is not allowed, but pt refuses and dismiss these statements.  This writer left the room and informed the Nurse Manager requesting to follow up on this issue and preventing the pt doing this.  However as per RN and medical resident, pt has been slightly in better behaviour, not cursing, is better cooperative, more compliant with medical treatment for past few days (FS, Vitals) no reported somatic complain or side-effect from haldol. Although he has been accepting the medication orally, continues to states he doesn't need it and only takes it because is mandated by court.

## 2018-01-11 NOTE — PROGRESS NOTE ADULT - SUBJECTIVE AND OBJECTIVE BOX
OVERNIGHT EVENTS/SUBJECTIVE/ROS: No events overnight    VITAL SIGNS:  Vital Signs Last 24 Hrs  T(C): 36.6 (11 Jan 2018 09:00), Max: 37 (10 New 2018 21:21)  T(F): 97.9 (11 Jan 2018 09:00), Max: 98.6 (10 New 2018 21:21)  HR: 71 (11 Jan 2018 09:00) (71 - 96)  BP: 99/62 (11 Jan 2018 09:00) (94/59 - 108/74)  BP(mean): --  RR: 16 (11 Jan 2018 09:00) (16 - 17)  SpO2: 98% (11 Jan 2018 09:00) (98% - 99%)    PHYSICAL EXAM:    General: lying in bed, in NAD  HEENT: EOMI, anicteric  Neck: supple  Cardiovascular: S1, S2, RRR   Respiratory: CTA BL, no w/r/r  Gastrointestinal: soft distended nontender abdomen, +Bs  Extremities: no edema b/l LE; WWP  Vascular: 2+ pulses b/l UE and LE  Neurological: alert awake oriented    MEDICATIONS:  MEDICATIONS  (STANDING):  amoxicillin  875 milliGRAM(s)/clavulanate 1 Tablet(s) Oral every 12 hours  ascorbic acid 250 milliGRAM(s) Oral daily  atorvastatin 20 milliGRAM(s) Oral at bedtime  ciprofloxacin     Tablet 500 milliGRAM(s) Oral every 12 hours  dextrose 5%. 1000 milliLiter(s) (50 mL/Hr) IV Continuous <Continuous>  dextrose 50% Injectable 12.5 Gram(s) IV Push once  dextrose 50% Injectable 25 Gram(s) IV Push once  dextrose 50% Injectable 25 Gram(s) IV Push once  haloperidol     Tablet 7.5 milliGRAM(s) Oral at bedtime  heparin  Injectable 5000 Unit(s) SubCutaneous every 8 hours  insulin glargine Injectable (LANTUS) 17 Unit(s) SubCutaneous every morning  insulin lispro (HumaLOG) corrective regimen sliding scale   SubCutaneous Before meals and at bedtime  levETIRAcetam 500 milliGRAM(s) Oral two times a day  metFORMIN 1000 milliGRAM(s) Oral two times a day with meals  nystatin Powder 1 Application(s) Topical two times a day  oxybutynin 5 milliGRAM(s) Oral two times a day    MEDICATIONS  (PRN):  bisacodyl Suppository 10 milliGRAM(s) Rectal daily PRN Constipation  dextrose Gel 1 Dose(s) Oral once PRN Blood Glucose LESS THAN 70 milliGRAM(s)/deciliter  glucagon  Injectable 1 milliGRAM(s) IntraMuscular once PRN Glucose LESS THAN 70 milligrams/deciliter  haloperidol    Injectable 7.5 milliGRAM(s) IntraMuscular at bedtime PRN REFUSING PO HALDOL  polyethylene glycol 3350 17 Gram(s) Oral daily PRN Constipation if bisacodyl ineffective      ALLERGIES:  Allergies    Capzasin Back and Body (Unknown)    Intolerances        LABS:                        9.2    7.4   )-----------( 220      ( 10 New 2018 06:46 )             31.0     01-10    136  |  97  |  19  ----------------------------<  106<H>  4.2   |  26  |  0.77    Ca    9.4      10 New 2018 06:46  Mg     1.8     01-10          CAPILLARY BLOOD GLUCOSE      POCT Blood Glucose.: 114 mg/dL (11 Jan 2018 07:34)      RADIOLOGY & ADDITIONAL TESTS: Reviewed.

## 2018-01-11 NOTE — PROGRESS NOTE BEHAVIORAL HEALTH - NSBHCHARTREVIEWLAB_PSY_A_CORE FT
9.2    7.4   )-----------( 220      ( 10 New 2018 06:46 )             31.0   01-10    136  |  97  |  19  ----------------------------<  106<H>  4.2   |  26  |  0.77    Ca    9.4      10 New 2018 06:46  Mg     1.8     01-10    POCT Blood Glucose.: 121 mg/dL (01.10.18 @ 07:42)
9.2    7.4   )-----------( 220      ( 10 New 2018 06:46 )             31.0   01-10    136  |  97  |  19  ----------------------------<  106<H>  4.2   |  26  |  0.77    Ca    9.4      10 New 2018 06:46  Mg     1.8     01-10

## 2018-01-11 NOTE — PROGRESS NOTE BEHAVIORAL HEALTH - SUMMARY
32-year-old male with reported hx of schizoaffective disorder, rendered paraplegic s/p suicide attempt, admitted in septic shock, s/p discontinuation of ADL and wound care, in context of noncompliance with antipsychotic medication resulting in psychosis.  Pt was determined to LACK CAPACITY regarding medical decision making including need for psychotropic medication. Has approval for TOO (court mandated), is tolerating haldol 5 mg po qhs with no evidence of SE, somewhat better behavioral control. (To date, has been taking po Haldol, thus has not needed IM medication.)     Based on my assessment pt exhibit slight improvement of aggressive behaviour and is more complaint with medical treatment. He continues to exhibit paranoid delusions, poor insight and judgment toward his mental illness. He is recording conversations and taking pictures without permission from psychiatrists on his phone. He is focused on discharge and it is unclear if his improvement in compliance is to facilitate the discharge or due to decrease irritability/paranoia. He continues to requires inpatient level of psychiatric care at this time and is not clear for discharge until further stabilization. Will continue to prescribe the Haldol to 7.5 mg po qhs (or IM if refused PO, pt can not refuse). 32-year-old male with reported hx of schizoaffective disorder, rendered paraplegic s/p suicide attempt, admitted in septic shock, s/p discontinuation of ADL and wound care, in context of noncompliance with antipsychotic medication resulting in psychosis.  Pt was determined to LACK CAPACITY regarding medical decision making including need for psychotropic medication. Has approval for TOO (court mandated), is tolerating haldol 5 mg po qhs with no evidence of SE, somewhat better behavioral control. (To date, has been taking po Haldol, thus has not needed IM medication.)     Based on my assessment pt exhibit slight improvement of aggressive behaviour and is more compliant with medical treatment. He continues to exhibit paranoid delusions, poor insight and judgment toward his mental illness. He is recording conversations and taking pictures without permission from psychiatrists on his phone. He is focused on discharge and it is unclear if his improvement in compliance is to facilitate the discharge or due to decrease irritability/paranoia. He continues to requires inpatient level of psychiatric care at this time and is not clear for discharge until further stabilization. Will increase Haldol to 10 mg po qhs (or IM if refused PO, pt can not refuse).

## 2018-01-11 NOTE — PROGRESS NOTE BEHAVIORAL HEALTH - NSBHCONSULTMEDS_PSY_A_CORE FT
Continue court-ordered medication. PT CANNOT REFUSE COURT-ORDERED MEDICATION:    Continue HALDOL TO 10 MG PO QHS.    IF PT REFUSES PO HALDOL, PLEASE GIVE HALDOL 10 MG IM. PLEASE HAVE SECURITY PRESENT PRIOR TO ADMINISTRATION OF IM MEDICATION. Continue court-ordered medication. PT CANNOT REFUSE COURT-ORDERED MEDICATION:    Increase HALDOL TO 10 MG PO QHS.    IF PT REFUSES PO HALDOL, PLEASE GIVE HALDOL 10 MG IM. PLEASE HAVE SECURITY PRESENT PRIOR TO ADMINISTRATION OF IM MEDICATION.

## 2018-01-11 NOTE — PROGRESS NOTE ADULT - PROBLEM SELECTOR PLAN 3
Patient on metformin and glimeperide at home. HgA1C of 6.0.   - patient continues to refuse insulin  -ISS+Lantus 17U+metformin 500mg BID  -monitor FSG

## 2018-01-11 NOTE — PROGRESS NOTE BEHAVIORAL HEALTH - NSBHCHARTREVIEWVS_PSY_A_CORE FT
ICU Vital Signs Last 24 Hrs  T(C): 36.6 (10 New 2018 08:15), Max: 36.9 (09 Jan 2018 20:57)  T(F): 97.8 (10 New 2018 08:15), Max: 98.5 (09 Jan 2018 20:57)  HR: 85 (10 New 2018 08:15) (75 - 94)  BP: 101/66 (10 New 2018 08:15) (97/59 - 115/73)  BP(mean): --  ABP: --  ABP(mean): --  RR: 18 (10 New 2018 08:15) (16 - 18)  SpO2: 99% (10 New 2018 08:15) (97% - 100%)
Vital Signs Last 24 Hrs  T(C): 36.4 (11 Jan 2018 16:29), Max: 37 (10 New 2018 21:21)  T(F): 97.6 (11 Jan 2018 16:29), Max: 98.6 (10 New 2018 21:21)  HR: 74 (11 Jan 2018 16:29) (71 - 96)  BP: 102/51 (11 Jan 2018 16:29) (94/59 - 102/51)  BP(mean): --  RR: 17 (11 Jan 2018 16:29) (16 - 17)  SpO2: 100% (11 Jan 2018 16:29) (98% - 100%)

## 2018-01-11 NOTE — PROGRESS NOTE BEHAVIORAL HEALTH - NSBHCHARTREVIEWINVESTIGATE_PSY_A_CORE FT
12 Lead ECG (01.09.18 @ 08:54) >  Ventricular Rate 81 BPM  Atrial Rate 81 BPM  QTC Calculation(Bezet) 450 ms  Confirmed by BERNABE CRESPO NEIL [id number]) on 1/9/2018 4:17:10 PM
12 Lead ECG (01.09.18 @ 08:54) >  Ventricular Rate 81 BPM  Atrial Rate 81 BPM  QTC Calculation(Bezet) 450 ms  Confirmed by BERNABE CRESPO NEIL ([id number]) on 1/9/2018 4:17:10 PM

## 2018-01-12 LAB
GLUCOSE BLDC GLUCOMTR-MCNC: 133 MG/DL — HIGH (ref 70–99)
GLUCOSE BLDC GLUCOMTR-MCNC: 138 MG/DL — HIGH (ref 70–99)
GLUCOSE BLDC GLUCOMTR-MCNC: 185 MG/DL — HIGH (ref 70–99)
GLUCOSE BLDC GLUCOMTR-MCNC: 198 MG/DL — HIGH (ref 70–99)
HCT VFR BLD CALC: 30.5 % — LOW (ref 39–50)
HGB BLD-MCNC: 10.1 G/DL — LOW (ref 13–17)
MCHC RBC-ENTMCNC: 22.8 PG — LOW (ref 27–34)
MCHC RBC-ENTMCNC: 33.1 G/DL — SIGNIFICANT CHANGE UP (ref 32–36)
MCV RBC AUTO: 68.8 FL — LOW (ref 80–100)
PLATELET # BLD AUTO: 254 K/UL — SIGNIFICANT CHANGE UP (ref 150–400)
RBC # BLD: 4.43 M/UL — SIGNIFICANT CHANGE UP (ref 4.2–5.8)
RBC # FLD: 17.5 % — HIGH (ref 10.3–16.9)
WBC # BLD: 14.5 K/UL — HIGH (ref 3.8–10.5)
WBC # FLD AUTO: 14.5 K/UL — HIGH (ref 3.8–10.5)

## 2018-01-12 PROCEDURE — 93010 ELECTROCARDIOGRAM REPORT: CPT

## 2018-01-12 PROCEDURE — 99231 SBSQ HOSP IP/OBS SF/LOW 25: CPT

## 2018-01-12 PROCEDURE — 99233 SBSQ HOSP IP/OBS HIGH 50: CPT

## 2018-01-12 PROCEDURE — 71045 X-RAY EXAM CHEST 1 VIEW: CPT | Mod: 26

## 2018-01-12 RX ORDER — ACETAMINOPHEN 500 MG
650 TABLET ORAL ONCE
Qty: 0 | Refills: 0 | Status: COMPLETED | OUTPATIENT
Start: 2018-01-12 | End: 2018-01-12

## 2018-01-12 RX ADMIN — METFORMIN HYDROCHLORIDE 1000 MILLIGRAM(S): 850 TABLET ORAL at 09:00

## 2018-01-12 RX ADMIN — ATORVASTATIN CALCIUM 20 MILLIGRAM(S): 80 TABLET, FILM COATED ORAL at 21:37

## 2018-01-12 RX ADMIN — HEPARIN SODIUM 5000 UNIT(S): 5000 INJECTION INTRAVENOUS; SUBCUTANEOUS at 21:37

## 2018-01-12 RX ADMIN — Medication 650 MILLIGRAM(S): at 21:36

## 2018-01-12 RX ADMIN — NYSTATIN CREAM 1 APPLICATION(S): 100000 CREAM TOPICAL at 05:58

## 2018-01-12 RX ADMIN — Medication 1 TABLET(S): at 21:37

## 2018-01-12 RX ADMIN — HEPARIN SODIUM 5000 UNIT(S): 5000 INJECTION INTRAVENOUS; SUBCUTANEOUS at 05:57

## 2018-01-12 RX ADMIN — Medication 1 TABLET(S): at 09:00

## 2018-01-12 RX ADMIN — INSULIN GLARGINE 17 UNIT(S): 100 INJECTION, SOLUTION SUBCUTANEOUS at 08:59

## 2018-01-12 RX ADMIN — Medication 5 MILLIGRAM(S): at 05:57

## 2018-01-12 RX ADMIN — LEVETIRACETAM 500 MILLIGRAM(S): 250 TABLET, FILM COATED ORAL at 18:29

## 2018-01-12 RX ADMIN — Medication 2: at 21:50

## 2018-01-12 RX ADMIN — HALOPERIDOL DECANOATE 10 MILLIGRAM(S): 100 INJECTION INTRAMUSCULAR at 21:37

## 2018-01-12 RX ADMIN — Medication 500 MILLIGRAM(S): at 09:00

## 2018-01-12 RX ADMIN — HEPARIN SODIUM 5000 UNIT(S): 5000 INJECTION INTRAVENOUS; SUBCUTANEOUS at 18:29

## 2018-01-12 RX ADMIN — Medication 250 MILLIGRAM(S): at 18:29

## 2018-01-12 RX ADMIN — LEVETIRACETAM 500 MILLIGRAM(S): 250 TABLET, FILM COATED ORAL at 05:57

## 2018-01-12 RX ADMIN — METFORMIN HYDROCHLORIDE 1000 MILLIGRAM(S): 850 TABLET ORAL at 18:29

## 2018-01-12 RX ADMIN — Medication 5 MILLIGRAM(S): at 18:28

## 2018-01-12 RX ADMIN — Medication 500 MILLIGRAM(S): at 21:37

## 2018-01-12 RX ADMIN — NYSTATIN CREAM 1 APPLICATION(S): 100000 CREAM TOPICAL at 18:29

## 2018-01-12 NOTE — PROGRESS NOTE ADULT - PROBLEM SELECTOR PLAN 6
-c/w home lipitor medication    #Seizure Disorder:   pt w/ a reported hx of seizure disorder  -c/w home medication of keppra 500mg BID    #Psychiatry Eval: Pt evaluated by psych and recommended he needs to be admitted to medical-psychiatric inpatient unit. Patient does not have capacity to make medical decisions. Court date this afternoon at bedside.   -discontinued aripriprazole in favor of haloperidol. Haloperidol 10 mg PO or IM. Patient does not have capacity to refuse and is court ordered to receive the haldol.

## 2018-01-12 NOTE — PROGRESS NOTE ADULT - SUBJECTIVE AND OBJECTIVE BOX
OVERNIGHT EVENTS/SUBJECTIVE/ROS: Pt nauseoua O/N, given IVP Zofran 4mg x1    VITAL SIGNS:  Vital Signs Last 24 Hrs  T(C): 36.8 (12 Jan 2018 06:09), Max: 36.8 (11 Jan 2018 21:00)  T(F): 98.3 (12 Jan 2018 06:09), Max: 98.3 (12 Jan 2018 06:09)  HR: 92 (12 Jan 2018 06:09) (71 - 92)  BP: 106/67 (12 Jan 2018 06:09) (99/62 - 106/67)  BP(mean): --  RR: 18 (12 Jan 2018 06:09) (16 - 18)  SpO2: 99% (12 Jan 2018 06:09) (97% - 100%)    PHYSICAL EXAM:    General: lying in bed, in NAD  HEENT: EOMI, anicteric  Neck: supple  Cardiovascular: S1, S2, RRR   Respiratory: CTA BL  Gastrointestinal: soft NT, slightly distended abdomen, +BS  : suprapubic catheter in place  Back: sacral ulcer  Extremities: no peripheral edema  Vascular: 2+ pulses radial, DP/PT  Neurological: alert awake oriented    MEDICATIONS:  MEDICATIONS  (STANDING):  amoxicillin  875 milliGRAM(s)/clavulanate 1 Tablet(s) Oral every 12 hours  ascorbic acid 250 milliGRAM(s) Oral daily  atorvastatin 20 milliGRAM(s) Oral at bedtime  ciprofloxacin     Tablet 500 milliGRAM(s) Oral every 12 hours  dextrose 5%. 1000 milliLiter(s) (50 mL/Hr) IV Continuous <Continuous>  dextrose 50% Injectable 12.5 Gram(s) IV Push once  dextrose 50% Injectable 25 Gram(s) IV Push once  dextrose 50% Injectable 25 Gram(s) IV Push once  haloperidol     Tablet 10 milliGRAM(s) Oral at bedtime  heparin  Injectable 5000 Unit(s) SubCutaneous every 8 hours  insulin glargine Injectable (LANTUS) 17 Unit(s) SubCutaneous every morning  insulin lispro (HumaLOG) corrective regimen sliding scale   SubCutaneous Before meals and at bedtime  levETIRAcetam 500 milliGRAM(s) Oral two times a day  metFORMIN 1000 milliGRAM(s) Oral two times a day with meals  nystatin Powder 1 Application(s) Topical two times a day  oxybutynin 5 milliGRAM(s) Oral two times a day    MEDICATIONS  (PRN):  bisacodyl Suppository 10 milliGRAM(s) Rectal daily PRN Constipation  dextrose Gel 1 Dose(s) Oral once PRN Blood Glucose LESS THAN 70 milliGRAM(s)/deciliter  glucagon  Injectable 1 milliGRAM(s) IntraMuscular once PRN Glucose LESS THAN 70 milligrams/deciliter  haloperidol    Injectable 10 milliGRAM(s) IntraMuscular at bedtime PRN if refuses PO Haldol  polyethylene glycol 3350 17 Gram(s) Oral daily PRN Constipation if bisacodyl ineffective      ALLERGIES:  Allergies    Capzasin Back and Body (Unknown)    Intolerances        LABS:              CAPILLARY BLOOD GLUCOSE      POCT Blood Glucose.: 138 mg/dL (12 Jan 2018 07:50)      RADIOLOGY & ADDITIONAL TESTS: Reviewed.

## 2018-01-12 NOTE — PROGRESS NOTE BEHAVIORAL HEALTH - ORIENTATION OTHER
Pt uncooperative, unable to assess. But appears oriented x PPT
Not assessed
Pt uncooperative, unable to assess. But appears oriented x PPT
Pt uncooperative, unable to assess. But appears oriented x PPT
Not assessed

## 2018-01-12 NOTE — PROGRESS NOTE ADULT - ASSESSMENT
32M paraplegic PMH spinal cord injury (wheelchair bound), sacral pressure ulcer with osteo x2 (outpatient provider said they have records of March osteo s/p daptomycin of unknown length) earlier in 2017,  DM2, indwelling suprapubic catheter who presented w/ septic shock secondary to infected purulent sacral 4th degree pressure ulcer with underlying inadequately treated OM, hemodynamically stable being treated for sacral osteomyelitis awaiting placement at [facility]

## 2018-01-12 NOTE — PROGRESS NOTE BEHAVIORAL HEALTH - NSBHFUPINTERVALHXFT_PSY_A_CORE
Pt observed s/p having BM. Soiled sheets on floor.  He is irritable, unengageable, states, "You better stop ordering the Haldol!"  Despite this, is taking po Haldol 10 mg qhs

## 2018-01-12 NOTE — PROGRESS NOTE ADULT - ATTENDING COMMENTS
dx:  1) infected decubitus ulcers/possible sacral osteomyelitis- wounds cont to improved , stable on cipro/augmentin x 6 wks from wound debridement . (last dose abx ~jan 16 2018) . he will need ID follow up.   2) left IT  decub and sacral decub- stage iv  - seen by plastics and gen surg. no acute surgical intervention recommended at this time. c/w local wound care. off load wound.   wounds are improved  3)psychosis - improved, but still remains paranoid. recent email sent to patient experience highlights some of the paranoia . letter forwarded to risk management. cont treatment over objection based on court ruling. haldol qhs (po preferably , if not willing then will need IM haldol) . ekg to eval qtc serially  4)neurogenic bladder - c/w suprapubic bowles, oxybutinin  5)DM ii - can cont metformin  6)epilepsy- keppra   7) tachycardia - etiology unclear, but pt has stated he needed propranolol in the past for his "heartrate", if persistently p>100, can start metoprolol .

## 2018-01-13 DIAGNOSIS — A41.9 SEPSIS, UNSPECIFIED ORGANISM: ICD-10-CM

## 2018-01-13 DIAGNOSIS — N30.00 ACUTE CYSTITIS WITHOUT HEMATURIA: ICD-10-CM

## 2018-01-13 DIAGNOSIS — M86.60 OTHER CHRONIC OSTEOMYELITIS, UNSPECIFIED SITE: ICD-10-CM

## 2018-01-13 LAB
ANION GAP SERPL CALC-SCNC: 16 MMOL/L — SIGNIFICANT CHANGE UP (ref 5–17)
APPEARANCE UR: CLEAR — SIGNIFICANT CHANGE UP
BACTERIA # UR AUTO: PRESENT /HPF
BASOPHILS NFR BLD AUTO: 0.3 % — SIGNIFICANT CHANGE UP (ref 0–2)
BILIRUB UR-MCNC: NEGATIVE — SIGNIFICANT CHANGE UP
BUN SERPL-MCNC: 31 MG/DL — HIGH (ref 7–23)
CALCIUM SERPL-MCNC: 9 MG/DL — SIGNIFICANT CHANGE UP (ref 8.4–10.5)
CHLORIDE SERPL-SCNC: 92 MMOL/L — LOW (ref 96–108)
CO2 SERPL-SCNC: 21 MMOL/L — LOW (ref 22–31)
COLOR SPEC: YELLOW — SIGNIFICANT CHANGE UP
COMMENT - URINE: SIGNIFICANT CHANGE UP
CREAT SERPL-MCNC: 1.52 MG/DL — HIGH (ref 0.5–1.3)
DIFF PNL FLD: (no result)
EOSINOPHIL NFR BLD AUTO: 0.4 % — SIGNIFICANT CHANGE UP (ref 0–6)
GLUCOSE BLDC GLUCOMTR-MCNC: 128 MG/DL — HIGH (ref 70–99)
GLUCOSE BLDC GLUCOMTR-MCNC: 141 MG/DL — HIGH (ref 70–99)
GLUCOSE BLDC GLUCOMTR-MCNC: 152 MG/DL — HIGH (ref 70–99)
GLUCOSE BLDC GLUCOMTR-MCNC: 165 MG/DL — HIGH (ref 70–99)
GLUCOSE SERPL-MCNC: 122 MG/DL — HIGH (ref 70–99)
GLUCOSE UR QL: NEGATIVE — SIGNIFICANT CHANGE UP
HCT VFR BLD CALC: 32.3 % — LOW (ref 39–50)
HGB BLD-MCNC: 9.4 G/DL — LOW (ref 13–17)
KETONES UR-MCNC: NEGATIVE — SIGNIFICANT CHANGE UP
LEUKOCYTE ESTERASE UR-ACNC: (no result)
LYMPHOCYTES # BLD AUTO: 16.7 % — SIGNIFICANT CHANGE UP (ref 13–44)
MAGNESIUM SERPL-MCNC: 1.8 MG/DL — SIGNIFICANT CHANGE UP (ref 1.6–2.6)
MCHC RBC-ENTMCNC: 21.8 PG — LOW (ref 27–34)
MCHC RBC-ENTMCNC: 29.1 G/DL — LOW (ref 32–36)
MCV RBC AUTO: 74.9 FL — LOW (ref 80–100)
MONOCYTES NFR BLD AUTO: 13.6 % — SIGNIFICANT CHANGE UP (ref 2–14)
NEUTROPHILS NFR BLD AUTO: 69 % — SIGNIFICANT CHANGE UP (ref 43–77)
NITRITE UR-MCNC: NEGATIVE — SIGNIFICANT CHANGE UP
PH UR: 6.5 — SIGNIFICANT CHANGE UP (ref 5–8)
PLATELET # BLD AUTO: 228 K/UL — SIGNIFICANT CHANGE UP (ref 150–400)
POTASSIUM SERPL-MCNC: 5 MMOL/L — SIGNIFICANT CHANGE UP (ref 3.5–5.3)
POTASSIUM SERPL-SCNC: 5 MMOL/L — SIGNIFICANT CHANGE UP (ref 3.5–5.3)
PROT UR-MCNC: 30 MG/DL
RAPID RVP RESULT: SIGNIFICANT CHANGE UP
RBC # BLD: 4.31 M/UL — SIGNIFICANT CHANGE UP (ref 4.2–5.8)
RBC # FLD: 17.7 % — HIGH (ref 10.3–16.9)
RBC CASTS # UR COMP ASSIST: > 10 /HPF
SODIUM SERPL-SCNC: 129 MMOL/L — LOW (ref 135–145)
SP GR SPEC: <=1.005 — SIGNIFICANT CHANGE UP (ref 1–1.03)
UROBILINOGEN FLD QL: 0.2 E.U./DL — SIGNIFICANT CHANGE UP
WBC # BLD: 7.7 K/UL — SIGNIFICANT CHANGE UP (ref 3.8–10.5)
WBC # FLD AUTO: 7.7 K/UL — SIGNIFICANT CHANGE UP (ref 3.8–10.5)
WBC UR QL: (no result) /HPF

## 2018-01-13 PROCEDURE — 99233 SBSQ HOSP IP/OBS HIGH 50: CPT

## 2018-01-13 RX ORDER — ONDANSETRON 8 MG/1
4 TABLET, FILM COATED ORAL ONCE
Qty: 0 | Refills: 0 | Status: COMPLETED | OUTPATIENT
Start: 2018-01-13 | End: 2018-01-13

## 2018-01-13 RX ORDER — MAGNESIUM SULFATE 500 MG/ML
1 VIAL (ML) INJECTION ONCE
Qty: 0 | Refills: 0 | Status: COMPLETED | OUTPATIENT
Start: 2018-01-13 | End: 2018-01-13

## 2018-01-13 RX ORDER — MEROPENEM 1 G/30ML
1000 INJECTION INTRAVENOUS EVERY 8 HOURS
Qty: 0 | Refills: 0 | Status: DISCONTINUED | OUTPATIENT
Start: 2018-01-13 | End: 2018-01-19

## 2018-01-13 RX ORDER — ACETAMINOPHEN 500 MG
650 TABLET ORAL EVERY 6 HOURS
Qty: 0 | Refills: 0 | Status: DISCONTINUED | OUTPATIENT
Start: 2018-01-13 | End: 2018-01-14

## 2018-01-13 RX ORDER — MEROPENEM 1 G/30ML
INJECTION INTRAVENOUS
Qty: 0 | Refills: 0 | Status: DISCONTINUED | OUTPATIENT
Start: 2018-01-13 | End: 2018-01-19

## 2018-01-13 RX ORDER — FLUCONAZOLE 150 MG/1
200 TABLET ORAL DAILY
Qty: 0 | Refills: 0 | Status: DISCONTINUED | OUTPATIENT
Start: 2018-01-13 | End: 2018-01-13

## 2018-01-13 RX ORDER — MEROPENEM 1 G/30ML
1000 INJECTION INTRAVENOUS ONCE
Qty: 0 | Refills: 0 | Status: COMPLETED | OUTPATIENT
Start: 2018-01-13 | End: 2018-01-13

## 2018-01-13 RX ADMIN — NYSTATIN CREAM 1 APPLICATION(S): 100000 CREAM TOPICAL at 06:02

## 2018-01-13 RX ADMIN — Medication 5 MILLIGRAM(S): at 18:17

## 2018-01-13 RX ADMIN — HEPARIN SODIUM 5000 UNIT(S): 5000 INJECTION INTRAVENOUS; SUBCUTANEOUS at 22:40

## 2018-01-13 RX ADMIN — ONDANSETRON 4 MILLIGRAM(S): 8 TABLET, FILM COATED ORAL at 11:27

## 2018-01-13 RX ADMIN — LEVETIRACETAM 500 MILLIGRAM(S): 250 TABLET, FILM COATED ORAL at 05:58

## 2018-01-13 RX ADMIN — LEVETIRACETAM 500 MILLIGRAM(S): 250 TABLET, FILM COATED ORAL at 18:17

## 2018-01-13 RX ADMIN — Medication 2: at 12:44

## 2018-01-13 RX ADMIN — HEPARIN SODIUM 5000 UNIT(S): 5000 INJECTION INTRAVENOUS; SUBCUTANEOUS at 05:58

## 2018-01-13 RX ADMIN — Medication 5 MILLIGRAM(S): at 05:58

## 2018-01-13 RX ADMIN — Medication 250 MILLIGRAM(S): at 11:28

## 2018-01-13 RX ADMIN — METFORMIN HYDROCHLORIDE 1000 MILLIGRAM(S): 850 TABLET ORAL at 09:17

## 2018-01-13 RX ADMIN — ATORVASTATIN CALCIUM 20 MILLIGRAM(S): 80 TABLET, FILM COATED ORAL at 22:39

## 2018-01-13 RX ADMIN — Medication 2: at 09:16

## 2018-01-13 RX ADMIN — Medication 650 MILLIGRAM(S): at 12:13

## 2018-01-13 RX ADMIN — Medication 650 MILLIGRAM(S): at 11:27

## 2018-01-13 RX ADMIN — MEROPENEM 100 MILLIGRAM(S): 1 INJECTION INTRAVENOUS at 22:39

## 2018-01-13 RX ADMIN — HALOPERIDOL DECANOATE 10 MILLIGRAM(S): 100 INJECTION INTRAMUSCULAR at 22:40

## 2018-01-13 RX ADMIN — Medication 100 GRAM(S): at 20:04

## 2018-01-13 RX ADMIN — FLUCONAZOLE 200 MILLIGRAM(S): 150 TABLET ORAL at 07:28

## 2018-01-13 RX ADMIN — HEPARIN SODIUM 5000 UNIT(S): 5000 INJECTION INTRAVENOUS; SUBCUTANEOUS at 14:37

## 2018-01-13 RX ADMIN — METFORMIN HYDROCHLORIDE 1000 MILLIGRAM(S): 850 TABLET ORAL at 18:16

## 2018-01-13 RX ADMIN — NYSTATIN CREAM 1 APPLICATION(S): 100000 CREAM TOPICAL at 18:19

## 2018-01-13 RX ADMIN — MEROPENEM 100 MILLIGRAM(S): 1 INJECTION INTRAVENOUS at 12:44

## 2018-01-13 RX ADMIN — Medication 500 MILLIGRAM(S): at 09:17

## 2018-01-13 RX ADMIN — Medication 1 TABLET(S): at 09:17

## 2018-01-13 NOTE — PROGRESS NOTE ADULT - PROBLEM SELECTOR PLAN 3
Osteomyelitis and stage 4 infected sacral ulcer w/ purulence, s/p debridement.   * Change cipro/augmentin to Meropenem for now, in the setting of UTI.

## 2018-01-13 NOTE — PROGRESS NOTE ADULT - ASSESSMENT
32M w/ recurrent sacral decub and osteomyelitis who presented w/ septic shock in December 2/2 osteomyelitis, initially treated with IV Vancomycin and Zosyn, now completing course with oral Ciprofloxacin and Augmentin, spiked fever last night, Suprapubic catheter changed yesterday but UA positive, likely  UTI

## 2018-01-13 NOTE — PROGRESS NOTE ADULT - SUBJECTIVE AND OBJECTIVE BOX
CC: Spiked fever last night. No other complaints.   ROS otherwise negative.     Vital Signs Last 24 Hrs  T(C): 36.9 (13 Jan 2018 09:27), Max: 39.2 (12 Jan 2018 21:01)  T(F): 98.5 (13 Jan 2018 09:27), Max: 102.6 (12 Jan 2018 21:01)  HR: 97 (13 Jan 2018 09:27) (62 - 97)  BP: 105/68 (13 Jan 2018 09:27) (105/68 - 135/72)  BP(mean): --  RR: 18 (13 Jan 2018 09:27) (18 - 19)  SpO2: 99% (13 Jan 2018 09:27) (98% - 100%)    PHYSICAL EXAMINATION  * General: Not in acute distress. Awake and alert. Lying comfortably in bed.  * Head: Normocephalic, atraumatic.  * HEENT: ears no discharge, eyes PERRLA, nose no discharge, throat no exudates, normal tonsils.  * Neck: no JVD, supple.  * Lungs: Clear to auscultation, no rales, no wheezes.  * Cardio: Regular rate and rhythm, no murmurs, no rubs, no gallops. Good peripheral pulses.  * Abdomen: Soft, non-tender, non-distended, tympanic to percussion, no rebound, no guarding, no rigidity. Bowel sounds present. No suprapubic or CVA tenderness.  * : Suprapubic catheter  * Extremities: Acyanotic, no edema.  * Skin: Sacral ulcer  * Neuro: Alert and oriented x 3. No focal deficits. Motor strength is 5/5 throughout. Sensation intact. Cranial nerves II-XII grossly intact.                MEDICATIONS  (STANDING):  ascorbic acid 250 milliGRAM(s) Oral daily  atorvastatin 20 milliGRAM(s) Oral at bedtime  dextrose 5%. 1000 milliLiter(s) (50 mL/Hr) IV Continuous <Continuous>  dextrose 50% Injectable 12.5 Gram(s) IV Push once  dextrose 50% Injectable 25 Gram(s) IV Push once  dextrose 50% Injectable 25 Gram(s) IV Push once  haloperidol     Tablet 10 milliGRAM(s) Oral at bedtime  heparin  Injectable 5000 Unit(s) SubCutaneous every 8 hours  insulin lispro (HumaLOG) corrective regimen sliding scale   SubCutaneous Before meals and at bedtime  levETIRAcetam 500 milliGRAM(s) Oral two times a day  meropenem IVPB      meropenem IVPB 1000 milliGRAM(s) IV Intermittent every 8 hours  metFORMIN 1000 milliGRAM(s) Oral two times a day with meals  nystatin Powder 1 Application(s) Topical two times a day  oxybutynin 5 milliGRAM(s) Oral two times a day    MEDICATIONS  (PRN):  acetaminophen   Tablet. 650 milliGRAM(s) Oral every 6 hours PRN Moderate Pain (4 - 6)  dextrose Gel 1 Dose(s) Oral once PRN Blood Glucose LESS THAN 70 milliGRAM(s)/deciliter  glucagon  Injectable 1 milliGRAM(s) IntraMuscular once PRN Glucose LESS THAN 70 milligrams/deciliter  haloperidol    Injectable 10 milliGRAM(s) IntraMuscular at bedtime PRN if refuses PO Haldol

## 2018-01-13 NOTE — PROGRESS NOTE ADULT - PROBLEM SELECTOR PLAN 5
Pt in bed this AM, assessment complete. Pt alert and oriented,  and visitor at side. Pt in no noted distress. Pt with right breast redness noted. Dressing to right nipple area clean, dry and intact. Dressing to back noted to have small amount of drainage on dressing, it is not leaking at this time. RR even, unlabored, no noted distress. Bed L/L, call bell is within reach and side rails are up x 2. Pre op checklist complete at this time for procedure today at 1200. * ISS+Lantus 17U+metformin 500mg BID  * monitor FSG

## 2018-01-13 NOTE — PROGRESS NOTE ADULT - PROBLEM SELECTOR PLAN 2
Spiked fever last night. Positive UA.   * Suprapubic catheter was changed last night.   * Blood and urine cultures sent.  * Started on Meropenem --previously ESBL klebsi. in urine.   * ID consulted.

## 2018-01-13 NOTE — PROGRESS NOTE ADULT - ASSESSMENT
33yo M paraplegic, PMH of spinal cord injury (wheelchair bound), sacral pressure ulcer with osteo x2, DM2, indwelling suprapubic catheter who presented w/ septic shock secondary to infected purulent sacral 4th degree pressure ulcer with underlying inadequately treated OM.

## 2018-01-13 NOTE — PROGRESS NOTE ADULT - SUBJECTIVE AND OBJECTIVE BOX
INTERVAL HPI/OVERNIGHT EVENTS: Spiked fever last night 102.8F    CONSTITUTIONAL:  Negative fever or chills, feels well, good appetite  EYES:  Negative  blurry vision or double vision  CARDIOVASCULAR:  Negative for chest pain or palpitations  RESPIRATORY:  Negative for cough, wheezing, or SOB   GASTROINTESTINAL:  Negative for nausea, vomiting, diarrhea, constipation, or abdominal pain  GENITOURINARY:  Negative frequency, urgency or dysuria  NEUROLOGIC:  No headache, confusion, dizziness, lightheadedness      ANTIBIOTICS/RELEVANT:    MEDICATIONS  (STANDING):  ascorbic acid 250 milliGRAM(s) Oral daily  atorvastatin 20 milliGRAM(s) Oral at bedtime  dextrose 5%. 1000 milliLiter(s) (50 mL/Hr) IV Continuous <Continuous>  dextrose 50% Injectable 12.5 Gram(s) IV Push once  dextrose 50% Injectable 25 Gram(s) IV Push once  dextrose 50% Injectable 25 Gram(s) IV Push once  haloperidol     Tablet 10 milliGRAM(s) Oral at bedtime  heparin  Injectable 5000 Unit(s) SubCutaneous every 8 hours  insulin lispro (HumaLOG) corrective regimen sliding scale   SubCutaneous Before meals and at bedtime  levETIRAcetam 500 milliGRAM(s) Oral two times a day  magnesium sulfate  IVPB 1 Gram(s) IV Intermittent once  meropenem IVPB      meropenem IVPB 1000 milliGRAM(s) IV Intermittent every 8 hours  metFORMIN 1000 milliGRAM(s) Oral two times a day with meals  nystatin Powder 1 Application(s) Topical two times a day  oxybutynin 5 milliGRAM(s) Oral two times a day    MEDICATIONS  (PRN):  acetaminophen   Tablet. 650 milliGRAM(s) Oral every 6 hours PRN Moderate Pain (4 - 6)  dextrose Gel 1 Dose(s) Oral once PRN Blood Glucose LESS THAN 70 milliGRAM(s)/deciliter  glucagon  Injectable 1 milliGRAM(s) IntraMuscular once PRN Glucose LESS THAN 70 milligrams/deciliter  haloperidol    Injectable 10 milliGRAM(s) IntraMuscular at bedtime PRN if refuses PO Haldol        Vital Signs Last 24 Hrs  T(C): 37 (13 Jan 2018 16:28), Max: 39.2 (12 Jan 2018 21:01)  T(F): 98.6 (13 Jan 2018 16:28), Max: 102.6 (12 Jan 2018 21:01)  HR: 74 (13 Jan 2018 16:28) (62 - 97)  BP: 110/58 (13 Jan 2018 16:28) (105/68 - 135/72)  BP(mean): --  RR: 20 (13 Jan 2018 16:28) (18 - 20)  SpO2: 97% (13 Jan 2018 16:28) (97% - 99%)    01-12-18 @ 07:01  -  01-13-18 @ 07:00  --------------------------------------------------------  IN: 0 mL / OUT: 850 mL / NET: -850 mL      PHYSICAL EXAM:  Constitutional: Well-developed, well nourished  Eyes:ISIDRO, EOMI  Ear/Nose/Throat: no oral lesion, no sinus tenderness on percussion	  Neck:no JVD, no lymphadenopathy, supple  Respiratory: CTA jose  Cardiovascular: S1S2 RRR, no murmurs  Gastrointestinal:soft, (+) BS, no HSM, (+) suprapubic catheter  Extremities: Sacral decubitus and L buttock decubitus with dressing  Vascular: DP Pulse: right normal; left normal      LABS:                        9.4    7.7   )-----------( 228      ( 13 Jan 2018 17:39 )             32.3     01-13    129<L>  |  92<L>  |  31<H>  ----------------------------<  122<H>  5.0   |  21<L>  |  1.52<H>    Ca    9.0      13 Jan 2018 17:39  Mg     1.8     01-13        Urinalysis Basic - ( 13 Jan 2018 00:26 )    Color: Yellow / Appearance: Clear / SG: <=1.005 / pH: x  Gluc: x / Ketone: NEGATIVE  / Bili: Negative / Urobili: 0.2 E.U./dL   Blood: x / Protein: 30 mg/dL / Nitrite: NEGATIVE   Leuk Esterase: Large / RBC: > 10 /HPF / WBC Many /HPF   Sq Epi: x / Non Sq Epi: x / Bacteria: Present /HPF        MICROBIOLOGY:  Culture - Abscess with Gram Stain (12.05.17 @ 17:06)    Gram Stain:   Rare Gram Negative Rods  Numerous White blood cells    -  Ampicillin: R >16    -  Ampicillin: S <=8    -  Ampicillin: R >16    -  Ampicillin: S <=2    -  Ampicillin/Sulbactam: S <=8/4    -  Ampicillin/Sulbactam: S <=8/4    -  Ampicillin/Sulbactam: S <=8/4    -  Cefazolin: S <=8    -  Cefazolin: R >16    -  Cefazolin: R >16    -  Ceftriaxone: S <=1    -  Ceftriaxone: S <=1    -  Ceftriaxone: S <=1    -  Ciprofloxacin: S <=1    -  Ciprofloxacin: S <=1  Culture - Urine (12.05.17 @ 16:13)    -  Amikacin: S 16    -  Amikacin: S <=8    -  Ampicillin: R >16    -  Ampicillin: R >16    -  Ampicillin/Sulbactam: S 8/4    -  Ampicillin/Sulbactam: R >16/8    -  Aztreonam: S <=4    -  Aztreonam: R >16    -  Cefazolin: S 8    -  Cefazolin: R >16    -  Cefepime: S <=2    -  Cefepime: R >16    -  Cefoxitin: S <=4    -  Cefoxitin: S 8    -  Ceftazidime: S <=1    -  Ceftazidime: R >16    -  Ceftriaxone: S <=1    -  Ceftriaxone: R >32    -  Imipenem: S <=1    -  Levofloxacin: R >4    -  Levofloxacin: R >4    -  Meropenem: S <=1    -  Meropenem: S <=1    -  Nitrofurantoin: R >64    -  Nitrofurantoin: R >64    -  Piperacillin/Tazobactam: S <=8    -  Piperacillin/Tazobactam: R <=8    -  Ertapenem: S <=0.5    -  Ertapenem: S <=0.5    -  Gentamicin: I 8    -  Gentamicin: S <=1    -  Ciprofloxacin: R >2    -  Ciprofloxacin: R >2    -  Trimethoprim/Sulfamethoxazole: S <=0.5/9.5    -  Trimethoprim/Sulfamethoxazole: S <=0.5/9.5    -  Tobramycin: R >8    -  Tobramycin: S <=2    Specimen Source: .Urine Clean Catch (Midstream)    Culture Results:   >100,000 CFU/ml Klebsiella pneumoniae ESBL  >100,000 CFU/ml Proteus mirabilis    Organism Identification: Proteus mirabilis  Klebsiella pneumoniae ESBL    Organism: Proteus mirabilis    Organism: Klebsiella pneumoniae ESBL    Method Type: YENI    Method Type: YENI      -  Ciprofloxacin: S <=1    -  Gentamicin: S <=4    -  Gentamicin: S <=4    -  Gentamicin: S <=4    -  Piperacillin/Tazobactam: S <=16    -  Piperacillin/Tazobactam: S <=16    -  Piperacillin/Tazobactam: S <=16    -  Tobramycin: S <=4    -  Tobramycin: S <=4    -  Tobramycin: S <=4    -  Trimethoprim/Sulfamethoxazole: S <=2/38    -  Trimethoprim/Sulfamethoxazole: S <=2/38    -  Trimethoprim/Sulfamethoxazole: S <=2/38    -  Vancomycin: S 2    Specimen Source: .Abscess sacrum    Culture Results:   Few Citrobacter braakii  Rare Enterococcus faecalis  Few Klebsiella ozaenae  Few Citrobacter youngae    Organism Identification: Citrobacter braakii  Klebsiella ozaenae  Citrobacter youngae  Enterococcus faecalis    Organism: Citrobacter braakii    Organism: Klebsiella ozaenae    Organism: Citrobacter youngae    Organism: Enterococcus faecalis    Method Type: YENI    Method Type: YENI    Method Type: YENI    Method Type: YENI        RADIOLOGY & ADDITIONAL STUDIES:

## 2018-01-14 DIAGNOSIS — N17.9 ACUTE KIDNEY FAILURE, UNSPECIFIED: ICD-10-CM

## 2018-01-14 LAB
ANION GAP SERPL CALC-SCNC: 16 MMOL/L — SIGNIFICANT CHANGE UP (ref 5–17)
BUN SERPL-MCNC: 29 MG/DL — HIGH (ref 7–23)
CALCIUM SERPL-MCNC: 8.9 MG/DL — SIGNIFICANT CHANGE UP (ref 8.4–10.5)
CHLORIDE SERPL-SCNC: 98 MMOL/L — SIGNIFICANT CHANGE UP (ref 96–108)
CO2 SERPL-SCNC: 23 MMOL/L — SIGNIFICANT CHANGE UP (ref 22–31)
CREAT ?TM UR-MCNC: 37 MG/DL — SIGNIFICANT CHANGE UP
CREAT SERPL-MCNC: 1.49 MG/DL — HIGH (ref 0.5–1.3)
GLUCOSE BLDC GLUCOMTR-MCNC: 121 MG/DL — HIGH (ref 70–99)
GLUCOSE BLDC GLUCOMTR-MCNC: 128 MG/DL — HIGH (ref 70–99)
GLUCOSE BLDC GLUCOMTR-MCNC: 152 MG/DL — HIGH (ref 70–99)
GLUCOSE BLDC GLUCOMTR-MCNC: 160 MG/DL — HIGH (ref 70–99)
GLUCOSE SERPL-MCNC: 144 MG/DL — HIGH (ref 70–99)
HCT VFR BLD CALC: 29.3 % — LOW (ref 39–50)
HGB BLD-MCNC: 8.9 G/DL — LOW (ref 13–17)
MAGNESIUM SERPL-MCNC: 2.3 MG/DL — SIGNIFICANT CHANGE UP (ref 1.6–2.6)
MCHC RBC-ENTMCNC: 22 PG — LOW (ref 27–34)
MCHC RBC-ENTMCNC: 30.4 G/DL — LOW (ref 32–36)
MCV RBC AUTO: 72.3 FL — LOW (ref 80–100)
OSMOLALITY UR: 179 MOSMOL/KG — SIGNIFICANT CHANGE UP (ref 100–650)
PLATELET # BLD AUTO: 212 K/UL — SIGNIFICANT CHANGE UP (ref 150–400)
POTASSIUM SERPL-MCNC: 4.4 MMOL/L — SIGNIFICANT CHANGE UP (ref 3.5–5.3)
POTASSIUM SERPL-SCNC: 4.4 MMOL/L — SIGNIFICANT CHANGE UP (ref 3.5–5.3)
RBC # BLD: 4.05 M/UL — LOW (ref 4.2–5.8)
RBC # FLD: 17.4 % — HIGH (ref 10.3–16.9)
SODIUM SERPL-SCNC: 137 MMOL/L — SIGNIFICANT CHANGE UP (ref 135–145)
SODIUM UR-SCNC: 20 MMOL/L — SIGNIFICANT CHANGE UP
UUN UR-MCNC: 292 MG/DL — SIGNIFICANT CHANGE UP
WBC # BLD: 6.4 K/UL — SIGNIFICANT CHANGE UP (ref 3.8–10.5)
WBC # FLD AUTO: 6.4 K/UL — SIGNIFICANT CHANGE UP (ref 3.8–10.5)

## 2018-01-14 PROCEDURE — 99233 SBSQ HOSP IP/OBS HIGH 50: CPT

## 2018-01-14 RX ORDER — ACETAMINOPHEN 500 MG
1000 TABLET ORAL ONCE
Qty: 0 | Refills: 0 | Status: COMPLETED | OUTPATIENT
Start: 2018-01-14 | End: 2018-01-14

## 2018-01-14 RX ORDER — LIDOCAINE 4 G/100G
1 CREAM TOPICAL ONCE
Qty: 0 | Refills: 0 | Status: COMPLETED | OUTPATIENT
Start: 2018-01-14 | End: 2018-01-14

## 2018-01-14 RX ORDER — FAMOTIDINE 10 MG/ML
20 INJECTION INTRAVENOUS ONCE
Qty: 0 | Refills: 0 | Status: COMPLETED | OUTPATIENT
Start: 2018-01-14 | End: 2018-01-14

## 2018-01-14 RX ORDER — FLUCONAZOLE 150 MG/1
200 TABLET ORAL DAILY
Qty: 0 | Refills: 0 | Status: COMPLETED | OUTPATIENT
Start: 2018-01-14 | End: 2018-01-19

## 2018-01-14 RX ORDER — ACETAMINOPHEN 500 MG
650 TABLET ORAL ONCE
Qty: 0 | Refills: 0 | Status: DISCONTINUED | OUTPATIENT
Start: 2018-01-14 | End: 2018-01-14

## 2018-01-14 RX ADMIN — Medication 250 MILLIGRAM(S): at 13:28

## 2018-01-14 RX ADMIN — HEPARIN SODIUM 5000 UNIT(S): 5000 INJECTION INTRAVENOUS; SUBCUTANEOUS at 21:57

## 2018-01-14 RX ADMIN — MEROPENEM 100 MILLIGRAM(S): 1 INJECTION INTRAVENOUS at 06:39

## 2018-01-14 RX ADMIN — Medication 1000 MILLIGRAM(S): at 23:51

## 2018-01-14 RX ADMIN — LEVETIRACETAM 500 MILLIGRAM(S): 250 TABLET, FILM COATED ORAL at 06:38

## 2018-01-14 RX ADMIN — Medication 5 MILLIGRAM(S): at 06:38

## 2018-01-14 RX ADMIN — MEROPENEM 100 MILLIGRAM(S): 1 INJECTION INTRAVENOUS at 13:28

## 2018-01-14 RX ADMIN — METFORMIN HYDROCHLORIDE 1000 MILLIGRAM(S): 850 TABLET ORAL at 07:58

## 2018-01-14 RX ADMIN — FLUCONAZOLE 200 MILLIGRAM(S): 150 TABLET ORAL at 19:54

## 2018-01-14 RX ADMIN — Medication 400 MILLIGRAM(S): at 23:36

## 2018-01-14 RX ADMIN — HEPARIN SODIUM 5000 UNIT(S): 5000 INJECTION INTRAVENOUS; SUBCUTANEOUS at 13:28

## 2018-01-14 RX ADMIN — Medication 2: at 07:59

## 2018-01-14 RX ADMIN — FAMOTIDINE 20 MILLIGRAM(S): 10 INJECTION INTRAVENOUS at 22:58

## 2018-01-14 RX ADMIN — Medication 2: at 17:18

## 2018-01-14 RX ADMIN — LIDOCAINE 1 PATCH: 4 CREAM TOPICAL at 22:30

## 2018-01-14 RX ADMIN — HALOPERIDOL DECANOATE 10 MILLIGRAM(S): 100 INJECTION INTRAMUSCULAR at 21:57

## 2018-01-14 RX ADMIN — LEVETIRACETAM 500 MILLIGRAM(S): 250 TABLET, FILM COATED ORAL at 17:19

## 2018-01-14 RX ADMIN — Medication 5 MILLIGRAM(S): at 17:19

## 2018-01-14 RX ADMIN — NYSTATIN CREAM 1 APPLICATION(S): 100000 CREAM TOPICAL at 06:45

## 2018-01-14 RX ADMIN — NYSTATIN CREAM 1 APPLICATION(S): 100000 CREAM TOPICAL at 17:21

## 2018-01-14 RX ADMIN — MEROPENEM 100 MILLIGRAM(S): 1 INJECTION INTRAVENOUS at 21:57

## 2018-01-14 RX ADMIN — ATORVASTATIN CALCIUM 20 MILLIGRAM(S): 80 TABLET, FILM COATED ORAL at 21:57

## 2018-01-14 RX ADMIN — METFORMIN HYDROCHLORIDE 1000 MILLIGRAM(S): 850 TABLET ORAL at 17:19

## 2018-01-14 RX ADMIN — HEPARIN SODIUM 5000 UNIT(S): 5000 INJECTION INTRAVENOUS; SUBCUTANEOUS at 06:37

## 2018-01-14 NOTE — PROGRESS NOTE ADULT - PROBLEM SELECTOR PLAN 3
Pt w/ no sensation or control of urination with suprapubic catheter changed once a month.   - suprapubic catheter changed 1/12  -c/w oxybutynin 5mg BID

## 2018-01-14 NOTE — PROGRESS NOTE ADULT - SUBJECTIVE AND OBJECTIVE BOX
INTERVAL HPI/OVERNIGHT EVENTS: No fever today    CONSTITUTIONAL:  Negative fever or chills, feels well, good appetite  EYES:  Negative  blurry vision or double vision  CARDIOVASCULAR:  Negative for chest pain or palpitations  RESPIRATORY:  Negative for cough, wheezing, or SOB   GASTROINTESTINAL:  Negative for nausea, vomiting, diarrhea, constipation, or abdominal pain  GENITOURINARY:  Negative frequency, urgency or dysuria  NEUROLOGIC:  No headache, confusion, dizziness, lightheadedness      ANTIBIOTICS/RELEVANT:    MEDICATIONS  (STANDING):  ascorbic acid 250 milliGRAM(s) Oral daily  atorvastatin 20 milliGRAM(s) Oral at bedtime  dextrose 5%. 1000 milliLiter(s) (50 mL/Hr) IV Continuous <Continuous>  dextrose 50% Injectable 12.5 Gram(s) IV Push once  dextrose 50% Injectable 25 Gram(s) IV Push once  dextrose 50% Injectable 25 Gram(s) IV Push once  fluconAZOLE   Tablet 200 milliGRAM(s) Oral daily  haloperidol     Tablet 10 milliGRAM(s) Oral at bedtime  heparin  Injectable 5000 Unit(s) SubCutaneous every 8 hours  insulin lispro (HumaLOG) corrective regimen sliding scale   SubCutaneous Before meals and at bedtime  levETIRAcetam 500 milliGRAM(s) Oral two times a day  meropenem IVPB      meropenem IVPB 1000 milliGRAM(s) IV Intermittent every 8 hours  metFORMIN 1000 milliGRAM(s) Oral two times a day with meals  nystatin Powder 1 Application(s) Topical two times a day  oxybutynin 5 milliGRAM(s) Oral two times a day    MEDICATIONS  (PRN):  acetaminophen   Tablet. 650 milliGRAM(s) Oral every 6 hours PRN Moderate Pain (4 - 6)  dextrose Gel 1 Dose(s) Oral once PRN Blood Glucose LESS THAN 70 milliGRAM(s)/deciliter  glucagon  Injectable 1 milliGRAM(s) IntraMuscular once PRN Glucose LESS THAN 70 milligrams/deciliter  haloperidol    Injectable 10 milliGRAM(s) IntraMuscular at bedtime PRN if refuses PO Haldol        Vital Signs Last 24 Hrs  T(C): 36.3 (14 Jan 2018 17:02), Max: 37.2 (14 Jan 2018 07:08)  T(F): 97.4 (14 Jan 2018 17:02), Max: 98.9 (14 Jan 2018 07:08)  HR: 87 (14 Jan 2018 17:02) (82 - 87)  BP: 96/62 (14 Jan 2018 17:02) (96/62 - 103/61)  BP(mean): --  RR: 17 (14 Jan 2018 17:02) (14 - 17)  SpO2: 96% (14 Jan 2018 17:02) (96% - 100%)    01-13-18 @ 07:01  -  01-14-18 @ 07:00  --------------------------------------------------------  IN: 0 mL / OUT: 1400 mL / NET: -1400 mL    01-14-18 @ 07:01  -  01-14-18 @ 17:48  --------------------------------------------------------  IN: 0 mL / OUT: 750 mL / NET: -750 mL      PHYSICAL EXAM:  Constitutional:Well-developed, well nourished  Eyes:ISIDRO, EOMI  Ear/Nose/Throat: no oral lesion, no sinus tenderness on percussion	  Neck:no JVD, no lymphadenopathy, supple  Respiratory: CTA jose  Cardiovascular: S1S2 RRR, no murmurs  Gastrointestinal:soft, (+) BS, no HSM  : suprapubic catheter  Extremities: Sacral decubitus with dressing  Vascular: DP Pulse: right normal; left normal      LABS:                        8.9    6.4   )-----------( 212      ( 14 Jan 2018 07:52 )             29.3     01-14    137  |  98  |  29<H>  ----------------------------<  144<H>  4.4   |  23  |  1.49<H>    Ca    8.9      14 Jan 2018 07:52  Mg     2.3     01-14        Urinalysis Basic - ( 13 Jan 2018 00:26 )    Color: Yellow / Appearance: Clear / SG: <=1.005 / pH: x  Gluc: x / Ketone: NEGATIVE  / Bili: Negative / Urobili: 0.2 E.U./dL   Blood: x / Protein: 30 mg/dL / Nitrite: NEGATIVE   Leuk Esterase: Large / RBC: > 10 /HPF / WBC Many /HPF   Sq Epi: x / Non Sq Epi: x / Bacteria: Present /HPF        MICROBIOLOGY:  Culture - Urine (01.13.18 @ 12:58)    Specimen Source: .Urine None    Culture Results:   20,000 CFU/ml Yeast    Culture - Blood (01.12.18 @ 23:14)    Specimen Source: .Blood Blood    Culture Results:   No growth at 1 day.        RADIOLOGY & ADDITIONAL STUDIES:

## 2018-01-14 NOTE — PROGRESS NOTE ADULT - SUBJECTIVE AND OBJECTIVE BOX
CC: septic shock 2/2 sacral OM  OVERNIGHT EVENTS/SUBJECTIVE/ROS: NAEO, pt with no complaints this AM    VITAL SIGNS:  Vital Signs Last 24 Hrs  T(C): 37.2 (14 Jan 2018 07:08), Max: 37.2 (14 Jan 2018 07:08)  T(F): 98.9 (14 Jan 2018 07:08), Max: 98.9 (14 Jan 2018 07:08)  HR: 82 (14 Jan 2018 07:08) (74 - 85)  BP: 103/61 (14 Jan 2018 07:08) (100/63 - 110/58)  BP(mean): --  RR: 16 (14 Jan 2018 07:08) (14 - 20)  SpO2: 97% (14 Jan 2018 07:08) (97% - 100%)    PHYSICAL EXAM:    General: lying in bed, in NAD  HEENT: EOMI, anicteric  Neck: supple  Cardiovascular: S1, S2, RRR   Respiratory: CTA BL no w/r/r  Gastrointestinal: soft, distended nontender abdomen, +BS; midline scar  : suprapubic catheter  Extremities: no  peripheral edema, WWP  Vascular: 2+ pulses bilateral radial, PT  Neurological: awake alert oriented    MEDICATIONS:  MEDICATIONS  (STANDING):  ascorbic acid 250 milliGRAM(s) Oral daily  atorvastatin 20 milliGRAM(s) Oral at bedtime  dextrose 5%. 1000 milliLiter(s) (50 mL/Hr) IV Continuous <Continuous>  dextrose 50% Injectable 12.5 Gram(s) IV Push once  dextrose 50% Injectable 25 Gram(s) IV Push once  dextrose 50% Injectable 25 Gram(s) IV Push once  haloperidol     Tablet 10 milliGRAM(s) Oral at bedtime  heparin  Injectable 5000 Unit(s) SubCutaneous every 8 hours  insulin lispro (HumaLOG) corrective regimen sliding scale   SubCutaneous Before meals and at bedtime  levETIRAcetam 500 milliGRAM(s) Oral two times a day  meropenem IVPB      meropenem IVPB 1000 milliGRAM(s) IV Intermittent every 8 hours  metFORMIN 1000 milliGRAM(s) Oral two times a day with meals  nystatin Powder 1 Application(s) Topical two times a day  oxybutynin 5 milliGRAM(s) Oral two times a day    MEDICATIONS  (PRN):  acetaminophen   Tablet. 650 milliGRAM(s) Oral every 6 hours PRN Moderate Pain (4 - 6)  dextrose Gel 1 Dose(s) Oral once PRN Blood Glucose LESS THAN 70 milliGRAM(s)/deciliter  glucagon  Injectable 1 milliGRAM(s) IntraMuscular once PRN Glucose LESS THAN 70 milligrams/deciliter  haloperidol    Injectable 10 milliGRAM(s) IntraMuscular at bedtime PRN if refuses PO Haldol      ALLERGIES:  Allergies    Capzasin Back and Body (Unknown)    Intolerances        LABS:                        8.9    6.4   )-----------( 212      ( 14 Jan 2018 07:52 )             29.3     01-14    137  |  98  |  29<H>  ----------------------------<  144<H>  4.4   |  23  |  1.49<H>    Ca    8.9      14 Jan 2018 07:52  Mg     2.3     01-14        Urinalysis Basic - ( 13 Jan 2018 00:26 )    Color: Yellow / Appearance: Clear / SG: <=1.005 / pH: x  Gluc: x / Ketone: NEGATIVE  / Bili: Negative / Urobili: 0.2 E.U./dL   Blood: x / Protein: 30 mg/dL / Nitrite: NEGATIVE   Leuk Esterase: Large / RBC: > 10 /HPF / WBC Many /HPF   Sq Epi: x / Non Sq Epi: x / Bacteria: Present /HPF      CAPILLARY BLOOD GLUCOSE      POCT Blood Glucose.: 160 mg/dL (14 Jan 2018 07:16)      RADIOLOGY & ADDITIONAL TESTS: Reviewed.

## 2018-01-14 NOTE — PROGRESS NOTE ADULT - ASSESSMENT
32M paraplegic PMH spinal cord injury (wheelchair bound), sacral pressure ulcer with osteo x2 (outpatient provider said they have records of March osteo s/p daptomycin of unknown length) earlier in 2017,  DM2, indwelling suprapubic catheter who presented w/ septic shock secondary to infected purulent sacral 4th degree pressure ulcer with underlying inadequately treated OM, hemodynamically stable being treated for sacral osteomyelitis awaiting placement at [facility]

## 2018-01-14 NOTE — PROGRESS NOTE ADULT - PROBLEM SELECTOR PLAN 2
Pt with LM on 1/12  - pt with urinary leakage around suprapubic catheter on 1/11, may be postobstructive  - f/u urine lytes and calculate FeNa  - c/t monitor Cr Pt with LM on 1/12  - f/u urine lytes and calculate FeNa  - c/t monitor Cr Pt with LM on 1/12  - FeNa 0.6%, prerenal likely 2/2 diarrhea and sepsis  - c/t monitor Cr

## 2018-01-14 NOTE — PROGRESS NOTE ADULT - PROBLEM SELECTOR PLAN 1
1) d/c Augmentin and Ciprofloxacin and continue Meropenem  until 1/19/2018  2) Sacral decubitus care per [facility].

## 2018-01-14 NOTE — PROGRESS NOTE ADULT - PROBLEM SELECTOR PLAN 2
Spiked fever last night. Positive UA.   * Suprapubic catheter was changed last night.   * Blood and urine cultures sent.  * Started on Meropenem --previously ESBL klebsi. in urine.   * ID consulted. Spiked fever last night. Positive UA.   * Suprapubic catheter was changed last night.   * Blood and urine cultures sent.  * Urine cx growing yeasts.   * Started on Meropenem --previously ESBL klebsi. in urine.   * ID consulted.

## 2018-01-14 NOTE — PROGRESS NOTE ADULT - ASSESSMENT
31yo M paraplegic, PMH of spinal cord injury (wheelchair bound), sacral pressure ulcer with osteo x2, DM2, indwelling suprapubic catheter who presented w/ septic shock secondary to infected purulent sacral 4th degree pressure ulcer with underlying inadequately treated OM.

## 2018-01-14 NOTE — PROGRESS NOTE ADULT - PROBLEM SELECTOR PLAN 2
3) Pending  Blood culture, urine culture [id number] yeast after catheter change  4) Continue Meropenem 1gr IV q8h.  5) Add Fluconazole 200mg q24h x 5 days

## 2018-01-14 NOTE — PROGRESS NOTE ADULT - SUBJECTIVE AND OBJECTIVE BOX
CC: No more fevers. No overnight events.  Denies cp, sob, urinary symptoms.   ROS otherwise negative.     Vital Signs Last 24 Hrs  T(C): 37.2 (14 Jan 2018 07:08), Max: 37.2 (14 Jan 2018 07:08)  T(F): 98.9 (14 Jan 2018 07:08), Max: 98.9 (14 Jan 2018 07:08)  HR: 82 (14 Jan 2018 07:08) (74 - 85)  BP: 103/61 (14 Jan 2018 07:08) (100/63 - 110/58)  BP(mean): --  RR: 16 (14 Jan 2018 07:08) (14 - 20)  SpO2: 97% (14 Jan 2018 07:08) (97% - 100%)    PHYSICAL EXAMINATION  * General: Not in acute distress. Awake and alert. Lying comfortably in bed.  * Head: Normocephalic, atraumatic.  * HEENT: ears no discharge, eyes PERRLA, nose no discharge, throat no exudates, normal tonsils.  * Neck: no JVD, supple.  * Lungs: Clear to auscultation, no rales, no wheezes.  * Cardio: Regular rate and rhythm, no murmurs, no rubs, no gallops. Good peripheral pulses.  * Abdomen: Soft, non-tender, non-distended, tympanic to percussion, no rebound, no guarding, no rigidity. Bowel sounds present. No suprapubic or CVA tenderness.  * : Suprapubic catheter  * Extremities: Acyanotic, no edema.  * Skin: Sacral ulcer and trochanteric.   * Neuro: Alert and oriented x 3. No focal deficits. Motor strength is 5/5 throughout. Sensation intact. Cranial nerves II-XII grossly intact.                MEDICATIONS  (STANDING):  ascorbic acid 250 milliGRAM(s) Oral daily  atorvastatin 20 milliGRAM(s) Oral at bedtime  dextrose 5%. 1000 milliLiter(s) (50 mL/Hr) IV Continuous <Continuous>  dextrose 50% Injectable 12.5 Gram(s) IV Push once  dextrose 50% Injectable 25 Gram(s) IV Push once  dextrose 50% Injectable 25 Gram(s) IV Push once  haloperidol     Tablet 10 milliGRAM(s) Oral at bedtime  heparin  Injectable 5000 Unit(s) SubCutaneous every 8 hours  insulin lispro (HumaLOG) corrective regimen sliding scale   SubCutaneous Before meals and at bedtime  levETIRAcetam 500 milliGRAM(s) Oral two times a day  meropenem IVPB      meropenem IVPB 1000 milliGRAM(s) IV Intermittent every 8 hours  metFORMIN 1000 milliGRAM(s) Oral two times a day with meals  nystatin Powder 1 Application(s) Topical two times a day  oxybutynin 5 milliGRAM(s) Oral two times a day    MEDICATIONS  (PRN):  acetaminophen   Tablet. 650 milliGRAM(s) Oral every 6 hours PRN Moderate Pain (4 - 6)  dextrose Gel 1 Dose(s) Oral once PRN Blood Glucose LESS THAN 70 milliGRAM(s)/deciliter  glucagon  Injectable 1 milliGRAM(s) IntraMuscular once PRN Glucose LESS THAN 70 milligrams/deciliter  haloperidol    Injectable 10 milliGRAM(s) IntraMuscular at bedtime PRN if refuses PO Haldol CC: No more fevers. No overnight events.  Denies cp, sob, urinary symptoms.   ROS otherwise negative.     Vital Signs Last 24 Hrs  T(C): 37.2 (14 Jan 2018 07:08), Max: 37.2 (14 Jan 2018 07:08)  T(F): 98.9 (14 Jan 2018 07:08), Max: 98.9 (14 Jan 2018 07:08)  HR: 82 (14 Jan 2018 07:08) (74 - 85)  BP: 103/61 (14 Jan 2018 07:08) (100/63 - 110/58)  BP(mean): --  RR: 16 (14 Jan 2018 07:08) (14 - 20)  SpO2: 97% (14 Jan 2018 07:08) (97% - 100%)    PHYSICAL EXAMINATION  * General: Not in acute distress. Awake and alert. Lying comfortably in bed.  * Head: Normocephalic, atraumatic.  * HEENT: ears no discharge, eyes PERRLA, nose no discharge, throat no exudates, normal tonsils.  * Neck: no JVD, supple.  * Lungs: Clear to auscultation, no rales, no wheezes.  * Cardio: Regular rate and rhythm, no murmurs, no rubs, no gallops. Good peripheral pulses.  * Abdomen: Soft, non-tender, non-distended, tympanic to percussion, no rebound, no guarding, no rigidity. Bowel sounds present. No suprapubic or CVA tenderness.  * : Suprapubic catheter  * Extremities: Acyanotic, no edema.  * Skin: Sacral ulcer and trochanteric ulcers. Clean.  * Neuro: Alert and oriented x 3. No focal deficits. Motor strength is 5/5 throughout. Sensation intact. Cranial nerves II-XII grossly intact.                           8.9    6.4   )-----------( 212      ( 14 Jan 2018 07:52 )             29.3   01-14    137  |  98  |  29<H>  ----------------------------<  144<H>  4.4   |  23  |  1.49<H>    Ca    8.9      14 Jan 2018 07:52  Mg     2.3     01-14    MEDICATIONS  (STANDING):  ascorbic acid 250 milliGRAM(s) Oral daily  atorvastatin 20 milliGRAM(s) Oral at bedtime  dextrose 5%. 1000 milliLiter(s) (50 mL/Hr) IV Continuous <Continuous>  dextrose 50% Injectable 12.5 Gram(s) IV Push once  dextrose 50% Injectable 25 Gram(s) IV Push once  dextrose 50% Injectable 25 Gram(s) IV Push once  haloperidol     Tablet 10 milliGRAM(s) Oral at bedtime  heparin  Injectable 5000 Unit(s) SubCutaneous every 8 hours  insulin lispro (HumaLOG) corrective regimen sliding scale   SubCutaneous Before meals and at bedtime  levETIRAcetam 500 milliGRAM(s) Oral two times a day  meropenem IVPB      meropenem IVPB 1000 milliGRAM(s) IV Intermittent every 8 hours  metFORMIN 1000 milliGRAM(s) Oral two times a day with meals  nystatin Powder 1 Application(s) Topical two times a day  oxybutynin 5 milliGRAM(s) Oral two times a day    MEDICATIONS  (PRN):  acetaminophen   Tablet. 650 milliGRAM(s) Oral every 6 hours PRN Moderate Pain (4 - 6)  dextrose Gel 1 Dose(s) Oral once PRN Blood Glucose LESS THAN 70 milliGRAM(s)/deciliter  glucagon  Injectable 1 milliGRAM(s) IntraMuscular once PRN Glucose LESS THAN 70 milligrams/deciliter  haloperidol    Injectable 10 milliGRAM(s) IntraMuscular at bedtime PRN if refuses PO Haldol

## 2018-01-14 NOTE — PROGRESS NOTE ADULT - PROBLEM SELECTOR PLAN 1
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 Upon arrival pts wound was packed w/ feculent material and required debridement by plastic surgery.   - wound cx had citrobacter, enterococcus, klebsiella  - general surgery does not believe there is a fistula between wound and rectum causing leakage of stool, no surgical intervention at this time.  - wound care following  - discontinued Augmentin and ciprofloxacin on 1/13  - on IV meropenem 1g q8h (1/13- ) for MDR UTI after spiking fever on 1/12  - stool cx neg, bcx (1/12) NGTD, ucx few yeast

## 2018-01-14 NOTE — PROGRESS NOTE ADULT - PROBLEM SELECTOR PLAN 3
Osteomyelitis and stage 4 infected sacral ulcer w/ purulence, s/p debridement.   * Change cipro/augmentin to Meropenem for now, in the setting of UTI. Osteomyelitis and stage 4 infected sacral ulcer w/ purulence, s/p debridement.   * C/w Meropenem for now, in the setting of UTI.  * ID following, recs appreciated.

## 2018-01-15 DIAGNOSIS — T83.510S INFECTION AND INFLAMMATORY REACTION DUE TO CYSTOSTOMY CATHETER, SEQUELA: ICD-10-CM

## 2018-01-15 LAB
ANION GAP SERPL CALC-SCNC: 15 MMOL/L — SIGNIFICANT CHANGE UP (ref 5–17)
BUN SERPL-MCNC: 25 MG/DL — HIGH (ref 7–23)
CALCIUM SERPL-MCNC: 8.8 MG/DL — SIGNIFICANT CHANGE UP (ref 8.4–10.5)
CHLORIDE SERPL-SCNC: 98 MMOL/L — SIGNIFICANT CHANGE UP (ref 96–108)
CO2 SERPL-SCNC: 23 MMOL/L — SIGNIFICANT CHANGE UP (ref 22–31)
CREAT SERPL-MCNC: 1.24 MG/DL — SIGNIFICANT CHANGE UP (ref 0.5–1.3)
CULTURE RESULTS: SIGNIFICANT CHANGE UP
CULTURE RESULTS: SIGNIFICANT CHANGE UP
GLUCOSE BLDC GLUCOMTR-MCNC: 111 MG/DL — HIGH (ref 70–99)
GLUCOSE BLDC GLUCOMTR-MCNC: 116 MG/DL — HIGH (ref 70–99)
GLUCOSE BLDC GLUCOMTR-MCNC: 126 MG/DL — HIGH (ref 70–99)
GLUCOSE BLDC GLUCOMTR-MCNC: 148 MG/DL — HIGH (ref 70–99)
GLUCOSE SERPL-MCNC: 121 MG/DL — HIGH (ref 70–99)
MAGNESIUM SERPL-MCNC: 2.2 MG/DL — SIGNIFICANT CHANGE UP (ref 1.6–2.6)
POTASSIUM SERPL-MCNC: 4.6 MMOL/L — SIGNIFICANT CHANGE UP (ref 3.5–5.3)
POTASSIUM SERPL-SCNC: 4.6 MMOL/L — SIGNIFICANT CHANGE UP (ref 3.5–5.3)
SODIUM SERPL-SCNC: 136 MMOL/L — SIGNIFICANT CHANGE UP (ref 135–145)
SPECIMEN SOURCE: SIGNIFICANT CHANGE UP
SPECIMEN SOURCE: SIGNIFICANT CHANGE UP

## 2018-01-15 PROCEDURE — 99233 SBSQ HOSP IP/OBS HIGH 50: CPT

## 2018-01-15 RX ORDER — ACETAMINOPHEN 500 MG
650 TABLET ORAL EVERY 6 HOURS
Qty: 0 | Refills: 0 | Status: DISCONTINUED | OUTPATIENT
Start: 2018-01-15 | End: 2018-02-16

## 2018-01-15 RX ORDER — ATROPA BELLADONNA AND OPIUM 16.2; 6 MG/1; MG/1
1 SUPPOSITORY RECTAL EVERY 6 HOURS
Qty: 0 | Refills: 0 | Status: DISCONTINUED | OUTPATIENT
Start: 2018-01-15 | End: 2018-01-22

## 2018-01-15 RX ORDER — SODIUM CHLORIDE 9 MG/ML
500 INJECTION INTRAMUSCULAR; INTRAVENOUS; SUBCUTANEOUS ONCE
Qty: 0 | Refills: 0 | Status: COMPLETED | OUTPATIENT
Start: 2018-01-15 | End: 2018-01-15

## 2018-01-15 RX ORDER — DIAZEPAM 5 MG
2 TABLET ORAL ONCE
Qty: 0 | Refills: 0 | Status: DISCONTINUED | OUTPATIENT
Start: 2018-01-15 | End: 2018-01-15

## 2018-01-15 RX ADMIN — MEROPENEM 100 MILLIGRAM(S): 1 INJECTION INTRAVENOUS at 13:26

## 2018-01-15 RX ADMIN — Medication 5 MILLIGRAM(S): at 17:50

## 2018-01-15 RX ADMIN — LIDOCAINE 1 PATCH: 4 CREAM TOPICAL at 11:47

## 2018-01-15 RX ADMIN — ATORVASTATIN CALCIUM 20 MILLIGRAM(S): 80 TABLET, FILM COATED ORAL at 21:23

## 2018-01-15 RX ADMIN — LEVETIRACETAM 500 MILLIGRAM(S): 250 TABLET, FILM COATED ORAL at 17:50

## 2018-01-15 RX ADMIN — NYSTATIN CREAM 1 APPLICATION(S): 100000 CREAM TOPICAL at 17:51

## 2018-01-15 RX ADMIN — MEROPENEM 100 MILLIGRAM(S): 1 INJECTION INTRAVENOUS at 21:23

## 2018-01-15 RX ADMIN — HEPARIN SODIUM 5000 UNIT(S): 5000 INJECTION INTRAVENOUS; SUBCUTANEOUS at 05:27

## 2018-01-15 RX ADMIN — Medication 650 MILLIGRAM(S): at 19:23

## 2018-01-15 RX ADMIN — HEPARIN SODIUM 5000 UNIT(S): 5000 INJECTION INTRAVENOUS; SUBCUTANEOUS at 21:23

## 2018-01-15 RX ADMIN — LEVETIRACETAM 500 MILLIGRAM(S): 250 TABLET, FILM COATED ORAL at 05:27

## 2018-01-15 RX ADMIN — METFORMIN HYDROCHLORIDE 1000 MILLIGRAM(S): 850 TABLET ORAL at 08:23

## 2018-01-15 RX ADMIN — SODIUM CHLORIDE 2000 MILLILITER(S): 9 INJECTION INTRAMUSCULAR; INTRAVENOUS; SUBCUTANEOUS at 21:23

## 2018-01-15 RX ADMIN — MEROPENEM 100 MILLIGRAM(S): 1 INJECTION INTRAVENOUS at 05:28

## 2018-01-15 RX ADMIN — HALOPERIDOL DECANOATE 10 MILLIGRAM(S): 100 INJECTION INTRAMUSCULAR at 21:23

## 2018-01-15 RX ADMIN — HEPARIN SODIUM 5000 UNIT(S): 5000 INJECTION INTRAVENOUS; SUBCUTANEOUS at 13:26

## 2018-01-15 RX ADMIN — NYSTATIN CREAM 1 APPLICATION(S): 100000 CREAM TOPICAL at 05:30

## 2018-01-15 RX ADMIN — METFORMIN HYDROCHLORIDE 1000 MILLIGRAM(S): 850 TABLET ORAL at 17:50

## 2018-01-15 RX ADMIN — Medication 5 MILLIGRAM(S): at 05:27

## 2018-01-15 RX ADMIN — FLUCONAZOLE 200 MILLIGRAM(S): 150 TABLET ORAL at 11:47

## 2018-01-15 RX ADMIN — Medication 250 MILLIGRAM(S): at 11:47

## 2018-01-15 RX ADMIN — Medication 2 MILLIGRAM(S): at 23:05

## 2018-01-15 NOTE — PROGRESS NOTE ADULT - SUBJECTIVE AND OBJECTIVE BOX
CC: No more fevers. No overnight events.  Denies cp, sob, urinary symptoms.   ROS otherwise negative.     Vital Signs Last 24 Hrs  T(C): 36.3 (15 New 2018 08:58), Max: 37.1 (15 New 2018 04:51)  T(F): 97.3 (15 New 2018 08:58), Max: 98.7 (15 New 2018 04:51)  HR: 65 (15 Enw 2018 08:58) (61 - 91)  BP: 102/67 (15 New 2018 08:58) (96/62 - 107/72)  BP(mean): --  RR: 17 (15 New 2018 08:58) (17 - 18)  SpO2: 100% (15 New 2018 08:58) (96% - 100%)    PHYSICAL EXAMINATION  * General: Not in acute distress. Awake and alert. Lying comfortably in bed.  * Head: Normocephalic, atraumatic.  * HEENT: ears no discharge, eyes PERRLA, nose no discharge, throat no exudates, normal tonsils.  * Neck: no JVD, supple.  * Lungs: Clear to auscultation, no rales, no wheezes.  * Cardio: Regular rate and rhythm, no murmurs, no rubs, no gallops. Good peripheral pulses.  * Abdomen: Soft, non-tender, non-distended, tympanic to percussion, no rebound, no guarding, no rigidity. Bowel sounds present. No suprapubic or CVA tenderness.  * : Suprapubic catheter  * Extremities: Acyanotic, no edema.  * Skin: Sacral ulcer and trochanteric ulcers. Clean.  * Neuro: Alert and oriented x 3. No focal deficits. Motor strength is 5/5 throughout. Sensation intact. Cranial nerves II-XII grossly intact.                MEDICATIONS  (STANDING):  ascorbic acid 250 milliGRAM(s) Oral daily  atorvastatin 20 milliGRAM(s) Oral at bedtime  dextrose 5%. 1000 milliLiter(s) (50 mL/Hr) IV Continuous <Continuous>  dextrose 50% Injectable 12.5 Gram(s) IV Push once  dextrose 50% Injectable 25 Gram(s) IV Push once  dextrose 50% Injectable 25 Gram(s) IV Push once  fluconAZOLE   Tablet 200 milliGRAM(s) Oral daily  haloperidol     Tablet 10 milliGRAM(s) Oral at bedtime  heparin  Injectable 5000 Unit(s) SubCutaneous every 8 hours  insulin lispro (HumaLOG) corrective regimen sliding scale   SubCutaneous Before meals and at bedtime  levETIRAcetam 500 milliGRAM(s) Oral two times a day  meropenem IVPB      meropenem IVPB 1000 milliGRAM(s) IV Intermittent every 8 hours  metFORMIN 1000 milliGRAM(s) Oral two times a day with meals  nystatin Powder 1 Application(s) Topical two times a day  oxybutynin 5 milliGRAM(s) Oral two times a day    MEDICATIONS  (PRN):  dextrose Gel 1 Dose(s) Oral once PRN Blood Glucose LESS THAN 70 milliGRAM(s)/deciliter  glucagon  Injectable 1 milliGRAM(s) IntraMuscular once PRN Glucose LESS THAN 70 milligrams/deciliter  haloperidol    Injectable 10 milliGRAM(s) IntraMuscular at bedtime PRN if refuses PO Haldol

## 2018-01-15 NOTE — PROGRESS NOTE ADULT - PROBLEM SELECTOR PLAN 2
Fever on 1/12. Positive UA. UCx growing yeasts  * Suprapubic catheter was changed on 1/12  * ID following.   * C/w meropenem q8h (end on 1/19)  * c/w Fluconazole (end on 1/19)

## 2018-01-15 NOTE — PROGRESS NOTE ADULT - PROBLEM SELECTOR PLAN 3
Osteomyelitis and stage 4 infected sacral ulcer w/ purulence, s/p debridement.   * C/w meropenem q8h (end on 1/19)  * c/w Fluconazole (end on 1/19)

## 2018-01-15 NOTE — PROGRESS NOTE ADULT - SUBJECTIVE AND OBJECTIVE BOX
INTERVAL HPI/OVERNIGHT EVENTS: Now afebrile    CONSTITUTIONAL:  Negative fever or chills,   EYES:  Negative  blurry vision or double vision  CARDIOVASCULAR:  Negative for chest pain or palpitations  RESPIRATORY:  Negative for cough, wheezing, or SOB   GASTROINTESTINAL:  Negative for nausea, vomiting, diarrhea  GENITOURINARY:  Suprapubic cath in place  NEUROLOGIC:  No headache, confusion, dizziness, lightheadedness      ANTIBIOTICS/RELEVANT:    MEDICATIONS  (STANDING):  ascorbic acid 250 milliGRAM(s) Oral daily  atorvastatin 20 milliGRAM(s) Oral at bedtime  dextrose 5%. 1000 milliLiter(s) (50 mL/Hr) IV Continuous <Continuous>  dextrose 50% Injectable 12.5 Gram(s) IV Push once  dextrose 50% Injectable 25 Gram(s) IV Push once  dextrose 50% Injectable 25 Gram(s) IV Push once  fluconAZOLE   Tablet 200 milliGRAM(s) Oral daily  haloperidol     Tablet 10 milliGRAM(s) Oral at bedtime  heparin  Injectable 5000 Unit(s) SubCutaneous every 8 hours  insulin lispro (HumaLOG) corrective regimen sliding scale   SubCutaneous Before meals and at bedtime  levETIRAcetam 500 milliGRAM(s) Oral two times a day  meropenem IVPB      meropenem IVPB 1000 milliGRAM(s) IV Intermittent every 8 hours  metFORMIN 1000 milliGRAM(s) Oral two times a day with meals  nystatin Powder 1 Application(s) Topical two times a day  oxybutynin 5 milliGRAM(s) Oral two times a day  sodium chloride 0.9% Bolus 500 milliLiter(s) IV Bolus once    MEDICATIONS  (PRN):  acetaminophen   Tablet 650 milliGRAM(s) Oral every 6 hours PRN For Temp greater than 38 C (100.4 F)  belladonna 16.2 mG/opium 60 mg Suppository 1 Suppository(s) Rectal every 6 hours PRN Bladder spasms; bowles leaking  dextrose Gel 1 Dose(s) Oral once PRN Blood Glucose LESS THAN 70 milliGRAM(s)/deciliter  glucagon  Injectable 1 milliGRAM(s) IntraMuscular once PRN Glucose LESS THAN 70 milligrams/deciliter  haloperidol    Injectable 10 milliGRAM(s) IntraMuscular at bedtime PRN if refuses PO Haldol        Vital Signs Last 24 Hrs  T(C): 38.4 (15 New 2018 18:46), Max: 38.4 (15 New 2018 18:46)  T(F): 101.2 (15 New 2018 18:46), Max: 101.2 (15 New 2018 18:46)  HR: 92 (15 New 2018 17:00) (61 - 92)  BP: 112/68 (15 New 2018 17:00) (100/60 - 112/68)  BP(mean): --  RR: 18 (15 New 2018 17:00) (17 - 18)  SpO2: 100% (15 New 2018 17:00) (98% - 100%)    01-14-18 @ 07:01  -  01-15-18 @ 07:00  --------------------------------------------------------  IN: 100 mL / OUT: 2650 mL / NET: -2550 mL    01-15-18 @ 07:01  -  01-15-18 @ 21:24  --------------------------------------------------------  IN: 0 mL / OUT: 1000 mL / NET: -1000 mL      PHYSICAL EXAM:  Constitutional: Paraplegic, w/c bound  Eyes:ISIDRO, EOMI  Ear/Nose/Throat: no oral lesion, no sinus tenderness on percussion	  Neck:no JVD, no lymphadenopathy, supple  Respiratory: CTA jose  Cardiovascular: S1S2 RRR, no murmurs  Gastrointestinal:soft, (+) BS,(+) suprapubic catheter  Extremities: sacral decubitus with dressing        LABS:                        8.9    6.4   )-----------( 212      ( 14 Jan 2018 07:52 )             29.3     01-15    136  |  98  |  25<H>  ----------------------------<  121<H>  4.6   |  23  |  1.24    Ca    8.8      15 New 2018 06:54  Mg     2.2     01-15    MICROBIOLOGY:  Culture - Urine (01.13.18 @ 12:58)    Specimen Source: .Urine None    Culture Results:   20,000 CFU/ml Yeast  Pending further identification at [facility]  Antibiotic susceptibility testing is performed at request only by calling  24/7 at [phone]    Culture - Blood (01.12.18 @ 23:14)    Specimen Source: .Blood Blood    Culture Results:   No growth at 2 days.        RADIOLOGY & ADDITIONAL STUDIES:

## 2018-01-16 LAB
ANION GAP SERPL CALC-SCNC: 13 MMOL/L — SIGNIFICANT CHANGE UP (ref 5–17)
BUN SERPL-MCNC: 20 MG/DL — SIGNIFICANT CHANGE UP (ref 7–23)
CALCIUM SERPL-MCNC: 9.1 MG/DL — SIGNIFICANT CHANGE UP (ref 8.4–10.5)
CHLORIDE SERPL-SCNC: 98 MMOL/L — SIGNIFICANT CHANGE UP (ref 96–108)
CO2 SERPL-SCNC: 24 MMOL/L — SIGNIFICANT CHANGE UP (ref 22–31)
CREAT SERPL-MCNC: 1.21 MG/DL — SIGNIFICANT CHANGE UP (ref 0.5–1.3)
CULTURE RESULTS: SIGNIFICANT CHANGE UP
GLUCOSE BLDC GLUCOMTR-MCNC: 101 MG/DL — HIGH (ref 70–99)
GLUCOSE BLDC GLUCOMTR-MCNC: 113 MG/DL — HIGH (ref 70–99)
GLUCOSE BLDC GLUCOMTR-MCNC: 142 MG/DL — HIGH (ref 70–99)
GLUCOSE BLDC GLUCOMTR-MCNC: 197 MG/DL — HIGH (ref 70–99)
GLUCOSE SERPL-MCNC: 103 MG/DL — HIGH (ref 70–99)
HCT VFR BLD CALC: 28.3 % — LOW (ref 39–50)
HGB BLD-MCNC: 8.7 G/DL — LOW (ref 13–17)
MAGNESIUM SERPL-MCNC: 2 MG/DL — SIGNIFICANT CHANGE UP (ref 1.6–2.6)
MCHC RBC-ENTMCNC: 21.6 PG — LOW (ref 27–34)
MCHC RBC-ENTMCNC: 30.7 G/DL — LOW (ref 32–36)
MCV RBC AUTO: 70.2 FL — LOW (ref 80–100)
PLATELET # BLD AUTO: 238 K/UL — SIGNIFICANT CHANGE UP (ref 150–400)
POTASSIUM SERPL-MCNC: 4.8 MMOL/L — SIGNIFICANT CHANGE UP (ref 3.5–5.3)
POTASSIUM SERPL-SCNC: 4.8 MMOL/L — SIGNIFICANT CHANGE UP (ref 3.5–5.3)
RBC # BLD: 4.03 M/UL — LOW (ref 4.2–5.8)
RBC # FLD: 17.5 % — HIGH (ref 10.3–16.9)
SODIUM SERPL-SCNC: 135 MMOL/L — SIGNIFICANT CHANGE UP (ref 135–145)
SPECIMEN SOURCE: SIGNIFICANT CHANGE UP
WBC # BLD: 5.7 K/UL — SIGNIFICANT CHANGE UP (ref 3.8–10.5)
WBC # FLD AUTO: 5.7 K/UL — SIGNIFICANT CHANGE UP (ref 3.8–10.5)

## 2018-01-16 PROCEDURE — 99232 SBSQ HOSP IP/OBS MODERATE 35: CPT

## 2018-01-16 PROCEDURE — 99239 HOSP IP/OBS DSCHRG MGMT >30: CPT

## 2018-01-16 RX ADMIN — Medication 250 MILLIGRAM(S): at 10:51

## 2018-01-16 RX ADMIN — HEPARIN SODIUM 5000 UNIT(S): 5000 INJECTION INTRAVENOUS; SUBCUTANEOUS at 06:18

## 2018-01-16 RX ADMIN — MEROPENEM 100 MILLIGRAM(S): 1 INJECTION INTRAVENOUS at 13:48

## 2018-01-16 RX ADMIN — MEROPENEM 100 MILLIGRAM(S): 1 INJECTION INTRAVENOUS at 21:00

## 2018-01-16 RX ADMIN — HEPARIN SODIUM 5000 UNIT(S): 5000 INJECTION INTRAVENOUS; SUBCUTANEOUS at 21:00

## 2018-01-16 RX ADMIN — ATORVASTATIN CALCIUM 20 MILLIGRAM(S): 80 TABLET, FILM COATED ORAL at 21:00

## 2018-01-16 RX ADMIN — MEROPENEM 100 MILLIGRAM(S): 1 INJECTION INTRAVENOUS at 06:18

## 2018-01-16 RX ADMIN — LEVETIRACETAM 500 MILLIGRAM(S): 250 TABLET, FILM COATED ORAL at 06:18

## 2018-01-16 RX ADMIN — HEPARIN SODIUM 5000 UNIT(S): 5000 INJECTION INTRAVENOUS; SUBCUTANEOUS at 13:48

## 2018-01-16 RX ADMIN — NYSTATIN CREAM 1 APPLICATION(S): 100000 CREAM TOPICAL at 17:18

## 2018-01-16 RX ADMIN — FLUCONAZOLE 200 MILLIGRAM(S): 150 TABLET ORAL at 10:51

## 2018-01-16 RX ADMIN — HALOPERIDOL DECANOATE 10 MILLIGRAM(S): 100 INJECTION INTRAMUSCULAR at 21:00

## 2018-01-16 RX ADMIN — METFORMIN HYDROCHLORIDE 1000 MILLIGRAM(S): 850 TABLET ORAL at 17:17

## 2018-01-16 RX ADMIN — NYSTATIN CREAM 1 APPLICATION(S): 100000 CREAM TOPICAL at 08:36

## 2018-01-16 RX ADMIN — METFORMIN HYDROCHLORIDE 1000 MILLIGRAM(S): 850 TABLET ORAL at 08:36

## 2018-01-16 RX ADMIN — LEVETIRACETAM 500 MILLIGRAM(S): 250 TABLET, FILM COATED ORAL at 17:17

## 2018-01-16 RX ADMIN — Medication 5 MILLIGRAM(S): at 17:17

## 2018-01-16 RX ADMIN — Medication 5 MILLIGRAM(S): at 06:18

## 2018-01-16 NOTE — PROGRESS NOTE BEHAVIORAL HEALTH - [id number]_PSY_A_CORE
Pt seen; chart reviewed and discussed with team.  Complains RE: his catheter, states he wants it changed/removed. Otherwise, denies complaints. Is observed to be less ornery, more interactive. No expressions of paranoia or hostility. Is cooperative with court-ordered po Haldol. Has not required IM Haldol.  RN notes that pt's overall mental status is improved.

## 2018-01-16 NOTE — PROGRESS NOTE ADULT - SUBJECTIVE AND OBJECTIVE BOX
OVERNIGHT EVENTS/SUBJECTIVE/ROS: Pt w/ rectal temp of 101.2 at 6:46pm yesterday, BCx drawn x2 today. Pt denies feeling febrile or with any complaints at this time. Pt also with suprapubic catheter leakage overnight, given Valium 2mg IVP with no improvement. Pt refused belladonna. Urology consulted with gauze around suprapubic catheter.     VITAL SIGNS:  Vital Signs Last 24 Hrs  T(C): 36.9 (16 Jan 2018 09:04), Max: 38.4 (15 New 2018 18:46)  T(F): 98.5 (16 Jan 2018 09:04), Max: 101.2 (15 New 2018 18:46)  HR: 100 (16 Jan 2018 09:04) (70 - 100)  BP: 102/60 (16 Jan 2018 09:04) (100/62 - 114/64)  BP(mean): --  RR: 17 (16 Jan 2018 09:04) (17 - 18)  SpO2: 99% (16 Jan 2018 09:04) (99% - 100%)    PHYSICAL EXAM:    General: lying in bed   HEENT: EOMI, anicteric  Neck: supple  Cardiovascular: S1, S2, RRR   Respiratory: CTA BL  Gastrointestinal: soft, distended, nontender, +BS  Extremities: no edema; WWP  Vascular: 2+ pulses radial; DP  Neurological: alert awake oriented    MEDICATIONS:  MEDICATIONS  (STANDING):  ascorbic acid 250 milliGRAM(s) Oral daily  atorvastatin 20 milliGRAM(s) Oral at bedtime  dextrose 5%. 1000 milliLiter(s) (50 mL/Hr) IV Continuous <Continuous>  dextrose 50% Injectable 12.5 Gram(s) IV Push once  dextrose 50% Injectable 25 Gram(s) IV Push once  dextrose 50% Injectable 25 Gram(s) IV Push once  fluconAZOLE   Tablet 200 milliGRAM(s) Oral daily  haloperidol     Tablet 10 milliGRAM(s) Oral at bedtime  heparin  Injectable 5000 Unit(s) SubCutaneous every 8 hours  insulin lispro (HumaLOG) corrective regimen sliding scale   SubCutaneous Before meals and at bedtime  levETIRAcetam 500 milliGRAM(s) Oral two times a day  meropenem IVPB      meropenem IVPB 1000 milliGRAM(s) IV Intermittent every 8 hours  metFORMIN 1000 milliGRAM(s) Oral two times a day with meals  nystatin Powder 1 Application(s) Topical two times a day  oxybutynin 5 milliGRAM(s) Oral two times a day    MEDICATIONS  (PRN):  acetaminophen   Tablet 650 milliGRAM(s) Oral every 6 hours PRN For Temp greater than 38 C (100.4 F)  belladonna 16.2 mG/opium 60 mg Suppository 1 Suppository(s) Rectal every 6 hours PRN Bladder spasms; bowles leaking  dextrose Gel 1 Dose(s) Oral once PRN Blood Glucose LESS THAN 70 milliGRAM(s)/deciliter  glucagon  Injectable 1 milliGRAM(s) IntraMuscular once PRN Glucose LESS THAN 70 milligrams/deciliter  haloperidol    Injectable 10 milliGRAM(s) IntraMuscular at bedtime PRN if refuses PO Haldol      ALLERGIES:  Allergies    Capzasin Back and Body (Unknown)    Intolerances        LABS:                        8.7    5.7   )-----------( 238      ( 16 Jan 2018 09:00 )             28.3     01-16    135  |  98  |  20  ----------------------------<  103<H>  4.8   |  24  |  1.21    Ca    9.1      16 Jan 2018 09:00  Mg     2.0     01-16          CAPILLARY BLOOD GLUCOSE      POCT Blood Glucose.: 197 mg/dL (16 Jan 2018 11:48)      RADIOLOGY & ADDITIONAL TESTS: Reviewed.

## 2018-01-16 NOTE — PROGRESS NOTE ADULT - ASSESSMENT
32M paraplegic PMH spinal cord injury (wheelchair bound), sacral pressure ulcer with osteo x2 (outpatient provider said they have records of March osteo s/p daptomycin of unknown length) earlier in 2017,  DM2, indwelling suprapubic catheter who presented w/ septic shock secondary to infected purulent sacral 4th degree pressure ulcer with underlying inadequately treated OM, hemodynamically stable being treated for sacral osteomyelitis awaiting placement at [facility]

## 2018-01-16 NOTE — PROGRESS NOTE ADULT - PROBLEM SELECTOR PLAN 3
Pt w/ no sensation or control of urination with suprapubic catheter changed once a month.   - suprapubic catheter changed 1/12  -c/w oxybutynin 5mg BID    #yeast in urine  - c/w fluconazole (1/14- )  - UCx+ for Candida albicans

## 2018-01-16 NOTE — PROGRESS NOTE BEHAVIORAL HEALTH - SUMMARY
32-year-old male with reported hx of schizoaffective disorder, rendered paraplegic s/p suicide attempt, admitted in septic shock, s/p discontinuation of ADL and wound care, in context of noncompliance with antipsychotic medication resulting in psychosis.  Pt was determined to LACK CAPACITY regarding medical decision making including need for psychotropic medication. Has approval for TOO (court mandated), is tolerating haldol 10 mg po qhs with no evidence of SE, increasingly better behavioral control. (To date, has been taking po Haldol, thus has not needed IM medication.)

## 2018-01-16 NOTE — CHART NOTE - NSCHARTNOTEFT_GEN_A_CORE
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  Paraplegia  Type 2 diabetes mellitus with hyperglycemia, without long-term current use of insulin      Current Nutrition Order:[facility] with snack/DASH/Glucerna shake  x2/Pro-statx2       PO Intake: Good (%) [x   ]  Fair (50-75%) [   ] Poor (<25%) [   ]    GI Issues: none    Pain:none    Skin Integrity:unstageable    Labs:   01-16    135  |  98  |  20  ----------------------------<  103<H>  4.8   |  24  |  1.21    Ca    9.1      16 Jan 2018 09:00  Mg     2.0     01-16      CAPILLARY BLOOD GLUCOSE      POCT Blood Glucose.: 197 mg/dL (16 Jan 2018 11:48)  POCT Blood Glucose.: 142 mg/dL (16 Jan 2018 08:08)  POCT Blood Glucose.: 148 mg/dL (15 New 2018 21:53)  POCT Blood Glucose.: 126 mg/dL (15 New 2018 17:11)      Medications:  MEDICATIONS  (STANDING):  ascorbic acid 250 milliGRAM(s) Oral daily  atorvastatin 20 milliGRAM(s) Oral at bedtime  dextrose 5%. 1000 milliLiter(s) (50 mL/Hr) IV Continuous <Continuous>  dextrose 50% Injectable 12.5 Gram(s) IV Push once  dextrose 50% Injectable 25 Gram(s) IV Push once  dextrose 50% Injectable 25 Gram(s) IV Push once  fluconAZOLE   Tablet 200 milliGRAM(s) Oral daily  haloperidol     Tablet 10 milliGRAM(s) Oral at bedtime  heparin  Injectable 5000 Unit(s) SubCutaneous every 8 hours  insulin lispro (HumaLOG) corrective regimen sliding scale   SubCutaneous Before meals and at bedtime  levETIRAcetam 500 milliGRAM(s) Oral two times a day  meropenem IVPB      meropenem IVPB 1000 milliGRAM(s) IV Intermittent every 8 hours  metFORMIN 1000 milliGRAM(s) Oral two times a day with meals  nystatin Powder 1 Application(s) Topical two times a day  oxybutynin 5 milliGRAM(s) Oral two times a day    MEDICATIONS  (PRN):  acetaminophen   Tablet 650 milliGRAM(s) Oral every 6 hours PRN For Temp greater than 38 C (100.4 F)  belladonna 16.2 mG/opium 60 mg Suppository 1 Suppository(s) Rectal every 6 hours PRN Bladder spasms; bowles leaking  dextrose Gel 1 Dose(s) Oral once PRN Blood Glucose LESS THAN 70 milliGRAM(s)/deciliter  glucagon  Injectable 1 milliGRAM(s) IntraMuscular once PRN Glucose LESS THAN 70 milligrams/deciliter  haloperidol    Injectable 10 milliGRAM(s) IntraMuscular at bedtime PRN if refuses PO Haldol      Weight:72.6kg  Daily     Daily     Weight Change: no updated weights    Estimated energy needs: Needs based on IBW:51kg f84-13smfs:1530-1785kcal and 1.4-1.6gmprotein:71-82gmprotein and 30-35ccfluids:1530-1785ccfluids    Subjective: 31 y/o male admitted with chills.D/C issue ongoing    Previous Nutrition Diagnosis:increased nutrient needs r/t increased demand for protein and nutrients AEB: PU unstageable    Active [ x  ]  Resolved [   ]    If resolved, new PES:     Goal:Meet 80% of needs consistently    Recommendations:Updated weights.Encourage high protein/kcal supplement intake  Education: Not appropriate    Risk Level: High [   ] Moderate [ x  ] Low [   ]

## 2018-01-16 NOTE — PROGRESS NOTE BEHAVIORAL HEALTH - RISK ASSESSMENT
Pt with no current suicidal ideation/intent/plan despite prior suicide attempt. Is currently at low to moderate risk for self harm and low risk for harm to others.

## 2018-01-16 NOTE — PROGRESS NOTE ADULT - ATTENDING COMMENTS
dx  uti (catheter associated) -(+) candida. c/w fluconazole.   infected decubitus ulcers/possible sacral osteomyelitis- wounds cont to improve , now changed to meropenem given febrile episodes. s/p wound debridement . he will need ID follow up.   left IT  decub and sacral decub- stage iv  - seen by plastics and gen surg. no acute surgical intervention recommended at this time. c/w local wound care. off load wound.   wounds are improved  psychosis -  treatment over objection based on court ruling. haldol qhs (po preferably , if not willing then will need IM haldol) . ekg to eval qtc serially  neurogenic bladder - c/w suprapubic bowles (changed jan 14th), oxybutinin  DM ii - can cont metformin  epilepsy- keppra   tachycardia - etiology unclear, but pt has stated he needed propranolol in the past for his "heartrate", if persistently p>100, can start metoprolol .

## 2018-01-17 LAB
ANION GAP SERPL CALC-SCNC: 13 MMOL/L — SIGNIFICANT CHANGE UP (ref 5–17)
BUN SERPL-MCNC: 15 MG/DL — SIGNIFICANT CHANGE UP (ref 7–23)
CALCIUM SERPL-MCNC: 9.1 MG/DL — SIGNIFICANT CHANGE UP (ref 8.4–10.5)
CHLORIDE SERPL-SCNC: 100 MMOL/L — SIGNIFICANT CHANGE UP (ref 96–108)
CO2 SERPL-SCNC: 24 MMOL/L — SIGNIFICANT CHANGE UP (ref 22–31)
CREAT SERPL-MCNC: 0.9 MG/DL — SIGNIFICANT CHANGE UP (ref 0.5–1.3)
GLUCOSE BLDC GLUCOMTR-MCNC: 107 MG/DL — HIGH (ref 70–99)
GLUCOSE BLDC GLUCOMTR-MCNC: 112 MG/DL — HIGH (ref 70–99)
GLUCOSE BLDC GLUCOMTR-MCNC: 122 MG/DL — HIGH (ref 70–99)
GLUCOSE BLDC GLUCOMTR-MCNC: 199 MG/DL — HIGH (ref 70–99)
GLUCOSE SERPL-MCNC: 174 MG/DL — HIGH (ref 70–99)
HCT VFR BLD CALC: 27.5 % — LOW (ref 39–50)
HGB BLD-MCNC: 8.3 G/DL — LOW (ref 13–17)
MAGNESIUM SERPL-MCNC: 1.5 MG/DL — LOW (ref 1.6–2.6)
MCHC RBC-ENTMCNC: 22 PG — LOW (ref 27–34)
MCHC RBC-ENTMCNC: 30.2 G/DL — LOW (ref 32–36)
MCV RBC AUTO: 72.9 FL — LOW (ref 80–100)
PLATELET # BLD AUTO: 216 K/UL — SIGNIFICANT CHANGE UP (ref 150–400)
POTASSIUM SERPL-MCNC: 5.3 MMOL/L — SIGNIFICANT CHANGE UP (ref 3.5–5.3)
POTASSIUM SERPL-SCNC: 5.3 MMOL/L — SIGNIFICANT CHANGE UP (ref 3.5–5.3)
RBC # BLD: 3.77 M/UL — LOW (ref 4.2–5.8)
RBC # FLD: 17.5 % — HIGH (ref 10.3–16.9)
SODIUM SERPL-SCNC: 137 MMOL/L — SIGNIFICANT CHANGE UP (ref 135–145)
WBC # BLD: 4.5 K/UL — SIGNIFICANT CHANGE UP (ref 3.8–10.5)
WBC # FLD AUTO: 4.5 K/UL — SIGNIFICANT CHANGE UP (ref 3.8–10.5)

## 2018-01-17 PROCEDURE — 99233 SBSQ HOSP IP/OBS HIGH 50: CPT

## 2018-01-17 RX ORDER — MAGNESIUM SULFATE 500 MG/ML
2 VIAL (ML) INJECTION ONCE
Qty: 0 | Refills: 0 | Status: COMPLETED | OUTPATIENT
Start: 2018-01-17 | End: 2018-01-17

## 2018-01-17 RX ORDER — MINERAL OIL
133 OIL (ML) MISCELLANEOUS DAILY
Qty: 0 | Refills: 0 | Status: DISCONTINUED | OUTPATIENT
Start: 2018-01-17 | End: 2018-02-04

## 2018-01-17 RX ORDER — SODIUM HYPOCHLORITE 0.125 %
1 SOLUTION, NON-ORAL MISCELLANEOUS DAILY
Qty: 0 | Refills: 0 | Status: DISCONTINUED | OUTPATIENT
Start: 2018-01-17 | End: 2018-01-24

## 2018-01-17 RX ADMIN — LEVETIRACETAM 500 MILLIGRAM(S): 250 TABLET, FILM COATED ORAL at 17:22

## 2018-01-17 RX ADMIN — METFORMIN HYDROCHLORIDE 1000 MILLIGRAM(S): 850 TABLET ORAL at 09:18

## 2018-01-17 RX ADMIN — HALOPERIDOL DECANOATE 10 MILLIGRAM(S): 100 INJECTION INTRAMUSCULAR at 21:55

## 2018-01-17 RX ADMIN — HEPARIN SODIUM 5000 UNIT(S): 5000 INJECTION INTRAVENOUS; SUBCUTANEOUS at 05:53

## 2018-01-17 RX ADMIN — Medication 1 APPLICATION(S): at 21:50

## 2018-01-17 RX ADMIN — Medication 5 MILLIGRAM(S): at 05:53

## 2018-01-17 RX ADMIN — LEVETIRACETAM 500 MILLIGRAM(S): 250 TABLET, FILM COATED ORAL at 05:53

## 2018-01-17 RX ADMIN — Medication 2: at 09:16

## 2018-01-17 RX ADMIN — ATORVASTATIN CALCIUM 20 MILLIGRAM(S): 80 TABLET, FILM COATED ORAL at 21:55

## 2018-01-17 RX ADMIN — HEPARIN SODIUM 5000 UNIT(S): 5000 INJECTION INTRAVENOUS; SUBCUTANEOUS at 21:53

## 2018-01-17 RX ADMIN — HEPARIN SODIUM 5000 UNIT(S): 5000 INJECTION INTRAVENOUS; SUBCUTANEOUS at 14:26

## 2018-01-17 RX ADMIN — Medication 5 MILLIGRAM(S): at 17:22

## 2018-01-17 RX ADMIN — MEROPENEM 100 MILLIGRAM(S): 1 INJECTION INTRAVENOUS at 21:53

## 2018-01-17 RX ADMIN — FLUCONAZOLE 200 MILLIGRAM(S): 150 TABLET ORAL at 11:47

## 2018-01-17 RX ADMIN — MEROPENEM 100 MILLIGRAM(S): 1 INJECTION INTRAVENOUS at 05:54

## 2018-01-17 RX ADMIN — METFORMIN HYDROCHLORIDE 1000 MILLIGRAM(S): 850 TABLET ORAL at 17:22

## 2018-01-17 RX ADMIN — Medication 250 MILLIGRAM(S): at 11:47

## 2018-01-17 RX ADMIN — MEROPENEM 100 MILLIGRAM(S): 1 INJECTION INTRAVENOUS at 14:26

## 2018-01-17 RX ADMIN — Medication 50 GRAM(S): at 12:27

## 2018-01-17 NOTE — PROGRESS NOTE ADULT - ATTENDING COMMENTS
dx  uti (catheter associated) -(+) candida. c/w fluconazole.   infected decubitus ulcers/possible sacral osteomyelitis- wounds cont to improve , now changed to meropenem given febrile episode. s/p wound debridement . he will need ID follow up.   left IT  decub and sacral decub- stage iv  - seen by plastics and gen surg. no acute surgical intervention recommended at this time. c/w local wound care. off load wound.   wounds are improved  psychosis -  treatment over objection based on court ruling. haldol qhs (po preferably , if not willing then will need IM haldol) . ekg to eval qtc serially  neurogenic bladder - c/w suprapubic bowles (changed jan 14th), oxybutinin  DM ii - can cont metformin  epilepsy- keppra

## 2018-01-17 NOTE — PROGRESS NOTE ADULT - SUBJECTIVE AND OBJECTIVE BOX
OVERNIGHT EVENTS/SUBJECTIVE/ROS: No complaints this morning    VITAL SIGNS:  Vital Signs Last 24 Hrs  T(C): 36.4 (17 Jan 2018 09:49), Max: 37.3 (16 Jan 2018 15:40)  T(F): 97.5 (17 Jan 2018 09:49), Max: 99.1 (16 Jan 2018 15:40)  HR: 94 (17 Jan 2018 09:49) (69 - 94)  BP: 99/64 (17 Jan 2018 09:49) (92/54 - 99/64)  BP(mean): --  RR: 16 (17 Jan 2018 09:49) (16 - 17)  SpO2: 98% (17 Jan 2018 09:49) (98% - 100%)    PHYSICAL EXAM:    General: lying in bed, in NAD  HEENT: EOMi anicteric  Neck: supple  Cardiovascular: S1, S2, RRR   Respiratory: CTABL no w/r/r  Gastrointestinal: soft distended, nontender abdomen +BS  Extremities: no peripheral edema, wwp  Vascular: 2+ radial, dp/pt pulses  Neurological: alert awake oriented    MEDICATIONS:  MEDICATIONS  (STANDING):  ascorbic acid 250 milliGRAM(s) Oral daily  atorvastatin 20 milliGRAM(s) Oral at bedtime  dextrose 5%. 1000 milliLiter(s) (50 mL/Hr) IV Continuous <Continuous>  dextrose 50% Injectable 12.5 Gram(s) IV Push once  dextrose 50% Injectable 25 Gram(s) IV Push once  dextrose 50% Injectable 25 Gram(s) IV Push once  fluconAZOLE   Tablet 200 milliGRAM(s) Oral daily  haloperidol     Tablet 10 milliGRAM(s) Oral at bedtime  heparin  Injectable 5000 Unit(s) SubCutaneous every 8 hours  insulin lispro (HumaLOG) corrective regimen sliding scale   SubCutaneous Before meals and at bedtime  levETIRAcetam 500 milliGRAM(s) Oral two times a day  meropenem IVPB      meropenem IVPB 1000 milliGRAM(s) IV Intermittent every 8 hours  metFORMIN 1000 milliGRAM(s) Oral two times a day with meals  nystatin Powder 1 Application(s) Topical two times a day  oxybutynin 5 milliGRAM(s) Oral two times a day    MEDICATIONS  (PRN):  acetaminophen   Tablet 650 milliGRAM(s) Oral every 6 hours PRN For Temp greater than 38 C (100.4 F)  belladonna 16.2 mG/opium 60 mg Suppository 1 Suppository(s) Rectal every 6 hours PRN Bladder spasms; bowles leaking  dextrose Gel 1 Dose(s) Oral once PRN Blood Glucose LESS THAN 70 milliGRAM(s)/deciliter  glucagon  Injectable 1 milliGRAM(s) IntraMuscular once PRN Glucose LESS THAN 70 milligrams/deciliter  haloperidol    Injectable 10 milliGRAM(s) IntraMuscular at bedtime PRN if refuses PO Haldol  mineral oil enema 133 milliLiter(s) Rectal daily PRN constipation      ALLERGIES:  Allergies    Capzasin Back and Body (Unknown)    Intolerances        LABS:                        8.3    4.5   )-----------( 216      ( 17 Jan 2018 08:07 )             27.5     01-17    137  |  100  |  15  ----------------------------<  174<H>  5.3   |  24  |  0.90    Ca    9.1      17 Jan 2018 08:07  Mg     1.5     01-17          CAPILLARY BLOOD GLUCOSE      POCT Blood Glucose.: 107 mg/dL (17 Jan 2018 11:42)      RADIOLOGY & ADDITIONAL TESTS: Reviewed.

## 2018-01-17 NOTE — PROGRESS NOTE ADULT - ASSESSMENT
32M paraplegic PMH spinal cord injury (wheelchair bound), sacral pressure ulcer with osteo x2 (outpatient provider said they have records of March osteo s/p daptomycin of unknown length) earlier in 2017,  DM2, indwelling suprapubic catheter who presented w/ septic shock secondary to infected purulent sacral 4th degree pressure ulcer with underlying inadequately treated OM, hemodynamically stable being treated for sacral osteomyelitis awaiting placement at [facility]

## 2018-01-18 LAB
CULTURE RESULTS: SIGNIFICANT CHANGE UP
CULTURE RESULTS: SIGNIFICANT CHANGE UP
GLUCOSE BLDC GLUCOMTR-MCNC: 110 MG/DL — HIGH (ref 70–99)
GLUCOSE BLDC GLUCOMTR-MCNC: 122 MG/DL — HIGH (ref 70–99)
GLUCOSE BLDC GLUCOMTR-MCNC: 127 MG/DL — HIGH (ref 70–99)
GLUCOSE BLDC GLUCOMTR-MCNC: 136 MG/DL — HIGH (ref 70–99)
SPECIMEN SOURCE: SIGNIFICANT CHANGE UP
SPECIMEN SOURCE: SIGNIFICANT CHANGE UP

## 2018-01-18 PROCEDURE — 99233 SBSQ HOSP IP/OBS HIGH 50: CPT

## 2018-01-18 PROCEDURE — 99232 SBSQ HOSP IP/OBS MODERATE 35: CPT

## 2018-01-18 RX ADMIN — Medication 250 MILLIGRAM(S): at 11:50

## 2018-01-18 RX ADMIN — HEPARIN SODIUM 5000 UNIT(S): 5000 INJECTION INTRAVENOUS; SUBCUTANEOUS at 14:22

## 2018-01-18 RX ADMIN — HALOPERIDOL DECANOATE 10 MILLIGRAM(S): 100 INJECTION INTRAMUSCULAR at 22:29

## 2018-01-18 RX ADMIN — ATORVASTATIN CALCIUM 20 MILLIGRAM(S): 80 TABLET, FILM COATED ORAL at 22:29

## 2018-01-18 RX ADMIN — Medication 5 MILLIGRAM(S): at 22:28

## 2018-01-18 RX ADMIN — FLUCONAZOLE 200 MILLIGRAM(S): 150 TABLET ORAL at 11:51

## 2018-01-18 RX ADMIN — METFORMIN HYDROCHLORIDE 1000 MILLIGRAM(S): 850 TABLET ORAL at 18:11

## 2018-01-18 RX ADMIN — MEROPENEM 100 MILLIGRAM(S): 1 INJECTION INTRAVENOUS at 14:20

## 2018-01-18 RX ADMIN — MEROPENEM 100 MILLIGRAM(S): 1 INJECTION INTRAVENOUS at 05:51

## 2018-01-18 RX ADMIN — NYSTATIN CREAM 1 APPLICATION(S): 100000 CREAM TOPICAL at 05:52

## 2018-01-18 RX ADMIN — Medication 5 MILLIGRAM(S): at 05:52

## 2018-01-18 RX ADMIN — HEPARIN SODIUM 5000 UNIT(S): 5000 INJECTION INTRAVENOUS; SUBCUTANEOUS at 22:29

## 2018-01-18 RX ADMIN — LEVETIRACETAM 500 MILLIGRAM(S): 250 TABLET, FILM COATED ORAL at 05:52

## 2018-01-18 RX ADMIN — METFORMIN HYDROCHLORIDE 1000 MILLIGRAM(S): 850 TABLET ORAL at 08:58

## 2018-01-18 RX ADMIN — MEROPENEM 100 MILLIGRAM(S): 1 INJECTION INTRAVENOUS at 22:29

## 2018-01-18 RX ADMIN — LEVETIRACETAM 500 MILLIGRAM(S): 250 TABLET, FILM COATED ORAL at 18:11

## 2018-01-18 RX ADMIN — HEPARIN SODIUM 5000 UNIT(S): 5000 INJECTION INTRAVENOUS; SUBCUTANEOUS at 05:52

## 2018-01-18 RX ADMIN — NYSTATIN CREAM 1 APPLICATION(S): 100000 CREAM TOPICAL at 18:12

## 2018-01-18 RX ADMIN — Medication 1 APPLICATION(S): at 11:52

## 2018-01-18 NOTE — PROGRESS NOTE ADULT - SUBJECTIVE AND OBJECTIVE BOX
OVERNIGHT EVENTS/SUBJECTIVE/ROS: RONNIE pt has no complaints this AM.    VITAL SIGNS:  Vital Signs Last 24 Hrs  T(C): 36.7 (18 Jan 2018 09:00), Max: 37.1 (17 Jan 2018 22:28)  T(F): 98 (18 Jan 2018 09:00), Max: 98.7 (17 Jan 2018 22:28)  HR: 82 (18 Jan 2018 09:00) (68 - 86)  BP: 101/65 (18 Jan 2018 09:00) (98/64 - 119/72)  BP(mean): --  RR: 16 (18 Jan 2018 09:00) (16 - 18)  SpO2: 98% (18 Jan 2018 09:00) (98% - 100%)    PHYSICAL EXAM:    General: lying in bed, in NAD  HEENT: eomi anicteric  Neck: supple  Cardiovascular: s1 s2 rrr   Respiratory: CTA BL, no w/r/r  Gastrointestinal: soft NTND abdomen, +BS  : suprapubic catheter in place  Extremities: no peripheral edema  Vascular: WWP  Neurological: alert awake oriented    MEDICATIONS:  MEDICATIONS  (STANDING):  ascorbic acid 250 milliGRAM(s) Oral daily  atorvastatin 20 milliGRAM(s) Oral at bedtime  Dakins Solution - 1/4 Strength 1 Application(s) Topical daily  dextrose 5%. 1000 milliLiter(s) (50 mL/Hr) IV Continuous <Continuous>  dextrose 50% Injectable 12.5 Gram(s) IV Push once  dextrose 50% Injectable 25 Gram(s) IV Push once  dextrose 50% Injectable 25 Gram(s) IV Push once  fluconAZOLE   Tablet 200 milliGRAM(s) Oral daily  haloperidol     Tablet 10 milliGRAM(s) Oral at bedtime  heparin  Injectable 5000 Unit(s) SubCutaneous every 8 hours  insulin lispro (HumaLOG) corrective regimen sliding scale   SubCutaneous Before meals and at bedtime  levETIRAcetam 500 milliGRAM(s) Oral two times a day  meropenem IVPB      meropenem IVPB 1000 milliGRAM(s) IV Intermittent every 8 hours  metFORMIN 1000 milliGRAM(s) Oral two times a day with meals  nystatin Powder 1 Application(s) Topical two times a day  oxybutynin 5 milliGRAM(s) Oral two times a day    MEDICATIONS  (PRN):  acetaminophen   Tablet 650 milliGRAM(s) Oral every 6 hours PRN For Temp greater than 38 C (100.4 F)  belladonna 16.2 mG/opium 60 mg Suppository 1 Suppository(s) Rectal every 6 hours PRN Bladder spasms; bowles leaking  dextrose Gel 1 Dose(s) Oral once PRN Blood Glucose LESS THAN 70 milliGRAM(s)/deciliter  glucagon  Injectable 1 milliGRAM(s) IntraMuscular once PRN Glucose LESS THAN 70 milligrams/deciliter  haloperidol    Injectable 10 milliGRAM(s) IntraMuscular at bedtime PRN if refuses PO Haldol  mineral oil enema 133 milliLiter(s) Rectal daily PRN constipation      ALLERGIES:  Allergies    Capzasin Back and Body (Unknown)    Intolerances        LABS:                        8.3    4.5   )-----------( 216      ( 17 Jan 2018 08:07 )             27.5     01-17    137  |  100  |  15  ----------------------------<  174<H>  5.3   |  24  |  0.90    Ca    9.1      17 Jan 2018 08:07  Mg     1.5     01-17          CAPILLARY BLOOD GLUCOSE      POCT Blood Glucose.: 136 mg/dL (18 Jan 2018 11:40)      RADIOLOGY & ADDITIONAL TESTS: Reviewed.

## 2018-01-18 NOTE — PROGRESS NOTE ADULT - PROBLEM SELECTOR PLAN 4
Patient on metformin and glimeperide at home. HgA1C of 6.0.   - patient continues to refuse insulin  -ISS+Lantus 17U+metformin 500mg BID  -monitor FSG Patient on metformin and glimeperide at home. HgA1C of 6.0.   - patient continues to refuse insulin  -ISS+ metformin 1000mg BID  -monitor FSG

## 2018-01-18 NOTE — PROGRESS NOTE ADULT - ATTENDING COMMENTS
dx  uti (catheter associated) -(+) candida. c/w fluconazole x total of 14 days  infected decubitus ulcers/possible sacral osteomyelitis- wounds cont to improve , now changed to meropenem given febrile episode. s/p wound debridement . he will need ID follow up.   septic shock- resolved  left IT  decub and sacral decub- stage iv  - seen by plastics and gen surg. no acute surgical intervention recommended at this time. c/w local wound care. off load wound.   wounds are improved  psychosis /paranoia -  treatment over objection based on court ruling. haldol qhs (po preferably , if not willing then will need IM haldol) . ekg to eval qtc serially  neurogenic bladder - c/w suprapubic bowles (changed jan 14th), oxybutinin  DM ii - can cont metformin  epilepsy- keppra .

## 2018-01-18 NOTE — PROGRESS NOTE BEHAVIORAL HEALTH - RISK ASSESSMENT
Pt with no current suicidal or homicidal ideation/intent/plan. Because of past hx of suicide attempt, has baseline, low to moderate risk of self harm.

## 2018-01-18 NOTE — PROGRESS NOTE BEHAVIORAL HEALTH - NSBHCONSULTMEDS_PSY_A_CORE FT
Continue Haldol 10 mg po qhs. If pt refuses po, administer 10 mg IM qhs.  Continue to encourage switch to long-acting injectable Haldol.

## 2018-01-18 NOTE — PROGRESS NOTE BEHAVIORAL HEALTH - NSBHHPIREASONCLOTHER_PSY_A_CORE FT
Management
Resisting care
discharge planning
management issues; noncompliance with medical treatment

## 2018-01-18 NOTE — PROGRESS NOTE BEHAVIORAL HEALTH - NSBHFUPINTERVALHXFT_PSY_A_CORE
Pt seen with Dr. Garcia and Ms. . Chart reviewed.  Per team, pt medically stable, expresses wish to go home.  Pt approached regarding possible long-acting injectable formulation of haldol which could be administered q monthl Pt seen with Dr. Garcia and Ms. Slaughter, Care Coordinator. Case also discussed with pt's insurance care planner. Chart reviewed.  Per team, pt medically stable, expresses wish to go home.  Pt approached regarding possible long-acting injectable formulation of haldol which could be administered q monthly. Pt declined STEWART formulation, stating he does not like to get injections and prefers to take po medication. Pt denied having any problems or SE from Haldol. Expressed intention to take po formulation.  Pt otherwise calm, cooperative, no evidence of hostility, paranoia or other psychosis.  Per team, remains compliant with medication and medical care.

## 2018-01-19 LAB
ANION GAP SERPL CALC-SCNC: 12 MMOL/L — SIGNIFICANT CHANGE UP (ref 5–17)
BUN SERPL-MCNC: 19 MG/DL — SIGNIFICANT CHANGE UP (ref 7–23)
CALCIUM SERPL-MCNC: 9.2 MG/DL — SIGNIFICANT CHANGE UP (ref 8.4–10.5)
CHLORIDE SERPL-SCNC: 101 MMOL/L — SIGNIFICANT CHANGE UP (ref 96–108)
CO2 SERPL-SCNC: 26 MMOL/L — SIGNIFICANT CHANGE UP (ref 22–31)
CREAT SERPL-MCNC: 0.78 MG/DL — SIGNIFICANT CHANGE UP (ref 0.5–1.3)
GLUCOSE BLDC GLUCOMTR-MCNC: 103 MG/DL — HIGH (ref 70–99)
GLUCOSE BLDC GLUCOMTR-MCNC: 137 MG/DL — HIGH (ref 70–99)
GLUCOSE BLDC GLUCOMTR-MCNC: 90 MG/DL — SIGNIFICANT CHANGE UP (ref 70–99)
GLUCOSE BLDC GLUCOMTR-MCNC: 94 MG/DL — SIGNIFICANT CHANGE UP (ref 70–99)
GLUCOSE SERPL-MCNC: 91 MG/DL — SIGNIFICANT CHANGE UP (ref 70–99)
HCT VFR BLD CALC: 28.3 % — LOW (ref 39–50)
HGB BLD-MCNC: 8.3 G/DL — LOW (ref 13–17)
MAGNESIUM SERPL-MCNC: 1.4 MG/DL — LOW (ref 1.6–2.6)
MCHC RBC-ENTMCNC: 21.8 PG — LOW (ref 27–34)
MCHC RBC-ENTMCNC: 29.3 G/DL — LOW (ref 32–36)
MCV RBC AUTO: 74.5 FL — LOW (ref 80–100)
PLATELET # BLD AUTO: 247 K/UL — SIGNIFICANT CHANGE UP (ref 150–400)
POTASSIUM SERPL-MCNC: 5 MMOL/L — SIGNIFICANT CHANGE UP (ref 3.5–5.3)
POTASSIUM SERPL-SCNC: 5 MMOL/L — SIGNIFICANT CHANGE UP (ref 3.5–5.3)
RBC # BLD: 3.8 M/UL — LOW (ref 4.2–5.8)
RBC # FLD: 17.8 % — HIGH (ref 10.3–16.9)
SODIUM SERPL-SCNC: 139 MMOL/L — SIGNIFICANT CHANGE UP (ref 135–145)
WBC # BLD: 6.3 K/UL — SIGNIFICANT CHANGE UP (ref 3.8–10.5)
WBC # FLD AUTO: 6.3 K/UL — SIGNIFICANT CHANGE UP (ref 3.8–10.5)

## 2018-01-19 PROCEDURE — 99233 SBSQ HOSP IP/OBS HIGH 50: CPT

## 2018-01-19 RX ORDER — FLUCONAZOLE 150 MG/1
200 TABLET ORAL DAILY
Qty: 0 | Refills: 0 | Status: DISCONTINUED | OUTPATIENT
Start: 2018-01-19 | End: 2018-01-25

## 2018-01-19 RX ORDER — MAGNESIUM SULFATE 500 MG/ML
2 VIAL (ML) INJECTION ONCE
Qty: 0 | Refills: 0 | Status: COMPLETED | OUTPATIENT
Start: 2018-01-19 | End: 2018-01-19

## 2018-01-19 RX ORDER — MEROPENEM 1 G/30ML
1000 INJECTION INTRAVENOUS EVERY 8 HOURS
Qty: 0 | Refills: 0 | Status: COMPLETED | OUTPATIENT
Start: 2018-01-19 | End: 2018-01-19

## 2018-01-19 RX ADMIN — ATORVASTATIN CALCIUM 20 MILLIGRAM(S): 80 TABLET, FILM COATED ORAL at 22:14

## 2018-01-19 RX ADMIN — MEROPENEM 100 MILLIGRAM(S): 1 INJECTION INTRAVENOUS at 14:25

## 2018-01-19 RX ADMIN — HALOPERIDOL DECANOATE 10 MILLIGRAM(S): 100 INJECTION INTRAMUSCULAR at 22:15

## 2018-01-19 RX ADMIN — METFORMIN HYDROCHLORIDE 1000 MILLIGRAM(S): 850 TABLET ORAL at 17:51

## 2018-01-19 RX ADMIN — HEPARIN SODIUM 5000 UNIT(S): 5000 INJECTION INTRAVENOUS; SUBCUTANEOUS at 22:14

## 2018-01-19 RX ADMIN — METFORMIN HYDROCHLORIDE 1000 MILLIGRAM(S): 850 TABLET ORAL at 09:05

## 2018-01-19 RX ADMIN — Medication 250 MILLIGRAM(S): at 12:30

## 2018-01-19 RX ADMIN — FLUCONAZOLE 200 MILLIGRAM(S): 150 TABLET ORAL at 12:30

## 2018-01-19 RX ADMIN — Medication 5 MILLIGRAM(S): at 08:28

## 2018-01-19 RX ADMIN — FLUCONAZOLE 200 MILLIGRAM(S): 150 TABLET ORAL at 22:14

## 2018-01-19 RX ADMIN — HEPARIN SODIUM 5000 UNIT(S): 5000 INJECTION INTRAVENOUS; SUBCUTANEOUS at 08:26

## 2018-01-19 RX ADMIN — NYSTATIN CREAM 1 APPLICATION(S): 100000 CREAM TOPICAL at 17:51

## 2018-01-19 RX ADMIN — MEROPENEM 100 MILLIGRAM(S): 1 INJECTION INTRAVENOUS at 08:28

## 2018-01-19 RX ADMIN — MEROPENEM 100 MILLIGRAM(S): 1 INJECTION INTRAVENOUS at 22:15

## 2018-01-19 RX ADMIN — NYSTATIN CREAM 1 APPLICATION(S): 100000 CREAM TOPICAL at 08:27

## 2018-01-19 RX ADMIN — Medication 5 MILLIGRAM(S): at 17:53

## 2018-01-19 RX ADMIN — HEPARIN SODIUM 5000 UNIT(S): 5000 INJECTION INTRAVENOUS; SUBCUTANEOUS at 14:27

## 2018-01-19 RX ADMIN — Medication 100 GRAM(S): at 12:31

## 2018-01-19 RX ADMIN — Medication 1 APPLICATION(S): at 14:27

## 2018-01-19 RX ADMIN — LEVETIRACETAM 500 MILLIGRAM(S): 250 TABLET, FILM COATED ORAL at 17:51

## 2018-01-19 RX ADMIN — LEVETIRACETAM 500 MILLIGRAM(S): 250 TABLET, FILM COATED ORAL at 08:27

## 2018-01-19 NOTE — PROGRESS NOTE ADULT - ASSESSMENT
32M paraplegic PMH spinal cord injury (wheelchair bound), sacral pressure ulcer with osteo x2 (outpatient provider said they have records of March osteo s/p daptomycin of unknown length) earlier in 2017,  DM2, indwelling suprapubic catheter who presented w/ septic shock secondary to infected purulent sacral 4th degree pressure ulcer with underlying inadequately treated OM, hemodynamically stable being treated for sacral osteomyelitis awaiting placement at [facility]

## 2018-01-19 NOTE — PROGRESS NOTE ADULT - SUBJECTIVE AND OBJECTIVE BOX
OVERNIGHT EVENTS/SUBJECTIVE/ROS: NAEO, pt with no complaints today    VITAL SIGNS:  Vital Signs Last 24 Hrs  T(C): 36.5 (19 Jan 2018 09:24), Max: 36.8 (18 Jan 2018 16:39)  T(F): 97.7 (19 Jan 2018 09:24), Max: 98.3 (18 Jan 2018 21:32)  HR: 90 (19 Jan 2018 09:24) (58 - 90)  BP: 100/- (19 Jan 2018 09:24) (99/60 - 121/74)  BP(mean): --  RR: 16 (19 Jan 2018 09:24) (16 - 19)  SpO2: 99% (19 Jan 2018 09:24) (97% - 100%)    PHYSICAL EXAM:    General: lying in bed in NAD  HEENT: EOMI anicteric  Neck: supple  Cardiovascular: S1, S2 RRR   Respiratory: CTA BL no w/r/r  Gastrointestinal: soft slightly distended nontender abdomen, +BS  Extremities: no peripheral edema  Vascular: 2+ pulses radial; dp/pt  Neurological: alert awake oriented    MEDICATIONS:  MEDICATIONS  (STANDING):  ascorbic acid 250 milliGRAM(s) Oral daily  atorvastatin 20 milliGRAM(s) Oral at bedtime  Dakins Solution - 1/4 Strength 1 Application(s) Topical daily  dextrose 5%. 1000 milliLiter(s) (50 mL/Hr) IV Continuous <Continuous>  dextrose 50% Injectable 12.5 Gram(s) IV Push once  dextrose 50% Injectable 25 Gram(s) IV Push once  dextrose 50% Injectable 25 Gram(s) IV Push once  haloperidol     Tablet 10 milliGRAM(s) Oral at bedtime  heparin  Injectable 5000 Unit(s) SubCutaneous every 8 hours  insulin lispro (HumaLOG) corrective regimen sliding scale   SubCutaneous Before meals and at bedtime  levETIRAcetam 500 milliGRAM(s) Oral two times a day  meropenem IVPB 1000 milliGRAM(s) IV Intermittent every 8 hours  metFORMIN 1000 milliGRAM(s) Oral two times a day with meals  nystatin Powder 1 Application(s) Topical two times a day  oxybutynin 5 milliGRAM(s) Oral two times a day    MEDICATIONS  (PRN):  acetaminophen   Tablet 650 milliGRAM(s) Oral every 6 hours PRN For Temp greater than 38 C (100.4 F)  belladonna 16.2 mG/opium 60 mg Suppository 1 Suppository(s) Rectal every 6 hours PRN Bladder spasms; bowles leaking  dextrose Gel 1 Dose(s) Oral once PRN Blood Glucose LESS THAN 70 milliGRAM(s)/deciliter  glucagon  Injectable 1 milliGRAM(s) IntraMuscular once PRN Glucose LESS THAN 70 milligrams/deciliter  haloperidol    Injectable 10 milliGRAM(s) IntraMuscular at bedtime PRN if refuses PO Haldol  mineral oil enema 133 milliLiter(s) Rectal daily PRN constipation      ALLERGIES:  Allergies    Capzasin Back and Body (Unknown)    Intolerances        LABS:                        8.3    6.3   )-----------( 247      ( 19 Jan 2018 06:37 )             28.3     01-19    139  |  101  |  19  ----------------------------<  91  5.0   |  26  |  0.78    Ca    9.2      19 Jan 2018 06:37  Mg     1.4     01-19          CAPILLARY BLOOD GLUCOSE      POCT Blood Glucose.: 137 mg/dL (19 Jan 2018 11:30)      RADIOLOGY & ADDITIONAL TESTS: Reviewed.

## 2018-01-19 NOTE — PROGRESS NOTE ADULT - ATTENDING COMMENTS
dx  uti (catheter associated) -(+) candida. c/w fluconazole x total of 14 days  infected decubitus ulcers/possible sacral osteomyelitis- wounds cont to improve , now changed to meropenem given febrile episode. s/p wound debridement . he will need ID follow up.   septic shock- resolved  left IT  decub and sacral decub- stage iv  - seen by plastics and gen surg. no acute surgical intervention recommended at this time. c/w local wound care. off load wound.   wounds are improved  psychosis /paranoia -  treatment over objection based on court ruling. haldol qhs (po preferably , if not willing then will need IM haldol) . ekg to eval qtc serially  neurogenic bladder - c/w suprapubic bowles (changed jan 14th), oxybutinin  DM ii - can cont metformin  epilepsy- keppra

## 2018-01-19 NOTE — PROGRESS NOTE ADULT - PROBLEM SELECTOR PLAN 2
Pt w/ no sensation or control of urination with suprapubic catheter changed once a month.   - suprapubic catheter changed 1/12 (to change again around 2/12)  -c/w oxybutynin 5mg BID    #yeast in urine  - c/w fluconazole (1/14- )  - UCx+ for Candida albicans

## 2018-01-20 DIAGNOSIS — G40.909 EPILEPSY, UNSPECIFIED, NOT INTRACTABLE, WITHOUT STATUS EPILEPTICUS: ICD-10-CM

## 2018-01-20 DIAGNOSIS — T83.510A INFECTION AND INFLAMMATORY REACTION DUE TO CYSTOSTOMY CATHETER, INITIAL ENCOUNTER: ICD-10-CM

## 2018-01-20 DIAGNOSIS — E11.9 TYPE 2 DIABETES MELLITUS WITHOUT COMPLICATIONS: ICD-10-CM

## 2018-01-20 LAB
GLUCOSE BLDC GLUCOMTR-MCNC: 101 MG/DL — HIGH (ref 70–99)
GLUCOSE BLDC GLUCOMTR-MCNC: 108 MG/DL — HIGH (ref 70–99)
GLUCOSE BLDC GLUCOMTR-MCNC: 124 MG/DL — HIGH (ref 70–99)
GLUCOSE BLDC GLUCOMTR-MCNC: 137 MG/DL — HIGH (ref 70–99)

## 2018-01-20 PROCEDURE — 99233 SBSQ HOSP IP/OBS HIGH 50: CPT

## 2018-01-20 RX ADMIN — METFORMIN HYDROCHLORIDE 1000 MILLIGRAM(S): 850 TABLET ORAL at 17:46

## 2018-01-20 RX ADMIN — HEPARIN SODIUM 5000 UNIT(S): 5000 INJECTION INTRAVENOUS; SUBCUTANEOUS at 17:47

## 2018-01-20 RX ADMIN — Medication 5 MILLIGRAM(S): at 17:46

## 2018-01-20 RX ADMIN — FLUCONAZOLE 200 MILLIGRAM(S): 150 TABLET ORAL at 12:33

## 2018-01-20 RX ADMIN — LEVETIRACETAM 500 MILLIGRAM(S): 250 TABLET, FILM COATED ORAL at 05:45

## 2018-01-20 RX ADMIN — NYSTATIN CREAM 1 APPLICATION(S): 100000 CREAM TOPICAL at 05:55

## 2018-01-20 RX ADMIN — ATORVASTATIN CALCIUM 20 MILLIGRAM(S): 80 TABLET, FILM COATED ORAL at 22:03

## 2018-01-20 RX ADMIN — Medication 5 MILLIGRAM(S): at 05:45

## 2018-01-20 RX ADMIN — METFORMIN HYDROCHLORIDE 1000 MILLIGRAM(S): 850 TABLET ORAL at 07:44

## 2018-01-20 RX ADMIN — LEVETIRACETAM 500 MILLIGRAM(S): 250 TABLET, FILM COATED ORAL at 17:46

## 2018-01-20 RX ADMIN — HALOPERIDOL DECANOATE 10 MILLIGRAM(S): 100 INJECTION INTRAMUSCULAR at 22:03

## 2018-01-20 RX ADMIN — HEPARIN SODIUM 5000 UNIT(S): 5000 INJECTION INTRAVENOUS; SUBCUTANEOUS at 22:03

## 2018-01-20 RX ADMIN — Medication 1 APPLICATION(S): at 19:55

## 2018-01-20 RX ADMIN — HEPARIN SODIUM 5000 UNIT(S): 5000 INJECTION INTRAVENOUS; SUBCUTANEOUS at 05:45

## 2018-01-20 RX ADMIN — NYSTATIN CREAM 1 APPLICATION(S): 100000 CREAM TOPICAL at 19:55

## 2018-01-20 RX ADMIN — Medication 250 MILLIGRAM(S): at 12:33

## 2018-01-20 NOTE — PROGRESS NOTE ADULT - PROBLEM SELECTOR PLAN 2
Pt w/ no sensation or control of urination with suprapubic catheter changed once a month.   - suprapubic catheter changed 1/12 (to change again around 2/12)  -c/w oxybutynin 5mg BID    #yeast in urine  - c/w fluconazole (1/14- )  - f/u ID regarding duration of fluconazole  - UCx+ for Candida albicans

## 2018-01-20 NOTE — PROGRESS NOTE ADULT - SUBJECTIVE AND OBJECTIVE BOX
Patient is a 32y old  Male who presents with a chief complaint of Chills (12 Dec 2017 18:17)      INTERVAL HPI/OVERNIGHT EVENTS:    Pt. seen and examined at 9:10AM  Pt. has no acute complaints  No F/C    Review of Systems: 12 point review of systems otherwise negative    MEDICATIONS  (STANDING):  ascorbic acid 250 milliGRAM(s) Oral daily  atorvastatin 20 milliGRAM(s) Oral at bedtime  Dakins Solution - 1/4 Strength 1 Application(s) Topical daily  dextrose 5%. 1000 milliLiter(s) (50 mL/Hr) IV Continuous <Continuous>  dextrose 50% Injectable 12.5 Gram(s) IV Push once  dextrose 50% Injectable 25 Gram(s) IV Push once  dextrose 50% Injectable 25 Gram(s) IV Push once  fluconAZOLE   Tablet 200 milliGRAM(s) Oral daily  haloperidol     Tablet 10 milliGRAM(s) Oral at bedtime  heparin  Injectable 5000 Unit(s) SubCutaneous every 8 hours  insulin lispro (HumaLOG) corrective regimen sliding scale   SubCutaneous Before meals and at bedtime  levETIRAcetam 500 milliGRAM(s) Oral two times a day  metFORMIN 1000 milliGRAM(s) Oral two times a day with meals  nystatin Powder 1 Application(s) Topical two times a day  oxybutynin 5 milliGRAM(s) Oral two times a day    MEDICATIONS  (PRN):  acetaminophen   Tablet 650 milliGRAM(s) Oral every 6 hours PRN For Temp greater than 38 C (100.4 F)  belladonna 16.2 mG/opium 60 mg Suppository 1 Suppository(s) Rectal every 6 hours PRN Bladder spasms; bowles leaking  dextrose Gel 1 Dose(s) Oral once PRN Blood Glucose LESS THAN 70 milliGRAM(s)/deciliter  glucagon  Injectable 1 milliGRAM(s) IntraMuscular once PRN Glucose LESS THAN 70 milligrams/deciliter  haloperidol    Injectable 10 milliGRAM(s) IntraMuscular at bedtime PRN if refuses PO Haldol  mineral oil enema 133 milliLiter(s) Rectal daily PRN constipation      Allergies    Capzasin Back and Body (Unknown)    Intolerances          Vital Signs Last 24 Hrs  T(C): 36.9 (20 Jan 2018 11:50), Max: 36.9 (19 Jan 2018 16:09)  T(F): 98.5 (20 Jan 2018 11:50), Max: 98.5 (20 Jan 2018 11:50)  HR: 78 (20 Jan 2018 11:50) (74 - 89)  BP: 101/62 (20 Jan 2018 11:50) (96/58 - 101/62)  BP(mean): --  RR: 18 (20 Jan 2018 11:50) (17 - 18)  SpO2: 99% (20 Jan 2018 11:50) (97% - 100%)  CAPILLARY BLOOD GLUCOSE      POCT Blood Glucose.: 124 mg/dL (20 Jan 2018 11:38)  POCT Blood Glucose.: 101 mg/dL (20 Jan 2018 07:32)  POCT Blood Glucose.: 90 mg/dL (19 Jan 2018 21:13)  POCT Blood Glucose.: 94 mg/dL (19 Jan 2018 17:23)      01-19 @ 07:01  -  01-20 @ 07:00  --------------------------------------------------------  IN: 50 mL / OUT: 3975 mL / NET: -3925 mL    01-20 @ 07:01 - 01-20 @ 15:15  --------------------------------------------------------  IN: 0 mL / OUT: 800 mL / NET: -800 mL        Physical Exam:  (at 9:10AM)  Daily     Daily   General:  chronically-ill but non-toxic appearing; calm and cooperative in NAD  HEENT:  MMM  Abdomen:  soft NT ND  Skin:  WWP  : +suprapubic catheter  Neuro:  paraplegic     LABS:                        8.3    6.3   )-----------( 247      ( 19 Jan 2018 06:37 )             28.3     01-19    139  |  101  |  19  ----------------------------<  91  5.0   |  26  |  0.78    Ca    9.2      19 Jan 2018 06:37  Mg     1.4     01-19              RADIOLOGY & ADDITIONAL TESTS:    ---------------------------------------------------------------------------  I personally reviewed: [  ]EKG   [  ]CXR    [  ] CT    [  ]Other  ---------------------------------------------------------------------------  PLEASE CHECK WHEN PRESENT:     [  ]Heart Failure     [  ] Acute     [  ] Acute on Chronic     [  ] Chronic  -------------------------------------------------------------------     [  ]Diastolic [HFpEF]     [  ]Systolic [HFrEF]     [  ]Combined [HFpEF & HFrEF]     [  ]Other:  -------------------------------------------------------------------  [  ]LM     [  ]ATN     [  ]Reneal Medullary Necrosis     [  ]Renal Cortical Necrosis     [  ]Other Pathological Lesions:    [  ]CKD 1  [  ]CKD 2  [  ]CKD 3  [  ]CKD 4  [  ]CKD 5  [  ]Other  -------------------------------------------------------------------  [  ]Other/Unspecified:    --------------------------------------------------------------------    Abdominal Nutritional Status  [  ]Malnutrition: See Nutrition Note  [  ]Cachexia  [  ]Other:   [  ]Supplement Ordered:  [  ]Morbid Obesity (BMI >=40]

## 2018-01-20 NOTE — PROGRESS NOTE ADULT - SUBJECTIVE AND OBJECTIVE BOX
OVERNIGHT EVENTS/SUBJECTIVE/ROS: NAEO, no complaints this morning    VITAL SIGNS:  Vital Signs Last 24 Hrs  T(C): 36.9 (20 Jan 2018 11:50), Max: 36.9 (19 Jan 2018 16:09)  T(F): 98.5 (20 Jan 2018 11:50), Max: 98.5 (20 Jan 2018 11:50)  HR: 78 (20 Jan 2018 11:50) (74 - 89)  BP: 101/62 (20 Jan 2018 11:50) (96/58 - 101/62)  BP(mean): --  RR: 18 (20 Jan 2018 11:50) (17 - 18)  SpO2: 99% (20 Jan 2018 11:50) (97% - 100%)    PHYSICAL EXAM:    General: lying in bed, in NAD  HEENT: EOMI anicteric  Neck: supple  Cardiovascular: s1 s2 rrr   Respiratory: CTA BL, no w/r/r  Gastrointestinal: soft NTND abdomen, +BS  : suprapubic catheter   Extremities: no peripheral edema  Vascular: WWP  Neurological: alert awake oriented    MEDICATIONS:  MEDICATIONS  (STANDING):  ascorbic acid 250 milliGRAM(s) Oral daily  atorvastatin 20 milliGRAM(s) Oral at bedtime  Dakins Solution - 1/4 Strength 1 Application(s) Topical daily  dextrose 5%. 1000 milliLiter(s) (50 mL/Hr) IV Continuous <Continuous>  dextrose 50% Injectable 12.5 Gram(s) IV Push once  dextrose 50% Injectable 25 Gram(s) IV Push once  dextrose 50% Injectable 25 Gram(s) IV Push once  fluconAZOLE   Tablet 200 milliGRAM(s) Oral daily  haloperidol     Tablet 10 milliGRAM(s) Oral at bedtime  heparin  Injectable 5000 Unit(s) SubCutaneous every 8 hours  insulin lispro (HumaLOG) corrective regimen sliding scale   SubCutaneous Before meals and at bedtime  levETIRAcetam 500 milliGRAM(s) Oral two times a day  metFORMIN 1000 milliGRAM(s) Oral two times a day with meals  nystatin Powder 1 Application(s) Topical two times a day  oxybutynin 5 milliGRAM(s) Oral two times a day    MEDICATIONS  (PRN):  acetaminophen   Tablet 650 milliGRAM(s) Oral every 6 hours PRN For Temp greater than 38 C (100.4 F)  belladonna 16.2 mG/opium 60 mg Suppository 1 Suppository(s) Rectal every 6 hours PRN Bladder spasms; bowles leaking  dextrose Gel 1 Dose(s) Oral once PRN Blood Glucose LESS THAN 70 milliGRAM(s)/deciliter  glucagon  Injectable 1 milliGRAM(s) IntraMuscular once PRN Glucose LESS THAN 70 milligrams/deciliter  haloperidol    Injectable 10 milliGRAM(s) IntraMuscular at bedtime PRN if refuses PO Haldol  mineral oil enema 133 milliLiter(s) Rectal daily PRN constipation      ALLERGIES:  Allergies    Capzasin Back and Body (Unknown)    Intolerances        LABS:                        8.3    6.3   )-----------( 247      ( 19 Jan 2018 06:37 )             28.3     01-19    139  |  101  |  19  ----------------------------<  91  5.0   |  26  |  0.78    Ca    9.2      19 Jan 2018 06:37  Mg     1.4     01-19          CAPILLARY BLOOD GLUCOSE      POCT Blood Glucose.: 124 mg/dL (20 Jan 2018 11:38)      RADIOLOGY & ADDITIONAL TESTS: Reviewed.

## 2018-01-20 NOTE — PROGRESS NOTE ADULT - ASSESSMENT
32M paraplegic PMH spinal cord injury (wheelchair bound), sacral pressure ulcer with osteo x2 (outpatient provider said they have records of March osteo s/p daptomycin of unknown length) earlier in 2017,  DM2, indwelling suprapubic catheter who presented w/ septic shock secondary to infected purulent sacral 4th degree pressure ulcer with underlying inadequately treated OM, hemodynamically stable being treated for sacral osteomyelitis awaiting placement at [facility]

## 2018-01-20 NOTE — PROGRESS NOTE ADULT - PROBLEM SELECTOR PLAN 3
Patient on metformin and glimeperide at home. HgA1C of 6.0.   -ISS+ metformin 1000mg BID  -monitor FSG

## 2018-01-20 NOTE — PROGRESS NOTE ADULT - PROBLEM SELECTOR PLAN 7
-c/w home lipitor medication    #Seizure Disorder:   pt w/ a reported hx of seizure disorder  -c/w home medication of keppra 500mg BID    #Psychiatry Eval: Pt evaluated by psych and recommended he needs to be admitted to medical-psychiatric inpatient unit. Patient does not have capacity to make medical decisions. Court date this afternoon at bedside.   -discontinued aripriprazole in favor of haloperidol. Haloperidol 10 mg PO or IM. Patient does not have capacity to refuse and is court ordered to receive the haldol. -c/w home lipitor medication    #Seizure Disorder:   pt w/ a reported hx of seizure disorder  -c/w home medication of keppra 500mg BID    #Psychiatry Eval: Pt evaluated by psych and recommended he needs to be admitted to medical-psychiatric inpatient unit. Patient does not have capacity to make medical decisions.   -discontinued aripriprazole in favor of haloperidol. Haloperidol 10 mg PO or IM. Patient does not have capacity to refuse and is court ordered to receive the haldol.

## 2018-01-20 NOTE — PROGRESS NOTE ADULT - PROBLEM SELECTOR PLAN 1
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 Upon arrival pts wound was packed w/ feculent material and required debridement by plastic surgery.   - wound cx had citrobacter, enterococcus, klebsiella  - general surgery does not believe there is a fistula between wound and rectum causing leakage of stool, no surgical intervention at this time.  - wound care following  - discontinued Augmentin and ciprofloxacin on 1/13  - s/p meropenem 1g q8h (1/13-1/19) for MDR UTI after spiking fever on 1/12  - stool cx neg, bcx (1/12) NGTD, ucx few yeast

## 2018-01-21 DIAGNOSIS — K59.00 CONSTIPATION, UNSPECIFIED: ICD-10-CM

## 2018-01-21 LAB
ANION GAP SERPL CALC-SCNC: 12 MMOL/L — SIGNIFICANT CHANGE UP (ref 5–17)
BUN SERPL-MCNC: 21 MG/DL — SIGNIFICANT CHANGE UP (ref 7–23)
CALCIUM SERPL-MCNC: 9.6 MG/DL — SIGNIFICANT CHANGE UP (ref 8.4–10.5)
CHLORIDE SERPL-SCNC: 98 MMOL/L — SIGNIFICANT CHANGE UP (ref 96–108)
CO2 SERPL-SCNC: 26 MMOL/L — SIGNIFICANT CHANGE UP (ref 22–31)
CREAT SERPL-MCNC: 0.71 MG/DL — SIGNIFICANT CHANGE UP (ref 0.5–1.3)
CULTURE RESULTS: SIGNIFICANT CHANGE UP
CULTURE RESULTS: SIGNIFICANT CHANGE UP
GLUCOSE BLDC GLUCOMTR-MCNC: 118 MG/DL — HIGH (ref 70–99)
GLUCOSE BLDC GLUCOMTR-MCNC: 143 MG/DL — HIGH (ref 70–99)
GLUCOSE BLDC GLUCOMTR-MCNC: 154 MG/DL — HIGH (ref 70–99)
GLUCOSE BLDC GLUCOMTR-MCNC: 189 MG/DL — HIGH (ref 70–99)
GLUCOSE SERPL-MCNC: 107 MG/DL — HIGH (ref 70–99)
HCT VFR BLD CALC: 30.2 % — LOW (ref 39–50)
HGB BLD-MCNC: 9 G/DL — LOW (ref 13–17)
MAGNESIUM SERPL-MCNC: 1.5 MG/DL — LOW (ref 1.6–2.6)
MCHC RBC-ENTMCNC: 21.8 PG — LOW (ref 27–34)
MCHC RBC-ENTMCNC: 29.8 G/DL — LOW (ref 32–36)
MCV RBC AUTO: 73.3 FL — LOW (ref 80–100)
PLATELET # BLD AUTO: 267 K/UL — SIGNIFICANT CHANGE UP (ref 150–400)
POTASSIUM SERPL-MCNC: 4.8 MMOL/L — SIGNIFICANT CHANGE UP (ref 3.5–5.3)
POTASSIUM SERPL-SCNC: 4.8 MMOL/L — SIGNIFICANT CHANGE UP (ref 3.5–5.3)
RBC # BLD: 4.12 M/UL — LOW (ref 4.2–5.8)
RBC # FLD: 18 % — HIGH (ref 10.3–16.9)
SODIUM SERPL-SCNC: 136 MMOL/L — SIGNIFICANT CHANGE UP (ref 135–145)
SPECIMEN SOURCE: SIGNIFICANT CHANGE UP
SPECIMEN SOURCE: SIGNIFICANT CHANGE UP
WBC # BLD: 8.2 K/UL — SIGNIFICANT CHANGE UP (ref 3.8–10.5)
WBC # FLD AUTO: 8.2 K/UL — SIGNIFICANT CHANGE UP (ref 3.8–10.5)

## 2018-01-21 PROCEDURE — 99233 SBSQ HOSP IP/OBS HIGH 50: CPT

## 2018-01-21 PROCEDURE — 93010 ELECTROCARDIOGRAM REPORT: CPT

## 2018-01-21 RX ORDER — MAGNESIUM SULFATE 500 MG/ML
2 VIAL (ML) INJECTION ONCE
Qty: 0 | Refills: 0 | Status: COMPLETED | OUTPATIENT
Start: 2018-01-21 | End: 2018-01-21

## 2018-01-21 RX ADMIN — METFORMIN HYDROCHLORIDE 1000 MILLIGRAM(S): 850 TABLET ORAL at 08:42

## 2018-01-21 RX ADMIN — Medication 5 MILLIGRAM(S): at 05:34

## 2018-01-21 RX ADMIN — LEVETIRACETAM 500 MILLIGRAM(S): 250 TABLET, FILM COATED ORAL at 18:29

## 2018-01-21 RX ADMIN — Medication 2: at 08:31

## 2018-01-21 RX ADMIN — NYSTATIN CREAM 1 APPLICATION(S): 100000 CREAM TOPICAL at 05:34

## 2018-01-21 RX ADMIN — FLUCONAZOLE 200 MILLIGRAM(S): 150 TABLET ORAL at 12:43

## 2018-01-21 RX ADMIN — Medication 5 MILLIGRAM(S): at 18:29

## 2018-01-21 RX ADMIN — Medication 1 APPLICATION(S): at 18:20

## 2018-01-21 RX ADMIN — Medication 50 GRAM(S): at 14:00

## 2018-01-21 RX ADMIN — HEPARIN SODIUM 5000 UNIT(S): 5000 INJECTION INTRAVENOUS; SUBCUTANEOUS at 05:34

## 2018-01-21 RX ADMIN — Medication 250 MILLIGRAM(S): at 12:43

## 2018-01-21 RX ADMIN — Medication 650 MILLIGRAM(S): at 21:46

## 2018-01-21 RX ADMIN — HEPARIN SODIUM 5000 UNIT(S): 5000 INJECTION INTRAVENOUS; SUBCUTANEOUS at 21:28

## 2018-01-21 RX ADMIN — LEVETIRACETAM 500 MILLIGRAM(S): 250 TABLET, FILM COATED ORAL at 05:34

## 2018-01-21 RX ADMIN — HEPARIN SODIUM 5000 UNIT(S): 5000 INJECTION INTRAVENOUS; SUBCUTANEOUS at 18:28

## 2018-01-21 RX ADMIN — ATORVASTATIN CALCIUM 20 MILLIGRAM(S): 80 TABLET, FILM COATED ORAL at 21:27

## 2018-01-21 RX ADMIN — Medication 2: at 18:29

## 2018-01-21 RX ADMIN — HALOPERIDOL DECANOATE 10 MILLIGRAM(S): 100 INJECTION INTRAMUSCULAR at 21:28

## 2018-01-21 RX ADMIN — METFORMIN HYDROCHLORIDE 1000 MILLIGRAM(S): 850 TABLET ORAL at 18:29

## 2018-01-21 NOTE — PROGRESS NOTE ADULT - SUBJECTIVE AND OBJECTIVE BOX
Patient is a 32y old  Male who presents with a chief complaint of Chills (12 Dec 2017 18:17)      INTERVAL HPI/OVERNIGHT EVENTS:    Pt. seen and examined at 10AM  Pt. c/o chronic constipation; requesting an enema  Otherwise, no complaints  No F/C    Review of Systems: 12 point review of systems otherwise negative    MEDICATIONS  (STANDING):  ascorbic acid 250 milliGRAM(s) Oral daily  atorvastatin 20 milliGRAM(s) Oral at bedtime  Dakins Solution - 1/4 Strength 1 Application(s) Topical daily  dextrose 5%. 1000 milliLiter(s) (50 mL/Hr) IV Continuous <Continuous>  dextrose 50% Injectable 12.5 Gram(s) IV Push once  dextrose 50% Injectable 25 Gram(s) IV Push once  dextrose 50% Injectable 25 Gram(s) IV Push once  fluconAZOLE   Tablet 200 milliGRAM(s) Oral daily  haloperidol     Tablet 10 milliGRAM(s) Oral at bedtime  heparin  Injectable 5000 Unit(s) SubCutaneous every 8 hours  insulin lispro (HumaLOG) corrective regimen sliding scale   SubCutaneous Before meals and at bedtime  levETIRAcetam 500 milliGRAM(s) Oral two times a day  metFORMIN 1000 milliGRAM(s) Oral two times a day with meals  nystatin Powder 1 Application(s) Topical two times a day  oxybutynin 5 milliGRAM(s) Oral two times a day    MEDICATIONS  (PRN):  acetaminophen   Tablet 650 milliGRAM(s) Oral every 6 hours PRN For Temp greater than 38 C (100.4 F)  belladonna 16.2 mG/opium 60 mg Suppository 1 Suppository(s) Rectal every 6 hours PRN Bladder spasms; bowles leaking  dextrose Gel 1 Dose(s) Oral once PRN Blood Glucose LESS THAN 70 milliGRAM(s)/deciliter  glucagon  Injectable 1 milliGRAM(s) IntraMuscular once PRN Glucose LESS THAN 70 milligrams/deciliter  haloperidol    Injectable 10 milliGRAM(s) IntraMuscular at bedtime PRN if refuses PO Haldol  mineral oil enema 133 milliLiter(s) Rectal daily PRN constipation      Allergies    Capzasin Back and Body (Unknown)    Intolerances          Vital Signs Last 24 Hrs  T(C): 37.2 (21 Jan 2018 05:10), Max: 37.2 (21 Jan 2018 05:10)  T(F): 98.9 (21 Jan 2018 05:10), Max: 98.9 (21 Jan 2018 05:10)  HR: 96 (21 Jan 2018 05:10) (74 - 96)  BP: 107/64 (21 Jan 2018 05:10) (102/65 - 113/67)  BP(mean): --  RR: 17 (21 Jan 2018 05:10) (16 - 17)  SpO2: 97% (21 Jan 2018 05:10) (97% - 100%)  CAPILLARY BLOOD GLUCOSE      POCT Blood Glucose.: 143 mg/dL (21 Jan 2018 12:10)  POCT Blood Glucose.: 189 mg/dL (21 Jan 2018 07:32)  POCT Blood Glucose.: 108 mg/dL (20 Jan 2018 21:12)  POCT Blood Glucose.: 137 mg/dL (20 Jan 2018 17:34)      01-20 @ 07:01 - 01-21 @ 07:00  --------------------------------------------------------  IN: 0 mL / OUT: 1000 mL / NET: -1000 mL    01-21 @ 07:01 - 01-21 @ 15:22  --------------------------------------------------------  IN: 0 mL / OUT: 1600 mL / NET: -1600 mL        Physical Exam:  (at 10AM)  Daily     Daily   General:  calm and cooperative in NAD  HEENT:  MMM  Abdomen:  soft NT ND  Skin:  WWP  : +suprapubic catheter  Neuro:  paraplegic    LABS:                        9.0    8.2   )-----------( 267      ( 21 Jan 2018 06:11 )             30.2     01-21    136  |  98  |  21  ----------------------------<  107<H>  4.8   |  26  |  0.71    Ca    9.6      21 Jan 2018 06:11  Mg     1.5     01-21              RADIOLOGY & ADDITIONAL TESTS:    ---------------------------------------------------------------------------  I personally reviewed: [  ]EKG   [  ]CXR    [  ] CT    [  ]Other  ---------------------------------------------------------------------------  PLEASE CHECK WHEN PRESENT:     [  ]Heart Failure     [  ] Acute     [  ] Acute on Chronic     [  ] Chronic  -------------------------------------------------------------------     [  ]Diastolic [HFpEF]     [  ]Systolic [HFrEF]     [  ]Combined [HFpEF & HFrEF]     [  ]Other:  -------------------------------------------------------------------  [  ]LM     [  ]ATN     [  ]Reneal Medullary Necrosis     [  ]Renal Cortical Necrosis     [  ]Other Pathological Lesions:    [  ]CKD 1  [  ]CKD 2  [  ]CKD 3  [  ]CKD 4  [  ]CKD 5  [  ]Other  -------------------------------------------------------------------  [  ]Other/Unspecified:    --------------------------------------------------------------------    Abdominal Nutritional Status  [  ]Malnutrition: See Nutrition Note  [  ]Cachexia  [  ]Other:   [  ]Supplement Ordered:  [  ]Morbid Obesity (BMI >=40]

## 2018-01-22 LAB
GLUCOSE BLDC GLUCOMTR-MCNC: 104 MG/DL — HIGH (ref 70–99)
GLUCOSE BLDC GLUCOMTR-MCNC: 112 MG/DL — HIGH (ref 70–99)
GLUCOSE BLDC GLUCOMTR-MCNC: 171 MG/DL — HIGH (ref 70–99)
GLUCOSE BLDC GLUCOMTR-MCNC: 238 MG/DL — HIGH (ref 70–99)
HCT VFR BLD CALC: 26.4 % — LOW (ref 39–50)
HGB BLD-MCNC: 8.1 G/DL — LOW (ref 13–17)
LACTATE SERPL-SCNC: 1.4 MMOL/L — SIGNIFICANT CHANGE UP (ref 0.5–2)
MCHC RBC-ENTMCNC: 22.2 PG — LOW (ref 27–34)
MCHC RBC-ENTMCNC: 30.7 G/DL — LOW (ref 32–36)
MCV RBC AUTO: 72.3 FL — LOW (ref 80–100)
PLATELET # BLD AUTO: 264 K/UL — SIGNIFICANT CHANGE UP (ref 150–400)
RBC # BLD: 3.65 M/UL — LOW (ref 4.2–5.8)
RBC # FLD: 18.4 % — HIGH (ref 10.3–16.9)
WBC # BLD: 12.8 K/UL — HIGH (ref 3.8–10.5)
WBC # FLD AUTO: 12.8 K/UL — HIGH (ref 3.8–10.5)

## 2018-01-22 PROCEDURE — 71045 X-RAY EXAM CHEST 1 VIEW: CPT | Mod: 26

## 2018-01-22 PROCEDURE — 99233 SBSQ HOSP IP/OBS HIGH 50: CPT

## 2018-01-22 RX ORDER — MEROPENEM 1 G/30ML
1000 INJECTION INTRAVENOUS ONCE
Qty: 0 | Refills: 0 | Status: COMPLETED | OUTPATIENT
Start: 2018-01-22 | End: 2018-01-22

## 2018-01-22 RX ORDER — ATROPA BELLADONNA AND OPIUM 16.2; 6 MG/1; MG/1
1 SUPPOSITORY RECTAL EVERY 6 HOURS
Qty: 0 | Refills: 0 | Status: DISCONTINUED | OUTPATIENT
Start: 2018-01-22 | End: 2018-01-22

## 2018-01-22 RX ORDER — VANCOMYCIN HCL 1 G
1000 VIAL (EA) INTRAVENOUS EVERY 8 HOURS
Qty: 0 | Refills: 0 | Status: DISCONTINUED | OUTPATIENT
Start: 2018-01-22 | End: 2018-01-23

## 2018-01-22 RX ORDER — VANCOMYCIN HCL 1 G
VIAL (EA) INTRAVENOUS
Qty: 0 | Refills: 0 | Status: DISCONTINUED | OUTPATIENT
Start: 2018-01-22 | End: 2018-01-22

## 2018-01-22 RX ORDER — MEROPENEM 1 G/30ML
INJECTION INTRAVENOUS
Qty: 0 | Refills: 0 | Status: DISCONTINUED | OUTPATIENT
Start: 2018-01-22 | End: 2018-01-24

## 2018-01-22 RX ORDER — LANOLIN ALCOHOL/MO/W.PET/CERES
3 CREAM (GRAM) TOPICAL ONCE
Qty: 0 | Refills: 0 | Status: COMPLETED | OUTPATIENT
Start: 2018-01-22 | End: 2018-01-22

## 2018-01-22 RX ORDER — MEROPENEM 1 G/30ML
1000 INJECTION INTRAVENOUS EVERY 8 HOURS
Qty: 0 | Refills: 0 | Status: DISCONTINUED | OUTPATIENT
Start: 2018-01-22 | End: 2018-01-24

## 2018-01-22 RX ADMIN — Medication 2: at 11:46

## 2018-01-22 RX ADMIN — METFORMIN HYDROCHLORIDE 1000 MILLIGRAM(S): 850 TABLET ORAL at 18:24

## 2018-01-22 RX ADMIN — Medication 5 MILLIGRAM(S): at 05:46

## 2018-01-22 RX ADMIN — LEVETIRACETAM 500 MILLIGRAM(S): 250 TABLET, FILM COATED ORAL at 18:23

## 2018-01-22 RX ADMIN — Medication 650 MILLIGRAM(S): at 22:20

## 2018-01-22 RX ADMIN — Medication 250 MILLIGRAM(S): at 23:50

## 2018-01-22 RX ADMIN — HALOPERIDOL DECANOATE 10 MILLIGRAM(S): 100 INJECTION INTRAMUSCULAR at 22:17

## 2018-01-22 RX ADMIN — Medication 1 APPLICATION(S): at 11:23

## 2018-01-22 RX ADMIN — NYSTATIN CREAM 1 APPLICATION(S): 100000 CREAM TOPICAL at 05:48

## 2018-01-22 RX ADMIN — MEROPENEM 100 MILLIGRAM(S): 1 INJECTION INTRAVENOUS at 14:27

## 2018-01-22 RX ADMIN — NYSTATIN CREAM 1 APPLICATION(S): 100000 CREAM TOPICAL at 18:24

## 2018-01-22 RX ADMIN — METFORMIN HYDROCHLORIDE 1000 MILLIGRAM(S): 850 TABLET ORAL at 08:57

## 2018-01-22 RX ADMIN — Medication 4: at 18:24

## 2018-01-22 RX ADMIN — FLUCONAZOLE 200 MILLIGRAM(S): 150 TABLET ORAL at 11:22

## 2018-01-22 RX ADMIN — Medication 5 MILLIGRAM(S): at 18:24

## 2018-01-22 RX ADMIN — HEPARIN SODIUM 5000 UNIT(S): 5000 INJECTION INTRAVENOUS; SUBCUTANEOUS at 22:16

## 2018-01-22 RX ADMIN — HEPARIN SODIUM 5000 UNIT(S): 5000 INJECTION INTRAVENOUS; SUBCUTANEOUS at 14:27

## 2018-01-22 RX ADMIN — Medication 250 MILLIGRAM(S): at 11:22

## 2018-01-22 RX ADMIN — LEVETIRACETAM 500 MILLIGRAM(S): 250 TABLET, FILM COATED ORAL at 05:46

## 2018-01-22 RX ADMIN — ATORVASTATIN CALCIUM 20 MILLIGRAM(S): 80 TABLET, FILM COATED ORAL at 22:17

## 2018-01-22 RX ADMIN — HEPARIN SODIUM 5000 UNIT(S): 5000 INJECTION INTRAVENOUS; SUBCUTANEOUS at 05:46

## 2018-01-22 RX ADMIN — MEROPENEM 100 MILLIGRAM(S): 1 INJECTION INTRAVENOUS at 23:10

## 2018-01-22 NOTE — PROGRESS NOTE ADULT - PROBLEM SELECTOR PLAN 1
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 Upon arrival pts wound was packed w/ feculent material and required debridement by plastic surgery.   - wound cx had citrobacter, enterococcus, klebsiella  - general surgery does not believe there is a fistula between wound and rectum causing leakage of stool, no surgical intervention at this time.  - wound care following  - discontinued Augmentin and ciprofloxacin on 1/13  - s/p meropenem 1g q8h (1/13-1/19) for MDR UTI after spiking fever on 1/12  - stool cx neg, bcx (1/12) NGTD, ucx few yeast  - repeat blood cultures x 2 (1/22), f/u results  - f/u ID about possibly adding on vancomycin or restarting meropenem

## 2018-01-22 NOTE — PROGRESS NOTE BEHAVIORAL HEALTH - NSBHFUPINTERVALHXFT_PSY_A_CORE
Pt observed receiving ADL care, then sitting in his wheelchair.    Per chart, and on observation, pt is in good behavioral control, no evidence of paranoia, suicidal/homicidal ideation, hostility. Per team, pt's aunt, who visited over the weekend, reports that pt is back to his psychiatric baseline.    Pt expressed interest in returning for outpt psychotherapy/psychopharmacology at [facility] on [address]. Pt alleges that he was told he was no longer appropriate for care at the Center due to his physical limitations.     Pt is compliant with po Haldol; also compliant with other rx medications, medical and ADL care.

## 2018-01-22 NOTE — PROGRESS NOTE BEHAVIORAL HEALTH - SUMMARY
32-year-old male with reported hx of schizoaffective disorder, rendered paraplegic s/p suicide attempt, admitted in septic shock, s/p discontinuation of ADL and wound care, in context of noncompliance with antipsychotic medication resulting in psychosis.  Pt was determined to LACK CAPACITY regarding medical decision making including need for psychotropic medication. Has approval for TOO (court mandated), is tolerating haldol 10 mg po qhs with no evidence of SE, increasingly better behavioral control. (To date, has been taking po Haldol, thus has not needed IM medication.).     Pt approaching readiness for discharge, requires dispo planning regarding home care as well as psychiatric follow up.    Will complete ACT application with .

## 2018-01-22 NOTE — PROGRESS NOTE ADULT - SUBJECTIVE AND OBJECTIVE BOX
OVERNIGHT EVENTS/SUBJECTIVE/ROS: Pt spiked rectal temp to 101.4 overnight and given Tylenol; pt has no complaints at this time, denies chills, chest pain, abdominal pain, back pain, n/v    VITAL SIGNS:  Vital Signs Last 24 Hrs  T(C): 37 (22 Jan 2018 05:54), Max: 38.6 (21 Jan 2018 21:44)  T(F): 98.6 (22 Jan 2018 05:54), Max: 101.4 (21 Jan 2018 21:44)  HR: 88 (22 Jan 2018 05:54) (88 - 92)  BP: 96/64 (22 Jan 2018 05:54) (96/64 - 97/60)  BP(mean): --  RR: 16 (22 Jan 2018 05:54) (16 - 16)  SpO2: 98% (22 Jan 2018 05:54) (98% - 100%)    PHYSICAL EXAM:    General: lying in bed comfortable and in NAD  HEENT: EOMI, anicteric  Neck: supple  Cardiovascular: S1. S2, RRR   Respiratory: CTA BL, no w/r/r  Gastrointestinal: soft, distended nontender, +BS  Extremities: no peripheral edema, in inflated boots bilaterally, L foot partially wrapped   Vascular: 2+ pulses radial; dp/pt, WWP extremities  Neurological: alert awake oriented    MEDICATIONS:  MEDICATIONS  (STANDING):  ascorbic acid 250 milliGRAM(s) Oral daily  atorvastatin 20 milliGRAM(s) Oral at bedtime  Dakins Solution - 1/4 Strength 1 Application(s) Topical daily  dextrose 5%. 1000 milliLiter(s) (50 mL/Hr) IV Continuous <Continuous>  dextrose 50% Injectable 12.5 Gram(s) IV Push once  dextrose 50% Injectable 25 Gram(s) IV Push once  dextrose 50% Injectable 25 Gram(s) IV Push once  fluconAZOLE   Tablet 200 milliGRAM(s) Oral daily  haloperidol     Tablet 10 milliGRAM(s) Oral at bedtime  heparin  Injectable 5000 Unit(s) SubCutaneous every 8 hours  insulin lispro (HumaLOG) corrective regimen sliding scale   SubCutaneous Before meals and at bedtime  levETIRAcetam 500 milliGRAM(s) Oral two times a day  metFORMIN 1000 milliGRAM(s) Oral two times a day with meals  nystatin Powder 1 Application(s) Topical two times a day  oxybutynin 5 milliGRAM(s) Oral two times a day    MEDICATIONS  (PRN):  acetaminophen   Tablet 650 milliGRAM(s) Oral every 6 hours PRN For Temp greater than 38 C (100.4 F)  belladonna 16.2 mG/opium 60 mg Suppository 1 Suppository(s) Rectal every 6 hours PRN Bladder spasms; bowles leaking  dextrose Gel 1 Dose(s) Oral once PRN Blood Glucose LESS THAN 70 milliGRAM(s)/deciliter  glucagon  Injectable 1 milliGRAM(s) IntraMuscular once PRN Glucose LESS THAN 70 milligrams/deciliter  haloperidol    Injectable 10 milliGRAM(s) IntraMuscular at bedtime PRN if refuses PO Haldol  mineral oil enema 133 milliLiter(s) Rectal daily PRN constipation      ALLERGIES:  Allergies    Capzasin Back and Body (Unknown)    Intolerances        LABS:                        8.1    12.8  )-----------( 264      ( 22 Jan 2018 04:59 )             26.4     01-21    136  |  98  |  21  ----------------------------<  107<H>  4.8   |  26  |  0.71    Ca    9.6      21 Jan 2018 06:11  Mg     1.5     01-21          CAPILLARY BLOOD GLUCOSE      POCT Blood Glucose.: 112 mg/dL (22 Jan 2018 07:53)      RADIOLOGY & ADDITIONAL TESTS: Reviewed.

## 2018-01-22 NOTE — CHART NOTE - NSCHARTNOTEFT_GEN_A_CORE
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  Paraplegia  Type 2 diabetes mellitus with hyperglycemia, without long-term current use of insulin      Current Nutrition Order:[facility] with snack/DASH with 2 glucerna shake(440kcal/20gmprotien and 2 Pro-stat(200kcal and 30gmprotein)       PO Intake: Good (%) [x   ]  Fair (50-75%) [   ] Poor (<25%) [   ]    GI Issues: none    Pain:denied    Skin Integrity:stage 4    Labs:   01-21    136  |  98  |  21  ----------------------------<  107<H>  4.8   |  26  |  0.71    Ca    9.6      21 Jan 2018 06:11  Mg     1.5     01-21      CAPILLARY BLOOD GLUCOSE      POCT Blood Glucose.: 171 mg/dL (22 Jan 2018 11:29)  POCT Blood Glucose.: 112 mg/dL (22 Jan 2018 07:53)  POCT Blood Glucose.: 118 mg/dL (21 Jan 2018 20:38)  POCT Blood Glucose.: 154 mg/dL (21 Jan 2018 17:36)      Medications:  MEDICATIONS  (STANDING):  ascorbic acid 250 milliGRAM(s) Oral daily  atorvastatin 20 milliGRAM(s) Oral at bedtime  Dakins Solution - 1/4 Strength 1 Application(s) Topical daily  dextrose 5%. 1000 milliLiter(s) (50 mL/Hr) IV Continuous <Continuous>  dextrose 50% Injectable 12.5 Gram(s) IV Push once  dextrose 50% Injectable 25 Gram(s) IV Push once  dextrose 50% Injectable 25 Gram(s) IV Push once  fluconAZOLE   Tablet 200 milliGRAM(s) Oral daily  haloperidol     Tablet 10 milliGRAM(s) Oral at bedtime  heparin  Injectable 5000 Unit(s) SubCutaneous every 8 hours  insulin lispro (HumaLOG) corrective regimen sliding scale   SubCutaneous Before meals and at bedtime  levETIRAcetam 500 milliGRAM(s) Oral two times a day  meropenem IVPB      meropenem IVPB 1000 milliGRAM(s) IV Intermittent once  meropenem IVPB 1000 milliGRAM(s) IV Intermittent every 8 hours  metFORMIN 1000 milliGRAM(s) Oral two times a day with meals  nystatin Powder 1 Application(s) Topical two times a day  oxybutynin 5 milliGRAM(s) Oral two times a day    MEDICATIONS  (PRN):  acetaminophen   Tablet 650 milliGRAM(s) Oral every 6 hours PRN For Temp greater than 38 C (100.4 F)  belladonna 16.2 mG/opium 60 mg Suppository 1 Suppository(s) Rectal every 6 hours PRN bladder spasms, bowles leaking  dextrose Gel 1 Dose(s) Oral once PRN Blood Glucose LESS THAN 70 milliGRAM(s)/deciliter  glucagon  Injectable 1 milliGRAM(s) IntraMuscular once PRN Glucose LESS THAN 70 milligrams/deciliter  haloperidol    Injectable 10 milliGRAM(s) IntraMuscular at bedtime PRN if refuses PO Haldol  mineral oil enema 133 milliLiter(s) Rectal daily PRN constipation      Weight:72.6kg(admission 12/17)  Daily   no updated weights  Daily     Weight Change: no updated weights    Estimated energy needs: Based on IBW:51kg [id number]: 1530-1785kcal and 1.4-1.6gmprotein:71-82gmprotein and 1530-1785cc fluids(d71-77at)    Subjective: 31 y/o male admitted with chills. Noted D/C issues. Eating 100% of needs. No N/V/D. Complaints of constipation    Previous Nutrition Diagnosis: Increased nutrient needs r/t increased demand for protein and nutrients AEB: PU    Active [   x]  Resolved [   ]    If resolved, new PES:     Goal:  Meet 80% of needs consistently  Recommendations:1.Updated weights    Education: Not appropriate due to Psych issues.  Risk Level: High [   ] Moderate [  x ] Low [   ]

## 2018-01-22 NOTE — PROGRESS NOTE ADULT - PROBLEM SELECTOR PLAN 7
-c/w home lipitor medication    #Seizure Disorder:   pt w/ a reported hx of seizure disorder  -c/w home medication of keppra 500mg BID    #Psychiatry Eval: Pt evaluated by psych and recommended he needs to be admitted to medical-psychiatric inpatient unit. Patient does not have capacity to make medical decisions.   -discontinued aripriprazole in favor of haloperidol. Haloperidol 10 mg PO or IM. Patient does not have capacity to refuse and is court ordered to receive the haldol.

## 2018-01-22 NOTE — PROGRESS NOTE ADULT - ATTENDING COMMENTS
dx  fever - source unclear. remains on meropenem. f/u blood cxs.   uti (catheter associated) -(+) candida. c/w fluconazole x total of 14 days  infected decubitus ulcers/possible sacral osteomyelitis- wounds cont to improve . now changed to meropenem given febrile episode. s/p wound debridement . he will need ID follow up.   septic shock- resolved  left IT  decub and sacral decub- stage iv  - seen by plastics and gen surg. no acute surgical intervention recommended at this time. c/w local wound care. off load wound.   wounds are improved  psychosis /paranoia -  treatment over objection based on court ruling. haldol qhs (po preferably , if not willing then will need IM haldol) . ekg to eval qtc serially  neurogenic bladder - c/w suprapubic bowles (changed jan 14th), oxybutinin  DM ii - can cont metformin  epilepsy- keppra.

## 2018-01-23 LAB
ANION GAP SERPL CALC-SCNC: 11 MMOL/L — SIGNIFICANT CHANGE UP (ref 5–17)
BUN SERPL-MCNC: 15 MG/DL — SIGNIFICANT CHANGE UP (ref 7–23)
CALCIUM SERPL-MCNC: 9.3 MG/DL — SIGNIFICANT CHANGE UP (ref 8.4–10.5)
CHLORIDE SERPL-SCNC: 100 MMOL/L — SIGNIFICANT CHANGE UP (ref 96–108)
CO2 SERPL-SCNC: 27 MMOL/L — SIGNIFICANT CHANGE UP (ref 22–31)
CREAT SERPL-MCNC: 0.75 MG/DL — SIGNIFICANT CHANGE UP (ref 0.5–1.3)
CRP SERPL-MCNC: 13.53 MG/DL — HIGH (ref 0–0.4)
ERYTHROCYTE [SEDIMENTATION RATE] IN BLOOD: 74 MM/HR — HIGH
GLUCOSE BLDC GLUCOMTR-MCNC: 131 MG/DL — HIGH (ref 70–99)
GLUCOSE BLDC GLUCOMTR-MCNC: 145 MG/DL — HIGH (ref 70–99)
GLUCOSE BLDC GLUCOMTR-MCNC: 193 MG/DL — HIGH (ref 70–99)
GLUCOSE BLDC GLUCOMTR-MCNC: 194 MG/DL — HIGH (ref 70–99)
GLUCOSE SERPL-MCNC: 135 MG/DL — HIGH (ref 70–99)
HCT VFR BLD CALC: 27.4 % — LOW (ref 39–50)
HGB BLD-MCNC: 8.1 G/DL — LOW (ref 13–17)
MAGNESIUM SERPL-MCNC: 1.7 MG/DL — SIGNIFICANT CHANGE UP (ref 1.6–2.6)
MCHC RBC-ENTMCNC: 21.8 PG — LOW (ref 27–34)
MCHC RBC-ENTMCNC: 29.6 G/DL — LOW (ref 32–36)
MCV RBC AUTO: 73.9 FL — LOW (ref 80–100)
PLATELET # BLD AUTO: 281 K/UL — SIGNIFICANT CHANGE UP (ref 150–400)
POTASSIUM SERPL-MCNC: 4.9 MMOL/L — SIGNIFICANT CHANGE UP (ref 3.5–5.3)
POTASSIUM SERPL-SCNC: 4.9 MMOL/L — SIGNIFICANT CHANGE UP (ref 3.5–5.3)
RBC # BLD: 3.71 M/UL — LOW (ref 4.2–5.8)
RBC # FLD: 19 % — HIGH (ref 10.3–16.9)
SODIUM SERPL-SCNC: 138 MMOL/L — SIGNIFICANT CHANGE UP (ref 135–145)
WBC # BLD: 8.3 K/UL — SIGNIFICANT CHANGE UP (ref 3.8–10.5)
WBC # FLD AUTO: 8.3 K/UL — SIGNIFICANT CHANGE UP (ref 3.8–10.5)

## 2018-01-23 PROCEDURE — 99233 SBSQ HOSP IP/OBS HIGH 50: CPT

## 2018-01-23 PROCEDURE — 93010 ELECTROCARDIOGRAM REPORT: CPT | Mod: 76

## 2018-01-23 RX ORDER — VANCOMYCIN HCL 1 G
1000 VIAL (EA) INTRAVENOUS EVERY 12 HOURS
Qty: 0 | Refills: 0 | Status: DISCONTINUED | OUTPATIENT
Start: 2018-01-23 | End: 2018-01-25

## 2018-01-23 RX ORDER — VANCOMYCIN HCL 1 G
1000 VIAL (EA) INTRAVENOUS EVERY 12 HOURS
Qty: 0 | Refills: 0 | Status: DISCONTINUED | OUTPATIENT
Start: 2018-01-23 | End: 2018-01-23

## 2018-01-23 RX ORDER — MAGNESIUM SULFATE 500 MG/ML
2 VIAL (ML) INJECTION ONCE
Qty: 0 | Refills: 0 | Status: COMPLETED | OUTPATIENT
Start: 2018-01-23 | End: 2018-01-23

## 2018-01-23 RX ADMIN — MEROPENEM 100 MILLIGRAM(S): 1 INJECTION INTRAVENOUS at 06:32

## 2018-01-23 RX ADMIN — HEPARIN SODIUM 5000 UNIT(S): 5000 INJECTION INTRAVENOUS; SUBCUTANEOUS at 14:03

## 2018-01-23 RX ADMIN — NYSTATIN CREAM 1 APPLICATION(S): 100000 CREAM TOPICAL at 06:34

## 2018-01-23 RX ADMIN — METFORMIN HYDROCHLORIDE 1000 MILLIGRAM(S): 850 TABLET ORAL at 18:27

## 2018-01-23 RX ADMIN — HEPARIN SODIUM 5000 UNIT(S): 5000 INJECTION INTRAVENOUS; SUBCUTANEOUS at 21:30

## 2018-01-23 RX ADMIN — ATORVASTATIN CALCIUM 20 MILLIGRAM(S): 80 TABLET, FILM COATED ORAL at 21:30

## 2018-01-23 RX ADMIN — Medication 5 MILLIGRAM(S): at 06:32

## 2018-01-23 RX ADMIN — Medication 133 MILLILITER(S): at 14:02

## 2018-01-23 RX ADMIN — HEPARIN SODIUM 5000 UNIT(S): 5000 INJECTION INTRAVENOUS; SUBCUTANEOUS at 06:33

## 2018-01-23 RX ADMIN — LEVETIRACETAM 500 MILLIGRAM(S): 250 TABLET, FILM COATED ORAL at 06:33

## 2018-01-23 RX ADMIN — Medication 2: at 11:46

## 2018-01-23 RX ADMIN — Medication 250 MILLIGRAM(S): at 11:42

## 2018-01-23 RX ADMIN — Medication 250 MILLIGRAM(S): at 07:20

## 2018-01-23 RX ADMIN — HALOPERIDOL DECANOATE 10 MILLIGRAM(S): 100 INJECTION INTRAMUSCULAR at 21:29

## 2018-01-23 RX ADMIN — Medication 50 GRAM(S): at 09:10

## 2018-01-23 RX ADMIN — FLUCONAZOLE 200 MILLIGRAM(S): 150 TABLET ORAL at 11:43

## 2018-01-23 RX ADMIN — Medication 5 MILLIGRAM(S): at 18:27

## 2018-01-23 RX ADMIN — Medication 1 APPLICATION(S): at 11:44

## 2018-01-23 RX ADMIN — MEROPENEM 100 MILLIGRAM(S): 1 INJECTION INTRAVENOUS at 14:02

## 2018-01-23 RX ADMIN — MEROPENEM 100 MILLIGRAM(S): 1 INJECTION INTRAVENOUS at 21:30

## 2018-01-23 RX ADMIN — LEVETIRACETAM 500 MILLIGRAM(S): 250 TABLET, FILM COATED ORAL at 18:28

## 2018-01-23 RX ADMIN — NYSTATIN CREAM 1 APPLICATION(S): 100000 CREAM TOPICAL at 18:26

## 2018-01-23 RX ADMIN — Medication 250 MILLIGRAM(S): at 18:29

## 2018-01-23 RX ADMIN — METFORMIN HYDROCHLORIDE 1000 MILLIGRAM(S): 850 TABLET ORAL at 09:10

## 2018-01-23 RX ADMIN — Medication 2: at 18:26

## 2018-01-23 NOTE — PROGRESS NOTE ADULT - ATTENDING COMMENTS
dx  fever - source unclear. cultures (blood , urine negative). esr and crp trending up.  possibly undertreated osteomyelitis? remains on meropenem, vancomycin added. follow up vanc troughs.   uti (catheter associated) -(+) candida. c/w fluconazole x total of 14 days (until jan 27th)  infected decubitus ulcers/possible sacral osteomyelitis- wounds cont to improve , thought  remains on vanc/meropenem.    septic shock- resolved  left IT  decub and sacral decub- stage iv  - seen by plastics and gen surg. no acute surgical intervention recommended at this time. c/w local wound care. off load wound.   wounds are improved  psychosis /paranoia -  treatment over objection based on court ruling. haldol qhs (po preferably , if not willing then will need IM haldol) . ekg to eval qtc serially  neurogenic bladder - c/w suprapubic bowles (changed jan 14th), oxybutinin  DM ii - can cont metformin  epilepsy- keppra.

## 2018-01-23 NOTE — PROGRESS NOTE ADULT - PROBLEM SELECTOR PLAN 1
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 Upon arrival pts wound was packed w/ feculent material and required debridement by plastic surgery.   - wound cx had citrobacter, enterococcus, klebsiella  - general surgery does not believe there is a fistula between wound and rectum causing leakage of stool, no surgical intervention at this time.  - wound care following  - discontinued Augmentin and ciprofloxacin on 1/13  - s/p meropenem 1g q8h (1/13-1/19) for MDR UTI after spiking fever on 1/12  - stool cx neg, bcx (1/12) NGTD, ucx few yeast  - repeat blood cultures x 2 (1/22), f/u results  - IV Vancomycin 1g q8h (1/22- )  - f/u ID recs Pt w/ a stage 4 infected sacral ulcer w/ purulence. Upon arrival pts wound was packed w/ feculent material and required debridement by plastic surgery.   - wound cx had citrobacter, enterococcus, klebsiella  - general surgery does not believe there is a fistula between wound and rectum causing leakage of stool, no surgical intervention at this time.  - wound care following  - discontinued Augmentin and ciprofloxacin on 1/13  - s/p meropenem 1g q8h (1/13-1/19) for MDR UTI after spiking fever on 1/12  - stool cx neg, bcx (1/12) NGTD, ucx few yeast  - repeat blood cultures x 2 (1/22), f/u results  - restarted IV meropenem 1g q8h (1/22- )  - IV Vancomycin 1g q12h (1/23- )  - f/u ID recs

## 2018-01-23 NOTE — PROGRESS NOTE ADULT - SUBJECTIVE AND OBJECTIVE BOX
INTERVAL HPI/OVERNIGHT EVENTS:  RAHEL overnight, resting comfortably today        ANTIBIOTICS/RELEVANT:  Meropenem    MEDICATIONS  (STANDING):  ascorbic acid 250 milliGRAM(s) Oral daily  atorvastatin 20 milliGRAM(s) Oral at bedtime  Dakins Solution - 1/4 Strength 1 Application(s) Topical daily  dextrose 5%. 1000 milliLiter(s) (50 mL/Hr) IV Continuous <Continuous>  dextrose 50% Injectable 12.5 Gram(s) IV Push once  dextrose 50% Injectable 25 Gram(s) IV Push once  dextrose 50% Injectable 25 Gram(s) IV Push once  fluconAZOLE   Tablet 200 milliGRAM(s) Oral daily  haloperidol     Tablet 10 milliGRAM(s) Oral at bedtime  heparin  Injectable 5000 Unit(s) SubCutaneous every 8 hours  insulin lispro (HumaLOG) corrective regimen sliding scale   SubCutaneous Before meals and at bedtime  levETIRAcetam 500 milliGRAM(s) Oral two times a day  meropenem IVPB      meropenem IVPB 1000 milliGRAM(s) IV Intermittent every 8 hours  metFORMIN 1000 milliGRAM(s) Oral two times a day with meals  nystatin Powder 1 Application(s) Topical two times a day  oxybutynin 5 milliGRAM(s) Oral two times a day  vancomycin  IVPB 1000 milliGRAM(s) IV Intermittent every 8 hours    MEDICATIONS  (PRN):  acetaminophen   Tablet 650 milliGRAM(s) Oral every 6 hours PRN For Temp greater than 38 C (100.4 F)  belladonna 16.2 mG/opium 60 mg Suppository 1 Suppository(s) Rectal every 6 hours PRN bladder spasms, bowles leaking  dextrose Gel 1 Dose(s) Oral once PRN Blood Glucose LESS THAN 70 milliGRAM(s)/deciliter  glucagon  Injectable 1 milliGRAM(s) IntraMuscular once PRN Glucose LESS THAN 70 milligrams/deciliter  haloperidol    Injectable 10 milliGRAM(s) IntraMuscular at bedtime PRN if refuses PO Haldol  mineral oil enema 133 milliLiter(s) Rectal daily PRN constipation        Vital Signs Last 24 Hrs  T(C): 36.4 (23 Jan 2018 06:36), Max: 38.4 (22 Jan 2018 22:18)  T(F): 97.6 (23 Jan 2018 06:36), Max: 101.1 (22 Jan 2018 22:18)  HR: 83 (23 Jan 2018 06:36) (80 - 106)  BP: 100/63 (23 Jan 2018 06:36) (100/63 - 111/70)  BP(mean): --  RR: 18 (23 Jan 2018 06:36) (17 - 18)  SpO2: 99% (23 Jan 2018 06:36) (98% - 99%)    01-22-18 @ 07:01  -  01-23-18 @ 07:00  --------------------------------------------------------  IN: 350 mL / OUT: 1800 mL / NET: -1450 mL    01-23-18 @ 07:01  -  01-23-18 @ 13:40  --------------------------------------------------------  IN: 250 mL / OUT: 2000 mL / NET: -1750 mL      PHYSICAL EXAM:  Constitutional: Paraplegic, w/c bound  Eyes:ISIDRO, EOMI  Ear/Nose/Throat: no oral lesion, no sinus tenderness on percussion	  Neck:no JVD, no lymphadenopathy, supple  Respiratory: CTA jose  Cardiovascular: S1S2 RRR, no murmurs  Gastrointestinal:soft, (+) BS,(+) suprapubic catheter  Extremities: sacral decubitus with dressing      LABS:                        8.1    8.3   )-----------( 281      ( 23 Jan 2018 06:12 )             27.4     01-23    138  |  100  |  15  ----------------------------<  135<H>  4.9   |  27  |  0.75    Ca    9.3      23 Jan 2018 06:12  Mg     1.7     01-23            MICROBIOLOGY:  Culture - Urine (01.22.18 @ 18:58)    Specimen Source: Suprapubic Suprapubic    Culture Results:   No growth to date    Fungus Identification (01.16.18 @ 08:39)    Culture Results:   Candida albicans isolated        RADIOLOGY & ADDITIONAL STUDIES: reviewed

## 2018-01-23 NOTE — PROGRESS NOTE BEHAVIORAL HEALTH - SUMMARY
32-year-old male with reported hx of schizoaffective disorder, rendered paraplegic s/p suicide attempt, admitted in septic shock, s/p discontinuation of ADL and wound care, in context of noncompliance with antipsychotic medication resulting in psychosis.  Pt was determined to LACK CAPACITY regarding medical decision making including need for psychotropic medication. Has approval for TOO (court mandated), is tolerating haldol 10 mg po qhs with no evidence of SE, increasingly better behavioral control. (To date, has been taking po Haldol, thus has not needed IM medication.).     Pt approaching readiness for discharge, requires dispo planning regarding home care as well as psychiatric follow up.    Given above hx, pt needs structured outpt follow up, i.e. health home, to ensure medical and psychiatric safety. Referral made to [facility]     Will also complete ACT application with .

## 2018-01-23 NOTE — PROGRESS NOTE ADULT - ASSESSMENT
31yo M paraplegic, PMH of spinal cord injury (wheelchair bound), sacral pressure ulcer with osteo x2, DM2, indwelling suprapubic catheter who presented w/ septic shock secondary to infected purulent sacral 4th degree pressure ulcer with underlying inadequately treated OM.    -Recommend adding on ESR and CRP to AM labs  -Repeat urine and blood cultures  -Start on Vancomycin 1g Q12h and Meronpenem 1g Q8h until culture results come back  -Start on Fluconazole 200mg Q24 for 14 days since there was a positive fungal culture previously  -Check vanc trough

## 2018-01-23 NOTE — PROGRESS NOTE BEHAVIORAL HEALTH - NSBHFUPINTERVALHXFT_PSY_A_CORE
Pt observed in bed, calm, cooperative, receptive to my visit. Denies complaints. Reports he is eager to be discharged home.  Pt informed that before he can be discharged home, a more structured follow-up plan needs to be in place, to ensure both medical and psychiatric stability. Pt accepted information without protest.    This writer spoke with CARLOS Ferrer, a complex care manager for the Complex Care Management Team of [email or web address]. [facility], given the pt's history of serious suicide attempt leading to paraplegia, schizophrenia, personality disorder and noncompliance with both medications and medical care, pt will require more structured level of outpt care. Options include partial hospitalization, Pt observed in bed, calm, cooperative, receptive to my visit. Case also discussed at length with Jose Looney, Ms. Rubio and Mr. Ricardo Arroyo, complex care manager for pt's managed long-term care program, [facility].   Pt denies complaints. Reports he is eager to be discharged home. Continues to deny suicidal or homicidal ideation/intent/plan. Remains compliant with po Haldol, other medications and medical care.  Pt informed that before he can be discharged home, a more structured follow-up plan needs to be in place, to ensure both medical and psychiatric stability. Pt accepted information without protest.    This writer spoke with CARLOS Ferrre, a complex care manager for the Complex Care Management Team of [facility]. Per Mr. Arroyo, given the pt's history of serious suicide attempt leading to paraplegia, schizophrenia, personality disorder and noncompliance with both medications and medical care, pt will require more structured level of outpt care.   Application submitted to [facility].  (Application also will be submitted for ACT follow up.) Pt observed in bed, calm, cooperative, receptive to my visit. Case also discussed at length with Jose Looney, Ms. Slaughter and Mr. Ricardo Arroyo, complex care manager for pt's managed long-term care program, [facility].   Pt denies complaints. Reports he is eager to be discharged home. Continues to deny suicidal or homicidal ideation/intent/plan. Remains compliant with po Haldol, other medications and medical care.  Pt informed that before he can be discharged home, a more structured follow-up plan needs to be in place, to ensure both medical and psychiatric stability. Pt accepted information without protest.    This writer spoke with CARLOS Ferrer, a complex care manager for the Complex Care Management Team of [facility]. Per Mr. Arroyo, given the pt's history of serious suicide attempt leading to paraplegia, schizophrenia, personality disorder and noncompliance with both medications and medical care, pt will require more structured level of outpt care.   Application submitted to [facility].  (Application also will be submitted for ACT follow up.)

## 2018-01-23 NOTE — PROGRESS NOTE BEHAVIORAL HEALTH - RISK ASSESSMENT
Pt currently denies suicidal/homicidal ideation/intent/plan. He is domiciled, has involved family (aunt), is future oriented. However, pt with hx of significant suicide attempt, also hx of non-compliance with medication leading to decompansation.

## 2018-01-23 NOTE — PROGRESS NOTE ADULT - SUBJECTIVE AND OBJECTIVE BOX
OVERNIGHT EVENTS/SUBJECTIVE/ROS: Febrile rectal temp to 101.1, added on IV Vancomycin, ROS neg    VITAL SIGNS:  Vital Signs Last 24 Hrs  T(C): 36.4 (23 Jan 2018 06:36), Max: 38.4 (22 Jan 2018 22:18)  T(F): 97.6 (23 Jan 2018 06:36), Max: 101.1 (22 Jan 2018 22:18)  HR: 83 (23 Jan 2018 06:36) (80 - 106)  BP: 100/63 (23 Jan 2018 06:36) (100/63 - 111/70)  BP(mean): --  RR: 18 (23 Jan 2018 06:36) (16 - 18)  SpO2: 99% (23 Jan 2018 06:36) (98% - 100%)    PHYSICAL EXAM:    General: lying in bed in NAD  HEENT: EOMI anicteric  Neck: supple  Cardiovascular: S1, S2, RRR   Respiratory: CTA BL no w/r/r  Gastrointestinal: soft, distended, nontender, +BS  Extremities: no peripheral edema, wwp  Vascular: 2+ pulses radial; dp/pt  Neurological: alert awake oriented    MEDICATIONS:  MEDICATIONS  (STANDING):  ascorbic acid 250 milliGRAM(s) Oral daily  atorvastatin 20 milliGRAM(s) Oral at bedtime  Dakins Solution - 1/4 Strength 1 Application(s) Topical daily  dextrose 5%. 1000 milliLiter(s) (50 mL/Hr) IV Continuous <Continuous>  dextrose 50% Injectable 12.5 Gram(s) IV Push once  dextrose 50% Injectable 25 Gram(s) IV Push once  dextrose 50% Injectable 25 Gram(s) IV Push once  fluconAZOLE   Tablet 200 milliGRAM(s) Oral daily  haloperidol     Tablet 10 milliGRAM(s) Oral at bedtime  heparin  Injectable 5000 Unit(s) SubCutaneous every 8 hours  insulin lispro (HumaLOG) corrective regimen sliding scale   SubCutaneous Before meals and at bedtime  levETIRAcetam 500 milliGRAM(s) Oral two times a day  magnesium sulfate  IVPB 2 Gram(s) IV Intermittent once  meropenem IVPB      meropenem IVPB 1000 milliGRAM(s) IV Intermittent every 8 hours  metFORMIN 1000 milliGRAM(s) Oral two times a day with meals  nystatin Powder 1 Application(s) Topical two times a day  oxybutynin 5 milliGRAM(s) Oral two times a day  vancomycin  IVPB 1000 milliGRAM(s) IV Intermittent every 8 hours    MEDICATIONS  (PRN):  acetaminophen   Tablet 650 milliGRAM(s) Oral every 6 hours PRN For Temp greater than 38 C (100.4 F)  belladonna 16.2 mG/opium 60 mg Suppository 1 Suppository(s) Rectal every 6 hours PRN bladder spasms, bowles leaking  dextrose Gel 1 Dose(s) Oral once PRN Blood Glucose LESS THAN 70 milliGRAM(s)/deciliter  glucagon  Injectable 1 milliGRAM(s) IntraMuscular once PRN Glucose LESS THAN 70 milligrams/deciliter  haloperidol    Injectable 10 milliGRAM(s) IntraMuscular at bedtime PRN if refuses PO Haldol  mineral oil enema 133 milliLiter(s) Rectal daily PRN constipation      ALLERGIES:  Allergies    Capzasin Back and Body (Unknown)    Intolerances        LABS:                        8.1    8.3   )-----------( 281      ( 23 Jan 2018 06:12 )             27.4     01-23    138  |  100  |  15  ----------------------------<  135<H>  4.9   |  27  |  0.75    Ca    9.3      23 Jan 2018 06:12  Mg     1.7     01-23          CAPILLARY BLOOD GLUCOSE      POCT Blood Glucose.: 145 mg/dL (23 Jan 2018 07:40)      RADIOLOGY & ADDITIONAL TESTS: Reviewed.

## 2018-01-23 NOTE — PROGRESS NOTE ADULT - ASSESSMENT
32M paraplegic PMH spinal cord injury (wheelchair bound), sacral pressure ulcer with osteo x2 (outpatient provider said they have records of March osteo s/p daptomycin of unknown length) earlier in 2017,  DM2, indwelling suprapubic catheter who presented w/ septic shock secondary to infected purulent sacral 4th degree pressure ulcer with underlying inadequately treated OM, hemodynamically stable being treated for sacral osteomyelitis awaiting placement at [facility]

## 2018-01-24 DIAGNOSIS — B37.41 CANDIDAL CYSTITIS AND URETHRITIS: ICD-10-CM

## 2018-01-24 LAB
ANION GAP SERPL CALC-SCNC: 14 MMOL/L — SIGNIFICANT CHANGE UP (ref 5–17)
BUN SERPL-MCNC: 17 MG/DL — SIGNIFICANT CHANGE UP (ref 7–23)
CALCIUM SERPL-MCNC: 9.5 MG/DL — SIGNIFICANT CHANGE UP (ref 8.4–10.5)
CHLORIDE SERPL-SCNC: 97 MMOL/L — SIGNIFICANT CHANGE UP (ref 96–108)
CO2 SERPL-SCNC: 23 MMOL/L — SIGNIFICANT CHANGE UP (ref 22–31)
CREAT SERPL-MCNC: 0.67 MG/DL — SIGNIFICANT CHANGE UP (ref 0.5–1.3)
CRP SERPL-MCNC: 10.1 MG/DL — HIGH (ref 0–0.4)
CULTURE RESULTS: NO GROWTH — SIGNIFICANT CHANGE UP
ERYTHROCYTE [SEDIMENTATION RATE] IN BLOOD: 75 MM/HR — HIGH
GLUCOSE BLDC GLUCOMTR-MCNC: 127 MG/DL — HIGH (ref 70–99)
GLUCOSE BLDC GLUCOMTR-MCNC: 135 MG/DL — HIGH (ref 70–99)
GLUCOSE BLDC GLUCOMTR-MCNC: 140 MG/DL — HIGH (ref 70–99)
GLUCOSE BLDC GLUCOMTR-MCNC: 213 MG/DL — HIGH (ref 70–99)
GLUCOSE SERPL-MCNC: 117 MG/DL — HIGH (ref 70–99)
HCT VFR BLD CALC: 28.3 % — LOW (ref 39–50)
HGB BLD-MCNC: 8.4 G/DL — LOW (ref 13–17)
MAGNESIUM SERPL-MCNC: 1.8 MG/DL — SIGNIFICANT CHANGE UP (ref 1.6–2.6)
MCHC RBC-ENTMCNC: 21.9 PG — LOW (ref 27–34)
MCHC RBC-ENTMCNC: 29.7 G/DL — LOW (ref 32–36)
MCV RBC AUTO: 73.9 FL — LOW (ref 80–100)
PLATELET # BLD AUTO: 304 K/UL — SIGNIFICANT CHANGE UP (ref 150–400)
POTASSIUM SERPL-MCNC: 4.9 MMOL/L — SIGNIFICANT CHANGE UP (ref 3.5–5.3)
POTASSIUM SERPL-SCNC: 4.9 MMOL/L — SIGNIFICANT CHANGE UP (ref 3.5–5.3)
RBC # BLD: 3.83 M/UL — LOW (ref 4.2–5.8)
RBC # FLD: 19 % — HIGH (ref 10.3–16.9)
SODIUM SERPL-SCNC: 134 MMOL/L — LOW (ref 135–145)
SPECIMEN SOURCE: SIGNIFICANT CHANGE UP
VANCOMYCIN TROUGH SERPL-MCNC: 14 UG/ML — SIGNIFICANT CHANGE UP (ref 10–20)
WBC # BLD: 6.8 K/UL — SIGNIFICANT CHANGE UP (ref 3.8–10.5)
WBC # FLD AUTO: 6.8 K/UL — SIGNIFICANT CHANGE UP (ref 3.8–10.5)

## 2018-01-24 PROCEDURE — 99233 SBSQ HOSP IP/OBS HIGH 50: CPT

## 2018-01-24 RX ORDER — POLYETHYLENE GLYCOL 3350 17 G/17G
17 POWDER, FOR SOLUTION ORAL DAILY
Qty: 0 | Refills: 0 | Status: DISCONTINUED | OUTPATIENT
Start: 2018-01-24 | End: 2018-01-26

## 2018-01-24 RX ORDER — SENNA PLUS 8.6 MG/1
2 TABLET ORAL AT BEDTIME
Qty: 0 | Refills: 0 | Status: DISCONTINUED | OUTPATIENT
Start: 2018-01-24 | End: 2018-01-26

## 2018-01-24 RX ORDER — MEROPENEM 1 G/30ML
1000 INJECTION INTRAVENOUS EVERY 8 HOURS
Qty: 0 | Refills: 0 | Status: DISCONTINUED | OUTPATIENT
Start: 2018-01-24 | End: 2018-01-25

## 2018-01-24 RX ORDER — MEROPENEM 1 G/30ML
1000 INJECTION INTRAVENOUS ONCE
Qty: 0 | Refills: 0 | Status: COMPLETED | OUTPATIENT
Start: 2018-01-24 | End: 2018-01-24

## 2018-01-24 RX ORDER — DOCUSATE SODIUM 100 MG
100 CAPSULE ORAL THREE TIMES A DAY
Qty: 0 | Refills: 0 | Status: DISCONTINUED | OUTPATIENT
Start: 2018-01-24 | End: 2018-02-16

## 2018-01-24 RX ADMIN — LEVETIRACETAM 500 MILLIGRAM(S): 250 TABLET, FILM COATED ORAL at 05:23

## 2018-01-24 RX ADMIN — FLUCONAZOLE 200 MILLIGRAM(S): 150 TABLET ORAL at 11:10

## 2018-01-24 RX ADMIN — METFORMIN HYDROCHLORIDE 1000 MILLIGRAM(S): 850 TABLET ORAL at 17:11

## 2018-01-24 RX ADMIN — Medication 250 MILLIGRAM(S): at 11:10

## 2018-01-24 RX ADMIN — MEROPENEM 100 MILLIGRAM(S): 1 INJECTION INTRAVENOUS at 05:23

## 2018-01-24 RX ADMIN — HEPARIN SODIUM 5000 UNIT(S): 5000 INJECTION INTRAVENOUS; SUBCUTANEOUS at 22:18

## 2018-01-24 RX ADMIN — Medication 10 MILLIGRAM(S): at 19:19

## 2018-01-24 RX ADMIN — MEROPENEM 100 MILLIGRAM(S): 1 INJECTION INTRAVENOUS at 15:11

## 2018-01-24 RX ADMIN — HALOPERIDOL DECANOATE 10 MILLIGRAM(S): 100 INJECTION INTRAMUSCULAR at 22:17

## 2018-01-24 RX ADMIN — NYSTATIN CREAM 1 APPLICATION(S): 100000 CREAM TOPICAL at 05:24

## 2018-01-24 RX ADMIN — Medication 1 ENEMA: at 11:10

## 2018-01-24 RX ADMIN — LEVETIRACETAM 500 MILLIGRAM(S): 250 TABLET, FILM COATED ORAL at 17:11

## 2018-01-24 RX ADMIN — HEPARIN SODIUM 5000 UNIT(S): 5000 INJECTION INTRAVENOUS; SUBCUTANEOUS at 15:11

## 2018-01-24 RX ADMIN — METFORMIN HYDROCHLORIDE 1000 MILLIGRAM(S): 850 TABLET ORAL at 08:00

## 2018-01-24 RX ADMIN — Medication 4: at 18:04

## 2018-01-24 RX ADMIN — ATORVASTATIN CALCIUM 20 MILLIGRAM(S): 80 TABLET, FILM COATED ORAL at 22:17

## 2018-01-24 RX ADMIN — Medication 250 MILLIGRAM(S): at 09:20

## 2018-01-24 RX ADMIN — HEPARIN SODIUM 5000 UNIT(S): 5000 INJECTION INTRAVENOUS; SUBCUTANEOUS at 05:23

## 2018-01-24 RX ADMIN — NYSTATIN CREAM 1 APPLICATION(S): 100000 CREAM TOPICAL at 17:11

## 2018-01-24 RX ADMIN — Medication 1 APPLICATION(S): at 11:10

## 2018-01-24 RX ADMIN — Medication 250 MILLIGRAM(S): at 18:05

## 2018-01-24 RX ADMIN — Medication 5 MILLIGRAM(S): at 05:23

## 2018-01-24 RX ADMIN — Medication 5 MILLIGRAM(S): at 17:11

## 2018-01-24 NOTE — PROGRESS NOTE ADULT - PROBLEM SELECTOR PLAN 2
Pt w/ no sensation or control of urination with suprapubic catheter changed once a month.   - suprapubic catheter changed 1/12 (to change again around 1/12)  -c/w oxybutynin 5mg BID    #yeast in urine  - c/w fluconazole (1/14- )  - f/u ID regarding duration of fluconazole  - UCx+ for Candida albicans

## 2018-01-24 NOTE — PROGRESS NOTE ADULT - PROBLEM SELECTOR PLAN 1
ID recs appreciated.   * c/w meropenem, vancomycin, as per ID recs.   * Vanco trough.   * ID for final abx recs on discharge.

## 2018-01-24 NOTE — PROGRESS NOTE ADULT - PROBLEM SELECTOR PLAN 1
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 Upon arrival pts wound was packed w/ feculent material and required debridement by plastic surgery.   - wound cx had citrobacter, enterococcus, klebsiella  - general surgery does not believe there is a fistula between wound and rectum causing leakage of stool, no surgical intervention at this time.  - wound care following  - discontinued Augmentin and ciprofloxacin on 1/13  - s/p meropenem 1g q8h (1/13-1/19) for MDR UTI after spiking fever on 1/12  - stool cx neg, bcx (1/12) NGTD, ucx few yeast  - repeat blood cultures x 2 (1/22), f/u results  - restarted IV meropenem 1g q8h (1/22- )  - IV Vancomycin 1g q12h (1/23- )  - f/u ID recs

## 2018-01-24 NOTE — PROGRESS NOTE ADULT - ASSESSMENT
33yo M paraplegic, PMH spinal cord injury, sacral pressure ulcer with osteo x2, DM2, suprapubic catheter. Admitted for septic shock 2/2 nfected pressure ulcer with underlying OM, complicated with active psychosis.

## 2018-01-24 NOTE — PROGRESS NOTE ADULT - SUBJECTIVE AND OBJECTIVE BOX
INTERVAL HPI/OVERNIGHT EVENTS:  RAHEL events overnight. Patient remains afebrile.      ANTIBIOTICS/RELEVANT:  Meropenem  Vancomycin  Fluconazole    MEDICATIONS  (STANDING):  ascorbic acid 250 milliGRAM(s) Oral daily  atorvastatin 20 milliGRAM(s) Oral at bedtime  Dakins Solution - 1/4 Strength 1 Application(s) Topical daily  dextrose 5%. 1000 milliLiter(s) (50 mL/Hr) IV Continuous <Continuous>  dextrose 50% Injectable 12.5 Gram(s) IV Push once  dextrose 50% Injectable 25 Gram(s) IV Push once  dextrose 50% Injectable 25 Gram(s) IV Push once  fluconAZOLE   Tablet 200 milliGRAM(s) Oral daily  haloperidol     Tablet 10 milliGRAM(s) Oral at bedtime  heparin  Injectable 5000 Unit(s) SubCutaneous every 8 hours  insulin lispro (HumaLOG) corrective regimen sliding scale   SubCutaneous Before meals and at bedtime  levETIRAcetam 500 milliGRAM(s) Oral two times a day  meropenem Injectable 1000 milliGRAM(s) IV Push every 8 hours  metFORMIN 1000 milliGRAM(s) Oral two times a day with meals  nystatin Powder 1 Application(s) Topical two times a day  oxybutynin 5 milliGRAM(s) Oral two times a day  vancomycin  IVPB 1000 milliGRAM(s) IV Intermittent every 12 hours    MEDICATIONS  (PRN):  acetaminophen   Tablet 650 milliGRAM(s) Oral every 6 hours PRN For Temp greater than 38 C (100.4 F)  belladonna 16.2 mG/opium 60 mg Suppository 1 Suppository(s) Rectal every 6 hours PRN bladder spasms, bowles leaking  dextrose Gel 1 Dose(s) Oral once PRN Blood Glucose LESS THAN 70 milliGRAM(s)/deciliter  glucagon  Injectable 1 milliGRAM(s) IntraMuscular once PRN Glucose LESS THAN 70 milligrams/deciliter  haloperidol    Injectable 10 milliGRAM(s) IntraMuscular at bedtime PRN if refuses PO Haldol  mineral oil enema 133 milliLiter(s) Rectal daily PRN constipation        Vital Signs Last 24 Hrs  T(C): 36.8 (24 Jan 2018 09:00), Max: 36.9 (23 Jan 2018 16:46)  T(F): 98.3 (24 Jan 2018 09:00), Max: 98.5 (23 Jan 2018 21:18)  HR: 96 (24 Jan 2018 09:00) (88 - 96)  BP: 102/68 (24 Jan 2018 09:00) (91/49 - 110/69)  BP(mean): --  RR: 18 (24 Jan 2018 09:00) (16 - 18)  SpO2: 98% (24 Jan 2018 09:00) (97% - 100%)    01-23-18 @ 07:01  -  01-24-18 @ 07:00  --------------------------------------------------------  IN: 650 mL / OUT: 4250 mL / NET: -3600 mL      PHYSICAL EXAM:  Constitutional: Paraplegic, w/c bound  Eyes:ISIDRO, EOMI  Ear/Nose/Throat: no oral lesion, no sinus tenderness on percussion	  Neck:no JVD, no lymphadenopathy, supple  Respiratory: CTA jose  Cardiovascular: S1S2 RRR, no murmurs  Gastrointestinal:soft, (+) BS,(+) suprapubic catheter  Extremities: sacral decubitus with dressing      LABS:                        8.4    6.8   )-----------( 304      ( 24 Jan 2018 06:15 )             28.3     01-24    134<L>  |  97  |  17  ----------------------------<  117<H>  4.9   |  23  |  0.67    Ca    9.5      24 Jan 2018 06:15  Mg     1.8     01-24            MICROBIOLOGY:  Culture - Urine (01.13.18 @ 12:58)    Specimen Source: .Urine None    Culture Results:   20,000 CFU/ml Yeast  Pending further identification at [facility]  Antibiotic susceptibility testing is performed at request only by calling  24/7 at [phone]    Culture - Urine (01.22.18 @ 18:58)    Specimen Source: Suprapubic Suprapubic    Culture Results:   No growth    Culture - Blood (01.22.18 @ 04:30)    Specimen Source: .Blood Blood    Culture Results:   No growth at 2 days.        RADIOLOGY & ADDITIONAL STUDIES:

## 2018-01-24 NOTE — PROGRESS NOTE ADULT - SUBJECTIVE AND OBJECTIVE BOX
CC: No complaints. Able to go around on his wheelchair.   No more fevers.   Rest of ROS negative.     Vital Signs Last 24 Hrs  T(C): 36.9 (24 Jan 2018 15:49), Max: 36.9 (23 Jan 2018 16:46)  T(F): 98.4 (24 Jan 2018 15:49), Max: 98.5 (23 Jan 2018 21:18)  HR: 111 (24 Jan 2018 15:49) (88 - 111)  BP: 108/71 (24 Jan 2018 15:49) (91/49 - 110/69)  BP(mean): --  RR: 16 (24 Jan 2018 15:49) (16 - 18)  SpO2: 99% (24 Jan 2018 15:49) (97% - 100%)    PHYSICAL EXAMINATION  * General: Not in acute distress. Awake and alert. Lying comfortably in bed.  * Head: Normocephalic, atraumatic.  * HEENT: ears no discharge, eyes PERRLA, nose no discharge, throat no exudates, normal tonsils.  * Neck: no JVD, supple.  * Lungs: Clear to auscultation, no rales, no wheezes.  * Cardio: Regular rate and rhythm, no murmurs, no rubs, no gallops. Good peripheral pulses.  * Abdomen: Soft, non-tender, non-distended, tympanic to percussion, no rebound, no guarding, no rigidity. Bowel sounds present. No suprapubic or CVA tenderness.  * : Suprapubic catheter  * Extremities: Sacral ulcer and bilateral buttock ulcers. Clean.   * Skin: Warm and dry.  * Neuro: Alert and oriented x 3. No focal deficits. Motor strength is 5/5 throughout. Sensation intact. Cranial nerves II-XII grossly intact.                           8.4    6.8   )-----------( 304      ( 24 Jan 2018 06:15 )             28.3   01-24    134<L>  |  97  |  17  ----------------------------<  117<H>  4.9   |  23  |  0.67    Ca    9.5      24 Jan 2018 06:15  Mg     1.8     01-24          ascorbic acid 250 milliGRAM(s) Oral daily  atorvastatin 20 milliGRAM(s) Oral at bedtime  Dakins Solution - 1/4 Strength 1 Application(s) Topical daily  dextrose 5%. 1000 milliLiter(s) (50 mL/Hr) IV Continuous <Continuous>  dextrose 50% Injectable 12.5 Gram(s) IV Push once  dextrose 50% Injectable 25 Gram(s) IV Push once  dextrose 50% Injectable 25 Gram(s) IV Push once  docusate sodium 100 milliGRAM(s) Oral three times a day  fluconAZOLE   Tablet 200 milliGRAM(s) Oral daily  haloperidol     Tablet 10 milliGRAM(s) Oral at bedtime  heparin  Injectable 5000 Unit(s) SubCutaneous every 8 hours  insulin lispro (HumaLOG) corrective regimen sliding scale   SubCutaneous Before meals and at bedtime  levETIRAcetam 500 milliGRAM(s) Oral two times a day  meropenem Injectable 1000 milliGRAM(s) IV Push every 8 hours  metFORMIN 1000 milliGRAM(s) Oral two times a day with meals  nystatin Powder 1 Application(s) Topical two times a day  oxybutynin 5 milliGRAM(s) Oral two times a day  polyethylene glycol 3350 17 Gram(s) Oral daily  senna 2 Tablet(s) Oral at bedtime  vancomycin  IVPB 1000 milliGRAM(s) IV Intermittent every 12 hours    MEDICATIONS  (PRN):  acetaminophen   Tablet 650 milliGRAM(s) Oral every 6 hours PRN For Temp greater than 38 C (100.4 F)  belladonna 16.2 mG/opium 60 mg Suppository 1 Suppository(s) Rectal every 6 hours PRN bladder spasms, bowles leaking  dextrose Gel 1 Dose(s) Oral once PRN Blood Glucose LESS THAN 70 milliGRAM(s)/deciliter  glucagon  Injectable 1 milliGRAM(s) IntraMuscular once PRN Glucose LESS THAN 70 milligrams/deciliter  haloperidol    Injectable 10 milliGRAM(s) IntraMuscular at bedtime PRN if refuses PO Haldol  mineral oil enema 133 milliLiter(s) Rectal daily PRN constipation

## 2018-01-24 NOTE — PROGRESS NOTE ADULT - ASSESSMENT
31yo M paraplegic, PMH of spinal cord injury (wheelchair bound), sacral pressure ulcer with osteo x2, DM2, indwelling suprapubic catheter who presented w/ septic shock secondary to infected purulent sacral 4th degree pressure ulcer with underlying inadequately treated OM.      -Contine Vancomycin 1g Q12h and Meronpenem 1g Q8h while patient is admitted  -Continue Fluconazole 200mg Q24 until 1/28  -Check vanc trough 31yo M paraplegic, PMH of spinal cord injury (wheelchair bound), sacral pressure ulcer with osteo x2, DM2, indwelling suprapubic catheter who presented w/ septic shock secondary to infected purulent sacral 4th degree pressure ulcer with underlying inadequately treated OM.      -Contine Vancomycin 1g Q12h and Meropenem 1g Q8h while patient is admitted  -Continue Fluconazole 200mg Q24 until 1/28  -Check vanc trough  -Wound care

## 2018-01-24 NOTE — PROGRESS NOTE ADULT - ASSESSMENT
32M paraplegic PMH spinal cord injury (wheelchair bound), sacral pressure ulcer with osteo x2 (outpatient provider said they have records of March osteo s/p daptomycin of unknown length) earlier in 2017,  DM2, indwelling suprapubic catheter who presented w/ septic shock secondary to infected purulent sacral 4th degree pressure ulcer with underlying inadequately treated OM, hemodynamically stable being treated for sacral osteomyelitis awaiting placement at [facility]

## 2018-01-24 NOTE — PROGRESS NOTE ADULT - SUBJECTIVE AND OBJECTIVE BOX
INTERVAL HPI / OVERNIGHT EVENTS: RAHEL.     SUBJECTIVE: Patient seen and examined at bedside. Patient denies any complaints currently.     OBJECTIVE:    VITAL SIGNS:  Vital Signs Last 24 Hrs  T(C): 36.9 (24 Jan 2018 15:49), Max: 36.9 (23 Jan 2018 21:18)  T(F): 98.4 (24 Jan 2018 15:49), Max: 98.5 (23 Jan 2018 21:18)  HR: 111 (24 Jan 2018 15:49) (88 - 111)  BP: 108/71 (24 Jan 2018 15:49) (91/49 - 108/71)  BP(mean): --  RR: 16 (24 Jan 2018 15:49) (16 - 18)  SpO2: 99% (24 Jan 2018 15:49) (98% - 100%)      01-23 @ 07:01  -  01-24 @ 07:00  --------------------------------------------------------  IN: 650 mL / OUT: 4250 mL / NET: -3600 mL      CAPILLARY BLOOD GLUCOSE  POCT Blood Glucose.: 140 mg/dL (24 Jan 2018 11:20)      PHYSICAL EXAM:  General: lying in bed in NAD  HEENT: EOMI anicteric  Neck: supple  Cardiovascular: S1, S2, RRR   Respiratory: CTA BL no w/r/r  Gastrointestinal: soft, distended, nontender, +BS  Extremities: no peripheral edema, wwp  Vascular: 2+ pulses radial; dp/pt  Neurological: alert awake oriented    MEDICATIONS:  MEDICATIONS  (STANDING):  ascorbic acid 250 milliGRAM(s) Oral daily  atorvastatin 20 milliGRAM(s) Oral at bedtime  dextrose 5%. 1000 milliLiter(s) (50 mL/Hr) IV Continuous <Continuous>  dextrose 50% Injectable 12.5 Gram(s) IV Push once  dextrose 50% Injectable 25 Gram(s) IV Push once  dextrose 50% Injectable 25 Gram(s) IV Push once  docusate sodium 100 milliGRAM(s) Oral three times a day  fluconAZOLE   Tablet 200 milliGRAM(s) Oral daily  haloperidol     Tablet 10 milliGRAM(s) Oral at bedtime  heparin  Injectable 5000 Unit(s) SubCutaneous every 8 hours  insulin lispro (HumaLOG) corrective regimen sliding scale   SubCutaneous Before meals and at bedtime  levETIRAcetam 500 milliGRAM(s) Oral two times a day  meropenem Injectable 1000 milliGRAM(s) IV Push every 8 hours  metFORMIN 1000 milliGRAM(s) Oral two times a day with meals  nystatin Powder 1 Application(s) Topical two times a day  oxybutynin 5 milliGRAM(s) Oral two times a day  polyethylene glycol 3350 17 Gram(s) Oral daily  senna 2 Tablet(s) Oral at bedtime  vancomycin  IVPB 1000 milliGRAM(s) IV Intermittent every 12 hours    MEDICATIONS  (PRN):  acetaminophen   Tablet 650 milliGRAM(s) Oral every 6 hours PRN For Temp greater than 38 C (100.4 F)  belladonna 16.2 mG/opium 60 mg Suppository 1 Suppository(s) Rectal every 6 hours PRN bladder spasms, bowles leaking  dextrose Gel 1 Dose(s) Oral once PRN Blood Glucose LESS THAN 70 milliGRAM(s)/deciliter  glucagon  Injectable 1 milliGRAM(s) IntraMuscular once PRN Glucose LESS THAN 70 milligrams/deciliter  haloperidol    Injectable 10 milliGRAM(s) IntraMuscular at bedtime PRN if refuses PO Haldol  mineral oil enema 133 milliLiter(s) Rectal daily PRN constipation      ALLERGIES:  Allergies    Capzasin Back and Body (Unknown)    Intolerances        LABS:                        8.4    6.8   )-----------( 304      ( 24 Jan 2018 06:15 )             28.3     01-24    134<L>  |  97  |  17  ----------------------------<  117<H>  4.9   |  23  |  0.67    Ca    9.5      24 Jan 2018 06:15  Mg     1.8     01-24            RADIOLOGY & ADDITIONAL TESTS: Reviewed.

## 2018-01-25 LAB
ANION GAP SERPL CALC-SCNC: 15 MMOL/L — SIGNIFICANT CHANGE UP (ref 5–17)
BUN SERPL-MCNC: 20 MG/DL — SIGNIFICANT CHANGE UP (ref 7–23)
CALCIUM SERPL-MCNC: 9.7 MG/DL — SIGNIFICANT CHANGE UP (ref 8.4–10.5)
CHLORIDE SERPL-SCNC: 95 MMOL/L — LOW (ref 96–108)
CO2 SERPL-SCNC: 24 MMOL/L — SIGNIFICANT CHANGE UP (ref 22–31)
CREAT SERPL-MCNC: 0.67 MG/DL — SIGNIFICANT CHANGE UP (ref 0.5–1.3)
CRP SERPL-MCNC: 6.51 MG/DL — HIGH (ref 0–0.4)
ERYTHROCYTE [SEDIMENTATION RATE] IN BLOOD: 69 MM/HR — HIGH
GLUCOSE BLDC GLUCOMTR-MCNC: 102 MG/DL — HIGH (ref 70–99)
GLUCOSE BLDC GLUCOMTR-MCNC: 177 MG/DL — HIGH (ref 70–99)
GLUCOSE BLDC GLUCOMTR-MCNC: 222 MG/DL — HIGH (ref 70–99)
GLUCOSE BLDC GLUCOMTR-MCNC: 98 MG/DL — SIGNIFICANT CHANGE UP (ref 70–99)
GLUCOSE SERPL-MCNC: 141 MG/DL — HIGH (ref 70–99)
HCT VFR BLD CALC: 28.1 % — LOW (ref 39–50)
HGB BLD-MCNC: 8.3 G/DL — LOW (ref 13–17)
MAGNESIUM SERPL-MCNC: 1.6 MG/DL — SIGNIFICANT CHANGE UP (ref 1.6–2.6)
MCHC RBC-ENTMCNC: 21.9 PG — LOW (ref 27–34)
MCHC RBC-ENTMCNC: 29.5 G/DL — LOW (ref 32–36)
MCV RBC AUTO: 74.1 FL — LOW (ref 80–100)
PHOSPHATE SERPL-MCNC: 4.5 MG/DL — SIGNIFICANT CHANGE UP (ref 2.5–4.5)
PLATELET # BLD AUTO: 317 K/UL — SIGNIFICANT CHANGE UP (ref 150–400)
POTASSIUM SERPL-MCNC: 5.1 MMOL/L — SIGNIFICANT CHANGE UP (ref 3.5–5.3)
POTASSIUM SERPL-SCNC: 5.1 MMOL/L — SIGNIFICANT CHANGE UP (ref 3.5–5.3)
RBC # BLD: 3.79 M/UL — LOW (ref 4.2–5.8)
RBC # FLD: 19.1 % — HIGH (ref 10.3–16.9)
SODIUM SERPL-SCNC: 134 MMOL/L — LOW (ref 135–145)
WBC # BLD: 8.2 K/UL — SIGNIFICANT CHANGE UP (ref 3.8–10.5)
WBC # FLD AUTO: 8.2 K/UL — SIGNIFICANT CHANGE UP (ref 3.8–10.5)

## 2018-01-25 PROCEDURE — 93010 ELECTROCARDIOGRAM REPORT: CPT | Mod: 77

## 2018-01-25 PROCEDURE — 93010 ELECTROCARDIOGRAM REPORT: CPT

## 2018-01-25 PROCEDURE — 99232 SBSQ HOSP IP/OBS MODERATE 35: CPT

## 2018-01-25 PROCEDURE — 99233 SBSQ HOSP IP/OBS HIGH 50: CPT | Mod: GC

## 2018-01-25 RX ORDER — SODIUM CHLORIDE 9 MG/ML
500 INJECTION INTRAMUSCULAR; INTRAVENOUS; SUBCUTANEOUS ONCE
Qty: 0 | Refills: 0 | Status: COMPLETED | OUTPATIENT
Start: 2018-01-25 | End: 2018-01-25

## 2018-01-25 RX ORDER — CIPROFLOXACIN LACTATE 400MG/40ML
500 VIAL (ML) INTRAVENOUS EVERY 12 HOURS
Qty: 0 | Refills: 0 | Status: DISCONTINUED | OUTPATIENT
Start: 2018-01-25 | End: 2018-01-26

## 2018-01-25 RX ORDER — MAGNESIUM SULFATE 500 MG/ML
1 VIAL (ML) INJECTION ONCE
Qty: 0 | Refills: 0 | Status: COMPLETED | OUTPATIENT
Start: 2018-01-25 | End: 2018-01-25

## 2018-01-25 RX ADMIN — LEVETIRACETAM 500 MILLIGRAM(S): 250 TABLET, FILM COATED ORAL at 06:22

## 2018-01-25 RX ADMIN — Medication 100 MILLIGRAM(S): at 22:39

## 2018-01-25 RX ADMIN — ATORVASTATIN CALCIUM 20 MILLIGRAM(S): 80 TABLET, FILM COATED ORAL at 22:39

## 2018-01-25 RX ADMIN — LEVETIRACETAM 500 MILLIGRAM(S): 250 TABLET, FILM COATED ORAL at 17:54

## 2018-01-25 RX ADMIN — NYSTATIN CREAM 1 APPLICATION(S): 100000 CREAM TOPICAL at 17:54

## 2018-01-25 RX ADMIN — FLUCONAZOLE 200 MILLIGRAM(S): 150 TABLET ORAL at 11:48

## 2018-01-25 RX ADMIN — METFORMIN HYDROCHLORIDE 1000 MILLIGRAM(S): 850 TABLET ORAL at 08:58

## 2018-01-25 RX ADMIN — HEPARIN SODIUM 5000 UNIT(S): 5000 INJECTION INTRAVENOUS; SUBCUTANEOUS at 06:22

## 2018-01-25 RX ADMIN — SENNA PLUS 2 TABLET(S): 8.6 TABLET ORAL at 22:40

## 2018-01-25 RX ADMIN — MEROPENEM 1000 MILLIGRAM(S): 1 INJECTION INTRAVENOUS at 00:18

## 2018-01-25 RX ADMIN — HEPARIN SODIUM 5000 UNIT(S): 5000 INJECTION INTRAVENOUS; SUBCUTANEOUS at 22:40

## 2018-01-25 RX ADMIN — HEPARIN SODIUM 5000 UNIT(S): 5000 INJECTION INTRAVENOUS; SUBCUTANEOUS at 13:16

## 2018-01-25 RX ADMIN — METFORMIN HYDROCHLORIDE 1000 MILLIGRAM(S): 850 TABLET ORAL at 17:54

## 2018-01-25 RX ADMIN — Medication 5 MILLIGRAM(S): at 17:54

## 2018-01-25 RX ADMIN — Medication 100 GRAM(S): at 08:58

## 2018-01-25 RX ADMIN — SODIUM CHLORIDE 2000 MILLILITER(S): 9 INJECTION INTRAMUSCULAR; INTRAVENOUS; SUBCUTANEOUS at 18:58

## 2018-01-25 RX ADMIN — Medication 5 MILLIGRAM(S): at 06:22

## 2018-01-25 RX ADMIN — HALOPERIDOL DECANOATE 10 MILLIGRAM(S): 100 INJECTION INTRAMUSCULAR at 22:40

## 2018-01-25 RX ADMIN — Medication 250 MILLIGRAM(S): at 06:23

## 2018-01-25 RX ADMIN — NYSTATIN CREAM 1 APPLICATION(S): 100000 CREAM TOPICAL at 06:22

## 2018-01-25 RX ADMIN — Medication 1 TABLET(S): at 17:54

## 2018-01-25 RX ADMIN — Medication 500 MILLIGRAM(S): at 17:54

## 2018-01-25 RX ADMIN — Medication 250 MILLIGRAM(S): at 11:48

## 2018-01-25 NOTE — PROGRESS NOTE ADULT - PROBLEM SELECTOR PLAN 2
Pt w/ no sensation or control of urination with suprapubic catheter changed once a month.   - suprapubic catheter changed 1/12 (to change again around 1/12)  -c/w oxybutynin 5mg BID    #yeast in urine  - c/w fluconazole (1/14-1/25 )  - f/u ID regarding duration of fluconazole  - UCx+ for Candida albicans

## 2018-01-25 NOTE — ADVANCED PRACTICE NURSE CONSULT - ASSESSMENT
Re-assessed pressure injuries to left ischium and sacrum. Both wounds clean with moderate amount of serosanguinous exudate. Periwound intact. Left IT pressure injury with 80% red, non - granulating tissue and 20% yellow slough measuring 4 cm x 4.5 cm x 3.5 cm with tunnelling of 4 cm between 10-11 o'clock. Sacral pressure injury with red, non-granulating tissue measuring 4cm x 4 cm x 4.3 cm with tunneling of 9 cm at 10 o'clock and 6.5 cm at 12 o'clock.  Pt on Envella bed for pressure injury relief.

## 2018-01-25 NOTE — PROGRESS NOTE ADULT - SUBJECTIVE AND OBJECTIVE BOX
Patient is a 32y old  Male who presents with a chief complaint of Chills (12 Dec 2017 18:17)      INTERVAL HPI/OVERNIGHT EVENTS:    aunt at bedside  pt w/o complaints.   afebrile        ROS  (- ) headache  ( -  )fevers/chills  ( - ) dyspnea  (  - ) cough  (  - ) chest pain  (  - ) palpatations  ( - ) dizziness/lightheadedness  (  - ) nausea/vomiting  (  - ) abd pain  (  - ) diarrhea  (  - ) melena  (  - ) hematochezia      ( - ) back pain  ( + ) tolerating POs  ( + ) BM  ROS: 12 point review of systems otherwise negative              MEDICATIONS  (STANDING):  amoxicillin  875 milliGRAM(s)/clavulanate 1 Tablet(s) Oral two times a day  ascorbic acid 250 milliGRAM(s) Oral daily  atorvastatin 20 milliGRAM(s) Oral at bedtime  ciprofloxacin     Tablet 500 milliGRAM(s) Oral every 12 hours  dextrose 5%. 1000 milliLiter(s) (50 mL/Hr) IV Continuous <Continuous>  dextrose 50% Injectable 12.5 Gram(s) IV Push once  dextrose 50% Injectable 25 Gram(s) IV Push once  dextrose 50% Injectable 25 Gram(s) IV Push once  docusate sodium 100 milliGRAM(s) Oral three times a day  haloperidol     Tablet 10 milliGRAM(s) Oral at bedtime  heparin  Injectable 5000 Unit(s) SubCutaneous every 8 hours  insulin lispro (HumaLOG) corrective regimen sliding scale   SubCutaneous Before meals and at bedtime  levETIRAcetam 500 milliGRAM(s) Oral two times a day  metFORMIN 1000 milliGRAM(s) Oral two times a day with meals  nystatin Powder 1 Application(s) Topical two times a day  oxybutynin 5 milliGRAM(s) Oral two times a day  polyethylene glycol 3350 17 Gram(s) Oral daily  senna 2 Tablet(s) Oral at bedtime  sodium chloride 0.9% Bolus 500 milliLiter(s) IV Bolus once    MEDICATIONS  (PRN):  acetaminophen   Tablet 650 milliGRAM(s) Oral every 6 hours PRN For Temp greater than 38 C (100.4 F)  belladonna 16.2 mG/opium 60 mg Suppository 1 Suppository(s) Rectal every 6 hours PRN bladder spasms, bowles leaking  bisacodyl Suppository 10 milliGRAM(s) Rectal daily PRN Constipation  dextrose Gel 1 Dose(s) Oral once PRN Blood Glucose LESS THAN 70 milliGRAM(s)/deciliter  glucagon  Injectable 1 milliGRAM(s) IntraMuscular once PRN Glucose LESS THAN 70 milligrams/deciliter  haloperidol    Injectable 10 milliGRAM(s) IntraMuscular at bedtime PRN if refuses PO Haldol  mineral oil enema 133 milliLiter(s) Rectal daily PRN constipation 1st line      Allergies    Capzasin Back and Body (Unknown)    Intolerances          Vital Signs Last 24 Hrs  T(C): 36.5 (25 Jan 2018 16:24), Max: 37.2 (25 Jan 2018 09:40)  T(F): 97.7 (25 Jan 2018 16:24), Max: 98.9 (25 Jan 2018 09:40)  HR: 110 (25 Jan 2018 16:24) (89 - 110)  BP: 112/70 (25 Jan 2018 16:24) (93/63 - 112/70)  BP(mean): --  RR: 17 (25 Jan 2018 16:24) (16 - 18)  SpO2: 99% (25 Jan 2018 16:24) (97% - 100%)  CAPILLARY BLOOD GLUCOSE      POCT Blood Glucose.: 222 mg/dL (25 Jan 2018 18:04)  POCT Blood Glucose.: 102 mg/dL (25 Jan 2018 12:03)  POCT Blood Glucose.: 177 mg/dL (25 Jan 2018 07:29)  POCT Blood Glucose.: 135 mg/dL (24 Jan 2018 21:45)      01-24 @ 07:01 - 01-25 @ 07:00  --------------------------------------------------------  IN: 0 mL / OUT: 4600 mL / NET: -4600 mL    01-25 @ 07:01 - 01-25 @ 18:34  --------------------------------------------------------  IN: 0 mL / OUT: 1300 mL / NET: -1300 mL        Physical Exam:    Daily     Daily   PHYSICAL EXAMINATION   General:well appearing   HEENT: ears no discharge, eyes PERRLA, nose no discharge, throat no exudates, normal tonsils.  Lungs: Clear to auscultation, no rales, no wheezes.  Cardio: Regular rate and rhythm, no murmurs, no rubs, no gallops. Good peripheral pulses.  Abdomen: Soft, non-tender, mildly distended, tympanic to percussion, no rebound, no guarding, no rigidity. Bowel sounds present. No suprapubic or CVA tenderness.   : Suprapubic catheter  Extremities: no LE edema  Skin: Sacral ulcer and bilateral buttock ulcers with wound vac in place. Clean.   Neuro: Alert and oriented x 3. Motor strength is 5/5 upper ext bl. t4 and below w/o sensation. Cranial nerves II-XII grossly intact.       LABS:                        8.3    8.2   )-----------( 317      ( 25 Jan 2018 06:13 )             28.1     01-25    134<L>  |  95<L>  |  20  ----------------------------<  141<H>  5.1   |  24  |  0.67    Ca    9.7      25 Jan 2018 06:13  Phos  4.5     01-25  Mg     1.6     01-25              RADIOLOGY & ADDITIONAL TESTS:

## 2018-01-25 NOTE — ADVANCED PRACTICE NURSE CONSULT - REASON FOR CONSULT
WOC nurse consult top re-assess stage 4 ischial tuberosity (IT) and sacral pressure ulcers (injuries). WOC nurse consult to re-assess stage 4 ischial tuberosity (IT) and sacral pressure ulcers (injuries).
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
## 2018-01-25 NOTE — PROGRESS NOTE ADULT - PROBLEM SELECTOR PLAN 1
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 Upon arrival pts wound was packed w/ feculent material and required debridement by plastic surgery.   - wound cx had citrobacter, enterococcus, klebsiella - now with wound vac   - general surgery does not believe there is a fistula between wound and rectum causing leakage of stool, no surgical intervention at this time.  - wound care following  - discontinued Augmentin and ciprofloxacin on 1/13  - s/p meropenem 1g q8h (1/13-1/19) for MDR UTI after spiking fever on 1/12  - stool cx neg, bcx (1/12) NGTD, ucx few yeast  - repeat blood cultures x 2 (1/22), f/u results  - restarted IV meropenem 1g q8h (1/22-1/25 ), IV Vancomycin 1g q12h (1/23-1/25)  - Now started Cipro and Augmentin for 2 weeks   - f/u ID recs

## 2018-01-25 NOTE — PROGRESS NOTE ADULT - SUBJECTIVE AND OBJECTIVE BOX
INTERVAL HPI/OVERNIGHT EVENTS:  Patient remains afebrile. RAHEL overnight, resting comfortably this AM.      ANTIBIOTICS/RELEVANT:  Meropenem  Vancomycin    MEDICATIONS  (STANDING):  ascorbic acid 250 milliGRAM(s) Oral daily  atorvastatin 20 milliGRAM(s) Oral at bedtime  dextrose 5%. 1000 milliLiter(s) (50 mL/Hr) IV Continuous <Continuous>  dextrose 50% Injectable 12.5 Gram(s) IV Push once  dextrose 50% Injectable 25 Gram(s) IV Push once  dextrose 50% Injectable 25 Gram(s) IV Push once  docusate sodium 100 milliGRAM(s) Oral three times a day  fluconAZOLE   Tablet 200 milliGRAM(s) Oral daily  haloperidol     Tablet 10 milliGRAM(s) Oral at bedtime  heparin  Injectable 5000 Unit(s) SubCutaneous every 8 hours  insulin lispro (HumaLOG) corrective regimen sliding scale   SubCutaneous Before meals and at bedtime  levETIRAcetam 500 milliGRAM(s) Oral two times a day  meropenem Injectable 1000 milliGRAM(s) IV Push every 8 hours  metFORMIN 1000 milliGRAM(s) Oral two times a day with meals  nystatin Powder 1 Application(s) Topical two times a day  oxybutynin 5 milliGRAM(s) Oral two times a day  polyethylene glycol 3350 17 Gram(s) Oral daily  senna 2 Tablet(s) Oral at bedtime  vancomycin  IVPB 1000 milliGRAM(s) IV Intermittent every 12 hours    MEDICATIONS  (PRN):  acetaminophen   Tablet 650 milliGRAM(s) Oral every 6 hours PRN For Temp greater than 38 C (100.4 F)  belladonna 16.2 mG/opium 60 mg Suppository 1 Suppository(s) Rectal every 6 hours PRN bladder spasms, bowles leaking  bisacodyl Suppository 10 milliGRAM(s) Rectal daily PRN Constipation  dextrose Gel 1 Dose(s) Oral once PRN Blood Glucose LESS THAN 70 milliGRAM(s)/deciliter  glucagon  Injectable 1 milliGRAM(s) IntraMuscular once PRN Glucose LESS THAN 70 milligrams/deciliter  haloperidol    Injectable 10 milliGRAM(s) IntraMuscular at bedtime PRN if refuses PO Haldol  mineral oil enema 133 milliLiter(s) Rectal daily PRN constipation        Vital Signs Last 24 Hrs  T(C): 37.2 (25 Jan 2018 09:40), Max: 37.2 (25 Jan 2018 09:40)  T(F): 98.9 (25 Jan 2018 09:40), Max: 98.9 (25 Jan 2018 09:40)  HR: 89 (25 Jan 2018 09:40) (89 - 111)  BP: 104/70 (25 Jan 2018 09:40) (93/63 - 108/71)  BP(mean): --  RR: 16 (25 Jan 2018 09:40) (16 - 18)  SpO2: 97% (25 Jan 2018 09:40) (97% - 100%)    01-24-18 @ 07:01  -  01-25-18 @ 07:00  --------------------------------------------------------  IN: 0 mL / OUT: 4600 mL / NET: -4600 mL      PHYSICAL EXAM:  Constitutional: Paraplegic, w/c bound  Eyes:ISIDRO, EOMI  Ear/Nose/Throat: no oral lesion, no sinus tenderness on percussion	  Neck:no JVD, no lymphadenopathy, supple  Respiratory: CTA jose  Cardiovascular: S1S2 RRR, no murmurs  Gastrointestinal:soft, (+) BS,(+) suprapubic catheter  Extremities: sacral decubitus with dressing      LABS:                        8.3    8.2   )-----------( 317      ( 25 Jan 2018 06:13 )             28.1     01-25    134<L>  |  95<L>  |  20  ----------------------------<  141<H>  5.1   |  24  |  0.67    Ca    9.7      25 Jan 2018 06:13  Phos  4.5     01-25  Mg     1.6     01-25            MICROBIOLOGY:    Culture - Urine (collected 22 Jan 2018 18:58)  Source: Suprapubic Suprapubic  Final Report (24 Jan 2018 13:35):    No growth    Culture - Blood (01.22.18 @ 04:30)    Specimen Source: .Blood Blood    Culture Results:   No growth at 3 days.        RADIOLOGY & ADDITIONAL STUDIES: Reviewed

## 2018-01-25 NOTE — PROGRESS NOTE ADULT - PROBLEM SELECTOR PLAN 3
Patient on metformin and Glimepiride at home. HgA1C of 6.0.   -ISS+ metformin 1000mg BID  -monitor FSG

## 2018-01-25 NOTE — PROGRESS NOTE ADULT - ATTENDING COMMENTS
Agree with above.  Patient was febrile several days ago, afebrile now.  Still with elevated ESR and CRP.  Has possible residual/chronic osteomyelits.  Can stop vancomycin, meropenem and fluconazole.  Can switch to PO cipro and PO augmentin x 14 days to complete antibiotic course.      Reconsult as needed

## 2018-01-25 NOTE — PROGRESS NOTE ADULT - ASSESSMENT
33yo M paraplegic, PMH of spinal cord injury (wheelchair bound), sacral pressure ulcer with osteo x2, DM2, indwelling suprapubic catheter who presented w/ septic shock secondary to infected purulent sacral 4th degree pressure ulcer with underlying inadequately treated OM.        -Patient remains afebrile with no leukocytosis  -Recommend discontinuing Meropenem and Vancomycin  -Patient can be discharged with PO Cipro 500 mg q12 and Augmentin 875mg q12 for 2 weeks  -Patient will need an outpatient ID follow-up 33yo M paraplegic, PMH of spinal cord injury (wheelchair bound), sacral pressure ulcer with osteo x2, DM2, indwelling suprapubic catheter who presented w/ septic shock secondary to infected purulent sacral 4th degree pressure ulcer with underlying inadequately treated OM.        -Patient remains afebrile with no leukocytosis  -Recommend discontinuing Meropenem and Vancomycin  -Patient can be discharged with PO Cipro 500 mg q12 and Augmentin 875mg q12 for 2 weeks  -Discontinue Fluconazole on 1/28  -Patient will need an outpatient ID follow-up 33yo M paraplegic, PMH of spinal cord injury (wheelchair bound), sacral pressure ulcer with osteo x2, DM2, indwelling suprapubic catheter who presented w/ septic shock secondary to infected purulent sacral 4th degree pressure ulcer with underlying inadequately treated OM.        -Patient remains afebrile with no leukocytosis  -Recommend discontinuing Meropenem and Vancomycin  -Patient can be discharged with PO Cipro 500 mg q12 and Augmentin 875mg q12 for 2 weeks  -Discontinue Fluconazole on 1/28  -Patient will need an outpatient ID follow-up   -Please reconsult ID if needed 33yo M paraplegic, PMH of spinal cord injury (wheelchair bound), sacral pressure ulcer with osteo x2, DM2, indwelling suprapubic catheter who presented w/ septic shock secondary to infected purulent sacral 4th degree pressure ulcer with underlying inadequately treated OM.        -Patient remains afebrile with no leukocytosis  -Recommend discontinuing Meropenem and Vancomycin  -Patient can be discharged with PO Cipro 500 mg q12 and Augmentin 875mg q12 for 2 weeks  -Discontinue Fluconazole   -Patient will need an outpatient ID follow-up   -Please reconsult ID if needed

## 2018-01-25 NOTE — ADVANCED PRACTICE NURSE CONSULT - ASSESSMENT
Packed tunneling with white foam, applied black foam to wounds, applied skin barrier to distal periwound edges, liquid skin barrier to periwound skin, bridged both wounds and placed track on left hip. Plaved VAC on 125 mm/Hg continuous low setting. Dressing to be changed Mondays and Thursday.
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
## 2018-01-25 NOTE — PROGRESS NOTE ADULT - SUBJECTIVE AND OBJECTIVE BOX
INTERVAL HPI / OVERNIGHT EVENTS: No acute events.     SUBJECTIVE: Patient seen and examined at bedside.    OBJECTIVE:    VITAL SIGNS:  Vital Signs Last 24 Hrs  T(C): 37.2 (25 Jan 2018 18:00), Max: 37.2 (25 Jan 2018 09:40)  T(F): 98.9 (25 Jan 2018 18:00), Max: 98.9 (25 Jan 2018 09:40)  HR: 128 (25 Jan 2018 18:00) (89 - 128)  BP: 110/60 (25 Jan 2018 18:00) (93/63 - 112/70)  BP(mean): --  RR: 17 (25 Jan 2018 18:00) (16 - 18)  SpO2: 98% (25 Jan 2018 18:00) (97% - 100%)      01-24 @ 07:01 - 01-25 @ 07:00  --------------------------------------------------------  IN: 0 mL / OUT: 4600 mL / NET: -4600 mL    01-25 @ 07:01  - 01-25 @ 19:29  --------------------------------------------------------  IN: 0 mL / OUT: 1800 mL / NET: -1800 mL      CAPILLARY BLOOD GLUCOSE  POCT Blood Glucose.: 222 mg/dL (25 Jan 2018 18:04)      PHYSICAL EXAM:  General: lying in bed in NAD  HEENT: EOMI anicteric  Neck: supple  Cardiovascular: S1, S2, RRR   Respiratory: CTA BL no w/r/r  Gastrointestinal: soft, distended, nontender, +BS  Extremities: no peripheral edema, wwp  Back: wound vac placed on sacral ulcer    Vascular: 2+ pulses radial; dp/pt  Neurological: alert awake oriented      MEDICATIONS:  MEDICATIONS  (STANDING):  amoxicillin  875 milliGRAM(s)/clavulanate 1 Tablet(s) Oral two times a day  ascorbic acid 250 milliGRAM(s) Oral daily  atorvastatin 20 milliGRAM(s) Oral at bedtime  ciprofloxacin     Tablet 500 milliGRAM(s) Oral every 12 hours  dextrose 5%. 1000 milliLiter(s) (50 mL/Hr) IV Continuous <Continuous>  dextrose 50% Injectable 12.5 Gram(s) IV Push once  dextrose 50% Injectable 25 Gram(s) IV Push once  dextrose 50% Injectable 25 Gram(s) IV Push once  docusate sodium 100 milliGRAM(s) Oral three times a day  haloperidol     Tablet 10 milliGRAM(s) Oral at bedtime  heparin  Injectable 5000 Unit(s) SubCutaneous every 8 hours  insulin lispro (HumaLOG) corrective regimen sliding scale   SubCutaneous Before meals and at bedtime  levETIRAcetam 500 milliGRAM(s) Oral two times a day  metFORMIN 1000 milliGRAM(s) Oral two times a day with meals  nystatin Powder 1 Application(s) Topical two times a day  oxybutynin 5 milliGRAM(s) Oral two times a day  polyethylene glycol 3350 17 Gram(s) Oral daily  senna 2 Tablet(s) Oral at bedtime    MEDICATIONS  (PRN):  acetaminophen   Tablet 650 milliGRAM(s) Oral every 6 hours PRN For Temp greater than 38 C (100.4 F)  belladonna 16.2 mG/opium 60 mg Suppository 1 Suppository(s) Rectal every 6 hours PRN bladder spasms, bowles leaking  bisacodyl Suppository 10 milliGRAM(s) Rectal daily PRN Constipation  dextrose Gel 1 Dose(s) Oral once PRN Blood Glucose LESS THAN 70 milliGRAM(s)/deciliter  glucagon  Injectable 1 milliGRAM(s) IntraMuscular once PRN Glucose LESS THAN 70 milligrams/deciliter  haloperidol    Injectable 10 milliGRAM(s) IntraMuscular at bedtime PRN if refuses PO Haldol  mineral oil enema 133 milliLiter(s) Rectal daily PRN constipation 1st line      ALLERGIES:  Allergies    Capzasin Back and Body (Unknown)    Intolerances        LABS:                        8.3    8.2   )-----------( 317      ( 25 Jan 2018 06:13 )             28.1     01-25    134<L>  |  95<L>  |  20  ----------------------------<  141<H>  5.1   |  24  |  0.67    Ca    9.7      25 Jan 2018 06:13  Phos  4.5     01-25  Mg     1.6     01-25            RADIOLOGY & ADDITIONAL TESTS: Reviewed.

## 2018-01-25 NOTE — PROGRESS NOTE ADULT - PROBLEM SELECTOR PLAN 7
-c/w home Lipitor medication    #Seizure Disorder:   pt w/ a reported hx of seizure disorder  -c/w home medication of Keppra 500mg BID    #Psychiatry Eval: Pt evaluated by psych and recommended he needs to be admitted to medical-psychiatric inpatient unit. Patient does not have capacity to make medical decisions.   -discontinued Aripiprazole in favor of haloperidol. Haloperidol 10 mg PO or IM. Patient does not have capacity to refuse and is court ordered to receive the haldol.

## 2018-01-26 DIAGNOSIS — R19.7 DIARRHEA, UNSPECIFIED: ICD-10-CM

## 2018-01-26 LAB
ANION GAP SERPL CALC-SCNC: 13 MMOL/L — SIGNIFICANT CHANGE UP (ref 5–17)
ANION GAP SERPL CALC-SCNC: 15 MMOL/L — SIGNIFICANT CHANGE UP (ref 5–17)
ANISOCYTOSIS BLD QL: SIGNIFICANT CHANGE UP
APPEARANCE UR: CLEAR — SIGNIFICANT CHANGE UP
BASOPHILS NFR BLD AUTO: 0.2 % — SIGNIFICANT CHANGE UP (ref 0–2)
BILIRUB UR-MCNC: NEGATIVE — SIGNIFICANT CHANGE UP
BUN SERPL-MCNC: 22 MG/DL — SIGNIFICANT CHANGE UP (ref 7–23)
BUN SERPL-MCNC: 23 MG/DL — SIGNIFICANT CHANGE UP (ref 7–23)
C DIFF BY PCR RESULT: POSITIVE
C DIFF GDH STL QL: SIGNIFICANT CHANGE UP
C DIFF GDH STL QL: SIGNIFICANT CHANGE UP
C DIFF TOX GENS STL QL NAA+PROBE: SIGNIFICANT CHANGE UP
CALCIUM SERPL-MCNC: 8.2 MG/DL — LOW (ref 8.4–10.5)
CALCIUM SERPL-MCNC: 8.4 MG/DL — SIGNIFICANT CHANGE UP (ref 8.4–10.5)
CHLORIDE SERPL-SCNC: 96 MMOL/L — SIGNIFICANT CHANGE UP (ref 96–108)
CHLORIDE SERPL-SCNC: 97 MMOL/L — SIGNIFICANT CHANGE UP (ref 96–108)
CO2 SERPL-SCNC: 21 MMOL/L — LOW (ref 22–31)
CO2 SERPL-SCNC: 23 MMOL/L — SIGNIFICANT CHANGE UP (ref 22–31)
COLOR SPEC: YELLOW — SIGNIFICANT CHANGE UP
CREAT SERPL-MCNC: 0.87 MG/DL — SIGNIFICANT CHANGE UP (ref 0.5–1.3)
CREAT SERPL-MCNC: 0.91 MG/DL — SIGNIFICANT CHANGE UP (ref 0.5–1.3)
CRP SERPL-MCNC: 5.48 MG/DL — HIGH (ref 0–0.4)
DACRYOCYTES BLD QL SMEAR: SLIGHT — SIGNIFICANT CHANGE UP
DIFF PNL FLD: NEGATIVE — SIGNIFICANT CHANGE UP
EOSINOPHIL NFR BLD AUTO: 0.3 % — SIGNIFICANT CHANGE UP (ref 0–6)
ERYTHROCYTE [SEDIMENTATION RATE] IN BLOOD: 52 MM/HR — HIGH
GIANT PLATELETS BLD QL SMEAR: PRESENT — SIGNIFICANT CHANGE UP
GLUCOSE BLDC GLUCOMTR-MCNC: 154 MG/DL — HIGH (ref 70–99)
GLUCOSE BLDC GLUCOMTR-MCNC: 180 MG/DL — HIGH (ref 70–99)
GLUCOSE BLDC GLUCOMTR-MCNC: 194 MG/DL — HIGH (ref 70–99)
GLUCOSE BLDC GLUCOMTR-MCNC: 216 MG/DL — HIGH (ref 70–99)
GLUCOSE BLDC GLUCOMTR-MCNC: 252 MG/DL — HIGH (ref 70–99)
GLUCOSE SERPL-MCNC: 165 MG/DL — HIGH (ref 70–99)
GLUCOSE SERPL-MCNC: 197 MG/DL — HIGH (ref 70–99)
GLUCOSE UR QL: NEGATIVE — SIGNIFICANT CHANGE UP
HCT VFR BLD CALC: 24.5 % — LOW (ref 39–50)
HCT VFR BLD CALC: 24.7 % — LOW (ref 39–50)
HGB BLD-MCNC: 7.5 G/DL — LOW (ref 13–17)
HGB BLD-MCNC: 7.6 G/DL — LOW (ref 13–17)
HYPOCHROMIA BLD QL: SLIGHT — SIGNIFICANT CHANGE UP
KETONES UR-MCNC: NEGATIVE — SIGNIFICANT CHANGE UP
LACTATE SERPL-SCNC: 1.7 MMOL/L — SIGNIFICANT CHANGE UP (ref 0.5–2)
LACTATE SERPL-SCNC: 2.5 MMOL/L — HIGH (ref 0.5–2)
LEUKOCYTE ESTERASE UR-ACNC: (no result)
LG PLATELETS BLD QL AUTO: PRESENT — SIGNIFICANT CHANGE UP
LYMPHOCYTES # BLD AUTO: 4 % — LOW (ref 13–44)
LYMPHOCYTES # BLD AUTO: 7.7 % — LOW (ref 13–44)
MAGNESIUM SERPL-MCNC: 1.2 MG/DL — LOW (ref 1.6–2.6)
MANUAL DIF COMMENT BLD-IMP: SIGNIFICANT CHANGE UP
MANUAL SMEAR VERIFICATION: SIGNIFICANT CHANGE UP
MCHC RBC-ENTMCNC: 22.1 PG — LOW (ref 27–34)
MCHC RBC-ENTMCNC: 22.5 PG — LOW (ref 27–34)
MCHC RBC-ENTMCNC: 30.4 G/DL — LOW (ref 32–36)
MCHC RBC-ENTMCNC: 31 G/DL — LOW (ref 32–36)
MCV RBC AUTO: 72.5 FL — LOW (ref 80–100)
MCV RBC AUTO: 72.6 FL — LOW (ref 80–100)
MICROCYTES BLD QL: SIGNIFICANT CHANGE UP
MONOCYTES NFR BLD AUTO: 10.8 % — SIGNIFICANT CHANGE UP (ref 2–14)
MONOCYTES NFR BLD AUTO: 6 % — SIGNIFICANT CHANGE UP (ref 2–14)
NEUTROPHILS NFR BLD AUTO: 57 % — SIGNIFICANT CHANGE UP (ref 43–77)
NEUTROPHILS NFR BLD AUTO: 81 % — HIGH (ref 43–77)
NEUTS BAND # BLD: 33 % — HIGH
NITRITE UR-MCNC: NEGATIVE — SIGNIFICANT CHANGE UP
OVALOCYTES BLD QL SMEAR: SLIGHT — SIGNIFICANT CHANGE UP
PH UR: 6 — SIGNIFICANT CHANGE UP (ref 5–8)
PLAT MORPH BLD: (no result)
PLATELET # BLD AUTO: 315 K/UL — SIGNIFICANT CHANGE UP (ref 150–400)
PLATELET # BLD AUTO: 320 K/UL — SIGNIFICANT CHANGE UP (ref 150–400)
POIKILOCYTOSIS BLD QL AUTO: SLIGHT — SIGNIFICANT CHANGE UP
POLYCHROMASIA BLD QL SMEAR: SLIGHT — SIGNIFICANT CHANGE UP
POTASSIUM SERPL-MCNC: 4.5 MMOL/L — SIGNIFICANT CHANGE UP (ref 3.5–5.3)
POTASSIUM SERPL-MCNC: 4.5 MMOL/L — SIGNIFICANT CHANGE UP (ref 3.5–5.3)
POTASSIUM SERPL-SCNC: 4.5 MMOL/L — SIGNIFICANT CHANGE UP (ref 3.5–5.3)
POTASSIUM SERPL-SCNC: 4.5 MMOL/L — SIGNIFICANT CHANGE UP (ref 3.5–5.3)
PROT UR-MCNC: NEGATIVE MG/DL — SIGNIFICANT CHANGE UP
RBC # BLD: 3.38 M/UL — LOW (ref 4.2–5.8)
RBC # BLD: 3.4 M/UL — LOW (ref 4.2–5.8)
RBC # FLD: 18.9 % — HIGH (ref 10.3–16.9)
RBC # FLD: 19 % — HIGH (ref 10.3–16.9)
RBC BLD AUTO: (no result)
SMUDGE CELLS # BLD: SIGNIFICANT CHANGE UP
SODIUM SERPL-SCNC: 132 MMOL/L — LOW (ref 135–145)
SODIUM SERPL-SCNC: 133 MMOL/L — LOW (ref 135–145)
SP GR SPEC: <=1.005 — SIGNIFICANT CHANGE UP (ref 1–1.03)
SPHEROCYTES BLD QL SMEAR: SLIGHT — SIGNIFICANT CHANGE UP
UROBILINOGEN FLD QL: 0.2 E.U./DL — SIGNIFICANT CHANGE UP
WBC # BLD: 19.4 K/UL — HIGH (ref 3.8–10.5)
WBC # BLD: 20.8 K/UL — HIGH (ref 3.8–10.5)
WBC # FLD AUTO: 19.4 K/UL — HIGH (ref 3.8–10.5)
WBC # FLD AUTO: 20.8 K/UL — HIGH (ref 3.8–10.5)

## 2018-01-26 PROCEDURE — 93010 ELECTROCARDIOGRAM REPORT: CPT

## 2018-01-26 PROCEDURE — 74019 RADEX ABDOMEN 2 VIEWS: CPT | Mod: 26

## 2018-01-26 PROCEDURE — 71045 X-RAY EXAM CHEST 1 VIEW: CPT | Mod: 26

## 2018-01-26 PROCEDURE — 99233 SBSQ HOSP IP/OBS HIGH 50: CPT | Mod: GC

## 2018-01-26 PROCEDURE — 99233 SBSQ HOSP IP/OBS HIGH 50: CPT

## 2018-01-26 RX ORDER — SODIUM CHLORIDE 9 MG/ML
1 INJECTION INTRAMUSCULAR; INTRAVENOUS; SUBCUTANEOUS ONCE
Qty: 0 | Refills: 0 | Status: DISCONTINUED | OUTPATIENT
Start: 2018-01-26 | End: 2018-01-26

## 2018-01-26 RX ORDER — VANCOMYCIN HCL 1 G
125 VIAL (EA) INTRAVENOUS EVERY 6 HOURS
Qty: 0 | Refills: 0 | Status: DISCONTINUED | OUTPATIENT
Start: 2018-01-26 | End: 2018-01-26

## 2018-01-26 RX ORDER — MEROPENEM 1 G/30ML
1000 INJECTION INTRAVENOUS ONCE
Qty: 0 | Refills: 0 | Status: COMPLETED | OUTPATIENT
Start: 2018-01-26 | End: 2018-01-26

## 2018-01-26 RX ORDER — VANCOMYCIN HCL 1 G
VIAL (EA) INTRAVENOUS
Qty: 0 | Refills: 0 | Status: DISCONTINUED | OUTPATIENT
Start: 2018-01-26 | End: 2018-01-26

## 2018-01-26 RX ORDER — MEROPENEM 1 G/30ML
1000 INJECTION INTRAVENOUS EVERY 8 HOURS
Qty: 0 | Refills: 0 | Status: DISCONTINUED | OUTPATIENT
Start: 2018-01-26 | End: 2018-01-26

## 2018-01-26 RX ORDER — SODIUM CHLORIDE 9 MG/ML
1000 INJECTION INTRAMUSCULAR; INTRAVENOUS; SUBCUTANEOUS ONCE
Qty: 0 | Refills: 0 | Status: COMPLETED | OUTPATIENT
Start: 2018-01-26 | End: 2018-01-26

## 2018-01-26 RX ORDER — VANCOMYCIN HCL 1 G
1000 VIAL (EA) INTRAVENOUS ONCE
Qty: 0 | Refills: 0 | Status: COMPLETED | OUTPATIENT
Start: 2018-01-26 | End: 2018-01-26

## 2018-01-26 RX ORDER — MEROPENEM 1 G/30ML
1000 INJECTION INTRAVENOUS EVERY 8 HOURS
Qty: 0 | Refills: 0 | Status: DISCONTINUED | OUTPATIENT
Start: 2018-01-26 | End: 2018-01-29

## 2018-01-26 RX ORDER — MAGNESIUM SULFATE 500 MG/ML
4 VIAL (ML) INJECTION ONCE
Qty: 0 | Refills: 0 | Status: COMPLETED | OUTPATIENT
Start: 2018-01-26 | End: 2018-01-26

## 2018-01-26 RX ORDER — MEROPENEM 1 G/30ML
INJECTION INTRAVENOUS
Qty: 0 | Refills: 0 | Status: DISCONTINUED | OUTPATIENT
Start: 2018-01-26 | End: 2018-01-26

## 2018-01-26 RX ORDER — VANCOMYCIN HCL 1 G
500 VIAL (EA) INTRAVENOUS ONCE
Qty: 0 | Refills: 0 | Status: COMPLETED | OUTPATIENT
Start: 2018-01-26 | End: 2018-01-26

## 2018-01-26 RX ORDER — VANCOMYCIN HCL 1 G
1500 VIAL (EA) INTRAVENOUS EVERY 12 HOURS
Qty: 0 | Refills: 0 | Status: DISCONTINUED | OUTPATIENT
Start: 2018-01-26 | End: 2018-01-27

## 2018-01-26 RX ORDER — IBUPROFEN 200 MG
400 TABLET ORAL EVERY 4 HOURS
Qty: 0 | Refills: 0 | Status: COMPLETED | OUTPATIENT
Start: 2018-01-26 | End: 2018-01-26

## 2018-01-26 RX ADMIN — Medication 650 MILLIGRAM(S): at 01:31

## 2018-01-26 RX ADMIN — HALOPERIDOL DECANOATE 10 MILLIGRAM(S): 100 INJECTION INTRAMUSCULAR at 21:57

## 2018-01-26 RX ADMIN — Medication 250 MILLIGRAM(S): at 11:24

## 2018-01-26 RX ADMIN — Medication 400 MILLIGRAM(S): at 12:00

## 2018-01-26 RX ADMIN — LEVETIRACETAM 500 MILLIGRAM(S): 250 TABLET, FILM COATED ORAL at 18:32

## 2018-01-26 RX ADMIN — HEPARIN SODIUM 5000 UNIT(S): 5000 INJECTION INTRAVENOUS; SUBCUTANEOUS at 21:57

## 2018-01-26 RX ADMIN — Medication 300 MILLIGRAM(S): at 16:31

## 2018-01-26 RX ADMIN — Medication 4: at 18:31

## 2018-01-26 RX ADMIN — Medication 400 MILLIGRAM(S): at 16:00

## 2018-01-26 RX ADMIN — Medication 6: at 12:53

## 2018-01-26 RX ADMIN — Medication 2: at 22:03

## 2018-01-26 RX ADMIN — Medication 1 TABLET(S): at 05:53

## 2018-01-26 RX ADMIN — Medication 400 MILLIGRAM(S): at 05:52

## 2018-01-26 RX ADMIN — SODIUM CHLORIDE 4000 MILLILITER(S): 9 INJECTION INTRAMUSCULAR; INTRAVENOUS; SUBCUTANEOUS at 03:20

## 2018-01-26 RX ADMIN — Medication 400 MILLIGRAM(S): at 15:21

## 2018-01-26 RX ADMIN — Medication 2: at 10:06

## 2018-01-26 RX ADMIN — Medication 400 MILLIGRAM(S): at 11:24

## 2018-01-26 RX ADMIN — Medication 100 GRAM(S): at 12:45

## 2018-01-26 RX ADMIN — Medication 5 MILLIGRAM(S): at 17:58

## 2018-01-26 RX ADMIN — Medication 125 MILLIGRAM(S): at 11:23

## 2018-01-26 RX ADMIN — LEVETIRACETAM 500 MILLIGRAM(S): 250 TABLET, FILM COATED ORAL at 05:52

## 2018-01-26 RX ADMIN — MEROPENEM 1000 MILLIGRAM(S): 1 INJECTION INTRAVENOUS at 21:58

## 2018-01-26 RX ADMIN — SODIUM CHLORIDE 2000 MILLILITER(S): 9 INJECTION INTRAMUSCULAR; INTRAVENOUS; SUBCUTANEOUS at 01:58

## 2018-01-26 RX ADMIN — NYSTATIN CREAM 1 APPLICATION(S): 100000 CREAM TOPICAL at 17:57

## 2018-01-26 RX ADMIN — Medication 100 MILLIGRAM(S): at 11:23

## 2018-01-26 RX ADMIN — NYSTATIN CREAM 1 APPLICATION(S): 100000 CREAM TOPICAL at 05:59

## 2018-01-26 RX ADMIN — SODIUM CHLORIDE 2000 MILLILITER(S): 9 INJECTION INTRAMUSCULAR; INTRAVENOUS; SUBCUTANEOUS at 09:16

## 2018-01-26 RX ADMIN — Medication 400 MILLIGRAM(S): at 06:24

## 2018-01-26 RX ADMIN — Medication 5 MILLIGRAM(S): at 05:53

## 2018-01-26 RX ADMIN — Medication 250 MILLIGRAM(S): at 03:56

## 2018-01-26 RX ADMIN — METFORMIN HYDROCHLORIDE 1000 MILLIGRAM(S): 850 TABLET ORAL at 17:56

## 2018-01-26 RX ADMIN — HEPARIN SODIUM 5000 UNIT(S): 5000 INJECTION INTRAVENOUS; SUBCUTANEOUS at 05:53

## 2018-01-26 RX ADMIN — HEPARIN SODIUM 5000 UNIT(S): 5000 INJECTION INTRAVENOUS; SUBCUTANEOUS at 15:21

## 2018-01-26 RX ADMIN — MEROPENEM 100 MILLIGRAM(S): 1 INJECTION INTRAVENOUS at 07:15

## 2018-01-26 RX ADMIN — Medication 500 MILLIGRAM(S): at 05:52

## 2018-01-26 RX ADMIN — ATORVASTATIN CALCIUM 20 MILLIGRAM(S): 80 TABLET, FILM COATED ORAL at 21:57

## 2018-01-26 RX ADMIN — Medication 100 MILLIGRAM(S): at 05:53

## 2018-01-26 RX ADMIN — MEROPENEM 1000 MILLIGRAM(S): 1 INJECTION INTRAVENOUS at 17:56

## 2018-01-26 RX ADMIN — METFORMIN HYDROCHLORIDE 1000 MILLIGRAM(S): 850 TABLET ORAL at 08:36

## 2018-01-26 NOTE — PROGRESS NOTE ADULT - PROBLEM SELECTOR PLAN 1
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 Upon arrival pts wound was packed w/ feculent material and required debridement by plastic surgery.   - wound cx had citrobacter, enterococcus, klebsiella - now with wound vac   - general surgery does not believe there is a fistula between wound and rectum causing leakage of stool, no surgical intervention at this time.  - wound care following  - discontinued Augmentin and ciprofloxacin on 1/13  - s/p meropenem 1g q8h (1/13-1/19) for MDR UTI after spiking fever on 1/12  - stool cx neg, bcx (1/12) NGTD, ucx few yeast  - repeat blood cultures x 2 (1/22), f/u results  - restarted IV meropenem 1g q8h (1/22-1/25 ), IV Vancomycin 1g q12h (1/23-1/25)  - Now started Cipro and Augmentin for 2 weeks   - f/u ID recs Pt w/ a stage 4 infected sacral ulcer w/ purulence. Upon arrival pts wound was packed w/ feculent material and required debridement by plastic surgery.   - wound cx had citrobacter, enterococcus, klebsiella - now with wound vac   - general surgery does not believe there is a fistula between wound and rectum causing leakage of stool, no surgical intervention at this time.  - wound care following  - discontinued Augmentin and ciprofloxacin on 1/13  - s/p meropenem 1g q8h (1/13-1/19) for MDR UTI after spiking fever on 1/12  - stool cx neg, bcx (1/12) NGTD, ucx few yeast  - repeat blood cultures x 2 (1/22), f/u results  - restarted IV meropenem 1g q8h (1/22-1/25 ), IV Vancomycin 1g q12h (1/23-1/25)  - d/c Cipro and Augmentin and restarted on meropenem and vancomycin IV and PO vancomycin, check for C. diff   - f/u ID recs

## 2018-01-26 NOTE — PROGRESS NOTE ADULT - SUBJECTIVE AND OBJECTIVE BOX
INTERVAL HPI/OVERNIGHT EVENTS: Rapid Response Team called. Overnight patient was febrile to 102 orally, tachycardiac to 150s, 650mg of tylenol 1L bolus NS given.      SUBJECTIVE: Patient seen and examined at bedside. Patient denies SOB. Patient is upset about his haldol medication.     OBJECTIVE:    VITAL SIGNS:  Vital Signs Last 24 Hrs  T(C): 36.7 (26 Jan 2018 15:29), Max: 39.2 (26 Jan 2018 03:10)  T(F): 98 (26 Jan 2018 15:29), Max: 102.5 (26 Jan 2018 03:10)  HR: 112 (26 Jan 2018 15:29) (80 - 150)  BP: 128/65 (26 Jan 2018 15:29) (83/49 - 128/65)  BP(mean): --  RR: 18 (26 Jan 2018 15:29) (16 - 22)  SpO2: 98% (26 Jan 2018 15:29) (96% - 100%)      01-25 @ 07:01 - 01-26 @ 07:00  --------------------------------------------------------  IN: 0 mL / OUT: 4600 mL / NET: -4600 mL    01-26 @ 07:01 - 01-26 @ 17:33  --------------------------------------------------------  IN: 0 mL / OUT: 1700 mL / NET: -1700 mL      CAPILLARY BLOOD GLUCOSE  POCT Blood Glucose.: 216 mg/dL (26 Jan 2018 17:26)      PHYSICAL EXAM:  Constitutional: Laying in bed comfortably, patient is paraplegic   HEENT: PERRLA, EOMI, conjunctiva clear,   Neck: supple, nontender, no JVD, no   Respiratory: CTA jose  Cardiovascular: S1S2 RRR, no murmurs  Gastrointestinal: soft, (+) BS,(+) suprapubic catheter  Extremities: sacral decubitus with dressing    MEDICATIONS:  MEDICATIONS  (STANDING):  ascorbic acid 250 milliGRAM(s) Oral daily  atorvastatin 20 milliGRAM(s) Oral at bedtime  dextrose 5%. 1000 milliLiter(s) (50 mL/Hr) IV Continuous <Continuous>  dextrose 50% Injectable 12.5 Gram(s) IV Push once  dextrose 50% Injectable 25 Gram(s) IV Push once  dextrose 50% Injectable 25 Gram(s) IV Push once  docusate sodium 100 milliGRAM(s) Oral three times a day  haloperidol     Tablet 10 milliGRAM(s) Oral at bedtime  heparin  Injectable 5000 Unit(s) SubCutaneous every 8 hours  insulin lispro (HumaLOG) corrective regimen sliding scale   SubCutaneous Before meals and at bedtime  levETIRAcetam 500 milliGRAM(s) Oral two times a day  meropenem Injectable 1000 milliGRAM(s) IV Push every 8 hours  metFORMIN 1000 milliGRAM(s) Oral two times a day with meals  nystatin Powder 1 Application(s) Topical two times a day  oxybutynin 5 milliGRAM(s) Oral two times a day  vancomycin  IVPB 1500 milliGRAM(s) IV Intermittent every 12 hours    MEDICATIONS  (PRN):  acetaminophen   Tablet 650 milliGRAM(s) Oral every 6 hours PRN For Temp greater than 38 C (100.4 F)  belladonna 16.2 mG/opium 60 mg Suppository 1 Suppository(s) Rectal every 6 hours PRN bladder spasms, bowles leaking  bisacodyl Suppository 10 milliGRAM(s) Rectal daily PRN Constipation  dextrose Gel 1 Dose(s) Oral once PRN Blood Glucose LESS THAN 70 milliGRAM(s)/deciliter  glucagon  Injectable 1 milliGRAM(s) IntraMuscular once PRN Glucose LESS THAN 70 milligrams/deciliter  haloperidol    Injectable 10 milliGRAM(s) IntraMuscular at bedtime PRN if refuses PO Haldol  mineral oil enema 133 milliLiter(s) Rectal daily PRN constipation 1st line      ALLERGIES:  Allergies    Capzasin Back and Body (Unknown)    Intolerances        LABS:                        7.5    20.8  )-----------( 315      ( 26 Jan 2018 06:08 )             24.7     01-26    133<L>  |  97  |  23  ----------------------------<  197<H>  4.5   |  23  |  0.91    Ca    8.4      26 Jan 2018 06:08  Phos  4.5     01-25  Mg     1.2     01-26        Urinalysis Basic - ( 26 Jan 2018 12:35 )    Color: Yellow / Appearance: Clear / SG: <=1.005 / pH: x  Gluc: x / Ketone: NEGATIVE  / Bili: Negative / Urobili: 0.2 E.U./dL   Blood: x / Protein: NEGATIVE mg/dL / Nitrite: NEGATIVE   Leuk Esterase: Small / RBC: 5-10 /HPF / WBC < 5 /HPF   Sq Epi: x / Non Sq Epi: 0-5 /HPF / Bacteria: Present /HPF        RADIOLOGY & ADDITIONAL TESTS: Reviewed. INTERVAL HPI/OVERNIGHT EVENTS: Rapid Response Team called. Overnight patient was febrile to 102 orally, tachycardiac to 150s, 650mg of Tylenol, hypotensive to  Full fever work - up completed by the night team. Given 2L bolus overnight. Restarted on IV meropenem and vancomycin.     SUBJECTIVE: Patient seen and examined at bedside. Patient denies SOB. Patient complains of fevers and chills. Patient is having palpitations. Patient is upset about his haldol medication - feels that it is making his heart rate go up.     OBJECTIVE:    VITAL SIGNS:  Vital Signs Last 24 Hrs  T(C): 36.7 (26 Jan 2018 15:29), Max: 39.2 (26 Jan 2018 03:10)  T(F): 98 (26 Jan 2018 15:29), Max: 102.5 (26 Jan 2018 03:10)  HR: 112 (26 Jan 2018 15:29) (80 - 150)  BP: 128/65 (26 Jan 2018 15:29) (83/49 - 128/65)  BP(mean): --  RR: 18 (26 Jan 2018 15:29) (16 - 22)  SpO2: 98% (26 Jan 2018 15:29) (96% - 100%)      01-25 @ 07:01 - 01-26 @ 07:00  --------------------------------------------------------  IN: 0 mL / OUT: 4600 mL / NET: -4600 mL    01-26 @ 07:01 - 01-26 @ 17:33  --------------------------------------------------------  IN: 0 mL / OUT: 1700 mL / NET: -1700 mL      CAPILLARY BLOOD GLUCOSE  POCT Blood Glucose.: 216 mg/dL (26 Jan 2018 17:26)      PHYSICAL EXAM:  Constitutional: Laying in bed comfortably, patient is paraplegic   HEENT: PERRLA, EOMI, conjunctiva clear,   Neck: supple, nontender, no JVD, no   Respiratory: CTA jose  Cardiovascular: S1S2 RRR, no murmurs  Gastrointestinal: soft, (+) BS,(+) suprapubic catheter  Extremities: sacral decubitus with dressing    MEDICATIONS:  MEDICATIONS  (STANDING):  ascorbic acid 250 milliGRAM(s) Oral daily  atorvastatin 20 milliGRAM(s) Oral at bedtime  dextrose 5%. 1000 milliLiter(s) (50 mL/Hr) IV Continuous <Continuous>  dextrose 50% Injectable 12.5 Gram(s) IV Push once  dextrose 50% Injectable 25 Gram(s) IV Push once  dextrose 50% Injectable 25 Gram(s) IV Push once  docusate sodium 100 milliGRAM(s) Oral three times a day  haloperidol     Tablet 10 milliGRAM(s) Oral at bedtime  heparin  Injectable 5000 Unit(s) SubCutaneous every 8 hours  insulin lispro (HumaLOG) corrective regimen sliding scale   SubCutaneous Before meals and at bedtime  levETIRAcetam 500 milliGRAM(s) Oral two times a day  meropenem Injectable 1000 milliGRAM(s) IV Push every 8 hours  metFORMIN 1000 milliGRAM(s) Oral two times a day with meals  nystatin Powder 1 Application(s) Topical two times a day  oxybutynin 5 milliGRAM(s) Oral two times a day  vancomycin  IVPB 1500 milliGRAM(s) IV Intermittent every 12 hours    MEDICATIONS  (PRN):  acetaminophen   Tablet 650 milliGRAM(s) Oral every 6 hours PRN For Temp greater than 38 C (100.4 F)  belladonna 16.2 mG/opium 60 mg Suppository 1 Suppository(s) Rectal every 6 hours PRN bladder spasms, bowles leaking  bisacodyl Suppository 10 milliGRAM(s) Rectal daily PRN Constipation  dextrose Gel 1 Dose(s) Oral once PRN Blood Glucose LESS THAN 70 milliGRAM(s)/deciliter  glucagon  Injectable 1 milliGRAM(s) IntraMuscular once PRN Glucose LESS THAN 70 milligrams/deciliter  haloperidol    Injectable 10 milliGRAM(s) IntraMuscular at bedtime PRN if refuses PO Haldol  mineral oil enema 133 milliLiter(s) Rectal daily PRN constipation 1st line      ALLERGIES:  Allergies    Capzasin Back and Body (Unknown)    Intolerances        LABS:                        7.5    20.8  )-----------( 315      ( 26 Jan 2018 06:08 )             24.7     01-26    133<L>  |  97  |  23  ----------------------------<  197<H>  4.5   |  23  |  0.91    Ca    8.4      26 Jan 2018 06:08  Phos  4.5     01-25  Mg     1.2     01-26        Urinalysis Basic - ( 26 Jan 2018 12:35 )    Color: Yellow / Appearance: Clear / SG: <=1.005 / pH: x  Gluc: x / Ketone: NEGATIVE  / Bili: Negative / Urobili: 0.2 E.U./dL   Blood: x / Protein: NEGATIVE mg/dL / Nitrite: NEGATIVE   Leuk Esterase: Small / RBC: 5-10 /HPF / WBC < 5 /HPF   Sq Epi: x / Non Sq Epi: 0-5 /HPF / Bacteria: Present /HPF        RADIOLOGY & ADDITIONAL TESTS: Reviewed.

## 2018-01-26 NOTE — PROGRESS NOTE ADULT - SUBJECTIVE AND OBJECTIVE BOX
INTERVAL HPI/OVERNIGHT EVENTS:  Febrile overnight. Resting comfortably.      ANTIBIOTICS/RELEVANT:  Vancomycin    MEDICATIONS  (STANDING):  ascorbic acid 250 milliGRAM(s) Oral daily  atorvastatin 20 milliGRAM(s) Oral at bedtime  dextrose 5%. 1000 milliLiter(s) (50 mL/Hr) IV Continuous <Continuous>  dextrose 50% Injectable 12.5 Gram(s) IV Push once  dextrose 50% Injectable 25 Gram(s) IV Push once  dextrose 50% Injectable 25 Gram(s) IV Push once  docusate sodium 100 milliGRAM(s) Oral three times a day  haloperidol     Tablet 10 milliGRAM(s) Oral at bedtime  heparin  Injectable 5000 Unit(s) SubCutaneous every 8 hours  ibuprofen  Tablet 400 milliGRAM(s) Oral every 4 hours  insulin lispro (HumaLOG) corrective regimen sliding scale   SubCutaneous Before meals and at bedtime  levETIRAcetam 500 milliGRAM(s) Oral two times a day  metFORMIN 1000 milliGRAM(s) Oral two times a day with meals  nystatin Powder 1 Application(s) Topical two times a day  oxybutynin 5 milliGRAM(s) Oral two times a day  polyethylene glycol 3350 17 Gram(s) Oral daily  senna 2 Tablet(s) Oral at bedtime  vancomycin    Solution 125 milliGRAM(s) Oral every 6 hours  vancomycin  IVPB 1500 milliGRAM(s) IV Intermittent every 12 hours    MEDICATIONS  (PRN):  acetaminophen   Tablet 650 milliGRAM(s) Oral every 6 hours PRN For Temp greater than 38 C (100.4 F)  belladonna 16.2 mG/opium 60 mg Suppository 1 Suppository(s) Rectal every 6 hours PRN bladder spasms, bowles leaking  bisacodyl Suppository 10 milliGRAM(s) Rectal daily PRN Constipation  dextrose Gel 1 Dose(s) Oral once PRN Blood Glucose LESS THAN 70 milliGRAM(s)/deciliter  glucagon  Injectable 1 milliGRAM(s) IntraMuscular once PRN Glucose LESS THAN 70 milligrams/deciliter  haloperidol    Injectable 10 milliGRAM(s) IntraMuscular at bedtime PRN if refuses PO Haldol  mineral oil enema 133 milliLiter(s) Rectal daily PRN constipation 1st line        Vital Signs Last 24 Hrs  T(C): 36.4 (26 Jan 2018 10:30), Max: 39.2 (26 Jan 2018 03:10)  T(F): 97.5 (26 Jan 2018 10:30), Max: 102.5 (26 Jan 2018 03:10)  HR: 108 (26 Jan 2018 10:30) (80 - 150)  BP: 100/65 (26 Jan 2018 10:30) (83/49 - 120/56)  BP(mean): --  RR: 18 (26 Jan 2018 10:30) (16 - 22)  SpO2: 96% (26 Jan 2018 10:30) (96% - 100%)    01-25-18 @ 07:01  -  01-26-18 @ 07:00  --------------------------------------------------------  IN: 0 mL / OUT: 4600 mL / NET: -4600 mL      PHYSICAL EXAM:  Constitutional: Paraplegic, w/c bound  Eyes:ISIDRO, EOMI  Ear/Nose/Throat: no oral lesion, no sinus tenderness on percussion	  Neck:no JVD, no lymphadenopathy, supple  Respiratory: CTA jose  Cardiovascular: S1S2 RRR, no murmurs  Gastrointestinal:soft, (+) BS,(+) suprapubic catheter  Extremities: sacral decubitus with dressing      LABS:                        7.5    20.8  )-----------( 315      ( 26 Jan 2018 06:08 )             24.7     01-26    133<L>  |  97  |  23  ----------------------------<  197<H>  4.5   |  23  |  0.91    Ca    8.4      26 Jan 2018 06:08  Phos  4.5     01-25  Mg     1.2     01-26        Urinalysis Basic - ( 26 Jan 2018 12:35 )    Color: Yellow / Appearance: Clear / SG: <=1.005 / pH: x  Gluc: x / Ketone: NEGATIVE  / Bili: Negative / Urobili: 0.2 E.U./dL   Blood: x / Protein: NEGATIVE mg/dL / Nitrite: NEGATIVE   Leuk Esterase: Small / RBC: 5-10 /HPF / WBC < 5 /HPF   Sq Epi: x / Non Sq Epi: 0-5 /HPF / Bacteria: Present /HPF        MICROBIOLOGY:    Culture - Urine (01.22.18 @ 18:58)    Specimen Source: Suprapubic Suprapubic    Culture Results:   No growth    Culture - Blood (01.22.18 @ 04:30)    Specimen Source: .Blood Blood    Culture Results:   No growth at 4 days.          RADIOLOGY & ADDITIONAL STUDIES: Reviewed

## 2018-01-26 NOTE — PROGRESS NOTE ADULT - SUBJECTIVE AND OBJECTIVE BOX
Patient is a 32y old  Male who presents with a chief complaint of Chills (12 Dec 2017 18:17)      INTERVAL HPI/OVERNIGHT EVENTS:  rapid response this AM -  febrile , hypotensive   BPs improved w/ ivfs  vanc/meropenem restarted  (+) diarrhea    ROS  (- ) headache     ( - ) dyspnea  (  - ) cough  (  - ) chest pain  (  - ) palpatations  ( - ) dizziness/lightheadedness  (  - ) nausea/vomiting  (  - ) abd pain     (  - ) melena  (  - ) hematochezia  (  - ) dysuria   ( - ) hematuria   ( - ) back pain  ( + ) tolerating POs  ( + ) BM  ROS: 12 point review of systems otherwise negative              MEDICATIONS  (STANDING):  ascorbic acid 250 milliGRAM(s) Oral daily  atorvastatin 20 milliGRAM(s) Oral at bedtime  dextrose 5%. 1000 milliLiter(s) (50 mL/Hr) IV Continuous <Continuous>  dextrose 50% Injectable 12.5 Gram(s) IV Push once  dextrose 50% Injectable 25 Gram(s) IV Push once  dextrose 50% Injectable 25 Gram(s) IV Push once  docusate sodium 100 milliGRAM(s) Oral three times a day  haloperidol     Tablet 10 milliGRAM(s) Oral at bedtime  heparin  Injectable 5000 Unit(s) SubCutaneous every 8 hours  insulin lispro (HumaLOG) corrective regimen sliding scale   SubCutaneous Before meals and at bedtime  levETIRAcetam 500 milliGRAM(s) Oral two times a day  meropenem Injectable 1000 milliGRAM(s) IV Push every 8 hours  metFORMIN 1000 milliGRAM(s) Oral two times a day with meals  nystatin Powder 1 Application(s) Topical two times a day  oxybutynin 5 milliGRAM(s) Oral two times a day  polyethylene glycol 3350 17 Gram(s) Oral daily  senna 2 Tablet(s) Oral at bedtime  vancomycin  IVPB 1500 milliGRAM(s) IV Intermittent every 12 hours    MEDICATIONS  (PRN):  acetaminophen   Tablet 650 milliGRAM(s) Oral every 6 hours PRN For Temp greater than 38 C (100.4 F)  belladonna 16.2 mG/opium 60 mg Suppository 1 Suppository(s) Rectal every 6 hours PRN bladder spasms, bowles leaking  bisacodyl Suppository 10 milliGRAM(s) Rectal daily PRN Constipation  dextrose Gel 1 Dose(s) Oral once PRN Blood Glucose LESS THAN 70 milliGRAM(s)/deciliter  glucagon  Injectable 1 milliGRAM(s) IntraMuscular once PRN Glucose LESS THAN 70 milligrams/deciliter  haloperidol    Injectable 10 milliGRAM(s) IntraMuscular at bedtime PRN if refuses PO Haldol  mineral oil enema 133 milliLiter(s) Rectal daily PRN constipation 1st line      Allergies    Capzasin Back and Body (Unknown)    Intolerances          Vital Signs Last 24 Hrs  T(C): 36.7 (26 Jan 2018 15:29), Max: 39.2 (26 Jan 2018 03:10)  T(F): 98 (26 Jan 2018 15:29), Max: 102.5 (26 Jan 2018 03:10)  HR: 112 (26 Jan 2018 15:29) (80 - 150)  BP: 128/65 (26 Jan 2018 15:29) (83/49 - 128/65)  BP(mean): --  RR: 18 (26 Jan 2018 15:29) (16 - 22)  SpO2: 98% (26 Jan 2018 15:29) (96% - 100%)  CAPILLARY BLOOD GLUCOSE      POCT Blood Glucose.: 252 mg/dL (26 Jan 2018 12:08)  POCT Blood Glucose.: 194 mg/dL (26 Jan 2018 10:05)  POCT Blood Glucose.: 180 mg/dL (26 Jan 2018 08:36)  POCT Blood Glucose.: 98 mg/dL (25 Jan 2018 21:35)  POCT Blood Glucose.: 222 mg/dL (25 Jan 2018 18:04)      01-25 @ 07:01 - 01-26 @ 07:00  --------------------------------------------------------  IN: 0 mL / OUT: 4600 mL / NET: -4600 mL    01-26 @ 07:01 - 01-26 @ 17:26  --------------------------------------------------------  IN: 0 mL / OUT: 1700 mL / NET: -1700 mL        Physical Exam:    Daily     Daily   General:well appearing   HEENT: ears no discharge, eyes PERRLA, nose no discharge, throat no exudates, normal tonsils.  Lungs: Clear to auscultation, no rales, no wheezes.  Cardio: Regular rate and rhythm, no murmurs, no rubs, no gallops. Good peripheral pulses.  Abdomen: Soft, non-tender, mildly distended, tympanic to percussion, no rebound, no guarding, no rigidity. Bowel sounds present. No suprapubic or CVA tenderness.   : Suprapubic catheter  Extremities: no LE edema  Skin: Sacral ulcer and bilateral buttock ulcers without surrounding erythema. (+) stool seen in wounds.   Neuro: Alert and oriented x 3. Motor strength is 5/5 upper ext bl. t4 and below w/o sensation. Cranial nerves II-XII grossly intact.     LABS:                        7.5    20.8  )-----------( 315      ( 26 Jan 2018 06:08 )             24.7     01-26    133<L>  |  97  |  23  ----------------------------<  197<H>  4.5   |  23  |  0.91    Ca    8.4      26 Jan 2018 06:08  Phos  4.5     01-25  Mg     1.2     01-26        Urinalysis Basic - ( 26 Jan 2018 12:35 )    Color: Yellow / Appearance: Clear / SG: <=1.005 / pH: x  Gluc: x / Ketone: NEGATIVE  / Bili: Negative / Urobili: 0.2 E.U./dL   Blood: x / Protein: NEGATIVE mg/dL / Nitrite: NEGATIVE   Leuk Esterase: Small / RBC: 5-10 /HPF / WBC < 5 /HPF   Sq Epi: x / Non Sq Epi: 0-5 /HPF / Bacteria: Present /HPF          RADIOLOGY & ADDITIONAL TESTS:

## 2018-01-26 NOTE — PROGRESS NOTE ADULT - ASSESSMENT
33yo M paraplegic, PMH of spinal cord injury (wheelchair bound), sacral pressure ulcer with osteo x2, DM2, indwelling suprapubic catheter who presented w/ septic shock secondary to infected purulent sacral 4th degree pressure ulcer with underlying inadequately treated OM.      -Patient febrile with leukocytosis this AM  -Discontinue IV vancomycin. Will assess cultures and determine appropriate antibiotic therapy   -Will f/u blood cultures taken overnight 31yo M paraplegic, PMH of spinal cord injury (wheelchair bound), sacral pressure ulcer with osteo x2, DM2, indwelling suprapubic catheter who presented w/ septic shock secondary to infected purulent sacral 4th degree pressure ulcer with underlying inadequately treated OM.      #Fever and Leukocytosis  -Patient febrile with leukocytosis this AM  -Recommend CT of sacral area for possible abscess  -Discontinue IV vancomycin. Will assess cultures and determine appropriate antibiotic therapy   -Will f/u blood cultures taken overnight 33yo M paraplegic, PMH of spinal cord injury (wheelchair bound), sacral pressure ulcer with osteo x2, DM2, indwelling suprapubic catheter who presented w/ septic shock secondary to infected purulent sacral 4th degree pressure ulcer with underlying inadequately treated OM.      #Fever and Leukocytosis  -Patient febrile with leukocytosis this AM  -Recommend CT of sacral area for possible abscess  -Discontinue PO vancomycin as C.diff is unlikely  -Restart Meropenem 1g q8h  -Continue Vancomycin 1.5g q12  -Will f/u blood cultures taken overnight

## 2018-01-26 NOTE — ADVANCED PRACTICE NURSE CONSULT - REASON FOR CONSULT
WOC nurse follow up to assess alarming VAC. Rapid response initiated today due to temp and pulse elevated.

## 2018-01-26 NOTE — PROGRESS NOTE ADULT - ATTENDING COMMENTS
I am concerned for a newly developing infection given rapid rise in WBC (with marked bandemia) and new fevers.  CXR, KUB and UA do not suggest infection.  Blood cultures are pending.  Sacral wounds do show discharge but no purulent drainage.  Given that the rise in WBC and new fevers developed on vanco/meropenem I am concerned for a nidus of infection.  Can check CT sacral area with contast to eval for drainable focus.  Can stop PO vancomycin as low concern for C. Diff.  Follow up blood cultures

## 2018-01-26 NOTE — PROGRESS NOTE ADULT - PROBLEM SELECTOR PLAN 1
lactate cleared  BPs now at baseline  fevers resolved  suspect due to cdiff vs sacral osteo/wound infxn.   vanc/meropenem  f/u blood cxs  f/u c diff assay  ID following

## 2018-01-26 NOTE — PROGRESS NOTE ADULT - ASSESSMENT
32M paraplegic PMH spinal cord injury (wheelchair bound), sacral pressure ulcer with osteo x2 (outpatient provider said they have records of March osteo s/p daptomycin of unknown length) earlier in 2017,  DM2, indwelling suprapubic catheter who presented w/ septic shock secondary to infected purulent sacral 4th degree pressure ulcer with underlying inadequately treated OM, hemodynamically stable being treated for sacral osteomyelitis awaiting placement at [facility]

## 2018-01-27 LAB
ANION GAP SERPL CALC-SCNC: 11 MMOL/L — SIGNIFICANT CHANGE UP (ref 5–17)
BASOPHILS NFR BLD AUTO: 0.7 % — SIGNIFICANT CHANGE UP (ref 0–2)
BUN SERPL-MCNC: 15 MG/DL — SIGNIFICANT CHANGE UP (ref 7–23)
CALCIUM SERPL-MCNC: 9 MG/DL — SIGNIFICANT CHANGE UP (ref 8.4–10.5)
CHLORIDE SERPL-SCNC: 102 MMOL/L — SIGNIFICANT CHANGE UP (ref 96–108)
CO2 SERPL-SCNC: 24 MMOL/L — SIGNIFICANT CHANGE UP (ref 22–31)
CREAT SERPL-MCNC: 0.71 MG/DL — SIGNIFICANT CHANGE UP (ref 0.5–1.3)
CULTURE RESULTS: SIGNIFICANT CHANGE UP
CULTURE RESULTS: SIGNIFICANT CHANGE UP
EOSINOPHIL NFR BLD AUTO: 1.9 % — SIGNIFICANT CHANGE UP (ref 0–6)
GLUCOSE BLDC GLUCOMTR-MCNC: 142 MG/DL — HIGH (ref 70–99)
GLUCOSE BLDC GLUCOMTR-MCNC: 150 MG/DL — HIGH (ref 70–99)
GLUCOSE BLDC GLUCOMTR-MCNC: 211 MG/DL — HIGH (ref 70–99)
GLUCOSE BLDC GLUCOMTR-MCNC: 258 MG/DL — HIGH (ref 70–99)
GLUCOSE SERPL-MCNC: 138 MG/DL — HIGH (ref 70–99)
HCT VFR BLD CALC: 24.2 % — LOW (ref 39–50)
HGB BLD-MCNC: 7.2 G/DL — LOW (ref 13–17)
LYMPHOCYTES # BLD AUTO: 25.8 % — SIGNIFICANT CHANGE UP (ref 13–44)
MAGNESIUM SERPL-MCNC: 1.8 MG/DL — SIGNIFICANT CHANGE UP (ref 1.6–2.6)
MCHC RBC-ENTMCNC: 21.9 PG — LOW (ref 27–34)
MCHC RBC-ENTMCNC: 29.8 G/DL — LOW (ref 32–36)
MCV RBC AUTO: 73.6 FL — LOW (ref 80–100)
MONOCYTES NFR BLD AUTO: 9.4 % — SIGNIFICANT CHANGE UP (ref 2–14)
NEUTROPHILS NFR BLD AUTO: 62.2 % — SIGNIFICANT CHANGE UP (ref 43–77)
PHOSPHATE SERPL-MCNC: 4.4 MG/DL — SIGNIFICANT CHANGE UP (ref 2.5–4.5)
PLATELET # BLD AUTO: 235 K/UL — SIGNIFICANT CHANGE UP (ref 150–400)
POTASSIUM SERPL-MCNC: 4.5 MMOL/L — SIGNIFICANT CHANGE UP (ref 3.5–5.3)
POTASSIUM SERPL-SCNC: 4.5 MMOL/L — SIGNIFICANT CHANGE UP (ref 3.5–5.3)
RBC # BLD: 3.29 M/UL — LOW (ref 4.2–5.8)
RBC # FLD: 19 % — HIGH (ref 10.3–16.9)
SODIUM SERPL-SCNC: 137 MMOL/L — SIGNIFICANT CHANGE UP (ref 135–145)
SPECIMEN SOURCE: SIGNIFICANT CHANGE UP
SPECIMEN SOURCE: SIGNIFICANT CHANGE UP
VANCOMYCIN TROUGH SERPL-MCNC: 16 UG/ML — SIGNIFICANT CHANGE UP (ref 10–20)
VANCOMYCIN TROUGH SERPL-MCNC: 41 UG/ML — CRITICAL HIGH (ref 10–20)
WBC # BLD: 7.3 K/UL — SIGNIFICANT CHANGE UP (ref 3.8–10.5)
WBC # FLD AUTO: 7.3 K/UL — SIGNIFICANT CHANGE UP (ref 3.8–10.5)

## 2018-01-27 PROCEDURE — 99233 SBSQ HOSP IP/OBS HIGH 50: CPT

## 2018-01-27 PROCEDURE — 72193 CT PELVIS W/DYE: CPT | Mod: 26

## 2018-01-27 RX ORDER — VANCOMYCIN HCL 1 G
1000 VIAL (EA) INTRAVENOUS EVERY 12 HOURS
Qty: 0 | Refills: 0 | Status: DISCONTINUED | OUTPATIENT
Start: 2018-01-27 | End: 2018-01-29

## 2018-01-27 RX ADMIN — HEPARIN SODIUM 5000 UNIT(S): 5000 INJECTION INTRAVENOUS; SUBCUTANEOUS at 22:35

## 2018-01-27 RX ADMIN — Medication 250 MILLIGRAM(S): at 15:30

## 2018-01-27 RX ADMIN — Medication 5 MILLIGRAM(S): at 06:36

## 2018-01-27 RX ADMIN — NYSTATIN CREAM 1 APPLICATION(S): 100000 CREAM TOPICAL at 06:46

## 2018-01-27 RX ADMIN — MEROPENEM 1000 MILLIGRAM(S): 1 INJECTION INTRAVENOUS at 22:33

## 2018-01-27 RX ADMIN — MEROPENEM 1000 MILLIGRAM(S): 1 INJECTION INTRAVENOUS at 15:31

## 2018-01-27 RX ADMIN — Medication 166.67 MILLIGRAM(S): at 19:38

## 2018-01-27 RX ADMIN — LEVETIRACETAM 500 MILLIGRAM(S): 250 TABLET, FILM COATED ORAL at 06:36

## 2018-01-27 RX ADMIN — HEPARIN SODIUM 5000 UNIT(S): 5000 INJECTION INTRAVENOUS; SUBCUTANEOUS at 15:31

## 2018-01-27 RX ADMIN — HEPARIN SODIUM 5000 UNIT(S): 5000 INJECTION INTRAVENOUS; SUBCUTANEOUS at 06:36

## 2018-01-27 RX ADMIN — METFORMIN HYDROCHLORIDE 1000 MILLIGRAM(S): 850 TABLET ORAL at 18:10

## 2018-01-27 RX ADMIN — Medication 6: at 11:46

## 2018-01-27 RX ADMIN — HALOPERIDOL DECANOATE 10 MILLIGRAM(S): 100 INJECTION INTRAMUSCULAR at 22:34

## 2018-01-27 RX ADMIN — Medication 4: at 22:36

## 2018-01-27 RX ADMIN — LEVETIRACETAM 500 MILLIGRAM(S): 250 TABLET, FILM COATED ORAL at 18:10

## 2018-01-27 RX ADMIN — ATORVASTATIN CALCIUM 20 MILLIGRAM(S): 80 TABLET, FILM COATED ORAL at 22:35

## 2018-01-27 RX ADMIN — Medication 5 MILLIGRAM(S): at 18:11

## 2018-01-27 RX ADMIN — METFORMIN HYDROCHLORIDE 1000 MILLIGRAM(S): 850 TABLET ORAL at 08:46

## 2018-01-27 RX ADMIN — Medication 300 MILLIGRAM(S): at 03:43

## 2018-01-27 RX ADMIN — Medication 100 MILLIGRAM(S): at 15:30

## 2018-01-27 RX ADMIN — MEROPENEM 1000 MILLIGRAM(S): 1 INJECTION INTRAVENOUS at 06:36

## 2018-01-27 NOTE — PROGRESS NOTE ADULT - SUBJECTIVE AND OBJECTIVE BOX
CC: No complaints. No overnight event.   No diarrhea.   ROS negative.     Vital Signs Last 24 Hrs  T(C): 36.9 (27 Jan 2018 08:50), Max: 36.9 (27 Jan 2018 08:50)  T(F): 98.5 (27 Jan 2018 08:50), Max: 98.5 (27 Jan 2018 08:50)  HR: 93 (27 Jan 2018 08:50) (85 - 112)  BP: 100/64 (27 Jan 2018 08:50) (99/61 - 128/65)  BP(mean): --  RR: 17 (27 Jan 2018 08:50) (17 - 18)  SpO2: 97% (27 Jan 2018 08:50) (94% - 100%)    PHYSICAL EXAMINATION  * General: Not in acute distress. Awake and alert. Lying comfortably in bed.  * Head: Normocephalic, atraumatic.  * HEENT: ears no discharge, eyes PERRLA, nose no discharge, throat no exudates, normal tonsils.  * Neck: no JVD, supple.  * Lungs: Clear to auscultation, no rales, no wheezes.  * Cardio: Regular rate and rhythm, no murmurs, no rubs, no gallops. Good peripheral pulses.  * Abdomen: Soft, non-tender, non-distended, tympanic to percussion, no rebound, no guarding, no rigidity. Bowel sounds present. No suprapubic or CVA tenderness.  * : Deferred.  * Extremities: Acyanotic, no edema.  * Skin: Decub sacral ulcers.   * Neuro: Alert and oriented x 3. No focal deficits. Motor strength is 5/5 throughout. Sensation intact. Cranial nerves II-XII grossly intact.                           7.2    7.3   )-----------( 235      ( 27 Jan 2018 06:44 )             24.2   01-27    137  |  102  |  15  ----------------------------<  138<H>  4.5   |  24  |  0.71    Ca    9.0      27 Jan 2018 06:44  Phos  4.4     01-27  Mg     1.8     01-27

## 2018-01-27 NOTE — PROGRESS NOTE ADULT - ASSESSMENT
IMPRESSION:  Currently afebrile with normalization of WBC.  Unclear etiology.  C. Diff PCR is positive but he is not having any diarrhea or abdominal pain and his WBC has normalized and fevers have resolved without treatment.  He was also receiving laxatives this past week.  It's likely this is colonization and not infection.  Need to rule out sacral infection/abscess as cause of fevers.  Vanco "trough" was high but this seems to have been checked after the 4 am dose    Recommend:    1.  Recheck vanco level this PM around 10pm.  If <20, restart vancomycin 1 gram IV q12   2.  Continue meropenem  3.  Follow up CT scan IMPRESSION:  Currently afebrile with normalization of WBC.  Unclear etiology.  C. Diff PCR is positive but he is not having any diarrhea or abdominal pain and his WBC has normalized and fevers have resolved without treatment.  He was also receiving laxatives this past week.  It's likely this is colonization and not infection.  Need to rule out sacral infection/abscess as cause of fevers.  Vanco "trough" was high but this seems to have been checked after the 4 am dose    Recommend:    1.  Recheck vanco level this PM around 4pm.  If <20, restart vancomycin 1 gram IV q12   2.  Continue meropenem  3.  Follow up CT scan

## 2018-01-27 NOTE — PROGRESS NOTE ADULT - PROBLEM SELECTOR PLAN 4
Very low suspicion for c. diff.   c. diff (+), however, no diarrhea. Likely COLONIZATION.   * Will not treat as per ID.   * wbc, and fever back to normal with kaci and vanco IV -w/o tx for c. diff.

## 2018-01-27 NOTE — PROGRESS NOTE ADULT - SUBJECTIVE AND OBJECTIVE BOX
INTERVAL HPI/OVERNIGHT EVENTS:    Patient was seen and examined.  No events overnight.  Does not know if he has abdominal pain.  Has not had a BM overnight or this AM.  Afebrile.  Awaiting CT scan     CONSTITUTIONAL:  Negative fever or chills, feels well, good appetite  EYES:  Negative  blurry vision or double vision  CARDIOVASCULAR:  Negative for chest pain or palpitations  RESPIRATORY:  Negative for cough, wheezing, or SOB   GASTROINTESTINAL:  Negative for nausea, vomiting, diarrhea, constipation, or abdominal pain  GENITOURINARY:  Negative frequency, urgency or dysuria  NEUROLOGIC:  No headache, confusion, dizziness, lightheadedness      ANTIBIOTICS/RELEVANT:    MEDICATIONS  (STANDING):  ascorbic acid 250 milliGRAM(s) Oral daily  atorvastatin 20 milliGRAM(s) Oral at bedtime  dextrose 5%. 1000 milliLiter(s) (50 mL/Hr) IV Continuous <Continuous>  dextrose 50% Injectable 12.5 Gram(s) IV Push once  dextrose 50% Injectable 25 Gram(s) IV Push once  dextrose 50% Injectable 25 Gram(s) IV Push once  docusate sodium 100 milliGRAM(s) Oral three times a day  haloperidol     Tablet 10 milliGRAM(s) Oral at bedtime  heparin  Injectable 5000 Unit(s) SubCutaneous every 8 hours  insulin lispro (HumaLOG) corrective regimen sliding scale   SubCutaneous Before meals and at bedtime  levETIRAcetam 500 milliGRAM(s) Oral two times a day  meropenem Injectable 1000 milliGRAM(s) IV Push every 8 hours  metFORMIN 1000 milliGRAM(s) Oral two times a day with meals  nystatin Powder 1 Application(s) Topical two times a day  oxybutynin 5 milliGRAM(s) Oral two times a day  vancomycin  IVPB 1500 milliGRAM(s) IV Intermittent every 12 hours    MEDICATIONS  (PRN):  acetaminophen   Tablet 650 milliGRAM(s) Oral every 6 hours PRN For Temp greater than 38 C (100.4 F)  belladonna 16.2 mG/opium 60 mg Suppository 1 Suppository(s) Rectal every 6 hours PRN bladder spasms, bowles leaking  bisacodyl Suppository 10 milliGRAM(s) Rectal daily PRN Constipation  dextrose Gel 1 Dose(s) Oral once PRN Blood Glucose LESS THAN 70 milliGRAM(s)/deciliter  glucagon  Injectable 1 milliGRAM(s) IntraMuscular once PRN Glucose LESS THAN 70 milligrams/deciliter  haloperidol    Injectable 10 milliGRAM(s) IntraMuscular at bedtime PRN if refuses PO Haldol  mineral oil enema 133 milliLiter(s) Rectal daily PRN constipation 1st line        Vital Signs Last 24 Hrs  T(C): 36.9 (27 Jan 2018 08:50), Max: 36.9 (27 Jan 2018 08:50)  T(F): 98.5 (27 Jan 2018 08:50), Max: 98.5 (27 Jan 2018 08:50)  HR: 93 (27 Jan 2018 08:50) (85 - 112)  BP: 100/64 (27 Jan 2018 08:50) (99/61 - 128/65)  BP(mean): --  RR: 17 (27 Jan 2018 08:50) (17 - 18)  SpO2: 97% (27 Jan 2018 08:50) (94% - 100%)    PHYSICAL EXAM:  Constitutional:  non-toxic, no distress  Eyes:  no icterus   Ear/Nose/Throat: no sinus tenderness on percussion	  Neck:  supple  Respiratory:  clear to auscultation   Cardiovascular: S1S2 RRR, no murmurs  Gastrointestinal: distended, soft, NT   Extremities:no edema  Vascular: DP Pulse:	right normal; left normal      LABS:                        7.2    7.3   )-----------( 235      ( 27 Jan 2018 06:44 )             24.2     01-27    137  |  102  |  15  ----------------------------<  138<H>  4.5   |  24  |  0.71    Ca    9.0      27 Jan 2018 06:44  Phos  4.4     01-27  Mg     1.8     01-27        Urinalysis Basic - ( 26 Jan 2018 12:35 )    Color: Yellow / Appearance: Clear / SG: <=1.005 / pH: x  Gluc: x / Ketone: NEGATIVE  / Bili: Negative / Urobili: 0.2 E.U./dL   Blood: x / Protein: NEGATIVE mg/dL / Nitrite: NEGATIVE   Leuk Esterase: Small / RBC: 5-10 /HPF / WBC < 5 /HPF   Sq Epi: x / Non Sq Epi: 0-5 /HPF / Bacteria: Present /HPF        MICROBIOLOGY:  Culture - Blood (01.26.18 @ 06:02)    Specimen Source: .Blood None    Culture Results:   No growth at 1 day.    Culture - Blood (01.26.18 @ 06:02)    Specimen Source: .Blood None    Culture Results:   No growth at 1 day.        RADIOLOGY & ADDITIONAL STUDIES:

## 2018-01-28 LAB
ANION GAP SERPL CALC-SCNC: 15 MMOL/L — SIGNIFICANT CHANGE UP (ref 5–17)
APTT BLD: 29.9 SEC — SIGNIFICANT CHANGE UP (ref 27.5–37.4)
BASOPHILS NFR BLD AUTO: 0.5 % — SIGNIFICANT CHANGE UP (ref 0–2)
BLD GP AB SCN SERPL QL: NEGATIVE — SIGNIFICANT CHANGE UP
BUN SERPL-MCNC: 16 MG/DL — SIGNIFICANT CHANGE UP (ref 7–23)
CALCIUM SERPL-MCNC: 9.4 MG/DL — SIGNIFICANT CHANGE UP (ref 8.4–10.5)
CHLORIDE SERPL-SCNC: 100 MMOL/L — SIGNIFICANT CHANGE UP (ref 96–108)
CO2 SERPL-SCNC: 23 MMOL/L — SIGNIFICANT CHANGE UP (ref 22–31)
CREAT SERPL-MCNC: 0.76 MG/DL — SIGNIFICANT CHANGE UP (ref 0.5–1.3)
EOSINOPHIL NFR BLD AUTO: 1.9 % — SIGNIFICANT CHANGE UP (ref 0–6)
GLUCOSE BLDC GLUCOMTR-MCNC: 126 MG/DL — HIGH (ref 70–99)
GLUCOSE BLDC GLUCOMTR-MCNC: 150 MG/DL — HIGH (ref 70–99)
GLUCOSE BLDC GLUCOMTR-MCNC: 166 MG/DL — HIGH (ref 70–99)
GLUCOSE BLDC GLUCOMTR-MCNC: 197 MG/DL — HIGH (ref 70–99)
GLUCOSE SERPL-MCNC: 152 MG/DL — HIGH (ref 70–99)
HCT VFR BLD CALC: 27.4 % — LOW (ref 39–50)
HGB BLD-MCNC: 8.2 G/DL — LOW (ref 13–17)
INR BLD: 1.12 — SIGNIFICANT CHANGE UP (ref 0.88–1.16)
LYMPHOCYTES # BLD AUTO: 30.7 % — SIGNIFICANT CHANGE UP (ref 13–44)
MAGNESIUM SERPL-MCNC: 1.6 MG/DL — SIGNIFICANT CHANGE UP (ref 1.6–2.6)
MCHC RBC-ENTMCNC: 21.9 PG — LOW (ref 27–34)
MCHC RBC-ENTMCNC: 29.9 G/DL — LOW (ref 32–36)
MCV RBC AUTO: 73.1 FL — LOW (ref 80–100)
MONOCYTES NFR BLD AUTO: 9.4 % — SIGNIFICANT CHANGE UP (ref 2–14)
NEUTROPHILS NFR BLD AUTO: 57.5 % — SIGNIFICANT CHANGE UP (ref 43–77)
PHOSPHATE SERPL-MCNC: 4.2 MG/DL — SIGNIFICANT CHANGE UP (ref 2.5–4.5)
PLATELET # BLD AUTO: 302 K/UL — SIGNIFICANT CHANGE UP (ref 150–400)
POTASSIUM SERPL-MCNC: 4.9 MMOL/L — SIGNIFICANT CHANGE UP (ref 3.5–5.3)
POTASSIUM SERPL-SCNC: 4.9 MMOL/L — SIGNIFICANT CHANGE UP (ref 3.5–5.3)
PROTHROM AB SERPL-ACNC: 12.5 SEC — SIGNIFICANT CHANGE UP (ref 9.8–12.7)
RBC # BLD: 3.75 M/UL — LOW (ref 4.2–5.8)
RBC # FLD: 19.1 % — HIGH (ref 10.3–16.9)
RH IG SCN BLD-IMP: POSITIVE — SIGNIFICANT CHANGE UP
SODIUM SERPL-SCNC: 138 MMOL/L — SIGNIFICANT CHANGE UP (ref 135–145)
WBC # BLD: 7.8 K/UL — SIGNIFICANT CHANGE UP (ref 3.8–10.5)
WBC # FLD AUTO: 7.8 K/UL — SIGNIFICANT CHANGE UP (ref 3.8–10.5)

## 2018-01-28 PROCEDURE — 99233 SBSQ HOSP IP/OBS HIGH 50: CPT

## 2018-01-28 RX ADMIN — HEPARIN SODIUM 5000 UNIT(S): 5000 INJECTION INTRAVENOUS; SUBCUTANEOUS at 13:20

## 2018-01-28 RX ADMIN — MEROPENEM 1000 MILLIGRAM(S): 1 INJECTION INTRAVENOUS at 13:20

## 2018-01-28 RX ADMIN — METFORMIN HYDROCHLORIDE 1000 MILLIGRAM(S): 850 TABLET ORAL at 17:27

## 2018-01-28 RX ADMIN — HALOPERIDOL DECANOATE 10 MILLIGRAM(S): 100 INJECTION INTRAMUSCULAR at 21:09

## 2018-01-28 RX ADMIN — Medication 166.67 MILLIGRAM(S): at 06:08

## 2018-01-28 RX ADMIN — HEPARIN SODIUM 5000 UNIT(S): 5000 INJECTION INTRAVENOUS; SUBCUTANEOUS at 21:09

## 2018-01-28 RX ADMIN — LEVETIRACETAM 500 MILLIGRAM(S): 250 TABLET, FILM COATED ORAL at 17:27

## 2018-01-28 RX ADMIN — MEROPENEM 1000 MILLIGRAM(S): 1 INJECTION INTRAVENOUS at 21:08

## 2018-01-28 RX ADMIN — NYSTATIN CREAM 1 APPLICATION(S): 100000 CREAM TOPICAL at 17:28

## 2018-01-28 RX ADMIN — Medication 5 MILLIGRAM(S): at 06:08

## 2018-01-28 RX ADMIN — METFORMIN HYDROCHLORIDE 1000 MILLIGRAM(S): 850 TABLET ORAL at 09:16

## 2018-01-28 RX ADMIN — Medication 5 MILLIGRAM(S): at 17:28

## 2018-01-28 RX ADMIN — HEPARIN SODIUM 5000 UNIT(S): 5000 INJECTION INTRAVENOUS; SUBCUTANEOUS at 06:07

## 2018-01-28 RX ADMIN — LEVETIRACETAM 500 MILLIGRAM(S): 250 TABLET, FILM COATED ORAL at 06:08

## 2018-01-28 RX ADMIN — NYSTATIN CREAM 1 APPLICATION(S): 100000 CREAM TOPICAL at 06:10

## 2018-01-28 RX ADMIN — Medication 166.67 MILLIGRAM(S): at 17:28

## 2018-01-28 RX ADMIN — Medication 250 MILLIGRAM(S): at 12:13

## 2018-01-28 RX ADMIN — Medication 2: at 13:19

## 2018-01-28 RX ADMIN — MEROPENEM 1000 MILLIGRAM(S): 1 INJECTION INTRAVENOUS at 06:07

## 2018-01-28 RX ADMIN — ATORVASTATIN CALCIUM 20 MILLIGRAM(S): 80 TABLET, FILM COATED ORAL at 21:09

## 2018-01-28 NOTE — PROGRESS NOTE ADULT - PROBLEM SELECTOR PLAN 2
CT of pelvis/hip was positive for buttocks abscess. Surgery consulted who talked to Radiology. Apparently No abscess but skin artifact?  * Will follow up with Surgery for recs. No note seen on [place].

## 2018-01-28 NOTE — PROGRESS NOTE ADULT - SUBJECTIVE AND OBJECTIVE BOX
INTERVAL HPI/OVERNIGHT EVENTS:    Patient was seen and examined at bedside.  No events overnight.  No diarrhea.  No fevers     CONSTITUTIONAL:  Negative fever or chills, feels well, good appetite  EYES:  Negative  blurry vision or double vision  CARDIOVASCULAR:  Negative for chest pain or palpitations  RESPIRATORY:  Negative for cough, wheezing, or SOB   GASTROINTESTINAL:  Negative for nausea, vomiting, diarrhea, constipation, or abdominal pain  GENITOURINARY:  Negative frequency, urgency or dysuria  NEUROLOGIC:  No headache, confusion, dizziness, lightheadedness      ANTIBIOTICS/RELEVANT:    MEDICATIONS  (STANDING):  ascorbic acid 250 milliGRAM(s) Oral daily  atorvastatin 20 milliGRAM(s) Oral at bedtime  dextrose 5%. 1000 milliLiter(s) (50 mL/Hr) IV Continuous <Continuous>  dextrose 50% Injectable 12.5 Gram(s) IV Push once  dextrose 50% Injectable 25 Gram(s) IV Push once  dextrose 50% Injectable 25 Gram(s) IV Push once  docusate sodium 100 milliGRAM(s) Oral three times a day  haloperidol     Tablet 10 milliGRAM(s) Oral at bedtime  heparin  Injectable 5000 Unit(s) SubCutaneous every 8 hours  insulin lispro (HumaLOG) corrective regimen sliding scale   SubCutaneous Before meals and at bedtime  levETIRAcetam 500 milliGRAM(s) Oral two times a day  meropenem Injectable 1000 milliGRAM(s) IV Push every 8 hours  metFORMIN 1000 milliGRAM(s) Oral two times a day with meals  nystatin Powder 1 Application(s) Topical two times a day  oxybutynin 5 milliGRAM(s) Oral two times a day  vancomycin  IVPB 1000 milliGRAM(s) IV Intermittent every 12 hours    MEDICATIONS  (PRN):  acetaminophen   Tablet 650 milliGRAM(s) Oral every 6 hours PRN For Temp greater than 38 C (100.4 F)  belladonna 16.2 mG/opium 60 mg Suppository 1 Suppository(s) Rectal every 6 hours PRN bladder spasms, bowles leaking  bisacodyl Suppository 10 milliGRAM(s) Rectal daily PRN Constipation  dextrose Gel 1 Dose(s) Oral once PRN Blood Glucose LESS THAN 70 milliGRAM(s)/deciliter  glucagon  Injectable 1 milliGRAM(s) IntraMuscular once PRN Glucose LESS THAN 70 milligrams/deciliter  haloperidol    Injectable 10 milliGRAM(s) IntraMuscular at bedtime PRN if refuses PO Haldol  mineral oil enema 133 milliLiter(s) Rectal daily PRN constipation 1st line        Vital Signs Last 24 Hrs  T(C): 36.6 (28 Jan 2018 08:30), Max: 37 (27 Jan 2018 20:56)  T(F): 97.8 (28 Jan 2018 08:30), Max: 98.6 (27 Jan 2018 20:56)  HR: 98 (28 Jan 2018 08:30) (93 - 130)  BP: 107/70 (28 Jan 2018 08:30) (91/59 - 109/71)  BP(mean): --  RR: 18 (28 Jan 2018 08:30) (18 - 20)  SpO2: 98% (28 Jan 2018 08:30) (96% - 99%)    PHYSICAL EXAM:  Constitutional:  non-toxic, no distress  Eyes:ISIDRO, EOMI  Ear/Nose/Throat: no oral lesion, no sinus tenderness on percussion	  Neck:  supple  Respiratory: CTA jose  Cardiovascular: S1S2 RRR, no murmurs  Gastrointestinal:soft, (+) BS, distended  Extremities:no edema  Vascular: DP Pulse:	right normal; left normal      LABS:                        8.2    7.8   )-----------( 302      ( 28 Jan 2018 08:24 )             27.4     01-28    138  |  100  |  16  ----------------------------<  152<H>  4.9   |  23  |  0.76    Ca    9.4      28 Jan 2018 08:27  Phos  4.2     01-28  Mg     1.6     01-28      PT/INR - ( 28 Jan 2018 08:25 )   PT: 12.5 sec;   INR: 1.12          PTT - ( 28 Jan 2018 08:25 )  PTT:29.9 sec  Urinalysis Basic - ( 26 Jan 2018 12:35 )    Color: Yellow / Appearance: Clear / SG: <=1.005 / pH: x  Gluc: x / Ketone: NEGATIVE  / Bili: Negative / Urobili: 0.2 E.U./dL   Blood: x / Protein: NEGATIVE mg/dL / Nitrite: NEGATIVE   Leuk Esterase: Small / RBC: 5-10 /HPF / WBC < 5 /HPF   Sq Epi: x / Non Sq Epi: 0-5 /HPF / Bacteria: Present /HPF        MICROBIOLOGY:  Culture - Blood (01.26.18 @ 06:02)    Specimen Source: .Blood None    Culture Results:   No growth at 2 days.        RADIOLOGY & ADDITIONAL STUDIES:    CT pelvis:  Findings consistent with abscess replacing the lower sacrum   and coccyx. Osteomyelitis of the adjacent S3 segment cannot be excluded.   The collection appears to be communicating with the skin surface in the   inferior gluteal cleft.

## 2018-01-28 NOTE — PROGRESS NOTE ADULT - PROBLEM SELECTOR PLAN 3
Patient on metformin and Glimepiride at home. HgA1C of 6.0.   - ISS+ metformin 1000mg BID  - monitor FSG

## 2018-01-28 NOTE — PROGRESS NOTE ADULT - ASSESSMENT
IMPRESSION:  Suspect the sacral wound is the source of his fevers and leukocytosis.  Currently afebrile on IV antibiotics.  Doubt active C. Diff infection as there was no colitis seen on CT and patient without diarrhea.      Recommend:  1.  Continue vancomycin and meropenem  2.  Follow up surgery evaluation

## 2018-01-28 NOTE — PROGRESS NOTE ADULT - SUBJECTIVE AND OBJECTIVE BOX
CC: No complaints. No overnight event.   No diarrhea.   ROS negative.     Vital Signs Last 24 Hrs  T(C): 36.6 (28 Jan 2018 08:30), Max: 37 (27 Jan 2018 20:56)  T(F): 97.8 (28 Jan 2018 08:30), Max: 98.6 (27 Jan 2018 20:56)  HR: 98 (28 Jan 2018 08:30) (93 - 130)  BP: 107/70 (28 Jan 2018 08:30) (91/59 - 109/71)  BP(mean): --  RR: 18 (28 Jan 2018 08:30) (18 - 20)  SpO2: 98% (28 Jan 2018 08:30) (96% - 99%)    PHYSICAL EXAMINATION  * General: Not in acute distress. Awake and alert. Lying comfortably in bed.  * Head: Normocephalic, atraumatic.  * HEENT: ears no discharge, eyes PERRLA, nose no discharge, throat no exudates, normal tonsils.  * Neck: no JVD, supple.  * Lungs: Clear to auscultation, no rales, no wheezes.  * Cardio: Regular rate and rhythm, no murmurs, no rubs, no gallops. Good peripheral pulses.  * Abdomen: Soft, non-tender, non-distended, tympanic to percussion, no rebound, no guarding, no rigidity. Bowel sounds present. No suprapubic or CVA tenderness.  * : Deferred.  * Extremities: Acyanotic, no edema.  * Skin: Decub sacral ulcers.   * Neuro: Alert and oriented x 3. No focal deficits. Motor strength is 5/5 throughout. Sensation intact. Cranial nerves II-XII grossly intact.                           8.2    7.8   )-----------( 302      ( 28 Jan 2018 08:24 )             27.4   01-28    138  |  100  |  16  ----------------------------<  152<H>  4.9   |  23  |  0.76    Ca    9.4      28 Jan 2018 08:27  Phos  4.2     01-28  Mg     1.6     01-28      MEDICATIONS  (STANDING):  ascorbic acid 250 milliGRAM(s) Oral daily  atorvastatin 20 milliGRAM(s) Oral at bedtime  dextrose 5%. 1000 milliLiter(s) (50 mL/Hr) IV Continuous <Continuous>  dextrose 50% Injectable 12.5 Gram(s) IV Push once  dextrose 50% Injectable 25 Gram(s) IV Push once  dextrose 50% Injectable 25 Gram(s) IV Push once  docusate sodium 100 milliGRAM(s) Oral three times a day  haloperidol     Tablet 10 milliGRAM(s) Oral at bedtime  heparin  Injectable 5000 Unit(s) SubCutaneous every 8 hours  insulin lispro (HumaLOG) corrective regimen sliding scale   SubCutaneous Before meals and at bedtime  levETIRAcetam 500 milliGRAM(s) Oral two times a day  meropenem Injectable 1000 milliGRAM(s) IV Push every 8 hours  metFORMIN 1000 milliGRAM(s) Oral two times a day with meals  nystatin Powder 1 Application(s) Topical two times a day  oxybutynin 5 milliGRAM(s) Oral two times a day  vancomycin  IVPB 1000 milliGRAM(s) IV Intermittent every 12 hours    MEDICATIONS  (PRN):  acetaminophen   Tablet 650 milliGRAM(s) Oral every 6 hours PRN For Temp greater than 38 C (100.4 F)  belladonna 16.2 mG/opium 60 mg Suppository 1 Suppository(s) Rectal every 6 hours PRN bladder spasms, bowles leaking  bisacodyl Suppository 10 milliGRAM(s) Rectal daily PRN Constipation  dextrose Gel 1 Dose(s) Oral once PRN Blood Glucose LESS THAN 70 milliGRAM(s)/deciliter  glucagon  Injectable 1 milliGRAM(s) IntraMuscular once PRN Glucose LESS THAN 70 milligrams/deciliter  haloperidol    Injectable 10 milliGRAM(s) IntraMuscular at bedtime PRN if refuses PO Haldol  mineral oil enema 133 milliLiter(s) Rectal daily PRN constipation 1st line

## 2018-01-28 NOTE — PROGRESS NOTE ADULT - PROBLEM SELECTOR PLAN 1
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 Upon arrival pts wound was packed w/ feculent material and required debridement by plastic surgery.   CT (1/27): Findings consistent with abscess replacing the lower sacrum   and coccyx. Osteomyelitis of the adjacent S3 segment cannot be excluded.   The collection appears to be communicating with the skin surface in the inferior gluteal cleft. CONFIRMED with surgery no intervention at this time.   - general surgery does not believe there is a fistula between wound and rectum causing leakage of stool, confirmed that there is no surgical intervention at this time.  - wound cx had citrobacter, enterococcus, klebsiella - now with wound vac   - wound care following; continue to appreciate recs  - c/w Vancomycin and meropenem 1g q8h  - repeat blood cultures x 2 (1/26), NGTD

## 2018-01-28 NOTE — PROGRESS NOTE ADULT - SUBJECTIVE AND OBJECTIVE BOX
OVERNIGHT EVENTS:  RAHEL overnight, patient sBP 90s, asymptomatic     SUBJECTIVE / INTERVAL HPI: Patient seen and examined at bedside.   Pt c/o position discomfort but without any other complaints at this time. Denies Abd pain but with BMs, formed.     VITAL SIGNS:  Vital Signs Last 24 Hrs  T(C): 36.6 (28 Jan 2018 08:30), Max: 37 (27 Jan 2018 20:56)  T(F): 97.8 (28 Jan 2018 08:30), Max: 98.6 (27 Jan 2018 20:56)  HR: 98 (28 Jan 2018 08:30) (93 - 130)  BP: 107/70 (28 Jan 2018 08:30) (91/59 - 109/71)  BP(mean): --  RR: 18 (28 Jan 2018 08:30) (18 - 20)  SpO2: 98% (28 Jan 2018 08:30) (96% - 99%)    PHYSICAL EXAM:    Constitutional: NAD, lying in bed, patient is paraplegic. AOx3, odd affect.   HEENT: PERRLA, EOMI, conjunctiva clear  Neck: supple, nontender, no JVD  Respiratory: CTA b/l, good inspiratory affect, not in resp distress  Cardiovascular: S1S2 RRR, no murmurs  Gastrointestinal: soft, (+) BS,(+) suprapubic catheter, well healed abd midline surgical scar, no hernias   Extremities: sacral decubitus with dressing    MEDICATIONS:  MEDICATIONS  (STANDING):  ascorbic acid 250 milliGRAM(s) Oral daily  atorvastatin 20 milliGRAM(s) Oral at bedtime  dextrose 5%. 1000 milliLiter(s) (50 mL/Hr) IV Continuous <Continuous>  dextrose 50% Injectable 12.5 Gram(s) IV Push once  dextrose 50% Injectable 25 Gram(s) IV Push once  dextrose 50% Injectable 25 Gram(s) IV Push once  docusate sodium 100 milliGRAM(s) Oral three times a day  haloperidol     Tablet 10 milliGRAM(s) Oral at bedtime  heparin  Injectable 5000 Unit(s) SubCutaneous every 8 hours  insulin lispro (HumaLOG) corrective regimen sliding scale   SubCutaneous Before meals and at bedtime  levETIRAcetam 500 milliGRAM(s) Oral two times a day  meropenem Injectable 1000 milliGRAM(s) IV Push every 8 hours  metFORMIN 1000 milliGRAM(s) Oral two times a day with meals  nystatin Powder 1 Application(s) Topical two times a day  oxybutynin 5 milliGRAM(s) Oral two times a day  vancomycin  IVPB 1000 milliGRAM(s) IV Intermittent every 12 hours    MEDICATIONS  (PRN):  acetaminophen   Tablet 650 milliGRAM(s) Oral every 6 hours PRN For Temp greater than 38 C (100.4 F)  belladonna 16.2 mG/opium 60 mg Suppository 1 Suppository(s) Rectal every 6 hours PRN bladder spasms, bowles leaking  bisacodyl Suppository 10 milliGRAM(s) Rectal daily PRN Constipation  dextrose Gel 1 Dose(s) Oral once PRN Blood Glucose LESS THAN 70 milliGRAM(s)/deciliter  glucagon  Injectable 1 milliGRAM(s) IntraMuscular once PRN Glucose LESS THAN 70 milligrams/deciliter  haloperidol    Injectable 10 milliGRAM(s) IntraMuscular at bedtime PRN if refuses PO Haldol  mineral oil enema 133 milliLiter(s) Rectal daily PRN constipation 1st line      ALLERGIES:  Allergies    Capzasin Back and Body (Unknown)    Intolerances        LABS:                        8.2    7.8   )-----------( 302      ( 28 Jan 2018 08:24 )             27.4     01-28    138  |  100  |  16  ----------------------------<  152<H>  4.9   |  23  |  0.76    Ca    9.4      28 Jan 2018 08:27  Phos  4.2     01-28  Mg     1.6     01-28      PT/INR - ( 28 Jan 2018 08:25 )   PT: 12.5 sec;   INR: 1.12          PTT - ( 28 Jan 2018 08:25 )  PTT:29.9 sec    CAPILLARY BLOOD GLUCOSE      POCT Blood Glucose.: 197 mg/dL (28 Jan 2018 12:37)      RADIOLOGY & ADDITIONAL TESTS: Reviewed.    ASSESSMENT:    PLAN:

## 2018-01-28 NOTE — PROGRESS NOTE ADULT - PROBLEM SELECTOR PLAN 2
Pt w/ no sensation or control of urination with suprapubic catheter changed once a month.   - suprapubic catheter changed 1/12 (to change again around 1/12)  - c/w oxybutynin 5mg BID    #yeast in urine  - c/w fluconazole (1/14-1/25 )  - f/u ID regarding duration of fluconazole  - UCx+ for Candida albicans

## 2018-01-28 NOTE — PROGRESS NOTE ADULT - PROBLEM SELECTOR PLAN 4
Pt with LM on 1/12, since RESOLVED  - FeNa 0.6%, prerenal likely 2/2 diarrhea and sepsis  - Resolved

## 2018-01-29 LAB
ANION GAP SERPL CALC-SCNC: 12 MMOL/L — SIGNIFICANT CHANGE UP (ref 5–17)
BUN SERPL-MCNC: 19 MG/DL — SIGNIFICANT CHANGE UP (ref 7–23)
CALCIUM SERPL-MCNC: 9.3 MG/DL — SIGNIFICANT CHANGE UP (ref 8.4–10.5)
CHLORIDE SERPL-SCNC: 98 MMOL/L — SIGNIFICANT CHANGE UP (ref 96–108)
CO2 SERPL-SCNC: 27 MMOL/L — SIGNIFICANT CHANGE UP (ref 22–31)
CREAT SERPL-MCNC: 0.63 MG/DL — SIGNIFICANT CHANGE UP (ref 0.5–1.3)
GLUCOSE BLDC GLUCOMTR-MCNC: 146 MG/DL — HIGH (ref 70–99)
GLUCOSE BLDC GLUCOMTR-MCNC: 182 MG/DL — HIGH (ref 70–99)
GLUCOSE BLDC GLUCOMTR-MCNC: 234 MG/DL — HIGH (ref 70–99)
GLUCOSE BLDC GLUCOMTR-MCNC: 258 MG/DL — HIGH (ref 70–99)
GLUCOSE SERPL-MCNC: 136 MG/DL — HIGH (ref 70–99)
HCT VFR BLD CALC: 26.8 % — LOW (ref 39–50)
HGB BLD-MCNC: 8 G/DL — LOW (ref 13–17)
MAGNESIUM SERPL-MCNC: 1.4 MG/DL — LOW (ref 1.6–2.6)
MCHC RBC-ENTMCNC: 21.4 PG — LOW (ref 27–34)
MCHC RBC-ENTMCNC: 28.5 G/DL — LOW (ref 32–36)
MCV RBC AUTO: 75.1 FL — LOW (ref 80–100)
PLATELET # BLD AUTO: 303 K/UL — SIGNIFICANT CHANGE UP (ref 150–400)
POTASSIUM SERPL-MCNC: 4.6 MMOL/L — SIGNIFICANT CHANGE UP (ref 3.5–5.3)
POTASSIUM SERPL-SCNC: 4.6 MMOL/L — SIGNIFICANT CHANGE UP (ref 3.5–5.3)
RBC # BLD: 3.74 M/UL — LOW (ref 4.2–5.8)
RBC # FLD: 18.9 % — HIGH (ref 10.3–16.9)
SODIUM SERPL-SCNC: 137 MMOL/L — SIGNIFICANT CHANGE UP (ref 135–145)
VANCOMYCIN TROUGH SERPL-MCNC: 16 UG/ML — SIGNIFICANT CHANGE UP (ref 10–20)
WBC # BLD: 7.9 K/UL — SIGNIFICANT CHANGE UP (ref 3.8–10.5)
WBC # FLD AUTO: 7.9 K/UL — SIGNIFICANT CHANGE UP (ref 3.8–10.5)

## 2018-01-29 PROCEDURE — 99233 SBSQ HOSP IP/OBS HIGH 50: CPT | Mod: GC

## 2018-01-29 PROCEDURE — 99233 SBSQ HOSP IP/OBS HIGH 50: CPT

## 2018-01-29 RX ORDER — PIPERACILLIN AND TAZOBACTAM 4; .5 G/20ML; G/20ML
3.38 INJECTION, POWDER, LYOPHILIZED, FOR SOLUTION INTRAVENOUS EVERY 6 HOURS
Qty: 0 | Refills: 0 | Status: DISCONTINUED | OUTPATIENT
Start: 2018-01-29 | End: 2018-02-13

## 2018-01-29 RX ORDER — MAGNESIUM SULFATE 500 MG/ML
2 VIAL (ML) INJECTION ONCE
Qty: 0 | Refills: 0 | Status: COMPLETED | OUTPATIENT
Start: 2018-01-29 | End: 2018-01-29

## 2018-01-29 RX ADMIN — PIPERACILLIN AND TAZOBACTAM 200 GRAM(S): 4; .5 INJECTION, POWDER, LYOPHILIZED, FOR SOLUTION INTRAVENOUS at 14:01

## 2018-01-29 RX ADMIN — HEPARIN SODIUM 5000 UNIT(S): 5000 INJECTION INTRAVENOUS; SUBCUTANEOUS at 14:01

## 2018-01-29 RX ADMIN — MEROPENEM 1000 MILLIGRAM(S): 1 INJECTION INTRAVENOUS at 05:13

## 2018-01-29 RX ADMIN — PIPERACILLIN AND TAZOBACTAM 200 GRAM(S): 4; .5 INJECTION, POWDER, LYOPHILIZED, FOR SOLUTION INTRAVENOUS at 19:29

## 2018-01-29 RX ADMIN — Medication 6: at 12:38

## 2018-01-29 RX ADMIN — ATORVASTATIN CALCIUM 20 MILLIGRAM(S): 80 TABLET, FILM COATED ORAL at 21:03

## 2018-01-29 RX ADMIN — METFORMIN HYDROCHLORIDE 1000 MILLIGRAM(S): 850 TABLET ORAL at 08:35

## 2018-01-29 RX ADMIN — Medication 5 MILLIGRAM(S): at 05:13

## 2018-01-29 RX ADMIN — HEPARIN SODIUM 5000 UNIT(S): 5000 INJECTION INTRAVENOUS; SUBCUTANEOUS at 21:04

## 2018-01-29 RX ADMIN — Medication 166.67 MILLIGRAM(S): at 07:54

## 2018-01-29 RX ADMIN — Medication 5 MILLIGRAM(S): at 17:00

## 2018-01-29 RX ADMIN — HALOPERIDOL DECANOATE 10 MILLIGRAM(S): 100 INJECTION INTRAMUSCULAR at 21:04

## 2018-01-29 RX ADMIN — LEVETIRACETAM 500 MILLIGRAM(S): 250 TABLET, FILM COATED ORAL at 05:13

## 2018-01-29 RX ADMIN — NYSTATIN CREAM 1 APPLICATION(S): 100000 CREAM TOPICAL at 08:35

## 2018-01-29 RX ADMIN — METFORMIN HYDROCHLORIDE 1000 MILLIGRAM(S): 850 TABLET ORAL at 16:59

## 2018-01-29 RX ADMIN — Medication 250 MILLIGRAM(S): at 11:57

## 2018-01-29 RX ADMIN — HEPARIN SODIUM 5000 UNIT(S): 5000 INJECTION INTRAVENOUS; SUBCUTANEOUS at 05:13

## 2018-01-29 RX ADMIN — Medication 4: at 18:30

## 2018-01-29 RX ADMIN — LEVETIRACETAM 500 MILLIGRAM(S): 250 TABLET, FILM COATED ORAL at 17:00

## 2018-01-29 RX ADMIN — Medication 50 GRAM(S): at 09:17

## 2018-01-29 RX ADMIN — Medication 2: at 21:53

## 2018-01-29 RX ADMIN — NYSTATIN CREAM 1 APPLICATION(S): 100000 CREAM TOPICAL at 17:00

## 2018-01-29 NOTE — CONSULT NOTE ADULT - SUBJECTIVE AND OBJECTIVE BOX
HPI: 32M paraplegic with diabetes and two stage 4 pressure wounds, admitted to the MICU with sepsis requiring pressor support. Original concern for sepsis 2/2 sacral wounds.  Patient was treated on vanc and zosyn, IVF, pressors, and fluid. He is now off pressors, tolerating a diet, hd stable and afebrile.  His Blood cultures from admission have had no growth to date, his wound cx are polymicrobial and most likely contaminated by the feculent spillage, and the urine culture has grown proteus >100K.  Urology has upsized his suprapubic catheter but he currently reports that he continues to leak around the catheter. He currently denies any F/C/N/V/CP/SOB.  Of note pt reports inadequate nursing care at home and constantly struggling with keeping his wounds clean and cared for.    PAST MEDICAL & SURGICAL HISTORY:  Hepatitis B infection without delta agent without hepatic coma, unspecified chronicity  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  Paraplegia  Type 2 diabetes mellitus with hyperglycemia, without long-term current use of insulin      REVIEW OF SYSTEMS      General:	    Skin/Breast:  	  Ophthalmologic:  	  ENMT:	    Respiratory and Thorax:  	  Cardiovascular:	    Gastrointestinal:	    Genitourinary:	    Musculoskeletal:	    Neurological:	    Psychiatric:	    Hematology/Lymphatics:	    Endocrine:	    Allergic/Immunologic:	    MEDICATIONS  (STANDING):  ascorbic acid 250 milliGRAM(s) Oral daily  atorvastatin 20 milliGRAM(s) Oral at bedtime  dextrose 5%. 1000 milliLiter(s) (50 mL/Hr) IV Continuous <Continuous>  dextrose 50% Injectable 12.5 Gram(s) IV Push once  dextrose 50% Injectable 25 Gram(s) IV Push once  dextrose 50% Injectable 25 Gram(s) IV Push once  docusate sodium 100 milliGRAM(s) Oral three times a day  haloperidol     Tablet 10 milliGRAM(s) Oral at bedtime  heparin  Injectable 5000 Unit(s) SubCutaneous every 8 hours  insulin lispro (HumaLOG) corrective regimen sliding scale   SubCutaneous Before meals and at bedtime  levETIRAcetam 500 milliGRAM(s) Oral two times a day  metFORMIN 1000 milliGRAM(s) Oral two times a day with meals  nystatin Powder 1 Application(s) Topical two times a day  oxybutynin 5 milliGRAM(s) Oral two times a day  piperacillin/tazobactam IVPB. 3.375 Gram(s) IV Intermittent every 6 hours    MEDICATIONS  (PRN):  acetaminophen   Tablet 650 milliGRAM(s) Oral every 6 hours PRN For Temp greater than 38 C (100.4 F)  belladonna 16.2 mG/opium 60 mg Suppository 1 Suppository(s) Rectal every 6 hours PRN bladder spasms, bowles leaking  bisacodyl Suppository 10 milliGRAM(s) Rectal daily PRN Constipation  dextrose Gel 1 Dose(s) Oral once PRN Blood Glucose LESS THAN 70 milliGRAM(s)/deciliter  glucagon  Injectable 1 milliGRAM(s) IntraMuscular once PRN Glucose LESS THAN 70 milligrams/deciliter  haloperidol    Injectable 10 milliGRAM(s) IntraMuscular at bedtime PRN if refuses PO Haldol  mineral oil enema 133 milliLiter(s) Rectal daily PRN constipation 1st line      Allergies    Capzasin Back and Body (Unknown)    Intolerances        SOCIAL HISTORY:    FAMILY HISTORY:  Family history of brain cancer (Father)      Vital Signs Last 24 Hrs  T(C): 37.3 (29 Jan 2018 16:58), Max: 37.3 (29 Jan 2018 16:58)  T(F): 99.2 (29 Jan 2018 16:58), Max: 99.2 (29 Jan 2018 16:58)  HR: 114 (29 Jan 2018 16:34) (89 - 120)  BP: 115/70 (29 Jan 2018 15:20) (103/67 - 115/70)  BP(mean): 85 (29 Jan 2018 15:20) (85 - 85)  RR: 20 (29 Jan 2018 15:20) (18 - 20)  SpO2: 100% (29 Jan 2018 15:20) (97% - 100%)    PHYSICAL EXAM:      Constitutional:    Eyes:    ENMT:    Neck:    Breasts:    Back:    Respiratory:    Cardiovascular:    Gastrointestinal:    Genitourinary:    Rectal:    Extremities:    Vascular:    Neurological:    Skin:    Lymph Nodes:    Musculoskeletal:    Psychiatric:        LABS:                        8.0    7.9   )-----------( 303      ( 29 Jan 2018 06:12 )             26.8     01-29    137  |  98  |  19  ----------------------------<  136<H>  4.6   |  27  |  0.63    Ca    9.3      29 Jan 2018 06:12  Phos  4.2     01-28  Mg     1.4     01-29      PT/INR - ( 28 Jan 2018 08:25 )   PT: 12.5 sec;   INR: 1.12          PTT - ( 28 Jan 2018 08:25 )  PTT:29.9 sec      RADIOLOGY & ADDITIONAL STUDIES: HPI: 32M paraplegic 2/2 spinal cord injury 5 years ago, DM and two stage 4 pressure wounds, admitted to [facility] initially with sepsis requiring pressor support. Original concern for sepsis 2/2 sacral wounds.  Patient was treated on vanc and zosyn, IVF, pressors, and fluid. He is now off pressors, tolerating a diet, hd stable and afebrile.  His Blood cultures from admission have had no growth to date, his wound cx are polymicrobial and most likely contaminated by the feculent spillage, and the urine culture has grown proteus >100K.  He currently denies any F/C/N/V/CP/SOB.  Of note pt reports inadequate nursing care at home and constantly struggling with keeping his wounds clean and cared for. Pt is fecally incontinent and has stool regularly contaminate his sacral decub ulcer. General surgery previously consulted for diverting colostomy but at that time pt was not amenable to ostomy. Pt now states he is amenable to having a colostomy to help keep his ulcer clean.     PAST MEDICAL & SURGICAL HISTORY:  Hepatitis B infection without delta agent without hepatic coma, unspecified chronicity  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  Paraplegia  Type 2 diabetes mellitus with hyperglycemia, without long-term current use of insulin      REVIEW OF SYSTEMS - neg except as per HPI      MEDICATIONS  (STANDING):  ascorbic acid 250 milliGRAM(s) Oral daily  atorvastatin 20 milliGRAM(s) Oral at bedtime  dextrose 5%. 1000 milliLiter(s) (50 mL/Hr) IV Continuous <Continuous>  dextrose 50% Injectable 12.5 Gram(s) IV Push once  dextrose 50% Injectable 25 Gram(s) IV Push once  dextrose 50% Injectable 25 Gram(s) IV Push once  docusate sodium 100 milliGRAM(s) Oral three times a day  haloperidol     Tablet 10 milliGRAM(s) Oral at bedtime  heparin  Injectable 5000 Unit(s) SubCutaneous every 8 hours  insulin lispro (HumaLOG) corrective regimen sliding scale   SubCutaneous Before meals and at bedtime  levETIRAcetam 500 milliGRAM(s) Oral two times a day  metFORMIN 1000 milliGRAM(s) Oral two times a day with meals  nystatin Powder 1 Application(s) Topical two times a day  oxybutynin 5 milliGRAM(s) Oral two times a day  piperacillin/tazobactam IVPB. 3.375 Gram(s) IV Intermittent every 6 hours    MEDICATIONS  (PRN):  acetaminophen   Tablet 650 milliGRAM(s) Oral every 6 hours PRN For Temp greater than 38 C (100.4 F)  belladonna 16.2 mG/opium 60 mg Suppository 1 Suppository(s) Rectal every 6 hours PRN bladder spasms, bowles leaking  bisacodyl Suppository 10 milliGRAM(s) Rectal daily PRN Constipation  dextrose Gel 1 Dose(s) Oral once PRN Blood Glucose LESS THAN 70 milliGRAM(s)/deciliter  glucagon  Injectable 1 milliGRAM(s) IntraMuscular once PRN Glucose LESS THAN 70 milligrams/deciliter  haloperidol    Injectable 10 milliGRAM(s) IntraMuscular at bedtime PRN if refuses PO Haldol  mineral oil enema 133 milliLiter(s) Rectal daily PRN constipation 1st line      Allergies    Capzasin Back and Body (Unknown)    Intolerances        SOCIAL HISTORY:    FAMILY HISTORY:  Family history of brain cancer (Father)      Vital Signs Last 24 Hrs  T(C): 37.3 (29 Jan 2018 16:58), Max: 37.3 (29 Jan 2018 16:58)  T(F): 99.2 (29 Jan 2018 16:58), Max: 99.2 (29 Jan 2018 16:58)  HR: 114 (29 Jan 2018 16:34) (89 - 120)  BP: 115/70 (29 Jan 2018 15:20) (103/67 - 115/70)  BP(mean): 85 (29 Jan 2018 15:20) (85 - 85)  RR: 20 (29 Jan 2018 15:20) (18 - 20)  SpO2: 100% (29 Jan 2018 15:20) (97% - 100%)    PHYSICAL EXAM:      Constitutional: AOx3, NAD    Respiratory: CTABL, no resp distress    Cardiovascular: RRR, no m/r/g    Gastrointestinal: soft, nontender, nondistender, midline scar well healed, suprapubic tube in place draining    Back sacral decubitus ulcer with no purulence, feces close to site of ulcer    Extremities: WWP, no edema        LABS:                        8.0    7.9   )-----------( 303      ( 29 Jan 2018 06:12 )             26.8     01-29    137  |  98  |  19  ----------------------------<  136<H>  4.6   |  27  |  0.63    Ca    9.3      29 Jan 2018 06:12  Phos  4.2     01-28  Mg     1.4     01-29      PT/INR - ( 28 Jan 2018 08:25 )   PT: 12.5 sec;   INR: 1.12          PTT - ( 28 Jan 2018 08:25 )  PTT:29.9 sec      RADIOLOGY & ADDITIONAL STUDIES:  INTERPRETATION:  CT scan of pelvis    Clinical indication: Paraplegic, sacral ulcer, osteomyelitis assess for   abscess    Technique: Following administration of intravenous contrast axial imaging   of the pelvis was obtained. The data was reformatted into sagittal and   coronal planes.    No prior imaging is available for comparison.    Findings: The distal sacrum and coccyx are not present. The osseous   structures have been resected. The most distal portion of the sacrum, mid   S3 is sclerotic. In this location is a fluid and gas-filled collection   measuring 7.8 cm in craniocaudad dimension x 4.3 cm transverse x 1.5 cm   in AP dimension. This is consistent with an abscess and appears to   communicate with the skin surface at the lower gluteal cleft. It is in   proximity to the anus but communication is not identified. There is   infiltration of the adjacent soft tissues. The gluteal muscles are   atrophied. There is diffuse haziness of the posterior subcutaneous   tissues.    The SI joints appear ankylosed. Joint space narrowing osteophyte   formation is present the hips with underlying cam morphology. In the   lower portions of the kidneys appear unremarkable. There is no evidence   of bowel obstruction. A large volume of stool is present in the colon. A   suprapubic catheter is present in the bladder. The bladder wall appears   thickened however the bladder is underdistended. There are scattered   lymph nodes in the pelvis measuring up to 11 mm in short axis in the   retroperitoneum.    Impression: Findings consistent with abscess replacing the lower sacrum   and coccyx. Osteomyelitis of the adjacent S3 segment cannot be excluded.   The collection appears to be communicating with the skin surface in the   inferior gluteal cleft.

## 2018-01-29 NOTE — PROGRESS NOTE ADULT - ASSESSMENT
33yo M paraplegic, PMH of spinal cord injury (wheelchair bound), sacral pressure ulcer with osteo x2, DM2, indwelling suprapubic catheter, with a sacral decubitis ulcer with CT indicating a possible abscess, unlikely C.Diff as pt is not having diarrhea. Normalized WBC and afebrile.      Recommend:  -Discontinue Meropenem  -Start Zosyn 3.375g Q6  -Continue Vancomycin 31yo M paraplegic, PMH of spinal cord injury (wheelchair bound), sacral pressure ulcer with osteo x2, DM2, indwelling suprapubic catheter, with a sacral decubitis ulcer with CT indicating a possible abscess, unlikely C.Diff as pt is not having diarrhea. Normalized WBC and afebrile.      Recommend:  -Discontinue Meropenem  -Discontinue Vancomycin  -Start Zosyn 3.375g Q6

## 2018-01-29 NOTE — CHART NOTE - NSCHARTNOTEFT_GEN_A_CORE
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  Paraplegia  Type 2 diabetes mellitus with hyperglycemia, without long-term current use of insulin      [facility] with x2 glucerna shakes(440kcal and 20gmprotein) and 2 Pro-stat(200kcal and 30gmprotein)        PO Intake: Good (%) [  x ]  Fair (50-75%) [   ] Poor (<25%) [   ]    GI Issues: none reported    Pain:yes.Not specific    Skin Integrity:stage 4    Labs:   01-29    137  |  98  |  19  ----------------------------<  136<H>  4.6   |  27  |  0.63    Ca    9.3      29 Jan 2018 06:12  Phos  4.2     01-28  Mg     1.4     01-29      CAPILLARY BLOOD GLUCOSE      POCT Blood Glucose.: 146 mg/dL (29 Jan 2018 08:22)  POCT Blood Glucose.: 126 mg/dL (28 Jan 2018 21:36)  POCT Blood Glucose.: 166 mg/dL (28 Jan 2018 17:26)  POCT Blood Glucose.: 197 mg/dL (28 Jan 2018 12:37)      Medications:  MEDICATIONS  (STANDING):  ascorbic acid 250 milliGRAM(s) Oral daily  atorvastatin 20 milliGRAM(s) Oral at bedtime  dextrose 5%. 1000 milliLiter(s) (50 mL/Hr) IV Continuous <Continuous>  dextrose 50% Injectable 12.5 Gram(s) IV Push once  dextrose 50% Injectable 25 Gram(s) IV Push once  dextrose 50% Injectable 25 Gram(s) IV Push once  docusate sodium 100 milliGRAM(s) Oral three times a day  haloperidol     Tablet 10 milliGRAM(s) Oral at bedtime  heparin  Injectable 5000 Unit(s) SubCutaneous every 8 hours  insulin lispro (HumaLOG) corrective regimen sliding scale   SubCutaneous Before meals and at bedtime  levETIRAcetam 500 milliGRAM(s) Oral two times a day  meropenem Injectable 1000 milliGRAM(s) IV Push every 8 hours  metFORMIN 1000 milliGRAM(s) Oral two times a day with meals  nystatin Powder 1 Application(s) Topical two times a day  oxybutynin 5 milliGRAM(s) Oral two times a day  vancomycin  IVPB 1000 milliGRAM(s) IV Intermittent every 12 hours    MEDICATIONS  (PRN):  acetaminophen   Tablet 650 milliGRAM(s) Oral every 6 hours PRN For Temp greater than 38 C (100.4 F)  belladonna 16.2 mG/opium 60 mg Suppository 1 Suppository(s) Rectal every 6 hours PRN bladder spasms, bowles leaking  bisacodyl Suppository 10 milliGRAM(s) Rectal daily PRN Constipation  dextrose Gel 1 Dose(s) Oral once PRN Blood Glucose LESS THAN 70 milliGRAM(s)/deciliter  glucagon  Injectable 1 milliGRAM(s) IntraMuscular once PRN Glucose LESS THAN 70 milligrams/deciliter  haloperidol    Injectable 10 milliGRAM(s) IntraMuscular at bedtime PRN if refuses PO Haldol  mineral oil enema 133 milliLiter(s) Rectal daily PRN constipation 1st line      Weight:72.6kg  Daily     Daily no updated weights    Weight Change: no updated weights    Estimated energy needs: Based on IBW:51kg [id number]:1530-1785kcal and 1.4-1.6gmprotien:71-82gmprotein and 2269=6105oj fluids(e53-09gp/kg)    Subjective: Patient is a 32y old  Male who presents with a chief complaint of Chills (12 Dec 2017 18:17).Eating 75% or better  Previous Nutrition Diagnosis:  Increased nutrient needs r/t increased demand for protein and nutrients AEB: PU  Active [  x ]  Resolved [   ]    If resolved, new PES:     Goal:Meet 80% of needs consistently    Recommendations:1.Updated weights    Education: not receptive    Risk Level: High [   ] Moderate [  x ] Low [   ]

## 2018-01-29 NOTE — PROGRESS NOTE ADULT - SUBJECTIVE AND OBJECTIVE BOX
INTERVAL HPI / OVERNIGHT EVENTS: No acute events overnight. VSS overnight. sBP low 100s. Asymptomatic.     SUBJECTIVE: Patient seen and examined at bedside. Patient denies any complaints of SOB, palpitations, chest pain. Patient states he had a small bowel movement yesterday. Denies any pain. Denies fevers and chills.     OBJECTIVE:    VITAL SIGNS:  Vital Signs Last 24 Hrs  T(C): 36.5 (29 Jan 2018 08:51), Max: 37.1 (28 Jan 2018 16:26)  T(F): 97.7 (29 Jan 2018 08:51), Max: 98.7 (28 Jan 2018 16:26)  HR: 96 (29 Jan 2018 08:51) (89 - 101)  BP: 103/67 (29 Jan 2018 08:51) (103/67 - 104/65)  BP(mean): --  RR: 18 (29 Jan 2018 08:51) (18 - 19)  SpO2: 98% (29 Jan 2018 08:51) (97% - 99%)      01-28 @ 07:01  -  01-29 @ 07:00  --------------------------------------------------------  IN: 0 mL / OUT: 3900 mL / NET: -3900 mL      CAPILLARY BLOOD GLUCOSE  POCT Blood Glucose.: 146 mg/dL (29 Jan 2018 08:22)      PHYSICAL EXAM:  Constitutional: Laying in bed comfortably, breathing comfortably, patient is paraplegic   HEENT: EOMI, conjunctiva clear  Neck: supple, nontender, no JVD   Respiratory: CTA b/l   Cardiovascular: S1S2 RRR, no murmurs  Gastrointestinal: Distended, soft, (+) BS,(+) suprapubic catheter  Extremities: sacral decubitus with dressing  Neuro: AAOx3, odd effect    MEDICATIONS:  MEDICATIONS  (STANDING):  ascorbic acid 250 milliGRAM(s) Oral daily  atorvastatin 20 milliGRAM(s) Oral at bedtime  dextrose 5%. 1000 milliLiter(s) (50 mL/Hr) IV Continuous <Continuous>  dextrose 50% Injectable 12.5 Gram(s) IV Push once  dextrose 50% Injectable 25 Gram(s) IV Push once  dextrose 50% Injectable 25 Gram(s) IV Push once  docusate sodium 100 milliGRAM(s) Oral three times a day  haloperidol     Tablet 10 milliGRAM(s) Oral at bedtime  heparin  Injectable 5000 Unit(s) SubCutaneous every 8 hours  insulin lispro (HumaLOG) corrective regimen sliding scale   SubCutaneous Before meals and at bedtime  levETIRAcetam 500 milliGRAM(s) Oral two times a day  magnesium sulfate  IVPB 2 Gram(s) IV Intermittent once  meropenem Injectable 1000 milliGRAM(s) IV Push every 8 hours  metFORMIN 1000 milliGRAM(s) Oral two times a day with meals  nystatin Powder 1 Application(s) Topical two times a day  oxybutynin 5 milliGRAM(s) Oral two times a day  vancomycin  IVPB 1000 milliGRAM(s) IV Intermittent every 12 hours    MEDICATIONS  (PRN):  acetaminophen   Tablet 650 milliGRAM(s) Oral every 6 hours PRN For Temp greater than 38 C (100.4 F)  belladonna 16.2 mG/opium 60 mg Suppository 1 Suppository(s) Rectal every 6 hours PRN bladder spasms, bowles leaking  bisacodyl Suppository 10 milliGRAM(s) Rectal daily PRN Constipation  dextrose Gel 1 Dose(s) Oral once PRN Blood Glucose LESS THAN 70 milliGRAM(s)/deciliter  glucagon  Injectable 1 milliGRAM(s) IntraMuscular once PRN Glucose LESS THAN 70 milligrams/deciliter  haloperidol    Injectable 10 milliGRAM(s) IntraMuscular at bedtime PRN if refuses PO Haldol  mineral oil enema 133 milliLiter(s) Rectal daily PRN constipation 1st line      ALLERGIES:  Allergies  Capzasin Back and Body (Unknown)      LABS:                        8.0    7.9   )-----------( 303      ( 29 Jan 2018 06:12 )             26.8     01-29    137  |  98  |  19  ----------------------------<  136<H>  4.6   |  27  |  0.63    Ca    9.3      29 Jan 2018 06:12  Phos  4.2     01-28  Mg     1.4     01-29      PT/INR - ( 28 Jan 2018 08:25 )   PT: 12.5 sec;   INR: 1.12          PTT - ( 28 Jan 2018 08:25 )  PTT:29.9 sec      RADIOLOGY & ADDITIONAL TESTS: Reviewed.

## 2018-01-29 NOTE — PROGRESS NOTE ADULT - ATTENDING COMMENTS
dx:  1)severe sepsis due to  infected decubitus ulcers/possible sacral osteomyelitis- ct pelvis (+) sacral wound abscess. will consult surgery. c/w vanc/meropenem. he will need ID follow up.   2) left IT  decub and sacral decub- stage iv  - as above  3)psychosis - improved, but still remains paranoid. recent email sent to patient experience highlights some of the paranoia . letter forwarded to risk management. cont treatment over objection based on court ruling. haldol qhs (po preferably , if not willing then will need IM haldol) . ekg to eval qtc serially  4)neurogenic bladder - c/w suprapubic bowles, oxybutinin  5)DM ii - can cont metformin  6)epilepsy- keppra   7) tachycardia - etiology unclear, but pt has stated he needed propranolol in the past for his "heartrate", if persistently p>100, can start metoprolol .

## 2018-01-29 NOTE — PROGRESS NOTE ADULT - ATTENDING COMMENTS
Agree with above.  Will treat as deep seated soft tissue infection and sacral osteomyelitis.  Can stop meropenem and vancomycin and start zosyn for more narrow coverage.  Will need several weeks of treatment

## 2018-01-29 NOTE — PROGRESS NOTE ADULT - ASSESSMENT
32M paraplegic PMH spinal cord injury (wheelchair bound), sacral pressure ulcer with osteo x2 (outpatient provider said they have records of March osteo s/p daptomycin of unknown length) earlier in 2017,  DM2, indwelling suprapubic catheter who presented w/ septic shock secondary to infected purulent sacral 4th degree pressure ulcer with underlying inadequately treated OM, hemodynamically stable being treated for sacral osteomyelitis awaiting placement at [facility]

## 2018-01-29 NOTE — PROGRESS NOTE ADULT - PROBLEM SELECTOR PLAN 2
Pt w/ no sensation or control of urination with suprapubic catheter changed once a month.   - suprapubic catheter changed 1/12 (to change again around 2/12)  - c/w oxybutynin 5mg BID    #yeast in urine  - completed fluconazole (1/14-1/25)  - UCx+ for Candida albicans

## 2018-01-29 NOTE — PROGRESS NOTE ADULT - SUBJECTIVE AND OBJECTIVE BOX
INTERVAL HPI/OVERNIGHT EVENTS:  Patient resting comfortably. Afebrile. No diarrhea.      ANTIBIOTICS/RELEVANT:  Meropenem  Vancomycin    MEDICATIONS  (STANDING):  ascorbic acid 250 milliGRAM(s) Oral daily  atorvastatin 20 milliGRAM(s) Oral at bedtime  dextrose 5%. 1000 milliLiter(s) (50 mL/Hr) IV Continuous <Continuous>  dextrose 50% Injectable 12.5 Gram(s) IV Push once  dextrose 50% Injectable 25 Gram(s) IV Push once  dextrose 50% Injectable 25 Gram(s) IV Push once  docusate sodium 100 milliGRAM(s) Oral three times a day  haloperidol     Tablet 10 milliGRAM(s) Oral at bedtime  heparin  Injectable 5000 Unit(s) SubCutaneous every 8 hours  insulin lispro (HumaLOG) corrective regimen sliding scale   SubCutaneous Before meals and at bedtime  levETIRAcetam 500 milliGRAM(s) Oral two times a day  meropenem Injectable 1000 milliGRAM(s) IV Push every 8 hours  metFORMIN 1000 milliGRAM(s) Oral two times a day with meals  nystatin Powder 1 Application(s) Topical two times a day  oxybutynin 5 milliGRAM(s) Oral two times a day  vancomycin  IVPB 1000 milliGRAM(s) IV Intermittent every 12 hours    MEDICATIONS  (PRN):  acetaminophen   Tablet 650 milliGRAM(s) Oral every 6 hours PRN For Temp greater than 38 C (100.4 F)  belladonna 16.2 mG/opium 60 mg Suppository 1 Suppository(s) Rectal every 6 hours PRN bladder spasms, bowles leaking  bisacodyl Suppository 10 milliGRAM(s) Rectal daily PRN Constipation  dextrose Gel 1 Dose(s) Oral once PRN Blood Glucose LESS THAN 70 milliGRAM(s)/deciliter  glucagon  Injectable 1 milliGRAM(s) IntraMuscular once PRN Glucose LESS THAN 70 milligrams/deciliter  haloperidol    Injectable 10 milliGRAM(s) IntraMuscular at bedtime PRN if refuses PO Haldol  mineral oil enema 133 milliLiter(s) Rectal daily PRN constipation 1st line        Vital Signs Last 24 Hrs  T(C): 36.5 (29 Jan 2018 08:51), Max: 37.1 (28 Jan 2018 16:26)  T(F): 97.7 (29 Jan 2018 08:51), Max: 98.7 (28 Jan 2018 16:26)  HR: 96 (29 Jan 2018 08:51) (89 - 101)  BP: 103/67 (29 Jan 2018 08:51) (103/67 - 104/65)  BP(mean): --  RR: 18 (29 Jan 2018 08:51) (18 - 19)  SpO2: 98% (29 Jan 2018 08:51) (97% - 99%)    01-28-18 @ 07:01  -  01-29-18 @ 07:00  --------------------------------------------------------  IN: 0 mL / OUT: 3900 mL / NET: -3900 mL    01-29-18 @ 07:01  -  01-29-18 @ 11:43  --------------------------------------------------------  IN: 0 mL / OUT: 700 mL / NET: -700 mL      PHYSICAL EXAM:  Constitutional: Paraplegic, w/c bound  Eyes:ISIDRO, EOMI  Ear/Nose/Throat: no oral lesion, no sinus tenderness on percussion	  Neck:no JVD, no lymphadenopathy, supple  Respiratory: CTA jose  Cardiovascular: S1S2 RRR, no murmurs  Gastrointestinal:soft, (+) BS,(+) suprapubic catheter  Extremities: sacral decubitus with dressing      LABS:                        8.0    7.9   )-----------( 303      ( 29 Jan 2018 06:12 )             26.8     01-29    137  |  98  |  19  ----------------------------<  136<H>  4.6   |  27  |  0.63    Ca    9.3      29 Jan 2018 06:12  Phos  4.2     01-28  Mg     1.4     01-29      PT/INR - ( 28 Jan 2018 08:25 )   PT: 12.5 sec;   INR: 1.12          PTT - ( 28 Jan 2018 08:25 )  PTT:29.9 sec      MICROBIOLOGY:  Culture - Blood (01.26.18 @ 06:02)    Specimen Source: .Blood None    Culture Results:   No growth at 3 days.        RADIOLOGY & ADDITIONAL STUDIES:  < from: CT Pelvis w/ IV Cont (01.27.18 @ 14:27) >  EXAM:  CT PELVIS ONLY IC                          PROCEDURE DATE:  01/27/2018    Quantity of Contrast in Vial in ml: 100 Contrast Used: Optiray 350  Quantity of Contrast Wasted in ml: 6           INTERPRETATION:  CT scan of pelvis    Clinical indication: Paraplegic, sacral ulcer, osteomyelitis assess for   abscess    Technique: Following administration of intravenous contrast axial imaging   of the pelvis was obtained. The data was reformatted into sagittal and   coronal planes.    No prior imaging is available for comparison.    Findings: The distal sacrum and coccyx are not present. The osseous   structures have been resected. The most distal portion of the sacrum, mid   S3 is sclerotic. In this location is a fluid and gas-filled collection   measuring 7.8 cm in craniocaudad dimension x 4.3 cm transverse x 1.5 cm   in AP dimension. This is consistent with an abscess and appears to   communicate with the skin surface at the lower gluteal cleft. It is in   proximity to the anus but communication is not identified. There is   infiltration of the adjacent soft tissues. The gluteal muscles are   atrophied. There is diffuse haziness of the posterior subcutaneous   tissues.    The SI joints appear ankylosed. Joint space narrowing osteophyte   formation is present the hips with underlying cam morphology. In the   lower portions of the kidneys appear unremarkable. There is no evidence   of bowel obstruction. A large volume of stool is present in the colon. A   suprapubic catheter is present in the bladder. The bladder wall appears   thickened however the bladder is underdistended. There are scattered   lymph nodes in the pelvis measuring up to 11 mm in short axis in the   retroperitoneum.    Impression: Findings consistent with abscess replacing the lower sacrum   and coccyx. Osteomyelitis of the adjacent S3 segment cannot be excluded.   The collection appears to be communicating with the skin surface in the   inferior gluteal cleft.

## 2018-01-29 NOTE — PROGRESS NOTE ADULT - PROBLEM SELECTOR PLAN 1
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 Upon arrival pts wound was packed w/ feculent material and required debridement by plastic surgery.   CT (1/27): Findings consistent with abscess replacing the lower sacrum   and coccyx. Osteomyelitis of the adjacent S3 segment cannot be excluded.   The collection appears to be communicating with the skin surface in the inferior gluteal cleft. CONFIRMED with surgery no intervention at this time.   - general surgery does not believe there is a fistula between wound and rectum causing leakage of stool, confirmed that there is no surgical intervention at this time.  - wound cx had citrobacter, enterococcus, klebsiella - now with wound vac   - wound care following; continue to appreciate recs  - c/w Vancomycin and meropenem 1g q8h (to end 2/7)   - repeat blood cultures x 2 (1/26), NGTD Pt w/ a stage 4 infected sacral ulcer w/ purulence. Upon arrival pts wound was packed w/ feculent material and required debridement by plastic surgery.   CT (1/27): Findings consistent with abscess replacing the lower sacrum   and coccyx. Osteomyelitis of the adjacent S3 segment cannot be excluded.   The collection appears to be communicating with the skin surface in the inferior gluteal cleft. CONFIRMED with surgery no intervention at this time.   - general surgery does not believe there is a fistula between wound and rectum causing leakage of stool, confirmed that there is no surgical intervention at this time.  - wound cx had citrobacter, enterococcus, klebsiella - now with wound vac   - wound care following; continue to appreciate recs  - c/w Vancomycin and meropenem 1g q8h (to end 2/7)   - repeat blood cultures x 2 (1/26), NGTD    #C. diff: Results came back as indeterminate and then positive. Discussed with ID prefers to hold off treating for now with no diarrhea. If developed increasing loose stool would add PO vancomycin.

## 2018-01-30 LAB
ANION GAP SERPL CALC-SCNC: 11 MMOL/L — SIGNIFICANT CHANGE UP (ref 5–17)
BUN SERPL-MCNC: 19 MG/DL — SIGNIFICANT CHANGE UP (ref 7–23)
CALCIUM SERPL-MCNC: 9.5 MG/DL — SIGNIFICANT CHANGE UP (ref 8.4–10.5)
CHLORIDE SERPL-SCNC: 97 MMOL/L — SIGNIFICANT CHANGE UP (ref 96–108)
CO2 SERPL-SCNC: 26 MMOL/L — SIGNIFICANT CHANGE UP (ref 22–31)
CREAT SERPL-MCNC: 0.75 MG/DL — SIGNIFICANT CHANGE UP (ref 0.5–1.3)
GLUCOSE BLDC GLUCOMTR-MCNC: 134 MG/DL — HIGH (ref 70–99)
GLUCOSE BLDC GLUCOMTR-MCNC: 138 MG/DL — HIGH (ref 70–99)
GLUCOSE BLDC GLUCOMTR-MCNC: 144 MG/DL — HIGH (ref 70–99)
GLUCOSE BLDC GLUCOMTR-MCNC: 173 MG/DL — HIGH (ref 70–99)
GLUCOSE BLDC GLUCOMTR-MCNC: 188 MG/DL — HIGH (ref 70–99)
GLUCOSE SERPL-MCNC: 140 MG/DL — HIGH (ref 70–99)
HCT VFR BLD CALC: 28.5 % — LOW (ref 39–50)
HGB BLD-MCNC: 8.5 G/DL — LOW (ref 13–17)
MAGNESIUM SERPL-MCNC: 1.7 MG/DL — SIGNIFICANT CHANGE UP (ref 1.6–2.6)
MCHC RBC-ENTMCNC: 21.5 PG — LOW (ref 27–34)
MCHC RBC-ENTMCNC: 29.8 G/DL — LOW (ref 32–36)
MCV RBC AUTO: 72.2 FL — LOW (ref 80–100)
PHOSPHATE SERPL-MCNC: 4.5 MG/DL — SIGNIFICANT CHANGE UP (ref 2.5–4.5)
PLATELET # BLD AUTO: 300 K/UL — SIGNIFICANT CHANGE UP (ref 150–400)
POTASSIUM SERPL-MCNC: 4.9 MMOL/L — SIGNIFICANT CHANGE UP (ref 3.5–5.3)
POTASSIUM SERPL-SCNC: 4.9 MMOL/L — SIGNIFICANT CHANGE UP (ref 3.5–5.3)
RBC # BLD: 3.95 M/UL — LOW (ref 4.2–5.8)
RBC # FLD: 18.8 % — HIGH (ref 10.3–16.9)
SODIUM SERPL-SCNC: 134 MMOL/L — LOW (ref 135–145)
WBC # BLD: 7.9 K/UL — SIGNIFICANT CHANGE UP (ref 3.8–10.5)
WBC # FLD AUTO: 7.9 K/UL — SIGNIFICANT CHANGE UP (ref 3.8–10.5)

## 2018-01-30 PROCEDURE — 99233 SBSQ HOSP IP/OBS HIGH 50: CPT | Mod: GC

## 2018-01-30 PROCEDURE — 93010 ELECTROCARDIOGRAM REPORT: CPT

## 2018-01-30 PROCEDURE — 99233 SBSQ HOSP IP/OBS HIGH 50: CPT

## 2018-01-30 PROCEDURE — 99232 SBSQ HOSP IP/OBS MODERATE 35: CPT

## 2018-01-30 RX ADMIN — Medication 2: at 17:48

## 2018-01-30 RX ADMIN — ATORVASTATIN CALCIUM 20 MILLIGRAM(S): 80 TABLET, FILM COATED ORAL at 21:12

## 2018-01-30 RX ADMIN — Medication 250 MILLIGRAM(S): at 13:34

## 2018-01-30 RX ADMIN — NYSTATIN CREAM 1 APPLICATION(S): 100000 CREAM TOPICAL at 09:16

## 2018-01-30 RX ADMIN — LEVETIRACETAM 500 MILLIGRAM(S): 250 TABLET, FILM COATED ORAL at 17:49

## 2018-01-30 RX ADMIN — Medication 5 MILLIGRAM(S): at 05:37

## 2018-01-30 RX ADMIN — PIPERACILLIN AND TAZOBACTAM 200 GRAM(S): 4; .5 INJECTION, POWDER, LYOPHILIZED, FOR SOLUTION INTRAVENOUS at 13:35

## 2018-01-30 RX ADMIN — HEPARIN SODIUM 5000 UNIT(S): 5000 INJECTION INTRAVENOUS; SUBCUTANEOUS at 21:12

## 2018-01-30 RX ADMIN — LEVETIRACETAM 500 MILLIGRAM(S): 250 TABLET, FILM COATED ORAL at 05:37

## 2018-01-30 RX ADMIN — HALOPERIDOL DECANOATE 10 MILLIGRAM(S): 100 INJECTION INTRAMUSCULAR at 21:12

## 2018-01-30 RX ADMIN — METFORMIN HYDROCHLORIDE 1000 MILLIGRAM(S): 850 TABLET ORAL at 17:49

## 2018-01-30 RX ADMIN — HEPARIN SODIUM 5000 UNIT(S): 5000 INJECTION INTRAVENOUS; SUBCUTANEOUS at 05:37

## 2018-01-30 RX ADMIN — NYSTATIN CREAM 1 APPLICATION(S): 100000 CREAM TOPICAL at 17:50

## 2018-01-30 RX ADMIN — Medication 100 MILLIGRAM(S): at 13:35

## 2018-01-30 RX ADMIN — HEPARIN SODIUM 5000 UNIT(S): 5000 INJECTION INTRAVENOUS; SUBCUTANEOUS at 13:35

## 2018-01-30 RX ADMIN — PIPERACILLIN AND TAZOBACTAM 200 GRAM(S): 4; .5 INJECTION, POWDER, LYOPHILIZED, FOR SOLUTION INTRAVENOUS at 07:26

## 2018-01-30 RX ADMIN — METFORMIN HYDROCHLORIDE 1000 MILLIGRAM(S): 850 TABLET ORAL at 09:12

## 2018-01-30 RX ADMIN — PIPERACILLIN AND TAZOBACTAM 200 GRAM(S): 4; .5 INJECTION, POWDER, LYOPHILIZED, FOR SOLUTION INTRAVENOUS at 01:43

## 2018-01-30 RX ADMIN — PIPERACILLIN AND TAZOBACTAM 200 GRAM(S): 4; .5 INJECTION, POWDER, LYOPHILIZED, FOR SOLUTION INTRAVENOUS at 19:38

## 2018-01-30 RX ADMIN — Medication 5 MILLIGRAM(S): at 17:50

## 2018-01-30 NOTE — PROGRESS NOTE ADULT - ATTENDING COMMENTS
dx:  1)severe sepsis due to  infected decubitus ulcers/possible sacral osteomyelitis- ct pelvis (+) sacral wound abscess, surgery has evaluated, and does not believe this is an abscess. ID narrowed abx to zosyn. to undergo diverting colostomy . cont local wound care  2) left IT  decub and sacral decub- stage iv  - as above. plan for diverting colostomy  3)psychosis - improved, but still remains paranoid. recent email sent to patient experience highlights some of the paranoia . letter forwarded to risk management. cont treatment over objection based on court ruling. haldol qhs (po preferably , if not willing then will need IM haldol) . ekg to eval qtc serially  4)neurogenic bladder - c/w suprapubic bowles, oxybutinin  5)DM ii - can cont metformin  6)epilepsy- keppra   7) tachycardia - etiology unclear, but pt has stated he needed propranolol in the past for his "heartrate", if persistently p>100, can start metoprolol . dx:  1)severe sepsis due to  infected decubitus ulcers/possible sacral osteomyelitis- ct pelvis (+) sacral wound abscess, surgery has evaluated, and does not believe this is an abscess. ID narrowed abx to zosyn. to undergo diverting colostomy . cont local wound care  2) left IT  decub and sacral decub- stage iv  - as above. plan for diverting colostomy            a) preop- pt is w/o angina, dyspnea, palpatations. no clinical evidence of decompensated chf or arrythmia, ACS, valvular dz.                 METS is unk. RCRI = 1 (DM II). pt is at low risk for intermediate risk procedure. ekg - unremarkable. pt ok to proceed with planned surgery                w/o additional cardiac testing.   3)psychosis - improved, but still remains paranoid. recent email sent to patient experience highlights some of the paranoia . letter forwarded to risk management. cont treatment over objection based on court ruling. haldol qhs (po preferably , if not willing then will need IM haldol) . ekg to eval qtc serially  4)neurogenic bladder - c/w suprapubic bowles, oxybutinin  5)DM ii - can cont metformin  6)epilepsy- keppra   7) tachycardia - etiology unclear, but pt has stated he needed propranolol in the past for his "heartrate", if persistently p>100, can start metoprolol .

## 2018-01-30 NOTE — PROGRESS NOTE BEHAVIORAL HEALTH - NSBHFUPINTERVALHXFT_PSY_A_CORE
Pt seen; chart reviewed; discussed with Ms. Rubio and team.   Pt scheduled for colostomy procedure on 2/1.  Pt observed lying in bed with headphones on. Has no complaints. Mood is "Okay." Affect is mood-congruent, stable, disinterested but not dismissive.  Pt remains compliant with po Haldol; no evidence of SE.  Per team, pt's aunt reports that pt is back to his baseline.    Dispo discussed with Ms. Slaughter.  Pt still being considered for partial hospitalization program but unclear regarding when or whether he will be accepted. ACT application also being processed. If pt unable to follow up in partial hospitalization program, ACT application will be expedited. Pt seen; chart reviewed; discussed with Ms. Slaughter and team.   Pt scheduled for colostomy procedure on 2/1.  Pt observed lying in bed with headphones on. Has no complaints. Mood is "Okay." Affect is mood-congruent, stable, disinterested but not dismissive.  Pt remains compliant with po Haldol; no evidence of SE.  Per team, pt's aunt reports that pt is back to his baseline.    Dispo discussed with Ms. Slaughter.  Pt still being considered for partial hospitalization program but unclear regarding when or whether he will be accepted. ACT application also being processed. If pt unable to follow up in partial hospitalization program, ACT application will be expedited.

## 2018-01-30 NOTE — PROGRESS NOTE ADULT - ATTENDING COMMENTS
Agree with above.  Can complete a 2 week course of Zosyn to treat deep seated soft tissue infection then stop    Reconsult as needed

## 2018-01-30 NOTE — PROGRESS NOTE ADULT - ASSESSMENT
33yo M paraplegic, PMH of spinal cord injury (wheelchair bound), sacral pressure ulcer with osteo x2, DM2, indwelling suprapubic catheter, with a sacral decubitis ulcer with CT indicating a possible abscess, unlikely C.Diff as pt is not having diarrhea. Normalized WBC and afebrile.      Recommend:  -Continue Zosyn 3.375g Q6h for 2 weeks  -Please reconsult ID if needed

## 2018-01-30 NOTE — PROGRESS NOTE BEHAVIORAL HEALTH - SUMMARY
Call placed to pt's aunt, Ayla ([phone]); left voice message.  Will try to arrange family meeting with aunt to ensure safe dispo for pt. 32-year-old male with reported hx of schizoaffective disorder, rendered paraplegic s/p suicide attempt, admitted in septic shock, s/p discontinuation of ADL and wound care, in context of noncompliance with antipsychotic medication resulting in psychosis.  Pt was determined to LACK CAPACITY regarding medical decision making including need for psychotropic medication. Has approval for TOO (court mandated), is tolerating haldol 10 mg po qhs with no evidence of SE, increasingly better behavioral control. (To date, has been taking po Haldol, thus has not needed IM medication.).     Pt approaching readiness for discharge, requires dispo planning regarding home care as well as psychiatric follow up.    Call placed to pt's aunt, Ayla ([phone]); left voice message.  Will try to arrange family meeting with aunt to ensure safe dispo for pt.

## 2018-01-30 NOTE — PROGRESS NOTE ADULT - SUBJECTIVE AND OBJECTIVE BOX
INTERVAL HPI / OVERNIGHT EVENTS: No acute events overnight. Patient was afebrile - he was tachycardiac to 115, remained asymptomatic. Yesterday - interval events - antibiotics changed as per ID rec's vancomycin and meropenem was deescalated to zosyn. Surgery consulted on patient for diverting colostomy bag possibly Thursday, surgical team will give additional recommendations prior to surgery.     SUBJECTIVE: Patient seen and examined at bedside. Patient denies shortness of breath, chest pain, palpitations. States he had a bowel movement yesterday. Denies any other symptoms currently.     OBJECTIVE:    VITAL SIGNS:  Vital Signs Last 24 Hrs  T(C): 36.9 (30 Jan 2018 05:16), Max: 37.3 (29 Jan 2018 16:58)  T(F): 98.4 (30 Jan 2018 05:16), Max: 99.2 (29 Jan 2018 16:58)  HR: 101 (30 Jan 2018 05:16) (84 - 120)  BP: 98/63 (30 Jan 2018 05:16) (98/63 - 115/70)  BP(mean): 85 (29 Jan 2018 15:20) (85 - 85)  RR: 16 (30 Jan 2018 05:16) (16 - 20)  SpO2: 96% (30 Jan 2018 05:16) (95% - 100%)      01-29 @ 07:01  -  01-30 @ 07:00  --------------------------------------------------------  IN: 0 mL / OUT: 4850 mL / NET: -4850 mL      CAPILLARY BLOOD GLUCOSE  POCT Blood Glucose.: 182 mg/dL (29 Jan 2018 21:36)      PHYSICAL EXAM:  Constitutional: Laying in bed comfortably, sleeping, easily arousable, breathing comfortably, patient is paraplegic   HEENT: EOMI, conjunctiva clear  Neck: supple, nontender, no JVD   Respiratory: CTA b/l   Cardiovascular: S1S2 RRR, no murmurs  Gastrointestinal: Distended, soft, nontender, (+) BS, + suprapubic catheter  Extremities: sacral decubitus with dressing  Neuro: AAOx3, odd effect    MEDICATIONS:  MEDICATIONS  (STANDING):  ascorbic acid 250 milliGRAM(s) Oral daily  atorvastatin 20 milliGRAM(s) Oral at bedtime  dextrose 5%. 1000 milliLiter(s) (50 mL/Hr) IV Continuous <Continuous>  dextrose 50% Injectable 12.5 Gram(s) IV Push once  dextrose 50% Injectable 25 Gram(s) IV Push once  dextrose 50% Injectable 25 Gram(s) IV Push once  docusate sodium 100 milliGRAM(s) Oral three times a day  haloperidol     Tablet 10 milliGRAM(s) Oral at bedtime  heparin  Injectable 5000 Unit(s) SubCutaneous every 8 hours  insulin lispro (HumaLOG) corrective regimen sliding scale   SubCutaneous Before meals and at bedtime  levETIRAcetam 500 milliGRAM(s) Oral two times a day  metFORMIN 1000 milliGRAM(s) Oral two times a day with meals  nystatin Powder 1 Application(s) Topical two times a day  oxybutynin 5 milliGRAM(s) Oral two times a day  piperacillin/tazobactam IVPB. 3.375 Gram(s) IV Intermittent every 6 hours    MEDICATIONS  (PRN):  acetaminophen   Tablet 650 milliGRAM(s) Oral every 6 hours PRN For Temp greater than 38 C (100.4 F)  bisacodyl Suppository 10 milliGRAM(s) Rectal daily PRN Constipation  dextrose Gel 1 Dose(s) Oral once PRN Blood Glucose LESS THAN 70 milliGRAM(s)/deciliter  glucagon  Injectable 1 milliGRAM(s) IntraMuscular once PRN Glucose LESS THAN 70 milligrams/deciliter  haloperidol    Injectable 10 milliGRAM(s) IntraMuscular at bedtime PRN if refuses PO Haldol  mineral oil enema 133 milliLiter(s) Rectal daily PRN constipation 1st line      ALLERGIES:  Allergies  Capzasin Back and Body (Unknown)        LABS:                        8.5    7.9   )-----------( 300      ( 30 Jan 2018 06:16 )             28.5     01-30    134<L>  |  97  |  19  ----------------------------<  140<H>  4.9   |  26  |  0.75    Ca    9.5      30 Jan 2018 06:16  Phos  4.5     01-30  Mg     1.7     01-30        RADIOLOGY & ADDITIONAL TESTS:     CT Pelvis w/ IV Cont (01.27.18 @ 14:27)  Findings: The distal sacrum and coccyx are not present. The osseous   structures have been resected. The most distal portion of the sacrum, mid   S3 is sclerotic. In this location is a fluid and gas-filled collection   measuring 7.8 cm in craniocaudad dimension x 4.3 cm transverse x 1.5 cm   in AP dimension. This is consistent with an abscess and appears to   communicate with the skin surface at the lower gluteal cleft. It is in   proximity to the anus but communication is not identified. There is   infiltration of the adjacent soft tissues. The gluteal muscles are   atrophied. There is diffuse haziness of the posterior subcutaneous   tissues.    The SI joints appear ankylosed. Joint space narrowing osteophyte   formation is present the hips with underlying cam morphology. In the   lower portions of the kidneys appear unremarkable. There is no evidence   of bowel obstruction. A large volume of stool is present in the colon. A   suprapubic catheter is present in the bladder. The bladder wall appears   thickened however the bladder is underdistended. There are scattered   lymph nodes in the pelvis measuring up to 11 mm in short axis in the   retroperitoneum.    Impression: Findings consistent with abscess replacing the lower sacrum   and coccyx. Osteomyelitis of the adjacent S3 segment cannot be excluded.   The collection appears to be communicating with the skin surface in the   inferior gluteal cleft.

## 2018-01-30 NOTE — PROGRESS NOTE ADULT - SUBJECTIVE AND OBJECTIVE BOX
INTERVAL HPI/OVERNIGHT EVENTS:  RAHEL overnight. Remains afebrile with normal WBC count        ANTIBIOTICS/RELEVANT:  Zosyn    MEDICATIONS  (STANDING):  ascorbic acid 250 milliGRAM(s) Oral daily  atorvastatin 20 milliGRAM(s) Oral at bedtime  dextrose 5%. 1000 milliLiter(s) (50 mL/Hr) IV Continuous <Continuous>  dextrose 50% Injectable 12.5 Gram(s) IV Push once  dextrose 50% Injectable 25 Gram(s) IV Push once  dextrose 50% Injectable 25 Gram(s) IV Push once  docusate sodium 100 milliGRAM(s) Oral three times a day  haloperidol     Tablet 10 milliGRAM(s) Oral at bedtime  heparin  Injectable 5000 Unit(s) SubCutaneous every 8 hours  insulin lispro (HumaLOG) corrective regimen sliding scale   SubCutaneous Before meals and at bedtime  levETIRAcetam 500 milliGRAM(s) Oral two times a day  metFORMIN 1000 milliGRAM(s) Oral two times a day with meals  nystatin Powder 1 Application(s) Topical two times a day  oxybutynin 5 milliGRAM(s) Oral two times a day  piperacillin/tazobactam IVPB. 3.375 Gram(s) IV Intermittent every 6 hours    MEDICATIONS  (PRN):  acetaminophen   Tablet 650 milliGRAM(s) Oral every 6 hours PRN For Temp greater than 38 C (100.4 F)  bisacodyl Suppository 10 milliGRAM(s) Rectal daily PRN Constipation  dextrose Gel 1 Dose(s) Oral once PRN Blood Glucose LESS THAN 70 milliGRAM(s)/deciliter  glucagon  Injectable 1 milliGRAM(s) IntraMuscular once PRN Glucose LESS THAN 70 milligrams/deciliter  haloperidol    Injectable 10 milliGRAM(s) IntraMuscular at bedtime PRN if refuses PO Haldol  mineral oil enema 133 milliLiter(s) Rectal daily PRN constipation 1st line        Vital Signs Last 24 Hrs  T(C): 36.9 (30 Jan 2018 05:16), Max: 37.3 (29 Jan 2018 16:58)  T(F): 98.4 (30 Jan 2018 05:16), Max: 99.2 (29 Jan 2018 16:58)  HR: 101 (30 Jan 2018 05:16) (84 - 120)  BP: 98/63 (30 Jan 2018 05:16) (98/63 - 115/70)  BP(mean): 85 (29 Jan 2018 15:20) (85 - 85)  RR: 16 (30 Jan 2018 05:16) (16 - 20)  SpO2: 96% (30 Jan 2018 05:16) (95% - 100%)    01-29-18 @ 07:01  -  01-30-18 @ 07:00  --------------------------------------------------------  IN: 0 mL / OUT: 4850 mL / NET: -4850 mL      PHYSICAL EXAM:  Constitutional: Paraplegic, w/c bound  Eyes:ISIDRO, EOMI  Ear/Nose/Throat: no oral lesion, no sinus tenderness on percussion	  Neck:no JVD, no lymphadenopathy, supple  Respiratory: CTA jose  Cardiovascular: S1S2 RRR, no murmurs  Gastrointestinal:soft, (+) BS,(+) suprapubic catheter  Extremities: sacral decubitus with dressing      LABS:                        8.5    7.9   )-----------( 300      ( 30 Jan 2018 06:16 )             28.5     01-30    134<L>  |  97  |  19  ----------------------------<  140<H>  4.9   |  26  |  0.75    Ca    9.5      30 Jan 2018 06:16  Phos  4.5     01-30  Mg     1.7     01-30            MICROBIOLOGY:  Culture - Blood (01.26.18 @ 06:02)    Specimen Source: .Blood None    Culture Results:   No growth at 3 days.      RADIOLOGY & ADDITIONAL STUDIES:  < from: CT Pelvis w/ IV Cont (01.27.18 @ 14:27) >  EXAM:  CT PELVIS ONLY IC                          PROCEDURE DATE:  01/27/2018    Quantity of Contrast in Vial in ml: 100 Contrast Used: Optiray 350  Quantity of Contrast Wasted in ml: 6           INTERPRETATION:  CT scan of pelvis    Clinical indication: Paraplegic, sacral ulcer, osteomyelitis assess for   abscess    Technique: Following administration of intravenous contrast axial imaging   of the pelvis was obtained. The data was reformatted into sagittal and   coronal planes.    No prior imaging is available for comparison.    Findings: The distal sacrum and coccyx are not present. The osseous   structures have been resected. The most distal portion of the sacrum, mid   S3 is sclerotic. In this location is a fluid and gas-filled collection   measuring 7.8 cm in craniocaudad dimension x 4.3 cm transverse x 1.5 cm   in AP dimension. This is consistent with an abscess and appears to   communicate with the skin surface at the lower gluteal cleft. It is in   proximity to the anus but communication is not identified. There is   infiltration of the adjacent soft tissues. The gluteal muscles are   atrophied. There is diffuse haziness of the posterior subcutaneous   tissues.    The SI joints appear ankylosed. Joint space narrowing osteophyte   formation is present the hips with underlying cam morphology. In the   lower portions of the kidneys appear unremarkable. There is no evidence   of bowel obstruction. A large volume of stool is present in the colon. A   suprapubic catheter is present in the bladder. The bladder wall appears   thickened however the bladder is underdistended. There are scattered   lymph nodes in the pelvis measuring up to 11 mm in short axis in the   retroperitoneum.    Impression: Findings consistent with abscess replacing the lower sacrum   and coccyx. Osteomyelitis of the adjacent S3 segment cannot be excluded.   The collection appears to be communicating with the skin surface in the   inferior gluteal cleft.

## 2018-01-30 NOTE — PROGRESS NOTE BEHAVIORAL HEALTH - NSBHCONSULTRECOMMENDOTHER_PSY_A_CORE FT
Will continue to work with team regarding safe discharge.  Pt still being considered for both partial hospitalization program and ACT.  If pt does not get into partial hospitalization program, ACT follow-up should be sufficient to provide safe dispo for pt.

## 2018-01-30 NOTE — PROGRESS NOTE ADULT - PROBLEM SELECTOR PLAN 1
Pt w/ a stage 4 infected sacral ulcer w/ drainage with w/ feculent material. On admission required debridement by plastic surgery.   CT (1/27): Findings consistent with abscess replacing the lower sacrum and coccyx. Osteomyelitis of the adjacent S3 segment cannot be excluded. The collection appears to be communicating with the skin surface in the inferior gluteal cleft.   - General surgery does not believe there is a fistula between wound and rectum causing leakage of stool.   - Wound cx had citrobacter, enterococcus, klebsiella   - Wound care following; continue to appreciate recs  - Antibiotics deescalated (1/29) discontinued vancomycin and meropenem and transitioned to zosyn (f/u ID rec’s on when to discontinue – possibly 2/7)   - Repeat blood cultures x 2 (1/26), NGTD  - Surgery consulted 1/29 for possibly colostomy – plan for Thursday (2/1)     #C. diff: Results came back as indeterminate and then positive. Discussed with ID prefers to hold off treating for now with no diarrhea. If developed increasing loose stool would add PO vancomycin.

## 2018-01-31 DIAGNOSIS — Z01.818 ENCOUNTER FOR OTHER PREPROCEDURAL EXAMINATION: ICD-10-CM

## 2018-01-31 LAB
CULTURE RESULTS: SIGNIFICANT CHANGE UP
CULTURE RESULTS: SIGNIFICANT CHANGE UP
GLUCOSE BLDC GLUCOMTR-MCNC: 132 MG/DL — HIGH (ref 70–99)
GLUCOSE BLDC GLUCOMTR-MCNC: 178 MG/DL — HIGH (ref 70–99)
GLUCOSE BLDC GLUCOMTR-MCNC: 271 MG/DL — HIGH (ref 70–99)
GLUCOSE BLDC GLUCOMTR-MCNC: 286 MG/DL — HIGH (ref 70–99)
SPECIMEN SOURCE: SIGNIFICANT CHANGE UP
SPECIMEN SOURCE: SIGNIFICANT CHANGE UP

## 2018-01-31 PROCEDURE — 99233 SBSQ HOSP IP/OBS HIGH 50: CPT

## 2018-01-31 RX ADMIN — Medication 100 MILLIGRAM(S): at 22:00

## 2018-01-31 RX ADMIN — PIPERACILLIN AND TAZOBACTAM 200 GRAM(S): 4; .5 INJECTION, POWDER, LYOPHILIZED, FOR SOLUTION INTRAVENOUS at 13:34

## 2018-01-31 RX ADMIN — ATORVASTATIN CALCIUM 20 MILLIGRAM(S): 80 TABLET, FILM COATED ORAL at 22:00

## 2018-01-31 RX ADMIN — HEPARIN SODIUM 5000 UNIT(S): 5000 INJECTION INTRAVENOUS; SUBCUTANEOUS at 13:34

## 2018-01-31 RX ADMIN — LEVETIRACETAM 500 MILLIGRAM(S): 250 TABLET, FILM COATED ORAL at 05:40

## 2018-01-31 RX ADMIN — HEPARIN SODIUM 5000 UNIT(S): 5000 INJECTION INTRAVENOUS; SUBCUTANEOUS at 05:40

## 2018-01-31 RX ADMIN — PIPERACILLIN AND TAZOBACTAM 200 GRAM(S): 4; .5 INJECTION, POWDER, LYOPHILIZED, FOR SOLUTION INTRAVENOUS at 01:20

## 2018-01-31 RX ADMIN — Medication 5 MILLIGRAM(S): at 17:58

## 2018-01-31 RX ADMIN — NYSTATIN CREAM 1 APPLICATION(S): 100000 CREAM TOPICAL at 18:00

## 2018-01-31 RX ADMIN — PIPERACILLIN AND TAZOBACTAM 200 GRAM(S): 4; .5 INJECTION, POWDER, LYOPHILIZED, FOR SOLUTION INTRAVENOUS at 18:56

## 2018-01-31 RX ADMIN — NYSTATIN CREAM 1 APPLICATION(S): 100000 CREAM TOPICAL at 08:55

## 2018-01-31 RX ADMIN — LEVETIRACETAM 500 MILLIGRAM(S): 250 TABLET, FILM COATED ORAL at 17:58

## 2018-01-31 RX ADMIN — Medication 6: at 22:03

## 2018-01-31 RX ADMIN — HALOPERIDOL DECANOATE 10 MILLIGRAM(S): 100 INJECTION INTRAMUSCULAR at 22:00

## 2018-01-31 RX ADMIN — Medication 5 MILLIGRAM(S): at 05:40

## 2018-01-31 RX ADMIN — PIPERACILLIN AND TAZOBACTAM 200 GRAM(S): 4; .5 INJECTION, POWDER, LYOPHILIZED, FOR SOLUTION INTRAVENOUS at 06:54

## 2018-01-31 RX ADMIN — Medication 250 MILLIGRAM(S): at 11:03

## 2018-01-31 NOTE — PROGRESS NOTE ADULT - PROBLEM SELECTOR PLAN 1
Pt w/ a stage 4 infected sacral ulcer w/ drainage with w/ feculent material. On admission required debridement by plastic surgery.   CT (1/27): Findings consistent with abscess replacing the lower sacrum and coccyx. Osteomyelitis of the adjacent S3 segment cannot be excluded. The collection appears to be communicating with the skin surface in the inferior gluteal cleft.   - General surgery does not believe there is a fistula between wound and rectum causing leakage of stool.   - Wound cx had citrobacter, enterococcus, klebsiella   - Wound care following; continue to appreciate recs  - Antibiotics deescalated (1/29) discontinued vancomycin and meropenem and transitioned to zosyn (f/u ID rec’s on when to discontinue – possibly 2/7)   - Repeat blood cultures x 2 (1/26), NGTD  - Surgery consulted 1/29 for possibly colostomy – plan for Thursday (2/1)     #C. diff: Results came back as indeterminate and then positive. Discussed with ID prefers to hold off treating for now with no diarrhea. If developed increasing loose stool would add PO vancomycin. - Patient has low to moderate risk given RCI score 0 with poor METs (less than 4) due to paraplegia and is going for intermediate risk surgery.   - Prior to surgery will need CBC, CMP, Mag, Phos, PT/PTT/INR, active Type & Screen with 2 units of blood on hold for OR.   - Last EKG revealed Sinus Tachycardia reveal no ischemic changes  - Chest Xray from 1/26 was clear lungs with no consolidations, does not need further imaging.   - In terms of medications: hold metformin, hold heparin SubQ dose prior to surgery.

## 2018-01-31 NOTE — PROGRESS NOTE ADULT - PROBLEM SELECTOR PLAN 8
F: no IVF  E: K>4 Mg>2, monitor and replete as needed  N: diabetic diet  Ppx: SQH  D: RMF, awaiting placement at med-psych facility   FULL CODE -c/w home Lipitor medication    #Seizure Disorder: pt w/ a reported hx of seizure disorder  -c/w home medication of Keppra 500mg BID    #Psychiatry Eval: Pt evaluated by psych and recommended he needs to be admitted to medical-psychiatric inpatient unit. Patient does not have capacity to make medical decisions.   -discontinued Aripiprazole in favor of haloperidol. Haloperidol 10 mg PO or IM. Patient does not have capacity to refuse and is court ordered to receive the haldol.

## 2018-01-31 NOTE — PROGRESS NOTE ADULT - ASSESSMENT
32M paraplegic PMH spinal cord injury (wheelchair bound), sacral pressure ulcer with osteo x2 (outpatient provider said they have records of March osteo s/p daptomycin of unknown length) earlier in 2017,  DM2, indwelling suprapubic catheter who presented w/ septic shock secondary to infected purulent sacral 4th degree pressure ulcer with underlying inadequately treated OM, hemodynamically stable being treated for sacral osteomyelitis awaiting placement at [facility]

## 2018-01-31 NOTE — PROGRESS NOTE ADULT - PROBLEM SELECTOR PLAN 7
-c/w home Lipitor medication    #Seizure Disorder: pt w/ a reported hx of seizure disorder  -c/w home medication of Keppra 500mg BID    #Psychiatry Eval: Pt evaluated by psych and recommended he needs to be admitted to medical-psychiatric inpatient unit. Patient does not have capacity to make medical decisions.   -discontinued Aripiprazole in favor of haloperidol. Haloperidol 10 mg PO or IM. Patient does not have capacity to refuse and is court ordered to receive the haldol. Pt w/ a Hgb of 9.1 and MCV of 74 on admission. Hgb has been slowly downtrending but no active signs of bleeding. Iron studies showing iron deficiency.   -will maintain active type and screen  -Hgb stable, no evidence of active bleeding  -will transfuse for Hgb <7

## 2018-01-31 NOTE — PROGRESS NOTE ADULT - SUBJECTIVE AND OBJECTIVE BOX
INTERVAL HPI/OVERNIGHT EVENTS:    SUBJECTIVE: Patient seen and examined at bedside.    OBJECTIVE:    VITAL SIGNS:      01-30 @ 07:01  -  01-31 @ 07:00  --------------------------------------------------------  IN: 0 mL / OUT: 2200 mL / NET: -2200 mL      CAPILLARY BLOOD GLUCOSE  POCT Blood Glucose.: 178 mg/dL (31 Jan 2018 12:26)      PHYSICAL EXAM:  Constitutional: Laying in bed comfortably, sleeping, easily arousable, breathing comfortably, patient is paraplegic   HEENT: EOMI, conjunctiva clear  Neck: supple, nontender, no JVD   Respiratory: CTA b/l   Cardiovascular: S1S2 RRR, no murmurs  Gastrointestinal: Distended, soft, nontender, (+) BS, + suprapubic catheter  Extremities: sacral decubitus with dressing  Neuro: AAOx3, odd effect      MEDICATIONS:  MEDICATIONS  (STANDING):  ascorbic acid 250 milliGRAM(s) Oral daily  atorvastatin 20 milliGRAM(s) Oral at bedtime  dextrose 5%. 1000 milliLiter(s) (50 mL/Hr) IV Continuous <Continuous>  dextrose 50% Injectable 12.5 Gram(s) IV Push once  dextrose 50% Injectable 25 Gram(s) IV Push once  dextrose 50% Injectable 25 Gram(s) IV Push once  docusate sodium 100 milliGRAM(s) Oral three times a day  haloperidol     Tablet 10 milliGRAM(s) Oral at bedtime  heparin  Injectable 5000 Unit(s) SubCutaneous every 8 hours  insulin lispro (HumaLOG) corrective regimen sliding scale   SubCutaneous Before meals and at bedtime  levETIRAcetam 500 milliGRAM(s) Oral two times a day  metFORMIN 1000 milliGRAM(s) Oral two times a day with meals  nystatin Powder 1 Application(s) Topical two times a day  oxybutynin 5 milliGRAM(s) Oral two times a day  piperacillin/tazobactam IVPB. 3.375 Gram(s) IV Intermittent every 6 hours    MEDICATIONS  (PRN):  acetaminophen   Tablet 650 milliGRAM(s) Oral every 6 hours PRN For Temp greater than 38 C (100.4 F)  bisacodyl Suppository 10 milliGRAM(s) Rectal daily PRN Constipation  dextrose Gel 1 Dose(s) Oral once PRN Blood Glucose LESS THAN 70 milliGRAM(s)/deciliter  glucagon  Injectable 1 milliGRAM(s) IntraMuscular once PRN Glucose LESS THAN 70 milligrams/deciliter  haloperidol    Injectable 10 milliGRAM(s) IntraMuscular at bedtime PRN if refuses PO Haldol  mineral oil enema 133 milliLiter(s) Rectal daily PRN constipation 1st line      ALLERGIES:  Allergies    Capzasin Back and Body (Unknown)    Intolerances        LABS:                        8.5    7.9   )-----------( 300      ( 30 Jan 2018 06:16 )             28.5     01-30    134<L>  |  97  |  19  ----------------------------<  140<H>  4.9   |  26  |  0.75    Ca    9.5      30 Jan 2018 06:16  Phos  4.5     01-30  Mg     1.7     01-30            RADIOLOGY & ADDITIONAL TESTS: Reviewed. INTERVAL HPI/OVERNIGHT EVENTS: No acute events overnight.     SUBJECTIVE: Patient seen and examined at bedside. Patient denies shortness of breath, chest pain, palpitations.     OBJECTIVE:    VITAL SIGNS:  Vital Signs Last 24 Hrs  T(C): 36.7 (31 Jan 2018 16:29), Max: 37.1 (30 Jan 2018 21:20)  T(F): 98.1 (31 Jan 2018 16:29), Max: 98.8 (30 Jan 2018 21:20)  HR: 97 (31 Jan 2018 16:29) (90 - 98)  BP: 112/69 (31 Jan 2018 16:29) (95/58 - 112/69)  BP(mean): 83 (31 Jan 2018 16:29) (83 - 83)  RR: 18 (31 Jan 2018 16:29) (16 - 18)  SpO2: 99% (31 Jan 2018 16:29) (97% - 99%)      01-30 @ 07:01  -  01-31 @ 07:00  --------------------------------------------------------  IN: 0 mL / OUT: 2200 mL / NET: -2200 mL      CAPILLARY BLOOD GLUCOSE  POCT Blood Glucose.: 178 mg/dL (31 Jan 2018 12:26)      PHYSICAL EXAM:  Constitutional: Laying in bed comfortably, sleeping, easily arousable, breathing comfortably, patient is paraplegic   HEENT: EOMI, conjunctiva clear  Neck: supple, nontender, no JVD   Respiratory: CTA b/l   Cardiovascular: S1S2 RRR, no murmurs  Gastrointestinal: Distended, soft, nontender, (+) BS, + suprapubic catheter  Extremities: sacral decubitus with dressing  Neuro: AAOx3, odd effect      MEDICATIONS:  MEDICATIONS  (STANDING):  ascorbic acid 250 milliGRAM(s) Oral daily  atorvastatin 20 milliGRAM(s) Oral at bedtime  dextrose 5%. 1000 milliLiter(s) (50 mL/Hr) IV Continuous <Continuous>  dextrose 50% Injectable 12.5 Gram(s) IV Push once  dextrose 50% Injectable 25 Gram(s) IV Push once  dextrose 50% Injectable 25 Gram(s) IV Push once  docusate sodium 100 milliGRAM(s) Oral three times a day  haloperidol     Tablet 10 milliGRAM(s) Oral at bedtime  heparin  Injectable 5000 Unit(s) SubCutaneous every 8 hours  insulin lispro (HumaLOG) corrective regimen sliding scale   SubCutaneous Before meals and at bedtime  levETIRAcetam 500 milliGRAM(s) Oral two times a day  metFORMIN 1000 milliGRAM(s) Oral two times a day with meals  nystatin Powder 1 Application(s) Topical two times a day  oxybutynin 5 milliGRAM(s) Oral two times a day  piperacillin/tazobactam IVPB. 3.375 Gram(s) IV Intermittent every 6 hours    MEDICATIONS  (PRN):  acetaminophen   Tablet 650 milliGRAM(s) Oral every 6 hours PRN For Temp greater than 38 C (100.4 F)  bisacodyl Suppository 10 milliGRAM(s) Rectal daily PRN Constipation  dextrose Gel 1 Dose(s) Oral once PRN Blood Glucose LESS THAN 70 milliGRAM(s)/deciliter  glucagon  Injectable 1 milliGRAM(s) IntraMuscular once PRN Glucose LESS THAN 70 milligrams/deciliter  haloperidol    Injectable 10 milliGRAM(s) IntraMuscular at bedtime PRN if refuses PO Haldol  mineral oil enema 133 milliLiter(s) Rectal daily PRN constipation 1st line      ALLERGIES:  Allergies    Capzasin Back and Body (Unknown)    Intolerances        LABS:                        8.5    7.9   )-----------( 300      ( 30 Jan 2018 06:16 )             28.5     01-30    134<L>  |  97  |  19  ----------------------------<  140<H>  4.9   |  26  |  0.75    Ca    9.5      30 Jan 2018 06:16  Phos  4.5     01-30  Mg     1.7     01-30            RADIOLOGY & ADDITIONAL TESTS: Reviewed.

## 2018-01-31 NOTE — PROGRESS NOTE ADULT - PROBLEM SELECTOR PLAN 3
Patient on metformin and Glimepiride at home. HgA1C of 6.0.   - ISS+ metformin 1000mg BID  - monitor FSG Pt w/ no sensation or control of urination with suprapubic catheter changed once a month.   - suprapubic catheter changed 1/12 (to change again around 2/12)  - c/w oxybutynin 5mg BID    #yeast in urine  - completed fluconazole (1/14-1/25)  - UCx+ for Candida albicans

## 2018-01-31 NOTE — PROGRESS NOTE ADULT - ATTENDING COMMENTS
dx:  1)severe sepsis due to  infected decubitus ulcers/possible sacral osteomyelitis- ct pelvis (+) sacral wound abscess, surgery has evaluated, and does not believe this is an abscess. ID narrowed abx to zosyn. to undergo diverting colostomy 2/1/18. cont local wound care  2) left IT  decub and sacral decub- stage iv  - as above. plan for diverting colostomy            a) preop- pt is w/o angina, dyspnea, palpatations. no clinical evidence of decompensated chf or arrythmia, ACS, valvular dz.                 METS is unk. RCRI = 1 (DM II). pt is at low risk for intermediate risk procedure. ekg - unremarkable. pt ok to proceed with planned surgery                w/o additional cardiac testing.   3)psychosis - improved,  cont treatment over objection based on court ruling. haldol qhs (po preferably , if not willing then will need IM haldol) . ekg to eval qtc serially  4)neurogenic bladder - c/w suprapubic bowles, oxybutinin  5)DM ii - can cont metformin  6)epilepsy- keppra   7) tachycardia - etiology unclear, but pt has stated he needed propranolol in the past for his "heartrate", if persistently p>100, can start metoprolol .

## 2018-01-31 NOTE — PROGRESS NOTE ADULT - PROBLEM SELECTOR PLAN 4
Pt with LM on 1/12, since RESOLVED  - FeNa 0.6%, prerenal likely 2/2 diarrhea and sepsis  - Resolved Patient on metformin and Glimepiride at home. HgA1C of 6.0.   - ISS+ metformin 1000mg BID  - monitor FSG

## 2018-01-31 NOTE — PROGRESS NOTE ADULT - PROBLEM SELECTOR PLAN 6
Pt w/ a Hgb of 9.1 and MCV of 74 on admission. Hgb has been slowly downtrending but no active signs of bleeding. Iron studies showing iron deficiency.   -will maintain active type and screen  -Hgb stable, no evidence of active bleeding  -will transfuse for Hgb <7 Pt w/ paraplegia after falling out of a window several years ago. Pt has no sensation or function below the nipples. Pt is able to move b/l upper extremities.   - pt has a automatic wheelchair for which he uses and is able to transfer himself from chair to bed

## 2018-01-31 NOTE — PROGRESS NOTE ADULT - PROBLEM SELECTOR PLAN 5
Pt w/ paraplegia after falling out of a window several years ago. Pt has no sensation or function below the nipples. Pt is able to move b/l upper extremities.   - pt has a automatic wheelchair for which he uses and is able to transfer himself from chair to bed Pt with LM on 1/12, since RESOLVED  - FeNa 0.6%, prerenal likely 2/2 diarrhea and sepsis  - Resolved

## 2018-01-31 NOTE — PROGRESS NOTE ADULT - PROBLEM SELECTOR PLAN 2
Pt w/ no sensation or control of urination with suprapubic catheter changed once a month.   - suprapubic catheter changed 1/12 (to change again around 2/12)  - c/w oxybutynin 5mg BID    #yeast in urine  - completed fluconazole (1/14-1/25)  - UCx+ for Candida albicans Pt w/ a stage 4 infected sacral ulcer w/ drainage with w/ feculent material. On admission required debridement by plastic surgery.   CT (1/27): Findings consistent with abscess replacing the lower sacrum and coccyx. Osteomyelitis of the adjacent S3 segment cannot be excluded. The collection appears to be communicating with the skin surface in the inferior gluteal cleft.   - General surgery does not believe there is a fistula between wound and rectum causing leakage of stool.   - Wound cx had citrobacter, enterococcus, klebsiella   - Wound care following; continue to appreciate recs  - Antibiotics deescalated (1/29) discontinued vancomycin and meropenem and transitioned to zosyn (f/u ID rec’s on when to discontinue – possibly 2/7)   - Repeat blood cultures x 2 (1/26), NGTD  - Surgery consulted 1/29 for possibly colostomy – plan for Thursday (2/1)     #C. diff: Results came back as indeterminate and then positive. Discussed with ID prefers to hold off treating for now with no diarrhea. If developed increasing loose stool would add PO vancomycin.

## 2018-01-31 NOTE — PROGRESS NOTE BEHAVIORAL HEALTH - NSBHFUPINTERVALCCFT_PSY_A_CORE
Candi Del Rio   1/27/2017 8:30 AM   Office Visit    Dept Phone:  [phone]   Encounter #:  [id number]    Provider:  WESLEY Escalera   Department:  [facility] INTERNAL MEDICINE                Your Full Care Plan              Today's Medication Changes          These changes are accurate as of: 1/27/17  9:21 AM.  If you have any questions, ask your nurse or doctor.               New Medication(s)Ordered:     montelukast 10 MG tablet   Commonly known as:  SINGULAIR   Take 1 tablet by mouth Every Night.   Started by:  WESLEY Escalera         Stop taking medication(s)listed here:     naltrexone-bupropion ER 8-90 MG tablet   Commonly known as:  CONTRAVE   Stopped by:  WESLEY Escalera                Where to Get Your Medications      These medications were sent to [facility] [address] [place] [address] [address] [address] - [phone] [facility] [phone]   [address] [address]     Phone:  [phone]     montelukast 10 MG tablet                  Your Updated Medication List          This list is accurate as of: 1/27/17  9:21 AM.  Always use your most recent med list.                metFORMIN  MG 24 hr tablet   Commonly known as:  GLUCOPHAGE XR   Take 2 tablets by mouth Daily With Breakfast.       montelukast 10 MG tablet   Commonly known as:  SINGULAIR   Take 1 tablet by mouth Every Night.       MULTI FOR HER PO       vitamin B-12 1000 MCG tablet   Commonly known as:  CYANOCOBALAMIN       Vitamin D3 2000 UNITS tablet               You Were Diagnosed With        Codes Comments    Seasonal allergic rhinitis, unspecified allergic rhinitis trigger    -  Primary ICD-10-CM: J30.2  ICD-9-CM: 477.9     Morbid obesity, unspecified obesity type     ICD-10-CM: E66.01  ICD-9-CM: 278.01     Diabetes mellitus type 2, noninsulin dependent     ICD-10-CM: E11.9  ICD-9-CM: 250.00     Vitamin D deficiency     ICD-10-CM: E55.9  ICD-9-CM: 268.9       Instructions     "None    Patient Instructions History      Upcoming Appointments     Visit Type Date Time Department    OFFICE VISIT 1/27/2017  8:30 AM [facility]    [facility] 3/24/2017  9:40 AM [facility]    OFFICE VISIT 3/28/2017  8:00 AM [facility]      MyChart Signup     Our records indicate that you have declined [facility] MyChart signup. If you would like to sign up for [employer], please email [email or web address] or call [phone] to obtain an activation code.             Other Info from Your Visit           Your Appointments     Mar 24, 2017  9:40 AM EDT   [facility]   [facility] INTERNAL MEDICINE (--)    [address] [place] [address]   [phone]            Mar 28, 2017  8:00 AM EDT   Office Visit with WESLEY Escalera   [facility] INTERNAL MEDICINE (--)    [address] [address]   [phone]           Arrive 15 minutes prior to appointment.              Allergies     No Known Allergies      Reason for Visit     Obesity 12/30 RX'd Contrave at 284.9 lbs Patient stopped taking due to potential side effects.     Cough x 2 weeks, coughs so hard she feels like she is going to \"throw up\", will sometimes happen when laughing. Has been taking Singulair.       Vital Signs     Blood Pressure Pulse Temperature Height Weight Oxygen Saturation    110/80 (BP Location: Left arm, Patient Position: Sitting, Cuff Size: Large Adult) 72 98.3 °F (36.8 °C) (Oral) 62\" (157.5 cm) 281 lb (127 kg) 98%    Breastfeeding? Body Mass Index Smoking Status             No 51.4 kg/m2 Never Smoker         Problems and Diagnoses Noted     Nasal inflammation due to allergen    Diabetes mellitus type 2, noninsulin dependent    Severe obesity    Vitamin D deficiency        " "I am okay."

## 2018-01-31 NOTE — PROGRESS NOTE ADULT - SUBJECTIVE AND OBJECTIVE BOX
SUBJECTIVE: Pt seen and examined at bedside. No overnight events. No complaints. Patient agrees to surgery.     Vital Signs Last 24 Hrs  T(C): 36.6 (31 Jan 2018 09:22), Max: 37.1 (30 Jan 2018 21:20)  T(F): 97.8 (31 Jan 2018 09:22), Max: 98.8 (30 Jan 2018 21:20)  HR: 94 (31 Jan 2018 09:22) (90 - 98)  BP: 95/62 (31 Jan 2018 09:22) (95/58 - 120/71)  BP(mean): --  RR: 16 (31 Jan 2018 09:22) (16 - 18)  SpO2: 98% (31 Jan 2018 09:22) (97% - 98%)    PHYSICAL EXAM  Constitutional: AOx3, NAD  Respiratory: CTABL, on RA  Cardiovascular: RRR, no m/r/g  Gastrointestinal: soft, ND, NT, well healed midline, suprapubic tube in place draining  Back sacral decubitus ulcer with no purulence, feces close to site of ulcer  Extremities: [facility]    LABS:                        8.5    7.9   )-----------( 300      ( 30 Jan 2018 06:16 )             28.5     01-30    134<L>  |  97  |  19  ----------------------------<  140<H>  4.9   |  26  |  0.75    Ca    9.5      30 Jan 2018 06:16  Phos  4.5     01-30  Mg     1.7     01-30    ASSESSMENT AND PLAN  31 yo M paraplegic with h/o SCI, DM, multiple stage 4 pressure with sacral decubitus ulcer    -plan for diverting colostomy tomorrow  -please pre-op patient: NPO after midnight  -pre-op labs (CBC, CMP, Mg, Phos, INR and PT, type and screen)  -2 u pRBC on hold to the OR   -continue wound care per wound rn  -discussed with Dr La, will follow

## 2018-02-01 LAB
ALBUMIN SERPL ELPH-MCNC: 3.8 G/DL — SIGNIFICANT CHANGE UP (ref 3.3–5)
ALP SERPL-CCNC: 96 U/L — SIGNIFICANT CHANGE UP (ref 40–120)
ALT FLD-CCNC: 22 U/L — SIGNIFICANT CHANGE UP (ref 10–45)
ANION GAP SERPL CALC-SCNC: 13 MMOL/L — SIGNIFICANT CHANGE UP (ref 5–17)
APTT BLD: 29.8 SEC — SIGNIFICANT CHANGE UP (ref 27.5–37.4)
AST SERPL-CCNC: 16 U/L — SIGNIFICANT CHANGE UP (ref 10–40)
BILIRUB SERPL-MCNC: 0.4 MG/DL — SIGNIFICANT CHANGE UP (ref 0.2–1.2)
BLD GP AB SCN SERPL QL: NEGATIVE — SIGNIFICANT CHANGE UP
BUN SERPL-MCNC: 20 MG/DL — SIGNIFICANT CHANGE UP (ref 7–23)
CALCIUM SERPL-MCNC: 9.6 MG/DL — SIGNIFICANT CHANGE UP (ref 8.4–10.5)
CHLORIDE SERPL-SCNC: 96 MMOL/L — SIGNIFICANT CHANGE UP (ref 96–108)
CO2 SERPL-SCNC: 26 MMOL/L — SIGNIFICANT CHANGE UP (ref 22–31)
CREAT SERPL-MCNC: 0.75 MG/DL — SIGNIFICANT CHANGE UP (ref 0.5–1.3)
GLUCOSE BLDC GLUCOMTR-MCNC: 142 MG/DL — HIGH (ref 70–99)
GLUCOSE BLDC GLUCOMTR-MCNC: 168 MG/DL — HIGH (ref 70–99)
GLUCOSE BLDC GLUCOMTR-MCNC: 203 MG/DL — HIGH (ref 70–99)
GLUCOSE BLDC GLUCOMTR-MCNC: 247 MG/DL — HIGH (ref 70–99)
GLUCOSE BLDC GLUCOMTR-MCNC: 406 MG/DL — HIGH (ref 70–99)
GLUCOSE BLDC GLUCOMTR-MCNC: 440 MG/DL — HIGH (ref 70–99)
GLUCOSE SERPL-MCNC: 313 MG/DL — HIGH (ref 70–99)
HCT VFR BLD CALC: 28.5 % — LOW (ref 39–50)
HGB BLD-MCNC: 8.4 G/DL — LOW (ref 13–17)
INR BLD: 1.19 — HIGH (ref 0.88–1.16)
MAGNESIUM SERPL-MCNC: 1.5 MG/DL — LOW (ref 1.6–2.6)
MCHC RBC-ENTMCNC: 21.6 PG — LOW (ref 27–34)
MCHC RBC-ENTMCNC: 29.5 G/DL — LOW (ref 32–36)
MCV RBC AUTO: 73.5 FL — LOW (ref 80–100)
PHOSPHATE SERPL-MCNC: 3.5 MG/DL — SIGNIFICANT CHANGE UP (ref 2.5–4.5)
PLATELET # BLD AUTO: 307 K/UL — SIGNIFICANT CHANGE UP (ref 150–400)
POTASSIUM SERPL-MCNC: 4.9 MMOL/L — SIGNIFICANT CHANGE UP (ref 3.5–5.3)
POTASSIUM SERPL-SCNC: 4.9 MMOL/L — SIGNIFICANT CHANGE UP (ref 3.5–5.3)
PROT SERPL-MCNC: 7.9 G/DL — SIGNIFICANT CHANGE UP (ref 6–8.3)
PROTHROM AB SERPL-ACNC: 13.3 SEC — HIGH (ref 9.8–12.7)
RBC # BLD: 3.88 M/UL — LOW (ref 4.2–5.8)
RBC # FLD: 18.7 % — HIGH (ref 10.3–16.9)
RH IG SCN BLD-IMP: POSITIVE — SIGNIFICANT CHANGE UP
SODIUM SERPL-SCNC: 135 MMOL/L — SIGNIFICANT CHANGE UP (ref 135–145)
WBC # BLD: 8.7 K/UL — SIGNIFICANT CHANGE UP (ref 3.8–10.5)
WBC # FLD AUTO: 8.7 K/UL — SIGNIFICANT CHANGE UP (ref 3.8–10.5)

## 2018-02-01 PROCEDURE — 99233 SBSQ HOSP IP/OBS HIGH 50: CPT

## 2018-02-01 RX ORDER — METFORMIN HYDROCHLORIDE 850 MG/1
1000 TABLET ORAL
Qty: 0 | Refills: 0 | Status: DISCONTINUED | OUTPATIENT
Start: 2018-02-01 | End: 2018-02-01

## 2018-02-01 RX ORDER — HYDROMORPHONE HYDROCHLORIDE 2 MG/ML
1 INJECTION INTRAMUSCULAR; INTRAVENOUS; SUBCUTANEOUS EVERY 4 HOURS
Qty: 0 | Refills: 0 | Status: DISCONTINUED | OUTPATIENT
Start: 2018-02-01 | End: 2018-02-04

## 2018-02-01 RX ORDER — HEPARIN SODIUM 5000 [USP'U]/ML
5000 INJECTION INTRAVENOUS; SUBCUTANEOUS EVERY 8 HOURS
Qty: 0 | Refills: 0 | Status: DISCONTINUED | OUTPATIENT
Start: 2018-02-01 | End: 2018-02-16

## 2018-02-01 RX ORDER — MAGNESIUM SULFATE 500 MG/ML
2 VIAL (ML) INJECTION ONCE
Qty: 0 | Refills: 0 | Status: COMPLETED | OUTPATIENT
Start: 2018-02-01 | End: 2018-02-01

## 2018-02-01 RX ORDER — HYDROMORPHONE HYDROCHLORIDE 2 MG/ML
0.5 INJECTION INTRAMUSCULAR; INTRAVENOUS; SUBCUTANEOUS EVERY 4 HOURS
Qty: 0 | Refills: 0 | Status: DISCONTINUED | OUTPATIENT
Start: 2018-02-01 | End: 2018-02-04

## 2018-02-01 RX ORDER — SODIUM CHLORIDE 9 MG/ML
1000 INJECTION, SOLUTION INTRAVENOUS
Qty: 0 | Refills: 0 | Status: DISCONTINUED | OUTPATIENT
Start: 2018-02-01 | End: 2018-02-02

## 2018-02-01 RX ADMIN — HEPARIN SODIUM 5000 UNIT(S): 5000 INJECTION INTRAVENOUS; SUBCUTANEOUS at 22:14

## 2018-02-01 RX ADMIN — NYSTATIN CREAM 1 APPLICATION(S): 100000 CREAM TOPICAL at 06:17

## 2018-02-01 RX ADMIN — Medication 100 MILLIGRAM(S): at 22:14

## 2018-02-01 RX ADMIN — Medication 2: at 17:57

## 2018-02-01 RX ADMIN — Medication 5 MILLIGRAM(S): at 06:10

## 2018-02-01 RX ADMIN — LEVETIRACETAM 500 MILLIGRAM(S): 250 TABLET, FILM COATED ORAL at 18:55

## 2018-02-01 RX ADMIN — NYSTATIN CREAM 1 APPLICATION(S): 100000 CREAM TOPICAL at 18:55

## 2018-02-01 RX ADMIN — Medication 50 GRAM(S): at 06:11

## 2018-02-01 RX ADMIN — Medication 4: at 08:59

## 2018-02-01 RX ADMIN — Medication 5 MILLIGRAM(S): at 18:55

## 2018-02-01 RX ADMIN — Medication 12: at 23:41

## 2018-02-01 RX ADMIN — ATORVASTATIN CALCIUM 20 MILLIGRAM(S): 80 TABLET, FILM COATED ORAL at 22:14

## 2018-02-01 RX ADMIN — PIPERACILLIN AND TAZOBACTAM 200 GRAM(S): 4; .5 INJECTION, POWDER, LYOPHILIZED, FOR SOLUTION INTRAVENOUS at 19:44

## 2018-02-01 RX ADMIN — HALOPERIDOL DECANOATE 10 MILLIGRAM(S): 100 INJECTION INTRAMUSCULAR at 22:14

## 2018-02-01 RX ADMIN — PIPERACILLIN AND TAZOBACTAM 200 GRAM(S): 4; .5 INJECTION, POWDER, LYOPHILIZED, FOR SOLUTION INTRAVENOUS at 06:10

## 2018-02-01 RX ADMIN — LEVETIRACETAM 500 MILLIGRAM(S): 250 TABLET, FILM COATED ORAL at 06:10

## 2018-02-01 NOTE — BRIEF OPERATIVE NOTE - PROCEDURE
<<-----Click on this checkbox to enter Procedure Colostomy  02/01/2018  diverting  Active  [id number]

## 2018-02-01 NOTE — PROGRESS NOTE ADULT - SUBJECTIVE AND OBJECTIVE BOX
POST-OPERATIVE NOTE    Procedure: Diverting Colostomy, SUZIE Sigmoid resection    Diagnosis/Indication: Decubitus Ulcer    Surgeon: Dr La    S: Pt has no complaints. Denies CP, SOB, ALANIZ, calf tenderness. Unable to assess pain. Denies nausea, vomiting.    O:  T(C): 36.9 (02-01-18 @ 21:03), Max: 37.5 (02-01-18 @ 18:00)  T(F): 98.5 (02-01-18 @ 21:03), Max: 99.5 (02-01-18 @ 18:00)  HR: 107 (02-01-18 @ 21:03) (78 - 107)  BP: 109/63 (02-01-18 @ 21:03) (106/52 - 135/80)  RR: 18 (02-01-18 @ 21:03) (18 - 27)  SpO2: 97% (02-01-18 @ 21:03) (95% - 98%)  Wt(kg): --                        8.4    8.7   )-----------( 307      ( 01 Feb 2018 02:36 )             28.5     02-01    135  |  96  |  20  ----------------------------<  313<H>  4.9   |  26  |  0.75    Ca    9.6      01 Feb 2018 02:36  Phos  3.5     02-01  Mg     1.5     02-01    TPro  7.9  /  Alb  3.8  /  TBili  0.4  /  DBili  x   /  AST  16  /  ALT  22  /  AlkPhos  96  02-01      Gen: NAD, resting comfortably in bed  C/V: NSR  Pulm: Nonlabored breathing, no respiratory distress  Abd: softly distended, midline dressing cdi, ostomy pink and protruding, no output  Extrem: WWP, no calf edema or tenderness, SCDs in place      A/P: 32y Male s/p above procedure  Diet: CLD  IVF: LR at 100, May discontinue once tolerating adequate oral intake.   Pain/nausea control  Rest of care per primary team

## 2018-02-01 NOTE — PROGRESS NOTE ADULT - SUBJECTIVE AND OBJECTIVE BOX
INTERVAL HPI / OVERNIGHT EVENTS: No acute events overnight.     SUBJECTIVE: Patient seen and examined at bedside. Patient denies any complaints. Currently does not feel palpitations but feels his heart rate is fast due to the Haldol. Denies SOB, chest pain. Aware of surgery today. NPO since midnight.     OBJECTIVE:    VITAL SIGNS:  Vital Signs Last 24 Hrs  T(C): 37 (01 Feb 2018 08:33), Max: 37 (01 Feb 2018 08:33)  T(F): 98.6 (01 Feb 2018 08:33), Max: 98.6 (01 Feb 2018 08:33)  HR: 90 (01 Feb 2018 08:33) (82 - 104)  BP: 108/68 (01 Feb 2018 08:33) (98/65 - 112/69)  BP(mean): 83 (31 Jan 2018 16:29) (83 - 83)  RR: 19 (01 Feb 2018 08:33) (17 - 20)  SpO2: 97% (01 Feb 2018 08:33) (97% - 99%)      01-31 @ 07:01  -  02-01 @ 07:00  --------------------------------------------------------  IN: 0 mL / OUT: 5700 mL / NET: -5700 mL      CAPILLARY BLOOD GLUCOSE  POCT Blood Glucose.: 142 mg/dL (01 Feb 2018 11:34)      PHYSICAL EXAM:  Constitutional: Laying in bed comfortably, sleeping, breathing comfortably, patient is paraplegic   HEENT: EOMI, conjunctiva clear  Neck: supple, nontender, no JVD   Respiratory: CTA b/l   Cardiovascular: S1S2 RRR, no murmurs  Gastrointestinal: Distended, soft, nontender, (+) BS, + suprapubic catheter  Extremities: sacral decubitus with dressing  Neuro: AAOx3, odd effect    MEDICATIONS:  MEDICATIONS  (STANDING):  ascorbic acid 250 milliGRAM(s) Oral daily  atorvastatin 20 milliGRAM(s) Oral at bedtime  dextrose 5%. 1000 milliLiter(s) (50 mL/Hr) IV Continuous <Continuous>  dextrose 50% Injectable 12.5 Gram(s) IV Push once  dextrose 50% Injectable 25 Gram(s) IV Push once  dextrose 50% Injectable 25 Gram(s) IV Push once  docusate sodium 100 milliGRAM(s) Oral three times a day  haloperidol     Tablet 10 milliGRAM(s) Oral at bedtime  insulin lispro (HumaLOG) corrective regimen sliding scale   SubCutaneous Before meals and at bedtime  levETIRAcetam 500 milliGRAM(s) Oral two times a day  nystatin Powder 1 Application(s) Topical two times a day  oxybutynin 5 milliGRAM(s) Oral two times a day  piperacillin/tazobactam IVPB. 3.375 Gram(s) IV Intermittent every 6 hours    MEDICATIONS  (PRN):  acetaminophen   Tablet 650 milliGRAM(s) Oral every 6 hours PRN For Temp greater than 38 C (100.4 F)  bisacodyl Suppository 10 milliGRAM(s) Rectal daily PRN Constipation  dextrose Gel 1 Dose(s) Oral once PRN Blood Glucose LESS THAN 70 milliGRAM(s)/deciliter  glucagon  Injectable 1 milliGRAM(s) IntraMuscular once PRN Glucose LESS THAN 70 milligrams/deciliter  haloperidol    Injectable 10 milliGRAM(s) IntraMuscular at bedtime PRN if refuses PO Haldol  mineral oil enema 133 milliLiter(s) Rectal daily PRN constipation 1st line      ALLERGIES:  Allergies  Capzasin Back and Body (Unknown)        LABS:                        8.4    8.7   )-----------( 307      ( 01 Feb 2018 02:36 )             28.5     02-01    135  |  96  |  20  ----------------------------<  313<H>  4.9   |  26  |  0.75    Ca    9.6      01 Feb 2018 02:36  Phos  3.5     02-01  Mg     1.5     02-01    TPro  7.9  /  Alb  3.8  /  TBili  0.4  /  DBili  x   /  AST  16  /  ALT  22  /  AlkPhos  96  02-01    PT/INR - ( 01 Feb 2018 02:37 )   PT: 13.3 sec;   INR: 1.19          PTT - ( 01 Feb 2018 02:37 )  PTT:29.8 sec      RADIOLOGY & ADDITIONAL TESTS: Reviewed.

## 2018-02-01 NOTE — PROGRESS NOTE ADULT - PROBLEM SELECTOR PLAN 1
- Patient has low to moderate risk given RCI score 0 with poor METs (less than 4) due to paraplegia and is going for intermediate risk surgery.   - Prior to surgery will need CBC, CMP, Mag, Phos, PT/PTT/INR, active Type & Screen with 2 units of blood on hold for OR.   - Last EKG revealed Sinus Tachycardia reveal no ischemic changes  - Chest Xray from 1/26 was clear lungs with no consolidations, does not need further imaging.   - In terms of medications: hold metformin, hold heparin SubQ dose prior to surgery.  - Diverting colostomy scheduled 2/1

## 2018-02-01 NOTE — BRIEF OPERATIVE NOTE - OPERATION/FINDINGS
Preop Dx: Sacral Decubitus Ulcer  Postop Dx: Same as above  Procedure: Exploratory Laparotomy Diverting Colostomy  Findings: Adhesions lysed. Redundant sigmoid colon excised. End Colostomy created. Hemostasis obtained at end of case Preop Dx: Sacral Decubitus Ulcer  Postop Dx: Same as above  Procedure: Exploratory Laparotomy, Diverting Colostomy  Findings: Adhesions lysed. Redundant sigmoid colon excised. End Colostomy created. Hemostasis obtained at end of case Preop Dx: Sacral Decubitus Ulcer  Postop Dx: Same as above  Procedure: Exploratory Laparotomy, Creation of Diverting Colostomy  Findings: Adhesions lysed. Redundant sigmoid colon excised. End Colostomy created. Hemostasis obtained at end of case

## 2018-02-01 NOTE — PROGRESS NOTE ADULT - ASSESSMENT
32M paraplegic PMH spinal cord injury (wheelchair bound), sacral pressure ulcer with osteo x2 (outpatient provider said they have records of March osteo s/p daptomycin of unknown length) earlier in 2017,  DM2, indwelling suprapubic catheter who presented w/ septic shock secondary to infected purulent sacral 4th degree pressure ulcer with underlying inadequately treated OM, hemodynamically stable being treated for sacral osteomyelitis awaiting placement at [facility] facility.

## 2018-02-02 DIAGNOSIS — Z93.3 COLOSTOMY STATUS: ICD-10-CM

## 2018-02-02 LAB
ANION GAP SERPL CALC-SCNC: 15 MMOL/L — SIGNIFICANT CHANGE UP (ref 5–17)
ANISOCYTOSIS BLD QL: SIGNIFICANT CHANGE UP
BUN SERPL-MCNC: 16 MG/DL — SIGNIFICANT CHANGE UP (ref 7–23)
CALCIUM SERPL-MCNC: 9.4 MG/DL — SIGNIFICANT CHANGE UP (ref 8.4–10.5)
CHLORIDE SERPL-SCNC: 94 MMOL/L — LOW (ref 96–108)
CO2 SERPL-SCNC: 24 MMOL/L — SIGNIFICANT CHANGE UP (ref 22–31)
CREAT SERPL-MCNC: 0.67 MG/DL — SIGNIFICANT CHANGE UP (ref 0.5–1.3)
DACRYOCYTES BLD QL SMEAR: SLIGHT — SIGNIFICANT CHANGE UP
GLUCOSE BLDC GLUCOMTR-MCNC: 289 MG/DL — HIGH (ref 70–99)
GLUCOSE BLDC GLUCOMTR-MCNC: 317 MG/DL — HIGH (ref 70–99)
GLUCOSE BLDC GLUCOMTR-MCNC: 337 MG/DL — HIGH (ref 70–99)
GLUCOSE BLDC GLUCOMTR-MCNC: 347 MG/DL — HIGH (ref 70–99)
GLUCOSE BLDC GLUCOMTR-MCNC: 395 MG/DL — HIGH (ref 70–99)
GLUCOSE SERPL-MCNC: 268 MG/DL — HIGH (ref 70–99)
HCT VFR BLD CALC: 28.2 % — LOW (ref 39–50)
HGB BLD-MCNC: 8.4 G/DL — LOW (ref 13–17)
HYPOCHROMIA BLD QL: SLIGHT — SIGNIFICANT CHANGE UP
MAGNESIUM SERPL-MCNC: 1.9 MG/DL — SIGNIFICANT CHANGE UP (ref 1.6–2.6)
MCHC RBC-ENTMCNC: 21.9 PG — LOW (ref 27–34)
MCHC RBC-ENTMCNC: 29.8 G/DL — LOW (ref 32–36)
MCV RBC AUTO: 73.6 FL — LOW (ref 80–100)
MICROCYTES BLD QL: SIGNIFICANT CHANGE UP
OVALOCYTES BLD QL SMEAR: SLIGHT — SIGNIFICANT CHANGE UP
PHOSPHATE SERPL-MCNC: 3.5 MG/DL — SIGNIFICANT CHANGE UP (ref 2.5–4.5)
PLAT MORPH BLD: NORMAL — SIGNIFICANT CHANGE UP
PLATELET # BLD AUTO: 318 K/UL — SIGNIFICANT CHANGE UP (ref 150–400)
POIKILOCYTOSIS BLD QL AUTO: SLIGHT — SIGNIFICANT CHANGE UP
POLYCHROMASIA BLD QL SMEAR: SLIGHT — SIGNIFICANT CHANGE UP
POTASSIUM SERPL-MCNC: 4.3 MMOL/L — SIGNIFICANT CHANGE UP (ref 3.5–5.3)
POTASSIUM SERPL-SCNC: 4.3 MMOL/L — SIGNIFICANT CHANGE UP (ref 3.5–5.3)
RBC # BLD: 3.83 M/UL — LOW (ref 4.2–5.8)
RBC # FLD: 18.8 % — HIGH (ref 10.3–16.9)
RBC BLD AUTO: (no result)
SODIUM SERPL-SCNC: 133 MMOL/L — LOW (ref 135–145)
SPHEROCYTES BLD QL SMEAR: SLIGHT — SIGNIFICANT CHANGE UP
WBC # BLD: 14.2 K/UL — HIGH (ref 3.8–10.5)
WBC # FLD AUTO: 14.2 K/UL — HIGH (ref 3.8–10.5)

## 2018-02-02 PROCEDURE — 99233 SBSQ HOSP IP/OBS HIGH 50: CPT

## 2018-02-02 PROCEDURE — 99232 SBSQ HOSP IP/OBS MODERATE 35: CPT

## 2018-02-02 RX ORDER — METFORMIN HYDROCHLORIDE 850 MG/1
1000 TABLET ORAL EVERY 12 HOURS
Qty: 0 | Refills: 0 | Status: DISCONTINUED | OUTPATIENT
Start: 2018-02-02 | End: 2018-02-07

## 2018-02-02 RX ORDER — INSULIN GLARGINE 100 [IU]/ML
12 INJECTION, SOLUTION SUBCUTANEOUS AT BEDTIME
Qty: 0 | Refills: 0 | Status: DISCONTINUED | OUTPATIENT
Start: 2018-02-02 | End: 2018-02-03

## 2018-02-02 RX ORDER — POLYETHYLENE GLYCOL 3350 17 G/17G
17 POWDER, FOR SOLUTION ORAL
Qty: 0 | Refills: 0 | Status: DISCONTINUED | OUTPATIENT
Start: 2018-02-02 | End: 2018-02-16

## 2018-02-02 RX ADMIN — HEPARIN SODIUM 5000 UNIT(S): 5000 INJECTION INTRAVENOUS; SUBCUTANEOUS at 07:03

## 2018-02-02 RX ADMIN — POLYETHYLENE GLYCOL 3350 17 GRAM(S): 17 POWDER, FOR SOLUTION ORAL at 14:40

## 2018-02-02 RX ADMIN — HEPARIN SODIUM 5000 UNIT(S): 5000 INJECTION INTRAVENOUS; SUBCUTANEOUS at 22:41

## 2018-02-02 RX ADMIN — NYSTATIN CREAM 1 APPLICATION(S): 100000 CREAM TOPICAL at 07:05

## 2018-02-02 RX ADMIN — NYSTATIN CREAM 1 APPLICATION(S): 100000 CREAM TOPICAL at 13:17

## 2018-02-02 RX ADMIN — Medication 5 MILLIGRAM(S): at 07:03

## 2018-02-02 RX ADMIN — ATORVASTATIN CALCIUM 20 MILLIGRAM(S): 80 TABLET, FILM COATED ORAL at 22:40

## 2018-02-02 RX ADMIN — Medication 250 MILLIGRAM(S): at 13:17

## 2018-02-02 RX ADMIN — PIPERACILLIN AND TAZOBACTAM 200 GRAM(S): 4; .5 INJECTION, POWDER, LYOPHILIZED, FOR SOLUTION INTRAVENOUS at 00:31

## 2018-02-02 RX ADMIN — Medication 8: at 13:17

## 2018-02-02 RX ADMIN — METFORMIN HYDROCHLORIDE 1000 MILLIGRAM(S): 850 TABLET ORAL at 17:04

## 2018-02-02 RX ADMIN — Medication 6: at 07:18

## 2018-02-02 RX ADMIN — HALOPERIDOL DECANOATE 10 MILLIGRAM(S): 100 INJECTION INTRAMUSCULAR at 22:40

## 2018-02-02 RX ADMIN — Medication 100 MILLIGRAM(S): at 22:42

## 2018-02-02 RX ADMIN — LEVETIRACETAM 500 MILLIGRAM(S): 250 TABLET, FILM COATED ORAL at 07:03

## 2018-02-02 RX ADMIN — INSULIN GLARGINE 12 UNIT(S): 100 INJECTION, SOLUTION SUBCUTANEOUS at 23:37

## 2018-02-02 RX ADMIN — PIPERACILLIN AND TAZOBACTAM 200 GRAM(S): 4; .5 INJECTION, POWDER, LYOPHILIZED, FOR SOLUTION INTRAVENOUS at 07:04

## 2018-02-02 RX ADMIN — LEVETIRACETAM 500 MILLIGRAM(S): 250 TABLET, FILM COATED ORAL at 17:05

## 2018-02-02 RX ADMIN — Medication 10: at 18:18

## 2018-02-02 RX ADMIN — Medication 100 MILLIGRAM(S): at 07:03

## 2018-02-02 RX ADMIN — NYSTATIN CREAM 1 APPLICATION(S): 100000 CREAM TOPICAL at 17:04

## 2018-02-02 RX ADMIN — POLYETHYLENE GLYCOL 3350 17 GRAM(S): 17 POWDER, FOR SOLUTION ORAL at 18:18

## 2018-02-02 RX ADMIN — PIPERACILLIN AND TAZOBACTAM 200 GRAM(S): 4; .5 INJECTION, POWDER, LYOPHILIZED, FOR SOLUTION INTRAVENOUS at 18:18

## 2018-02-02 RX ADMIN — Medication 8: at 22:45

## 2018-02-02 RX ADMIN — HEPARIN SODIUM 5000 UNIT(S): 5000 INJECTION INTRAVENOUS; SUBCUTANEOUS at 13:18

## 2018-02-02 RX ADMIN — PIPERACILLIN AND TAZOBACTAM 200 GRAM(S): 4; .5 INJECTION, POWDER, LYOPHILIZED, FOR SOLUTION INTRAVENOUS at 13:17

## 2018-02-02 RX ADMIN — Medication 5 MILLIGRAM(S): at 17:05

## 2018-02-02 RX ADMIN — Medication 100 MILLIGRAM(S): at 13:18

## 2018-02-02 NOTE — PROGRESS NOTE ADULT - SUBJECTIVE AND OBJECTIVE BOX
INTERVAL HPI / OVERNIGHT EVENTS: Patient blood sugar elevated - patient family gave patient naila, even though patient was on clear liquid diet.     SUBJECTIVE: Patient seen and examined at bedside. Patient denies any complaints of SOB, chest pain, palpitations. Denies nausea, vomiting.     OBJECTIVE:    VITAL SIGNS:  Vital Signs Last 24 Hrs  T(C): 36.6 (02 Feb 2018 09:08), Max: 37.8 (01 Feb 2018 16:26)  T(F): 97.8 (02 Feb 2018 09:08), Max: 100.1 (01 Feb 2018 16:26)  HR: 92 (02 Feb 2018 09:08) (78 - 114)  BP: 108/70 (02 Feb 2018 09:08) (101/63 - 135/80)  BP(mean): 99 (01 Feb 2018 18:46) (73 - 99)  RR: 17 (02 Feb 2018 09:08) (17 - 30)  SpO2: 97% (02 Feb 2018 09:08) (92% - 98%)      02-01 @ 07:01  -  02-02 @ 07:00  --------------------------------------------------------  IN: 430 mL / OUT: 1500 mL / NET: -1070 mL      CAPILLARY BLOOD GLUCOSE  POCT Blood Glucose.: 337 mg/dL (02 Feb 2018 12:35)      PHYSICAL EXAM:  Constitutional: Laying in bed comfortably, sleeping, breathing comfortably, patient is paraplegic   HEENT: PEERL, EOMI, conjunctiva clear  Neck: supple, nontender, no JVD   Respiratory: CTA b/l   Cardiovascular: S1S2 RRR, no murmurs  Gastrointestinal: Distended, with colostomy bag (no output), nontender, (+) BS, + suprapubic catheter  Extremities: sacral decubitus with dressing  Neuro: AAOx3, odd effect    MEDICATIONS:  MEDICATIONS  (STANDING):  ascorbic acid 250 milliGRAM(s) Oral daily  atorvastatin 20 milliGRAM(s) Oral at bedtime  dextrose 5%. 1000 milliLiter(s) (50 mL/Hr) IV Continuous <Continuous>  dextrose 50% Injectable 12.5 Gram(s) IV Push once  dextrose 50% Injectable 25 Gram(s) IV Push once  dextrose 50% Injectable 25 Gram(s) IV Push once  docusate sodium 100 milliGRAM(s) Oral three times a day  haloperidol     Tablet 10 milliGRAM(s) Oral at bedtime  heparin  Injectable 5000 Unit(s) SubCutaneous every 8 hours  insulin lispro (HumaLOG) corrective regimen sliding scale   SubCutaneous Before meals and at bedtime  levETIRAcetam 500 milliGRAM(s) Oral two times a day  metFORMIN 1000 milliGRAM(s) Oral every 12 hours  nystatin Powder 1 Application(s) Topical two times a day  oxybutynin 5 milliGRAM(s) Oral two times a day  piperacillin/tazobactam IVPB. 3.375 Gram(s) IV Intermittent every 6 hours  polyethylene glycol 3350 17 Gram(s) Oral two times a day    MEDICATIONS  (PRN):  acetaminophen   Tablet 650 milliGRAM(s) Oral every 6 hours PRN For Temp greater than 38 C (100.4 F)  bisacodyl Suppository 10 milliGRAM(s) Rectal daily PRN Constipation  dextrose Gel 1 Dose(s) Oral once PRN Blood Glucose LESS THAN 70 milliGRAM(s)/deciliter  glucagon  Injectable 1 milliGRAM(s) IntraMuscular once PRN Glucose LESS THAN 70 milligrams/deciliter  haloperidol    Injectable 10 milliGRAM(s) IntraMuscular at bedtime PRN if refuses PO Haldol  HYDROmorphone  Injectable 1 milliGRAM(s) IV Push every 4 hours PRN Severe Pain (7 - 10)  HYDROmorphone  Injectable 0.5 milliGRAM(s) IV Push every 4 hours PRN Moderate Pain (4 - 6)  mineral oil enema 133 milliLiter(s) Rectal daily PRN constipation 1st line      ALLERGIES:  Allergies    Capzasin Back and Body (Unknown)    Intolerances        LABS:                        8.4    14.2  )-----------( 318      ( 02 Feb 2018 08:18 )             28.2     02-02    133<L>  |  94<L>  |  16  ----------------------------<  268<H>  4.3   |  24  |  0.67    Ca    9.4      02 Feb 2018 08:18  Phos  3.5     02-02  Mg     1.9     02-02    TPro  7.9  /  Alb  3.8  /  TBili  0.4  /  DBili  x   /  AST  16  /  ALT  22  /  AlkPhos  96  02-01    PT/INR - ( 01 Feb 2018 02:37 )   PT: 13.3 sec;   INR: 1.19          PTT - ( 01 Feb 2018 02:37 )  PTT:29.8 sec      RADIOLOGY & ADDITIONAL TESTS: Reviewed.

## 2018-02-02 NOTE — PROGRESS NOTE ADULT - PROBLEM SELECTOR PLAN 1
Patient is s/p diverting colostomy 2/1/18.   - colostomy bag in place with no output, ostomy pink, + gas production  - Advanced to regular diet   - Miralax daily

## 2018-02-02 NOTE — PROGRESS NOTE BEHAVIORAL HEALTH - SUMMARY
32-year-old male with reported hx of schizoaffective disorder, rendered paraplegic s/p suicide attempt, admitted in septic shock, s/p discontinuation of ADL and wound care, in context of noncompliance with antipsychotic medication resulting in psychosis.  Pt was determined to LACK CAPACITY regarding medical decision making including need for psychotropic medication. Has approval for TOO (court mandated), is tolerating haldol 10 mg po qhs with no evidence of SE, increasingly better behavioral control. (To date, has been taking po Haldol, thus has not needed IM medication.).     Pt determined to be at his baseline, with no evidence of suicidal/homicidal ideation/intent/plan, no psychosis, is compliant with rx/tx.    Pt approaching readiness for discharge, requires dispo planning regarding home care as well as psychiatric follow up. Dispo planning underway.

## 2018-02-02 NOTE — PROGRESS NOTE ADULT - ASSESSMENT
32M paraplegic PMH spinal cord injury (wheelchair bound), sacral pressure ulcer with osteo x2 (outpatient provider said they have records of March osteo s/p daptomycin of unknown length) earlier in 2017,  DM2, indwelling suprapubic catheter who presented w/ septic shock secondary to infected purulent sacral 4th degree pressure ulcer with underlying inadequately treated OM, hemodynamically stable being treated for sacral osteomyelitis awaiting placement at [facility] facility.

## 2018-02-02 NOTE — PROGRESS NOTE ADULT - PROBLEM SELECTOR PLAN 2
Pt w/ a stage 4 infected sacral ulcer w/ drainage with w/ feculent material. On admission required debridement by plastic surgery.   CT (1/27): Findings consistent with abscess replacing the lower sacrum and coccyx. Osteomyelitis of the adjacent S3 segment cannot be excluded. The collection appears to be communicating with the skin surface in the inferior gluteal cleft.   - General surgery does not believe there is a fistula between wound and rectum causing leakage of stool.   - Wound cx had citrobacter, enterococcus, klebsiella   - Wound care following; continue to appreciate recs  - Antibiotics deescalated (1/29) discontinued vancomycin and meropenem and transitioned to zosyn (f/u ID rec’s on when to discontinue – possibly 2/7)   - Blood cultures NGTD      #C. diff: Results came back as indeterminate and then positive. Discussed with ID prefers to hold off treating for now with no diarrhea. If developed increasing loose stool would add PO vancomycin. CDIFF WAS SENT IN THE SITUATION PATIENT WAS HYPOTENSIVE AND SEPTIC WITH UNCLEAR ETIOLOGY AND WAS IN LAYING IN STOOL FOR HOURS WITH WOUND VAC ON UNCLEAR OF TRUE CONSISTENCY. Thereafter, patient with more formed stool.

## 2018-02-02 NOTE — PROGRESS NOTE BEHAVIORAL HEALTH - NSBHFUPINTERVALHXFT_PSY_A_CORE
Pt seen; chart reviewed; case also discussed with pt's aunt.  Pt observed using his computer. Receptive to my visit; denies complaints. Reports the surgical procedure went well. Denies SI/HI. No evidence of psychosis.  Per team, remains compliant with rx--including Haldol--and tx.  Per aunt, she observes that pt has pattern of decompensating when she and her  are away during the summer. She notes that pt is now at baseline.

## 2018-02-02 NOTE — ADVANCED PRACTICE NURSE CONSULT - ASSESSMENT
Pt s/p diverting colostomy, POD#1. Began educating patient on taking care of ostomy, frequency of emptying and changing appliance. Minimal serosanguinous drainage noted in appliance, pt denies pain, is willing to learn care.

## 2018-02-02 NOTE — PROGRESS NOTE ADULT - SUBJECTIVE AND OBJECTIVE BOX
INTERVAL HPI/OVERNIGHT EVENTS: no issues o/n, no pain, tolerating clears, no nausea or vomiting    STATUS POST:  diverting end colostomy    POST OPERATIVE DAY #: 1      MEDICATIONS  (STANDING):  ascorbic acid 250 milliGRAM(s) Oral daily  atorvastatin 20 milliGRAM(s) Oral at bedtime  dextrose 5%. 1000 milliLiter(s) (50 mL/Hr) IV Continuous <Continuous>  dextrose 50% Injectable 12.5 Gram(s) IV Push once  dextrose 50% Injectable 25 Gram(s) IV Push once  dextrose 50% Injectable 25 Gram(s) IV Push once  docusate sodium 100 milliGRAM(s) Oral three times a day  haloperidol     Tablet 10 milliGRAM(s) Oral at bedtime  heparin  Injectable 5000 Unit(s) SubCutaneous every 8 hours  insulin lispro (HumaLOG) corrective regimen sliding scale   SubCutaneous Before meals and at bedtime  lactated ringers. 1000 milliLiter(s) (110 mL/Hr) IV Continuous <Continuous>  levETIRAcetam 500 milliGRAM(s) Oral two times a day  metFORMIN 1000 milliGRAM(s) Oral every 12 hours  nystatin Powder 1 Application(s) Topical two times a day  oxybutynin 5 milliGRAM(s) Oral two times a day  piperacillin/tazobactam IVPB. 3.375 Gram(s) IV Intermittent every 6 hours    MEDICATIONS  (PRN):  acetaminophen   Tablet 650 milliGRAM(s) Oral every 6 hours PRN For Temp greater than 38 C (100.4 F)  bisacodyl Suppository 10 milliGRAM(s) Rectal daily PRN Constipation  dextrose Gel 1 Dose(s) Oral once PRN Blood Glucose LESS THAN 70 milliGRAM(s)/deciliter  glucagon  Injectable 1 milliGRAM(s) IntraMuscular once PRN Glucose LESS THAN 70 milligrams/deciliter  haloperidol    Injectable 10 milliGRAM(s) IntraMuscular at bedtime PRN if refuses PO Haldol  HYDROmorphone  Injectable 1 milliGRAM(s) IV Push every 4 hours PRN Severe Pain (7 - 10)  HYDROmorphone  Injectable 0.5 milliGRAM(s) IV Push every 4 hours PRN Moderate Pain (4 - 6)  mineral oil enema 133 milliLiter(s) Rectal daily PRN constipation 1st line      Vital Signs Last 24 Hrs  T(C): 36.6 (02 Feb 2018 09:08), Max: 37.8 (01 Feb 2018 16:26)  T(F): 97.8 (02 Feb 2018 09:08), Max: 100.1 (01 Feb 2018 16:26)  HR: 92 (02 Feb 2018 09:08) (78 - 114)  BP: 108/70 (02 Feb 2018 09:08) (101/63 - 135/80)  BP(mean): 99 (01 Feb 2018 18:46) (73 - 99)  RR: 17 (02 Feb 2018 09:08) (17 - 30)  SpO2: 97% (02 Feb 2018 09:08) (92% - 98%)    PHYSICAL EXAM:      Constitutional: AOx3, NAD    Respiratory: CTABL, no resp distress    Cardiovascular: RRR, no m/r/g    Gastrointestinal: soft, nontender, nondistended, gas in ostomy bag, no stool, stoma pink, patent    Extremities: WWP, no edema        I&O's Detail    01 Feb 2018 07:01  -  02 Feb 2018 07:00  --------------------------------------------------------  IN:    lactated ringers.: 330 mL    Solution: 100 mL  Total IN: 430 mL    OUT:    Indwelling Catheter - Suprapubic: 1500 mL  Total OUT: 1500 mL    Total NET: -1070 mL          LABS:                        8.4    14.2  )-----------( 318      ( 02 Feb 2018 08:18 )             28.2     02-02    133<L>  |  94<L>  |  16  ----------------------------<  268<H>  4.3   |  24  |  0.67    Ca    9.4      02 Feb 2018 08:18  Phos  3.5     02-02  Mg     1.9     02-02    TPro  7.9  /  Alb  3.8  /  TBili  0.4  /  DBili  x   /  AST  16  /  ALT  22  /  AlkPhos  96  02-01    PT/INR - ( 01 Feb 2018 02:37 )   PT: 13.3 sec;   INR: 1.19          PTT - ( 01 Feb 2018 02:37 )  PTT:29.8 sec      RADIOLOGY & ADDITIONAL STUDIES:

## 2018-02-02 NOTE — PROGRESS NOTE ADULT - ASSESSMENT
33 yo M paraplegic with h/o SCI, DM, multiple stage 4 pressure with sacral decubitus ulcer POD 1 s/p diverting colostomy  -ostomy pink, patent with gas production  -tolerating CLD, ok to advance to regular diet  -miralax daily  -will follow, discussed with Dr La

## 2018-02-02 NOTE — PROGRESS NOTE ADULT - ATTENDING COMMENTS
dx:  1)severe sepsis due to  infected decubitus ulcers/possible sacral osteomyelitis- ct pelvis (+) sacral wound abscess, surgery has evaluated, and does not believe this is an abscess. ID narrowed abx to zosyn. s/p diverting colostomy 2/1/18. cont local wound care  2) left IT  decub and sacral decub- stage iv  - as above. plan for diverting colostomy  3)psychosis - improved,  cont treatment over objection based on court ruling. haldol qhs (po preferably , if not willing then will need IM haldol) . ekg to eval qtc serially  4)neurogenic bladder - c/w suprapubic bowles, oxybutinin  5)DM ii - can cont metformin  6)epilepsy- keppra   7) tachycardia - etiology unclear, but pt has stated he needed propranolol in the past for his "heartrate", if persistently p>100, can start metoprolol .

## 2018-02-03 LAB
GLUCOSE BLDC GLUCOMTR-MCNC: 132 MG/DL — HIGH (ref 70–99)
GLUCOSE BLDC GLUCOMTR-MCNC: 188 MG/DL — HIGH (ref 70–99)
GLUCOSE BLDC GLUCOMTR-MCNC: 200 MG/DL — HIGH (ref 70–99)
GLUCOSE BLDC GLUCOMTR-MCNC: 322 MG/DL — HIGH (ref 70–99)

## 2018-02-03 PROCEDURE — 93010 ELECTROCARDIOGRAM REPORT: CPT

## 2018-02-03 PROCEDURE — 99233 SBSQ HOSP IP/OBS HIGH 50: CPT

## 2018-02-03 RX ORDER — HUMAN INSULIN 100 [IU]/ML
10 INJECTION, SUSPENSION SUBCUTANEOUS ONCE
Qty: 0 | Refills: 0 | Status: COMPLETED | OUTPATIENT
Start: 2018-02-03 | End: 2018-02-03

## 2018-02-03 RX ORDER — INSULIN GLARGINE 100 [IU]/ML
22 INJECTION, SOLUTION SUBCUTANEOUS AT BEDTIME
Qty: 0 | Refills: 0 | Status: DISCONTINUED | OUTPATIENT
Start: 2018-02-03 | End: 2018-02-04

## 2018-02-03 RX ORDER — INSULIN LISPRO 100/ML
7 VIAL (ML) SUBCUTANEOUS
Qty: 0 | Refills: 0 | Status: DISCONTINUED | OUTPATIENT
Start: 2018-02-03 | End: 2018-02-03

## 2018-02-03 RX ADMIN — Medication 8: at 18:38

## 2018-02-03 RX ADMIN — HEPARIN SODIUM 5000 UNIT(S): 5000 INJECTION INTRAVENOUS; SUBCUTANEOUS at 22:16

## 2018-02-03 RX ADMIN — Medication 5 MILLIGRAM(S): at 07:02

## 2018-02-03 RX ADMIN — LEVETIRACETAM 500 MILLIGRAM(S): 250 TABLET, FILM COATED ORAL at 18:40

## 2018-02-03 RX ADMIN — HEPARIN SODIUM 5000 UNIT(S): 5000 INJECTION INTRAVENOUS; SUBCUTANEOUS at 13:05

## 2018-02-03 RX ADMIN — NYSTATIN CREAM 1 APPLICATION(S): 100000 CREAM TOPICAL at 18:58

## 2018-02-03 RX ADMIN — LEVETIRACETAM 500 MILLIGRAM(S): 250 TABLET, FILM COATED ORAL at 07:03

## 2018-02-03 RX ADMIN — HALOPERIDOL DECANOATE 10 MILLIGRAM(S): 100 INJECTION INTRAMUSCULAR at 22:16

## 2018-02-03 RX ADMIN — POLYETHYLENE GLYCOL 3350 17 GRAM(S): 17 POWDER, FOR SOLUTION ORAL at 07:02

## 2018-02-03 RX ADMIN — Medication 250 MILLIGRAM(S): at 13:05

## 2018-02-03 RX ADMIN — NYSTATIN CREAM 1 APPLICATION(S): 100000 CREAM TOPICAL at 07:02

## 2018-02-03 RX ADMIN — METFORMIN HYDROCHLORIDE 1000 MILLIGRAM(S): 850 TABLET ORAL at 09:43

## 2018-02-03 RX ADMIN — Medication 5 MILLIGRAM(S): at 18:40

## 2018-02-03 RX ADMIN — PIPERACILLIN AND TAZOBACTAM 200 GRAM(S): 4; .5 INJECTION, POWDER, LYOPHILIZED, FOR SOLUTION INTRAVENOUS at 18:40

## 2018-02-03 RX ADMIN — INSULIN GLARGINE 22 UNIT(S): 100 INJECTION, SOLUTION SUBCUTANEOUS at 22:16

## 2018-02-03 RX ADMIN — METFORMIN HYDROCHLORIDE 1000 MILLIGRAM(S): 850 TABLET ORAL at 19:35

## 2018-02-03 RX ADMIN — PIPERACILLIN AND TAZOBACTAM 200 GRAM(S): 4; .5 INJECTION, POWDER, LYOPHILIZED, FOR SOLUTION INTRAVENOUS at 13:05

## 2018-02-03 RX ADMIN — Medication 100 MILLIGRAM(S): at 13:05

## 2018-02-03 RX ADMIN — Medication 2: at 22:16

## 2018-02-03 RX ADMIN — Medication 2: at 13:24

## 2018-02-03 RX ADMIN — PIPERACILLIN AND TAZOBACTAM 200 GRAM(S): 4; .5 INJECTION, POWDER, LYOPHILIZED, FOR SOLUTION INTRAVENOUS at 01:11

## 2018-02-03 RX ADMIN — Medication 100 MILLIGRAM(S): at 07:02

## 2018-02-03 RX ADMIN — HEPARIN SODIUM 5000 UNIT(S): 5000 INJECTION INTRAVENOUS; SUBCUTANEOUS at 07:02

## 2018-02-03 RX ADMIN — PIPERACILLIN AND TAZOBACTAM 200 GRAM(S): 4; .5 INJECTION, POWDER, LYOPHILIZED, FOR SOLUTION INTRAVENOUS at 07:02

## 2018-02-03 RX ADMIN — ATORVASTATIN CALCIUM 20 MILLIGRAM(S): 80 TABLET, FILM COATED ORAL at 22:16

## 2018-02-03 RX ADMIN — POLYETHYLENE GLYCOL 3350 17 GRAM(S): 17 POWDER, FOR SOLUTION ORAL at 18:39

## 2018-02-03 NOTE — PROGRESS NOTE ADULT - ASSESSMENT
33 yo M paraplegic with h/o SCI, DM, multiple stage 4 pressure with sacral decubitus ulcer POD 1 s/p diverting colostomy  -ostomy pink, patent with gas production, but no stool  -tolerating regular diet, no n/v  -miralax daily  -Team 4c will continue to follow

## 2018-02-03 NOTE — PROGRESS NOTE ADULT - SUBJECTIVE AND OBJECTIVE BOX
STATUS POST:  POD2 diverting end colostomy     SUBJECTIVE: Patient seen and examined bedside by chief resident. Colostomy producing gas, but no stool. Patient is tolerating regular diet without n/v.    heparin  Injectable 5000 Unit(s) SubCutaneous every 8 hours  piperacillin/tazobactam IVPB. 3.375 Gram(s) IV Intermittent every 6 hours      Vital Signs Last 24 Hrs  T(C): 37 (03 Feb 2018 09:05), Max: 37.2 (02 Feb 2018 21:25)  T(F): 98.6 (03 Feb 2018 09:05), Max: 98.9 (02 Feb 2018 21:25)  HR: 98 (03 Feb 2018 09:05) (79 - 98)  BP: 104/69 (03 Feb 2018 09:05) (104/69 - 116/74)  BP(mean): --  RR: 16 (03 Feb 2018 09:05) (16 - 19)  SpO2: 96% (03 Feb 2018 09:05) (96% - 98%)  I&O's Detail    02 Feb 2018 07:01  -  03 Feb 2018 07:00  --------------------------------------------------------  IN:  Total IN: 0 mL    OUT:    Indwelling Catheter - Suprapubic: 4100 mL  Total OUT: 4100 mL    Total NET: -4100 mL          General: NAD, resting comfortably in bed  C/V: NSR  Pulm: Nonlabored breathing, no respiratory distress  Abd: soft, NT/ND, gas in ostomy bag, but no stool. Stoma is pink and patent.  Extrem: WWP, no edema, SCDs in place        LABS:                        8.4    14.2  )-----------( 318      ( 02 Feb 2018 08:18 )             28.2     02-02    133<L>  |  94<L>  |  16  ----------------------------<  268<H>  4.3   |  24  |  0.67    Ca    9.4      02 Feb 2018 08:18  Phos  3.5     02-02  Mg     1.9     02-02            RADIOLOGY & ADDITIONAL STUDIES:

## 2018-02-03 NOTE — PROGRESS NOTE ADULT - SUBJECTIVE AND OBJECTIVE BOX
Pt reports no pain, stating he has no intact sensory function in the ulcers due to paraplegia.     Family by bedside.  Stating no active issues.      AFVSS  NAD, lying in right side, able to move his upper body  normal S1S2  clear lungs  soft abd  Colostomy intact  Sacral decub surrounding area -no edema/erythema    no labs

## 2018-02-03 NOTE — PROGRESS NOTE ADULT - ASSESSMENT
32M paraplegic PMH spinal cord injury (wheelchair bound), sacral pressure ulcer with osteo x2 (outpatient provider said they have records of March osteo s/p daptomycin of unknown length) earlier in 2017,  DM2, indwelling suprapubic catheter who presented w/ septic shock secondary to infected purulent sacral 4th degree pressure ulcer with underlying inadequately treated OM, hemodynamically stable being treated for sacral osteomyelitis.  Current plans include arranging for home care for DC.   On going psych needs.

## 2018-02-04 LAB
ANION GAP SERPL CALC-SCNC: 15 MMOL/L — SIGNIFICANT CHANGE UP (ref 5–17)
BUN SERPL-MCNC: 19 MG/DL — SIGNIFICANT CHANGE UP (ref 7–23)
CALCIUM SERPL-MCNC: 8.9 MG/DL — SIGNIFICANT CHANGE UP (ref 8.4–10.5)
CHLORIDE SERPL-SCNC: 97 MMOL/L — SIGNIFICANT CHANGE UP (ref 96–108)
CO2 SERPL-SCNC: 25 MMOL/L — SIGNIFICANT CHANGE UP (ref 22–31)
CREAT SERPL-MCNC: 0.8 MG/DL — SIGNIFICANT CHANGE UP (ref 0.5–1.3)
GLUCOSE BLDC GLUCOMTR-MCNC: 178 MG/DL — HIGH (ref 70–99)
GLUCOSE BLDC GLUCOMTR-MCNC: 183 MG/DL — HIGH (ref 70–99)
GLUCOSE BLDC GLUCOMTR-MCNC: 242 MG/DL — HIGH (ref 70–99)
GLUCOSE SERPL-MCNC: 139 MG/DL — HIGH (ref 70–99)
HCT VFR BLD CALC: 25.4 % — LOW (ref 39–50)
HGB BLD-MCNC: 7.6 G/DL — LOW (ref 13–17)
MAGNESIUM SERPL-MCNC: 1.4 MG/DL — LOW (ref 1.6–2.6)
MCHC RBC-ENTMCNC: 22 PG — LOW (ref 27–34)
MCHC RBC-ENTMCNC: 29.9 G/DL — LOW (ref 32–36)
MCV RBC AUTO: 73.4 FL — LOW (ref 80–100)
PLATELET # BLD AUTO: 270 K/UL — SIGNIFICANT CHANGE UP (ref 150–400)
POTASSIUM SERPL-MCNC: 4.2 MMOL/L — SIGNIFICANT CHANGE UP (ref 3.5–5.3)
POTASSIUM SERPL-SCNC: 4.2 MMOL/L — SIGNIFICANT CHANGE UP (ref 3.5–5.3)
RBC # BLD: 3.46 M/UL — LOW (ref 4.2–5.8)
RBC # FLD: 19.6 % — HIGH (ref 10.3–16.9)
SODIUM SERPL-SCNC: 137 MMOL/L — SIGNIFICANT CHANGE UP (ref 135–145)
WBC # BLD: 8.5 K/UL — SIGNIFICANT CHANGE UP (ref 3.8–10.5)
WBC # FLD AUTO: 8.5 K/UL — SIGNIFICANT CHANGE UP (ref 3.8–10.5)

## 2018-02-04 PROCEDURE — 99233 SBSQ HOSP IP/OBS HIGH 50: CPT | Mod: GC

## 2018-02-04 PROCEDURE — 74018 RADEX ABDOMEN 1 VIEW: CPT | Mod: 26

## 2018-02-04 RX ORDER — MULTIVIT WITH MIN/MFOLATE/K2 340-15/3 G
75 POWDER (GRAM) ORAL THREE TIMES A DAY
Qty: 0 | Refills: 0 | Status: DISCONTINUED | OUTPATIENT
Start: 2018-02-04 | End: 2018-02-05

## 2018-02-04 RX ORDER — SENNA PLUS 8.6 MG/1
2 TABLET ORAL AT BEDTIME
Qty: 0 | Refills: 0 | Status: DISCONTINUED | OUTPATIENT
Start: 2018-02-04 | End: 2018-02-16

## 2018-02-04 RX ORDER — MAGNESIUM SULFATE 500 MG/ML
2 VIAL (ML) INJECTION ONCE
Qty: 0 | Refills: 0 | Status: COMPLETED | OUTPATIENT
Start: 2018-02-04 | End: 2018-02-04

## 2018-02-04 RX ORDER — INSULIN GLARGINE 100 [IU]/ML
25 INJECTION, SOLUTION SUBCUTANEOUS AT BEDTIME
Qty: 0 | Refills: 0 | Status: DISCONTINUED | OUTPATIENT
Start: 2018-02-04 | End: 2018-02-05

## 2018-02-04 RX ORDER — MINERAL OIL
30 OIL (ML) MISCELLANEOUS DAILY
Qty: 0 | Refills: 0 | Status: DISCONTINUED | OUTPATIENT
Start: 2018-02-04 | End: 2018-02-16

## 2018-02-04 RX ORDER — POLYETHYLENE GLYCOL 3350 17 G/17G
17 POWDER, FOR SOLUTION ORAL ONCE
Qty: 0 | Refills: 0 | Status: DISCONTINUED | OUTPATIENT
Start: 2018-02-04 | End: 2018-02-04

## 2018-02-04 RX ORDER — SODIUM CHLORIDE 9 MG/ML
500 INJECTION INTRAMUSCULAR; INTRAVENOUS; SUBCUTANEOUS ONCE
Qty: 0 | Refills: 0 | Status: COMPLETED | OUTPATIENT
Start: 2018-02-04 | End: 2018-02-04

## 2018-02-04 RX ADMIN — METFORMIN HYDROCHLORIDE 1000 MILLIGRAM(S): 850 TABLET ORAL at 19:15

## 2018-02-04 RX ADMIN — INSULIN GLARGINE 25 UNIT(S): 100 INJECTION, SOLUTION SUBCUTANEOUS at 23:10

## 2018-02-04 RX ADMIN — POLYETHYLENE GLYCOL 3350 17 GRAM(S): 17 POWDER, FOR SOLUTION ORAL at 05:38

## 2018-02-04 RX ADMIN — Medication 100 MILLIGRAM(S): at 23:10

## 2018-02-04 RX ADMIN — Medication 250 MILLIGRAM(S): at 13:16

## 2018-02-04 RX ADMIN — LEVETIRACETAM 500 MILLIGRAM(S): 250 TABLET, FILM COATED ORAL at 17:55

## 2018-02-04 RX ADMIN — HEPARIN SODIUM 5000 UNIT(S): 5000 INJECTION INTRAVENOUS; SUBCUTANEOUS at 13:16

## 2018-02-04 RX ADMIN — ATORVASTATIN CALCIUM 20 MILLIGRAM(S): 80 TABLET, FILM COATED ORAL at 23:10

## 2018-02-04 RX ADMIN — Medication 5 MILLIGRAM(S): at 05:38

## 2018-02-04 RX ADMIN — POLYETHYLENE GLYCOL 3350 17 GRAM(S): 17 POWDER, FOR SOLUTION ORAL at 17:56

## 2018-02-04 RX ADMIN — HEPARIN SODIUM 5000 UNIT(S): 5000 INJECTION INTRAVENOUS; SUBCUTANEOUS at 05:38

## 2018-02-04 RX ADMIN — PIPERACILLIN AND TAZOBACTAM 200 GRAM(S): 4; .5 INJECTION, POWDER, LYOPHILIZED, FOR SOLUTION INTRAVENOUS at 06:31

## 2018-02-04 RX ADMIN — Medication 4: at 23:09

## 2018-02-04 RX ADMIN — Medication 5 MILLIGRAM(S): at 17:56

## 2018-02-04 RX ADMIN — PIPERACILLIN AND TAZOBACTAM 200 GRAM(S): 4; .5 INJECTION, POWDER, LYOPHILIZED, FOR SOLUTION INTRAVENOUS at 01:24

## 2018-02-04 RX ADMIN — Medication 2: at 11:40

## 2018-02-04 RX ADMIN — Medication 100 MILLIGRAM(S): at 13:16

## 2018-02-04 RX ADMIN — SENNA PLUS 2 TABLET(S): 8.6 TABLET ORAL at 23:10

## 2018-02-04 RX ADMIN — Medication 75 MILLILITER(S): at 17:56

## 2018-02-04 RX ADMIN — NYSTATIN CREAM 1 APPLICATION(S): 100000 CREAM TOPICAL at 18:04

## 2018-02-04 RX ADMIN — HALOPERIDOL DECANOATE 10 MILLIGRAM(S): 100 INJECTION INTRAMUSCULAR at 23:10

## 2018-02-04 RX ADMIN — SODIUM CHLORIDE 2000 MILLILITER(S): 9 INJECTION INTRAMUSCULAR; INTRAVENOUS; SUBCUTANEOUS at 01:53

## 2018-02-04 RX ADMIN — Medication 30 MILLILITER(S): at 14:21

## 2018-02-04 RX ADMIN — Medication 50 GRAM(S): at 10:45

## 2018-02-04 RX ADMIN — LEVETIRACETAM 500 MILLIGRAM(S): 250 TABLET, FILM COATED ORAL at 05:38

## 2018-02-04 RX ADMIN — Medication 2: at 18:36

## 2018-02-04 RX ADMIN — METFORMIN HYDROCHLORIDE 1000 MILLIGRAM(S): 850 TABLET ORAL at 08:50

## 2018-02-04 RX ADMIN — PIPERACILLIN AND TAZOBACTAM 200 GRAM(S): 4; .5 INJECTION, POWDER, LYOPHILIZED, FOR SOLUTION INTRAVENOUS at 13:17

## 2018-02-04 RX ADMIN — PIPERACILLIN AND TAZOBACTAM 200 GRAM(S): 4; .5 INJECTION, POWDER, LYOPHILIZED, FOR SOLUTION INTRAVENOUS at 18:35

## 2018-02-04 RX ADMIN — NYSTATIN CREAM 1 APPLICATION(S): 100000 CREAM TOPICAL at 10:45

## 2018-02-04 NOTE — PROGRESS NOTE ADULT - SUBJECTIVE AND OBJECTIVE BOX
INTERVAL HPI / OVERNIGHT EVENTS: Abdominal X- ray was done showing stool but no evidence of obstruction. Surgery team was alerted - patient more distended. Assessed by surgery team in the morning - increased his bowel regimen - added senna. Later on in the day, added mineral oil and mag citrate.     SUBJECTIVE: Patient seen and examined at bedside. Patient continues to complain of abdominal distention and not having bowel movement. Increased laxatives. Patient complains of palpitations and would like to start propanolol - will reconsider due to borderline blood pressures.     OBJECTIVE:    VITAL SIGNS:  Vital Signs Last 24 Hrs  T(C): 36.7 (04 Feb 2018 16:32), Max: 37 (04 Feb 2018 01:30)  T(F): 98.1 (04 Feb 2018 16:32), Max: 98.6 (04 Feb 2018 01:30)  HR: 98 (04 Feb 2018 16:32) (92 - 125)  BP: 103/60 (04 Feb 2018 16:32) (95/61 - 104/61)  BP(mean): --  RR: 17 (04 Feb 2018 16:32) (16 - 18)  SpO2: 97% (04 Feb 2018 16:32) (96% - 98%)      02-03 @ 07:01 - 02-04 @ 07:00  --------------------------------------------------------  IN: 0 mL / OUT: 1850 mL / NET: -1850 mL    02-04 @ 07:01 - 02-04 @ 16:55  --------------------------------------------------------  IN: 0 mL / OUT: 2025 mL / NET: -2025 mL      CAPILLARY BLOOD GLUCOSE  POCT Blood Glucose.: 178 mg/dL (04 Feb 2018 11:39)      PHYSICAL EXAM:  Constitutional: Laying in bed comfortably, sleeping, breathing comfortably, patient is paraplegic   HEENT: PEERL, EOMI, conjunctiva clear  Neck: supple, nontender, no JVD   Respiratory: CTA b/l   Cardiovascular: S1S2 RRR, no murmurs  Gastrointestinal: Distended, with colostomy bag (no output), nontender, (+) BS, + suprapubic catheter  Extremities: sacral decubitus with dressing  Neuro: AAOx3, odd effect    MEDICATIONS:  MEDICATIONS  (STANDING):  ascorbic acid 250 milliGRAM(s) Oral daily  atorvastatin 20 milliGRAM(s) Oral at bedtime  dextrose 5%. 1000 milliLiter(s) (50 mL/Hr) IV Continuous <Continuous>  dextrose 50% Injectable 12.5 Gram(s) IV Push once  dextrose 50% Injectable 25 Gram(s) IV Push once  dextrose 50% Injectable 25 Gram(s) IV Push once  docusate sodium 100 milliGRAM(s) Oral three times a day  haloperidol     Tablet 10 milliGRAM(s) Oral at bedtime  heparin  Injectable 5000 Unit(s) SubCutaneous every 8 hours  insulin glargine Injectable (LANTUS) 22 Unit(s) SubCutaneous at bedtime  insulin lispro (HumaLOG) corrective regimen sliding scale   SubCutaneous Before meals and at bedtime  levETIRAcetam 500 milliGRAM(s) Oral two times a day  metFORMIN 1000 milliGRAM(s) Oral every 12 hours  mineral oil 30 milliLiter(s) Oral daily  nystatin Powder 1 Application(s) Topical two times a day  oxybutynin 5 milliGRAM(s) Oral two times a day  piperacillin/tazobactam IVPB. 3.375 Gram(s) IV Intermittent every 6 hours  polyethylene glycol 3350 17 Gram(s) Oral two times a day  senna 2 Tablet(s) Oral at bedtime    MEDICATIONS  (PRN):  acetaminophen   Tablet 650 milliGRAM(s) Oral every 6 hours PRN For Temp greater than 38 C (100.4 F)  dextrose Gel 1 Dose(s) Oral once PRN Blood Glucose LESS THAN 70 milliGRAM(s)/deciliter  glucagon  Injectable 1 milliGRAM(s) IntraMuscular once PRN Glucose LESS THAN 70 milligrams/deciliter  haloperidol    Injectable 10 milliGRAM(s) IntraMuscular at bedtime PRN if refuses PO Haldol  magnesium citrate Solution 75 milliLiter(s) Oral three times a day PRN Constipation      ALLERGIES:  Allergies  Capzasin Back and Body (Unknown)    LABS:                        7.6    8.5   )-----------( 270      ( 04 Feb 2018 06:08 )             25.4     02-04    137  |  97  |  19  ----------------------------<  139<H>  4.2   |  25  |  0.80    Ca    8.9      04 Feb 2018 06:06  Mg     1.4     02-04        RADIOLOGY & ADDITIONAL TESTS:     Xray Abdomen 1 View PORTABLE -Urgent (02.04.18 @ 06:22)  Portable examination of the abdomen demonstrates nonspecific bowel gas   pattern. No evidence of obstruction. Fecal material noted throughout the   colon. Surgical clips overlying lower abdomen. Surgical hardware   overlying the thoracic spine.    Impression: Nonspecific bowel gas pattern. No evidence of obstruction.   Residual fecal material noted throughout the colon INTERVAL HPI / OVERNIGHT EVENTS: Abdominal X- ray was done showing stool but no evidence of obstruction. Surgery team was alerted - patient more distended. Assessed by surgery team in the morning - increased his bowel regimen - added senna. Later on in the day, added mineral oil and mag citrate by primary team.     SUBJECTIVE: Patient seen and examined at bedside. Patient continues to complain of abdominal distention and not having bowel movement. Increased laxatives. Patient complains of palpitations and would like to start propanolol - will hold for now due to borderline blood pressures.     OBJECTIVE:    VITAL SIGNS:  Vital Signs Last 24 Hrs  T(C): 36.7 (04 Feb 2018 16:32), Max: 37 (04 Feb 2018 01:30)  T(F): 98.1 (04 Feb 2018 16:32), Max: 98.6 (04 Feb 2018 01:30)  HR: 98 (04 Feb 2018 16:32) (92 - 125)  BP: 103/60 (04 Feb 2018 16:32) (95/61 - 104/61)  BP(mean): --  RR: 17 (04 Feb 2018 16:32) (16 - 18)  SpO2: 97% (04 Feb 2018 16:32) (96% - 98%)      02-03 @ 07:01 - 02-04 @ 07:00  --------------------------------------------------------  IN: 0 mL / OUT: 1850 mL / NET: -1850 mL    02-04 @ 07:01 - 02-04 @ 16:55  --------------------------------------------------------  IN: 0 mL / OUT: 2025 mL / NET: -2025 mL      CAPILLARY BLOOD GLUCOSE  POCT Blood Glucose.: 178 mg/dL (04 Feb 2018 11:39)      PHYSICAL EXAM:  Constitutional: Laying in bed comfortably, sleeping, breathing comfortably, patient is paraplegic   HEENT: PEERL, EOMI, conjunctiva clear  Neck: supple, nontender, no JVD   Respiratory: CTA b/l   Cardiovascular: S1S2 RRR, no murmurs  Gastrointestinal: Distended, with colostomy bag (no output), nontender, (+) BS, + suprapubic catheter  Extremities: sacral decubitus with dressing  Neuro: AAOx3, odd effect    MEDICATIONS:  MEDICATIONS  (STANDING):  ascorbic acid 250 milliGRAM(s) Oral daily  atorvastatin 20 milliGRAM(s) Oral at bedtime  dextrose 5%. 1000 milliLiter(s) (50 mL/Hr) IV Continuous <Continuous>  dextrose 50% Injectable 12.5 Gram(s) IV Push once  dextrose 50% Injectable 25 Gram(s) IV Push once  dextrose 50% Injectable 25 Gram(s) IV Push once  docusate sodium 100 milliGRAM(s) Oral three times a day  haloperidol     Tablet 10 milliGRAM(s) Oral at bedtime  heparin  Injectable 5000 Unit(s) SubCutaneous every 8 hours  insulin glargine Injectable (LANTUS) 22 Unit(s) SubCutaneous at bedtime  insulin lispro (HumaLOG) corrective regimen sliding scale   SubCutaneous Before meals and at bedtime  levETIRAcetam 500 milliGRAM(s) Oral two times a day  metFORMIN 1000 milliGRAM(s) Oral every 12 hours  mineral oil 30 milliLiter(s) Oral daily  nystatin Powder 1 Application(s) Topical two times a day  oxybutynin 5 milliGRAM(s) Oral two times a day  piperacillin/tazobactam IVPB. 3.375 Gram(s) IV Intermittent every 6 hours  polyethylene glycol 3350 17 Gram(s) Oral two times a day  senna 2 Tablet(s) Oral at bedtime    MEDICATIONS  (PRN):  acetaminophen   Tablet 650 milliGRAM(s) Oral every 6 hours PRN For Temp greater than 38 C (100.4 F)  dextrose Gel 1 Dose(s) Oral once PRN Blood Glucose LESS THAN 70 milliGRAM(s)/deciliter  glucagon  Injectable 1 milliGRAM(s) IntraMuscular once PRN Glucose LESS THAN 70 milligrams/deciliter  haloperidol    Injectable 10 milliGRAM(s) IntraMuscular at bedtime PRN if refuses PO Haldol  magnesium citrate Solution 75 milliLiter(s) Oral three times a day PRN Constipation      ALLERGIES:  Allergies  Capzasin Back and Body (Unknown)    LABS:                        7.6    8.5   )-----------( 270      ( 04 Feb 2018 06:08 )             25.4     02-04    137  |  97  |  19  ----------------------------<  139<H>  4.2   |  25  |  0.80    Ca    8.9      04 Feb 2018 06:06  Mg     1.4     02-04        RADIOLOGY & ADDITIONAL TESTS:     Xray Abdomen 1 View PORTABLE -Urgent (02.04.18 @ 06:22)  Portable examination of the abdomen demonstrates nonspecific bowel gas   pattern. No evidence of obstruction. Fecal material noted throughout the   colon. Surgical clips overlying lower abdomen. Surgical hardware   overlying the thoracic spine.    Impression: Nonspecific bowel gas pattern. No evidence of obstruction.   Residual fecal material noted throughout the colon

## 2018-02-04 NOTE — PROGRESS NOTE ADULT - PROBLEM SELECTOR PLAN 2
Pt w/ a stage 4 infected sacral ulcer w/ drainage with w/ feculent material. On admission required debridement by plastic surgery.   CT (1/27): Findings consistent with abscess replacing the lower sacrum and coccyx. Osteomyelitis of the adjacent S3 segment cannot be excluded. The collection appears to be communicating with the skin surface in the inferior gluteal cleft.   - General surgery does not believe there is a fistula between wound and rectum causing leakage of stool.   - Wound cx had citrobacter, enterococcus, klebsiella   - Wound care following; continue to appreciate recs  - Antibiotics deescalated (1/29) discontinued vancomycin and meropenem and transitioned to zosyn ( 1/29 to 2/12- to complete 2 weeks)   - Blood cultures NGTD      #C. diff: Results came back as indeterminate and then positive. Discussed with ID prefers to hold off treating for now with no diarrhea. If developed increasing loose stool would add PO vancomycin. CDIFF WAS SENT IN THE SITUATION PATIENT WAS HYPOTENSIVE AND SEPTIC WITH UNCLEAR ETIOLOGY AND WAS IN LAYING IN STOOL FOR HOURS WITH WOUND VAC ON UNCLEAR OF TRUE CONSISTENCY. Thereafter, patient with more formed stool.

## 2018-02-04 NOTE — PROGRESS NOTE ADULT - SUBJECTIVE AND OBJECTIVE BOX
STATUS POST:  Diverting Colostomy    POST OPERATIVE DAY #: 3    SUBJECTIVE: Seen pt at bedside. Pt denies any complaints, Denies N/V,SOB, denies feeling bloated. passing flatus from colostomy but no BM. Tolerating diet.       MEDICATIONS  (STANDING):  ascorbic acid 250 milliGRAM(s) Oral daily  atorvastatin 20 milliGRAM(s) Oral at bedtime  dextrose 5%. 1000 milliLiter(s) (50 mL/Hr) IV Continuous <Continuous>  dextrose 50% Injectable 12.5 Gram(s) IV Push once  dextrose 50% Injectable 25 Gram(s) IV Push once  dextrose 50% Injectable 25 Gram(s) IV Push once  docusate sodium 100 milliGRAM(s) Oral three times a day  haloperidol     Tablet 10 milliGRAM(s) Oral at bedtime  heparin  Injectable 5000 Unit(s) SubCutaneous every 8 hours  insulin glargine Injectable (LANTUS) 22 Unit(s) SubCutaneous at bedtime  insulin lispro (HumaLOG) corrective regimen sliding scale   SubCutaneous Before meals and at bedtime  levETIRAcetam 500 milliGRAM(s) Oral two times a day  metFORMIN 1000 milliGRAM(s) Oral every 12 hours  nystatin Powder 1 Application(s) Topical two times a day  oxybutynin 5 milliGRAM(s) Oral two times a day  piperacillin/tazobactam IVPB. 3.375 Gram(s) IV Intermittent every 6 hours  polyethylene glycol 3350 17 Gram(s) Oral two times a day    MEDICATIONS  (PRN):  acetaminophen   Tablet 650 milliGRAM(s) Oral every 6 hours PRN For Temp greater than 38 C (100.4 F)  dextrose Gel 1 Dose(s) Oral once PRN Blood Glucose LESS THAN 70 milliGRAM(s)/deciliter  glucagon  Injectable 1 milliGRAM(s) IntraMuscular once PRN Glucose LESS THAN 70 milligrams/deciliter  haloperidol    Injectable 10 milliGRAM(s) IntraMuscular at bedtime PRN if refuses PO Haldol  HYDROmorphone  Injectable 1 milliGRAM(s) IV Push every 4 hours PRN Severe Pain (7 - 10)  HYDROmorphone  Injectable 0.5 milliGRAM(s) IV Push every 4 hours PRN Moderate Pain (4 - 6)      Vital Signs Last 24 Hrs  T(C): 36.6 (04 Feb 2018 08:54), Max: 37 (03 Feb 2018 09:05)  T(F): 97.8 (04 Feb 2018 08:54), Max: 98.6 (03 Feb 2018 09:05)  HR: 92 (04 Feb 2018 08:54) (92 - 125)  BP: 95/61 (04 Feb 2018 08:54) (95/61 - 116/18)  BP(mean): --  RR: 18 (04 Feb 2018 08:54) (16 - 18)  SpO2: 96% (04 Feb 2018 08:54) (95% - 98%)    PHYSICAL EXAM:      Constitutional: A&Ox3    Respiratory: non labored breathing, no respiratory distress    Cardiovascular: NSR, RRR    Gastrointestinal:  Softly distended. NTTP. gas in colostomy, stoma is pink.                 Incision: CDI    Genitourinary: JOHNS    Extremities: (-) edema                  I&O's Detail    03 Feb 2018 07:01  -  04 Feb 2018 07:00  --------------------------------------------------------  IN:  Total IN: 0 mL    OUT:    Indwelling Catheter - Suprapubic: 1850 mL  Total OUT: 1850 mL    Total NET: -1850 mL          LABS:                        7.6    8.5   )-----------( 270      ( 04 Feb 2018 06:08 )             25.4     02-04    137  |  97  |  19  ----------------------------<  139<H>  4.2   |  25  |  0.80    Ca    8.9      04 Feb 2018 06:06  Mg     1.4     02-04            RADIOLOGY & ADDITIONAL STUDIES:

## 2018-02-04 NOTE — PROGRESS NOTE ADULT - ASSESSMENT
33 yo M paraplegic with h/o SCI, DM, multiple stage 4 pressure with sacral decubitus ulcer POD 3 s/p diverting colostomy    -ostomy pink, patent with gas production, but no stool  -tolerating regular diet, no n/v  -Colace and senna daily  -Team 4c will continue to follow  -no suppository

## 2018-02-04 NOTE — PROGRESS NOTE ADULT - PROBLEM SELECTOR PLAN 1
Patient is s/p diverting colostomy 2/1/18. Abdomen distended with no stool output from colostomy bag.   - colostomy bag in place with no output, ostomy pink, + gas production  - Advanced to regular diet (diabetic)  - Miralax BID, Senna, Mag citrate, mineral oil

## 2018-02-04 NOTE — PROGRESS NOTE ADULT - ATTENDING COMMENTS
Previously tachycardia on long term propranolol, which was held during sepsis episode, which is resolved.  Pt feels some discomfort with HR90's.  Ok to slowly reintroduce propranolol for Sx relief of anxiety/palpitation.     Still no BM via colostomy.  Pt reports his home regimen is mag citrate and mineral oil.  Add these to current regimen.

## 2018-02-04 NOTE — PROGRESS NOTE ADULT - ASSESSMENT
32M paraplegic PMH spinal cord injury (wheelchair bound), sacral pressure ulcer with osteo x2 (outpatient provider said they have records of March osteo s/p daptomycin of unknown length) earlier in 2017,  DM2, indwelling suprapubic catheter who presented w/ septic shock secondary to infected purulent sacral 4th degree pressure ulcer with underlying inadequately treated OM, hemodynamically stable being treated for sacral osteomyelitis awaiting placement at [facility] facility.

## 2018-02-05 LAB
ANION GAP SERPL CALC-SCNC: 13 MMOL/L — SIGNIFICANT CHANGE UP (ref 5–17)
ANION GAP SERPL CALC-SCNC: 14 MMOL/L — SIGNIFICANT CHANGE UP (ref 5–17)
BASOPHILS NFR BLD AUTO: 0.5 % — SIGNIFICANT CHANGE UP (ref 0–2)
BUN SERPL-MCNC: 18 MG/DL — SIGNIFICANT CHANGE UP (ref 7–23)
BUN SERPL-MCNC: 22 MG/DL — SIGNIFICANT CHANGE UP (ref 7–23)
CALCIUM SERPL-MCNC: 9.2 MG/DL — SIGNIFICANT CHANGE UP (ref 8.4–10.5)
CALCIUM SERPL-MCNC: 9.4 MG/DL — SIGNIFICANT CHANGE UP (ref 8.4–10.5)
CHLORIDE SERPL-SCNC: 94 MMOL/L — LOW (ref 96–108)
CHLORIDE SERPL-SCNC: 98 MMOL/L — SIGNIFICANT CHANGE UP (ref 96–108)
CO2 SERPL-SCNC: 25 MMOL/L — SIGNIFICANT CHANGE UP (ref 22–31)
CO2 SERPL-SCNC: 27 MMOL/L — SIGNIFICANT CHANGE UP (ref 22–31)
CREAT SERPL-MCNC: 0.75 MG/DL — SIGNIFICANT CHANGE UP (ref 0.5–1.3)
CREAT SERPL-MCNC: 0.85 MG/DL — SIGNIFICANT CHANGE UP (ref 0.5–1.3)
EOSINOPHIL NFR BLD AUTO: 1.4 % — SIGNIFICANT CHANGE UP (ref 0–6)
GLUCOSE BLDC GLUCOMTR-MCNC: 133 MG/DL — HIGH (ref 70–99)
GLUCOSE BLDC GLUCOMTR-MCNC: 149 MG/DL — HIGH (ref 70–99)
GLUCOSE BLDC GLUCOMTR-MCNC: 149 MG/DL — HIGH (ref 70–99)
GLUCOSE BLDC GLUCOMTR-MCNC: 162 MG/DL — HIGH (ref 70–99)
GLUCOSE SERPL-MCNC: 148 MG/DL — HIGH (ref 70–99)
GLUCOSE SERPL-MCNC: 162 MG/DL — HIGH (ref 70–99)
HCT VFR BLD CALC: 28.7 % — LOW (ref 39–50)
HCT VFR BLD CALC: 31.4 % — LOW (ref 39–50)
HGB BLD-MCNC: 8.3 G/DL — LOW (ref 13–17)
HGB BLD-MCNC: 9 G/DL — LOW (ref 13–17)
LACTATE SERPL-SCNC: 2 MMOL/L — SIGNIFICANT CHANGE UP (ref 0.5–2)
LYMPHOCYTES # BLD AUTO: 14.3 % — SIGNIFICANT CHANGE UP (ref 13–44)
MAGNESIUM SERPL-MCNC: 1.9 MG/DL — SIGNIFICANT CHANGE UP (ref 1.6–2.6)
MAGNESIUM SERPL-MCNC: 1.9 MG/DL — SIGNIFICANT CHANGE UP (ref 1.6–2.6)
MCHC RBC-ENTMCNC: 21.3 PG — LOW (ref 27–34)
MCHC RBC-ENTMCNC: 21.6 PG — LOW (ref 27–34)
MCHC RBC-ENTMCNC: 28.7 G/DL — LOW (ref 32–36)
MCHC RBC-ENTMCNC: 28.9 G/DL — LOW (ref 32–36)
MCV RBC AUTO: 74.2 FL — LOW (ref 80–100)
MCV RBC AUTO: 74.5 FL — LOW (ref 80–100)
MONOCYTES NFR BLD AUTO: 8 % — SIGNIFICANT CHANGE UP (ref 2–14)
NEUTROPHILS NFR BLD AUTO: 75.8 % — SIGNIFICANT CHANGE UP (ref 43–77)
PHOSPHATE SERPL-MCNC: 3 MG/DL — SIGNIFICANT CHANGE UP (ref 2.5–4.5)
PHOSPHATE SERPL-MCNC: 3.2 MG/DL — SIGNIFICANT CHANGE UP (ref 2.5–4.5)
PLATELET # BLD AUTO: 252 K/UL — SIGNIFICANT CHANGE UP (ref 150–400)
PLATELET # BLD AUTO: 258 K/UL — SIGNIFICANT CHANGE UP (ref 150–400)
POTASSIUM SERPL-MCNC: 4.3 MMOL/L — SIGNIFICANT CHANGE UP (ref 3.5–5.3)
POTASSIUM SERPL-MCNC: 4.7 MMOL/L — SIGNIFICANT CHANGE UP (ref 3.5–5.3)
POTASSIUM SERPL-SCNC: 4.3 MMOL/L — SIGNIFICANT CHANGE UP (ref 3.5–5.3)
POTASSIUM SERPL-SCNC: 4.7 MMOL/L — SIGNIFICANT CHANGE UP (ref 3.5–5.3)
RBC # BLD: 3.85 M/UL — LOW (ref 4.2–5.8)
RBC # BLD: 4.23 M/UL — SIGNIFICANT CHANGE UP (ref 4.2–5.8)
RBC # FLD: 19.4 % — HIGH (ref 10.3–16.9)
RBC # FLD: 19.7 % — HIGH (ref 10.3–16.9)
SODIUM SERPL-SCNC: 135 MMOL/L — SIGNIFICANT CHANGE UP (ref 135–145)
SODIUM SERPL-SCNC: 136 MMOL/L — SIGNIFICANT CHANGE UP (ref 135–145)
SURGICAL PATHOLOGY STUDY: SIGNIFICANT CHANGE UP
WBC # BLD: 11 K/UL — HIGH (ref 3.8–10.5)
WBC # BLD: 8.8 K/UL — SIGNIFICANT CHANGE UP (ref 3.8–10.5)
WBC # FLD AUTO: 11 K/UL — HIGH (ref 3.8–10.5)
WBC # FLD AUTO: 8.8 K/UL — SIGNIFICANT CHANGE UP (ref 3.8–10.5)

## 2018-02-05 PROCEDURE — 74018 RADEX ABDOMEN 1 VIEW: CPT | Mod: 26

## 2018-02-05 PROCEDURE — 99233 SBSQ HOSP IP/OBS HIGH 50: CPT

## 2018-02-05 RX ORDER — INSULIN GLARGINE 100 [IU]/ML
12.5 INJECTION, SOLUTION SUBCUTANEOUS AT BEDTIME
Qty: 0 | Refills: 0 | Status: DISCONTINUED | OUTPATIENT
Start: 2018-02-05 | End: 2018-02-09

## 2018-02-05 RX ORDER — MAGNESIUM HYDROXIDE 400 MG/1
30 TABLET, CHEWABLE ORAL DAILY
Qty: 0 | Refills: 0 | Status: DISCONTINUED | OUTPATIENT
Start: 2018-02-05 | End: 2018-02-16

## 2018-02-05 RX ORDER — LACTULOSE 10 G/15ML
20 SOLUTION ORAL DAILY
Qty: 0 | Refills: 0 | Status: DISCONTINUED | OUTPATIENT
Start: 2018-02-05 | End: 2018-02-05

## 2018-02-05 RX ORDER — LACTULOSE 10 G/15ML
20 SOLUTION ORAL
Qty: 0 | Refills: 0 | Status: DISCONTINUED | OUTPATIENT
Start: 2018-02-05 | End: 2018-02-05

## 2018-02-05 RX ORDER — SODIUM CHLORIDE 9 MG/ML
1000 INJECTION INTRAMUSCULAR; INTRAVENOUS; SUBCUTANEOUS
Qty: 0 | Refills: 0 | Status: DISCONTINUED | OUTPATIENT
Start: 2018-02-05 | End: 2018-02-12

## 2018-02-05 RX ORDER — LACTULOSE 10 G/15ML
20 SOLUTION ORAL
Qty: 0 | Refills: 0 | Status: DISCONTINUED | OUTPATIENT
Start: 2018-02-05 | End: 2018-02-16

## 2018-02-05 RX ORDER — MULTIVIT WITH MIN/MFOLATE/K2 340-15/3 G
75 POWDER (GRAM) ORAL THREE TIMES A DAY
Qty: 0 | Refills: 0 | Status: DISCONTINUED | OUTPATIENT
Start: 2018-02-05 | End: 2018-02-05

## 2018-02-05 RX ORDER — METOCLOPRAMIDE HCL 10 MG
10 TABLET ORAL ONCE
Qty: 0 | Refills: 0 | Status: COMPLETED | OUTPATIENT
Start: 2018-02-05 | End: 2018-02-05

## 2018-02-05 RX ORDER — MULTIVIT WITH MIN/MFOLATE/K2 340-15/3 G
296 POWDER (GRAM) ORAL ONCE
Qty: 0 | Refills: 0 | Status: COMPLETED | OUTPATIENT
Start: 2018-02-05 | End: 2018-02-05

## 2018-02-05 RX ORDER — SODIUM CHLORIDE 9 MG/ML
1000 INJECTION INTRAMUSCULAR; INTRAVENOUS; SUBCUTANEOUS ONCE
Qty: 0 | Refills: 0 | Status: COMPLETED | OUTPATIENT
Start: 2018-02-05 | End: 2018-02-05

## 2018-02-05 RX ADMIN — SENNA PLUS 2 TABLET(S): 8.6 TABLET ORAL at 21:06

## 2018-02-05 RX ADMIN — Medication 2: at 09:26

## 2018-02-05 RX ADMIN — HEPARIN SODIUM 5000 UNIT(S): 5000 INJECTION INTRAVENOUS; SUBCUTANEOUS at 21:19

## 2018-02-05 RX ADMIN — LEVETIRACETAM 500 MILLIGRAM(S): 250 TABLET, FILM COATED ORAL at 05:20

## 2018-02-05 RX ADMIN — POLYETHYLENE GLYCOL 3350 17 GRAM(S): 17 POWDER, FOR SOLUTION ORAL at 17:41

## 2018-02-05 RX ADMIN — Medication 296 MILLILITER(S): at 10:01

## 2018-02-05 RX ADMIN — POLYETHYLENE GLYCOL 3350 17 GRAM(S): 17 POWDER, FOR SOLUTION ORAL at 05:20

## 2018-02-05 RX ADMIN — NYSTATIN CREAM 1 APPLICATION(S): 100000 CREAM TOPICAL at 05:26

## 2018-02-05 RX ADMIN — Medication 5 MILLIGRAM(S): at 05:21

## 2018-02-05 RX ADMIN — MAGNESIUM HYDROXIDE 30 MILLILITER(S): 400 TABLET, CHEWABLE ORAL at 16:50

## 2018-02-05 RX ADMIN — ATORVASTATIN CALCIUM 20 MILLIGRAM(S): 80 TABLET, FILM COATED ORAL at 21:19

## 2018-02-05 RX ADMIN — LACTULOSE 20 GRAM(S): 10 SOLUTION ORAL at 09:26

## 2018-02-05 RX ADMIN — Medication 250 MILLIGRAM(S): at 12:52

## 2018-02-05 RX ADMIN — METFORMIN HYDROCHLORIDE 1000 MILLIGRAM(S): 850 TABLET ORAL at 19:08

## 2018-02-05 RX ADMIN — LACTULOSE 20 GRAM(S): 10 SOLUTION ORAL at 21:20

## 2018-02-05 RX ADMIN — PIPERACILLIN AND TAZOBACTAM 200 GRAM(S): 4; .5 INJECTION, POWDER, LYOPHILIZED, FOR SOLUTION INTRAVENOUS at 12:46

## 2018-02-05 RX ADMIN — Medication 10 MILLIGRAM(S): at 19:08

## 2018-02-05 RX ADMIN — LEVETIRACETAM 500 MILLIGRAM(S): 250 TABLET, FILM COATED ORAL at 17:42

## 2018-02-05 RX ADMIN — PIPERACILLIN AND TAZOBACTAM 200 GRAM(S): 4; .5 INJECTION, POWDER, LYOPHILIZED, FOR SOLUTION INTRAVENOUS at 18:18

## 2018-02-05 RX ADMIN — Medication 100 MILLIGRAM(S): at 21:19

## 2018-02-05 RX ADMIN — PIPERACILLIN AND TAZOBACTAM 200 GRAM(S): 4; .5 INJECTION, POWDER, LYOPHILIZED, FOR SOLUTION INTRAVENOUS at 00:25

## 2018-02-05 RX ADMIN — INSULIN GLARGINE 12.5 UNIT(S): 100 INJECTION, SOLUTION SUBCUTANEOUS at 23:30

## 2018-02-05 RX ADMIN — Medication 100 MILLIGRAM(S): at 14:27

## 2018-02-05 RX ADMIN — Medication 100 MILLIGRAM(S): at 05:20

## 2018-02-05 RX ADMIN — HALOPERIDOL DECANOATE 10 MILLIGRAM(S): 100 INJECTION INTRAMUSCULAR at 21:19

## 2018-02-05 RX ADMIN — HEPARIN SODIUM 5000 UNIT(S): 5000 INJECTION INTRAVENOUS; SUBCUTANEOUS at 14:27

## 2018-02-05 RX ADMIN — SODIUM CHLORIDE 4000 MILLILITER(S): 9 INJECTION INTRAMUSCULAR; INTRAVENOUS; SUBCUTANEOUS at 16:50

## 2018-02-05 RX ADMIN — LACTULOSE 20 GRAM(S): 10 SOLUTION ORAL at 18:18

## 2018-02-05 RX ADMIN — NYSTATIN CREAM 1 APPLICATION(S): 100000 CREAM TOPICAL at 17:41

## 2018-02-05 RX ADMIN — Medication 5 MILLIGRAM(S): at 17:42

## 2018-02-05 RX ADMIN — HEPARIN SODIUM 5000 UNIT(S): 5000 INJECTION INTRAVENOUS; SUBCUTANEOUS at 05:20

## 2018-02-05 RX ADMIN — Medication 30 MILLILITER(S): at 09:59

## 2018-02-05 RX ADMIN — METFORMIN HYDROCHLORIDE 1000 MILLIGRAM(S): 850 TABLET ORAL at 09:26

## 2018-02-05 RX ADMIN — PIPERACILLIN AND TAZOBACTAM 200 GRAM(S): 4; .5 INJECTION, POWDER, LYOPHILIZED, FOR SOLUTION INTRAVENOUS at 05:20

## 2018-02-05 NOTE — PROGRESS NOTE BEHAVIORAL HEALTH - NSBHADMITCOORDCAREOTHER_PSY_A_CORE FT
Discussed case with [facility] Chief, Dr. Fry.  Emails to team including JUAN PABLO.
 Shahriar Black
[facility]
[employer] case [employer] company
, ,  Shahriar Black
, Shahriar Black
Discussed in detail with Ethics Committee. Will continue to pursue TOO, also attempt to get collateral info from pt's aunt.

## 2018-02-05 NOTE — PROGRESS NOTE ADULT - ASSESSMENT
32M paraplegic PMH spinal cord injury (wheelchair bound), sacral pressure ulcer with osteo x2 (outpatient provider said they have records of March osteo s/p daptomycin of unknown length) earlier in 2017,  DM2, indwelling suprapubic catheter who presented w/ septic shock secondary to infected purulent sacral 4th degree pressure ulcer with underlying inadequately treated OM, hemodynamically stable being treated for sacral osteomyelitis - awaiting to go home with home care.

## 2018-02-05 NOTE — ADVANCED PRACTICE NURSE CONSULT - ASSESSMENT
Pt POD # 4 creation of diverting colostomy. Midline incision with staples, no erythema or drainage, site covered back per pt's request. Began educating pt on care of ostomy, including how often to empty and change appliance. Stoma approx 1 3/8", pink and budded, very minimal stool noted in appliance. Peristomal skin without breakdown, pt shows interest in caring for ostomy. Supplies left at bedside.   Left ischium pressure injury with 80% red, non - granulating tissue and 20% yellow slough measuring 4 cm x 2cm x 3 cm with tunnelling of 3 cm between 10-11 o'clock. Sacral pressure injury with red, non-granulating tissue measuring 3cm x 2 cm x 3.5 cm with tunneling of 6 cm at 10 o'clock and 7 cm at 12 o'clock. Sites cleaned with wound cleanser, dried, then packed loosely with Aquacel. Pt remains on air-fluidized mattress for pressure ulcer relief.

## 2018-02-05 NOTE — PROGRESS NOTE ADULT - SUBJECTIVE AND OBJECTIVE BOX
INTERVAL HPI / OVERNIGHT EVENTS: Overnight team called about abdominal distention with still no output in colostomy bag. Patient [facility] in the [facility] he had one episode of vomiting. Abdominal X-ray performed - read showing non-obstructive bowel. Suggested to add milk magnesia to meds.     SUBJECTIVE: Patient seen and examined at bedside. Patient continues to be upset about heart rate being elevated - would like to take propanolol. Explained BP are on the lower side will hold propanolol.  States this morning he had 2 episodes of vomiting while he was sleeping - states it was the food he ate, denies any blood. Denies nausea.     OBJECTIVE:    VITAL SIGNS:  Vital Signs Last 24 Hrs  T(C): 36.5 (05 Feb 2018 09:05), Max: 37.1 (05 Feb 2018 06:22)  T(F): 97.7 (05 Feb 2018 09:05), Max: 98.8 (05 Feb 2018 06:22)  HR: 102 (05 Feb 2018 09:05) (98 - 105)  BP: 113/73 (05 Feb 2018 09:05) (103/60 - 124/77)  BP(mean): --  RR: 18 (05 Feb 2018 09:05) (17 - 18)  SpO2: 98% (05 Feb 2018 09:05) (96% - 98%)      02-04 @ 07:01  -  02-05 @ 07:00  --------------------------------------------------------  IN: 0 mL / OUT: 2725 mL / NET: -2725 mL      CAPILLARY BLOOD GLUCOSE  POCT Blood Glucose.: 149 mg/dL (05 Feb 2018 12:22)    PHYSICAL EXAM:  Constitutional: Laying in bed comfortably, sleeping, breathing comfortably, patient is paraplegic, +agitated with no bowel movement  HEENT: PEERL, EOMI, conjunctiva clear  Neck: supple, nontender, no JVD   Respiratory: CTA b/l   Cardiovascular: S1S2, RRR, no murmurs  Gastrointestinal: Distended, with colostomy bag (about 30cc brown liquid), nontender, (+) BS, + suprapubic catheter  Extremities: sacral decubitus with dressing  Neuro: AAOx3, odd effect    MEDICATIONS:  MEDICATIONS  (STANDING):  ascorbic acid 250 milliGRAM(s) Oral daily  atorvastatin 20 milliGRAM(s) Oral at bedtime  dextrose 5%. 1000 milliLiter(s) (50 mL/Hr) IV Continuous <Continuous>  dextrose 50% Injectable 12.5 Gram(s) IV Push once  dextrose 50% Injectable 25 Gram(s) IV Push once  dextrose 50% Injectable 25 Gram(s) IV Push once  docusate sodium 100 milliGRAM(s) Oral three times a day  haloperidol     Tablet 10 milliGRAM(s) Oral at bedtime  heparin  Injectable 5000 Unit(s) SubCutaneous every 8 hours  insulin glargine Injectable (LANTUS) 25 Unit(s) SubCutaneous at bedtime  insulin lispro (HumaLOG) corrective regimen sliding scale   SubCutaneous Before meals and at bedtime  lactulose Syrup 20 Gram(s) Oral four times a day  levETIRAcetam 500 milliGRAM(s) Oral two times a day  magnesium hydroxide Suspension 30 milliLiter(s) Oral daily  metFORMIN 1000 milliGRAM(s) Oral every 12 hours  metoclopramide Injectable 10 milliGRAM(s) IV Push once  mineral oil 30 milliLiter(s) Oral daily  nystatin Powder 1 Application(s) Topical two times a day  oxybutynin 5 milliGRAM(s) Oral two times a day  piperacillin/tazobactam IVPB. 3.375 Gram(s) IV Intermittent every 6 hours  polyethylene glycol 3350 17 Gram(s) Oral two times a day  senna 2 Tablet(s) Oral at bedtime    MEDICATIONS  (PRN):  acetaminophen   Tablet 650 milliGRAM(s) Oral every 6 hours PRN For Temp greater than 38 C (100.4 F)  dextrose Gel 1 Dose(s) Oral once PRN Blood Glucose LESS THAN 70 milliGRAM(s)/deciliter  glucagon  Injectable 1 milliGRAM(s) IntraMuscular once PRN Glucose LESS THAN 70 milligrams/deciliter  haloperidol    Injectable 10 milliGRAM(s) IntraMuscular at bedtime PRN if refuses PO Haldol      ALLERGIES:  Allergies  Capzasin Back and Body (Unknown)      LABS:                     8.3    8.8   )-----------( 258      ( 05 Feb 2018 06:36 )             28.7     02-05    136  |  98  |  22  ----------------------------<  162<H>  4.7   |  25  |  0.85    Ca    9.2      05 Feb 2018 06:36  Phos  3.2     02-05  Mg     1.9     02-05        RADIOLOGY & ADDITIONAL TESTS:    Xray Abdomen 1 View PORTABLE -Urgent (02.05.18 @ 09:13)  XAM:  XR ABDOMEN PORTABLE URGENT 1V                        PROCEDURE DATE:  02/05/2018     INTERPRETATION:    XR ABDOMEN PORTABLE URGENT 1V dated 2/5/2018 9:13 AM    CLINICAL INFORMATION: Male, 32 years old.  s/p diverting colostomy, no   stoma output.    PRIOR STUDIES: 2/4/2016.    TECHNIQUE:Portable abdomen    FINDINGS: There is a nonobstructive bowel gas pattern. Abundant retained   fecal material is noted once again in the descending and rectosigmoid   colon. Limited for the assessment of pneumoperitoneum due to the portable   technique. There is exclusion of the right aspect of the diaphragm from   the field-of-view. Surgical staples are again noted over the midline as   well as the visualized lower spinal fixation rods and interpedicular   screws involving the lower thoracic spine.    IMPRESSION:  Nonobstructive bowel gas pattern.

## 2018-02-05 NOTE — PROGRESS NOTE ADULT - PROBLEM SELECTOR PLAN 1
Patient is s/p diverting colostomy 2/1/18. Abdomen distended with no stool output from colostomy bag. On 2/5 AM - with two episodes of vomiting.   - colostomy bag in place with no output, ostomy pink, + gas production  - Advanced to regular diet (diabetic)  - docusate sodium 100 mg oral TID, lactulose Syrup 20g Oral four times a day, magnesium hydroxide Suspension 30 milliLiter(s) Oral daily, mineral oil 30 milliLiter(s) Oral daily, polyethylene glycol 3350 17 Gram(s) Oral two times a day, senna 2 Tablet(s) Oral at bedtime, Mag Citrate 300ml x 1   - metoclopramide Injectable 10 milliGRAM(s) IV Push once for possible gastroparesis, f/u EKG QTc   - Abdominal X-Ray: Nonobstructive bowel gas pattern. Called Surgery about utility of CT, feels abdominal x-ray continues to be nonobstructive pattern will continue with laxatives and monitor

## 2018-02-05 NOTE — PROGRESS NOTE BEHAVIORAL HEALTH - NSBHCONSULTFOLLOWDETAILS_PSY_A_CORE FT
Pt continues to requires inpatient level of psychiatric care at this time and is not clear for discharge until further stabilization. Will titrate the Haldol to 10 mg po qhs (or IM if refused PO, pt can not refuse). He appears to be tolerating Haldol and exhibits slight improvement in paranoia and hostility.
Pt appears to be tolerating Haldol and exhibits slight improvement in paranoia and hostility.
Pt currently with no evidence of suicidal or homicidal ideation/intent/plan, no evidence of behavioral dysregulation or psychosis.
See Plan
Continue Haldol 10 mg po qhs/10 mg IM qhs prn refusing po.
Given pt's hx significant for suicide attempt leading to paraplesia, non-compliance with medication and medical care, pt will require a more structured outpt plan prior to discharge home.
Pt is at his psychiatric baseline. No evidence of suicidal or homicidal ideation/intent/plan. No behavioral issues.  Dispo planning with SW.  Pt is safe for discharge once structured plan (i.e. partial hospitalization and/or ACT) is in place.
Will complete ACT application with .
Pt currently calm, not threatening, no evidence of suicidal or homicidal ideation. However, given his significant past psychiatric history, pt is at risk of further psychiatric deterioration and harm to self.
Will continue to work with team regarding dispo planning, including ACT as well as [facility].
Will rx with Haldol, increasing dose as warranted/tolerated. Alternatives to include atypical antipsychotics including abilify and risperidone,  mood stabilizer (depakote), antidepressant (venlafaxine), anxiolytic (lorazepam).   Will rx with cogentin prn side effect of EPS.    Will discuss possible transfer of pt to area med-psych unit.
Pt continues to requires inpatient level of psychiatric care at this time and is not clear for discharge until further stabilization. Will continue to prescribe the Haldol to 7.5 mg po qhs (or IM if refused PO, pt can not refuse). He appears to be tolerating Haldol and exhibits slight improvement in paranoia and hostility.
See Plan
As above. Requires psychiatric stabilization prior to discharge home or to [facility].     If bed available on med-psych unit of another hospital (8U cannot provide proper wound care or appropriate environment for this patient), pt would benefit from transfer.     Psych C/L available to assist in transfer process if bed is found on med-psych unit.

## 2018-02-05 NOTE — CHART NOTE - NSCHARTNOTEFT_GEN_A_CORE
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  Paraplegia  Type 2 diabetes mellitus with hyperglycemia, without long-term current use of insulin      Current Nutrition Order:[facility] no snack       PO Intake: Good (%) [ x  ]  Fair (50-75%) [   ] Poor (<25%) [   ]    GI Issues: constipation.Colostomy functioning ok    Pain:none    Skin Integrity:stage 4    Labs:   02-05    136  |  98  |  22  ----------------------------<  162<H>  4.7   |  25  |  0.85    Ca    9.2      05 Feb 2018 06:36  Phos  3.2     02-05  Mg     1.9     02-05      CAPILLARY BLOOD GLUCOSE      POCT Blood Glucose.: 149 mg/dL (05 Feb 2018 12:22)  POCT Blood Glucose.: 162 mg/dL (05 Feb 2018 08:31)  POCT Blood Glucose.: 242 mg/dL (04 Feb 2018 22:05)  POCT Blood Glucose.: 183 mg/dL (04 Feb 2018 18:35)      Medications:  MEDICATIONS  (STANDING):  ascorbic acid 250 milliGRAM(s) Oral daily  atorvastatin 20 milliGRAM(s) Oral at bedtime  dextrose 5%. 1000 milliLiter(s) (50 mL/Hr) IV Continuous <Continuous>  dextrose 50% Injectable 12.5 Gram(s) IV Push once  dextrose 50% Injectable 25 Gram(s) IV Push once  dextrose 50% Injectable 25 Gram(s) IV Push once  docusate sodium 100 milliGRAM(s) Oral three times a day  haloperidol     Tablet 10 milliGRAM(s) Oral at bedtime  heparin  Injectable 5000 Unit(s) SubCutaneous every 8 hours  insulin glargine Injectable (LANTUS) 25 Unit(s) SubCutaneous at bedtime  insulin lispro (HumaLOG) corrective regimen sliding scale   SubCutaneous Before meals and at bedtime  lactulose Syrup 20 Gram(s) Oral four times a day  levETIRAcetam 500 milliGRAM(s) Oral two times a day  metFORMIN 1000 milliGRAM(s) Oral every 12 hours  metoclopramide Injectable 10 milliGRAM(s) IV Push once  mineral oil 30 milliLiter(s) Oral daily  nystatin Powder 1 Application(s) Topical two times a day  oxybutynin 5 milliGRAM(s) Oral two times a day  piperacillin/tazobactam IVPB. 3.375 Gram(s) IV Intermittent every 6 hours  polyethylene glycol 3350 17 Gram(s) Oral two times a day  senna 2 Tablet(s) Oral at bedtime    MEDICATIONS  (PRN):  acetaminophen   Tablet 650 milliGRAM(s) Oral every 6 hours PRN For Temp greater than 38 C (100.4 F)  dextrose Gel 1 Dose(s) Oral once PRN Blood Glucose LESS THAN 70 milliGRAM(s)/deciliter  glucagon  Injectable 1 milliGRAM(s) IntraMuscular once PRN Glucose LESS THAN 70 milligrams/deciliter  haloperidol    Injectable 10 milliGRAM(s) IntraMuscular at bedtime PRN if refuses PO Haldol      Weight:2/1:72.6  Daily     Daily no updated weights    Weight Change:     Estimated energy needs: based on IBW:51kg [id number]:1530-1785kcal and 1.4-1.6gmprotein:71-82gmprotein and 1530-1785cc fluid([id number])    Subjective: Patient is a 32y old  Male who presents with a chief complaint of Chills .Eating 80-10% of needs    Previous Nutrition Diagnosis:increased nutrient needs r/t increased demand for kcal/protein and nutrients AEB:PU    Active [ x  ]  Resolved [   ]    If resolved, new PES:     Goal:Meet 80% of needs consistently    Recommendations:1.bi-weekly weights 2.Add glucerna shake BID(440kcal and 20gmprotein)    Education: high calorie sources    Risk Level: High [   ] Moderate [ x  ] Low [   ]

## 2018-02-05 NOTE — PROGRESS NOTE BEHAVIORAL HEALTH - NSBHCONSULTRECOMMENDOTHER_PSY_A_CORE FT
Pt with court-mandated treatment over objection x 45 days. Need to confer with Mr. Jose Luis Black, , regarding what happens once 45-day period is exhausted. Pt with court-mandated treatment over objection x 45 days, ending February 17th. Need to confer with Mr. Jose Luis Black, , regarding what happens once 45-day period is exhausted.

## 2018-02-05 NOTE — PROGRESS NOTE ADULT - PROBLEM SELECTOR PLAN 2
Pt w/ a stage 4 infected sacral ulcer w/ drainage with w/ feculent material. On admission required debridement by plastic surgery.   CT (1/27): Findings consistent with abscess replacing the lower sacrum and coccyx. Osteomyelitis of the adjacent S3 segment cannot be excluded. The collection appears to be communicating with the skin surface in the inferior gluteal cleft.   - General surgery does not believe there is a fistula between wound and rectum causing leakage of stool. Now with colostomy bag.   - Wound cx had citrobacter, enterococcus, klebsiella   - Wound care following; continue to appreciate recs  - Antibiotics deescalated (1/29) discontinued vancomycin and meropenem and transitioned to zosyn ( 1/29 to 2/12-to complete 2 weeks)   - Blood cultures NGTD    #C. diff: Results came back as indeterminate and then positive. Discussed with ID prefers to hold off treating for now with no diarrhea. If developed increasing loose stool would add PO vancomycin. CDIFF WAS SENT IN THE SITUATION PATIENT WAS HYPOTENSIVE AND SEPTIC WITH UNCLEAR ETIOLOGY AND WAS IN LAYING IN STOOL FOR HOURS WITH WOUND VAC ON UNCLEAR OF TRUE CONSISTENCY. Thereafter, patient with more formed stool.

## 2018-02-05 NOTE — PROGRESS NOTE ADULT - SUBJECTIVE AND OBJECTIVE BOX
INTERVAL HPI/OVERNIGHT EVENTS: Pt seen and examined at bedside. Reports one episode of vomiting this am. No nausea currently, no BM    STATUS POST:  diverting colostomy        SUBJECTIVE:  Flatus: [ ] YES [ ] NO             Bowel Movement: [ ] YES [ ] NO  Pain (0-10):            Pain Control Adequate: [ ] YES [ ] NO  Nausea: [ ] YES [ ] NO            Vomiting: [ ] YES [ ] NO  Diarrhea: [ ] YES [ ] NO         Constipation: [ ] YES [ ] NO     Chest Pain: [ ] YES [ ] NO    SOB:  [ ] YES [ ] NO    MEDICATIONS  (STANDING):  ascorbic acid 250 milliGRAM(s) Oral daily  atorvastatin 20 milliGRAM(s) Oral at bedtime  dextrose 5%. 1000 milliLiter(s) (50 mL/Hr) IV Continuous <Continuous>  dextrose 50% Injectable 12.5 Gram(s) IV Push once  dextrose 50% Injectable 25 Gram(s) IV Push once  dextrose 50% Injectable 25 Gram(s) IV Push once  docusate sodium 100 milliGRAM(s) Oral three times a day  haloperidol     Tablet 10 milliGRAM(s) Oral at bedtime  heparin  Injectable 5000 Unit(s) SubCutaneous every 8 hours  insulin glargine Injectable (LANTUS) 25 Unit(s) SubCutaneous at bedtime  insulin lispro (HumaLOG) corrective regimen sliding scale   SubCutaneous Before meals and at bedtime  lactulose Syrup 20 Gram(s) Oral four times a day  levETIRAcetam 500 milliGRAM(s) Oral two times a day  metFORMIN 1000 milliGRAM(s) Oral every 12 hours  metoclopramide Injectable 10 milliGRAM(s) IV Push once  mineral oil 30 milliLiter(s) Oral daily  nystatin Powder 1 Application(s) Topical two times a day  oxybutynin 5 milliGRAM(s) Oral two times a day  piperacillin/tazobactam IVPB. 3.375 Gram(s) IV Intermittent every 6 hours  polyethylene glycol 3350 17 Gram(s) Oral two times a day  senna 2 Tablet(s) Oral at bedtime    MEDICATIONS  (PRN):  acetaminophen   Tablet 650 milliGRAM(s) Oral every 6 hours PRN For Temp greater than 38 C (100.4 F)  dextrose Gel 1 Dose(s) Oral once PRN Blood Glucose LESS THAN 70 milliGRAM(s)/deciliter  glucagon  Injectable 1 milliGRAM(s) IntraMuscular once PRN Glucose LESS THAN 70 milligrams/deciliter  haloperidol    Injectable 10 milliGRAM(s) IntraMuscular at bedtime PRN if refuses PO Haldol      Vital Signs Last 24 Hrs  T(C): 36.5 (05 Feb 2018 09:05), Max: 37.1 (05 Feb 2018 06:22)  T(F): 97.7 (05 Feb 2018 09:05), Max: 98.8 (05 Feb 2018 06:22)  HR: 102 (05 Feb 2018 09:05) (98 - 105)  BP: 113/73 (05 Feb 2018 09:05) (103/60 - 124/77)  BP(mean): --  RR: 18 (05 Feb 2018 09:05) (17 - 18)  SpO2: 98% (05 Feb 2018 09:05) (96% - 98%)    PHYSICAL EXAM:      Constitutional: AOx3, NAD    Respiratory: CTABL, no resp distress    Cardiovascular: RRR, no m/r/g    Gastrointestinal: mildly distended, soft, ostomy bag with gas but not stool, stoma pink, patent    Extremities: WWP, no edema      I&O's Detail    04 Feb 2018 07:01  -  05 Feb 2018 07:00  --------------------------------------------------------  IN:  Total IN: 0 mL    OUT:    Indwelling Catheter - Suprapubic: 2725 mL  Total OUT: 2725 mL    Total NET: -2725 mL          LABS:                        8.3    8.8   )-----------( 258      ( 05 Feb 2018 06:36 )             28.7     02-05    136  |  98  |  22  ----------------------------<  162<H>  4.7   |  25  |  0.85    Ca    9.2      05 Feb 2018 06:36  Phos  3.2     02-05  Mg     1.9     02-05            RADIOLOGY & ADDITIONAL STUDIES:

## 2018-02-05 NOTE — PROGRESS NOTE ADULT - ATTENDING COMMENTS
- ileus - cont with aggesssive bowel regimen, NPO, ivfs. f/u surgery recs.   -severe sepsis due to  infected decubitus ulcers/possible sacral osteomyelitis- ct pelvis (+) sacral wound abscess, surgery has evaluated, and does not believe this is an abscess. ID narrowed abx to zosyn. s/p diverting colostomy 2/1/18. cont local wound care  -left IT  decub and sacral decub- stage iv  - as above. plan for diverting colostomy  -psychosis - improved,  cont treatment over objection based on court ruling. haldol qhs (po preferably , if not willing then will need IM haldol) . ekg to eval qtc serially  -neurogenic bladder - c/w suprapubic bowles, oxybutinin  -DM ii - can cont metformin  -epilepsy- keppra   - tachycardia - etiology unclear, but pt has stated he needed propranolol in the past for his "heartrate", if persistently p>100, can start metoprolol .

## 2018-02-05 NOTE — PROGRESS NOTE ADULT - PROBLEM SELECTOR PLAN 4
Patient on metformin and Glimepiride at home. HgA1C of 6.0.   - ISS+ metformin 1000mg BID  - 25U Lantus QHS, dose daily based on requirements   - monitor FSG

## 2018-02-05 NOTE — PROGRESS NOTE BEHAVIORAL HEALTH - SUMMARY
32-year-old male with reported hx of schizoaffective disorder, rendered paraplegic s/p suicide attempt, admitted in septic shock, s/p discontinuation of ADL and wound care, in context of noncompliance with antipsychotic medication resulting in psychosis.  Pt was determined to LACK CAPACITY regarding medical decision making including need for psychotropic medication. Has approval for TOO x 45 days (court mandated), had been tolerating haldol 10 mg po qhs with no evidence of SE and increasingly better behavioral control until recent surgery.   Hopefully, once SE of surgery have resolved, pt will be in better behavioral control.  Dispo planning underway.  In the meantime, will need to determine how long court-mandated TOO applies and whether new bedside court hearing will need to be scheduled.

## 2018-02-05 NOTE — PROGRESS NOTE BEHAVIORAL HEALTH - NSBHFUPINTERVALHXFT_PSY_A_CORE
Pt seen; chart reviewed; discussed with team.     Per team, pt upset regarding post-op complications including bloating, constipation and tachycardia.   Pt interviewed with Dr. Everett. Pt remained upset, attributing his physical issues to SE of Haldol. (Of note, pt has previously complained of Haldol SE, though he has actually tolerate the medication well, with no evidence of SE.)  As discussed with Dr. Everett, pt continues to have court order regarding treatment over objection (Haldol) x 45 days from bedside court hearing.    This writer also placed call to Shirley,  at [facility] [phone]]. Voice message left for ; so far, no return phone call received. Pt seen; chart reviewed; discussed with team.     Per team, pt upset regarding post-op complications including bloating, constipation and tachycardia.   Pt interviewed with Dr. Everett. Pt remained upset, attributing his physical issues to SE of Haldol. (Of note, pt has previously complained of Haldol SE, though he has actually tolerate the medication well, with no evidence of SE.)  As discussed with Dr. Everett, pt continues to have court order regarding treatment over objection (Haldol) x 45 days from bedside court hearing--ends February 17th.    This writer also placed call to Shirley,  at [facility] [phone]]. Voice message left for ; so far, no return phone call received.

## 2018-02-05 NOTE — PROGRESS NOTE ADULT - ASSESSMENT
33 yo M paraplegic with h/o SCI, DM, multiple stage 4 pressure with sacral decubitus ulcer POD 4 s/p diverting colostomy    -ostomy pink, patent with gas production, but no stool  -pt reports one episode of vomiting this AM, abdominal xray with stool in colon, no evidence of obstruction  -recommend milk of magnesia in addition to current bowel regimen  -Team 4c will continue to follow  -d/w Dr La

## 2018-02-06 LAB
ANION GAP SERPL CALC-SCNC: 11 MMOL/L — SIGNIFICANT CHANGE UP (ref 5–17)
BUN SERPL-MCNC: 17 MG/DL — SIGNIFICANT CHANGE UP (ref 7–23)
CALCIUM SERPL-MCNC: 8.9 MG/DL — SIGNIFICANT CHANGE UP (ref 8.4–10.5)
CHLORIDE SERPL-SCNC: 101 MMOL/L — SIGNIFICANT CHANGE UP (ref 96–108)
CO2 SERPL-SCNC: 23 MMOL/L — SIGNIFICANT CHANGE UP (ref 22–31)
CREAT SERPL-MCNC: 0.7 MG/DL — SIGNIFICANT CHANGE UP (ref 0.5–1.3)
GLUCOSE BLDC GLUCOMTR-MCNC: 144 MG/DL — HIGH (ref 70–99)
GLUCOSE BLDC GLUCOMTR-MCNC: 149 MG/DL — HIGH (ref 70–99)
GLUCOSE BLDC GLUCOMTR-MCNC: 153 MG/DL — HIGH (ref 70–99)
GLUCOSE BLDC GLUCOMTR-MCNC: 156 MG/DL — HIGH (ref 70–99)
GLUCOSE SERPL-MCNC: 154 MG/DL — HIGH (ref 70–99)
HCT VFR BLD CALC: 28.5 % — LOW (ref 39–50)
HGB BLD-MCNC: 8.2 G/DL — LOW (ref 13–17)
MAGNESIUM SERPL-MCNC: 1.8 MG/DL — SIGNIFICANT CHANGE UP (ref 1.6–2.6)
MCHC RBC-ENTMCNC: 21.2 PG — LOW (ref 27–34)
MCHC RBC-ENTMCNC: 28.8 G/DL — LOW (ref 32–36)
MCV RBC AUTO: 73.6 FL — LOW (ref 80–100)
PHOSPHATE SERPL-MCNC: 3.9 MG/DL — SIGNIFICANT CHANGE UP (ref 2.5–4.5)
PLATELET # BLD AUTO: 209 K/UL — SIGNIFICANT CHANGE UP (ref 150–400)
POTASSIUM SERPL-MCNC: 3.9 MMOL/L — SIGNIFICANT CHANGE UP (ref 3.5–5.3)
POTASSIUM SERPL-SCNC: 3.9 MMOL/L — SIGNIFICANT CHANGE UP (ref 3.5–5.3)
RBC # BLD: 3.87 M/UL — LOW (ref 4.2–5.8)
RBC # FLD: 19.5 % — HIGH (ref 10.3–16.9)
SODIUM SERPL-SCNC: 135 MMOL/L — SIGNIFICANT CHANGE UP (ref 135–145)
WBC # BLD: 10.6 K/UL — HIGH (ref 3.8–10.5)
WBC # FLD AUTO: 10.6 K/UL — HIGH (ref 3.8–10.5)

## 2018-02-06 PROCEDURE — 71045 X-RAY EXAM CHEST 1 VIEW: CPT | Mod: 26

## 2018-02-06 PROCEDURE — 74019 RADEX ABDOMEN 2 VIEWS: CPT | Mod: 26

## 2018-02-06 PROCEDURE — 93010 ELECTROCARDIOGRAM REPORT: CPT

## 2018-02-06 PROCEDURE — 99233 SBSQ HOSP IP/OBS HIGH 50: CPT

## 2018-02-06 RX ORDER — MINERAL OIL
30 OIL (ML) MISCELLANEOUS ONCE
Qty: 0 | Refills: 0 | Status: DISCONTINUED | OUTPATIENT
Start: 2018-02-06 | End: 2018-02-16

## 2018-02-06 RX ORDER — POTASSIUM CHLORIDE 20 MEQ
10 PACKET (EA) ORAL ONCE
Qty: 0 | Refills: 0 | Status: COMPLETED | OUTPATIENT
Start: 2018-02-06 | End: 2018-02-06

## 2018-02-06 RX ORDER — ONDANSETRON 8 MG/1
4 TABLET, FILM COATED ORAL ONCE
Qty: 0 | Refills: 0 | Status: COMPLETED | OUTPATIENT
Start: 2018-02-06 | End: 2018-02-06

## 2018-02-06 RX ORDER — MAGNESIUM SULFATE 500 MG/ML
1 VIAL (ML) INJECTION ONCE
Qty: 0 | Refills: 0 | Status: COMPLETED | OUTPATIENT
Start: 2018-02-06 | End: 2018-02-06

## 2018-02-06 RX ORDER — ZOLPIDEM TARTRATE 10 MG/1
5 TABLET ORAL ONCE
Qty: 0 | Refills: 0 | Status: DISCONTINUED | OUTPATIENT
Start: 2018-02-06 | End: 2018-02-06

## 2018-02-06 RX ORDER — PSYLLIUM SEED (WITH DEXTROSE)
1 POWDER (GRAM) ORAL ONCE
Qty: 0 | Refills: 0 | Status: COMPLETED | OUTPATIENT
Start: 2018-02-06 | End: 2018-02-06

## 2018-02-06 RX ADMIN — Medication 1 PACKET(S): at 15:25

## 2018-02-06 RX ADMIN — Medication 250 MILLIGRAM(S): at 11:45

## 2018-02-06 RX ADMIN — PIPERACILLIN AND TAZOBACTAM 200 GRAM(S): 4; .5 INJECTION, POWDER, LYOPHILIZED, FOR SOLUTION INTRAVENOUS at 13:27

## 2018-02-06 RX ADMIN — NYSTATIN CREAM 1 APPLICATION(S): 100000 CREAM TOPICAL at 19:06

## 2018-02-06 RX ADMIN — Medication 100 MILLIGRAM(S): at 15:25

## 2018-02-06 RX ADMIN — METFORMIN HYDROCHLORIDE 1000 MILLIGRAM(S): 850 TABLET ORAL at 09:09

## 2018-02-06 RX ADMIN — PIPERACILLIN AND TAZOBACTAM 200 GRAM(S): 4; .5 INJECTION, POWDER, LYOPHILIZED, FOR SOLUTION INTRAVENOUS at 19:07

## 2018-02-06 RX ADMIN — Medication 100 GRAM(S): at 11:45

## 2018-02-06 RX ADMIN — SENNA PLUS 2 TABLET(S): 8.6 TABLET ORAL at 22:34

## 2018-02-06 RX ADMIN — ONDANSETRON 4 MILLIGRAM(S): 8 TABLET, FILM COATED ORAL at 09:08

## 2018-02-06 RX ADMIN — Medication 100 MILLIGRAM(S): at 22:34

## 2018-02-06 RX ADMIN — MAGNESIUM HYDROXIDE 30 MILLILITER(S): 400 TABLET, CHEWABLE ORAL at 09:08

## 2018-02-06 RX ADMIN — INSULIN GLARGINE 12.5 UNIT(S): 100 INJECTION, SOLUTION SUBCUTANEOUS at 22:35

## 2018-02-06 RX ADMIN — HALOPERIDOL DECANOATE 10 MILLIGRAM(S): 100 INJECTION INTRAMUSCULAR at 22:34

## 2018-02-06 RX ADMIN — HEPARIN SODIUM 5000 UNIT(S): 5000 INJECTION INTRAVENOUS; SUBCUTANEOUS at 05:40

## 2018-02-06 RX ADMIN — Medication 2: at 09:08

## 2018-02-06 RX ADMIN — Medication 5 MILLIGRAM(S): at 05:39

## 2018-02-06 RX ADMIN — Medication 5 MILLIGRAM(S): at 19:13

## 2018-02-06 RX ADMIN — LACTULOSE 20 GRAM(S): 10 SOLUTION ORAL at 11:45

## 2018-02-06 RX ADMIN — POLYETHYLENE GLYCOL 3350 17 GRAM(S): 17 POWDER, FOR SOLUTION ORAL at 05:39

## 2018-02-06 RX ADMIN — LEVETIRACETAM 500 MILLIGRAM(S): 250 TABLET, FILM COATED ORAL at 19:06

## 2018-02-06 RX ADMIN — LACTULOSE 20 GRAM(S): 10 SOLUTION ORAL at 19:07

## 2018-02-06 RX ADMIN — ONDANSETRON 4 MILLIGRAM(S): 8 TABLET, FILM COATED ORAL at 15:24

## 2018-02-06 RX ADMIN — NYSTATIN CREAM 1 APPLICATION(S): 100000 CREAM TOPICAL at 10:39

## 2018-02-06 RX ADMIN — Medication 100 MILLIGRAM(S): at 05:40

## 2018-02-06 RX ADMIN — Medication 100 MILLIEQUIVALENT(S): at 10:38

## 2018-02-06 RX ADMIN — SODIUM CHLORIDE 100 MILLILITER(S): 9 INJECTION INTRAMUSCULAR; INTRAVENOUS; SUBCUTANEOUS at 19:06

## 2018-02-06 RX ADMIN — LACTULOSE 20 GRAM(S): 10 SOLUTION ORAL at 05:39

## 2018-02-06 RX ADMIN — METFORMIN HYDROCHLORIDE 1000 MILLIGRAM(S): 850 TABLET ORAL at 19:06

## 2018-02-06 RX ADMIN — LEVETIRACETAM 500 MILLIGRAM(S): 250 TABLET, FILM COATED ORAL at 05:40

## 2018-02-06 RX ADMIN — ZOLPIDEM TARTRATE 5 MILLIGRAM(S): 10 TABLET ORAL at 22:45

## 2018-02-06 RX ADMIN — ATORVASTATIN CALCIUM 20 MILLIGRAM(S): 80 TABLET, FILM COATED ORAL at 22:34

## 2018-02-06 RX ADMIN — HEPARIN SODIUM 5000 UNIT(S): 5000 INJECTION INTRAVENOUS; SUBCUTANEOUS at 15:25

## 2018-02-06 RX ADMIN — PIPERACILLIN AND TAZOBACTAM 200 GRAM(S): 4; .5 INJECTION, POWDER, LYOPHILIZED, FOR SOLUTION INTRAVENOUS at 00:24

## 2018-02-06 RX ADMIN — PIPERACILLIN AND TAZOBACTAM 200 GRAM(S): 4; .5 INJECTION, POWDER, LYOPHILIZED, FOR SOLUTION INTRAVENOUS at 06:19

## 2018-02-06 RX ADMIN — Medication 30 MILLILITER(S): at 16:13

## 2018-02-06 NOTE — PROGRESS NOTE ADULT - ASSESSMENT
31 yo M paraplegic with h/o SCI, DM, multiple stage 4 pressure with sacral decubitus ulcer POD 4 s/p diverting colostomy  -abdominal xray today with increased dilation of transverse colon  -tap water enema given through ostomy, catheter decompression attempted, will monitor ostomy output  -recommend keeping pt NPO  -will follow, discussed with Dr La

## 2018-02-06 NOTE — PROGRESS NOTE ADULT - SUBJECTIVE AND OBJECTIVE BOX
SUBJECTIVE: Pt seen and examined at bedside. Pt reports several episodes of vomiting this morning and yesterday. No fevers or chills.    Vital Signs Last 24 Hrs  T(C): 36.9 (06 Feb 2018 09:19), Max: 37.3 (05 Feb 2018 16:07)  T(F): 98.4 (06 Feb 2018 09:19), Max: 99.2 (05 Feb 2018 16:07)  HR: 114 (06 Feb 2018 09:19) (114 - 128)  BP: 137/91 (06 Feb 2018 09:19) (113/71 - 137/91)  BP(mean): --  RR: 17 (06 Feb 2018 09:19) (16 - 19)  SpO2: 97% (06 Feb 2018 09:19) (95% - 99%)    PHYSICAL EXAM    Gen: NAD    CV: RRR, CTABL    Pulm: CTABL, no resp distress    Abd: softly distended, tympanic, liquid stool and gas in ostomy bag, ostomy pink, patent    Ext: WWP, no edema    I&O's Detail    05 Feb 2018 07:01  -  06 Feb 2018 07:00  --------------------------------------------------------  IN:    sodium chloride 0.9%.: 800 mL  Total IN: 800 mL    OUT:  Total OUT: 0 mL    Total NET: 800 mL          LABS:                        8.2    10.6  )-----------( 209      ( 06 Feb 2018 06:10 )             28.5     02-06    135  |  101  |  17  ----------------------------<  154<H>  3.9   |  23  |  0.70    Ca    8.9      06 Feb 2018 06:10  Phos  3.9     02-06  Mg     1.8     02-06            MEDICATIONS  (STANDING):  ascorbic acid 250 milliGRAM(s) Oral daily  atorvastatin 20 milliGRAM(s) Oral at bedtime  dextrose 5%. 1000 milliLiter(s) (50 mL/Hr) IV Continuous <Continuous>  dextrose 50% Injectable 12.5 Gram(s) IV Push once  dextrose 50% Injectable 25 Gram(s) IV Push once  dextrose 50% Injectable 25 Gram(s) IV Push once  docusate sodium 100 milliGRAM(s) Oral three times a day  haloperidol     Tablet 10 milliGRAM(s) Oral at bedtime  heparin  Injectable 5000 Unit(s) SubCutaneous every 8 hours  insulin glargine Injectable (LANTUS) 12.5 Unit(s) SubCutaneous at bedtime  insulin lispro (HumaLOG) corrective regimen sliding scale   SubCutaneous Before meals and at bedtime  lactulose Syrup 20 Gram(s) Oral four times a day  levETIRAcetam 500 milliGRAM(s) Oral two times a day  magnesium hydroxide Suspension 30 milliLiter(s) Oral daily  magnesium sulfate  IVPB 1 Gram(s) IV Intermittent once  metFORMIN 1000 milliGRAM(s) Oral every 12 hours  mineral oil 30 milliLiter(s) Oral once  mineral oil 30 milliLiter(s) Oral daily  nystatin Powder 1 Application(s) Topical two times a day  oxybutynin 5 milliGRAM(s) Oral two times a day  piperacillin/tazobactam IVPB. 3.375 Gram(s) IV Intermittent every 6 hours  polyethylene glycol 3350 17 Gram(s) Oral two times a day  senna 2 Tablet(s) Oral at bedtime  sodium chloride 0.9%. 1000 milliLiter(s) (100 mL/Hr) IV Continuous <Continuous>    MEDICATIONS  (PRN):  acetaminophen   Tablet 650 milliGRAM(s) Oral every 6 hours PRN For Temp greater than 38 C (100.4 F)  dextrose Gel 1 Dose(s) Oral once PRN Blood Glucose LESS THAN 70 milliGRAM(s)/deciliter  glucagon  Injectable 1 milliGRAM(s) IntraMuscular once PRN Glucose LESS THAN 70 milligrams/deciliter  haloperidol    Injectable 10 milliGRAM(s) IntraMuscular at bedtime PRN if refuses PO Haldol      RADIOLOGY & ADDITIONAL STUDIES:    ASSESSMENT AND PLAN

## 2018-02-06 NOTE — PROGRESS NOTE ADULT - PROBLEM SELECTOR PLAN 2
Pt w/ a stage 4 infected sacral ulcer w/ drainage with w/ feculent material. On admission required debridement by plastic surgery.   CT (1/27): Findings consistent with abscess replacing the lower sacrum and coccyx. Osteomyelitis of the adjacent S3 segment cannot be excluded. The collection appears to be communicating with the skin surface in the inferior gluteal cleft.   - General surgery does not believe there is a fistula between wound and rectum causing leakage of stool. Now with colostomy bag.   - Wound cx had citrobacter, enterococcus, klebsiella   - Wound care following; continue to appreciate recs  - Antibiotics deescalated (1/29) discontinued vancomycin and meropenem and transitioned to zosyn ( 1/29 to 2/12-to complete 2 weeks)   - Blood cultures NGTD    #C. diff: Results came back as indeterminate and then positive. Discussed with ID prefers to hold off treating for now with no diarrhea. If developed increasing loose stool would add PO vancomycin. CDIFF WAS SENT IN THE SITUATION PATIENT WAS HYPOTENSIVE AND SEPTIC WITH UNCLEAR ETIOLOGY AND WAS IN LAYING IN STOOL FOR HOURS WITH WOUND VAC ON UNCLEAR OF TRUE CONSISTENCY. Thereafter, patient with more formed stool. Pt w/ a stage 4 infected sacral ulcer w/ drainage with w/ feculent material. On admission required debridement by plastic surgery.   CT (1/27): Findings consistent with abscess replacing the lower sacrum and coccyx. Osteomyelitis of the adjacent S3 segment cannot be excluded. The collection appears to be communicating with the skin surface in the inferior gluteal cleft.   - General surgery does not believe there is a fistula between wound and rectum causing leakage of stool. Now with colostomy bag.   - Wound cx had citrobacter, enterococcus, klebsiella   - Wound care following; continue to appreciate recs  - Antibiotics deescalated (1/29) discontinued vancomycin and meropenem and transitioned to zosyn ( 1/29 to 2/12 - to complete 2 weeks)   - Blood cultures NGTD    #C. diff: Results came back as indeterminate and then positive. Discussed with ID prefers to hold off treating for now with no diarrhea. If developed increasing loose stool would add PO vancomycin. CDIFF WAS SENT IN THE SITUATION PATIENT WAS HYPOTENSIVE AND SEPTIC WITH UNCLEAR ETIOLOGY AND WAS IN LAYING IN STOOL FOR HOURS WITH WOUND VAC ON UNCLEAR OF TRUE CONSISTENCY. Thereafter, patient with more formed stool.

## 2018-02-06 NOTE — PROGRESS NOTE ADULT - PROBLEM SELECTOR PLAN 9
F: no IVF  E: K>4 Mg>2, monitor and replete as needed  N: diabetic diet  Ppx: SQH    FULL CODE F: NS @ 100cc  E: K>4 Mg>2, monitor and replete as needed  N: NPO in the setting of ileus, continue with diabetic diet when not NPO  Ppx: SQH    FULL CODE

## 2018-02-06 NOTE — ADVANCED PRACTICE NURSE CONSULT - ASSESSMENT
[place] 2 3/4" appliance placed for patient to have more experience with changing. Small amount (20 ccs) liquid stool in pouch, pt's abdomen tight, no c/o pain at this time.

## 2018-02-06 NOTE — PROGRESS NOTE ADULT - PROBLEM SELECTOR PLAN 4
Patient on metformin and Glimepiride at home. HgA1C of 6.0.   - ISS+ metformin 1000mg BID  - 25U Lantus QHS, dose daily based on requirements   - monitor FSG Patient on metformin and Glimepiride at home. HgA1C of 6.0.   - ISS+ metformin 1000mg BID  - 12.5U Lantus QHS (due to NPO status)  - monitor FSG

## 2018-02-06 NOTE — PROGRESS NOTE ADULT - SUBJECTIVE AND OBJECTIVE BOX
INTERVAL HPI/OVERNIGHT EVENTS:    SUBJECTIVE: Patient seen and examined at bedside.    OBJECTIVE:    VITAL SIGNS:  Vital Signs Last 24 Hrs  T(C): 36.4 (06 Feb 2018 16:48), Max: 36.9 (05 Feb 2018 21:25)  T(F): 97.6 (06 Feb 2018 16:48), Max: 98.4 (05 Feb 2018 21:25)  HR: 116 (06 Feb 2018 16:48) (114 - 128)  BP: 128/84 (06 Feb 2018 16:48) (113/71 - 137/91)  BP(mean): --  RR: 16 (06 Feb 2018 16:48) (16 - 18)  SpO2: 95% (06 Feb 2018 16:48) (95% - 99%)      02-05 @ 07:01  -  02-06 @ 07:00  --------------------------------------------------------  IN: 800 mL / OUT: 0 mL / NET: 800 mL      CAPILLARY BLOOD GLUCOSE      POCT Blood Glucose.: 149 mg/dL (06 Feb 2018 12:44)      PHYSICAL EXAM:      MEDICATIONS:  MEDICATIONS  (STANDING):  ascorbic acid 250 milliGRAM(s) Oral daily  atorvastatin 20 milliGRAM(s) Oral at bedtime  dextrose 5%. 1000 milliLiter(s) (50 mL/Hr) IV Continuous <Continuous>  dextrose 50% Injectable 12.5 Gram(s) IV Push once  dextrose 50% Injectable 25 Gram(s) IV Push once  dextrose 50% Injectable 25 Gram(s) IV Push once  docusate sodium 100 milliGRAM(s) Oral three times a day  haloperidol     Tablet 10 milliGRAM(s) Oral at bedtime  heparin  Injectable 5000 Unit(s) SubCutaneous every 8 hours  insulin glargine Injectable (LANTUS) 12.5 Unit(s) SubCutaneous at bedtime  insulin lispro (HumaLOG) corrective regimen sliding scale   SubCutaneous Before meals and at bedtime  lactulose Syrup 20 Gram(s) Oral four times a day  levETIRAcetam 500 milliGRAM(s) Oral two times a day  magnesium hydroxide Suspension 30 milliLiter(s) Oral daily  metFORMIN 1000 milliGRAM(s) Oral every 12 hours  mineral oil 30 milliLiter(s) Oral once  mineral oil 30 milliLiter(s) Oral daily  nystatin Powder 1 Application(s) Topical two times a day  oxybutynin 5 milliGRAM(s) Oral two times a day  piperacillin/tazobactam IVPB. 3.375 Gram(s) IV Intermittent every 6 hours  polyethylene glycol 3350 17 Gram(s) Oral two times a day  senna 2 Tablet(s) Oral at bedtime  sodium chloride 0.9%. 1000 milliLiter(s) (100 mL/Hr) IV Continuous <Continuous>    MEDICATIONS  (PRN):  acetaminophen   Tablet 650 milliGRAM(s) Oral every 6 hours PRN For Temp greater than 38 C (100.4 F)  dextrose Gel 1 Dose(s) Oral once PRN Blood Glucose LESS THAN 70 milliGRAM(s)/deciliter  glucagon  Injectable 1 milliGRAM(s) IntraMuscular once PRN Glucose LESS THAN 70 milligrams/deciliter  haloperidol    Injectable 10 milliGRAM(s) IntraMuscular at bedtime PRN if refuses PO Haldol      ALLERGIES:  Allergies    Capzasin Back and Body (Unknown)    Intolerances        LABS:                        8.2    10.6  )-----------( 209      ( 06 Feb 2018 06:10 )             28.5     02-06    135  |  101  |  17  ----------------------------<  154<H>  3.9   |  23  |  0.70    Ca    8.9      06 Feb 2018 06:10  Phos  3.9     02-06  Mg     1.8     02-06            RADIOLOGY & ADDITIONAL TESTS: Reviewed. INTERVAL HPI / OVERNIGHT EVENTS: Patient had several episodes of vomiting (3-4) with very minimal output from colostomy. Surgery called and overnight decided not to put NG tube would wait for morning.     SUBJECTIVE: Patient seen and examined at bedside. In the AM, patient is having bilious emesis. Complaining of severe abdominal discomfort - constipation.      OBJECTIVE:    VITAL SIGNS:  Vital Signs Last 24 Hrs  T(C): 36.4 (06 Feb 2018 16:48), Max: 36.9 (05 Feb 2018 21:25)  T(F): 97.6 (06 Feb 2018 16:48), Max: 98.4 (05 Feb 2018 21:25)  HR: 116 (06 Feb 2018 16:48) (114 - 128)  BP: 128/84 (06 Feb 2018 16:48) (113/71 - 137/91)  BP(mean): --  RR: 16 (06 Feb 2018 16:48) (16 - 18)  SpO2: 95% (06 Feb 2018 16:48) (95% - 99%)      02-05 @ 07:01  -  02-06 @ 07:00  --------------------------------------------------------  IN: 800 mL / OUT: 0 mL / NET: 800 mL      CAPILLARY BLOOD GLUCOSE  POCT Blood Glucose.: 149 mg/dL (06 Feb 2018 12:44)    PHYSICAL EXAM:  Constitutional: In distress, actively vomiting with bilious emesis, severely agitated with no bowel movement, patient is paraplegic   HEENT: EOMI, conjunctiva clear  Neck: supple, nontender, no JVD   Respiratory: CTA b/l, no wheezing   Cardiovascular: S1S2, RRR, no murmurs  Gastrointestinal: Severely distended, with colostomy bag, unable to assess tenderness patient is paraplegic, decreased bowel sounds, + suprapubic catheter  Extremities: sacral decubitus with dressing  Neuro: AAOx3, odd effect    MEDICATIONS:  MEDICATIONS  (STANDING):  ascorbic acid 250 milliGRAM(s) Oral daily  atorvastatin 20 milliGRAM(s) Oral at bedtime  dextrose 5%. 1000 milliLiter(s) (50 mL/Hr) IV Continuous <Continuous>  dextrose 50% Injectable 12.5 Gram(s) IV Push once  dextrose 50% Injectable 25 Gram(s) IV Push once  dextrose 50% Injectable 25 Gram(s) IV Push once  docusate sodium 100 milliGRAM(s) Oral three times a day  haloperidol     Tablet 10 milliGRAM(s) Oral at bedtime  heparin  Injectable 5000 Unit(s) SubCutaneous every 8 hours  insulin glargine Injectable (LANTUS) 12.5 Unit(s) SubCutaneous at bedtime  insulin lispro (HumaLOG) corrective regimen sliding scale   SubCutaneous Before meals and at bedtime  lactulose Syrup 20 Gram(s) Oral four times a day  levETIRAcetam 500 milliGRAM(s) Oral two times a day  magnesium hydroxide Suspension 30 milliLiter(s) Oral daily  metFORMIN 1000 milliGRAM(s) Oral every 12 hours  mineral oil 30 milliLiter(s) Oral once  mineral oil 30 milliLiter(s) Oral daily  nystatin Powder 1 Application(s) Topical two times a day  oxybutynin 5 milliGRAM(s) Oral two times a day  piperacillin/tazobactam IVPB. 3.375 Gram(s) IV Intermittent every 6 hours  polyethylene glycol 3350 17 Gram(s) Oral two times a day  senna 2 Tablet(s) Oral at bedtime  sodium chloride 0.9%. 1000 milliLiter(s) (100 mL/Hr) IV Continuous <Continuous>    MEDICATIONS  (PRN):  acetaminophen   Tablet 650 milliGRAM(s) Oral every 6 hours PRN For Temp greater than 38 C (100.4 F)  dextrose Gel 1 Dose(s) Oral once PRN Blood Glucose LESS THAN 70 milliGRAM(s)/deciliter  glucagon  Injectable 1 milliGRAM(s) IntraMuscular once PRN Glucose LESS THAN 70 milligrams/deciliter  haloperidol    Injectable 10 milliGRAM(s) IntraMuscular at bedtime PRN if refuses PO Haldol      ALLERGIES:  Allergies  Capzasin Back and Body (Unknown)      LABS:                        8.2    10.6  )-----------( 209      ( 06 Feb 2018 06:10 )             28.5     02-06    135  |  101  |  17  ----------------------------<  154<H>  3.9   |  23  |  0.70    Ca    8.9      06 Feb 2018 06:10  Phos  3.9     02-06  Mg     1.8     02-06            RADIOLOGY & ADDITIONAL TESTS: Reviewed.

## 2018-02-06 NOTE — PROGRESS NOTE ADULT - ATTENDING COMMENTS
- ileus - cont with aggesssive bowel regimen, NPO, ivfs. NG tube. decompression from ostomy. f/u surgery recs.   -severe sepsis due to  infected decubitus ulcers/possible sacral osteomyelitis- ct pelvis (+) sacral wound abscess, surgery has evaluated, and does not believe this is an abscess. ID narrowed abx to zosyn. s/p diverting colostomy 2/1/18. cont local wound care  -left IT  decub and sacral decub- stage iv  - as above. s/p diverting colostomy  -psychosis - improved,  cont treatment over objection based on court ruling. haldol qhs (po preferably , if not willing then will need IM haldol) . ekg to eval qtc serially  -neurogenic bladder - c/w suprapubic bowles, oxybutinin  -DM ii - can cont metformin  -epilepsy- keppra

## 2018-02-06 NOTE — PROGRESS NOTE ADULT - PROBLEM SELECTOR PLAN 1
Patient is s/p diverting colostomy 2/1/18. Abdomen distended with no stool output from colostomy bag. On 2/6 AM - with several episodes of bilious vomiting.   - colostomy bag in place with no output, ostomy pink, + gas production  - NPO now   - with NG sump on intermittent sunction and cathetar through ostomy   - docusate sodium 100 mg oral TID, lactulose Syrup 20g Oral four times a day, magnesium hydroxide Suspension 30 milliLiter(s) Oral daily, mineral oil 30 milliLiter(s) Oral daily, polyethylene glycol 3350 17 Gram(s) Oral two times a day, senna 2 Tablet(s) Oral at bedtime, Mag Citrate 300ml x 1   - metoclopramide Injectable 10 milliGRAM(s) IV Push once for possible gastroparesis, f/u EKG QTc   - Abdominal X-Ray: Findings compatible with development of a severe colonic ileus/early toxic megacolon cannot be excluded. No evidence of bowel wall thickening or intestinal pneumatosis. No evidence of pneumoperitoneum although   somewhat limited on the supine examinations. Additional imaging to include a contrast-enhanced CT scan is suggested for more complete evaluation. Patient is s/p diverting colostomy 2/1/18. Abdomen distended with no stool output from colostomy bag. On 2/6 AM - with several episodes of bilious vomiting.   - colostomy bag in place with no output, ostomy pink, + gas production  - NPO now   - Later in the evening of 2/6 placed NG sump - on intermittent suction and catheter through ostomy, decompressed/tap water enema through the cathetar, obtain repeat abdominal XRAY as needed     - docusate sodium 100 mg oral TID, lactulose Syrup 20g Oral four times a day, magnesium hydroxide Suspension 30ml Oral daily, mineral oil 30 milliLiter(s) Oral daily, polyethylene glycol 3350 17 Gram(s) Oral two times a day, senna 2 Tablet(s) Oral at bedtime, Mag Citrate 300ml x 1 (2/5)  - metoclopramide Injectable 10 milliGRAM(s) IV Push once for possible gastroparesis  - given zofran 4mg x 2,   - Abdominal X-Ray: Findings compatible with development of a severe colonic ileus/early toxic megacolon cannot be excluded. No evidence of bowel wall thickening or intestinal pneumatosis. No evidence of pneumoperitoneum although somewhat limited on the supine examinations.   -As per surgery, no suspicion for toxic megacolon, ileus is high on differential as patient is paraplegic, continue with bowel regimen. Patient is s/p diverting colostomy 2/1/18. Abdomen distended with no stool output from colostomy bag. On 2/6 AM - with several episodes of bilious vomiting.   - colostomy bag in place with no output, ostomy pink, + gas production  - NPO now   - Later in the evening of 2/6 placed NG sump - on intermittent suction and catheter through ostomy, decompressed/tap water enema through the cathetar, obtain repeat abdominal XRAY as needed     - docusate sodium 100 mg oral TID, lactulose Syrup 20g Oral four times a day, magnesium hydroxide Suspension 30ml Oral daily, mineral oil 30 milliLiter(s) Oral daily, polyethylene glycol 3350 17 Gram(s) Oral two times a day, senna 2 Tablet(s) Oral at bedtime, Mag Citrate 300ml x 1 (2/5)  - metoclopramide Injectable 10 milliGRAM(s) IV Push once for possible gastroparesis  - given zofran 4mg x 2,   - Abdominal X-Ray: Findings compatible with development of a severe colonic ileus/early toxic megacolon cannot be excluded. No evidence of bowel wall thickening or intestinal pneumatosis. No evidence of pneumoperitoneum although somewhat limited on the supine examinations.   -As per surgery, no suspicion for toxic megacolon, sxs related to ileus as patient is paraplegic, continue with bowel regimen, NG sump draining bilious output and catheter in colostomy

## 2018-02-07 DIAGNOSIS — K91.30 POSTPROCEDURAL INTESTINAL OBSTRUCTION, UNSPECIFIED AS TO PARTIAL VERSUS COMPLETE: ICD-10-CM

## 2018-02-07 LAB
ANION GAP SERPL CALC-SCNC: 15 MMOL/L — SIGNIFICANT CHANGE UP (ref 5–17)
BUN SERPL-MCNC: 13 MG/DL — SIGNIFICANT CHANGE UP (ref 7–23)
CALCIUM SERPL-MCNC: 9.1 MG/DL — SIGNIFICANT CHANGE UP (ref 8.4–10.5)
CHLORIDE SERPL-SCNC: 96 MMOL/L — SIGNIFICANT CHANGE UP (ref 96–108)
CO2 SERPL-SCNC: 22 MMOL/L — SIGNIFICANT CHANGE UP (ref 22–31)
CREAT SERPL-MCNC: 0.79 MG/DL — SIGNIFICANT CHANGE UP (ref 0.5–1.3)
GLUCOSE BLDC GLUCOMTR-MCNC: 127 MG/DL — HIGH (ref 70–99)
GLUCOSE BLDC GLUCOMTR-MCNC: 156 MG/DL — HIGH (ref 70–99)
GLUCOSE BLDC GLUCOMTR-MCNC: 157 MG/DL — HIGH (ref 70–99)
GLUCOSE BLDC GLUCOMTR-MCNC: 163 MG/DL — HIGH (ref 70–99)
GLUCOSE SERPL-MCNC: 135 MG/DL — HIGH (ref 70–99)
HCT VFR BLD CALC: 27.9 % — LOW (ref 39–50)
HGB BLD-MCNC: 8.3 G/DL — LOW (ref 13–17)
MAGNESIUM SERPL-MCNC: 1.9 MG/DL — SIGNIFICANT CHANGE UP (ref 1.6–2.6)
MCHC RBC-ENTMCNC: 21.6 PG — LOW (ref 27–34)
MCHC RBC-ENTMCNC: 29.7 G/DL — LOW (ref 32–36)
MCV RBC AUTO: 72.7 FL — LOW (ref 80–100)
PHOSPHATE SERPL-MCNC: 4.2 MG/DL — SIGNIFICANT CHANGE UP (ref 2.5–4.5)
PLATELET # BLD AUTO: 147 K/UL — LOW (ref 150–400)
POTASSIUM SERPL-MCNC: 4.2 MMOL/L — SIGNIFICANT CHANGE UP (ref 3.5–5.3)
POTASSIUM SERPL-SCNC: 4.2 MMOL/L — SIGNIFICANT CHANGE UP (ref 3.5–5.3)
RBC # BLD: 3.84 M/UL — LOW (ref 4.2–5.8)
RBC # FLD: 19.3 % — HIGH (ref 10.3–16.9)
SODIUM SERPL-SCNC: 133 MMOL/L — LOW (ref 135–145)
WBC # BLD: 13.4 K/UL — HIGH (ref 3.8–10.5)
WBC # FLD AUTO: 13.4 K/UL — HIGH (ref 3.8–10.5)

## 2018-02-07 PROCEDURE — 99233 SBSQ HOSP IP/OBS HIGH 50: CPT | Mod: GC

## 2018-02-07 PROCEDURE — 74176 CT ABD & PELVIS W/O CONTRAST: CPT | Mod: 26

## 2018-02-07 PROCEDURE — 74018 RADEX ABDOMEN 1 VIEW: CPT | Mod: 26

## 2018-02-07 RX ORDER — LANOLIN ALCOHOL/MO/W.PET/CERES
5 CREAM (GRAM) TOPICAL AT BEDTIME
Qty: 0 | Refills: 0 | Status: DISCONTINUED | OUTPATIENT
Start: 2018-02-07 | End: 2018-02-16

## 2018-02-07 RX ORDER — DIATRIZOATE MEGLUMINE 180 MG/ML
30 INJECTION, SOLUTION INTRAVESICAL ONCE
Qty: 0 | Refills: 0 | Status: COMPLETED | OUTPATIENT
Start: 2018-02-07 | End: 2018-02-07

## 2018-02-07 RX ORDER — SOD SULF/SODIUM/NAHCO3/KCL/PEG
4000 SOLUTION, RECONSTITUTED, ORAL ORAL ONCE
Qty: 0 | Refills: 0 | Status: DISCONTINUED | OUTPATIENT
Start: 2018-02-07 | End: 2018-02-07

## 2018-02-07 RX ORDER — ACETAMINOPHEN 500 MG
1000 TABLET ORAL ONCE
Qty: 0 | Refills: 0 | Status: COMPLETED | OUTPATIENT
Start: 2018-02-07 | End: 2018-02-07

## 2018-02-07 RX ADMIN — Medication 400 MILLIGRAM(S): at 02:20

## 2018-02-07 RX ADMIN — HEPARIN SODIUM 5000 UNIT(S): 5000 INJECTION INTRAVENOUS; SUBCUTANEOUS at 18:24

## 2018-02-07 RX ADMIN — INSULIN GLARGINE 12.5 UNIT(S): 100 INJECTION, SOLUTION SUBCUTANEOUS at 22:10

## 2018-02-07 RX ADMIN — Medication 250 MILLIGRAM(S): at 11:48

## 2018-02-07 RX ADMIN — LACTULOSE 20 GRAM(S): 10 SOLUTION ORAL at 11:49

## 2018-02-07 RX ADMIN — MAGNESIUM HYDROXIDE 30 MILLILITER(S): 400 TABLET, CHEWABLE ORAL at 11:49

## 2018-02-07 RX ADMIN — NYSTATIN CREAM 1 APPLICATION(S): 100000 CREAM TOPICAL at 07:21

## 2018-02-07 RX ADMIN — LACTULOSE 20 GRAM(S): 10 SOLUTION ORAL at 06:48

## 2018-02-07 RX ADMIN — Medication 100 MILLIGRAM(S): at 22:10

## 2018-02-07 RX ADMIN — Medication 100 MILLIGRAM(S): at 18:24

## 2018-02-07 RX ADMIN — Medication 100 MILLIGRAM(S): at 06:48

## 2018-02-07 RX ADMIN — PIPERACILLIN AND TAZOBACTAM 200 GRAM(S): 4; .5 INJECTION, POWDER, LYOPHILIZED, FOR SOLUTION INTRAVENOUS at 07:23

## 2018-02-07 RX ADMIN — Medication 2: at 10:00

## 2018-02-07 RX ADMIN — METFORMIN HYDROCHLORIDE 1000 MILLIGRAM(S): 850 TABLET ORAL at 07:32

## 2018-02-07 RX ADMIN — SENNA PLUS 2 TABLET(S): 8.6 TABLET ORAL at 22:08

## 2018-02-07 RX ADMIN — LEVETIRACETAM 500 MILLIGRAM(S): 250 TABLET, FILM COATED ORAL at 18:25

## 2018-02-07 RX ADMIN — LACTULOSE 20 GRAM(S): 10 SOLUTION ORAL at 18:25

## 2018-02-07 RX ADMIN — HEPARIN SODIUM 5000 UNIT(S): 5000 INJECTION INTRAVENOUS; SUBCUTANEOUS at 06:48

## 2018-02-07 RX ADMIN — Medication 5 MILLIGRAM(S): at 06:49

## 2018-02-07 RX ADMIN — PIPERACILLIN AND TAZOBACTAM 200 GRAM(S): 4; .5 INJECTION, POWDER, LYOPHILIZED, FOR SOLUTION INTRAVENOUS at 18:24

## 2018-02-07 RX ADMIN — Medication 5 MILLIGRAM(S): at 22:08

## 2018-02-07 RX ADMIN — SODIUM CHLORIDE 100 MILLILITER(S): 9 INJECTION INTRAMUSCULAR; INTRAVENOUS; SUBCUTANEOUS at 07:33

## 2018-02-07 RX ADMIN — Medication 5 MILLIGRAM(S): at 01:28

## 2018-02-07 RX ADMIN — LEVETIRACETAM 500 MILLIGRAM(S): 250 TABLET, FILM COATED ORAL at 06:49

## 2018-02-07 RX ADMIN — Medication 1000 MILLIGRAM(S): at 02:50

## 2018-02-07 RX ADMIN — HEPARIN SODIUM 5000 UNIT(S): 5000 INJECTION INTRAVENOUS; SUBCUTANEOUS at 22:07

## 2018-02-07 RX ADMIN — PIPERACILLIN AND TAZOBACTAM 200 GRAM(S): 4; .5 INJECTION, POWDER, LYOPHILIZED, FOR SOLUTION INTRAVENOUS at 01:27

## 2018-02-07 RX ADMIN — Medication 2: at 18:24

## 2018-02-07 RX ADMIN — HALOPERIDOL DECANOATE 10 MILLIGRAM(S): 100 INJECTION INTRAMUSCULAR at 22:08

## 2018-02-07 RX ADMIN — Medication 2: at 00:14

## 2018-02-07 RX ADMIN — POLYETHYLENE GLYCOL 3350 17 GRAM(S): 17 POWDER, FOR SOLUTION ORAL at 18:25

## 2018-02-07 RX ADMIN — Medication 30 MILLILITER(S): at 11:48

## 2018-02-07 RX ADMIN — NYSTATIN CREAM 1 APPLICATION(S): 100000 CREAM TOPICAL at 19:42

## 2018-02-07 RX ADMIN — ATORVASTATIN CALCIUM 20 MILLIGRAM(S): 80 TABLET, FILM COATED ORAL at 22:07

## 2018-02-07 RX ADMIN — DIATRIZOATE MEGLUMINE 30 MILLILITER(S): 180 INJECTION, SOLUTION INTRAVESICAL at 11:48

## 2018-02-07 RX ADMIN — LACTULOSE 20 GRAM(S): 10 SOLUTION ORAL at 01:30

## 2018-02-07 RX ADMIN — Medication 5 MILLIGRAM(S): at 18:25

## 2018-02-07 RX ADMIN — Medication 2: at 13:17

## 2018-02-07 NOTE — PROGRESS NOTE ADULT - ATTENDING COMMENTS
dx   ileus -olgilvie syndrome?  -  cont with aggesssive bowel regimen (to start golytely), NPO, ivfs. NG tube. decompression from ostomy. f/u surgery recs.   -severe sepsis due to  infected decubitus ulcers/possible sacral osteomyelitis- ct pelvis (+) sacral wound abscess, surgery has evaluated, and does not believe this is an abscess. ID narrowed abx to zosyn. s/p diverting colostomy 2/1/18. cont local wound care  -left IT  decub and sacral decub- stage iv  - as above. s/p diverting colostomy  -psychosis - improved,  cont treatment over objection based on court ruling. haldol qhs (po preferably , if not willing then will need IM haldol) . ekg to eval qtc serially  -neurogenic bladder - c/w suprapubic bowles, oxybutinin  -DM ii - can hold metformin post ct , ssi.   -epilepsy- keppra.

## 2018-02-07 NOTE — PROGRESS NOTE ADULT - ASSESSMENT
33 yo M paraplegic with h/o SCI, DM, multiple stage 4 pressure with sacral decubitus ulcer POD 6 s/p diverting colostomy  -abdominal xray today with increased dilation again of transverse colon  -NGT placed yesterday afternoon, minimal output, 24 fr bowles placed into ostomy for decompression yesterday afternoon and enema via ostomy  -CT A/P with 200cc contrast given via catheter into ostomy, no oral or IV contrast  -recommend keeping pt NPO  -will follow, discussed with Dr La 31 yo M paraplegic with h/o SCI, DM, multiple stage 4 pressure with sacral decubitus ulcer POD 6 s/p diverting colostomy  -abdominal xray today with increased dilation again of transverse colon  -NGT placed yesterday afternoon, minimal output, 24 fr bowles placed into ostomy for decompression yesterday afternoon and enema via ostomy  -CT A/P with 200cc contrast given via catheter into ostomy, no oral or IV contrast  -recommend keeping pt NPO  -will follow, discussed with Dr La    Addendum 4:13PM: CT A/P c/w stool impaction, no free intraperitoneal air or other acute intra-abdominal pathology. Manual disimpaction attempted earlier via ostomy. Pt has large jason-like stool burden. Recommend golytely via NGT. 200cc q 30 minutes via NGT for total 4L. Will consider neostigmine tomorrow if no response to golytely. D/w Dr La.

## 2018-02-07 NOTE — PROGRESS NOTE ADULT - PROBLEM SELECTOR PLAN 8
Pt w/ a Hgb of 9.1 and MCV of 74 on admission. Hgb has been slowly downtrending but no active signs of bleeding. Iron studies showing iron deficiency.   -will maintain active type and screen  -Hgb stable, no evidence of active bleeding  -will transfuse for Hgb <7 F: NS @ 100cc  E: K>4 Mg>2, monitor and replete as needed  N: NPO in the setting of ileus, continue with diabetic diet when not NPO  Ppx: SQH    FULL CODE

## 2018-02-07 NOTE — PROVIDER CONTACT NOTE (OTHER) - REASON
Catheter removed accidently by patient movement
Patient refused BS and insulin for lunch.
Patient refused afternoon VS, FS, and insulin.
Pt refusing care
Refusal of care
Right Radial Arterial Line
Vanco trough
abdominal discomfort.
elevated pulse
elevated temp.and heartrate
patient had 4 episode of vomiting
patient refusal of care
patient refused VS and fingerstick
patient refusing vital signs and fingerstick
patient tachycardic
patient tachycardic 
urine culture result > 100,000 CFU/ml Klebsiella Pneumoniae ESBL, Proteus mirabilis
Patient refused VS and fingerstick
Patient refused fingerstick

## 2018-02-07 NOTE — PROGRESS NOTE ADULT - SUBJECTIVE AND OBJECTIVE BOX
INTERVAL HPI/OVERNIGHT EVENTS: Pt seen and examined at bedside. Reports some increased back pain. No vomiting today but had episode of vomiting yesterday evening. Minimal output from ostomy.        SUBJECTIVE:  Flatus: [ ] YES [ ] NO             Bowel Movement: [ ] YES [ ] NO  Pain (0-10):            Pain Control Adequate: [ ] YES [ ] NO  Nausea: [ ] YES [ ] NO            Vomiting: [ ] YES [ ] NO  Diarrhea: [ ] YES [ ] NO         Constipation: [ ] YES [ ] NO     Chest Pain: [ ] YES [ ] NO    SOB:  [ ] YES [ ] NO    MEDICATIONS  (STANDING):  ascorbic acid 250 milliGRAM(s) Oral daily  atorvastatin 20 milliGRAM(s) Oral at bedtime  dextrose 5%. 1000 milliLiter(s) (50 mL/Hr) IV Continuous <Continuous>  dextrose 50% Injectable 12.5 Gram(s) IV Push once  dextrose 50% Injectable 25 Gram(s) IV Push once  dextrose 50% Injectable 25 Gram(s) IV Push once  diatrizoate meglumine/diatrizoate sodium. 30 milliLiter(s) Oral once  docusate sodium 100 milliGRAM(s) Oral three times a day  haloperidol     Tablet 10 milliGRAM(s) Oral at bedtime  heparin  Injectable 5000 Unit(s) SubCutaneous every 8 hours  insulin glargine Injectable (LANTUS) 12.5 Unit(s) SubCutaneous at bedtime  insulin lispro (HumaLOG) corrective regimen sliding scale   SubCutaneous Before meals and at bedtime  lactulose Syrup 20 Gram(s) Oral four times a day  levETIRAcetam 500 milliGRAM(s) Oral two times a day  magnesium hydroxide Suspension 30 milliLiter(s) Oral daily  melatonin 5 milliGRAM(s) Oral at bedtime  metFORMIN 1000 milliGRAM(s) Oral every 12 hours  mineral oil 30 milliLiter(s) Oral once  mineral oil 30 milliLiter(s) Oral daily  nystatin Powder 1 Application(s) Topical two times a day  oxybutynin 5 milliGRAM(s) Oral two times a day  piperacillin/tazobactam IVPB. 3.375 Gram(s) IV Intermittent every 6 hours  polyethylene glycol 3350 17 Gram(s) Oral two times a day  senna 2 Tablet(s) Oral at bedtime  sodium chloride 0.9%. 1000 milliLiter(s) (100 mL/Hr) IV Continuous <Continuous>    MEDICATIONS  (PRN):  acetaminophen   Tablet 650 milliGRAM(s) Oral every 6 hours PRN For Temp greater than 38 C (100.4 F)  dextrose Gel 1 Dose(s) Oral once PRN Blood Glucose LESS THAN 70 milliGRAM(s)/deciliter  glucagon  Injectable 1 milliGRAM(s) IntraMuscular once PRN Glucose LESS THAN 70 milligrams/deciliter  haloperidol    Injectable 10 milliGRAM(s) IntraMuscular at bedtime PRN if refuses PO Haldol      Vital Signs Last 24 Hrs  T(C): 36.5 (07 Feb 2018 05:45), Max: 36.5 (06 Feb 2018 21:03)  T(F): 97.7 (07 Feb 2018 05:45), Max: 97.7 (06 Feb 2018 21:03)  HR: 127 (07 Feb 2018 05:45) (116 - 127)  BP: 116/77 (07 Feb 2018 05:45) (116/77 - 128/84)  BP(mean): --  RR: 17 (07 Feb 2018 05:45) (16 - 17)  SpO2: 97% (07 Feb 2018 05:45) (95% - 99%)    PHYSICAL EXAM:      Constitutional: AOx3, NAD    Respiratory: CTABL, no resp distress    Cardiovascular: RRR, no m/r/g    Gastrointestinal: distended, tympanic, firm, ostomy with some liquid stool output, ngt in place with minimal output    Extremities: [facility]        I&O's Detail    06 Feb 2018 07:01  -  07 Feb 2018 07:00  --------------------------------------------------------  IN:    sodium chloride 0.9%.: 1200 mL    Solution: 200 mL  Total IN: 1400 mL    OUT:    Colostomy: 20 mL    Indwelling Catheter - Suprapubic: 700 mL  Total OUT: 720 mL    Total NET: 680 mL          LABS:                        8.3    13.4  )-----------( 147      ( 07 Feb 2018 06:10 )             27.9     02-07    133<L>  |  96  |  13  ----------------------------<  135<H>  4.2   |  22  |  0.79    Ca    9.1      07 Feb 2018 06:10  Phos  4.2     02-07  Mg     1.9     02-07            RADIOLOGY & ADDITIONAL STUDIES:

## 2018-02-07 NOTE — PROGRESS NOTE ADULT - ASSESSMENT
32M paraplegic PMH spinal cord injury (wheelchair bound), sacral pressure ulcer with osteo x2 (outpatient provider said they have records of March osteo s/p daptomycin of unknown length) earlier in 2017,  DM2, indwelling suprapubic catheter who presented w/ septic shock secondary to infected purulent sacral 4th degree pressure ulcer with underlying inadequately treated OM, hemodynamically stable being treated for sacral osteomyelitis now s/p colostomy w/ Ileus

## 2018-02-07 NOTE — PROVIDER CONTACT NOTE (OTHER) - ASSESSMENT
Patient resting in bed eating breakfast without complaints.
Patient is very anxious NGT in place for decompression, very small anount of liquidy stuff draining, no signs of BM.
pt had elevated pulse ot 120
Patient had 4 episode of foaming bile vomit.
Patient in bed in NAD.
Patient resting in bed comfortably with no s/s or hypo or hyper glycemia.
Pt NAD. Denies pain or discomfort
Temp 98.1 oral, , /81, RR 16, SpO2 95 room air.
patient refused VS and fingerstick.  patient education provided. patient resting in the bed, call bell within reach.
upon initial assessment, patient observed with right radial a-line no longer functioning. Very positional and no blood return
/60 ,  , RECTAL TEMP 98.9, RESP - 1`7
Patient lying supine in bed, denies any CP or SOB, states he does not need his temperature taken.
patient tachycardic, , Temp 98.5, / 61. RR 16,  SpO2 98.
patient refused VS and fingerstick.

## 2018-02-07 NOTE — PROVIDER CONTACT NOTE (OTHER) - BACKGROUND
Patient admitted for wound check.
Patient had colostomy placed over several days, no bowel movement reported, abdomen continues to get larger. Patient is now complaining of pain towards rib area and some amount of discomfort breathing
Patient had been refusing fingersticks and vital signs while during his hospital stay.
SACRAL OSTEOMYELITIS , DIABETES , EPILEPSY ,SCHIZOPHRENIA
patient admitted with wound check.
patient admitted with wound check. patient lying in the bed. denies pain or discomfort.

## 2018-02-07 NOTE — PROGRESS NOTE ADULT - PROBLEM SELECTOR PLAN 6
Pt with LM on 1/12, since RESOLVED  - FeNa 0.6%, prerenal likely 2/2 diarrhea and sepsis  - Resolved Iron studies showing iron deficiency.   -will maintain active type and screen  -Hgb stable, no evidence of active bleeding  -will transfuse for Hgb <7

## 2018-02-07 NOTE — PROGRESS NOTE ADULT - PROBLEM SELECTOR PLAN 2
Pt w/ a stage 4 infected sacral ulcer w/ drainage with w/ feculent material. On admission required debridement by plastic surgery.   CT (1/27): Findings consistent with abscess replacing the lower sacrum and coccyx. Osteomyelitis of the adjacent S3 segment cannot be excluded. The collection appears to be communicating with the skin surface in the inferior gluteal cleft.   - General surgery does not believe there is a fistula between wound and rectum causing leakage of stool. Now with colostomy bag.   - Wound cx had citrobacter, enterococcus, klebsiella   - Wound care following; continue to appreciate recs  - c/w zosyn (1/29 to 2/12 - to complete 2 weeks)   - Blood cultures NGTD    #C. diff: Results came back as indeterminate and then positive. Discussed with ID prefers to hold off treating for now with no diarrhea. If developed increasing loose stool would add PO vancomycin. CDIFF WAS SENT IN THE SITUATION PATIENT WAS HYPOTENSIVE AND SEPTIC WITH UNCLEAR ETIOLOGY AND WAS IN LAYING IN STOOL FOR HOURS WITH WOUND VAC ON UNCLEAR OF TRUE CONSISTENCY. Thereafter, patient with more formed stool.

## 2018-02-07 NOTE — PROVIDER CONTACT NOTE (OTHER) - SITUATION
Upon completing gastrographin, pt moved and catheter fell out. MD notified and replied that she was going to get in contact with the surgical team. Upon completing gastrographin, pt moved and colostomy catheter fell out. MD notified and replied that she was going to get in contact with the surgical team.

## 2018-02-07 NOTE — PROVIDER CONTACT NOTE (OTHER) - RECOMMENDATIONS
MD Donaldson made aware.
Call put in to several department, seen by intern and resident also surgical team, not much has been done to relieve discomfort
Rapid response called, Fluids given IV. 1gm vanco and Meropenim.
gregory in an hr
MD Keating at bedside. IV fluid intact. No new order received. will continue to monitor.
MD Milan made aware.
Notify MD. Reassess for removal of a-line
Pt educated on vital sign protocols and concerns. Will continue to monitor, Jabier ROSADO made aware
patient remained on IV fluid as ordered. MD castillo made aware of HR. patient asymptomatic, resting in the bed, no new order received at this time. will continue to monitor.
CONTINUE TO MONITOR PT
MD Keating made. patient asymptomatic, denies pain or discomfort.  will continue to monitor.

## 2018-02-07 NOTE — PROVIDER CONTACT NOTE (OTHER) - ACTION/TREATMENT ORDERED:
MD Donaldson made aware.
Dr. Keating made aware - humalog to bed held, but give Lantus if patient will allow per Dr. Keating.
Awaiting order.
Nanda ROSADO made aware
Will continue to push for more serious intervention for patient.
Dr. Eugene made aware.
Krysten Keating made aware.
rechecked in 1 hr pulse 117
Educated patient on importance of monitoring vital signs, will continue to monitor.
MD Keating at bedside. IV fluid intact. No new order received. will continue to monitor.
MD Milan made aware.
MD Slaughter made aware. Will follow up
Pt educated on vital sign protocols and concerns. Will continue to monitor, Jabier ROSADO made aware
patient remained on IV fluid as ordered. MD castillo made aware of HR. patient asymptomatic, resting in the bed, no new order received at this time. will continue to monitor.
EKG , RECTAL TEMP AND NS 500ML BOLUS GIVEN .
MD Keating made. patient asymptomatic, denies pain or discomfort.  will continue to monitor.

## 2018-02-07 NOTE — PROVIDER CONTACT NOTE (OTHER) - DATE AND TIME:
02-Jan-2018 09:00
03-Feb-2018 22:26
03-Jan-2018 22:31
05-Feb-2018 22:00
06-Feb-2018 02:38
07-Dec-2017 08:30
07-Feb-2018 13:06
10-Dec-2017 07:19
16-Dec-2017 17:05
17-Dec-2017 20:17
19-Dec-2017 11:51
19-Dec-2017 17:45
23-Dec-2017 16:40
25-Jan-2018 18:00
26-Jan-2018 04:00
27-Jan-2018 08:00
29-Jan-2018 15:20
02-Jan-2018 21:05
19-Dec-2017 08:30

## 2018-02-07 NOTE — PROGRESS NOTE ADULT - PROBLEM SELECTOR PLAN 1
s/p diverting colostomy 2/1/18 with worsening abdominal distention and minimal colostomy output  -Surgery: low suspicion of toxic megacolon or complete SBO  -NGT decompression   -bowel regimen  -follow AXR s/p diverting colostomy 2/1/18 with worsening abdominal distention and minimal colostomy output  -Surgery: low suspicion of toxic megacolon or complete SBO however CT abdomen   -NGT decompression   -bowel regimen  -follow AXR

## 2018-02-07 NOTE — PROVIDER CONTACT NOTE (OTHER) - NAME OF MD/NP/PA/DO NOTIFIED:
Dr Em
Dr. Eugene
Dr. Keating
Dr. Keating
Jabier ROSADO
MD Eugene
MD Keating
MD Milan
MD Moran
MD SQUIRES
MD Sanchez
MD Slaughter
MD castillo
MD castillo
Nanda ROSADO
Yonathan ROSADO
dr davila
VIKTORIYA Donaldson
Dr. Keating

## 2018-02-07 NOTE — PROGRESS NOTE ADULT - PROBLEM SELECTOR PLAN 4
Pt w/ no sensation or control of urination with suprapubic catheter changed once a month.   - suprapubic catheter changed 1/12 (to change again around 2/12)  - c/w oxybutynin 5mg BID    #yeast in urine  - completed fluconazole (1/14-1/25)  - UCx+ for Candida albicans Patient on metformin and Glimepiride at home. HgA1C of 6.0.   - ISS+ metformin 1000mg BID  - 12.5U Lantus QHS (due to NPO status)  - monitor FSG

## 2018-02-07 NOTE — PROGRESS NOTE ADULT - PROBLEM SELECTOR PLAN 5
Patient on metformin and Glimepiride at home. HgA1C of 6.0.   - ISS+ metformin 1000mg BID  - 12.5U Lantus QHS (due to NPO status)  - monitor FSG Pt w/ paraplegia after falling out of a window several years ago. Pt has no sensation or function below the nipples. Pt is able to move b/l upper extremities.   - pt has a automatic wheelchair for which he uses and is able to transfer himself from chair to bed

## 2018-02-07 NOTE — PROGRESS NOTE ADULT - PROBLEM SELECTOR PLAN 10
F: NS @ 100cc  E: K>4 Mg>2, monitor and replete as needed  N: NPO in the setting of ileus, continue with diabetic diet when not NPO  Ppx: SQH    FULL CODE

## 2018-02-07 NOTE — PROGRESS NOTE ADULT - PROBLEM SELECTOR PLAN 7
Pt w/ paraplegia after falling out of a window several years ago. Pt has no sensation or function below the nipples. Pt is able to move b/l upper extremities.   - pt has a automatic wheelchair for which he uses and is able to transfer himself from chair to bed -c/w home Lipitor medication    #Seizure Disorder: pt w/ a reported hx of seizure disorder  -c/w home medication of Keppra 500mg BID    #Psychiatry Eval: Pt evaluated by psych and recommended he needs to be admitted to medical-psychiatric inpatient unit. Patient does not have capacity to make medical decisions.   -discontinued Aripiprazole in favor of haloperidol. Haloperidol 10 mg PO or IM. Patient does not have capacity to refuse and is court ordered to receive the haldol.

## 2018-02-07 NOTE — PROGRESS NOTE ADULT - PROBLEM SELECTOR PLAN 3
Pt w/ a stage 4 infected sacral ulcer w/ drainage with w/ feculent material. On admission required debridement by plastic surgery.   CT (1/27): Findings consistent with abscess replacing the lower sacrum and coccyx. Osteomyelitis of the adjacent S3 segment cannot be excluded. The collection appears to be communicating with the skin surface in the inferior gluteal cleft.   - General surgery does not believe there is a fistula between wound and rectum causing leakage of stool. Now with colostomy bag.   - Wound cx had citrobacter, enterococcus, klebsiella   - Wound care following; continue to appreciate recs  - c/w zosyn (1/29 to 2/12 - to complete 2 weeks)   - Blood cultures NGTD    #C. diff: Results came back as indeterminate and then positive. Discussed with ID prefers to hold off treating for now with no diarrhea. If developed increasing loose stool would add PO vancomycin. CDIFF WAS SENT IN THE SITUATION PATIENT WAS HYPOTENSIVE AND SEPTIC WITH UNCLEAR ETIOLOGY AND WAS IN LAYING IN STOOL FOR HOURS WITH WOUND VAC ON UNCLEAR OF TRUE CONSISTENCY. Thereafter, patient with more formed stool. Pt w/ no sensation or control of urination with suprapubic catheter changed once a month.   - suprapubic catheter changed 1/12 (to change again around 2/12)  - c/w oxybutynin 5mg BID

## 2018-02-07 NOTE — PROGRESS NOTE ADULT - SUBJECTIVE AND OBJECTIVE BOX
INTERVAL HPI/OVERNIGHT EVENTS:  No reported events from overnight team or nursing, patient remained hemodynamically stable. This morning at bedside patient continues to feel very uncomfortable from abdominal distention.      Vital Signs Last 24 Hrs  T(C): 36.5 (07 Feb 2018 05:45), Max: 36.5 (06 Feb 2018 21:03)  T(F): 97.7 (07 Feb 2018 05:45), Max: 97.7 (06 Feb 2018 21:03)  HR: 127 (07 Feb 2018 05:45) (116 - 127)  BP: 116/77 (07 Feb 2018 05:45) (116/77 - 128/84)  BP(mean): --  ABP: --  ABP(mean): --  RR: 17 (07 Feb 2018 05:45) (16 - 17)  SpO2: 97% (07 Feb 2018 05:45) (95% - 99%)    PHYSICAL EXAM:  Constitutional: NAD, lying in bed, NGT in place on suction  HEENT: no eye discharge, no nasal discharge, no pharyngeal erythema, moist membranes  Neck: no lymphadenopathy, no JVD,  no carotid bruit  Cardiovascular: S1 and S2, RRR,  no M/R/G, non-displaced PMI  Respiratory: no wheezing, no rhonchi, no cough, no crackles   Gastrointestinal: BS+, soft, non-distended, non-tender, no ascites  Extremities: warm to touch, no edema  Vascular: 2+ peripheral pulses  Neurological: AAO x 4, PERRLA, EOMI, no nystagmus, 5/5 muscle strength, sensation intact   Psychiatric: normal mood, normal affect  Musculoskeletal: no joint swelling  Skin: No rashes, no skin breakdown    MEDICATIONS  (STANDING):  ascorbic acid 250 milliGRAM(s) Oral daily  atorvastatin 20 milliGRAM(s) Oral at bedtime  dextrose 5%. 1000 milliLiter(s) (50 mL/Hr) IV Continuous <Continuous>  dextrose 50% Injectable 12.5 Gram(s) IV Push once  dextrose 50% Injectable 25 Gram(s) IV Push once  dextrose 50% Injectable 25 Gram(s) IV Push once  docusate sodium 100 milliGRAM(s) Oral three times a day  haloperidol     Tablet 10 milliGRAM(s) Oral at bedtime  heparin  Injectable 5000 Unit(s) SubCutaneous every 8 hours  insulin glargine Injectable (LANTUS) 12.5 Unit(s) SubCutaneous at bedtime  insulin lispro (HumaLOG) corrective regimen sliding scale   SubCutaneous Before meals and at bedtime  lactulose Syrup 20 Gram(s) Oral four times a day  levETIRAcetam 500 milliGRAM(s) Oral two times a day  magnesium hydroxide Suspension 30 milliLiter(s) Oral daily  melatonin 5 milliGRAM(s) Oral at bedtime  metFORMIN 1000 milliGRAM(s) Oral every 12 hours  mineral oil 30 milliLiter(s) Oral once  mineral oil 30 milliLiter(s) Oral daily  nystatin Powder 1 Application(s) Topical two times a day  oxybutynin 5 milliGRAM(s) Oral two times a day  piperacillin/tazobactam IVPB. 3.375 Gram(s) IV Intermittent every 6 hours  polyethylene glycol 3350 17 Gram(s) Oral two times a day  senna 2 Tablet(s) Oral at bedtime  sodium chloride 0.9%. 1000 milliLiter(s) (100 mL/Hr) IV Continuous <Continuous>    MEDICATIONS  (PRN):  acetaminophen   Tablet 650 milliGRAM(s) Oral every 6 hours PRN For Temp greater than 38 C (100.4 F)  dextrose Gel 1 Dose(s) Oral once PRN Blood Glucose LESS THAN 70 milliGRAM(s)/deciliter  glucagon  Injectable 1 milliGRAM(s) IntraMuscular once PRN Glucose LESS THAN 70 milligrams/deciliter  haloperidol    Injectable 10 milliGRAM(s) IntraMuscular at bedtime PRN if refuses PO Haldol      Allergies    Capzasin Back and Body (Unknown)    Intolerances        LABS:                        8.3    13.4  )-----------( 147      ( 07 Feb 2018 06:10 )             27.9     02-07    133<L>  |  96  |  13  ----------------------------<  135<H>  4.2   |  22  |  0.79    Ca    9.1      07 Feb 2018 06:10  Phos  4.2     02-07  Mg     1.9     02-07            RADIOLOGY & ADDITIONAL TESTS: Reviewed INTERVAL HPI/OVERNIGHT EVENTS:  No reported events from overnight team or nursing, patient remained hemodynamically stable. This morning at bedside patient continues to feel very uncomfortable from abdominal distention.      Vital Signs Last 24 Hrs  T(C): 36.5 (07 Feb 2018 05:45), Max: 36.5 (06 Feb 2018 21:03)  T(F): 97.7 (07 Feb 2018 05:45), Max: 97.7 (06 Feb 2018 21:03)  HR: 127 (07 Feb 2018 05:45) (116 - 127)  BP: 116/77 (07 Feb 2018 05:45) (116/77 - 128/84)  BP(mean): --  ABP: --  ABP(mean): --  RR: 17 (07 Feb 2018 05:45) (16 - 17)  SpO2: 97% (07 Feb 2018 05:45) (95% - 99%)    PHYSICAL EXAM:  Constitutional: NAD, lying in bed, NGT in place on suction  HEENT: no eye discharge, no nasal discharge, no pharyngeal erythema, moist membranes  Neck: no lymphadenopathy, no JVD,  no carotid bruit  Cardiovascular: S1 and S2, RRR,  no M/R/G, non-displaced PMI  Respiratory: no wheezing, no rhonchi, no cough, no crackles   Gastrointestinal: BS+, tense, distended, midline scar, colostomy with minimal stool  Extremities: warm to touch, no edema  Vascular: 2+ peripheral pulses  Neurological: AAO x 4, PERRLA, EOMI  Psychiatric: normal mood, normal affect  Musculoskeletal: no joint swelling  Skin: No rashes, no skin breakdown    MEDICATIONS  (STANDING):  ascorbic acid 250 milliGRAM(s) Oral daily  atorvastatin 20 milliGRAM(s) Oral at bedtime  dextrose 5%. 1000 milliLiter(s) (50 mL/Hr) IV Continuous <Continuous>  dextrose 50% Injectable 12.5 Gram(s) IV Push once  dextrose 50% Injectable 25 Gram(s) IV Push once  dextrose 50% Injectable 25 Gram(s) IV Push once  docusate sodium 100 milliGRAM(s) Oral three times a day  haloperidol     Tablet 10 milliGRAM(s) Oral at bedtime  heparin  Injectable 5000 Unit(s) SubCutaneous every 8 hours  insulin glargine Injectable (LANTUS) 12.5 Unit(s) SubCutaneous at bedtime  insulin lispro (HumaLOG) corrective regimen sliding scale   SubCutaneous Before meals and at bedtime  lactulose Syrup 20 Gram(s) Oral four times a day  levETIRAcetam 500 milliGRAM(s) Oral two times a day  magnesium hydroxide Suspension 30 milliLiter(s) Oral daily  melatonin 5 milliGRAM(s) Oral at bedtime  metFORMIN 1000 milliGRAM(s) Oral every 12 hours  mineral oil 30 milliLiter(s) Oral once  mineral oil 30 milliLiter(s) Oral daily  nystatin Powder 1 Application(s) Topical two times a day  oxybutynin 5 milliGRAM(s) Oral two times a day  piperacillin/tazobactam IVPB. 3.375 Gram(s) IV Intermittent every 6 hours  polyethylene glycol 3350 17 Gram(s) Oral two times a day  senna 2 Tablet(s) Oral at bedtime  sodium chloride 0.9%. 1000 milliLiter(s) (100 mL/Hr) IV Continuous <Continuous>    MEDICATIONS  (PRN):  acetaminophen   Tablet 650 milliGRAM(s) Oral every 6 hours PRN For Temp greater than 38 C (100.4 F)  dextrose Gel 1 Dose(s) Oral once PRN Blood Glucose LESS THAN 70 milliGRAM(s)/deciliter  glucagon  Injectable 1 milliGRAM(s) IntraMuscular once PRN Glucose LESS THAN 70 milligrams/deciliter  haloperidol    Injectable 10 milliGRAM(s) IntraMuscular at bedtime PRN if refuses PO Haldol      Allergies    Capzasin Back and Body (Unknown)    Intolerances        LABS:                        8.3    13.4  )-----------( 147      ( 07 Feb 2018 06:10 )             27.9     02-07    133<L>  |  96  |  13  ----------------------------<  135<H>  4.2   |  22  |  0.79    Ca    9.1      07 Feb 2018 06:10  Phos  4.2     02-07  Mg     1.9     02-07            RADIOLOGY & ADDITIONAL TESTS: Reviewed

## 2018-02-08 LAB
ANION GAP SERPL CALC-SCNC: 15 MMOL/L — SIGNIFICANT CHANGE UP (ref 5–17)
BASOPHILS NFR BLD AUTO: 0.2 % — SIGNIFICANT CHANGE UP (ref 0–2)
BUN SERPL-MCNC: 16 MG/DL — SIGNIFICANT CHANGE UP (ref 7–23)
CALCIUM SERPL-MCNC: 9.3 MG/DL — SIGNIFICANT CHANGE UP (ref 8.4–10.5)
CHLORIDE SERPL-SCNC: 98 MMOL/L — SIGNIFICANT CHANGE UP (ref 96–108)
CO2 SERPL-SCNC: 23 MMOL/L — SIGNIFICANT CHANGE UP (ref 22–31)
CREAT SERPL-MCNC: 0.79 MG/DL — SIGNIFICANT CHANGE UP (ref 0.5–1.3)
EOSINOPHIL NFR BLD AUTO: 0.5 % — SIGNIFICANT CHANGE UP (ref 0–6)
GLUCOSE BLDC GLUCOMTR-MCNC: 102 MG/DL — HIGH (ref 70–99)
GLUCOSE BLDC GLUCOMTR-MCNC: 110 MG/DL — HIGH (ref 70–99)
GLUCOSE BLDC GLUCOMTR-MCNC: 111 MG/DL — HIGH (ref 70–99)
GLUCOSE BLDC GLUCOMTR-MCNC: 124 MG/DL — HIGH (ref 70–99)
GLUCOSE SERPL-MCNC: 145 MG/DL — HIGH (ref 70–99)
HCT VFR BLD CALC: 30.3 % — LOW (ref 39–50)
HGB BLD-MCNC: 9.1 G/DL — LOW (ref 13–17)
LYMPHOCYTES # BLD AUTO: 15.3 % — SIGNIFICANT CHANGE UP (ref 13–44)
MAGNESIUM SERPL-MCNC: 1.9 MG/DL — SIGNIFICANT CHANGE UP (ref 1.6–2.6)
MCHC RBC-ENTMCNC: 21.6 PG — LOW (ref 27–34)
MCHC RBC-ENTMCNC: 30 G/DL — LOW (ref 32–36)
MCV RBC AUTO: 72 FL — LOW (ref 80–100)
MONOCYTES NFR BLD AUTO: 8.9 % — SIGNIFICANT CHANGE UP (ref 2–14)
NEUTROPHILS NFR BLD AUTO: 75.1 % — SIGNIFICANT CHANGE UP (ref 43–77)
PHOSPHATE SERPL-MCNC: 3.9 MG/DL — SIGNIFICANT CHANGE UP (ref 2.5–4.5)
PLATELET # BLD AUTO: 125 K/UL — LOW (ref 150–400)
POTASSIUM SERPL-MCNC: 4.2 MMOL/L — SIGNIFICANT CHANGE UP (ref 3.5–5.3)
POTASSIUM SERPL-SCNC: 4.2 MMOL/L — SIGNIFICANT CHANGE UP (ref 3.5–5.3)
RBC # BLD: 4.21 M/UL — SIGNIFICANT CHANGE UP (ref 4.2–5.8)
RBC # FLD: 19.6 % — HIGH (ref 10.3–16.9)
SODIUM SERPL-SCNC: 136 MMOL/L — SIGNIFICANT CHANGE UP (ref 135–145)
WBC # BLD: 12.9 K/UL — HIGH (ref 3.8–10.5)
WBC # FLD AUTO: 12.9 K/UL — HIGH (ref 3.8–10.5)

## 2018-02-08 PROCEDURE — 99233 SBSQ HOSP IP/OBS HIGH 50: CPT

## 2018-02-08 PROCEDURE — 99233 SBSQ HOSP IP/OBS HIGH 50: CPT | Mod: GC

## 2018-02-08 PROCEDURE — 74018 RADEX ABDOMEN 1 VIEW: CPT | Mod: 26

## 2018-02-08 RX ORDER — ATROPINE SULFATE 0.1 MG/ML
0.5 SYRINGE (ML) INJECTION
Qty: 0 | Refills: 0 | Status: DISCONTINUED | OUTPATIENT
Start: 2018-02-08 | End: 2018-02-10

## 2018-02-08 RX ORDER — ALBUTEROL 90 UG/1
2.5 AEROSOL, METERED ORAL
Qty: 0 | Refills: 0 | Status: DISCONTINUED | OUTPATIENT
Start: 2018-02-08 | End: 2018-02-16

## 2018-02-08 RX ORDER — NEOSTIGMINE METHYLSULFATE 1 MG/ML
2 VIAL (ML) INJECTION ONCE
Qty: 0 | Refills: 0 | Status: COMPLETED | OUTPATIENT
Start: 2018-02-08 | End: 2018-02-08

## 2018-02-08 RX ADMIN — HEPARIN SODIUM 5000 UNIT(S): 5000 INJECTION INTRAVENOUS; SUBCUTANEOUS at 23:34

## 2018-02-08 RX ADMIN — HALOPERIDOL DECANOATE 10 MILLIGRAM(S): 100 INJECTION INTRAMUSCULAR at 23:38

## 2018-02-08 RX ADMIN — Medication 5 MILLIGRAM(S): at 05:33

## 2018-02-08 RX ADMIN — ATORVASTATIN CALCIUM 20 MILLIGRAM(S): 80 TABLET, FILM COATED ORAL at 23:35

## 2018-02-08 RX ADMIN — POLYETHYLENE GLYCOL 3350 17 GRAM(S): 17 POWDER, FOR SOLUTION ORAL at 18:08

## 2018-02-08 RX ADMIN — HEPARIN SODIUM 5000 UNIT(S): 5000 INJECTION INTRAVENOUS; SUBCUTANEOUS at 06:00

## 2018-02-08 RX ADMIN — LACTULOSE 20 GRAM(S): 10 SOLUTION ORAL at 18:08

## 2018-02-08 RX ADMIN — PIPERACILLIN AND TAZOBACTAM 200 GRAM(S): 4; .5 INJECTION, POWDER, LYOPHILIZED, FOR SOLUTION INTRAVENOUS at 05:33

## 2018-02-08 RX ADMIN — Medication 5 MILLIGRAM(S): at 23:35

## 2018-02-08 RX ADMIN — PIPERACILLIN AND TAZOBACTAM 200 GRAM(S): 4; .5 INJECTION, POWDER, LYOPHILIZED, FOR SOLUTION INTRAVENOUS at 23:48

## 2018-02-08 RX ADMIN — LACTULOSE 20 GRAM(S): 10 SOLUTION ORAL at 05:36

## 2018-02-08 RX ADMIN — Medication 100 MILLIGRAM(S): at 23:35

## 2018-02-08 RX ADMIN — Medication 2 MILLIGRAM(S): at 15:09

## 2018-02-08 RX ADMIN — Medication 5 MILLIGRAM(S): at 18:08

## 2018-02-08 RX ADMIN — SENNA PLUS 2 TABLET(S): 8.6 TABLET ORAL at 23:35

## 2018-02-08 RX ADMIN — LACTULOSE 20 GRAM(S): 10 SOLUTION ORAL at 00:17

## 2018-02-08 RX ADMIN — Medication 100 MILLIGRAM(S): at 05:33

## 2018-02-08 RX ADMIN — LEVETIRACETAM 500 MILLIGRAM(S): 250 TABLET, FILM COATED ORAL at 18:08

## 2018-02-08 RX ADMIN — LEVETIRACETAM 500 MILLIGRAM(S): 250 TABLET, FILM COATED ORAL at 05:33

## 2018-02-08 RX ADMIN — LACTULOSE 20 GRAM(S): 10 SOLUTION ORAL at 23:34

## 2018-02-08 RX ADMIN — PIPERACILLIN AND TAZOBACTAM 200 GRAM(S): 4; .5 INJECTION, POWDER, LYOPHILIZED, FOR SOLUTION INTRAVENOUS at 18:08

## 2018-02-08 RX ADMIN — POLYETHYLENE GLYCOL 3350 17 GRAM(S): 17 POWDER, FOR SOLUTION ORAL at 06:00

## 2018-02-08 RX ADMIN — INSULIN GLARGINE 12.5 UNIT(S): 100 INJECTION, SOLUTION SUBCUTANEOUS at 23:40

## 2018-02-08 RX ADMIN — PIPERACILLIN AND TAZOBACTAM 200 GRAM(S): 4; .5 INJECTION, POWDER, LYOPHILIZED, FOR SOLUTION INTRAVENOUS at 00:00

## 2018-02-08 NOTE — CONSULT NOTE ADULT - SUBJECTIVE AND OBJECTIVE BOX
32M paraplegic PMH spinal cord injury (wheelchair bound) from fall injury, schizoeffective disorder, sacral pressure ulcer with osteo x2 ( earlier in 2017,  DM2, indwelling suprapubic catheter has prolonged hospital course dueseptic shock secondary to infected purulent sacral 4th degree pressure ulcer with underlying inadequately treated OM, s/p illiostomy due to severity of sacral ulcer, now developing worsening abdominal distension. Patient stated abdmonial distension has been worsening for the past few days, causing him to have worsening pain with respiratory discomfort. No stool output for the past few days. Abdominal Xray showed worsening bowel dilation. CT abdomen and pelvis showed iliues with stool compaction. Surgery was following the patient, attempted disimpaction through iliostomy site without success, recommended bowel regiment. Patient has esclating bowel regiment without sucess, unable to tolerate golytely yesterday resulted in emesis. Surgery recommended neostigmine, ICU consulted for monitoring unit for neostigmine.  PMH: as above  PSH: illiostomy  ALL: NKDA       ( -  )fevers/chills  ( + ) dyspnea  (  - ) cough  (  - ) chest pain  (  - ) palpatations  ( - ) dizziness/lightheadedness  (  + ) nausea/vomiting  (  + ) abd pain  (  - ) diarrhea  (  - ) melena  (  - ) hematochezia  (  - ) dysuria  ( - ) hematuria  ( - ) back pain    ICU Vital Signs Last 24 Hrs  T(C): 36.5 (08 Feb 2018 06:19), Max: 36.6 (07 Feb 2018 16:26)  T(F): 97.7 (08 Feb 2018 06:19), Max: 97.9 (07 Feb 2018 16:26)  HR: 135 (08 Feb 2018 06:19) (108 - 135)  BP: 137/88 (08 Feb 2018 06:19) (137/88 - 146/92)  BP(mean): --  ABP: --  ABP(mean): --  RR: 18 (08 Feb 2018 06:19) (16 - 18)  SpO2: 94% (08 Feb 2018 06:19) (94% - 98%)    PHYSICAL EXAM:      Constitutional: NAD,   Eyes: PERRLA, EOMI  ENMT: MMM, neck supple, no lymphadenopathy, NG tube in place  Respiratory: CTAB, no r/r  Cardiovascular: RRR, audible S1S2, no murmur/rub/gallop  Gastrointestinal: distended, tympanic, mild tender to papation through out, no guarding or rebound, hypoactive bs throughout, illiostomy tube intact.  Extremities: no edema, no ulceration  Vascular: DPs and Radial pulse palpaple  Neurological: AnOx3, CN II-XII intact  Lymph Nodes: no lympadenopathy                          9.1    12.9  )-----------( 125      ( 08 Feb 2018 06:08 )             30.3   02-08    136  |  98  |  16  ----------------------------<  145<H>  4.2   |  23  |  0.79    Ca    9.3      08 Feb 2018 06:08  Phos  3.9     02-08  Mg     1.9     02-08 32M paraplegic PMH spinal cord injury (wheelchair bound) from fall injury, schizoeffective disorder, sacral pressure ulcer with osteo x2 ( earlier in 2017,  DM2, indwelling suprapubic catheter has prolonged hospital course dueseptic shock secondary to infected purulent sacral 4th degree pressure ulcer with underlying inadequately treated OM, s/p illiostomy due to severity of sacral ulcer, now developing worsening abdominal distension. Patient stated abdmonial distension has been worsening for the past few days, causing him to have worsening pain with respiratory discomfort. No stool output for the past few days. Abdominal Xray showed worsening bowel dilation. CT abdomen and pelvis showed iliues. Surgery was following the patient, attempted disimpaction through iliostomy site without success, recommended bowel regiment. Patient has esclating bowel regiment without sucess, unable to tolerate golytely yesterday resulted in emesis. Surgery recommended neostigmine, ICU consulted for monitoring unit for neostigmine.  PMH: as above  PSH: illiostomy  ALL: NKDA       ( -  )fevers/chills  ( + ) dyspnea  (  - ) cough  (  - ) chest pain  (  - ) palpatations  ( - ) dizziness/lightheadedness  (  + ) nausea/vomiting  (  + ) abd pain  (  - ) diarrhea  (  - ) melena  (  - ) hematochezia  (  - ) dysuria  ( - ) hematuria  ( - ) back pain    ICU Vital Signs Last 24 Hrs  T(C): 36.5 (08 Feb 2018 06:19), Max: 36.6 (07 Feb 2018 16:26)  T(F): 97.7 (08 Feb 2018 06:19), Max: 97.9 (07 Feb 2018 16:26)  HR: 135 (08 Feb 2018 06:19) (108 - 135)  BP: 137/88 (08 Feb 2018 06:19) (137/88 - 146/92)  BP(mean): --  ABP: --  ABP(mean): --  RR: 18 (08 Feb 2018 06:19) (16 - 18)  SpO2: 94% (08 Feb 2018 06:19) (94% - 98%)    PHYSICAL EXAM:      Constitutional: NAD,   Eyes: PERRLA, EOMI  ENMT: MMM, neck supple, no lymphadenopathy, NG tube in place  Respiratory: CTAB, no r/r  Cardiovascular: RRR, audible S1S2, no murmur/rub/gallop  Gastrointestinal: distended, tympanic, mild tender to papation through out, no guarding or rebound, hypoactive bs throughout, illiostomy tube intact.  Extremities: no edema, no ulceration  Vascular: DPs and Radial pulse palpaple  Neurological: AnOx3, CN II-XII intact  Lymph Nodes: no lympadenopathy                          9.1    12.9  )-----------( 125      ( 08 Feb 2018 06:08 )             30.3   02-08    136  |  98  |  16  ----------------------------<  145<H>  4.2   |  23  |  0.79    Ca    9.3      08 Feb 2018 06:08  Phos  3.9     02-08  Mg     1.9     02-08    CT abdomen/Pelvis:  Status post Bravo's procedure with diverting descending   colon colostomy. Markedly dilated entire colon which could be due to   ostomy malfunction or ileus. Probable  competent ileocecal valve. Small   amount of ascites.

## 2018-02-08 NOTE — PROGRESS NOTE ADULT - ATTENDING COMMENTS
dx:   ileus -olgilvie syndrome  -  cont with aggesssive bowel regimen  , NPO, ivfs. NG tube. decompression from ostomy. per surgery-- recommending neostigmine. txf to MICU for tele while receiving neostigmine   -severe sepsis due to  infected decubitus ulcers/possible sacral osteomyelitis- ct pelvis (+) sacral wound abscess, surgery has evaluated, and does not believe this is an abscess. ID narrowed abx to zosyn. s/p diverting colostomy 2/1/18. cont local wound care  -left IT  decub and sacral decub- stage iv  - as above. s/p diverting colostomy  -psychosis - improved,  cont treatment over objection based on court ruling. haldol qhs (po preferably , if not willing then will need IM haldol) . ekg to eval qtc serially  -neurogenic bladder - c/w suprapubic bowles, oxybutinin  -DM ii - can hold metformin post ct , ssi.   -epilepsy- keppra.

## 2018-02-08 NOTE — CONSULT NOTE ADULT - SUBJECTIVE AND OBJECTIVE BOX
HPI:  32yr old M with a PMHx significant for spinal cord injury (paraplegic x ~5 years), sacral decub ulcer with hx of sacral osteo in April and Aug 2017,  DMII, indwelling suprapubic catheter, CVA in 2016, HBV , admitted 12/4/17 for management of septic shock 2/2 infected stage 4 sacral decub ulcer, with underlying inadequately Rx osteomyelitis.  His hospital course has been notable for poorly healing pressure ulcer 2/2 stool contamination, was offered diverting colostomy which he initially refused, but finally agreed to and is sp diverting colostomy 2/1/18 (POD7 today). Post op course c/b constipation, and steadily worsening abdominal distention. Patient reports discomfort in the abdomen due to the distention, and some respiratory difficulty. He was transferred to the MICU for monitoring while his ileus was Rx with neostigmine.  Patient reports that prior to admission when he is on his psychiatric medications he has to be on aggressive bowel regimen, otherwise he is significantly constipated, when he is not on psych meds he is otherwise fine and has regular bowel movements without the need for laxatives or stool softeners.      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 1000 milliLiter(s) (50 mL/Hr) IV Continuous <Continuous>  dextrose 50% Injectable 12.5 Gram(s) IV Push once  dextrose 50% Injectable 25 Gram(s) IV Push once  dextrose 50% Injectable 25 Gram(s) IV Push once  docusate sodium 100 milliGRAM(s) Oral three times a day  haloperidol     Tablet 10 milliGRAM(s) Oral at bedtime  heparin  Injectable 5000 Unit(s) SubCutaneous every 8 hours  insulin glargine Injectable (LANTUS) 12.5 Unit(s) SubCutaneous at bedtime  insulin lispro (HumaLOG) corrective regimen sliding scale   SubCutaneous Before meals and at bedtime  lactulose Syrup 20 Gram(s) Oral four times a day  levETIRAcetam 500 milliGRAM(s) Oral two times a day  magnesium hydroxide Suspension 30 milliLiter(s) Oral daily  melatonin 5 milliGRAM(s) Oral at bedtime  mineral oil 30 milliLiter(s) Oral once  mineral oil 30 milliLiter(s) Oral daily  nystatin Powder 1 Application(s) Topical two times a day  oxybutynin 5 milliGRAM(s) Oral two times a day  piperacillin/tazobactam IVPB. 3.375 Gram(s) IV Intermittent every 6 hours  polyethylene glycol 3350 17 Gram(s) Oral two times a day  senna 2 Tablet(s) Oral at bedtime  sodium chloride 0.9%. 1000 milliLiter(s) (100 mL/Hr) IV Continuous <Continuous>    MEDICATIONS  (PRN):  acetaminophen   Tablet 650 milliGRAM(s) Oral every 6 hours PRN For Temp greater than 38 C (100.4 F)  ALBUTerol    0.083% 2.5 milliGRAM(s) Nebulizer every 2 hours PRN Bronchospasm  atropine Syringe 0.5 milliGRAM(s) IV Push every 5 minutes PRN bradycardia  dextrose Gel 1 Dose(s) Oral once PRN Blood Glucose LESS THAN 70 milliGRAM(s)/deciliter  glucagon  Injectable 1 milliGRAM(s) IntraMuscular once PRN Glucose LESS THAN 70 milligrams/deciliter  haloperidol    Injectable 10 milliGRAM(s) IntraMuscular at bedtime PRN if refuses PO Haldol      Allergies  Capzasin Back and Body (Unknown)    Intolerances    SOCIAL HISTORY:  DEnies toxic or illicit habits    FAMILY HISTORY:  Denies FHx of stomach/colon/pancreatic cancer or liver issues  Family history of brain cancer (Father)      Vital Signs Last 24 Hrs  T(C): 36.8 (08 Feb 2018 14:19), Max: 36.8 (08 Feb 2018 14:19)  T(F): 98.2 (08 Feb 2018 14:19), Max: 98.2 (08 Feb 2018 14:19)  HR: 114 (08 Feb 2018 17:00) (82 - 135)  BP: 138/80 (08 Feb 2018 17:00) (137/88 - 169/88)  BP(mean): 96 (08 Feb 2018 17:00) (96 - 124)  RR: 33 (08 Feb 2018 17:00) (18 - 34)  SpO2: 96% (08 Feb 2018 17:00) (94% - 99%)    PHYSICAL EXAM:  GEN - young male sitting in bed in no distress, NGT in place, anicteric, afebrile, not pale  Respiratory: good air entry  Cardiovascular: s1 s2 no M RRR  Gastrointestinal: severely distended, tympanitic to percussion, BS reduced, ostomy LLQ with smearing of stool, NT, NR, NG, difficult to assess masses or organs due to degree of distention  Rectal: deferred  Extremities: no edema  Neurological: AAOx3 non focal  Psychiatric: anxious      LABS:                 9.1    12.9  )-----------( 125      ( 08 Feb 2018 06:08 )             30.3     136  |  98  |  16  ----------------------------<  145<H>  4.2   |  23  |  0.79    Ca    9.3      08 Feb 2018 06:08  Phos  3.9     02-08  Mg     1.9     02-08        RADIOLOGY & ADDITIONAL STUDIES:  CT Abdomen and Pelvis w/ Oral Cont (02.07.18 @ 15:03)  IMPRESSION:   Status post Bravo's procedure with diverting descending colon colostomy. Markedly dilated entire colon which could be due to ostomy malfunction or ileus. Probable  competent ileocecal valve. Small amount of ascites.  Flat IVC likely due to hypovolemia.

## 2018-02-08 NOTE — PROGRESS NOTE ADULT - SUBJECTIVE AND OBJECTIVE BOX
PGY 1 Acceptance Note  Hospital Course:  32M paraplegic PMH spinal cord injury (wheelchair bound), sacral pressure ulcer with osteo x2 (outpatient provider said they have records of March osteo s/p daptomycin of unknown length) earlier in 2017,  DM2, indwelling suprapubic catheter who presented w/ septic shock secondary to infected purulent sacral 4th degree pressure ulcer with underlying inadequately treated OM with septic shock now resolved. Patient has been on RMF for extended period of time with persistent OM treated with antibiotics including Vanc, Zosyn, Cipro, Augmentin, but currently being treated with Zosyn(since 1/29). Prior wound cultures have grown citrobacter, enterococcus, klebsiella.  Recent blood cultures negative.  Decision was made for patient to have colostomy to prevent stool contamination of sacral wound.  Now s/p colostomy c/b ileus requiring NGT decompression.  Ostomy has had nearly no stool output.  Followed by surgical team which recommended medical management.  At this point no improvement of severe abdominal distention(CT shows severely dilated colon full of stool) so being transferred to  for cholinergic therapy.     INTERVAL HPI/OVERNIGHT EVENTS:    SUBJECTIVE: Patient seen and examined at bedside.    OBJECTIVE:    VITAL SIGNS:  ICU Vital Signs Last 24 Hrs  T(C): 36.8 (08 Feb 2018 14:19), Max: 36.8 (08 Feb 2018 14:19)  T(F): 98.2 (08 Feb 2018 14:19), Max: 98.2 (08 Feb 2018 14:19)  HR: 126 (08 Feb 2018 18:00) (82 - 135)  BP: 128/79 (08 Feb 2018 18:00) (128/79 - 169/88)  BP(mean): 91 (08 Feb 2018 18:00) (91 - 124)  ABP: --  ABP(mean): --  RR: 27 (08 Feb 2018 18:00) (18 - 34)  SpO2: 99% (08 Feb 2018 18:00) (94% - 99%)        02-07 @ 07:01  -  02-08 @ 07:00  --------------------------------------------------------  IN: 1500 mL / OUT: 1350 mL / NET: 150 mL    02-08 @ 07:01  -  02-08 @ 18:51  --------------------------------------------------------  IN: 600 mL / OUT: 0 mL / NET: 600 mL      CAPILLARY BLOOD GLUCOSE      POCT Blood Glucose.: 111 mg/dL (08 Feb 2018 17:24)      PHYSICAL EXAM:    Constitutional: NAD, lying in bed, NGT in place  HEENT: no pharyngeal erythema, moist membranes  Neck: no lymphadenopathy, no JVD  Cardiovascular: S1 and S2, tachycardic, no M/R/G, non-displaced PMI  Respiratory: no W/R/R, no increased WOB  Gastrointestinal: BS+, tense, distended, midline scar, colostomy with minimal stool  Extremities: warm to touch, no edema  Vascular: 2+ peripheral pulses  Neurological: AAO x 3. PERRLA, EOMI  Skin: Sacral ulcer not visualized dressing CDI    MEDICATIONS:  MEDICATIONS  (STANDING):  ascorbic acid 250 milliGRAM(s) Oral daily  atorvastatin 20 milliGRAM(s) Oral at bedtime  dextrose 5%. 1000 milliLiter(s) (50 mL/Hr) IV Continuous <Continuous>  dextrose 50% Injectable 12.5 Gram(s) IV Push once  dextrose 50% Injectable 25 Gram(s) IV Push once  dextrose 50% Injectable 25 Gram(s) IV Push once  docusate sodium 100 milliGRAM(s) Oral three times a day  haloperidol     Tablet 10 milliGRAM(s) Oral at bedtime  heparin  Injectable 5000 Unit(s) SubCutaneous every 8 hours  insulin glargine Injectable (LANTUS) 12.5 Unit(s) SubCutaneous at bedtime  insulin lispro (HumaLOG) corrective regimen sliding scale   SubCutaneous Before meals and at bedtime  lactulose Syrup 20 Gram(s) Oral four times a day  levETIRAcetam 500 milliGRAM(s) Oral two times a day  magnesium hydroxide Suspension 30 milliLiter(s) Oral daily  melatonin 5 milliGRAM(s) Oral at bedtime  mineral oil 30 milliLiter(s) Oral once  mineral oil 30 milliLiter(s) Oral daily  nystatin Powder 1 Application(s) Topical two times a day  oxybutynin 5 milliGRAM(s) Oral two times a day  piperacillin/tazobactam IVPB. 3.375 Gram(s) IV Intermittent every 6 hours  polyethylene glycol 3350 17 Gram(s) Oral two times a day  senna 2 Tablet(s) Oral at bedtime  sodium chloride 0.9%. 1000 milliLiter(s) (100 mL/Hr) IV Continuous <Continuous>    MEDICATIONS  (PRN):  acetaminophen   Tablet 650 milliGRAM(s) Oral every 6 hours PRN For Temp greater than 38 C (100.4 F)  ALBUTerol    0.083% 2.5 milliGRAM(s) Nebulizer every 2 hours PRN Bronchospasm  atropine Syringe 0.5 milliGRAM(s) IV Push every 5 minutes PRN bradycardia  dextrose Gel 1 Dose(s) Oral once PRN Blood Glucose LESS THAN 70 milliGRAM(s)/deciliter  glucagon  Injectable 1 milliGRAM(s) IntraMuscular once PRN Glucose LESS THAN 70 milligrams/deciliter  haloperidol    Injectable 10 milliGRAM(s) IntraMuscular at bedtime PRN if refuses PO Haldol    ALLERGIES:  Allergies    Capzasin Back and Body (Unknown)    Intolerances    LABS:                        9.1    12.9  )-----------( 125      ( 08 Feb 2018 06:08 )             30.3     02-08    136  |  98  |  16  ----------------------------<  145<H>  4.2   |  23  |  0.79    Ca    9.3      08 Feb 2018 06:08  Phos  3.9     02-08  Mg     1.9     02-08    RADIOLOGY & ADDITIONAL TESTS: Reviewed.

## 2018-02-08 NOTE — PROGRESS NOTE ADULT - PROBLEM SELECTOR PLAN 3
Pt w/ no sensation or control of urination with suprapubic catheter changed once a month.   - suprapubic catheter changed 1/12 (to change again around 2/12)  - c/w oxybutynin 5mg BID

## 2018-02-08 NOTE — CONSULT NOTE ADULT - ASSESSMENT
32yr old M with a PMHx significant for spinal cord injury (paraplegic x ~5 years), sacral decub ulcer with hx of sacral osteo in April and Aug 2017,  DMII, indwelling suprapubic catheter, CVA in 2016, HBV , admitted 12/4/17 for management of septic shock 2/2 infected stage 4 sacral decub ulcer, with underlying inadequately Rx osteomyelitis.  GI consulted for colonic decompression.    # Prolonged/Pathologic post-operative ileus  - with significant distention on CT of mostly colon  - likely patient might have had an underlying colonic dysmotility prior to the surgery and this has been aggravated by the diverting colostomy with colonic resection  - Dr La the attending surgeon - notes that intraoperatively he had a lot of redundant dilated colon  - sp aggressive bowel regimen - with resultant emesis of golytley  - continue aggressive bowel regimen as tolerated  - sp one dose of neostigmine with no response  -  reports that he was able to insert his finger into the ostomy and also 3/4 of a bowles catheter - whcih he used to flush/irrigate the ostomy with return of a lot of liquid stool  - Given degree of distention and the age of the ostomy the risks of performing an endoscopic procedure currently outweigh the risks as this could potentially precipitate a colonic perforation.  - we will hold off for now on performing any endoscopic interventions  - likely pathologic post-op ileus might resolve    Discussed with attending Dr Gamboa, MICU team and Dr Hon CORDERO following

## 2018-02-08 NOTE — PROGRESS NOTE ADULT - ASSESSMENT
32M paraplegic PMH spinal cord injury (wheelchair bound), sacral pressure ulcer with osteo x2 (outpatient provider said they have records of March osteo s/p daptomycin of unknown length) earlier in 2017, DM2, indwelling suprapubic catheter who presented w/ septic shock secondary to infected purulent sacral 4th degree pressure ulcer with underlying inadequately treated OM, hemodynamically stable being treated for sacral osteomyelitis now s/p colostomy w/ Ileus     GI  Ileus following gastrointestinal surgery: s/p diverting colostomy 2/1/18 with worsening abdominal distention and minimal colostomy output; transferred to MICU for neostigmine under tele monitoring  -Surgery: low suspicion of toxic megacolon or complete SBO however CT abdomen   -NGT decompression   -follow AXR  -Neostigmine for bowel motility       ID  Sacral osteomyelitis.  Plan: Pt w/ a stage 4 infected sacral ulcer w/ drainage with w/ feculent material. On admission required debridement by plastic surgery.   CT (1/27): Findings consistent with abscess replacing the lower sacrum and coccyx. Osteomyelitis of the adjacent S3 segment cannot be excluded. The collection appears to be communicating with the skin surface in the inferior gluteal cleft.   - General surgery does not believe there is a fistula between wound and rectum causing leakage of stool. Now with colostomy bag.   - Wound cx had citrobacter, enterococcus, klebsiella   - Wound care following; continue to appreciate recs  - c/w zosyn (1/29 to 2/12 - to complete 2 weeks)   - Blood cultures NGTD    C. diff: Results came back as indeterminate and then positive. Discussed with ID prefers to hold off treating for now with no diarrhea. If developed increasing loose stool would add PO vancomycin. CDIFF WAS SENT IN THE SITUATION PATIENT WAS HYPOTENSIVE AND SEPTIC WITH UNCLEAR ETIOLOGY AND WAS IN LAYING IN STOOL FOR HOURS WITH WOUND VAC ON UNCLEAR OF TRUE CONSISTENCY. Thereafter, patient with more formed stool.     Neuro  Neurogenic bladder: Pt w/ no sensation or control of urination with suprapubic catheter changed once a month.   - suprapubic catheter changed 1/12 (to change again around 2/12)  - c/w oxybutynin 5mg BID.     Paraplegia: Pt w/ paraplegia after falling out of a window several years ago. Pt has no sensation or function below the nipples. Pt is able to move b/l upper extremities.   - pt has a automatic wheelchair for which he uses and is able to transfer himself from chair to bed.     Seizure Disorder: pt w/ a reported hx of seizure disorder  -c/w home medication of Keppra 500mg BID    Endo  Type 2 diabetes mellitus.  Plan: Patient on metformin and Glimepiride at home. HgA1C of 6.0.   - ISS+ metformin 1000mg BID  - 12.5U Lantus QHS (due to NPO status)  - monitor FSG.     Heme  Microcytic anemia: Iron studies showing iron deficiency.   -will maintain active type and screen  -Hgb stable, no evidence of active bleeding  -will transfuse for Hgb <7    Cardio  Hyperlipidemia: c/w home Lipitor medication    Psych  Pt evaluated by psych and recommended he needs to be admitted to medical-psychiatric inpatient unit. Patient does not have capacity to make medical decisions.   -discontinued Aripiprazole in favor of haloperidol. Haloperidol 10 mg PO or IM. Patient does not have capacity to refuse and is court ordered to receive the haldol.       F: NS @ 100cc  E: K>4 Mg>2, monitor and replete as needed  N: NPO in the setting of ileus, continue with diabetic diet when not NPO  Ppx: [facility]    FULL CODE.

## 2018-02-08 NOTE — CONSULT NOTE ADULT - PROBLEM SELECTOR RECOMMENDATION 9
With worsening abdominal distention, likely obstruction from fecal mater, unable to tolerate PO and bowel regiment. Now worsening pain.  No sign of acute abdomen.  NG tube for decompression.  Neostigmine regiment per Surgery. Would f/u with further recs if further surgical intervention is indicated.  Will need telemetry for neostigmine, will discuss with attending for dispo. With worsening abdominal distention, likely due to illeus on abdominal CT, unable to tolerate PO and bowel regiment, failed fecal disimpaction by surgery. Now worsening pain.  No sign of acute abdomen.  NG tube for decompression.  Neostigmine regiment per Surgery. Would f/u with further recs if further surgical intervention is indicated.  Will need telemetry for neostigmine, will discuss with attending for dispo. With worsening abdominal distention, likely due to illeus on abdominal CT, unable to tolerate PO and bowel regiment, failed fecal disimpaction by surgery. Now worsening pain.  No sign of acute abdomen.  NG tube for decompression.  Neostigmine request per Surgery will require monitoring of HR and respiratoyr status. To perform in MICU. Would f/u with further recs if further surgical intervention is indicated.  Will need telemetry for neostigmine, will discuss with attending for dispo.

## 2018-02-08 NOTE — PROGRESS NOTE BEHAVIORAL HEALTH - NSBHFUPINTERVALHXFT_PSY_A_CORE
Pt observed calmly allowing care to be rendered. Allowed Aunt and Uncle to visit with no reports from them regarding concerns about his psychiatric state. Remains compliant with po Haldol at .  This writer participated in phone conference with Ms. Rubio Pt observed calmly allowing care to be rendered. Allowed Aunt and Uncle to visit with no reports from them regarding concerns about his psychiatric state. Remains compliant with po Haldol at .  This writer participated in phone conference (30') with Ms Holliday from  and representative from longterm care agency,  [facility]. Informed by Mr. Arroyo that due to pt's extended inpt hospital stay, pt has been dis-enrolled from current agency, and will require re-enrollment to current agency or another agency. Mr. Arroyo stated that pt may self-pay for home care until new agency set up is in place. JUAN PABLO and this writer emphasized deep concerns regarding discharging pt with no set home care follow-up, as pt is unlikely to be able to  [employer] application pending. This writer also received return call from  at pt's prior outpt provider, [facility]. Pt will be able to return to [facility] but must also go through new intake process (Fridays or Mondays at 8:15 AM).

## 2018-02-08 NOTE — PROGRESS NOTE ADULT - PROBLEM SELECTOR PLAN 6
Iron studies showing iron deficiency.   -will maintain active type and screen  -Hgb stable, no evidence of active bleeding  -will transfuse for Hgb <7

## 2018-02-08 NOTE — PROGRESS NOTE ADULT - ASSESSMENT
33 yo M paraplegic with h/o SCI, DM, multiple stage 4 pressure with sacral decubitus ulcer POD 6 s/p diverting colostomy  -pt with persistent abdominal distention and large stool burden on CT  -manual disimpaction attempted as well as golytely via ngt yesterday which pt did not tolerate  -no evidence of acute abdomen, obstruction or perforation  -would recommend neostigmine given multiple other attempts of relieving stool burden that have thus far failed  -discussed with medicine team and Dr La

## 2018-02-08 NOTE — CONSULT NOTE ADULT - CONSULT REQUESTED DATE/TIME
05-Dec-2017 22:13
06-Dec-2017
06-Dec-2017 10:54
07-Dec-2017
08-Feb-2018 11:14
08-Feb-2018 18:09
29-Jan-2018

## 2018-02-08 NOTE — CONSULT NOTE ADULT - ASSESSMENT
32M paraplegic PMH spinal cord injury (wheelchair bound) from fall injury, schizoeffective disorder, sacral pressure ulcer with osteo x2 ( earlier in 2017,  DM2, indwelling suprapubic catheter has prolonged hospital course dueseptic shock secondary to infected purulent sacral 4th degree pressure ulcer with underlying inadequately treated OM, s/p illiostomy due to severity of sacral ulcer, now developing worsening abdominal distension, found to have illeus and fecal impaction on CT 32M paraplegic PMH spinal cord injury (wheelchair bound) from fall injury, schizoeffective disorder, sacral pressure ulcer with osteo x2 ( earlier in 2017,  DM2, indwelling suprapubic catheter has prolonged hospital course dueseptic shock secondary to infected purulent sacral 4th degree pressure ulcer with underlying inadequately treated OM, s/p illiostomy due to severity of sacral ulcer, now developing worsening abdominal distension, found to have illeus on CT 32M paraplegic PMH spinal cord injury (wheelchair bound) from fall injury, schizoeffective disorder, sacral pressure ulcer with osteo x2 ( earlier in 2017,  DM2, indwelling suprapubic catheter has prolonged hospital course dueseptic shock secondary to infected purulent sacral 4th degree pressure ulcer with underlying inadequately treated OM, s/p illiostomy due to severity of sacral ulcer, now developing worsening abdominal distension, found to have 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

## 2018-02-08 NOTE — CONSULT NOTE ADULT - CONSULT REASON
Abx regimen for recurrent sacral ulcer infection w/ possible osteo
Colonic distention
SPT follow up
Sacral Decubiti
abdominal distention
decubitus ulcer
Diverting colostomy

## 2018-02-08 NOTE — PROGRESS NOTE ADULT - SUBJECTIVE AND OBJECTIVE BOX
SUBJECTIVE: Pt seen and examined at bedside. Golytely via NGT attempted o/n however pt did not tolerate, developed nausea and vomiting. Minimal output from ostomy     Vital Signs Last 24 Hrs  T(C): 36.5 (08 Feb 2018 06:19), Max: 36.6 (07 Feb 2018 16:26)  T(F): 97.7 (08 Feb 2018 06:19), Max: 97.9 (07 Feb 2018 16:26)  HR: 135 (08 Feb 2018 06:19) (108 - 135)  BP: 137/88 (08 Feb 2018 06:19) (137/88 - 146/92)  BP(mean): --  RR: 18 (08 Feb 2018 06:19) (16 - 18)  SpO2: 94% (08 Feb 2018 06:19) (94% - 98%)    PHYSICAL EXAM    Constitutional: AOx3, NAD    Respiratory: CTABL, no resp distress    Cardiovascular: RRR, no m/r/g    Gastrointestinal: distended, tympanic, firm, ostomy with some liquid stool output, ngt in place with minimal output    Extremities: WWP    I&O's Detail    07 Feb 2018 07:01  -  08 Feb 2018 07:00  --------------------------------------------------------  IN:    sodium chloride 0.9%.: 1300 mL    Solution: 200 mL  Total IN: 1500 mL    OUT:    Colostomy: 50 mL    Nasoenteral Tube: 500 mL    Voided: 800 mL  Total OUT: 1350 mL    Total NET: 150 mL          LABS:                        9.1    12.9  )-----------( 125      ( 08 Feb 2018 06:08 )             30.3     02-08    136  |  98  |  16  ----------------------------<  145<H>  4.2   |  23  |  0.79    Ca    9.3      08 Feb 2018 06:08  Phos  3.9     02-08  Mg     1.9     02-08            MEDICATIONS  (STANDING):  ascorbic acid 250 milliGRAM(s) Oral daily  atorvastatin 20 milliGRAM(s) Oral at bedtime  dextrose 5%. 1000 milliLiter(s) (50 mL/Hr) IV Continuous <Continuous>  dextrose 50% Injectable 12.5 Gram(s) IV Push once  dextrose 50% Injectable 25 Gram(s) IV Push once  dextrose 50% Injectable 25 Gram(s) IV Push once  docusate sodium 100 milliGRAM(s) Oral three times a day  haloperidol     Tablet 10 milliGRAM(s) Oral at bedtime  heparin  Injectable 5000 Unit(s) SubCutaneous every 8 hours  insulin glargine Injectable (LANTUS) 12.5 Unit(s) SubCutaneous at bedtime  insulin lispro (HumaLOG) corrective regimen sliding scale   SubCutaneous Before meals and at bedtime  lactulose Syrup 20 Gram(s) Oral four times a day  levETIRAcetam 500 milliGRAM(s) Oral two times a day  magnesium hydroxide Suspension 30 milliLiter(s) Oral daily  melatonin 5 milliGRAM(s) Oral at bedtime  mineral oil 30 milliLiter(s) Oral once  mineral oil 30 milliLiter(s) Oral daily  nystatin Powder 1 Application(s) Topical two times a day  oxybutynin 5 milliGRAM(s) Oral two times a day  piperacillin/tazobactam IVPB. 3.375 Gram(s) IV Intermittent every 6 hours  polyethylene glycol 3350 17 Gram(s) Oral two times a day  senna 2 Tablet(s) Oral at bedtime  sodium chloride 0.9%. 1000 milliLiter(s) (100 mL/Hr) IV Continuous <Continuous>    MEDICATIONS  (PRN):  acetaminophen   Tablet 650 milliGRAM(s) Oral every 6 hours PRN For Temp greater than 38 C (100.4 F)  dextrose Gel 1 Dose(s) Oral once PRN Blood Glucose LESS THAN 70 milliGRAM(s)/deciliter  glucagon  Injectable 1 milliGRAM(s) IntraMuscular once PRN Glucose LESS THAN 70 milligrams/deciliter  haloperidol    Injectable 10 milliGRAM(s) IntraMuscular at bedtime PRN if refuses PO Haldol      RADIOLOGY & ADDITIONAL STUDIES:    ASSESSMENT AND PLAN

## 2018-02-08 NOTE — PROGRESS NOTE ADULT - PROBLEM SELECTOR PLAN 4
Patient on metformin and Glimepiride at home. HgA1C of 6.0.   - ISS+ metformin 1000mg BID  - 12.5U Lantus QHS (due to NPO status)  - monitor FSG

## 2018-02-08 NOTE — PROGRESS NOTE ADULT - SUBJECTIVE AND OBJECTIVE BOX
INTERVAL HPI/OVERNIGHT EVENTS:  Overnight golytely was stopped as patient started vomiting, NGT place on suction again.  This morning at bedside patient very uncomfortable from distention.      Vital Signs Last 24 Hrs  T(C): 36.5 (08 Feb 2018 06:19), Max: 36.6 (07 Feb 2018 16:26)  T(F): 97.7 (08 Feb 2018 06:19), Max: 97.9 (07 Feb 2018 16:26)  HR: 135 (08 Feb 2018 06:19) (108 - 135)  BP: 137/88 (08 Feb 2018 06:19) (137/88 - 146/92)  BP(mean): --  ABP: --  ABP(mean): --  RR: 18 (08 Feb 2018 06:19) (16 - 18)  SpO2: 94% (08 Feb 2018 06:19) (94% - 98%)    PHYSICAL EXAM:  Constitutional: NAD, lying in bed, NGT in place on suction  HEENT: no eye discharge, no nasal discharge, no pharyngeal erythema, moist membranes  Neck: no lymphadenopathy, no JVD,  no carotid bruit  Cardiovascular: S1 and S2, RRR,  no M/R/G, non-displaced PMI  Respiratory: no wheezing, no rhonchi, no cough, no crackles   Gastrointestinal: BS+, tense, distended, midline scar, colostomy with minimal stool  Extremities: warm to touch, no edema  Vascular: 2+ peripheral pulses  Neurological: AAO x 4, PERRLA, EOMI  Psychiatric: normal mood, normal affect  Musculoskeletal: no joint swelling  Skin: No rashes, no skin breakdown    MEDICATIONS  (STANDING):  ascorbic acid 250 milliGRAM(s) Oral daily  atorvastatin 20 milliGRAM(s) Oral at bedtime  dextrose 5%. 1000 milliLiter(s) (50 mL/Hr) IV Continuous <Continuous>  dextrose 50% Injectable 12.5 Gram(s) IV Push once  dextrose 50% Injectable 25 Gram(s) IV Push once  dextrose 50% Injectable 25 Gram(s) IV Push once  docusate sodium 100 milliGRAM(s) Oral three times a day  haloperidol     Tablet 10 milliGRAM(s) Oral at bedtime  heparin  Injectable 5000 Unit(s) SubCutaneous every 8 hours  insulin glargine Injectable (LANTUS) 12.5 Unit(s) SubCutaneous at bedtime  insulin lispro (HumaLOG) corrective regimen sliding scale   SubCutaneous Before meals and at bedtime  lactulose Syrup 20 Gram(s) Oral four times a day  levETIRAcetam 500 milliGRAM(s) Oral two times a day  magnesium hydroxide Suspension 30 milliLiter(s) Oral daily  melatonin 5 milliGRAM(s) Oral at bedtime  mineral oil 30 milliLiter(s) Oral once  mineral oil 30 milliLiter(s) Oral daily  nystatin Powder 1 Application(s) Topical two times a day  oxybutynin 5 milliGRAM(s) Oral two times a day  piperacillin/tazobactam IVPB. 3.375 Gram(s) IV Intermittent every 6 hours  polyethylene glycol 3350 17 Gram(s) Oral two times a day  senna 2 Tablet(s) Oral at bedtime  sodium chloride 0.9%. 1000 milliLiter(s) (100 mL/Hr) IV Continuous <Continuous>    MEDICATIONS  (PRN):  acetaminophen   Tablet 650 milliGRAM(s) Oral every 6 hours PRN For Temp greater than 38 C (100.4 F)  dextrose Gel 1 Dose(s) Oral once PRN Blood Glucose LESS THAN 70 milliGRAM(s)/deciliter  glucagon  Injectable 1 milliGRAM(s) IntraMuscular once PRN Glucose LESS THAN 70 milligrams/deciliter  haloperidol    Injectable 10 milliGRAM(s) IntraMuscular at bedtime PRN if refuses PO Haldol      Allergies    Capzasin Back and Body (Unknown)    Intolerances        LABS:                        9.1    12.9  )-----------( 125      ( 08 Feb 2018 06:08 )             30.3     02-08    136  |  98  |  16  ----------------------------<  145<H>  4.2   |  23  |  0.79    Ca    9.3      08 Feb 2018 06:08  Phos  3.9     02-08  Mg     1.9     02-08            RADIOLOGY & ADDITIONAL TESTS: Reviewed 7U to 7E Transfer Note  32M paraplegic PMH spinal cord injury (wheelchair bound), sacral pressure ulcer with osteo x2 (outpatient provider said they have records of March osteo s/p daptomycin of unknown length) earlier in 2017,  DM2, indwelling suprapubic catheter who presented w/ septic shock secondary to infected purulent sacral 4th degree pressure ulcer with underlying inadequately treated OM with septic shock now resolved. Patient has been on RMF for extended period of time with persistent OM treated with antibiotics including Vanc, Zosyn, Cipro, Augmentin, but currently being treated with Zosyn(since 1/29). Prior wound cultures have grown citrobacter, enterococcus, klebsiella.  Recent blood cultures negative.  Decision was made for patient to have colostomy to prevent stool contamination of sacral wound.  Now s/p colostomy c/b ileus requiring NGT decompression.  Ostomy has had nearly no stool output.  Followed by surgical team which recommended medical management.  At this point no improvement of severe abdominal distention(CT shows severely dilated colon full of stool) so being transferred to  for cholinergic therapy.         INTERVAL HPI/OVERNIGHT EVENTS:  Overnight golytely was stopped as patient started vomiting, NGT place on suction again.  This morning at bedside patient very uncomfortable from distention.      Vital Signs Last 24 Hrs  T(C): 36.5 (08 Feb 2018 06:19), Max: 36.6 (07 Feb 2018 16:26)  T(F): 97.7 (08 Feb 2018 06:19), Max: 97.9 (07 Feb 2018 16:26)  HR: 135 (08 Feb 2018 06:19) (108 - 135)  BP: 137/88 (08 Feb 2018 06:19) (137/88 - 146/92)  BP(mean): --  ABP: --  ABP(mean): --  RR: 18 (08 Feb 2018 06:19) (16 - 18)  SpO2: 94% (08 Feb 2018 06:19) (94% - 98%)    PHYSICAL EXAM:  Constitutional: NAD, lying in bed, NGT in place on suction  HEENT: no eye discharge, no nasal discharge, no pharyngeal erythema, moist membranes  Neck: no lymphadenopathy, no JVD,  no carotid bruit  Cardiovascular: S1 and S2, RRR,  no M/R/G, non-displaced PMI  Respiratory: no wheezing, no rhonchi, no cough, no crackles   Gastrointestinal: BS+, tense, distended, midline scar, colostomy with minimal stool  Extremities: warm to touch, no edema  Vascular: 2+ peripheral pulses  Neurological: AAO x 4, PERRLA, EOMI  Psychiatric: normal mood, normal affect  Musculoskeletal: no joint swelling  Skin: No rashes, no skin breakdown    MEDICATIONS  (STANDING):  ascorbic acid 250 milliGRAM(s) Oral daily  atorvastatin 20 milliGRAM(s) Oral at bedtime  dextrose 5%. 1000 milliLiter(s) (50 mL/Hr) IV Continuous <Continuous>  dextrose 50% Injectable 12.5 Gram(s) IV Push once  dextrose 50% Injectable 25 Gram(s) IV Push once  dextrose 50% Injectable 25 Gram(s) IV Push once  docusate sodium 100 milliGRAM(s) Oral three times a day  haloperidol     Tablet 10 milliGRAM(s) Oral at bedtime  heparin  Injectable 5000 Unit(s) SubCutaneous every 8 hours  insulin glargine Injectable (LANTUS) 12.5 Unit(s) SubCutaneous at bedtime  insulin lispro (HumaLOG) corrective regimen sliding scale   SubCutaneous Before meals and at bedtime  lactulose Syrup 20 Gram(s) Oral four times a day  levETIRAcetam 500 milliGRAM(s) Oral two times a day  magnesium hydroxide Suspension 30 milliLiter(s) Oral daily  melatonin 5 milliGRAM(s) Oral at bedtime  mineral oil 30 milliLiter(s) Oral once  mineral oil 30 milliLiter(s) Oral daily  nystatin Powder 1 Application(s) Topical two times a day  oxybutynin 5 milliGRAM(s) Oral two times a day  piperacillin/tazobactam IVPB. 3.375 Gram(s) IV Intermittent every 6 hours  polyethylene glycol 3350 17 Gram(s) Oral two times a day  senna 2 Tablet(s) Oral at bedtime  sodium chloride 0.9%. 1000 milliLiter(s) (100 mL/Hr) IV Continuous <Continuous>    MEDICATIONS  (PRN):  acetaminophen   Tablet 650 milliGRAM(s) Oral every 6 hours PRN For Temp greater than 38 C (100.4 F)  dextrose Gel 1 Dose(s) Oral once PRN Blood Glucose LESS THAN 70 milliGRAM(s)/deciliter  glucagon  Injectable 1 milliGRAM(s) IntraMuscular once PRN Glucose LESS THAN 70 milligrams/deciliter  haloperidol    Injectable 10 milliGRAM(s) IntraMuscular at bedtime PRN if refuses PO Haldol      Allergies    Capzasin Back and Body (Unknown)    Intolerances        LABS:                        9.1    12.9  )-----------( 125      ( 08 Feb 2018 06:08 )             30.3     02-08    136  |  98  |  16  ----------------------------<  145<H>  4.2   |  23  |  0.79    Ca    9.3      08 Feb 2018 06:08  Phos  3.9     02-08  Mg     1.9     02-08            RADIOLOGY & ADDITIONAL TESTS: Reviewed 7U to 7E Transfer Note  32M paraplegic PMH spinal cord injury (wheelchair bound), sacral pressure ulcer with osteo x2 (outpatient provider said they have records of March osteo s/p daptomycin of unknown length) earlier in 2017,  DM2, indwelling suprapubic catheter who presented w/ septic shock secondary to infected purulent sacral 4th degree pressure ulcer with underlying inadequately treated OM with septic shock now resolved. Patient has been on RMF for extended period of time with persistent OM treated with antibiotics including Vanc, Zosyn, Cipro, Augmentin, but currently being treated with Zosyn(since 1/29). Prior wound cultures have grown citrobacter, enterococcus, klebsiella.  Recent blood cultures negative.  Decision was made for patient to have colostomy to prevent stool contamination of sacral wound.  Now s/p colostomy c/b ileus requiring NGT decompression.  Ostomy has had nearly no stool output.  Followed by surgical team which recommended medical management.  At this point no improvement of severe abdominal distention(CT shows severely dilated colon full of stool) so being transferred to  for cholinergic therapy.       INTERVAL HPI/OVERNIGHT EVENTS:  Overnight golytely was stopped as patient started vomiting, NGT place on suction again.  This morning at bedside patient very uncomfortable from distention.      Vital Signs Last 24 Hrs  T(C): 36.5 (08 Feb 2018 06:19), Max: 36.6 (07 Feb 2018 16:26)  T(F): 97.7 (08 Feb 2018 06:19), Max: 97.9 (07 Feb 2018 16:26)  HR: 135 (08 Feb 2018 06:19) (108 - 135)  BP: 137/88 (08 Feb 2018 06:19) (137/88 - 146/92)  BP(mean): --  ABP: --  ABP(mean): --  RR: 18 (08 Feb 2018 06:19) (16 - 18)  SpO2: 94% (08 Feb 2018 06:19) (94% - 98%)    PHYSICAL EXAM:  Constitutional: NAD, lying in bed, NGT in place on suction  HEENT: no eye discharge, no nasal discharge, no pharyngeal erythema, moist membranes  Neck: no lymphadenopathy, no JVD,  no carotid bruit  Cardiovascular: S1 and S2, RRR,  no M/R/G, non-displaced PMI  Respiratory: no wheezing, no rhonchi, no cough, no crackles   Gastrointestinal: BS+, tense, distended, midline scar, colostomy with minimal stool  Extremities: warm to touch, no edema  Vascular: 2+ peripheral pulses  Neurological: AAO x 4, PERRLA, EOMI  Psychiatric: normal mood, normal affect  Musculoskeletal: no joint swelling  Skin: No rashes, no skin breakdown    MEDICATIONS  (STANDING):  ascorbic acid 250 milliGRAM(s) Oral daily  atorvastatin 20 milliGRAM(s) Oral at bedtime  dextrose 5%. 1000 milliLiter(s) (50 mL/Hr) IV Continuous <Continuous>  dextrose 50% Injectable 12.5 Gram(s) IV Push once  dextrose 50% Injectable 25 Gram(s) IV Push once  dextrose 50% Injectable 25 Gram(s) IV Push once  docusate sodium 100 milliGRAM(s) Oral three times a day  haloperidol     Tablet 10 milliGRAM(s) Oral at bedtime  heparin  Injectable 5000 Unit(s) SubCutaneous every 8 hours  insulin glargine Injectable (LANTUS) 12.5 Unit(s) SubCutaneous at bedtime  insulin lispro (HumaLOG) corrective regimen sliding scale   SubCutaneous Before meals and at bedtime  lactulose Syrup 20 Gram(s) Oral four times a day  levETIRAcetam 500 milliGRAM(s) Oral two times a day  magnesium hydroxide Suspension 30 milliLiter(s) Oral daily  melatonin 5 milliGRAM(s) Oral at bedtime  mineral oil 30 milliLiter(s) Oral once  mineral oil 30 milliLiter(s) Oral daily  nystatin Powder 1 Application(s) Topical two times a day  oxybutynin 5 milliGRAM(s) Oral two times a day  piperacillin/tazobactam IVPB. 3.375 Gram(s) IV Intermittent every 6 hours  polyethylene glycol 3350 17 Gram(s) Oral two times a day  senna 2 Tablet(s) Oral at bedtime  sodium chloride 0.9%. 1000 milliLiter(s) (100 mL/Hr) IV Continuous <Continuous>    MEDICATIONS  (PRN):  acetaminophen   Tablet 650 milliGRAM(s) Oral every 6 hours PRN For Temp greater than 38 C (100.4 F)  dextrose Gel 1 Dose(s) Oral once PRN Blood Glucose LESS THAN 70 milliGRAM(s)/deciliter  glucagon  Injectable 1 milliGRAM(s) IntraMuscular once PRN Glucose LESS THAN 70 milligrams/deciliter  haloperidol    Injectable 10 milliGRAM(s) IntraMuscular at bedtime PRN if refuses PO Haldol      Allergies    Capzasin Back and Body (Unknown)    Intolerances        LABS:                        9.1    12.9  )-----------( 125      ( 08 Feb 2018 06:08 )             30.3     02-08    136  |  98  |  16  ----------------------------<  145<H>  4.2   |  23  |  0.79    Ca    9.3      08 Feb 2018 06:08  Phos  3.9     02-08  Mg     1.9     02-08            RADIOLOGY & ADDITIONAL TESTS: Reviewed

## 2018-02-08 NOTE — PROGRESS NOTE ADULT - PROBLEM SELECTOR PLAN 1
s/p diverting colostomy 2/1/18 with worsening abdominal distention and minimal colostomy output  -Surgery: low suspicion of toxic megacolon or complete SBO however CT abdomen   -NGT decompression   -follow AXR  -considering cholinergic agent but will require telemetry monitoring

## 2018-02-09 LAB
ANION GAP SERPL CALC-SCNC: 17 MMOL/L — SIGNIFICANT CHANGE UP (ref 5–17)
BASOPHILS NFR BLD AUTO: 0.3 % — SIGNIFICANT CHANGE UP (ref 0–2)
BUN SERPL-MCNC: 15 MG/DL — SIGNIFICANT CHANGE UP (ref 7–23)
CALCIUM SERPL-MCNC: 8.4 MG/DL — SIGNIFICANT CHANGE UP (ref 8.4–10.5)
CHLORIDE SERPL-SCNC: 97 MMOL/L — SIGNIFICANT CHANGE UP (ref 96–108)
CO2 SERPL-SCNC: 19 MMOL/L — LOW (ref 22–31)
CREAT SERPL-MCNC: 0.66 MG/DL — SIGNIFICANT CHANGE UP (ref 0.5–1.3)
EOSINOPHIL NFR BLD AUTO: 1 % — SIGNIFICANT CHANGE UP (ref 0–6)
GLUCOSE BLDC GLUCOMTR-MCNC: 103 MG/DL — HIGH (ref 70–99)
GLUCOSE BLDC GLUCOMTR-MCNC: 204 MG/DL — HIGH (ref 70–99)
GLUCOSE BLDC GLUCOMTR-MCNC: 70 MG/DL — SIGNIFICANT CHANGE UP (ref 70–99)
GLUCOSE BLDC GLUCOMTR-MCNC: 76 MG/DL — SIGNIFICANT CHANGE UP (ref 70–99)
GLUCOSE BLDC GLUCOMTR-MCNC: 94 MG/DL — SIGNIFICANT CHANGE UP (ref 70–99)
GLUCOSE SERPL-MCNC: 91 MG/DL — SIGNIFICANT CHANGE UP (ref 70–99)
HCT VFR BLD CALC: 26.2 % — LOW (ref 39–50)
HGB BLD-MCNC: 8 G/DL — LOW (ref 13–17)
LYMPHOCYTES # BLD AUTO: 19 % — SIGNIFICANT CHANGE UP (ref 13–44)
MAGNESIUM SERPL-MCNC: 1.6 MG/DL — SIGNIFICANT CHANGE UP (ref 1.6–2.6)
MCHC RBC-ENTMCNC: 22.1 PG — LOW (ref 27–34)
MCHC RBC-ENTMCNC: 30.5 G/DL — LOW (ref 32–36)
MCV RBC AUTO: 72.4 FL — LOW (ref 80–100)
MONOCYTES NFR BLD AUTO: 10.6 % — SIGNIFICANT CHANGE UP (ref 2–14)
NEUTROPHILS NFR BLD AUTO: 69.1 % — SIGNIFICANT CHANGE UP (ref 43–77)
PHOSPHATE SERPL-MCNC: 3.8 MG/DL — SIGNIFICANT CHANGE UP (ref 2.5–4.5)
PLATELET # BLD AUTO: 121 K/UL — LOW (ref 150–400)
POTASSIUM SERPL-MCNC: 3.6 MMOL/L — SIGNIFICANT CHANGE UP (ref 3.5–5.3)
POTASSIUM SERPL-SCNC: 3.6 MMOL/L — SIGNIFICANT CHANGE UP (ref 3.5–5.3)
RBC # BLD: 3.62 M/UL — LOW (ref 4.2–5.8)
RBC # FLD: 19.7 % — HIGH (ref 10.3–16.9)
SODIUM SERPL-SCNC: 133 MMOL/L — LOW (ref 135–145)
WBC # BLD: 11.1 K/UL — HIGH (ref 3.8–10.5)
WBC # FLD AUTO: 11.1 K/UL — HIGH (ref 3.8–10.5)

## 2018-02-09 PROCEDURE — 99233 SBSQ HOSP IP/OBS HIGH 50: CPT | Mod: GC

## 2018-02-09 PROCEDURE — 74018 RADEX ABDOMEN 1 VIEW: CPT | Mod: 26

## 2018-02-09 PROCEDURE — 99233 SBSQ HOSP IP/OBS HIGH 50: CPT

## 2018-02-09 RX ORDER — INSULIN GLARGINE 100 [IU]/ML
12 INJECTION, SOLUTION SUBCUTANEOUS AT BEDTIME
Qty: 0 | Refills: 0 | Status: DISCONTINUED | OUTPATIENT
Start: 2018-02-09 | End: 2018-02-16

## 2018-02-09 RX ORDER — POTASSIUM CHLORIDE 20 MEQ
10 PACKET (EA) ORAL
Qty: 0 | Refills: 0 | Status: COMPLETED | OUTPATIENT
Start: 2018-02-09 | End: 2018-02-09

## 2018-02-09 RX ORDER — BENZOCAINE AND MENTHOL 5; 1 G/100ML; G/100ML
1 LIQUID ORAL EVERY 6 HOURS
Qty: 0 | Refills: 0 | Status: DISCONTINUED | OUTPATIENT
Start: 2018-02-09 | End: 2018-02-16

## 2018-02-09 RX ORDER — FAMOTIDINE 10 MG/ML
20 INJECTION INTRAVENOUS DAILY
Qty: 0 | Refills: 0 | Status: DISCONTINUED | OUTPATIENT
Start: 2018-02-09 | End: 2018-02-16

## 2018-02-09 RX ORDER — NEOSTIGMINE METHYLSULFATE 1 MG/ML
2 VIAL (ML) INJECTION ONCE
Qty: 0 | Refills: 0 | Status: COMPLETED | OUTPATIENT
Start: 2018-02-09 | End: 2018-02-09

## 2018-02-09 RX ADMIN — ATORVASTATIN CALCIUM 20 MILLIGRAM(S): 80 TABLET, FILM COATED ORAL at 22:38

## 2018-02-09 RX ADMIN — INSULIN GLARGINE 12 UNIT(S): 100 INJECTION, SOLUTION SUBCUTANEOUS at 22:41

## 2018-02-09 RX ADMIN — PIPERACILLIN AND TAZOBACTAM 200 GRAM(S): 4; .5 INJECTION, POWDER, LYOPHILIZED, FOR SOLUTION INTRAVENOUS at 18:24

## 2018-02-09 RX ADMIN — SODIUM CHLORIDE 100 MILLILITER(S): 9 INJECTION INTRAMUSCULAR; INTRAVENOUS; SUBCUTANEOUS at 01:20

## 2018-02-09 RX ADMIN — Medication 100 MILLIGRAM(S): at 14:47

## 2018-02-09 RX ADMIN — LACTULOSE 20 GRAM(S): 10 SOLUTION ORAL at 05:57

## 2018-02-09 RX ADMIN — NYSTATIN CREAM 1 APPLICATION(S): 100000 CREAM TOPICAL at 06:51

## 2018-02-09 RX ADMIN — POLYETHYLENE GLYCOL 3350 17 GRAM(S): 17 POWDER, FOR SOLUTION ORAL at 05:58

## 2018-02-09 RX ADMIN — Medication 30 MILLILITER(S): at 12:26

## 2018-02-09 RX ADMIN — LACTULOSE 20 GRAM(S): 10 SOLUTION ORAL at 18:24

## 2018-02-09 RX ADMIN — MAGNESIUM HYDROXIDE 30 MILLILITER(S): 400 TABLET, CHEWABLE ORAL at 12:49

## 2018-02-09 RX ADMIN — SENNA PLUS 2 TABLET(S): 8.6 TABLET ORAL at 22:40

## 2018-02-09 RX ADMIN — HEPARIN SODIUM 5000 UNIT(S): 5000 INJECTION INTRAVENOUS; SUBCUTANEOUS at 22:39

## 2018-02-09 RX ADMIN — Medication 100 MILLIEQUIVALENT(S): at 09:11

## 2018-02-09 RX ADMIN — Medication 5 MILLIGRAM(S): at 05:59

## 2018-02-09 RX ADMIN — PIPERACILLIN AND TAZOBACTAM 200 GRAM(S): 4; .5 INJECTION, POWDER, LYOPHILIZED, FOR SOLUTION INTRAVENOUS at 13:30

## 2018-02-09 RX ADMIN — LACTULOSE 20 GRAM(S): 10 SOLUTION ORAL at 12:26

## 2018-02-09 RX ADMIN — HEPARIN SODIUM 5000 UNIT(S): 5000 INJECTION INTRAVENOUS; SUBCUTANEOUS at 05:59

## 2018-02-09 RX ADMIN — Medication 250 MILLIGRAM(S): at 12:28

## 2018-02-09 RX ADMIN — LEVETIRACETAM 500 MILLIGRAM(S): 250 TABLET, FILM COATED ORAL at 18:25

## 2018-02-09 RX ADMIN — PIPERACILLIN AND TAZOBACTAM 200 GRAM(S): 4; .5 INJECTION, POWDER, LYOPHILIZED, FOR SOLUTION INTRAVENOUS at 05:57

## 2018-02-09 RX ADMIN — NYSTATIN CREAM 1 APPLICATION(S): 100000 CREAM TOPICAL at 18:24

## 2018-02-09 RX ADMIN — BENZOCAINE AND MENTHOL 1 LOZENGE: 5; 1 LIQUID ORAL at 05:30

## 2018-02-09 RX ADMIN — Medication 100 MILLIGRAM(S): at 05:59

## 2018-02-09 RX ADMIN — LEVETIRACETAM 500 MILLIGRAM(S): 250 TABLET, FILM COATED ORAL at 05:59

## 2018-02-09 RX ADMIN — FAMOTIDINE 20 MILLIGRAM(S): 10 INJECTION INTRAVENOUS at 12:28

## 2018-02-09 RX ADMIN — Medication 100 MILLIEQUIVALENT(S): at 10:46

## 2018-02-09 RX ADMIN — Medication 2 MILLIGRAM(S): at 13:16

## 2018-02-09 RX ADMIN — HALOPERIDOL DECANOATE 10 MILLIGRAM(S): 100 INJECTION INTRAMUSCULAR at 22:39

## 2018-02-09 RX ADMIN — HEPARIN SODIUM 5000 UNIT(S): 5000 INJECTION INTRAVENOUS; SUBCUTANEOUS at 14:47

## 2018-02-09 RX ADMIN — Medication 4: at 22:42

## 2018-02-09 RX ADMIN — POLYETHYLENE GLYCOL 3350 17 GRAM(S): 17 POWDER, FOR SOLUTION ORAL at 18:24

## 2018-02-09 RX ADMIN — Medication 100 MILLIEQUIVALENT(S): at 06:51

## 2018-02-09 RX ADMIN — Medication 5 MILLIGRAM(S): at 22:40

## 2018-02-09 RX ADMIN — Medication 100 MILLIGRAM(S): at 22:38

## 2018-02-09 NOTE — PROGRESS NOTE ADULT - ASSESSMENT
32yr old M with a PMHx significant for spinal cord injury (paraplegic x ~5 years), sacral decub ulcer with hx of sacral osteo in April and Aug 2017,  DMII, indwelling suprapubic catheter, CVA in 2016, HBV , admitted 12/4/17 for management of septic shock 2/2 infected stage 4 sacral decub ulcer, with underlying inadequately Rx osteomyelitis.  GI consulted for colonic decompression.    # Prolonged/Pathologic post-operative ileus  - with significant distention on CT of mostly colon  - likely patient might have had an underlying colonic dysmotility prior to the surgery and this has been aggravated by the diverting colostomy with colonic resection due to redundant colon noted intra-op  - continue aggressive bowel regimen as tolerated  - sp one dose of neostigmine with no response  - may consider relistor/methylnatrexone subcut  - Dr La reports that he was able to insert his finger into the ostomy and also 3/4 of a bowles catheter - whcih he used to flush/irrigate the ostomy with return of a lot of liquid stool  - Given degree of distention and the age of the ostomy the risks of performing an endoscopic procedure currently outweigh the benefits as this could potentially precipitate a colonic perforation.  - we will hold off for now on performing any endoscopic interventions  - likely pathologic post-op ileus might resolve given time      Discussed with attending Dr Cooper CORDERO following 32yr old M with a PMHx significant for spinal cord injury (paraplegic x ~5 years), sacral decub ulcer with hx of sacral osteo in April and Aug 2017,  DMII, indwelling suprapubic catheter, CVA in 2016, HBV , admitted 12/4/17 for management of septic shock 2/2 infected stage 4 sacral decub ulcer, with underlying inadequately Rx osteomyelitis.  GI consulted for colonic decompression.    # Prolonged/Pathologic post-operative ileus  - with significant distention on CT of mostly colon  - likely patient might have had an underlying colonic dysmotility prior to the surgery and this has been aggravated by the diverting colostomy with colonic resection due to redundant colon noted intra-op  - continue aggressive bowel regimen as tolerated  - sp one dose of neostigmine with no response  - [facility] re-dosed neostigmine and patient had a large bowel movement today  - Dr La reports that he was able to insert his finger into the ostomy and also 3/4 of a bowles catheter - whcih he used to flush/irrigate the ostomy with return of a lot of liquid stool  - Given degree of distention and the age of the ostomy the risks of performing an endoscopic procedure currently outweigh the benefits as this could potentially precipitate a colonic perforation.  - we will hold off for now on performing any endoscopic interventions  - likely pathologic post-op ileus might resolve given time      Discussed with attending Dr Gamboa  GI will sign off for now, please reconsult us as needed

## 2018-02-09 NOTE — PROGRESS NOTE ADULT - PROBLEM SELECTOR PLAN 2
F: NS @ 100cc  E: K>4 Mg>2, monitor and replete as needed  N: NPO in the setting of ileus, continue with diabetic diet when not NPO Pt w/ a stage 4 infected sacral ulcer w/ drainage with w/ feculent material. On admission required debridement by plastic surgery.   CT (1/27): Findings consistent with abscess replacing the lower sacrum and coccyx. Osteomyelitis of the adjacent S3 segment cannot be excluded. The collection appears to be communicating with the skin surface in the inferior gluteal cleft.   - General surgery does not believe there is a fistula between wound and rectum causing leakage of stool. Now with colostomy bag.   - Wound cx had citrobacter, enterococcus, klebsiella   - Wound care following; continue to appreciate recs  - c/w zosyn (1/29 to 2/12 - to complete 2 weeks)   - Blood cultures NGTD

## 2018-02-09 NOTE — PROGRESS NOTE ADULT - SUBJECTIVE AND OBJECTIVE BOX
PGY1 TRANSFER ACCEPTANCE NOTE    HOSPITAL COURSE  A 32M paraplegic with PMH of spinal cord injury (wheelchair bound), sacral pressure ulcer with osteo x2 (outpatient provider said they have records of March osteo s/p daptomycin of unknown length) earlier in 2017,  DM2, indwelling suprapubic catheter who presented w/ septic shock secondary to infected purulent sacral 4th degree pressure ulcer with underlying inadequately treated OM with septic shock now resolved. Patient has been on RMF for extended period of time with persistent OM treated with antibiotics including Vanc, Zosyn, Cipro, Augmentin, but currently being treated with Zosyn(since 1/29). Prior wound cultures have grown citrobacter, enterococcus, klebsiella.  Recent blood cultures negative.  Decision was made for patient to have colostomy to prevent stool contamination of sacral wound.  Now s/p colostomy c/b ileus requiring NGT decompression.  Ostomy has had nearly no stool output.  Followed by surgical team which recommended medical management.  At this point no improvement of severe abdominal distention (CT shows severely dilated colon full of stool)transferred to  for cholinergic therapy.  Given neostigmine 2/8 without output from ostomy.  Surgery attempted to disimpact, unsuccessful.  Started q4 tap water enemas with some improvement.  Holding oxybutynin 5mg BID d/t cholinergic effect.  Received second dose of neostigmine today without complication, large bowel movement (roughly 1L stool.)  Stable for transfer back to [facility].       INTERVAL EVENTS: Patient seen and examined at bedside. No acute events today.  Vitals signs stable. States he feels slightly less distended today.  Notes sore throat and dry cough.  No abdominal pain, SOB, F/C.      Vital Signs Last 12 Hrs  T(F): 97.9 (02-09-18 @ 19:00), Max: 98.2 (02-09-18 @ 09:22)  HR: 112 (02-09-18 @ 20:00) (100 - 124)  BP: 133/77 (02-09-18 @ 20:00) (117/63 - 150/74)  BP(mean): 91 (02-09-18 @ 20:00) (80 - 99)  RR: 26 (02-09-18 @ 20:00) (19 - 28)  SpO2: 99% (02-09-18 @ 20:00) (94% - 99%)  I&O's Summary    08 Feb 2018 07:01  -  09 Feb 2018 07:00  --------------------------------------------------------  IN: 2200 mL / OUT: 1135 mL / NET: 1065 mL    09 Feb 2018 07:01  -  09 Feb 2018 20:55  --------------------------------------------------------  IN: 1940 mL / OUT: 5430 mL / NET: -3490 mL        PHYSICAL EXAM:    Constitutional: NAD, AAOx3, comfortable in bed. NGT in place.   HEENT: PERRL, EOMI, no pharyngeal erythema or exudates, moist membranes, no lymphadenopathy, no JVD  Respiratory: Normal rate, rhythm, depth, effort. CTAB. No w/r/r.   Cardiovascular: RRR, normal S1 and S2, no m/r/g.   Gastrointestinal: tense, distended, midline surgical wound with staples C/D/I, colostomy with minimal brown liquid stool  : suprapubic catheter  Extremities: wwp, no edema.   Vascular: Pulses equal and strong throughout.   Neurological: Cranial nerves grossly intact, strength and sensation intact throughout.   Skin: Sacral ulcer not visualized dressing CDI    LABS:                        8.0    11.1  )-----------( 121      ( 09 Feb 2018 05:41 )             26.2     02-09    133<L>  |  97  |  15  ----------------------------<  91  3.6   |  19<L>  |  0.66    Ca    8.4      09 Feb 2018 05:42  Phos  3.8     02-09  Mg     1.6     02-09            RADIOLOGY & ADDITIONAL TESTS:    MEDICATIONS  (STANDING):  ascorbic acid 250 milliGRAM(s) Oral daily  atorvastatin 20 milliGRAM(s) Oral at bedtime  dextrose 5%. 1000 milliLiter(s) (50 mL/Hr) IV Continuous <Continuous>  dextrose 50% Injectable 12.5 Gram(s) IV Push once  dextrose 50% Injectable 25 Gram(s) IV Push once  dextrose 50% Injectable 25 Gram(s) IV Push once  docusate sodium 100 milliGRAM(s) Oral three times a day  famotidine    Tablet 20 milliGRAM(s) Oral daily  haloperidol     Tablet 10 milliGRAM(s) Oral at bedtime  heparin  Injectable 5000 Unit(s) SubCutaneous every 8 hours  insulin glargine Injectable (LANTUS) 12 Unit(s) SubCutaneous at bedtime  insulin lispro (HumaLOG) corrective regimen sliding scale   SubCutaneous Before meals and at bedtime  lactulose Syrup 20 Gram(s) Oral four times a day  levETIRAcetam 500 milliGRAM(s) Oral two times a day  magnesium hydroxide Suspension 30 milliLiter(s) Oral daily  melatonin 5 milliGRAM(s) Oral at bedtime  mineral oil 30 milliLiter(s) Oral once  mineral oil 30 milliLiter(s) Oral daily  nystatin Powder 1 Application(s) Topical two times a day  piperacillin/tazobactam IVPB. 3.375 Gram(s) IV Intermittent every 6 hours  polyethylene glycol 3350 17 Gram(s) Oral two times a day  senna 2 Tablet(s) Oral at bedtime  sodium chloride 0.9%. 1000 milliLiter(s) (100 mL/Hr) IV Continuous <Continuous>    MEDICATIONS  (PRN):  acetaminophen   Tablet 650 milliGRAM(s) Oral every 6 hours PRN For Temp greater than 38 C (100.4 F)  ALBUTerol    0.083% 2.5 milliGRAM(s) Nebulizer every 2 hours PRN Bronchospasm  atropine Syringe 0.5 milliGRAM(s) IV Push every 5 minutes PRN bradycardia  benzocaine 15 mG/menthol 3.6 mG Lozenge 1 Lozenge Oral every 6 hours PRN Sore Throat  dextrose Gel 1 Dose(s) Oral once PRN Blood Glucose LESS THAN 70 milliGRAM(s)/deciliter  glucagon  Injectable 1 milliGRAM(s) IntraMuscular once PRN Glucose LESS THAN 70 milligrams/deciliter  haloperidol    Injectable 10 milliGRAM(s) IntraMuscular at bedtime PRN if refuses PO Haldol PGY1 TRANSFER ACCEPTANCE NOTE    HOSPITAL COURSE  A 32M paraplegic with PMH of spinal cord injury (wheelchair bound), sacral pressure ulcer with osteo x2 (outpatient provider said they have records of March osteo s/p daptomycin of unknown length) earlier in 2017,  DM2, indwelling suprapubic catheter who presented w/ septic shock secondary to infected purulent sacral 4th degree pressure ulcer with underlying inadequately treated OM with septic shock now resolved. Patient was previously on RMF for extended period of time with persistent OM treated with antibiotics including Vanc, Zosyn, Cipro, Augmentin, but currently being treated with Zosyn (since 1/29). Prior wound cultures have grown citrobacter, enterococcus, klebsiella.  Recent blood cultures negative.  Decision was made for patient to have colostomy to prevent stool contamination of sacral wound.  Now s/p colostomy c/b ileus requiring NGT decompression.  Ostomy has had nearly no stool output.  Followed by surgical team which recommended medical management.  At this point no improvement of severe abdominal distention (CT shows severely dilated colon full of stool) transferred to  for cholinergic therapy.  Given neostigmine 2/8 without output from ostomy.  Surgery attempted to disimpact, unsuccessful.  Started q4 tap water enemas with some improvement.  Holding oxybutynin 5mg BID d/t cholinergic effect.  Received second dose of neostigmine today without complication, large bowel movement (roughly 1L stool.)  Stable for transfer back to [facility].       INTERVAL EVENTS: Patient seen and examined at bedside. No acute events today.  Vitals signs stable. States he feels slightly less distended today.  Notes sore throat and dry cough.  No abdominal pain, SOB, F/C.      Vital Signs Last 12 Hrs  T(F): 97.9 (02-09-18 @ 19:00), Max: 98.2 (02-09-18 @ 09:22)  HR: 112 (02-09-18 @ 20:00) (100 - 124)  BP: 133/77 (02-09-18 @ 20:00) (117/63 - 150/74)  BP(mean): 91 (02-09-18 @ 20:00) (80 - 99)  RR: 26 (02-09-18 @ 20:00) (19 - 28)  SpO2: 99% (02-09-18 @ 20:00) (94% - 99%)  I&O's Summary    08 Feb 2018 07:01  -  09 Feb 2018 07:00  --------------------------------------------------------  IN: 2200 mL / OUT: 1135 mL / NET: 1065 mL    09 Feb 2018 07:01  -  09 Feb 2018 20:55  --------------------------------------------------------  IN: 1940 mL / OUT: 5430 mL / NET: -3490 mL        PHYSICAL EXAM:    Constitutional: NAD, AAOx3, comfortable in bed. NGT in place.   HEENT: PERRL, EOMI, no pharyngeal erythema or exudates, moist membranes, no lymphadenopathy, no JVD  Respiratory: Normal rate, rhythm, depth, effort. CTAB. No w/r/r.   Cardiovascular: RRR, normal S1 and S2, no m/r/g.   Gastrointestinal: tense, distended, midline surgical wound with staples C/D/I, colostomy with minimal brown liquid stool  : suprapubic catheter  Extremities: wwp, no edema.   Vascular: Pulses equal and strong throughout.   Neurological: Cranial nerves grossly intact, strength and sensation intact throughout.   Skin: Sacral ulcer not visualized dressing CDI    LABS:                        8.0    11.1  )-----------( 121      ( 09 Feb 2018 05:41 )             26.2     02-09    133<L>  |  97  |  15  ----------------------------<  91  3.6   |  19<L>  |  0.66    Ca    8.4      09 Feb 2018 05:42  Phos  3.8     02-09  Mg     1.6     02-09            RADIOLOGY & ADDITIONAL TESTS:    MEDICATIONS  (STANDING):  ascorbic acid 250 milliGRAM(s) Oral daily  atorvastatin 20 milliGRAM(s) Oral at bedtime  dextrose 5%. 1000 milliLiter(s) (50 mL/Hr) IV Continuous <Continuous>  dextrose 50% Injectable 12.5 Gram(s) IV Push once  dextrose 50% Injectable 25 Gram(s) IV Push once  dextrose 50% Injectable 25 Gram(s) IV Push once  docusate sodium 100 milliGRAM(s) Oral three times a day  famotidine    Tablet 20 milliGRAM(s) Oral daily  haloperidol     Tablet 10 milliGRAM(s) Oral at bedtime  heparin  Injectable 5000 Unit(s) SubCutaneous every 8 hours  insulin glargine Injectable (LANTUS) 12 Unit(s) SubCutaneous at bedtime  insulin lispro (HumaLOG) corrective regimen sliding scale   SubCutaneous Before meals and at bedtime  lactulose Syrup 20 Gram(s) Oral four times a day  levETIRAcetam 500 milliGRAM(s) Oral two times a day  magnesium hydroxide Suspension 30 milliLiter(s) Oral daily  melatonin 5 milliGRAM(s) Oral at bedtime  mineral oil 30 milliLiter(s) Oral once  mineral oil 30 milliLiter(s) Oral daily  nystatin Powder 1 Application(s) Topical two times a day  piperacillin/tazobactam IVPB. 3.375 Gram(s) IV Intermittent every 6 hours  polyethylene glycol 3350 17 Gram(s) Oral two times a day  senna 2 Tablet(s) Oral at bedtime  sodium chloride 0.9%. 1000 milliLiter(s) (100 mL/Hr) IV Continuous <Continuous>    MEDICATIONS  (PRN):  acetaminophen   Tablet 650 milliGRAM(s) Oral every 6 hours PRN For Temp greater than 38 C (100.4 F)  ALBUTerol    0.083% 2.5 milliGRAM(s) Nebulizer every 2 hours PRN Bronchospasm  atropine Syringe 0.5 milliGRAM(s) IV Push every 5 minutes PRN bradycardia  benzocaine 15 mG/menthol 3.6 mG Lozenge 1 Lozenge Oral every 6 hours PRN Sore Throat  dextrose Gel 1 Dose(s) Oral once PRN Blood Glucose LESS THAN 70 milliGRAM(s)/deciliter  glucagon  Injectable 1 milliGRAM(s) IntraMuscular once PRN Glucose LESS THAN 70 milligrams/deciliter  haloperidol    Injectable 10 milliGRAM(s) IntraMuscular at bedtime PRN if refuses PO Haldol PGY1 TRANSFER ACCEPTANCE NOTE    HOSPITAL COURSE  A 32M paraplegic with PMH of spinal cord injury (wheelchair bound), sacral pressure ulcer with osteo x2 (outpatient provider said they have records of March osteo s/p daptomycin of unknown length) earlier in 2017,  DM2, indwelling suprapubic catheter who presented w/ septic shock secondary to infected purulent sacral 4th degree pressure ulcer with underlying inadequately treated OM with septic shock now resolved. Patient was previously on RMF for extended period of time with persistent OM treated with antibiotics including Vanc, Zosyn, Cipro, Augmentin, but currently being treated with Zosyn (since 1/29). Prior wound cultures have grown citrobacter, enterococcus, klebsiella.  Recent blood cultures negative.  Decision was made for patient to have colostomy to prevent stool contamination of sacral wound.  Now s/p colostomy c/b ileus requiring NGT decompression.  Ostomy has had nearly no stool output.  Followed by surgical team which recommended medical management.  At this point no improvement of severe abdominal distention (CT shows severely dilated colon full of stool) transferred to  for cholinergic therapy.  Given neostigmine 2/8 without output from ostomy.  Surgery attempted to disimpact, unsuccessful.  Started q4 tap water enemas with some improvement.  Holding oxybutynin 5mg BID d/t cholinergic effect.  Received second dose of neostigmine today without complication, large bowel movement (roughly 1L stool.)  Patient hemodynamically stable and ready for transfer to 7U for further management of care.       INTERVAL EVENTS: Patient seen and examined at bedside. No acute events today.  Vitals signs stable. States he feels slightly less distended today.  Notes sore throat and dry cough.  Denies fevers, chills, CP, SOB, abdominal pain, N/V/D, urinary symptoms.       Vital Signs Last 12 Hrs  T(F): 97.9 (02-09-18 @ 19:00), Max: 98.2 (02-09-18 @ 09:22)  HR: 112 (02-09-18 @ 20:00) (100 - 124)  BP: 133/77 (02-09-18 @ 20:00) (117/63 - 150/74)  BP(mean): 91 (02-09-18 @ 20:00) (80 - 99)  RR: 26 (02-09-18 @ 20:00) (19 - 28)  SpO2: 99% (02-09-18 @ 20:00) (94% - 99%)  I&O's Summary    08 Feb 2018 07:01  -  09 Feb 2018 07:00  --------------------------------------------------------  IN: 2200 mL / OUT: 1135 mL / NET: 1065 mL    09 Feb 2018 07:01  -  09 Feb 2018 20:55  --------------------------------------------------------  IN: 1940 mL / OUT: 5430 mL / NET: -3490 mL        PHYSICAL EXAM:    Constitutional: NAD, AAOx3, comfortable in bed.   HEENT: PERRL, EOMI, no pharyngeal erythema or exudates, moist membranes, no lymphadenopathy, no JVD  Respiratory: Normal rate, rhythm, depth, effort. CTAB. No w/r/r.   Cardiovascular: RRR, normal S1 and S2, no m/r/g.   Gastrointestinal: tense, moderately distended, midline surgical wound with staples C/D/I, colostomy with minimal brown liquid stool  : suprapubic catheter  Extremities: wwp, no edema.   Vascular: Pulses equal and strong throughout.   Neurological: Cranial nerves grossly intact, strength and sensation intact throughout.   Skin: Sacral ulcer not visualized dressing CDI    LABS:                        8.0    11.1  )-----------( 121      ( 09 Feb 2018 05:41 )             26.2     02-09    133<L>  |  97  |  15  ----------------------------<  91  3.6   |  19<L>  |  0.66    Ca    8.4      09 Feb 2018 05:42  Phos  3.8     02-09  Mg     1.6     02-09        RADIOLOGY & ADDITIONAL TESTS:    MEDICATIONS  (STANDING):  ascorbic acid 250 milliGRAM(s) Oral daily  atorvastatin 20 milliGRAM(s) Oral at bedtime  dextrose 5%. 1000 milliLiter(s) (50 mL/Hr) IV Continuous <Continuous>  dextrose 50% Injectable 12.5 Gram(s) IV Push once  dextrose 50% Injectable 25 Gram(s) IV Push once  dextrose 50% Injectable 25 Gram(s) IV Push once  docusate sodium 100 milliGRAM(s) Oral three times a day  famotidine    Tablet 20 milliGRAM(s) Oral daily  haloperidol     Tablet 10 milliGRAM(s) Oral at bedtime  heparin  Injectable 5000 Unit(s) SubCutaneous every 8 hours  insulin glargine Injectable (LANTUS) 12 Unit(s) SubCutaneous at bedtime  insulin lispro (HumaLOG) corrective regimen sliding scale   SubCutaneous Before meals and at bedtime  lactulose Syrup 20 Gram(s) Oral four times a day  levETIRAcetam 500 milliGRAM(s) Oral two times a day  magnesium hydroxide Suspension 30 milliLiter(s) Oral daily  melatonin 5 milliGRAM(s) Oral at bedtime  mineral oil 30 milliLiter(s) Oral once  mineral oil 30 milliLiter(s) Oral daily  nystatin Powder 1 Application(s) Topical two times a day  piperacillin/tazobactam IVPB. 3.375 Gram(s) IV Intermittent every 6 hours  polyethylene glycol 3350 17 Gram(s) Oral two times a day  senna 2 Tablet(s) Oral at bedtime  sodium chloride 0.9%. 1000 milliLiter(s) (100 mL/Hr) IV Continuous <Continuous>    MEDICATIONS  (PRN):  acetaminophen   Tablet 650 milliGRAM(s) Oral every 6 hours PRN For Temp greater than 38 C (100.4 F)  ALBUTerol    0.083% 2.5 milliGRAM(s) Nebulizer every 2 hours PRN Bronchospasm  atropine Syringe 0.5 milliGRAM(s) IV Push every 5 minutes PRN bradycardia  benzocaine 15 mG/menthol 3.6 mG Lozenge 1 Lozenge Oral every 6 hours PRN Sore Throat  dextrose Gel 1 Dose(s) Oral once PRN Blood Glucose LESS THAN 70 milliGRAM(s)/deciliter  glucagon  Injectable 1 milliGRAM(s) IntraMuscular once PRN Glucose LESS THAN 70 milligrams/deciliter  haloperidol    Injectable 10 milliGRAM(s) IntraMuscular at bedtime PRN if refuses PO Haldol

## 2018-02-09 NOTE — PROGRESS NOTE ADULT - PROBLEM SELECTOR PLAN 4
Iron studies showing iron deficiency.   -will maintain active type and screen  -Hgb stable, no evidence of active bleeding  -will transfuse for Hgb <7 Iron studies showing iron deficiency.   - will maintain active type and screen  - Hgb stable, no evidence of active bleeding  - will transfuse for Hgb <7

## 2018-02-09 NOTE — PROGRESS NOTE ADULT - PROBLEM SELECTOR PLAN 5
c/w home Lipitor medication    #Seizure Disorder: pt w/ a reported hx of seizure disorder  -c/w home medication of Keppra 500mg BID c/w home Lipitor medication

## 2018-02-09 NOTE — PROGRESS NOTE ADULT - PROBLEM SELECTOR PLAN 7
Patient on metformin and Glimepiride at home. HgA1C of 6.0.   - ISS+ metformin 1000mg BID  - 12 U Lantus QHS (due to NPO status)  - monitor FSG.

## 2018-02-09 NOTE — PROGRESS NOTE ADULT - PROBLEM SELECTOR PROBLEM 9
Need for prophylactic measure
Nutrition, metabolism, and development symptoms
Schizoaffective disorder, unspecified type
Hyperlipidemia
Nutrition, metabolism, and development symptoms
Nutrition, metabolism, and development symptoms
Need for prophylactic measure

## 2018-02-09 NOTE — PROGRESS NOTE ADULT - SUBJECTIVE AND OBJECTIVE BOX
SUBJECTIVE: Pt seen and examined at bedside. No nausea or vomiting overnight, abdominal distention improved. C/o heartburn    Vital Signs Last 24 Hrs  T(C): 36.8 (09 Feb 2018 09:22), Max: 36.9 (09 Feb 2018 06:19)  T(F): 98.2 (09 Feb 2018 09:22), Max: 98.5 (09 Feb 2018 06:19)  HR: 120 (09 Feb 2018 10:00) (82 - 136)  BP: 117/65 (09 Feb 2018 10:00) (110/77 - 169/88)  BP(mean): 82 (09 Feb 2018 10:00) (82 - 124)  RR: 21 (09 Feb 2018 10:00) (18 - 34)  SpO2: 96% (09 Feb 2018 10:00) (94% - 99%)    PHYSICAL EXAM    Constitutional: AOx3, NAD    Respiratory: CTABL, no resp distress    Cardiovascular: RRR, no m/r/g    Gastrointestinal: distention improving, abdomen softer, ostomy with stool in bag, midline incision with staples in place, CDI    Extremities: WWP    I&O's Detail    08 Feb 2018 07:01  -  09 Feb 2018 07:00  --------------------------------------------------------  IN:    sodium chloride 0.9%.: 1800 mL    Solution: 100 mL    Solution: 300 mL  Total IN: 2200 mL    OUT:    Colostomy: 10 mL    Indwelling Catheter - Suprapubic: 625 mL    Nasoenteral Tube: 500 mL  Total OUT: 1135 mL    Total NET: 1065 mL      09 Feb 2018 07:01  -  09 Feb 2018 10:17  --------------------------------------------------------  IN:    sodium chloride 0.9%.: 300 mL    Solution: 100 mL  Total IN: 400 mL    OUT:    Indwelling Catheter - Suprapubic: 240 mL  Total OUT: 240 mL    Total NET: 160 mL          LABS:                        8.0    11.1  )-----------( 121      ( 09 Feb 2018 05:41 )             26.2     02-09    133<L>  |  97  |  15  ----------------------------<  91  3.6   |  19<L>  |  0.66    Ca    8.4      09 Feb 2018 05:42  Phos  3.8     02-09  Mg     1.6     02-09            MEDICATIONS  (STANDING):  ascorbic acid 250 milliGRAM(s) Oral daily  atorvastatin 20 milliGRAM(s) Oral at bedtime  dextrose 5%. 1000 milliLiter(s) (50 mL/Hr) IV Continuous <Continuous>  dextrose 50% Injectable 12.5 Gram(s) IV Push once  dextrose 50% Injectable 25 Gram(s) IV Push once  dextrose 50% Injectable 25 Gram(s) IV Push once  docusate sodium 100 milliGRAM(s) Oral three times a day  haloperidol     Tablet 10 milliGRAM(s) Oral at bedtime  heparin  Injectable 5000 Unit(s) SubCutaneous every 8 hours  insulin glargine Injectable (LANTUS) 12.5 Unit(s) SubCutaneous at bedtime  insulin lispro (HumaLOG) corrective regimen sliding scale   SubCutaneous Before meals and at bedtime  lactulose Syrup 20 Gram(s) Oral four times a day  levETIRAcetam 500 milliGRAM(s) Oral two times a day  magnesium hydroxide Suspension 30 milliLiter(s) Oral daily  melatonin 5 milliGRAM(s) Oral at bedtime  mineral oil 30 milliLiter(s) Oral once  mineral oil 30 milliLiter(s) Oral daily  neostigmine Injectable 2 milliGRAM(s) IV Push once  nystatin Powder 1 Application(s) Topical two times a day  oxybutynin 5 milliGRAM(s) Oral two times a day  piperacillin/tazobactam IVPB. 3.375 Gram(s) IV Intermittent every 6 hours  polyethylene glycol 3350 17 Gram(s) Oral two times a day  potassium chloride  10 mEq/100 mL IVPB 10 milliEquivalent(s) IV Intermittent every 1 hour  senna 2 Tablet(s) Oral at bedtime  sodium chloride 0.9%. 1000 milliLiter(s) (100 mL/Hr) IV Continuous <Continuous>    MEDICATIONS  (PRN):  acetaminophen   Tablet 650 milliGRAM(s) Oral every 6 hours PRN For Temp greater than 38 C (100.4 F)  ALBUTerol    0.083% 2.5 milliGRAM(s) Nebulizer every 2 hours PRN Bronchospasm  atropine Syringe 0.5 milliGRAM(s) IV Push every 5 minutes PRN bradycardia  benzocaine 15 mG/menthol 3.6 mG Lozenge 1 Lozenge Oral every 6 hours PRN Sore Throat  dextrose Gel 1 Dose(s) Oral once PRN Blood Glucose LESS THAN 70 milliGRAM(s)/deciliter  glucagon  Injectable 1 milliGRAM(s) IntraMuscular once PRN Glucose LESS THAN 70 milligrams/deciliter  haloperidol    Injectable 10 milliGRAM(s) IntraMuscular at bedtime PRN if refuses PO Haldol      RADIOLOGY & ADDITIONAL STUDIES:    ASSESSMENT AND PLAN

## 2018-02-09 NOTE — ADVANCED PRACTICE NURSE CONSULT - ASSESSMENT
New 2 3/4" [place] appliance placed over stoma, approx 60 ccs brown liquid stool in old pouch. Pressure injuries to sacrum and left ischium cleansed then loosely packed with Aquacel, covered with foam dressings.

## 2018-02-09 NOTE — PROGRESS NOTE ADULT - PROBLEM SELECTOR PLAN 8
Pt evaluated by psych and recommended he needs to be admitted to medical-psychiatric inpatient unit. Patient does not have capacity to make medical decisions.   -discontinued Aripiprazole in favor of haloperidol. Haloperidol 10 mg PO or IM. Patient does not have capacity to refuse and is court ordered to receive the haldol. Pt evaluated by psych and recommended he needs to be admitted to medical-psychiatric inpatient unit. Patient does not have capacity to make medical decisions.   -discontinued Aripiprazole in favor of haloperidol. Haloperidol 10 mg PO or IM. Patient does not have capacity to refuse and is court ordered to receive the haldol.    #Seizure Disorder: pt w/ a reported hx of seizure disorder  - c/w home medication of Keppra 500mg BID

## 2018-02-09 NOTE — PROGRESS NOTE ADULT - PROBLEM SELECTOR PLAN 3
DVT PPX: GIOVANNA  ETHICS: Full Code  DISPOSITION: Transfer to . Pt w/ no sensation or control of urination with suprapubic catheter changed once a month.   - suprapubic catheter changed 1/12 (to change again around 2/12)  - holding oxybutynin 5mg BID d/t cholinergic effect

## 2018-02-09 NOTE — PROGRESS NOTE ADULT - ASSESSMENT
32M paraplegic PMH spinal cord injury (wheelchair bound), sacral pressure ulcer with osteo x2 (outpatient provider said they have records of March osteo s/p daptomycin of unknown length) earlier in 2017, DM2, indwelling suprapubic catheter who presented w/ septic shock secondary to infected purulent sacral 4th degree pressure ulcer with underlying inadequately treated OM, hemodynamically stable being treated for sacral osteomyelitis now s/p colostomy w/ Ileus     GI  Ileus following gastrointestinal surgery: s/p diverting colostomy 2/1/18 with worsening abdominal distention and minimal colostomy output; transferred to MICU for neostigmine under tele monitoring  -Surgery: low suspicion of toxic megacolon or complete SBO however CT abdomen   -NGT decompression   -follow AXR  -Neostigmine for bowel motility       ID  Sacral osteomyelitis.  Plan: Pt w/ a stage 4 infected sacral ulcer w/ drainage with w/ feculent material. On admission required debridement by plastic surgery.   CT (1/27): Findings consistent with abscess replacing the lower sacrum and coccyx. Osteomyelitis of the adjacent S3 segment cannot be excluded. The collection appears to be communicating with the skin surface in the inferior gluteal cleft.   - General surgery does not believe there is a fistula between wound and rectum causing leakage of stool. Now with colostomy bag.   - Wound cx had citrobacter, enterococcus, klebsiella   - Wound care following; continue to appreciate recs  - c/w zosyn (1/29 to 2/12 - to complete 2 weeks)   - Blood cultures NGTD    C. diff: Results came back as indeterminate and then positive. Discussed with ID prefers to hold off treating for now with no diarrhea. If developed increasing loose stool would add PO vancomycin. CDIFF WAS SENT IN THE SITUATION PATIENT WAS HYPOTENSIVE AND SEPTIC WITH UNCLEAR ETIOLOGY AND WAS IN LAYING IN STOOL FOR HOURS WITH WOUND VAC ON UNCLEAR OF TRUE CONSISTENCY. Thereafter, patient with more formed stool.     Neuro  Neurogenic bladder: Pt w/ no sensation or control of urination with suprapubic catheter changed once a month.   - suprapubic catheter changed 1/12 (to change again around 2/12)  - holding oxybutynin 5mg BID d/t cholinergic effect    Paraplegia: Pt w/ paraplegia after falling out of a window several years ago. Pt has no sensation or function below the nipples. Pt is able to move b/l upper extremities.   - pt has a automatic wheelchair for which he uses and is able to transfer himself from chair to bed.     Seizure Disorder: pt w/ a reported hx of seizure disorder  -c/w home medication of Keppra 500mg BID    Endo  Type 2 diabetes mellitus.  Plan: Patient on metformin and Glimepiride at home. HgA1C of 6.0.   - ISS+ metformin 1000mg BID  - 12.5U Lantus QHS (due to NPO status)  - monitor FSG.     Heme  Microcytic anemia: Iron studies showing iron deficiency.   -will maintain active type and screen  -Hgb stable, no evidence of active bleeding  -will transfuse for Hgb <7    Cardio  Hyperlipidemia: c/w home Lipitor medication    Psych  Pt evaluated by psych and recommended he needs to be admitted to medical-psychiatric inpatient unit. Patient does not have capacity to make medical decisions.   -discontinued Aripiprazole in favor of haloperidol. Haloperidol 10 mg PO or IM. Patient does not have capacity to refuse and is court ordered to receive the haldol.       F: NS @ 100cc  E: K>4 Mg>2, monitor and replete as needed  N: NPO in the setting of ileus, continue with diabetic diet when not NPO  Ppx: [facility]    FULL CODE. 32M paraplegic PMH spinal cord injury (wheelchair bound), sacral pressure ulcer with osteo x2 (outpatient provider said they have records of March osteo s/p daptomycin of unknown length) earlier in 2017, DM2, indwelling suprapubic catheter who presented w/ septic shock secondary to infected purulent sacral 4th degree pressure ulcer with underlying inadequately treated OM, hemodynamically stable being treated for sacral osteomyelitis now s/p colostomy w/ Ileus     GI  Ileus following gastrointestinal surgery: s/p diverting colostomy 2/1/18 with worsening abdominal distention and minimal colostomy output; transferred to MICU for neostigmine under tele monitoring  -Surgery: low suspicion of toxic megacolon or complete SBO however CT abdomen   -NGT decompression   -follow AXR  -Neostigmine for bowel motility   -q4 tap water enemas      ID  Sacral osteomyelitis.  Plan: Pt w/ a stage 4 infected sacral ulcer w/ drainage with w/ feculent material. On admission required debridement by plastic surgery.   CT (1/27): Findings consistent with abscess replacing the lower sacrum and coccyx. Osteomyelitis of the adjacent S3 segment cannot be excluded. The collection appears to be communicating with the skin surface in the inferior gluteal cleft.   - General surgery does not believe there is a fistula between wound and rectum causing leakage of stool. Now with colostomy bag.   - Wound cx had citrobacter, enterococcus, klebsiella   - Wound care following; continue to appreciate recs  - c/w zosyn (1/29 to 2/12 - to complete 2 weeks)   - Blood cultures NGTD    C. diff: Results came back as indeterminate and then positive. Discussed with ID prefers to hold off treating for now with no diarrhea. If developed increasing loose stool would add PO vancomycin. CDIFF WAS SENT IN THE SITUATION PATIENT WAS HYPOTENSIVE AND SEPTIC WITH UNCLEAR ETIOLOGY AND WAS IN LAYING IN STOOL FOR HOURS WITH WOUND VAC ON UNCLEAR OF TRUE CONSISTENCY. Thereafter, patient with more formed stool.     Neuro  Neurogenic bladder: Pt w/ no sensation or control of urination with suprapubic catheter changed once a month.   - suprapubic catheter changed 1/12 (to change again around 2/12)  - holding oxybutynin 5mg BID d/t cholinergic effect    Paraplegia: Pt w/ paraplegia after falling out of a window several years ago. Pt has no sensation or function below the nipples. Pt is able to move b/l upper extremities.   - pt has a automatic wheelchair for which he uses and is able to transfer himself from chair to bed.     Seizure Disorder: pt w/ a reported hx of seizure disorder  -c/w home medication of Keppra 500mg BID    Endo  Type 2 diabetes mellitus.  Plan: Patient on metformin and Glimepiride at home. HgA1C of 6.0.   - ISS+ metformin 1000mg BID  - 12 U Lantus QHS (due to NPO status)  - monitor FSG.     Heme  Microcytic anemia: Iron studies showing iron deficiency.   -will maintain active type and screen  -Hgb stable, no evidence of active bleeding  -will transfuse for Hgb <7    Cardio  Hyperlipidemia: c/w home Lipitor medication    Psych  Pt evaluated by psych and recommended he needs to be admitted to medical-psychiatric inpatient unit. Patient does not have capacity to make medical decisions.   -discontinued Aripiprazole in favor of haloperidol. Haloperidol 10 mg PO or IM. Patient does not have capacity to refuse and is court ordered to receive the haldol.       F: NS @ 100cc  E: K>4 Mg>2, monitor and replete as needed  N: NPO in the setting of ileus, continue with diabetic diet when not NPO  Ppx: [facility]    FULL CODE. 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     GI  Ileus following gastrointestinal surgery: s/p diverting colostomy 2/1/18 with worsening abdominal distention and minimal colostomy output; transferred to MICU for neostigmine under tele monitoring  -Surgery: low suspicion of toxic megacolon or complete SBO however CT abdomen   -NGT decompression   -follow AXR  -Neostigmine for bowel motility   -q4 tap water enemas      ID  Sacral osteomyelitis.  Plan: Pt w/ a stage 4 infected sacral ulcer w/ drainage with w/ feculent material. On admission required debridement by plastic surgery.   CT (1/27): Findings consistent with abscess replacing the lower sacrum and coccyx. Osteomyelitis of the adjacent S3 segment cannot be excluded. The collection appears to be communicating with the skin surface in the inferior gluteal cleft.   - General surgery does not believe there is a fistula between wound and rectum causing leakage of stool. Now with colostomy bag.   - Wound cx had citrobacter, enterococcus, klebsiella   - Wound care following; continue to appreciate recs  - c/w zosyn (1/29 to 2/12 - to complete 2 weeks)   - Blood cultures NGTD    C. diff: Results came back as indeterminate and then positive. Discussed with ID prefers to hold off treating for now with no diarrhea. If developed increasing loose stool would add PO vancomycin. CDIFF WAS SENT IN THE SITUATION PATIENT WAS HYPOTENSIVE AND SEPTIC WITH UNCLEAR ETIOLOGY AND WAS IN LAYING IN STOOL FOR HOURS WITH WOUND VAC ON UNCLEAR OF TRUE CONSISTENCY. Thereafter, patient with more formed stool.     Neuro  Neurogenic bladder: Pt w/ no sensation or control of urination with suprapubic catheter changed once a month.   - suprapubic catheter changed 1/12 (to change again around 2/12)  - holding oxybutynin 5mg BID d/t cholinergic effect    Paraplegia: Pt w/ paraplegia after falling out of a window several years ago. Pt has no sensation or function below the nipples. Pt is able to move b/l upper extremities.   - pt has a automatic wheelchair for which he uses and is able to transfer himself from chair to bed.     Seizure Disorder: pt w/ a reported hx of seizure disorder  -c/w home medication of Keppra 500mg BID    Endo  Type 2 diabetes mellitus.  Plan: Patient on metformin and Glimepiride at home. HgA1C of 6.0.   - ISS+ metformin 1000mg BID  - 12 U Lantus QHS (due to NPO status)  - monitor FSG.     Heme  Microcytic anemia: Iron studies showing iron deficiency.   -will maintain active type and screen  -Hgb stable, no evidence of active bleeding  -will transfuse for Hgb <7    Cardio  Hyperlipidemia: c/w home Lipitor medication    Psych  Pt evaluated by psych and recommended he needs to be admitted to medical-psychiatric inpatient unit. Patient does not have capacity to make medical decisions.   -discontinued Aripiprazole in favor of haloperidol. Haloperidol 10 mg PO or IM. Patient does not have capacity to refuse and is court ordered to receive the haldol.       F: NS @ 100cc  E: K>4 Mg>2, monitor and replete as needed  N: NPO in the setting of ileus, continue with diabetic diet when not NPO  Ppx: [facility]    FULL CODE.

## 2018-02-09 NOTE — PROGRESS NOTE BEHAVIORAL HEALTH - NSBHFUPINTERVALHXFT_PSY_A_CORE
Pt seen with aunt at bedside. Chart reviewed and discussed with team.  [facility] s/p improved GI function.  Pt reports he feels better overall.  Pt is future-oriented and discharge-focused. States he will be visited by someone from a LTC agency on Monday. I informed him that he will be able to return to his prior outpt clinic, [facility], which pt stated pleased him to hear.  Pt is receptive to visit, no evidence of psychosis, hostility or dangerousness.   Pt remains fully compliant with po Haldol with no evidence of SE.

## 2018-02-09 NOTE — PROGRESS NOTE ADULT - PROBLEM SELECTOR PLAN 10
DVT PPX: GIOVANNA  ETHICS: Full Code  DISPOSITION: Transfer to . DVT PPX: [facility]  ETHICS: Full Code  DISPOSITION: Transfer to 7U for further management of care.

## 2018-02-09 NOTE — PROGRESS NOTE ADULT - PROBLEM SELECTOR PLAN 1
s/p diverting colostomy 2/1/18 with worsening abdominal distention and minimal colostomy output; transferred to MICU for neostigmine under tele monitoring  -Surgery: low suspicion of toxic megacolon or complete SBO however CT abdomen   -NGT decompression   -follow AXR  -Neostigmine for bowel motility   -q4 tap water enemas s/p diverting colostomy 2/1/18 with worsening abdominal distention and minimal colostomy output; transferred to MICU for neostigmine under tele monitoring  - Surgery: low suspicion of toxic megacolon or complete SBO however CT abdomen   - NGT decompression   - follow AXR  - Neostigmine for bowel motility, completed therapy in MICU  -q4 tap water enemas s/p diverting colostomy 2/1/18 with worsening abdominal distention and minimal colostomy output; transferred to MICU for neostigmine under tele monitoring  - Surgery: low suspicion of toxic megacolon or complete SBO however CT abdomen   - NGT decompression   - follow AXR with no significant change from earlier today  - Neostigmine for bowel motility in MICU 2/8 without output from ostomy.  - Surgery attempted to disimpact, unsuccessful.    - Started q4 tap water enemas.    - Received second dose of neostigmine today without complication, large bowel movement (roughly 1L stool.) s/p diverting colostomy 2/1/18 with worsening abdominal distention and minimal colostomy output; transferred to MICU for neostigmine under tele monitoring  - Surgery: low suspicion of toxic megacolon or complete SBO   - NGT decompression, now removed  - follow up AXR with no significant change from earlier today  - Neostigmine for bowel motility in MICU 2/8 without output from ostomy.    - Surgery attempted to disimpact, unsuccessful.    - Received second dose of neostigmine today without complication, large bowel movement (roughly 1L stool.)  - Started q4 tap water enemas.  Continue as needed.

## 2018-02-09 NOTE — PROGRESS NOTE ADULT - SUBJECTIVE AND OBJECTIVE BOX
PGY 1 Acceptance Note  Hospital Course:  32M paraplegic PMH spinal cord injury (wheelchair bound), sacral pressure ulcer with osteo x2 (outpatient provider said they have records of March osteo s/p daptomycin of unknown length) earlier in 2017,  DM2, indwelling suprapubic catheter who presented w/ septic shock secondary to infected purulent sacral 4th degree pressure ulcer with underlying inadequately treated OM with septic shock now resolved. Patient has been on RMF for extended period of time with persistent OM treated with antibiotics including Vanc, Zosyn, Cipro, Augmentin, but currently being treated with Zosyn(since 1/29). Prior wound cultures have grown citrobacter, enterococcus, klebsiella.  Recent blood cultures negative.  Decision was made for patient to have colostomy to prevent stool contamination of sacral wound.  Now s/p colostomy c/b ileus requiring NGT decompression.  Ostomy has had nearly no stool output.  Followed by surgical team which recommended medical management.  At this point no improvement of severe abdominal distention (CT shows severely dilated colon full of stool)transferred to  for cholinergic therapy.  Given neostigmine 2/8 without output from ostomy.  Surgery attempted to disimpact, unsuccessful.  Started q4 tap water enemas with some improvement.  Holding oxybutynin 5mg BID d/t cholinergic effect.  Received second dose of neostigmine today without complication.  Stable for transfer back to [facility].     INTERVAL HPI/OVERNIGHT EVENTS: RAHEL, pt noted to have small amount of stool in ostomy bag.    SUBJECTIVE: Patient seen and examined at bedside.  States he feels slightly less distended today.  Notes sore throat and dry cough.  No abdominal pain, SOB, F/C.    OBJECTIVE:    VITAL SIGNS:  ICU Vital Signs Last 24 Hrs  T(C): 36.8 (09 Feb 2018 09:22), Max: 36.9 (09 Feb 2018 06:19)  T(F): 98.2 (09 Feb 2018 09:22), Max: 98.5 (09 Feb 2018 06:19)  HR: 100 (09 Feb 2018 13:00) (82 - 136)  BP: 129/72 (09 Feb 2018 13:00) (110/77 - 169/88)  BP(mean): 85 (09 Feb 2018 13:00) (82 - 124)  ABP: --  ABP(mean): --  RR: 19 (09 Feb 2018 13:00) (18 - 34)  SpO2: 97% (09 Feb 2018 13:00) (94% - 99%)      02-08 @ 07:01 - 02-09 @ 07:00  --------------------------------------------------------  IN: 2200 mL / OUT: 1135 mL / NET: 1065 mL    02-09 @ 07:01 - 02-09 @ 13:35  --------------------------------------------------------  IN: 500 mL / OUT: 410 mL / NET: 90 mL      CAPILLARY BLOOD GLUCOSE    POCT Blood Glucose.: 76 mg/dL (09 Feb 2018 11:25)    PHYSICAL EXAM:    Constitutional: NAD, lying in bed, NGT in place  HEENT: no pharyngeal erythema or exudates, moist membranes  Neck: no lymphadenopathy, no JVD  Cardiovascular: S1 and S2, tachycardic, no M/R/G, non-displaced PMI  Respiratory: no W/R/R, no increased WOB  Gastrointestinal: tense, distended, midline surgical wound with staples C/D/I, colostomy with minimal brown liquid stool  Extremities: warm to touch, no edema  Vascular: 2+ peripheral pulses  Neurological: AAO x 3. PERRLA, EOMI  Skin: Sacral ulcer not visualized dressing CDI    MEDICATIONS:  MEDICATIONS  (STANDING):  ascorbic acid 250 milliGRAM(s) Oral daily  atorvastatin 20 milliGRAM(s) Oral at bedtime  dextrose 5%. 1000 milliLiter(s) (50 mL/Hr) IV Continuous <Continuous>  dextrose 50% Injectable 12.5 Gram(s) IV Push once  dextrose 50% Injectable 25 Gram(s) IV Push once  dextrose 50% Injectable 25 Gram(s) IV Push once  docusate sodium 100 milliGRAM(s) Oral three times a day  famotidine    Tablet 20 milliGRAM(s) Oral daily  haloperidol     Tablet 10 milliGRAM(s) Oral at bedtime  heparin  Injectable 5000 Unit(s) SubCutaneous every 8 hours  insulin glargine Injectable (LANTUS) 12 Unit(s) SubCutaneous at bedtime  insulin lispro (HumaLOG) corrective regimen sliding scale   SubCutaneous Before meals and at bedtime  lactulose Syrup 20 Gram(s) Oral four times a day  levETIRAcetam 500 milliGRAM(s) Oral two times a day  magnesium hydroxide Suspension 30 milliLiter(s) Oral daily  melatonin 5 milliGRAM(s) Oral at bedtime  mineral oil 30 milliLiter(s) Oral once  mineral oil 30 milliLiter(s) Oral daily  nystatin Powder 1 Application(s) Topical two times a day  piperacillin/tazobactam IVPB. 3.375 Gram(s) IV Intermittent every 6 hours  polyethylene glycol 3350 17 Gram(s) Oral two times a day  senna 2 Tablet(s) Oral at bedtime  sodium chloride 0.9%. 1000 milliLiter(s) (100 mL/Hr) IV Continuous <Continuous>    MEDICATIONS  (PRN):  acetaminophen   Tablet 650 milliGRAM(s) Oral every 6 hours PRN For Temp greater than 38 C (100.4 F)  ALBUTerol    0.083% 2.5 milliGRAM(s) Nebulizer every 2 hours PRN Bronchospasm  atropine Syringe 0.5 milliGRAM(s) IV Push every 5 minutes PRN bradycardia  benzocaine 15 mG/menthol 3.6 mG Lozenge 1 Lozenge Oral every 6 hours PRN Sore Throat  dextrose Gel 1 Dose(s) Oral once PRN Blood Glucose LESS THAN 70 milliGRAM(s)/deciliter  glucagon  Injectable 1 milliGRAM(s) IntraMuscular once PRN Glucose LESS THAN 70 milligrams/deciliter  haloperidol    Injectable 10 milliGRAM(s) IntraMuscular at bedtime PRN if refuses PO Haldol    ALLERGIES:  Allergies    Capzasin Back and Body (Unknown)    Intolerances    LABS:                        8.0    11.1  )-----------( 121      ( 09 Feb 2018 05:41 )             26.2     02-09    133<L>  |  97  |  15  ----------------------------<  91  3.6   |  19<L>  |  0.66    Ca    8.4      09 Feb 2018 05:42  Phos  3.8     02-09  Mg     1.6     02-09    RADIOLOGY & ADDITIONAL TESTS: Reviewed. PGY 1 Acceptance Note  Hospital Course:  32M paraplegic PMH spinal cord injury (wheelchair bound), sacral pressure ulcer with osteo x2 (outpatient provider said they have records of March osteo s/p daptomycin of unknown length) earlier in 2017,  DM2, indwelling suprapubic catheter who presented w/ septic shock secondary to infected purulent sacral 4th degree pressure ulcer with underlying inadequately treated OM with septic shock now resolved. Patient has been on RMF for extended period of time with persistent OM treated with antibiotics including Vanc, Zosyn, Cipro, Augmentin, but currently being treated with Zosyn(since 1/29). Prior wound cultures have grown citrobacter, enterococcus, klebsiella.  Recent blood cultures negative.  Decision was made for patient to have colostomy to prevent stool contamination of sacral wound.  Now s/p colostomy c/b ileus requiring NGT decompression.  Ostomy has had nearly no stool output.  Followed by surgical team which recommended medical management.  At this point no improvement of severe abdominal distention (CT shows severely dilated colon full of stool)transferred to  for cholinergic therapy.  Given neostigmine 2/8 without output from ostomy.  Surgery attempted to disimpact, unsuccessful.  Started q4 tap water enemas with some improvement.  Holding oxybutynin 5mg BID d/t cholinergic effect.  Received second dose of neostigmine today without complication, large bowel movement (roughly 1L stool.)  Stable for transfer back to [facility].     INTERVAL HPI/OVERNIGHT EVENTS: RAHEL, pt noted to have small amount of stool in ostomy bag.    SUBJECTIVE: Patient seen and examined at bedside.  States he feels slightly less distended today.  Notes sore throat and dry cough.  No abdominal pain, SOB, F/C.    OBJECTIVE:    VITAL SIGNS:  ICU Vital Signs Last 24 Hrs  T(C): 36.8 (09 Feb 2018 09:22), Max: 36.9 (09 Feb 2018 06:19)  T(F): 98.2 (09 Feb 2018 09:22), Max: 98.5 (09 Feb 2018 06:19)  HR: 100 (09 Feb 2018 13:00) (82 - 136)  BP: 129/72 (09 Feb 2018 13:00) (110/77 - 169/88)  BP(mean): 85 (09 Feb 2018 13:00) (82 - 124)  ABP: --  ABP(mean): --  RR: 19 (09 Feb 2018 13:00) (18 - 34)  SpO2: 97% (09 Feb 2018 13:00) (94% - 99%)      02-08 @ 07:01  -  02-09 @ 07:00  --------------------------------------------------------  IN: 2200 mL / OUT: 1135 mL / NET: 1065 mL    02-09 @ 07:01  -  02-09 @ 13:35  --------------------------------------------------------  IN: 500 mL / OUT: 410 mL / NET: 90 mL      CAPILLARY BLOOD GLUCOSE    POCT Blood Glucose.: 76 mg/dL (09 Feb 2018 11:25)    PHYSICAL EXAM:    Constitutional: NAD, lying in bed, NGT in place  HEENT: no pharyngeal erythema or exudates, moist membranes  Neck: no lymphadenopathy, no JVD  Cardiovascular: S1 and S2, tachycardic, no M/R/G, non-displaced PMI  Respiratory: no W/R/R, no increased WOB  Gastrointestinal: tense, distended, midline surgical wound with staples C/D/I, colostomy with minimal brown liquid stool  Extremities: warm to touch, no edema  Vascular: 2+ peripheral pulses  Neurological: AAO x 3. PERRLA, EOMI  Skin: Sacral ulcer not visualized dressing CDI    MEDICATIONS:  MEDICATIONS  (STANDING):  ascorbic acid 250 milliGRAM(s) Oral daily  atorvastatin 20 milliGRAM(s) Oral at bedtime  dextrose 5%. 1000 milliLiter(s) (50 mL/Hr) IV Continuous <Continuous>  dextrose 50% Injectable 12.5 Gram(s) IV Push once  dextrose 50% Injectable 25 Gram(s) IV Push once  dextrose 50% Injectable 25 Gram(s) IV Push once  docusate sodium 100 milliGRAM(s) Oral three times a day  famotidine    Tablet 20 milliGRAM(s) Oral daily  haloperidol     Tablet 10 milliGRAM(s) Oral at bedtime  heparin  Injectable 5000 Unit(s) SubCutaneous every 8 hours  insulin glargine Injectable (LANTUS) 12 Unit(s) SubCutaneous at bedtime  insulin lispro (HumaLOG) corrective regimen sliding scale   SubCutaneous Before meals and at bedtime  lactulose Syrup 20 Gram(s) Oral four times a day  levETIRAcetam 500 milliGRAM(s) Oral two times a day  magnesium hydroxide Suspension 30 milliLiter(s) Oral daily  melatonin 5 milliGRAM(s) Oral at bedtime  mineral oil 30 milliLiter(s) Oral once  mineral oil 30 milliLiter(s) Oral daily  nystatin Powder 1 Application(s) Topical two times a day  piperacillin/tazobactam IVPB. 3.375 Gram(s) IV Intermittent every 6 hours  polyethylene glycol 3350 17 Gram(s) Oral two times a day  senna 2 Tablet(s) Oral at bedtime  sodium chloride 0.9%. 1000 milliLiter(s) (100 mL/Hr) IV Continuous <Continuous>    MEDICATIONS  (PRN):  acetaminophen   Tablet 650 milliGRAM(s) Oral every 6 hours PRN For Temp greater than 38 C (100.4 F)  ALBUTerol    0.083% 2.5 milliGRAM(s) Nebulizer every 2 hours PRN Bronchospasm  atropine Syringe 0.5 milliGRAM(s) IV Push every 5 minutes PRN bradycardia  benzocaine 15 mG/menthol 3.6 mG Lozenge 1 Lozenge Oral every 6 hours PRN Sore Throat  dextrose Gel 1 Dose(s) Oral once PRN Blood Glucose LESS THAN 70 milliGRAM(s)/deciliter  glucagon  Injectable 1 milliGRAM(s) IntraMuscular once PRN Glucose LESS THAN 70 milligrams/deciliter  haloperidol    Injectable 10 milliGRAM(s) IntraMuscular at bedtime PRN if refuses PO Haldol    ALLERGIES:  Allergies    Capzasin Back and Body (Unknown)    Intolerances    LABS:                        8.0    11.1  )-----------( 121      ( 09 Feb 2018 05:41 )             26.2     02-09    133<L>  |  97  |  15  ----------------------------<  91  3.6   |  19<L>  |  0.66    Ca    8.4      09 Feb 2018 05:42  Phos  3.8     02-09  Mg     1.6     02-09    RADIOLOGY & ADDITIONAL TESTS: Reviewed. PGY 1 Acceptance Note  Hospital Course:  32M paraplegic PMH spinal cord injury (wheelchair bound), sacral pressure ulcer with osteo x2 (outpatient provider said they have records of March osteo s/p daptomycin of unknown length) earlier in 2017,  DM2, indwelling suprapubic catheter who presented w/ septic shock secondary to infected purulent sacral 4th degree pressure ulcer with underlying inadequately treated OM with septic shock now resolved. Patient has been on RMF for extended period of time with persistent OM treated with antibiotics including Vanc, Zosyn, Cipro, Augmentin, but currently being treated with Zosyn(since 1/29). Prior wound cultures have grown citrobacter, enterococcus, klebsiella.  Recent blood cultures negative.  Decision was made for patient to have colostomy to prevent stool contamination of sacral wound.  Now s/p colostomy c/b ileus requiring NGT decompression.  Ostomy has had nearly no stool output.  Followed by surgical team which recommended medical management.  At this point no improvement of severe abdominal distention (CT shows severely dilated colon full of stool)transferred to  for cholinergic therapy.  Given neostigmine 2/8 without output from ostomy.  Surgery attempted to disimpact, unsuccessful.  Started q4 tap water enemas with some improvement.  Holding oxybutynin 5mg BID d/t cholinergic effect.  Received second dose of neostigmine today without complication, large bowel movement (roughly 1L stool.)  Stable for transfer back to [facility].     INTERVAL HPI/OVERNIGHT EVENTS: RAHEL, pt noted to have small amount of stool in ostomy bag.    SUBJECTIVE: Patient seen and examined at bedside.  States he feels slightly less distended today.  Notes sore throat and dry cough.  No abdominal pain, SOB, F/C.    OBJECTIVE:    VITAL SIGNS:  ICU Vital Signs Last 24 Hrs  T(C): 36.8 (09 Feb 2018 09:22), Max: 36.9 (09 Feb 2018 06:19)  T(F): 98.2 (09 Feb 2018 09:22), Max: 98.5 (09 Feb 2018 06:19)  HR: 100 (09 Feb 2018 13:00) (82 - 136)  BP: 129/72 (09 Feb 2018 13:00) (110/77 - 169/88)  BP(mean): 85 (09 Feb 2018 13:00) (82 - 124)  ABP: --  ABP(mean): --  RR: 19 (09 Feb 2018 13:00) (18 - 34)  SpO2: 97% (09 Feb 2018 13:00) (94% - 99%)      02-08 @ 07:01  -  02-09 @ 07:00  --------------------------------------------------------  IN: 2200 mL / OUT: 1135 mL / NET: 1065 mL    02-09 @ 07:01  -  02-09 @ 13:35  --------------------------------------------------------  IN: 500 mL / OUT: 410 mL / NET: 90 mL      CAPILLARY BLOOD GLUCOSE    POCT Blood Glucose.: 76 mg/dL (09 Feb 2018 11:25)    PHYSICAL EXAM:    Constitutional: NAD, lying in bed, NGT in place  HEENT: no pharyngeal erythema or exudates, moist membranes  Neck: no lymphadenopathy, no JVD  Cardiovascular: S1 and S2, tachycardic, no M/R/G, non-displaced PMI  Respiratory: no W/R/R, no increased WOB  Gastrointestinal: tense, distended, midline surgical wound with staples C/D/I, colostomy with minimal brown liquid stool  : suprapubic catheter  Extremities: warm to touch, no edema  Vascular: 2+ peripheral pulses  Neurological: AAO x 3. PERRLA, EOMI  Skin: Sacral ulcer not visualized dressing CDI    MEDICATIONS:  MEDICATIONS  (STANDING):  ascorbic acid 250 milliGRAM(s) Oral daily  atorvastatin 20 milliGRAM(s) Oral at bedtime  dextrose 5%. 1000 milliLiter(s) (50 mL/Hr) IV Continuous <Continuous>  dextrose 50% Injectable 12.5 Gram(s) IV Push once  dextrose 50% Injectable 25 Gram(s) IV Push once  dextrose 50% Injectable 25 Gram(s) IV Push once  docusate sodium 100 milliGRAM(s) Oral three times a day  famotidine    Tablet 20 milliGRAM(s) Oral daily  haloperidol     Tablet 10 milliGRAM(s) Oral at bedtime  heparin  Injectable 5000 Unit(s) SubCutaneous every 8 hours  insulin glargine Injectable (LANTUS) 12 Unit(s) SubCutaneous at bedtime  insulin lispro (HumaLOG) corrective regimen sliding scale   SubCutaneous Before meals and at bedtime  lactulose Syrup 20 Gram(s) Oral four times a day  levETIRAcetam 500 milliGRAM(s) Oral two times a day  magnesium hydroxide Suspension 30 milliLiter(s) Oral daily  melatonin 5 milliGRAM(s) Oral at bedtime  mineral oil 30 milliLiter(s) Oral once  mineral oil 30 milliLiter(s) Oral daily  nystatin Powder 1 Application(s) Topical two times a day  piperacillin/tazobactam IVPB. 3.375 Gram(s) IV Intermittent every 6 hours  polyethylene glycol 3350 17 Gram(s) Oral two times a day  senna 2 Tablet(s) Oral at bedtime  sodium chloride 0.9%. 1000 milliLiter(s) (100 mL/Hr) IV Continuous <Continuous>    MEDICATIONS  (PRN):  acetaminophen   Tablet 650 milliGRAM(s) Oral every 6 hours PRN For Temp greater than 38 C (100.4 F)  ALBUTerol    0.083% 2.5 milliGRAM(s) Nebulizer every 2 hours PRN Bronchospasm  atropine Syringe 0.5 milliGRAM(s) IV Push every 5 minutes PRN bradycardia  benzocaine 15 mG/menthol 3.6 mG Lozenge 1 Lozenge Oral every 6 hours PRN Sore Throat  dextrose Gel 1 Dose(s) Oral once PRN Blood Glucose LESS THAN 70 milliGRAM(s)/deciliter  glucagon  Injectable 1 milliGRAM(s) IntraMuscular once PRN Glucose LESS THAN 70 milligrams/deciliter  haloperidol    Injectable 10 milliGRAM(s) IntraMuscular at bedtime PRN if refuses PO Haldol    ALLERGIES:  Allergies    Capzasin Back and Body (Unknown)    Intolerances    LABS:                        8.0    11.1  )-----------( 121      ( 09 Feb 2018 05:41 )             26.2     02-09    133<L>  |  97  |  15  ----------------------------<  91  3.6   |  19<L>  |  0.66    Ca    8.4      09 Feb 2018 05:42  Phos  3.8     02-09  Mg     1.6     02-09    RADIOLOGY & ADDITIONAL TESTS: Reviewed.

## 2018-02-09 NOTE — PROGRESS NOTE ADULT - SUBJECTIVE AND OBJECTIVE BOX
Pt seen and examined at bedside  No new c/o  No n/v, abdominal pain or bleeding    MEDICATIONS:  MEDICATIONS  (STANDING):  ascorbic acid 250 milliGRAM(s) Oral daily  atorvastatin 20 milliGRAM(s) Oral at bedtime  dextrose 5%. 1000 milliLiter(s) (50 mL/Hr) IV Continuous <Continuous>  dextrose 50% Injectable 12.5 Gram(s) IV Push once  dextrose 50% Injectable 25 Gram(s) IV Push once  dextrose 50% Injectable 25 Gram(s) IV Push once  docusate sodium 100 milliGRAM(s) Oral three times a day  famotidine    Tablet 20 milliGRAM(s) Oral daily  haloperidol     Tablet 10 milliGRAM(s) Oral at bedtime  heparin  Injectable 5000 Unit(s) SubCutaneous every 8 hours  insulin glargine Injectable (LANTUS) 12.5 Unit(s) SubCutaneous at bedtime  insulin lispro (HumaLOG) corrective regimen sliding scale   SubCutaneous Before meals and at bedtime  lactulose Syrup 20 Gram(s) Oral four times a day  levETIRAcetam 500 milliGRAM(s) Oral two times a day  magnesium hydroxide Suspension 30 milliLiter(s) Oral daily  melatonin 5 milliGRAM(s) Oral at bedtime  mineral oil 30 milliLiter(s) Oral once  mineral oil 30 milliLiter(s) Oral daily  neostigmine Injectable 2 milliGRAM(s) IV Push once  nystatin Powder 1 Application(s) Topical two times a day  oxybutynin 5 milliGRAM(s) Oral two times a day  piperacillin/tazobactam IVPB. 3.375 Gram(s) IV Intermittent every 6 hours  polyethylene glycol 3350 17 Gram(s) Oral two times a day  senna 2 Tablet(s) Oral at bedtime  sodium chloride 0.9%. 1000 milliLiter(s) (100 mL/Hr) IV Continuous <Continuous>    MEDICATIONS  (PRN):  acetaminophen   Tablet 650 milliGRAM(s) Oral every 6 hours PRN For Temp greater than 38 C (100.4 F)  ALBUTerol    0.083% 2.5 milliGRAM(s) Nebulizer every 2 hours PRN Bronchospasm  atropine Syringe 0.5 milliGRAM(s) IV Push every 5 minutes PRN bradycardia  benzocaine 15 mG/menthol 3.6 mG Lozenge 1 Lozenge Oral every 6 hours PRN Sore Throat  dextrose Gel 1 Dose(s) Oral once PRN Blood Glucose LESS THAN 70 milliGRAM(s)/deciliter  glucagon  Injectable 1 milliGRAM(s) IntraMuscular once PRN Glucose LESS THAN 70 milligrams/deciliter  haloperidol    Injectable 10 milliGRAM(s) IntraMuscular at bedtime PRN if refuses PO Haldol    Allergies  Capzasin Back and Body (Unknown)    Intolerances    Vital Signs Last 24 Hrs  T(C): 36.8 (09 Feb 2018 09:22), Max: 36.9 (09 Feb 2018 06:19)  T(F): 98.2 (09 Feb 2018 09:22), Max: 98.5 (09 Feb 2018 06:19)  HR: 120 (09 Feb 2018 10:00) (82 - 136)  BP: 117/65 (09 Feb 2018 10:00) (110/77 - 169/88)  BP(mean): 82 (09 Feb 2018 10:00) (82 - 124)  RR: 21 (09 Feb 2018 10:00) (18 - 34)  SpO2: 96% (09 Feb 2018 10:00) (94% - 99%)    02-08 @ 07:01 - 02-09 @ 07:00  --------------------------------------------------------  IN: 2200 mL / OUT: 1135 mL / NET: 1065 mL    02-09 @ 07:01 - 02-09 @ 10:48  --------------------------------------------------------  IN: 400 mL / OUT: 240 mL / NET: 160 mL    PHYSICAL EXAM:  GEN - young male sitting in bed in no distress, NGT in place, anicteric, afebrile, not pale  Gastrointestinal: severely distended, tympanitic to percussion, BS reduced, ostomy LLQ with some stool in it, NT, NR, NG, difficult to assess masses or organs due to degree of distention  Neurological: AAOx3 non focal  Psychiatric: anxious    LABS:  CBC Full  -  ( 09 Feb 2018 05:41 )  WBC Count : 11.1 K/uL  Hemoglobin : 8.0 g/dL  Hematocrit : 26.2 %  Platelet Count - Automated : 121 K/uL  Mean Cell Volume : 72.4 fL  Mean Cell Hemoglobin : 22.1 pg  Mean Cell Hemoglobin Concentration : 30.5 g/dL  Auto Neutrophil % : 69.1 %  Auto Lymphocyte % : 19.0 %  Auto Monocyte % : 10.6 %  Auto Eosinophil % : 1.0 %  Auto Basophil % : 0.3 %    133<L>  |  97  |  15  ----------------------------<  91  3.6   |  19<L>  |  0.66    Ca    8.4      09 Feb 2018 05:42  Phos  3.8     02-09  Mg     1.6     02-09          RADIOLOGY & ADDITIONAL STUDIES (The following images were personally reviewed):

## 2018-02-09 NOTE — PROGRESS NOTE ADULT - PROBLEM SELECTOR PLAN 9
F: NS @ 100cc  E: K>4 Mg>2, monitor and replete as needed  N: NPO in the setting of ileus, continue with diabetic diet when not NPO

## 2018-02-09 NOTE — PROGRESS NOTE ADULT - ATTENDING COMMENTS
patient seen and examined    reviewed vs, labs, available radiological reports/ studies, ekg     agree w/ PE findings as above, except colostomy bag was filled with stool     1. ileus: s/p neostigmine x2 , w/ stool output; on tap water enemas  2. sacral osteomyelitis : on zosyn, wound care   3. anemia: plan as above   4. paraplegia: chronic, w/ complications of sacral ulcers/ OM  5. DM2: plan as above  6. psychosis: appreciate psych recs

## 2018-02-10 LAB
ALBUMIN SERPL ELPH-MCNC: 2.8 G/DL — LOW (ref 3.3–5)
ALP SERPL-CCNC: 64 U/L — SIGNIFICANT CHANGE UP (ref 40–120)
ALT FLD-CCNC: 8 U/L — LOW (ref 10–45)
ANION GAP SERPL CALC-SCNC: 6 MMOL/L — SIGNIFICANT CHANGE UP (ref 5–17)
AST SERPL-CCNC: 9 U/L — LOW (ref 10–40)
BILIRUB SERPL-MCNC: 0.3 MG/DL — SIGNIFICANT CHANGE UP (ref 0.2–1.2)
BUN SERPL-MCNC: 11 MG/DL — SIGNIFICANT CHANGE UP (ref 7–23)
CALCIUM SERPL-MCNC: 8.1 MG/DL — LOW (ref 8.4–10.5)
CHLORIDE SERPL-SCNC: 104 MMOL/L — SIGNIFICANT CHANGE UP (ref 96–108)
CO2 SERPL-SCNC: 25 MMOL/L — SIGNIFICANT CHANGE UP (ref 22–31)
CREAT SERPL-MCNC: 0.67 MG/DL — SIGNIFICANT CHANGE UP (ref 0.5–1.3)
GLUCOSE BLDC GLUCOMTR-MCNC: 152 MG/DL — HIGH (ref 70–99)
GLUCOSE BLDC GLUCOMTR-MCNC: 164 MG/DL — HIGH (ref 70–99)
GLUCOSE BLDC GLUCOMTR-MCNC: 171 MG/DL — HIGH (ref 70–99)
GLUCOSE BLDC GLUCOMTR-MCNC: 97 MG/DL — SIGNIFICANT CHANGE UP (ref 70–99)
GLUCOSE SERPL-MCNC: 98 MG/DL — SIGNIFICANT CHANGE UP (ref 70–99)
MAGNESIUM SERPL-MCNC: 1.6 MG/DL — SIGNIFICANT CHANGE UP (ref 1.6–2.6)
PHOSPHATE SERPL-MCNC: 3.7 MG/DL — SIGNIFICANT CHANGE UP (ref 2.5–4.5)
POTASSIUM SERPL-MCNC: 3 MMOL/L — LOW (ref 3.5–5.3)
POTASSIUM SERPL-SCNC: 3 MMOL/L — LOW (ref 3.5–5.3)
PROT SERPL-MCNC: 5.7 G/DL — LOW (ref 6–8.3)
SODIUM SERPL-SCNC: 135 MMOL/L — SIGNIFICANT CHANGE UP (ref 135–145)

## 2018-02-10 PROCEDURE — 99233 SBSQ HOSP IP/OBS HIGH 50: CPT | Mod: GC

## 2018-02-10 RX ORDER — METFORMIN HYDROCHLORIDE 850 MG/1
500 TABLET ORAL
Qty: 0 | Refills: 0 | Status: DISCONTINUED | OUTPATIENT
Start: 2018-02-10 | End: 2018-02-16

## 2018-02-10 RX ORDER — POTASSIUM CHLORIDE 20 MEQ
10 PACKET (EA) ORAL
Qty: 0 | Refills: 0 | Status: DISCONTINUED | OUTPATIENT
Start: 2018-02-10 | End: 2018-02-10

## 2018-02-10 RX ORDER — POTASSIUM CHLORIDE 20 MEQ
40 PACKET (EA) ORAL ONCE
Qty: 0 | Refills: 0 | Status: COMPLETED | OUTPATIENT
Start: 2018-02-10 | End: 2018-02-10

## 2018-02-10 RX ADMIN — HEPARIN SODIUM 5000 UNIT(S): 5000 INJECTION INTRAVENOUS; SUBCUTANEOUS at 21:55

## 2018-02-10 RX ADMIN — LEVETIRACETAM 500 MILLIGRAM(S): 250 TABLET, FILM COATED ORAL at 17:35

## 2018-02-10 RX ADMIN — Medication 2: at 18:02

## 2018-02-10 RX ADMIN — Medication 100 MILLIGRAM(S): at 06:04

## 2018-02-10 RX ADMIN — METFORMIN HYDROCHLORIDE 500 MILLIGRAM(S): 850 TABLET ORAL at 18:02

## 2018-02-10 RX ADMIN — NYSTATIN CREAM 1 APPLICATION(S): 100000 CREAM TOPICAL at 12:23

## 2018-02-10 RX ADMIN — PIPERACILLIN AND TAZOBACTAM 200 GRAM(S): 4; .5 INJECTION, POWDER, LYOPHILIZED, FOR SOLUTION INTRAVENOUS at 00:13

## 2018-02-10 RX ADMIN — Medication 30 MILLILITER(S): at 12:23

## 2018-02-10 RX ADMIN — POLYETHYLENE GLYCOL 3350 17 GRAM(S): 17 POWDER, FOR SOLUTION ORAL at 06:04

## 2018-02-10 RX ADMIN — Medication 2: at 21:56

## 2018-02-10 RX ADMIN — LEVETIRACETAM 500 MILLIGRAM(S): 250 TABLET, FILM COATED ORAL at 06:04

## 2018-02-10 RX ADMIN — Medication 40 MILLIEQUIVALENT(S): at 09:54

## 2018-02-10 RX ADMIN — Medication 100 MILLIGRAM(S): at 21:57

## 2018-02-10 RX ADMIN — HEPARIN SODIUM 5000 UNIT(S): 5000 INJECTION INTRAVENOUS; SUBCUTANEOUS at 06:04

## 2018-02-10 RX ADMIN — Medication 40 MILLIEQUIVALENT(S): at 19:06

## 2018-02-10 RX ADMIN — PIPERACILLIN AND TAZOBACTAM 200 GRAM(S): 4; .5 INJECTION, POWDER, LYOPHILIZED, FOR SOLUTION INTRAVENOUS at 06:04

## 2018-02-10 RX ADMIN — PIPERACILLIN AND TAZOBACTAM 200 GRAM(S): 4; .5 INJECTION, POWDER, LYOPHILIZED, FOR SOLUTION INTRAVENOUS at 17:35

## 2018-02-10 RX ADMIN — Medication 2: at 13:20

## 2018-02-10 RX ADMIN — LACTULOSE 20 GRAM(S): 10 SOLUTION ORAL at 00:13

## 2018-02-10 RX ADMIN — HEPARIN SODIUM 5000 UNIT(S): 5000 INJECTION INTRAVENOUS; SUBCUTANEOUS at 13:20

## 2018-02-10 RX ADMIN — PIPERACILLIN AND TAZOBACTAM 200 GRAM(S): 4; .5 INJECTION, POWDER, LYOPHILIZED, FOR SOLUTION INTRAVENOUS at 12:24

## 2018-02-10 RX ADMIN — SENNA PLUS 2 TABLET(S): 8.6 TABLET ORAL at 21:49

## 2018-02-10 RX ADMIN — HALOPERIDOL DECANOATE 10 MILLIGRAM(S): 100 INJECTION INTRAMUSCULAR at 21:49

## 2018-02-10 RX ADMIN — INSULIN GLARGINE 12 UNIT(S): 100 INJECTION, SOLUTION SUBCUTANEOUS at 21:49

## 2018-02-10 RX ADMIN — ATORVASTATIN CALCIUM 20 MILLIGRAM(S): 80 TABLET, FILM COATED ORAL at 21:49

## 2018-02-10 RX ADMIN — Medication 5 MILLIGRAM(S): at 21:49

## 2018-02-10 RX ADMIN — SODIUM CHLORIDE 100 MILLILITER(S): 9 INJECTION INTRAMUSCULAR; INTRAVENOUS; SUBCUTANEOUS at 07:09

## 2018-02-10 RX ADMIN — Medication 250 MILLIGRAM(S): at 12:23

## 2018-02-10 RX ADMIN — Medication 100 MILLIGRAM(S): at 13:20

## 2018-02-10 RX ADMIN — NYSTATIN CREAM 1 APPLICATION(S): 100000 CREAM TOPICAL at 23:39

## 2018-02-10 RX ADMIN — LACTULOSE 20 GRAM(S): 10 SOLUTION ORAL at 06:04

## 2018-02-10 RX ADMIN — FAMOTIDINE 20 MILLIGRAM(S): 10 INJECTION INTRAVENOUS at 12:23

## 2018-02-10 NOTE — PROGRESS NOTE ADULT - ASSESSMENT
32M paraplegic PMH spinal cord injury (wheelchair bound), sacral pressure ulcer with osteo x2 (outpatient provider said they have records of March osteo s/p daptomycin of unknown length) earlier in 2017,  DM2, indwelling suprapubic catheter who presented w/ septic shock secondary to infected purulent sacral 4th degree pressure ulcer with underlying inadequately treated OM, hemodynamically stable being treated for sacral osteomyelitis now s/p colostomy w/ improving Ileus(post-neostigmine)

## 2018-02-10 NOTE — PROGRESS NOTE ADULT - PROBLEM SELECTOR PLAN 4
Patient on metformin and Glimepiride at home. HgA1C of 6.0.   - ISS  - 12.5U Lantus QHS (due to NPO status)  - monitor FSG

## 2018-02-10 NOTE — PROGRESS NOTE ADULT - SUBJECTIVE AND OBJECTIVE BOX
INTERVAL HPI/OVERNIGHT EVENTS:  No reported events from overnight team or nursing, patient remained hemodynamically stable. This morning at bedside 10 system ROS negative and patient feeling much better from a constipation standpoint.      Vital Signs Last 24 Hrs  T(C): 37 (10 Feb 2018 05:47), Max: 37 (10 Feb 2018 05:47)  T(F): 98.6 (10 Feb 2018 05:47), Max: 98.6 (10 Feb 2018 05:47)  HR: 94 (10 Feb 2018 05:47) (94 - 120)  BP: 126/79 (10 Feb 2018 05:47) (110/64 - 140/84)  BP(mean): 78 (09 Feb 2018 22:00) (78 - 99)  ABP: --  ABP(mean): --  RR: 18 (10 Feb 2018 05:47) (18 - 28)  SpO2: 95% (10 Feb 2018 05:47) (94% - 99%)    PHYSICAL EXAM:  Constitutional: NAD, lying in bed comfortably, nontoxic  HEENT: no eye discharge, no nasal discharge, no pharyngeal erythema, moist membranes  Neck: no lymphadenopathy, no JVD,  no carotid bruit  Cardiovascular: S1 and S2, RRR,  no M/R/G, non-displaced PMI  Respiratory: no wheezing, no rhonchi, no cough, no crackles   Gastrointestinal: BS+, tense, distended, midline scar, colostomy with stool  Vascular: 2+ peripheral pulses  Neurological: AAO x 4, PERRLA, EOMI  Psychiatric: normal mood, normal affect  Musculoskeletal: no joint swelling  Skin: No rashes, no skin breakdown    MEDICATIONS  (STANDING):  ascorbic acid 250 milliGRAM(s) Oral daily  atorvastatin 20 milliGRAM(s) Oral at bedtime  dextrose 5%. 1000 milliLiter(s) (50 mL/Hr) IV Continuous <Continuous>  dextrose 50% Injectable 12.5 Gram(s) IV Push once  dextrose 50% Injectable 25 Gram(s) IV Push once  dextrose 50% Injectable 25 Gram(s) IV Push once  docusate sodium 100 milliGRAM(s) Oral three times a day  famotidine    Tablet 20 milliGRAM(s) Oral daily  haloperidol     Tablet 10 milliGRAM(s) Oral at bedtime  heparin  Injectable 5000 Unit(s) SubCutaneous every 8 hours  insulin glargine Injectable (LANTUS) 12 Unit(s) SubCutaneous at bedtime  insulin lispro (HumaLOG) corrective regimen sliding scale   SubCutaneous Before meals and at bedtime  lactulose Syrup 20 Gram(s) Oral four times a day  levETIRAcetam 500 milliGRAM(s) Oral two times a day  magnesium hydroxide Suspension 30 milliLiter(s) Oral daily  melatonin 5 milliGRAM(s) Oral at bedtime  mineral oil 30 milliLiter(s) Oral once  mineral oil 30 milliLiter(s) Oral daily  nystatin Powder 1 Application(s) Topical two times a day  piperacillin/tazobactam IVPB. 3.375 Gram(s) IV Intermittent every 6 hours  polyethylene glycol 3350 17 Gram(s) Oral two times a day  potassium chloride    Tablet ER 40 milliEquivalent(s) Oral once  potassium chloride    Tablet ER 40 milliEquivalent(s) Oral once  senna 2 Tablet(s) Oral at bedtime  sodium chloride 0.9%. 1000 milliLiter(s) (100 mL/Hr) IV Continuous <Continuous>    MEDICATIONS  (PRN):  acetaminophen   Tablet 650 milliGRAM(s) Oral every 6 hours PRN For Temp greater than 38 C (100.4 F)  ALBUTerol    0.083% 2.5 milliGRAM(s) Nebulizer every 2 hours PRN Bronchospasm  benzocaine 15 mG/menthol 3.6 mG Lozenge 1 Lozenge Oral every 6 hours PRN Sore Throat  dextrose Gel 1 Dose(s) Oral once PRN Blood Glucose LESS THAN 70 milliGRAM(s)/deciliter  glucagon  Injectable 1 milliGRAM(s) IntraMuscular once PRN Glucose LESS THAN 70 milligrams/deciliter  haloperidol    Injectable 10 milliGRAM(s) IntraMuscular at bedtime PRN if refuses PO Haldol      Allergies    Capzasin Back and Body (Unknown)    Intolerances        LABS:                        8.0    11.1  )-----------( 121      ( 09 Feb 2018 05:41 )             26.2     02-10    135  |  104  |  11  ----------------------------<  98  3.0<L>   |  25  |  0.67    Ca    8.1<L>      10 Feb 2018 06:23  Phos  3.7     02-10  Mg     1.6     02-10    TPro  5.7<L>  /  Alb  2.8<L>  /  TBili  0.3  /  DBili  x   /  AST  9<L>  /  ALT  8<L>  /  AlkPhos  64  02-10          RADIOLOGY & ADDITIONAL TESTS: Reviewed

## 2018-02-10 NOTE — PROGRESS NOTE ADULT - PROBLEM SELECTOR PLAN 1
s/p diverting colostomy 2/1/18 with improving distention s/p neostigmine in MICU  -c/w bowel regimen  -no surgical intervention at this time

## 2018-02-10 NOTE — PROGRESS NOTE ADULT - ATTENDING COMMENTS
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 ID narrowed abx to zosyn. s/p diverting colostomy 2/1/18. cont local wound care  -left IT  decub and sacral decub- stage iv  - as above. s/p diverting colostomy  -psychosis - improved,  cont treatment over objection based on court ruling. haldol qhs (po preferably , if not willing then will need IM haldol) . ekg to eval qtc serially  -neurogenic bladder - c/w suprapubic bowles, oxybutinin  -DM ii - restart metformin  , ssi.   -epilepsy- keppra

## 2018-02-11 DIAGNOSIS — F22 DELUSIONAL DISORDERS: ICD-10-CM

## 2018-02-11 LAB
ANION GAP SERPL CALC-SCNC: 10 MMOL/L — SIGNIFICANT CHANGE UP (ref 5–17)
BLD GP AB SCN SERPL QL: NEGATIVE — SIGNIFICANT CHANGE UP
BUN SERPL-MCNC: 4 MG/DL — LOW (ref 7–23)
CALCIUM SERPL-MCNC: 8.5 MG/DL — SIGNIFICANT CHANGE UP (ref 8.4–10.5)
CHLORIDE SERPL-SCNC: 106 MMOL/L — SIGNIFICANT CHANGE UP (ref 96–108)
CO2 SERPL-SCNC: 24 MMOL/L — SIGNIFICANT CHANGE UP (ref 22–31)
CREAT SERPL-MCNC: 0.7 MG/DL — SIGNIFICANT CHANGE UP (ref 0.5–1.3)
GLUCOSE BLDC GLUCOMTR-MCNC: 151 MG/DL — HIGH (ref 70–99)
GLUCOSE BLDC GLUCOMTR-MCNC: 153 MG/DL — HIGH (ref 70–99)
GLUCOSE BLDC GLUCOMTR-MCNC: 167 MG/DL — HIGH (ref 70–99)
GLUCOSE BLDC GLUCOMTR-MCNC: 191 MG/DL — HIGH (ref 70–99)
GLUCOSE SERPL-MCNC: 100 MG/DL — HIGH (ref 70–99)
HCT VFR BLD CALC: 23 % — LOW (ref 39–50)
HCT VFR BLD CALC: 24.7 % — LOW (ref 39–50)
HCT VFR BLD CALC: 28.1 % — LOW (ref 39–50)
HGB BLD-MCNC: 6.8 G/DL — CRITICAL LOW (ref 13–17)
HGB BLD-MCNC: 7.2 G/DL — LOW (ref 13–17)
HGB BLD-MCNC: 8.3 G/DL — LOW (ref 13–17)
MAGNESIUM SERPL-MCNC: 1.5 MG/DL — LOW (ref 1.6–2.6)
MCHC RBC-ENTMCNC: 21.7 PG — LOW (ref 27–34)
MCHC RBC-ENTMCNC: 22 PG — LOW (ref 27–34)
MCHC RBC-ENTMCNC: 22.2 PG — LOW (ref 27–34)
MCHC RBC-ENTMCNC: 29.1 G/DL — LOW (ref 32–36)
MCHC RBC-ENTMCNC: 29.5 G/DL — LOW (ref 32–36)
MCHC RBC-ENTMCNC: 29.6 G/DL — LOW (ref 32–36)
MCV RBC AUTO: 73.5 FL — LOW (ref 80–100)
MCV RBC AUTO: 75.1 FL — LOW (ref 80–100)
MCV RBC AUTO: 75.3 FL — LOW (ref 80–100)
PHOSPHATE SERPL-MCNC: 3.4 MG/DL — SIGNIFICANT CHANGE UP (ref 2.5–4.5)
PLATELET # BLD AUTO: 110 K/UL — LOW (ref 150–400)
PLATELET # BLD AUTO: 119 K/UL — LOW (ref 150–400)
PLATELET # BLD AUTO: 120 K/UL — LOW (ref 150–400)
POTASSIUM SERPL-MCNC: 3.7 MMOL/L — SIGNIFICANT CHANGE UP (ref 3.5–5.3)
POTASSIUM SERPL-SCNC: 3.7 MMOL/L — SIGNIFICANT CHANGE UP (ref 3.5–5.3)
RBC # BLD: 3.13 M/UL — LOW (ref 4.2–5.8)
RBC # BLD: 3.28 M/UL — LOW (ref 4.2–5.8)
RBC # BLD: 3.74 M/UL — LOW (ref 4.2–5.8)
RBC # FLD: 18.9 % — HIGH (ref 10.3–16.9)
RBC # FLD: 20 % — HIGH (ref 10.3–16.9)
RBC # FLD: 20.1 % — HIGH (ref 10.3–16.9)
RH IG SCN BLD-IMP: POSITIVE — SIGNIFICANT CHANGE UP
SODIUM SERPL-SCNC: 140 MMOL/L — SIGNIFICANT CHANGE UP (ref 135–145)
WBC # BLD: 4.8 K/UL — SIGNIFICANT CHANGE UP (ref 3.8–10.5)
WBC # BLD: 4.9 K/UL — SIGNIFICANT CHANGE UP (ref 3.8–10.5)
WBC # BLD: 5.2 K/UL — SIGNIFICANT CHANGE UP (ref 3.8–10.5)
WBC # FLD AUTO: 4.8 K/UL — SIGNIFICANT CHANGE UP (ref 3.8–10.5)
WBC # FLD AUTO: 4.9 K/UL — SIGNIFICANT CHANGE UP (ref 3.8–10.5)
WBC # FLD AUTO: 5.2 K/UL — SIGNIFICANT CHANGE UP (ref 3.8–10.5)

## 2018-02-11 PROCEDURE — 99233 SBSQ HOSP IP/OBS HIGH 50: CPT | Mod: GC

## 2018-02-11 RX ORDER — MAGNESIUM SULFATE 500 MG/ML
2 VIAL (ML) INJECTION ONCE
Qty: 0 | Refills: 0 | Status: COMPLETED | OUTPATIENT
Start: 2018-02-11 | End: 2018-02-11

## 2018-02-11 RX ORDER — POTASSIUM CHLORIDE 20 MEQ
40 PACKET (EA) ORAL ONCE
Qty: 0 | Refills: 0 | Status: COMPLETED | OUTPATIENT
Start: 2018-02-11 | End: 2018-02-11

## 2018-02-11 RX ADMIN — Medication 5 MILLIGRAM(S): at 21:05

## 2018-02-11 RX ADMIN — Medication 100 MILLIGRAM(S): at 13:45

## 2018-02-11 RX ADMIN — METFORMIN HYDROCHLORIDE 500 MILLIGRAM(S): 850 TABLET ORAL at 17:30

## 2018-02-11 RX ADMIN — HEPARIN SODIUM 5000 UNIT(S): 5000 INJECTION INTRAVENOUS; SUBCUTANEOUS at 13:45

## 2018-02-11 RX ADMIN — PIPERACILLIN AND TAZOBACTAM 200 GRAM(S): 4; .5 INJECTION, POWDER, LYOPHILIZED, FOR SOLUTION INTRAVENOUS at 00:12

## 2018-02-11 RX ADMIN — NYSTATIN CREAM 1 APPLICATION(S): 100000 CREAM TOPICAL at 05:38

## 2018-02-11 RX ADMIN — LEVETIRACETAM 500 MILLIGRAM(S): 250 TABLET, FILM COATED ORAL at 05:39

## 2018-02-11 RX ADMIN — HALOPERIDOL DECANOATE 10 MILLIGRAM(S): 100 INJECTION INTRAMUSCULAR at 21:05

## 2018-02-11 RX ADMIN — Medication 650 MILLIGRAM(S): at 21:29

## 2018-02-11 RX ADMIN — ATORVASTATIN CALCIUM 20 MILLIGRAM(S): 80 TABLET, FILM COATED ORAL at 21:06

## 2018-02-11 RX ADMIN — Medication 2: at 22:21

## 2018-02-11 RX ADMIN — METFORMIN HYDROCHLORIDE 500 MILLIGRAM(S): 850 TABLET ORAL at 09:13

## 2018-02-11 RX ADMIN — PIPERACILLIN AND TAZOBACTAM 200 GRAM(S): 4; .5 INJECTION, POWDER, LYOPHILIZED, FOR SOLUTION INTRAVENOUS at 05:38

## 2018-02-11 RX ADMIN — NYSTATIN CREAM 1 APPLICATION(S): 100000 CREAM TOPICAL at 17:30

## 2018-02-11 RX ADMIN — FAMOTIDINE 20 MILLIGRAM(S): 10 INJECTION INTRAVENOUS at 11:37

## 2018-02-11 RX ADMIN — PIPERACILLIN AND TAZOBACTAM 200 GRAM(S): 4; .5 INJECTION, POWDER, LYOPHILIZED, FOR SOLUTION INTRAVENOUS at 17:30

## 2018-02-11 RX ADMIN — LACTULOSE 20 GRAM(S): 10 SOLUTION ORAL at 05:39

## 2018-02-11 RX ADMIN — PIPERACILLIN AND TAZOBACTAM 200 GRAM(S): 4; .5 INJECTION, POWDER, LYOPHILIZED, FOR SOLUTION INTRAVENOUS at 11:38

## 2018-02-11 RX ADMIN — Medication 100 MILLIGRAM(S): at 05:39

## 2018-02-11 RX ADMIN — Medication 2: at 18:24

## 2018-02-11 RX ADMIN — HEPARIN SODIUM 5000 UNIT(S): 5000 INJECTION INTRAVENOUS; SUBCUTANEOUS at 21:06

## 2018-02-11 RX ADMIN — INSULIN GLARGINE 12 UNIT(S): 100 INJECTION, SOLUTION SUBCUTANEOUS at 22:22

## 2018-02-11 RX ADMIN — LEVETIRACETAM 500 MILLIGRAM(S): 250 TABLET, FILM COATED ORAL at 17:30

## 2018-02-11 RX ADMIN — Medication 2: at 13:14

## 2018-02-11 RX ADMIN — Medication 2: at 09:16

## 2018-02-11 RX ADMIN — PIPERACILLIN AND TAZOBACTAM 200 GRAM(S): 4; .5 INJECTION, POWDER, LYOPHILIZED, FOR SOLUTION INTRAVENOUS at 23:55

## 2018-02-11 RX ADMIN — Medication 100 MILLIGRAM(S): at 21:06

## 2018-02-11 RX ADMIN — Medication 40 MILLIEQUIVALENT(S): at 13:46

## 2018-02-11 RX ADMIN — SODIUM CHLORIDE 100 MILLILITER(S): 9 INJECTION INTRAMUSCULAR; INTRAVENOUS; SUBCUTANEOUS at 00:19

## 2018-02-11 RX ADMIN — HEPARIN SODIUM 5000 UNIT(S): 5000 INJECTION INTRAVENOUS; SUBCUTANEOUS at 05:39

## 2018-02-11 RX ADMIN — Medication 50 GRAM(S): at 09:13

## 2018-02-11 RX ADMIN — SENNA PLUS 2 TABLET(S): 8.6 TABLET ORAL at 21:05

## 2018-02-11 RX ADMIN — Medication 250 MILLIGRAM(S): at 11:36

## 2018-02-11 NOTE — PROGRESS NOTE ADULT - SUBJECTIVE AND OBJECTIVE BOX
SUBJECTIVE: Denies any complaints  Flatus: [x ] YES [ ] NO             Bowel Movement: [ x] YES [ ] NO- Ostomy   Pain (0-10):            Pain Control Adequate: [x ] YES [ ] NO  Nausea: [ ] YES [ x] NO            Vomiting: [ ] YES [x ] NO  Diarrhea: [ ] YES [x ] NO         Constipation: [ ] YES [x ] NO     Chest Pain: [ ] YES [x ] NO    SOB:  [ ] YES [x ] NO    MEDICATIONS  (STANDING):  ascorbic acid 250 milliGRAM(s) Oral daily  atorvastatin 20 milliGRAM(s) Oral at bedtime  dextrose 5%. 1000 milliLiter(s) (50 mL/Hr) IV Continuous <Continuous>  dextrose 50% Injectable 12.5 Gram(s) IV Push once  dextrose 50% Injectable 25 Gram(s) IV Push once  dextrose 50% Injectable 25 Gram(s) IV Push once  docusate sodium 100 milliGRAM(s) Oral three times a day  famotidine    Tablet 20 milliGRAM(s) Oral daily  haloperidol     Tablet 10 milliGRAM(s) Oral at bedtime  heparin  Injectable 5000 Unit(s) SubCutaneous every 8 hours  insulin glargine Injectable (LANTUS) 12 Unit(s) SubCutaneous at bedtime  insulin lispro (HumaLOG) corrective regimen sliding scale   SubCutaneous Before meals and at bedtime  lactulose Syrup 20 Gram(s) Oral four times a day  levETIRAcetam 500 milliGRAM(s) Oral two times a day  magnesium hydroxide Suspension 30 milliLiter(s) Oral daily  melatonin 5 milliGRAM(s) Oral at bedtime  metFORMIN 500 milliGRAM(s) Oral two times a day with meals  mineral oil 30 milliLiter(s) Oral once  mineral oil 30 milliLiter(s) Oral daily  nystatin Powder 1 Application(s) Topical two times a day  piperacillin/tazobactam IVPB. 3.375 Gram(s) IV Intermittent every 6 hours  polyethylene glycol 3350 17 Gram(s) Oral two times a day  senna 2 Tablet(s) Oral at bedtime  sodium chloride 0.9%. 1000 milliLiter(s) (100 mL/Hr) IV Continuous <Continuous>    MEDICATIONS  (PRN):  acetaminophen   Tablet 650 milliGRAM(s) Oral every 6 hours PRN For Temp greater than 38 C (100.4 F)  ALBUTerol    0.083% 2.5 milliGRAM(s) Nebulizer every 2 hours PRN Bronchospasm  benzocaine 15 mG/menthol 3.6 mG Lozenge 1 Lozenge Oral every 6 hours PRN Sore Throat  dextrose Gel 1 Dose(s) Oral once PRN Blood Glucose LESS THAN 70 milliGRAM(s)/deciliter  glucagon  Injectable 1 milliGRAM(s) IntraMuscular once PRN Glucose LESS THAN 70 milligrams/deciliter  haloperidol    Injectable 10 milliGRAM(s) IntraMuscular at bedtime PRN if refuses PO Haldol      Vital Signs Last 24 Hrs  T(C): 37.1 (11 Feb 2018 08:25), Max: 37.2 (10 Feb 2018 20:00)  T(F): 98.7 (11 Feb 2018 08:25), Max: 98.9 (10 Feb 2018 20:00)  HR: 95 (11 Feb 2018 08:25) (76 - 98)  BP: 117/74 (11 Feb 2018 08:25) (108/66 - 117/74)  BP(mean): 82 (10 Feb 2018 15:00) (82 - 82)  RR: 18 (11 Feb 2018 08:25) (17 - 19)  SpO2: 97% (11 Feb 2018 08:25) (96% - 100%)    PHYSICAL EXAM:      Constitutional: A&Ox3      Respiratory: non labored breathing, no respiratory distress    Cardiovascular: NSR, RRR    Gastrointestinal: Soft ND,NT                 Incision: CDI, ostomy functioning, stoma pink and patent    Genitourinary: voiding    Extremities: (-) edema                  I&O's Detail    10 Feb 2018 07:01  -  11 Feb 2018 07:00  --------------------------------------------------------  IN:    sodium chloride 0.9%.: 1300 mL  Total IN: 1300 mL    OUT:    Colostomy: 1600 mL    Indwelling Catheter - Suprapubic: 4800 mL  Total OUT: 6400 mL    Total NET: -5100 mL          LABS:                        7.2    4.9   )-----------( 119      ( 11 Feb 2018 06:03 )             24.7     02-11    140  |  106  |  4<L>  ----------------------------<  100<H>  3.7   |  24  |  0.70    Ca    8.5      11 Feb 2018 06:03  Phos  3.4     02-11  Mg     1.5     02-11    TPro  5.7<L>  /  Alb  2.8<L>  /  TBili  0.3  /  DBili  x   /  AST  9<L>  /  ALT  8<L>  /  AlkPhos  64  02-10          RADIOLOGY & ADDITIONAL STUDIES:

## 2018-02-11 NOTE — PROGRESS NOTE ADULT - ASSESSMENT
31 yo M paraplegic with h/o SCI, DM, multiple stage 4 pressure with sacral decubitus ulcer POD 10 s/p diverting colostomy    -Ostomy functioning-mainly liquid  -abdomen soft, distension has decreased  -c/w enemas and milk of magnesia  -no evidence of acute abdomen, obstruction or perforation  -Surgery will continue to follow

## 2018-02-11 NOTE — PROGRESS NOTE ADULT - SUBJECTIVE AND OBJECTIVE BOX
Patient is a 32y old  Male who presents with a chief complaint of Chills (12 Dec 2017 18:17)      INTERVAL HPI/OVERNIGHT EVENTS:    feels well  wants to get into wheelchair  abd distension improved  (+) BMs      ROS  (- ) headache  ( -  )fevers/chills  ( - ) dyspnea  (  - ) cough  (  - ) chest pain  (  - ) palpatations  ( - ) dizziness/lightheadedness  (  - ) nausea/vomiting  (  - ) abd pain  (  - ) diarrhea  (  - ) melena  (  - ) hematochezia  (  - ) dysuria   ( - ) hematuria  (  - ) leg swelling    ( - ) calf tenderness  (  - ) motor weakness  ( - ) extremity numbness  ( - ) back pain  ( + ) tolerating POs  ( + ) BM  ROS: 12 point review of systems otherwise negative              MEDICATIONS  (STANDING):  ascorbic acid 250 milliGRAM(s) Oral daily  atorvastatin 20 milliGRAM(s) Oral at bedtime  dextrose 5%. 1000 milliLiter(s) (50 mL/Hr) IV Continuous <Continuous>  dextrose 50% Injectable 12.5 Gram(s) IV Push once  dextrose 50% Injectable 25 Gram(s) IV Push once  dextrose 50% Injectable 25 Gram(s) IV Push once  docusate sodium 100 milliGRAM(s) Oral three times a day  famotidine    Tablet 20 milliGRAM(s) Oral daily  haloperidol     Tablet 10 milliGRAM(s) Oral at bedtime  heparin  Injectable 5000 Unit(s) SubCutaneous every 8 hours  insulin glargine Injectable (LANTUS) 12 Unit(s) SubCutaneous at bedtime  insulin lispro (HumaLOG) corrective regimen sliding scale   SubCutaneous Before meals and at bedtime  lactulose Syrup 20 Gram(s) Oral four times a day  levETIRAcetam 500 milliGRAM(s) Oral two times a day  magnesium hydroxide Suspension 30 milliLiter(s) Oral daily  melatonin 5 milliGRAM(s) Oral at bedtime  metFORMIN 500 milliGRAM(s) Oral two times a day with meals  mineral oil 30 milliLiter(s) Oral once  mineral oil 30 milliLiter(s) Oral daily  nystatin Powder 1 Application(s) Topical two times a day  piperacillin/tazobactam IVPB. 3.375 Gram(s) IV Intermittent every 6 hours  polyethylene glycol 3350 17 Gram(s) Oral two times a day  senna 2 Tablet(s) Oral at bedtime  sodium chloride 0.9%. 1000 milliLiter(s) (100 mL/Hr) IV Continuous <Continuous>    MEDICATIONS  (PRN):  acetaminophen   Tablet 650 milliGRAM(s) Oral every 6 hours PRN For Temp greater than 38 C (100.4 F)  ALBUTerol    0.083% 2.5 milliGRAM(s) Nebulizer every 2 hours PRN Bronchospasm  benzocaine 15 mG/menthol 3.6 mG Lozenge 1 Lozenge Oral every 6 hours PRN Sore Throat  dextrose Gel 1 Dose(s) Oral once PRN Blood Glucose LESS THAN 70 milliGRAM(s)/deciliter  glucagon  Injectable 1 milliGRAM(s) IntraMuscular once PRN Glucose LESS THAN 70 milligrams/deciliter  haloperidol    Injectable 10 milliGRAM(s) IntraMuscular at bedtime PRN if refuses PO Haldol      Allergies    Capzasin Back and Body (Unknown)    Intolerances          Vital Signs Last 24 Hrs  T(C): 37.1 (11 Feb 2018 08:25), Max: 37.2 (10 Feb 2018 20:00)  T(F): 98.7 (11 Feb 2018 08:25), Max: 98.9 (10 Feb 2018 20:00)  HR: 95 (11 Feb 2018 08:25) (76 - 95)  BP: 117/74 (11 Feb 2018 08:25) (108/66 - 117/74)  BP(mean): 82 (10 Feb 2018 15:00) (82 - 82)  RR: 18 (11 Feb 2018 08:25) (18 - 19)  SpO2: 97% (11 Feb 2018 08:25) (96% - 100%)  CAPILLARY BLOOD GLUCOSE      POCT Blood Glucose.: 191 mg/dL (11 Feb 2018 12:32)  POCT Blood Glucose.: 153 mg/dL (11 Feb 2018 08:52)  POCT Blood Glucose.: 164 mg/dL (10 Feb 2018 21:25)  POCT Blood Glucose.: 152 mg/dL (10 Feb 2018 17:48)      02-10 @ 07:01  -  02-11 @ 07:00  --------------------------------------------------------  IN: 1300 mL / OUT: 6400 mL / NET: -5100 mL        Physical Exam:    Daily     Daily   General:  Well appearing   HEENT:  Nonicteric, PERRLA, oral pharynx w/o erythema/exudate,  neck supple  CV:  C1K4rrw , RRR,  no JVD  Lungs:  CTA B/L, no wheezes, rales, rhonchi  Abdomen:  positive BS, Soft, non-tender,  distended, ostomy w/ brown stool, SP cath site clean, no hepatosplenomegaly  Extremities:  2+ DP/radial pulses b/l, no cyanosis, no edema  Back: no cva or midline tenderness  Skin: large sacral and left IT decubiti. no e/o infection  :  No bowles  Neuro:  AAOx3,  CN II-XII grossly intact, 5/5 upper ext str bl, paraplegia w/ loss of sensation from T4 below  No Restraints    LABS:                        6.8    4.8   )-----------( 110      ( 11 Feb 2018 10:51 )             23.0     02-11    140  |  106  |  4<L>  ----------------------------<  100<H>  3.7   |  24  |  0.70    Ca    8.5      11 Feb 2018 06:03  Phos  3.4     02-11  Mg     1.5     02-11    TPro  5.7<L>  /  Alb  2.8<L>  /  TBili  0.3  /  DBili  x   /  AST  9<L>  /  ALT  8<L>  /  AlkPhos  64  02-10            RADIOLOGY & ADDITIONAL TESTS:

## 2018-02-12 LAB
ANION GAP SERPL CALC-SCNC: 9 MMOL/L — SIGNIFICANT CHANGE UP (ref 5–17)
BUN SERPL-MCNC: 3 MG/DL — LOW (ref 7–23)
CALCIUM SERPL-MCNC: 6.7 MG/DL — LOW (ref 8.4–10.5)
CHLORIDE SERPL-SCNC: 115 MMOL/L — HIGH (ref 96–108)
CO2 SERPL-SCNC: 19 MMOL/L — LOW (ref 22–31)
CREAT SERPL-MCNC: 0.56 MG/DL — SIGNIFICANT CHANGE UP (ref 0.5–1.3)
GLUCOSE BLDC GLUCOMTR-MCNC: 133 MG/DL — HIGH (ref 70–99)
GLUCOSE BLDC GLUCOMTR-MCNC: 141 MG/DL — HIGH (ref 70–99)
GLUCOSE BLDC GLUCOMTR-MCNC: 193 MG/DL — HIGH (ref 70–99)
GLUCOSE BLDC GLUCOMTR-MCNC: 198 MG/DL — HIGH (ref 70–99)
GLUCOSE BLDC GLUCOMTR-MCNC: 95 MG/DL — SIGNIFICANT CHANGE UP (ref 70–99)
GLUCOSE SERPL-MCNC: 81 MG/DL — SIGNIFICANT CHANGE UP (ref 70–99)
HCT VFR BLD CALC: 25.5 % — LOW (ref 39–50)
HCT VFR BLD CALC: 28.8 % — LOW (ref 39–50)
HGB BLD-MCNC: 7.6 G/DL — LOW (ref 13–17)
HGB BLD-MCNC: 8.4 G/DL — LOW (ref 13–17)
MAGNESIUM SERPL-MCNC: 1.2 MG/DL — LOW (ref 1.6–2.6)
MCHC RBC-ENTMCNC: 22 PG — LOW (ref 27–34)
MCHC RBC-ENTMCNC: 22.2 PG — LOW (ref 27–34)
MCHC RBC-ENTMCNC: 29.2 G/DL — LOW (ref 32–36)
MCHC RBC-ENTMCNC: 29.8 G/DL — LOW (ref 32–36)
MCV RBC AUTO: 74.6 FL — LOW (ref 80–100)
MCV RBC AUTO: 75.6 FL — LOW (ref 80–100)
PLATELET # BLD AUTO: 118 K/UL — LOW (ref 150–400)
PLATELET # BLD AUTO: 137 K/UL — LOW (ref 150–400)
POTASSIUM SERPL-MCNC: 3.4 MMOL/L — LOW (ref 3.5–5.3)
POTASSIUM SERPL-SCNC: 3.4 MMOL/L — LOW (ref 3.5–5.3)
RBC # BLD: 3.42 M/UL — LOW (ref 4.2–5.8)
RBC # BLD: 3.81 M/UL — LOW (ref 4.2–5.8)
RBC # FLD: 18.9 % — HIGH (ref 10.3–16.9)
RBC # FLD: 19.1 % — HIGH (ref 10.3–16.9)
SODIUM SERPL-SCNC: 143 MMOL/L — SIGNIFICANT CHANGE UP (ref 135–145)
WBC # BLD: 4.4 K/UL — SIGNIFICANT CHANGE UP (ref 3.8–10.5)
WBC # BLD: 6.4 K/UL — SIGNIFICANT CHANGE UP (ref 3.8–10.5)
WBC # FLD AUTO: 4.4 K/UL — SIGNIFICANT CHANGE UP (ref 3.8–10.5)
WBC # FLD AUTO: 6.4 K/UL — SIGNIFICANT CHANGE UP (ref 3.8–10.5)

## 2018-02-12 PROCEDURE — 99233 SBSQ HOSP IP/OBS HIGH 50: CPT | Mod: GC

## 2018-02-12 RX ORDER — POTASSIUM CHLORIDE 20 MEQ
40 PACKET (EA) ORAL ONCE
Qty: 0 | Refills: 0 | Status: COMPLETED | OUTPATIENT
Start: 2018-02-12 | End: 2018-02-12

## 2018-02-12 RX ORDER — MAGNESIUM SULFATE 500 MG/ML
1 VIAL (ML) INJECTION ONCE
Qty: 0 | Refills: 0 | Status: COMPLETED | OUTPATIENT
Start: 2018-02-12 | End: 2018-02-12

## 2018-02-12 RX ADMIN — Medication 5 MILLIGRAM(S): at 21:43

## 2018-02-12 RX ADMIN — PIPERACILLIN AND TAZOBACTAM 200 GRAM(S): 4; .5 INJECTION, POWDER, LYOPHILIZED, FOR SOLUTION INTRAVENOUS at 11:13

## 2018-02-12 RX ADMIN — Medication 100 GRAM(S): at 10:04

## 2018-02-12 RX ADMIN — HEPARIN SODIUM 5000 UNIT(S): 5000 INJECTION INTRAVENOUS; SUBCUTANEOUS at 21:46

## 2018-02-12 RX ADMIN — PIPERACILLIN AND TAZOBACTAM 200 GRAM(S): 4; .5 INJECTION, POWDER, LYOPHILIZED, FOR SOLUTION INTRAVENOUS at 05:32

## 2018-02-12 RX ADMIN — PIPERACILLIN AND TAZOBACTAM 200 GRAM(S): 4; .5 INJECTION, POWDER, LYOPHILIZED, FOR SOLUTION INTRAVENOUS at 17:10

## 2018-02-12 RX ADMIN — Medication 100 MILLIGRAM(S): at 13:48

## 2018-02-12 RX ADMIN — ATORVASTATIN CALCIUM 20 MILLIGRAM(S): 80 TABLET, FILM COATED ORAL at 21:43

## 2018-02-12 RX ADMIN — HEPARIN SODIUM 5000 UNIT(S): 5000 INJECTION INTRAVENOUS; SUBCUTANEOUS at 13:46

## 2018-02-12 RX ADMIN — HALOPERIDOL DECANOATE 10 MILLIGRAM(S): 100 INJECTION INTRAMUSCULAR at 21:43

## 2018-02-12 RX ADMIN — METFORMIN HYDROCHLORIDE 500 MILLIGRAM(S): 850 TABLET ORAL at 10:04

## 2018-02-12 RX ADMIN — LEVETIRACETAM 500 MILLIGRAM(S): 250 TABLET, FILM COATED ORAL at 05:34

## 2018-02-12 RX ADMIN — Medication 250 MILLIGRAM(S): at 11:13

## 2018-02-12 RX ADMIN — Medication 650 MILLIGRAM(S): at 17:09

## 2018-02-12 RX ADMIN — Medication 2: at 21:44

## 2018-02-12 RX ADMIN — NYSTATIN CREAM 1 APPLICATION(S): 100000 CREAM TOPICAL at 05:34

## 2018-02-12 RX ADMIN — METFORMIN HYDROCHLORIDE 500 MILLIGRAM(S): 850 TABLET ORAL at 17:09

## 2018-02-12 RX ADMIN — INSULIN GLARGINE 12 UNIT(S): 100 INJECTION, SOLUTION SUBCUTANEOUS at 21:44

## 2018-02-12 RX ADMIN — Medication 100 MILLIGRAM(S): at 05:33

## 2018-02-12 RX ADMIN — FAMOTIDINE 20 MILLIGRAM(S): 10 INJECTION INTRAVENOUS at 11:13

## 2018-02-12 RX ADMIN — Medication 40 MILLIEQUIVALENT(S): at 10:07

## 2018-02-12 RX ADMIN — NYSTATIN CREAM 1 APPLICATION(S): 100000 CREAM TOPICAL at 17:07

## 2018-02-12 RX ADMIN — LEVETIRACETAM 500 MILLIGRAM(S): 250 TABLET, FILM COATED ORAL at 17:05

## 2018-02-12 NOTE — PROGRESS NOTE ADULT - PROBLEM SELECTOR PLAN 2
s/p diverting colostomy 2/1/18 with improving distention s/p neostigmine in MICU  -c/w bowel regimen  -colostomy with adequate stool output

## 2018-02-12 NOTE — PROGRESS NOTE ADULT - ASSESSMENT
33 yo M paraplegic with h/o SCI, DM, multiple stage 4 pressure with sacral decubitus ulcer s/p diverting colostomy  -Ostomy functioning-+ gas, BM  -abdomen soft, distension has decreased  -ok to advance diet  -Surgery will continue to follow

## 2018-02-12 NOTE — PROGRESS NOTE ADULT - SUBJECTIVE AND OBJECTIVE BOX
INTERVAL HPI/OVERNIGHT EVENTS:  No reported events from overnight team or nursing, patient remained hemodynamically stable. This morning at bedside 10 system ROS and patient feels that abdominal discomfort is improving    Vital Signs Last 24 Hrs  T(C): 36.8 (12 Feb 2018 09:02), Max: 37.8 (11 Feb 2018 20:24)  T(F): 98.2 (12 Feb 2018 09:02), Max: 100.1 (11 Feb 2018 20:24)  HR: 79 (12 Feb 2018 09:02) (75 - 80)  BP: 117/75 (12 Feb 2018 09:02) (109/69 - 129/79)  BP(mean): --  ABP: --  ABP(mean): --  RR: 18 (12 Feb 2018 09:02) (18 - 19)  SpO2: 98% (12 Feb 2018 09:02) (97% - 99%)    PHYSICAL EXAM:  Constitutional: NAD, lying in bed comfortably, nontoxic  HEENT: no eye discharge, no nasal discharge, no pharyngeal erythema, moist membranes  Neck: no lymphadenopathy, no JVD,  no carotid bruit  Cardiovascular: S1 and S2, RRR,  no M/R/G, non-displaced PMI  Respiratory: no wheezing, no rhonchi, no cough, no crackles   Gastrointestinal: BS+, tense, distended, midline scar, colostomy with stool  Vascular: 2+ peripheral pulses  Neurological: AAO x 4, PERRLA, EOMI  Psychiatric: normal mood, normal affect  Musculoskeletal: no joint swelling  Skin: No rashes, no skin breakdown      MEDICATIONS  (STANDING):  ascorbic acid 250 milliGRAM(s) Oral daily  atorvastatin 20 milliGRAM(s) Oral at bedtime  dextrose 5%. 1000 milliLiter(s) (50 mL/Hr) IV Continuous <Continuous>  dextrose 50% Injectable 12.5 Gram(s) IV Push once  dextrose 50% Injectable 25 Gram(s) IV Push once  dextrose 50% Injectable 25 Gram(s) IV Push once  docusate sodium 100 milliGRAM(s) Oral three times a day  famotidine    Tablet 20 milliGRAM(s) Oral daily  haloperidol     Tablet 10 milliGRAM(s) Oral at bedtime  heparin  Injectable 5000 Unit(s) SubCutaneous every 8 hours  insulin glargine Injectable (LANTUS) 12 Unit(s) SubCutaneous at bedtime  insulin lispro (HumaLOG) corrective regimen sliding scale   SubCutaneous Before meals and at bedtime  lactulose Syrup 20 Gram(s) Oral four times a day  levETIRAcetam 500 milliGRAM(s) Oral two times a day  magnesium hydroxide Suspension 30 milliLiter(s) Oral daily  melatonin 5 milliGRAM(s) Oral at bedtime  metFORMIN 500 milliGRAM(s) Oral two times a day with meals  mineral oil 30 milliLiter(s) Oral once  mineral oil 30 milliLiter(s) Oral daily  nystatin Powder 1 Application(s) Topical two times a day  piperacillin/tazobactam IVPB. 3.375 Gram(s) IV Intermittent every 6 hours  polyethylene glycol 3350 17 Gram(s) Oral two times a day  senna 2 Tablet(s) Oral at bedtime  sodium chloride 0.9%. 1000 milliLiter(s) (100 mL/Hr) IV Continuous <Continuous>    MEDICATIONS  (PRN):  acetaminophen   Tablet 650 milliGRAM(s) Oral every 6 hours PRN For Temp greater than 38 C (100.4 F)  ALBUTerol    0.083% 2.5 milliGRAM(s) Nebulizer every 2 hours PRN Bronchospasm  benzocaine 15 mG/menthol 3.6 mG Lozenge 1 Lozenge Oral every 6 hours PRN Sore Throat  dextrose Gel 1 Dose(s) Oral once PRN Blood Glucose LESS THAN 70 milliGRAM(s)/deciliter  glucagon  Injectable 1 milliGRAM(s) IntraMuscular once PRN Glucose LESS THAN 70 milligrams/deciliter  haloperidol    Injectable 10 milliGRAM(s) IntraMuscular at bedtime PRN if refuses PO Haldol      Allergies    Capzasin Back and Body (Unknown)    Intolerances        LABS:                        7.6    4.4   )-----------( 118      ( 12 Feb 2018 06:01 )             25.5     02-12    143  |  115<H>  |  3<L>  ----------------------------<  81  3.4<L>   |  19<L>  |  0.56    Ca    6.7<L>      12 Feb 2018 06:01  Phos  3.4     02-11  Mg     1.2     02-12            RADIOLOGY & ADDITIONAL TESTS: Reviewed

## 2018-02-12 NOTE — PROGRESS NOTE ADULT - ATTENDING COMMENTS
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 ID narrowed abx to [email or web address]. s/p diverting colostomy 2/1/18. cont local wound care  -left IT  decub and sacral decub- stage iv  - as above. s/p diverting colostomy  -psychosis - improved,  cont treatment over objection based on court ruling. haldol qhs (po preferably , if not willing then will need IM haldol) . ekg to eval qtc serially  -neurogenic bladder - c/w suprapubic bowles, oxybutinin  -DM ii - restart metformin  , ssi.   -epilepsy- keppra .

## 2018-02-12 NOTE — CHART NOTE - NSCHARTNOTEFT_GEN_A_CORE
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    Labs: Noted low MG/Calcium  02-12    143  |  115<H>  |  3<L>  ----------------------------<  81  3.4<L>   |  19<L>  |  0.56    Ca    6.7<L>      12 Feb 2018 06:01  Phos  3.4     02-11  Mg     1.2     02-12      CAPILLARY BLOOD GLUCOSE      POCT Blood Glucose.: 95 mg/dL (12 Feb 2018 08:23)  POCT Blood Glucose.: 167 mg/dL (11 Feb 2018 21:47)  POCT Blood Glucose.: 151 mg/dL (11 Feb 2018 17:57)  POCT Blood Glucose.: 191 mg/dL (11 Feb 2018 12:32)      Medications:  MEDICATIONS  (STANDING):  ascorbic acid 250 milliGRAM(s) Oral daily  atorvastatin 20 milliGRAM(s) Oral at bedtime  dextrose 5%. 1000 milliLiter(s) (50 mL/Hr) IV Continuous <Continuous>  dextrose 50% Injectable 12.5 Gram(s) IV Push once  dextrose 50% Injectable 25 Gram(s) IV Push once  dextrose 50% Injectable 25 Gram(s) IV Push once  docusate sodium 100 milliGRAM(s) Oral three times a day  famotidine    Tablet 20 milliGRAM(s) Oral daily  haloperidol     Tablet 10 milliGRAM(s) Oral at bedtime  heparin  Injectable 5000 Unit(s) SubCutaneous every 8 hours  insulin glargine Injectable (LANTUS) 12 Unit(s) SubCutaneous at bedtime  insulin lispro (HumaLOG) corrective regimen sliding scale   SubCutaneous Before meals and at bedtime  lactulose Syrup 20 Gram(s) Oral four times a day  levETIRAcetam 500 milliGRAM(s) Oral two times a day  magnesium hydroxide Suspension 30 milliLiter(s) Oral daily  melatonin 5 milliGRAM(s) Oral at bedtime  metFORMIN 500 milliGRAM(s) Oral two times a day with meals  mineral oil 30 milliLiter(s) Oral once  mineral oil 30 milliLiter(s) Oral daily  nystatin Powder 1 Application(s) Topical two times a day  piperacillin/tazobactam IVPB. 3.375 Gram(s) IV Intermittent every 6 hours  polyethylene glycol 3350 17 Gram(s) Oral two times a day  senna 2 Tablet(s) Oral at bedtime    MEDICATIONS  (PRN):  acetaminophen   Tablet 650 milliGRAM(s) Oral every 6 hours PRN For Temp greater than 38 C (100.4 F)  ALBUTerol    0.083% 2.5 milliGRAM(s) Nebulizer every 2 hours PRN Bronchospasm  benzocaine 15 mG/menthol 3.6 mG Lozenge 1 Lozenge Oral every 6 hours PRN Sore Throat  dextrose Gel 1 Dose(s) Oral once PRN Blood Glucose LESS THAN 70 milliGRAM(s)/deciliter  glucagon  Injectable 1 milliGRAM(s) IntraMuscular once PRN Glucose LESS THAN 70 milligrams/deciliter  haloperidol    Injectable 10 milliGRAM(s) IntraMuscular at bedtime PRN if refuses PO Haldol      Weight:90.8kg 2/8  Daily   2/1:72.6kg.Question 40lb weigh gain since 1 week.Question accuracy of weight recording  Daily     Weight Change: ?40lb weight gain    Estimated energy needs: Use of IBW :51kg due to fluctuation of weights 51kg [id number]:1530-1785kcal and 1.4-1.6gmprotien:71-82gmprotien and 1530-1785cc fluids    Subjective: 31 y/o male s/p ileus and diverting colostomy.Able to tolerate solid foods.None compliant with restricitions.Family brings outside foods.     Previous Nutrition Diagnosis:Increased nutrient needs r/t increased demand for kcal/protein and nutrients AEB: stage 4 PU and ostomy loss needs    Active [ x  ]  Resolved [   ]    If resolved, new PES:     Goal:Meet 80% of needs consistently    Recommendations:1.Check weights 2.Advance diet to [facility] with snack plus glucerna shake BID(440kcal and 20gmprotein)    Education: Not indicated due to lack of motivation    Risk Level: High [  x ] Moderate [   ] Low [   ]

## 2018-02-12 NOTE — PROGRESS NOTE ADULT - SUBJECTIVE AND OBJECTIVE BOX
SUBJECTIVE: Pt seen and examined at bedside. No overnight events.  Pain controlled. No nausea or vomiting, + ostomy output    Vital Signs Last 24 Hrs  T(C): 36.8 (12 Feb 2018 09:02), Max: 37.8 (11 Feb 2018 20:24)  T(F): 98.2 (12 Feb 2018 09:02), Max: 100.1 (11 Feb 2018 20:24)  HR: 79 (12 Feb 2018 09:02) (75 - 80)  BP: 117/75 (12 Feb 2018 09:02) (109/69 - 129/79)  BP(mean): --  RR: 18 (12 Feb 2018 09:02) (18 - 19)  SpO2: 98% (12 Feb 2018 09:02) (97% - 99%)    PHYSICAL EXAM    Constitutional: A&Ox3      Respiratory: non labored breathing, no respiratory distress    Cardiovascular: NSR, RRR    Gastrointestinal: Soft ND,NT                 Incision: CDI, ostomy functioning, stoma pink and patent    Genitourinary: voiding    I&O's Detail    11 Feb 2018 07:01  -  12 Feb 2018 07:00  --------------------------------------------------------  IN:    Packed Red Blood Cells: 350 mL    sodium chloride 0.9%: 1300 mL    Solution: 100 mL  Total IN: 1750 mL    OUT:    Colostomy: 1525 mL    Indwelling Catheter - Suprapubic: 1600 mL    Voided: 2500 mL  Total OUT: 5625 mL    Total NET: -3875 mL          LABS:                        7.6    4.4   )-----------( 118      ( 12 Feb 2018 06:01 )             25.5     02-12    143  |  115<H>  |  3<L>  ----------------------------<  81  3.4<L>   |  19<L>  |  0.56    Ca    6.7<L>      12 Feb 2018 06:01  Phos  3.4     02-11  Mg     1.2     02-12            MEDICATIONS  (STANDING):  ascorbic acid 250 milliGRAM(s) Oral daily  atorvastatin 20 milliGRAM(s) Oral at bedtime  dextrose 5%. 1000 milliLiter(s) (50 mL/Hr) IV Continuous <Continuous>  dextrose 50% Injectable 12.5 Gram(s) IV Push once  dextrose 50% Injectable 25 Gram(s) IV Push once  dextrose 50% Injectable 25 Gram(s) IV Push once  docusate sodium 100 milliGRAM(s) Oral three times a day  famotidine    Tablet 20 milliGRAM(s) Oral daily  haloperidol     Tablet 10 milliGRAM(s) Oral at bedtime  heparin  Injectable 5000 Unit(s) SubCutaneous every 8 hours  insulin glargine Injectable (LANTUS) 12 Unit(s) SubCutaneous at bedtime  insulin lispro (HumaLOG) corrective regimen sliding scale   SubCutaneous Before meals and at bedtime  lactulose Syrup 20 Gram(s) Oral four times a day  levETIRAcetam 500 milliGRAM(s) Oral two times a day  magnesium hydroxide Suspension 30 milliLiter(s) Oral daily  melatonin 5 milliGRAM(s) Oral at bedtime  metFORMIN 500 milliGRAM(s) Oral two times a day with meals  mineral oil 30 milliLiter(s) Oral once  mineral oil 30 milliLiter(s) Oral daily  nystatin Powder 1 Application(s) Topical two times a day  piperacillin/tazobactam IVPB. 3.375 Gram(s) IV Intermittent every 6 hours  polyethylene glycol 3350 17 Gram(s) Oral two times a day  senna 2 Tablet(s) Oral at bedtime    MEDICATIONS  (PRN):  acetaminophen   Tablet 650 milliGRAM(s) Oral every 6 hours PRN For Temp greater than 38 C (100.4 F)  ALBUTerol    0.083% 2.5 milliGRAM(s) Nebulizer every 2 hours PRN Bronchospasm  benzocaine 15 mG/menthol 3.6 mG Lozenge 1 Lozenge Oral every 6 hours PRN Sore Throat  dextrose Gel 1 Dose(s) Oral once PRN Blood Glucose LESS THAN 70 milliGRAM(s)/deciliter  glucagon  Injectable 1 milliGRAM(s) IntraMuscular once PRN Glucose LESS THAN 70 milligrams/deciliter  haloperidol    Injectable 10 milliGRAM(s) IntraMuscular at bedtime PRN if refuses PO Haldol      RADIOLOGY & ADDITIONAL STUDIES:    ASSESSMENT AND PLAN

## 2018-02-12 NOTE — PROGRESS NOTE ADULT - PROBLEM SELECTOR PLAN 3
Pt w/ a stage 4 infected sacral ulcer w/ drainage with w/ feculent material. On admission required debridement by plastic surgery.   CT (1/27): Findings consistent with abscess replacing the lower sacrum and coccyx. Osteomyelitis of the adjacent S3 segment cannot be excluded. The collection appears to be communicating with the skin surface in the inferior gluteal cleft.   - General surgery does not believe there is a fistula between wound and rectum causing leakage of stool. Now with colostomy bag.   - Wound cx had citrobacter, enterococcus, klebsiella   - Wound care following  - c/w zosyn (1/29 to 2/12 - to complete 2 weeks)   - Blood cultures NGTD    #C. diff: Results came back as indeterminate and then positive. Discussed with ID prefers to hold off treating for now with no diarrhea. If developed increasing loose stool would add PO vancomycin. CDIFF WAS SENT IN THE SITUATION PATIENT WAS HYPOTENSIVE AND SEPTIC WITH UNCLEAR ETIOLOGY AND WAS IN LAYING IN STOOL FOR HOURS WITH WOUND VAC ON UNCLEAR OF TRUE CONSISTENCY. Thereafter, patient with more formed stool.

## 2018-02-12 NOTE — PROGRESS NOTE ADULT - PROBLEM SELECTOR PLAN 5
Patient on metformin and Glimepiride at home. HgA1C of 6.0.   - 12.5U Lantus QHS+Metformin  - monitor FSG

## 2018-02-12 NOTE — PROGRESS NOTE ADULT - PROBLEM SELECTOR PLAN 1
Chronic anemia but with acute drop  -s/p 1U PRBC yesterday, no evidence of bleeding, no change in vital signs   -repeat CBC   -will maintain active type and screen  -Hgb stable, no evidence of active bleeding  -will transfuse for Hgb <7

## 2018-02-13 LAB
ANION GAP SERPL CALC-SCNC: 13 MMOL/L — SIGNIFICANT CHANGE UP (ref 5–17)
BUN SERPL-MCNC: 3 MG/DL — LOW (ref 7–23)
CALCIUM SERPL-MCNC: 8.7 MG/DL — SIGNIFICANT CHANGE UP (ref 8.4–10.5)
CHLORIDE SERPL-SCNC: 103 MMOL/L — SIGNIFICANT CHANGE UP (ref 96–108)
CO2 SERPL-SCNC: 24 MMOL/L — SIGNIFICANT CHANGE UP (ref 22–31)
CREAT SERPL-MCNC: 0.67 MG/DL — SIGNIFICANT CHANGE UP (ref 0.5–1.3)
GLUCOSE BLDC GLUCOMTR-MCNC: 104 MG/DL — HIGH (ref 70–99)
GLUCOSE BLDC GLUCOMTR-MCNC: 134 MG/DL — HIGH (ref 70–99)
GLUCOSE BLDC GLUCOMTR-MCNC: 154 MG/DL — HIGH (ref 70–99)
GLUCOSE BLDC GLUCOMTR-MCNC: 85 MG/DL — SIGNIFICANT CHANGE UP (ref 70–99)
GLUCOSE SERPL-MCNC: 81 MG/DL — SIGNIFICANT CHANGE UP (ref 70–99)
HCT VFR BLD CALC: 27.5 % — LOW (ref 39–50)
HGB BLD-MCNC: 8.3 G/DL — LOW (ref 13–17)
MAGNESIUM SERPL-MCNC: 1.5 MG/DL — LOW (ref 1.6–2.6)
MCHC RBC-ENTMCNC: 22.3 PG — LOW (ref 27–34)
MCHC RBC-ENTMCNC: 30.2 G/DL — LOW (ref 32–36)
MCV RBC AUTO: 73.9 FL — LOW (ref 80–100)
PLATELET # BLD AUTO: 154 K/UL — SIGNIFICANT CHANGE UP (ref 150–400)
POTASSIUM SERPL-MCNC: 4.2 MMOL/L — SIGNIFICANT CHANGE UP (ref 3.5–5.3)
POTASSIUM SERPL-SCNC: 4.2 MMOL/L — SIGNIFICANT CHANGE UP (ref 3.5–5.3)
RBC # BLD: 3.72 M/UL — LOW (ref 4.2–5.8)
RBC # FLD: 19.1 % — HIGH (ref 10.3–16.9)
SODIUM SERPL-SCNC: 140 MMOL/L — SIGNIFICANT CHANGE UP (ref 135–145)
WBC # BLD: 5.8 K/UL — SIGNIFICANT CHANGE UP (ref 3.8–10.5)
WBC # FLD AUTO: 5.8 K/UL — SIGNIFICANT CHANGE UP (ref 3.8–10.5)

## 2018-02-13 PROCEDURE — 99233 SBSQ HOSP IP/OBS HIGH 50: CPT | Mod: GC

## 2018-02-13 PROCEDURE — 99233 SBSQ HOSP IP/OBS HIGH 50: CPT

## 2018-02-13 RX ORDER — MAGNESIUM SULFATE 500 MG/ML
1 VIAL (ML) INJECTION ONCE
Qty: 0 | Refills: 0 | Status: COMPLETED | OUTPATIENT
Start: 2018-02-13 | End: 2018-02-13

## 2018-02-13 RX ADMIN — INSULIN GLARGINE 12 UNIT(S): 100 INJECTION, SOLUTION SUBCUTANEOUS at 21:37

## 2018-02-13 RX ADMIN — METFORMIN HYDROCHLORIDE 500 MILLIGRAM(S): 850 TABLET ORAL at 17:38

## 2018-02-13 RX ADMIN — Medication 250 MILLIGRAM(S): at 11:30

## 2018-02-13 RX ADMIN — FAMOTIDINE 20 MILLIGRAM(S): 10 INJECTION INTRAVENOUS at 11:30

## 2018-02-13 RX ADMIN — ATORVASTATIN CALCIUM 20 MILLIGRAM(S): 80 TABLET, FILM COATED ORAL at 21:38

## 2018-02-13 RX ADMIN — LACTULOSE 20 GRAM(S): 10 SOLUTION ORAL at 11:29

## 2018-02-13 RX ADMIN — NYSTATIN CREAM 1 APPLICATION(S): 100000 CREAM TOPICAL at 17:38

## 2018-02-13 RX ADMIN — Medication 2: at 21:36

## 2018-02-13 RX ADMIN — PIPERACILLIN AND TAZOBACTAM 200 GRAM(S): 4; .5 INJECTION, POWDER, LYOPHILIZED, FOR SOLUTION INTRAVENOUS at 05:46

## 2018-02-13 RX ADMIN — LEVETIRACETAM 500 MILLIGRAM(S): 250 TABLET, FILM COATED ORAL at 17:38

## 2018-02-13 RX ADMIN — HALOPERIDOL DECANOATE 10 MILLIGRAM(S): 100 INJECTION INTRAMUSCULAR at 21:37

## 2018-02-13 RX ADMIN — METFORMIN HYDROCHLORIDE 500 MILLIGRAM(S): 850 TABLET ORAL at 09:07

## 2018-02-13 RX ADMIN — LEVETIRACETAM 500 MILLIGRAM(S): 250 TABLET, FILM COATED ORAL at 05:46

## 2018-02-13 RX ADMIN — HEPARIN SODIUM 5000 UNIT(S): 5000 INJECTION INTRAVENOUS; SUBCUTANEOUS at 05:46

## 2018-02-13 RX ADMIN — HEPARIN SODIUM 5000 UNIT(S): 5000 INJECTION INTRAVENOUS; SUBCUTANEOUS at 21:38

## 2018-02-13 RX ADMIN — Medication 100 GRAM(S): at 09:07

## 2018-02-13 RX ADMIN — PIPERACILLIN AND TAZOBACTAM 200 GRAM(S): 4; .5 INJECTION, POWDER, LYOPHILIZED, FOR SOLUTION INTRAVENOUS at 01:02

## 2018-02-13 RX ADMIN — Medication 5 MILLIGRAM(S): at 21:38

## 2018-02-13 RX ADMIN — MAGNESIUM HYDROXIDE 30 MILLILITER(S): 400 TABLET, CHEWABLE ORAL at 11:30

## 2018-02-13 RX ADMIN — HEPARIN SODIUM 5000 UNIT(S): 5000 INJECTION INTRAVENOUS; SUBCUTANEOUS at 13:03

## 2018-02-13 RX ADMIN — LACTULOSE 20 GRAM(S): 10 SOLUTION ORAL at 17:38

## 2018-02-13 RX ADMIN — NYSTATIN CREAM 1 APPLICATION(S): 100000 CREAM TOPICAL at 09:07

## 2018-02-13 NOTE — PROGRESS NOTE ADULT - SUBJECTIVE AND OBJECTIVE BOX
INTERVAL HPI/OVERNIGHT EVENTS:  No reported events from overnight team or nursing, patient remained hemodynamically stable. This morning at bedside 10 system ROS negative.     Vital Signs Last 24 Hrs  T(C): 37.3 (13 Feb 2018 06:01), Max: 37.9 (12 Feb 2018 16:47)  T(F): 99.2 (13 Feb 2018 06:01), Max: 100.3 (12 Feb 2018 16:47)  HR: 86 (13 Feb 2018 06:01) (76 - 86)  BP: 115/69 (13 Feb 2018 06:01) (115/69 - 144/83)  BP(mean): --  ABP: --  ABP(mean): --  RR: 18 (13 Feb 2018 06:01) (18 - 18)  SpO2: 97% (13 Feb 2018 06:01) (97% - 100%)      PHYSICAL EXAM:  Constitutional: NAD, lying in bed comfortably, nontoxic  HEENT: no eye discharge, no nasal discharge, no pharyngeal erythema, moist membranes  Neck: no lymphadenopathy, no JVD,  no carotid bruit  Cardiovascular: S1 and S2, RRR,  no M/R/G, non-displaced PMI  Respiratory: no wheezing, no rhonchi, no cough, no crackles   Gastrointestinal: BS+, tense, distended, midline scar, colostomy with stool  Vascular: 2+ peripheral pulses  Neurological: AAO x 4, PERRLA, EOMI  Psychiatric: normal mood, normal affect  Musculoskeletal: no joint swelling  Skin: No rashes, no skin breakdown    MEDICATIONS  (STANDING):  ascorbic acid 250 milliGRAM(s) Oral daily  atorvastatin 20 milliGRAM(s) Oral at bedtime  dextrose 5%. 1000 milliLiter(s) (50 mL/Hr) IV Continuous <Continuous>  dextrose 50% Injectable 12.5 Gram(s) IV Push once  dextrose 50% Injectable 25 Gram(s) IV Push once  dextrose 50% Injectable 25 Gram(s) IV Push once  docusate sodium 100 milliGRAM(s) Oral three times a day  famotidine    Tablet 20 milliGRAM(s) Oral daily  haloperidol     Tablet 10 milliGRAM(s) Oral at bedtime  heparin  Injectable 5000 Unit(s) SubCutaneous every 8 hours  insulin glargine Injectable (LANTUS) 12 Unit(s) SubCutaneous at bedtime  insulin lispro (HumaLOG) corrective regimen sliding scale   SubCutaneous Before meals and at bedtime  lactulose Syrup 20 Gram(s) Oral four times a day  levETIRAcetam 500 milliGRAM(s) Oral two times a day  magnesium hydroxide Suspension 30 milliLiter(s) Oral daily  magnesium sulfate  IVPB 1 Gram(s) IV Intermittent once  melatonin 5 milliGRAM(s) Oral at bedtime  metFORMIN 500 milliGRAM(s) Oral two times a day with meals  mineral oil 30 milliLiter(s) Oral once  mineral oil 30 milliLiter(s) Oral daily  nystatin Powder 1 Application(s) Topical two times a day  polyethylene glycol 3350 17 Gram(s) Oral two times a day  senna 2 Tablet(s) Oral at bedtime    MEDICATIONS  (PRN):  acetaminophen   Tablet 650 milliGRAM(s) Oral every 6 hours PRN For Temp greater than 38 C (100.4 F)  ALBUTerol    0.083% 2.5 milliGRAM(s) Nebulizer every 2 hours PRN Bronchospasm  benzocaine 15 mG/menthol 3.6 mG Lozenge 1 Lozenge Oral every 6 hours PRN Sore Throat  dextrose Gel 1 Dose(s) Oral once PRN Blood Glucose LESS THAN 70 milliGRAM(s)/deciliter  glucagon  Injectable 1 milliGRAM(s) IntraMuscular once PRN Glucose LESS THAN 70 milligrams/deciliter  haloperidol    Injectable 10 milliGRAM(s) IntraMuscular at bedtime PRN if refuses PO Haldol      Allergies    Capzasin Back and Body (Unknown)    Intolerances        LABS:                        8.3    5.8   )-----------( 154      ( 13 Feb 2018 06:16 )             27.5     02-13    140  |  103  |  3<L>  ----------------------------<  81  4.2   |  24  |  0.67    Ca    8.7      13 Feb 2018 06:16  Mg     1.5     02-13            RADIOLOGY & ADDITIONAL TESTS: Reviewed

## 2018-02-13 NOTE — PROGRESS NOTE BEHAVIORAL HEALTH - NSBHLOC_PSY_A_CORE
Alert

## 2018-02-13 NOTE — PROGRESS NOTE ADULT - PROBLEM SELECTOR PLAN 4
Pt w/ no sensation or control of urination with suprapubic catheter changed once a month.   - suprapubic catheter changed 1/12 (will contact urology to change)  - c/w oxybutynin 5mg BID

## 2018-02-13 NOTE — PROGRESS NOTE BEHAVIORAL HEALTH - NSBHFUPINTERVALHXFT_PSY_A_CORE
Pt seen in wheelchair, aunt and uncle in attendance.  Pt and aunt and uncle all expressed being pleased regarding impending discharge home.  Pt is future-oriented, no evidence of psychosis or behavioral dysregulation.  Pt continues to be compliant with haldol 10 mg po qhs; no SE noted.

## 2018-02-13 NOTE — PROGRESS NOTE ADULT - PROBLEM SELECTOR PLAN 1
Stable. Chronic anemia but with acute drop  -no evidence of bleeding, no change in vital signs   -monitor CBC and for evidence of bleeding   -will maintain active type and screen  -Hgb stable, no evidence of active bleeding  -will transfuse for Hgb <7

## 2018-02-13 NOTE — PROGRESS NOTE ADULT - SUBJECTIVE AND OBJECTIVE BOX
SUBJECTIVE: Pt seen and examined at bedside. No overnight events.  No nausea or vomiting. Good ostomy output    Vital Signs Last 24 Hrs  T(C): 37.3 (13 Feb 2018 09:15), Max: 37.9 (12 Feb 2018 16:47)  T(F): 99.1 (13 Feb 2018 09:15), Max: 100.3 (12 Feb 2018 16:47)  HR: 75 (13 Feb 2018 09:15) (75 - 86)  BP: 112/72 (13 Feb 2018 09:15) (112/72 - 144/83)  BP(mean): --  RR: 18 (13 Feb 2018 09:15) (18 - 18)  SpO2: 99% (13 Feb 2018 09:15) (97% - 100%)    PHYSICAL EXAM    Gen: NAD  CV: RRR  Pulm: CTABL  Abd: Soft, nontender, nondistended, ostomy bag with good stool output and gas, midline incision staples removed    I&O's Detail    12 Feb 2018 07:01  -  13 Feb 2018 07:00  --------------------------------------------------------  IN:    sodium chloride 0.9%: 300 mL    Solution: 200 mL  Total IN: 500 mL    OUT:    Colostomy: 1300 mL    Indwelling Catheter - Suprapubic: 3200 mL    Voided: 3000 mL  Total OUT: 7500 mL    Total NET: -7000 mL          LABS:                        8.3    5.8   )-----------( 154      ( 13 Feb 2018 06:16 )             27.5     02-13    140  |  103  |  3<L>  ----------------------------<  81  4.2   |  24  |  0.67    Ca    8.7      13 Feb 2018 06:16  Mg     1.5     02-13            MEDICATIONS  (STANDING):  ascorbic acid 250 milliGRAM(s) Oral daily  atorvastatin 20 milliGRAM(s) Oral at bedtime  dextrose 5%. 1000 milliLiter(s) (50 mL/Hr) IV Continuous <Continuous>  dextrose 50% Injectable 12.5 Gram(s) IV Push once  dextrose 50% Injectable 25 Gram(s) IV Push once  dextrose 50% Injectable 25 Gram(s) IV Push once  docusate sodium 100 milliGRAM(s) Oral three times a day  famotidine    Tablet 20 milliGRAM(s) Oral daily  haloperidol     Tablet 10 milliGRAM(s) Oral at bedtime  heparin  Injectable 5000 Unit(s) SubCutaneous every 8 hours  insulin glargine Injectable (LANTUS) 12 Unit(s) SubCutaneous at bedtime  insulin lispro (HumaLOG) corrective regimen sliding scale   SubCutaneous Before meals and at bedtime  lactulose Syrup 20 Gram(s) Oral four times a day  levETIRAcetam 500 milliGRAM(s) Oral two times a day  magnesium hydroxide Suspension 30 milliLiter(s) Oral daily  melatonin 5 milliGRAM(s) Oral at bedtime  metFORMIN 500 milliGRAM(s) Oral two times a day with meals  mineral oil 30 milliLiter(s) Oral once  mineral oil 30 milliLiter(s) Oral daily  nystatin Powder 1 Application(s) Topical two times a day  polyethylene glycol 3350 17 Gram(s) Oral two times a day  senna 2 Tablet(s) Oral at bedtime    MEDICATIONS  (PRN):  acetaminophen   Tablet 650 milliGRAM(s) Oral every 6 hours PRN For Temp greater than 38 C (100.4 F)  ALBUTerol    0.083% 2.5 milliGRAM(s) Nebulizer every 2 hours PRN Bronchospasm  benzocaine 15 mG/menthol 3.6 mG Lozenge 1 Lozenge Oral every 6 hours PRN Sore Throat  dextrose Gel 1 Dose(s) Oral once PRN Blood Glucose LESS THAN 70 milliGRAM(s)/deciliter  glucagon  Injectable 1 milliGRAM(s) IntraMuscular once PRN Glucose LESS THAN 70 milligrams/deciliter  haloperidol    Injectable 10 milliGRAM(s) IntraMuscular at bedtime PRN if refuses PO Haldol      RADIOLOGY & ADDITIONAL STUDIES:    ASSESSMENT AND PLAN

## 2018-02-13 NOTE — PROGRESS NOTE ADULT - PROBLEM SELECTOR PLAN 3
Pt w/ a stage 4 infected sacral ulcer w/ drainage with w/ feculent material. On admission required debridement by plastic surgery.   CT (1/27): Findings consistent with abscess replacing the lower sacrum and coccyx. Osteomyelitis of the adjacent S3 segment cannot be excluded. The collection appears to be communicating with the skin surface in the inferior gluteal cleft.   - General surgery does not believe there is a fistula between wound and rectum causing leakage of stool. Now with colostomy bag.   - Wound cx had citrobacter, enterococcus, klebsiella   - Wound care following  - s/p zosyn (1/29 to 2/12)   - Blood cultures NGTD    #C. diff: Results came back as indeterminate and then positive. Discussed with ID prefers to hold off treating for now with no diarrhea. If developed increasing loose stool would add PO vancomycin. CDIFF WAS SENT IN THE SITUATION PATIENT WAS HYPOTENSIVE AND SEPTIC WITH UNCLEAR ETIOLOGY AND WAS IN LAYING IN STOOL FOR HOURS WITH WOUND VAC ON UNCLEAR OF TRUE CONSISTENCY. Thereafter, patient with more formed stool.

## 2018-02-13 NOTE — PROGRESS NOTE ADULT - ATTENDING COMMENTS
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  cont with aggressive bowel regimen ,   -severe sepsis due to  infected decubitus ulcers/possible sacral osteomyelitis- ct pelvis (+) sacral wound abscess, surgery has evaluated, and does not believe this is an abscess. completed course of  zosyn. s/p diverting colostomy 2/1/18.  -left IT  decub and sacral decub- stage iv  - as above. s/p diverting colostomy; cont local wound care  -psychosis - improved,  cont treatment over objection based on court ruling. haldol qhs (po preferably , if not willing then will need IM haldol) . ekg to eval qtc serially  -neurogenic bladder - c/w suprapubic bowles, oxybutinin  -DM ii -  metformin  , ssi.   -epilepsy- keppra .

## 2018-02-13 NOTE — PROGRESS NOTE BEHAVIORAL HEALTH - NSBHFUPMEDSE_PSY_A_CORE
Unable to assess
None known

## 2018-02-13 NOTE — PROGRESS NOTE ADULT - ASSESSMENT
31 yo M paraplegic with h/o SCI, DM, multiple stage 4 pressure with sacral decubitus ulcer s/p diverting colostomy  -Ostomy functioning-+ gas, BM  -abdomen soft, distension has decreased  -tolerating diet  -staples removed  -will sign off, reconsult as needed  -follow up with Dr La within several weeks after discharge

## 2018-02-14 LAB
ANION GAP SERPL CALC-SCNC: 11 MMOL/L — SIGNIFICANT CHANGE UP (ref 5–17)
BUN SERPL-MCNC: 4 MG/DL — LOW (ref 7–23)
CALCIUM SERPL-MCNC: 8.8 MG/DL — SIGNIFICANT CHANGE UP (ref 8.4–10.5)
CHLORIDE SERPL-SCNC: 99 MMOL/L — SIGNIFICANT CHANGE UP (ref 96–108)
CO2 SERPL-SCNC: 27 MMOL/L — SIGNIFICANT CHANGE UP (ref 22–31)
CREAT SERPL-MCNC: 0.7 MG/DL — SIGNIFICANT CHANGE UP (ref 0.5–1.3)
GLUCOSE BLDC GLUCOMTR-MCNC: 125 MG/DL — HIGH (ref 70–99)
GLUCOSE BLDC GLUCOMTR-MCNC: 127 MG/DL — HIGH (ref 70–99)
GLUCOSE BLDC GLUCOMTR-MCNC: 162 MG/DL — HIGH (ref 70–99)
GLUCOSE BLDC GLUCOMTR-MCNC: 180 MG/DL — HIGH (ref 70–99)
GLUCOSE SERPL-MCNC: 119 MG/DL — HIGH (ref 70–99)
HCT VFR BLD CALC: 30 % — LOW (ref 39–50)
HGB BLD-MCNC: 8.9 G/DL — LOW (ref 13–17)
MAGNESIUM SERPL-MCNC: 1.7 MG/DL — SIGNIFICANT CHANGE UP (ref 1.6–2.6)
MCHC RBC-ENTMCNC: 22.3 PG — LOW (ref 27–34)
MCHC RBC-ENTMCNC: 29.7 G/DL — LOW (ref 32–36)
MCV RBC AUTO: 75 FL — LOW (ref 80–100)
PHOSPHATE SERPL-MCNC: 4 MG/DL — SIGNIFICANT CHANGE UP (ref 2.5–4.5)
PLATELET # BLD AUTO: 208 K/UL — SIGNIFICANT CHANGE UP (ref 150–400)
POTASSIUM SERPL-MCNC: 4.2 MMOL/L — SIGNIFICANT CHANGE UP (ref 3.5–5.3)
POTASSIUM SERPL-SCNC: 4.2 MMOL/L — SIGNIFICANT CHANGE UP (ref 3.5–5.3)
RBC # BLD: 4 M/UL — LOW (ref 4.2–5.8)
RBC # FLD: 19.1 % — HIGH (ref 10.3–16.9)
SODIUM SERPL-SCNC: 137 MMOL/L — SIGNIFICANT CHANGE UP (ref 135–145)
WBC # BLD: 7.5 K/UL — SIGNIFICANT CHANGE UP (ref 3.8–10.5)
WBC # FLD AUTO: 7.5 K/UL — SIGNIFICANT CHANGE UP (ref 3.8–10.5)

## 2018-02-14 PROCEDURE — 99233 SBSQ HOSP IP/OBS HIGH 50: CPT | Mod: GC

## 2018-02-14 RX ADMIN — LACTULOSE 20 GRAM(S): 10 SOLUTION ORAL at 22:50

## 2018-02-14 RX ADMIN — LEVETIRACETAM 500 MILLIGRAM(S): 250 TABLET, FILM COATED ORAL at 18:00

## 2018-02-14 RX ADMIN — HEPARIN SODIUM 5000 UNIT(S): 5000 INJECTION INTRAVENOUS; SUBCUTANEOUS at 13:29

## 2018-02-14 RX ADMIN — Medication 100 MILLIGRAM(S): at 13:29

## 2018-02-14 RX ADMIN — HEPARIN SODIUM 5000 UNIT(S): 5000 INJECTION INTRAVENOUS; SUBCUTANEOUS at 05:24

## 2018-02-14 RX ADMIN — ATORVASTATIN CALCIUM 20 MILLIGRAM(S): 80 TABLET, FILM COATED ORAL at 22:46

## 2018-02-14 RX ADMIN — HALOPERIDOL DECANOATE 10 MILLIGRAM(S): 100 INJECTION INTRAMUSCULAR at 22:46

## 2018-02-14 RX ADMIN — Medication 250 MILLIGRAM(S): at 11:29

## 2018-02-14 RX ADMIN — NYSTATIN CREAM 1 APPLICATION(S): 100000 CREAM TOPICAL at 17:59

## 2018-02-14 RX ADMIN — POLYETHYLENE GLYCOL 3350 17 GRAM(S): 17 POWDER, FOR SOLUTION ORAL at 18:06

## 2018-02-14 RX ADMIN — HEPARIN SODIUM 5000 UNIT(S): 5000 INJECTION INTRAVENOUS; SUBCUTANEOUS at 22:46

## 2018-02-14 RX ADMIN — METFORMIN HYDROCHLORIDE 500 MILLIGRAM(S): 850 TABLET ORAL at 17:59

## 2018-02-14 RX ADMIN — SENNA PLUS 2 TABLET(S): 8.6 TABLET ORAL at 22:47

## 2018-02-14 RX ADMIN — INSULIN GLARGINE 12 UNIT(S): 100 INJECTION, SOLUTION SUBCUTANEOUS at 22:47

## 2018-02-14 RX ADMIN — FAMOTIDINE 20 MILLIGRAM(S): 10 INJECTION INTRAVENOUS at 11:29

## 2018-02-14 RX ADMIN — Medication 5 MILLIGRAM(S): at 22:46

## 2018-02-14 RX ADMIN — NYSTATIN CREAM 1 APPLICATION(S): 100000 CREAM TOPICAL at 09:39

## 2018-02-14 RX ADMIN — Medication 100 MILLIGRAM(S): at 22:46

## 2018-02-14 RX ADMIN — MAGNESIUM HYDROXIDE 30 MILLILITER(S): 400 TABLET, CHEWABLE ORAL at 18:06

## 2018-02-14 RX ADMIN — METFORMIN HYDROCHLORIDE 500 MILLIGRAM(S): 850 TABLET ORAL at 09:39

## 2018-02-14 RX ADMIN — LACTULOSE 20 GRAM(S): 10 SOLUTION ORAL at 18:06

## 2018-02-14 RX ADMIN — LEVETIRACETAM 500 MILLIGRAM(S): 250 TABLET, FILM COATED ORAL at 05:24

## 2018-02-14 RX ADMIN — Medication 2: at 12:33

## 2018-02-14 RX ADMIN — Medication 2: at 18:00

## 2018-02-14 NOTE — PROGRESS NOTE ADULT - ATTENDING COMMENTS
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  cont with aggressive bowel regimen ,   -severe sepsis due to  infected decubitus ulcers/possible sacral osteomyelitis- ct pelvis (+) sacral wound abscess, surgery has evaluated, and does not believe this is an abscess. completed course of  zosyn. s/p diverting colostomy 2/1/18.  -left IT  decub and sacral decub- stage iv  - as above. s/p diverting colostomy; cont local wound care  -psychosis - improved,  cont treatment over objection based on court ruling. haldol qhs (po preferably , if not willing then will need IM haldol) . ekg to eval qtc serially  -neurogenic bladder - c/w suprapubic bowles, oxybutinin  -DM ii -  metformin  , ssi.   -epilepsy- keppra .

## 2018-02-14 NOTE — PROGRESS NOTE ADULT - PROBLEM SELECTOR PLAN 1
IMPRVOING s/p diverting colostomy 2/1/18 with improving distention s/p neostigmine in MICU  -c/w bowel regimen  -colostomy with adequate stool output

## 2018-02-14 NOTE — PROGRESS NOTE ADULT - SUBJECTIVE AND OBJECTIVE BOX
INTERVAL HPI/OVERNIGHT EVENTS:    Vital Signs Last 24 Hrs  T(C): 36.8 (14 Feb 2018 05:36), Max: 37.3 (13 Feb 2018 16:07)  T(F): 98.3 (14 Feb 2018 05:36), Max: 99.2 (13 Feb 2018 16:07)  HR: 91 (14 Feb 2018 05:36) (91 - 104)  BP: 110/69 (14 Feb 2018 05:36) (110/69 - 127/81)  BP(mean): --  ABP: --  ABP(mean): --  RR: 18 (14 Feb 2018 05:36) (18 - 18)  SpO2: 97% (14 Feb 2018 05:36) (97% - 100%)    PHYSICAL EXAM:  Constitutional: NAD, lying in bed comfortably, nontoxic  HEENT: no eye discharge, no nasal discharge, no pharyngeal erythema, moist membranes  Neck: no lymphadenopathy, no JVD,  no carotid bruit  Cardiovascular: S1 and S2, RRR,  no M/R/G, non-displaced PMI  Respiratory: no wheezing, no rhonchi, no cough, no crackles   Gastrointestinal: BS+, tense, distended, midline scar, colostomy with stool  Vascular: 2+ peripheral pulses  Neurological: AAO x 4, PERRLA, EOMI  Psychiatric: normal mood, normal affect  Musculoskeletal: no joint swelling  Skin: No rashes, no skin breakdown    MEDICATIONS  (STANDING):  ascorbic acid 250 milliGRAM(s) Oral daily  atorvastatin 20 milliGRAM(s) Oral at bedtime  dextrose 5%. 1000 milliLiter(s) (50 mL/Hr) IV Continuous <Continuous>  dextrose 50% Injectable 12.5 Gram(s) IV Push once  dextrose 50% Injectable 25 Gram(s) IV Push once  dextrose 50% Injectable 25 Gram(s) IV Push once  docusate sodium 100 milliGRAM(s) Oral three times a day  famotidine    Tablet 20 milliGRAM(s) Oral daily  haloperidol     Tablet 10 milliGRAM(s) Oral at bedtime  heparin  Injectable 5000 Unit(s) SubCutaneous every 8 hours  insulin glargine Injectable (LANTUS) 12 Unit(s) SubCutaneous at bedtime  insulin lispro (HumaLOG) corrective regimen sliding scale   SubCutaneous Before meals and at bedtime  lactulose Syrup 20 Gram(s) Oral four times a day  levETIRAcetam 500 milliGRAM(s) Oral two times a day  magnesium hydroxide Suspension 30 milliLiter(s) Oral daily  melatonin 5 milliGRAM(s) Oral at bedtime  metFORMIN 500 milliGRAM(s) Oral two times a day with meals  mineral oil 30 milliLiter(s) Oral once  mineral oil 30 milliLiter(s) Oral daily  nystatin Powder 1 Application(s) Topical two times a day  polyethylene glycol 3350 17 Gram(s) Oral two times a day  senna 2 Tablet(s) Oral at bedtime    MEDICATIONS  (PRN):  acetaminophen   Tablet 650 milliGRAM(s) Oral every 6 hours PRN For Temp greater than 38 C (100.4 F)  ALBUTerol    0.083% 2.5 milliGRAM(s) Nebulizer every 2 hours PRN Bronchospasm  benzocaine 15 mG/menthol 3.6 mG Lozenge 1 Lozenge Oral every 6 hours PRN Sore Throat  dextrose Gel 1 Dose(s) Oral once PRN Blood Glucose LESS THAN 70 milliGRAM(s)/deciliter  glucagon  Injectable 1 milliGRAM(s) IntraMuscular once PRN Glucose LESS THAN 70 milligrams/deciliter  haloperidol    Injectable 10 milliGRAM(s) IntraMuscular at bedtime PRN if refuses PO Haldol      Allergies    Capzasin Back and Body (Unknown)    Intolerances        LABS:                        8.9    7.5   )-----------( 208      ( 14 Feb 2018 06:48 )             30.0     02-14    137  |  99  |  4<L>  ----------------------------<  119<H>  4.2   |  27  |  0.70    Ca    8.8      14 Feb 2018 06:49  Phos  4.0     02-14  Mg     1.7     02-14            RADIOLOGY & ADDITIONAL TESTS: Reviewed

## 2018-02-15 LAB
GLUCOSE BLDC GLUCOMTR-MCNC: 109 MG/DL — HIGH (ref 70–99)
GLUCOSE BLDC GLUCOMTR-MCNC: 124 MG/DL — HIGH (ref 70–99)
GLUCOSE BLDC GLUCOMTR-MCNC: 171 MG/DL — HIGH (ref 70–99)
GLUCOSE BLDC GLUCOMTR-MCNC: 177 MG/DL — HIGH (ref 70–99)

## 2018-02-15 PROCEDURE — 99232 SBSQ HOSP IP/OBS MODERATE 35: CPT

## 2018-02-15 PROCEDURE — 93010 ELECTROCARDIOGRAM REPORT: CPT

## 2018-02-15 PROCEDURE — 99233 SBSQ HOSP IP/OBS HIGH 50: CPT | Mod: GC

## 2018-02-15 RX ORDER — ATORVASTATIN CALCIUM 80 MG/1
1 TABLET, FILM COATED ORAL
Qty: 0 | Refills: 0 | COMMUNITY

## 2018-02-15 RX ORDER — HALOPERIDOL DECANOATE 100 MG/ML
1 INJECTION INTRAMUSCULAR
Qty: 0 | Refills: 0 | COMMUNITY
Start: 2018-02-15

## 2018-02-15 RX ORDER — GLIMEPIRIDE 1 MG
1 TABLET ORAL
Qty: 0 | Refills: 0 | COMMUNITY

## 2018-02-15 RX ORDER — FAMOTIDINE 10 MG/ML
1 INJECTION INTRAVENOUS
Qty: 0 | Refills: 0 | COMMUNITY
Start: 2018-02-15

## 2018-02-15 RX ORDER — ASCORBIC ACID 60 MG
1 TABLET,CHEWABLE ORAL
Qty: 0 | Refills: 0 | COMMUNITY
Start: 2018-02-15

## 2018-02-15 RX ORDER — OXYBUTYNIN CHLORIDE 5 MG
15 TABLET ORAL
Qty: 0 | Refills: 0 | COMMUNITY

## 2018-02-15 RX ORDER — LEVETIRACETAM 250 MG/1
1 TABLET, FILM COATED ORAL
Qty: 0 | Refills: 0 | COMMUNITY
Start: 2018-02-15

## 2018-02-15 RX ORDER — ATORVASTATIN CALCIUM 80 MG/1
1 TABLET, FILM COATED ORAL
Qty: 0 | Refills: 0 | COMMUNITY
Start: 2018-02-15

## 2018-02-15 RX ORDER — LEVETIRACETAM 250 MG/1
1 TABLET, FILM COATED ORAL
Qty: 0 | Refills: 0 | COMMUNITY

## 2018-02-15 RX ORDER — POLYETHYLENE GLYCOL 3350 17 G/17G
17 POWDER, FOR SOLUTION ORAL
Qty: 0 | Refills: 0 | COMMUNITY
Start: 2018-02-15

## 2018-02-15 RX ORDER — METFORMIN HYDROCHLORIDE 850 MG/1
1 TABLET ORAL
Qty: 0 | Refills: 0 | COMMUNITY

## 2018-02-15 RX ORDER — METFORMIN HYDROCHLORIDE 850 MG/1
1 TABLET ORAL
Qty: 0 | Refills: 0 | COMMUNITY
Start: 2018-02-15

## 2018-02-15 RX ORDER — INSULIN GLARGINE 100 [IU]/ML
12 INJECTION, SOLUTION SUBCUTANEOUS
Qty: 0 | Refills: 0 | COMMUNITY
Start: 2018-02-15

## 2018-02-15 RX ADMIN — Medication 2: at 23:00

## 2018-02-15 RX ADMIN — INSULIN GLARGINE 12 UNIT(S): 100 INJECTION, SOLUTION SUBCUTANEOUS at 22:00

## 2018-02-15 RX ADMIN — LEVETIRACETAM 500 MILLIGRAM(S): 250 TABLET, FILM COATED ORAL at 05:40

## 2018-02-15 RX ADMIN — POLYETHYLENE GLYCOL 3350 17 GRAM(S): 17 POWDER, FOR SOLUTION ORAL at 05:40

## 2018-02-15 RX ADMIN — METFORMIN HYDROCHLORIDE 500 MILLIGRAM(S): 850 TABLET ORAL at 17:32

## 2018-02-15 RX ADMIN — ATORVASTATIN CALCIUM 20 MILLIGRAM(S): 80 TABLET, FILM COATED ORAL at 23:59

## 2018-02-15 RX ADMIN — Medication 5 MILLIGRAM(S): at 23:59

## 2018-02-15 RX ADMIN — FAMOTIDINE 20 MILLIGRAM(S): 10 INJECTION INTRAVENOUS at 11:30

## 2018-02-15 RX ADMIN — NYSTATIN CREAM 1 APPLICATION(S): 100000 CREAM TOPICAL at 17:32

## 2018-02-15 RX ADMIN — LACTULOSE 20 GRAM(S): 10 SOLUTION ORAL at 11:29

## 2018-02-15 RX ADMIN — Medication 250 MILLIGRAM(S): at 11:30

## 2018-02-15 RX ADMIN — SENNA PLUS 2 TABLET(S): 8.6 TABLET ORAL at 23:59

## 2018-02-15 RX ADMIN — Medication 100 MILLIGRAM(S): at 05:40

## 2018-02-15 RX ADMIN — MAGNESIUM HYDROXIDE 30 MILLILITER(S): 400 TABLET, CHEWABLE ORAL at 11:29

## 2018-02-15 RX ADMIN — HEPARIN SODIUM 5000 UNIT(S): 5000 INJECTION INTRAVENOUS; SUBCUTANEOUS at 22:00

## 2018-02-15 RX ADMIN — Medication 30 MILLILITER(S): at 11:30

## 2018-02-15 RX ADMIN — LACTULOSE 20 GRAM(S): 10 SOLUTION ORAL at 23:59

## 2018-02-15 RX ADMIN — HEPARIN SODIUM 5000 UNIT(S): 5000 INJECTION INTRAVENOUS; SUBCUTANEOUS at 14:11

## 2018-02-15 RX ADMIN — Medication 100 MILLIGRAM(S): at 23:59

## 2018-02-15 RX ADMIN — Medication 100 MILLIGRAM(S): at 11:30

## 2018-02-15 RX ADMIN — Medication 2: at 18:21

## 2018-02-15 RX ADMIN — METFORMIN HYDROCHLORIDE 500 MILLIGRAM(S): 850 TABLET ORAL at 08:41

## 2018-02-15 RX ADMIN — LEVETIRACETAM 500 MILLIGRAM(S): 250 TABLET, FILM COATED ORAL at 17:32

## 2018-02-15 RX ADMIN — HEPARIN SODIUM 5000 UNIT(S): 5000 INJECTION INTRAVENOUS; SUBCUTANEOUS at 05:40

## 2018-02-15 RX ADMIN — LACTULOSE 20 GRAM(S): 10 SOLUTION ORAL at 05:40

## 2018-02-15 RX ADMIN — NYSTATIN CREAM 1 APPLICATION(S): 100000 CREAM TOPICAL at 08:41

## 2018-02-15 NOTE — PROGRESS NOTE BEHAVIORAL HEALTH - RELATEDNESS
Fair
Good
Fair
Poor
Fair
Poor
Fair
Poor
Fair
Poor
Good
Poor
Good
Fair

## 2018-02-15 NOTE — PROGRESS NOTE ADULT - SUBJECTIVE AND OBJECTIVE BOX
INTERVAL HPI/OVERNIGHT EVENTS:  No reported events from overnight team or nursing, patient remained hemodynamically stable. This morning at bedside 10 system ROS negative.      Vital Signs Last 24 Hrs  T(C): 36.4 (15 Feb 2018 09:20), Max: 37.2 (14 Feb 2018 20:41)  T(F): 97.6 (15 Feb 2018 09:20), Max: 99 (14 Feb 2018 20:41)  HR: 104 (15 Feb 2018 09:20) (88 - 110)  BP: 108/- (15 Feb 2018 09:20) (108/- - 138/78)  BP(mean): 74 (15 Feb 2018 09:20) (74 - 74)  ABP: --  ABP(mean): --  RR: 17 (15 Feb 2018 09:20) (17 - 18)  SpO2: 98% (15 Feb 2018 09:20) (97% - 99%)    PHYSICAL EXAM:  Constitutional: NAD, lying in bed comfortably, nontoxic  HEENT: no eye discharge, no nasal discharge, no pharyngeal erythema, moist membranes  Neck: no lymphadenopathy, no JVD,  no carotid bruit  Cardiovascular: S1 and S2, RRR,  no M/R/G, non-displaced PMI  Respiratory: no wheezing, no rhonchi, no cough, no crackles   Gastrointestinal: BS+, tense, distended, midline scar, colostomy with stool  Vascular: 2+ peripheral pulses  Neurological: AAO x 4, PERRLA, EOMI  Psychiatric: normal mood, normal affect  Musculoskeletal: no joint swelling  Skin: No rashes, no skin breakdown      MEDICATIONS  (STANDING):  ascorbic acid 250 milliGRAM(s) Oral daily  atorvastatin 20 milliGRAM(s) Oral at bedtime  dextrose 5%. 1000 milliLiter(s) (50 mL/Hr) IV Continuous <Continuous>  dextrose 50% Injectable 12.5 Gram(s) IV Push once  dextrose 50% Injectable 25 Gram(s) IV Push once  dextrose 50% Injectable 25 Gram(s) IV Push once  docusate sodium 100 milliGRAM(s) Oral three times a day  famotidine    Tablet 20 milliGRAM(s) Oral daily  haloperidol     Tablet 10 milliGRAM(s) Oral at bedtime  heparin  Injectable 5000 Unit(s) SubCutaneous every 8 hours  insulin glargine Injectable (LANTUS) 12 Unit(s) SubCutaneous at bedtime  insulin lispro (HumaLOG) corrective regimen sliding scale   SubCutaneous Before meals and at bedtime  lactulose Syrup 20 Gram(s) Oral four times a day  levETIRAcetam 500 milliGRAM(s) Oral two times a day  magnesium hydroxide Suspension 30 milliLiter(s) Oral daily  melatonin 5 milliGRAM(s) Oral at bedtime  metFORMIN 500 milliGRAM(s) Oral two times a day with meals  mineral oil 30 milliLiter(s) Oral once  mineral oil 30 milliLiter(s) Oral daily  nystatin Powder 1 Application(s) Topical two times a day  polyethylene glycol 3350 17 Gram(s) Oral two times a day  senna 2 Tablet(s) Oral at bedtime    MEDICATIONS  (PRN):  acetaminophen   Tablet 650 milliGRAM(s) Oral every 6 hours PRN For Temp greater than 38 C (100.4 F)  ALBUTerol    0.083% 2.5 milliGRAM(s) Nebulizer every 2 hours PRN Bronchospasm  benzocaine 15 mG/menthol 3.6 mG Lozenge 1 Lozenge Oral every 6 hours PRN Sore Throat  dextrose Gel 1 Dose(s) Oral once PRN Blood Glucose LESS THAN 70 milliGRAM(s)/deciliter  glucagon  Injectable 1 milliGRAM(s) IntraMuscular once PRN Glucose LESS THAN 70 milligrams/deciliter  haloperidol    Injectable 10 milliGRAM(s) IntraMuscular at bedtime PRN if refuses PO Haldol      Allergies    Capzasin Back and Body (Unknown)    Intolerances        LABS:                        8.9    7.5   )-----------( 208      ( 14 Feb 2018 06:48 )             30.0     02-14    137  |  99  |  4<L>  ----------------------------<  119<H>  4.2   |  27  |  0.70    Ca    8.8      14 Feb 2018 06:49  Phos  4.0     02-14  Mg     1.7     02-14            RADIOLOGY & ADDITIONAL TESTS: Reviewed

## 2018-02-15 NOTE — PROGRESS NOTE BEHAVIORAL HEALTH - NSBHFUPSUICINTERVAL_PSY_A_CORE
none known
unable to assess
unable to assess
none known
none known
unable to assess
none known
unable to assess
none known

## 2018-02-15 NOTE — ADVANCED PRACTICE NURSE CONSULT - ASSESSMENT
Colostomy stoma pale red, well budded measuring 1 3/8 inches, functioning with light brown thick liquid effluent. Peristomal skin intact. Patient receptive to teaching and participated in changing pouching system. Patient independent in emptying pouch. Patient able to cut skin barrier and apply pouching system with WOCN coaching.   Left ischial pressure ulcer (injury) with 80% pale red, non-granulating tissue and 20% yellow adherent slough. Wound measuring 5 cm x 3.5 cm x 3.5 cm with 3.5 cm tunneling at 10 o'clock, 3 cm at 11 o'clock and 3.5 cm at 12 o'clock. Wound draining moderate amount of serosanguinous exudate.   Sacral pressure injury with pale red, non-granulating tissue measuring 2.5 cm x 3 cm x 2.6 cm with 7.5 cm tunneling at 11 o'clock and 6.5 cm at 12 o'clock. Wound draining moderate amount of serosanguinous exudate. Colostomy stoma pale red, well budded measuring 1 3/8 inches, functioning with light brown thick liquid effluent. Peristomal skin intact. Patient receptive to teaching and participated in changing pouching system. Patient independent in emptying pouch. Patient able to cut skin barrier and apply pouching system with WOCN coaching.   Left ischial pressure ulcer (injury) with 80% pale red, non-granulating tissue and 20% yellow adherent slough. Wound measuring 5 cm x 3.5 cm x 3.5 cm with 3.5 cm tunneling at 10 o'clock, 3 cm at 11 o'clock and 3.5 cm at 12 o'clock. Wound draining moderate amount of serosanguinous exudate.   Provided patient with instructions for changing ostomy pouching system. Also provided patient with ostomy and wound care supplies pending homecare nurse visit.   Faxed enrollment form to [facility] [employer] [facility].   Sacral pressure injury with pale red, non-granulating tissue measuring 2.5 cm x 3 cm x 2.6 cm with 7.5 cm tunneling at 11 o'clock and 6.5 cm at 12 o'clock. Wound draining moderate amount of serosanguinous exudate.

## 2018-02-15 NOTE — PROGRESS NOTE BEHAVIORAL HEALTH - NSBHCONSULTOBS_PSY_A_CORE
Routine observation

## 2018-02-15 NOTE — PROGRESS NOTE BEHAVIORAL HEALTH - GAIT / STATION
Other

## 2018-02-15 NOTE — PROGRESS NOTE BEHAVIORAL HEALTH - AFFECT CONGRUENCE
Congruent

## 2018-02-15 NOTE — PROGRESS NOTE BEHAVIORAL HEALTH - NSBHFUPTYPE_PSY_A_CORE
Consult Follow Up

## 2018-02-15 NOTE — PROGRESS NOTE BEHAVIORAL HEALTH - NS ED BHA MED ROS PSYCHIATRIC
See HPI

## 2018-02-15 NOTE — PROGRESS NOTE BEHAVIORAL HEALTH - NSBHCONSULTOBSREASON_PSY_A_CORE FT
No active dangerousness
No active dangerousness
No dangerousness. Safe for discharge home.
No current dangerousness
No dangerousness
No current dangerousness.
No dangerousness
No current suicidal or homicidal ideation/intent/plan.
No dangerousness
No dangerousness.
No current dangerousness.
No acute dangerousness.
No current suicidal/homicidal ideation/intent/plan. No active dangerousness.
No suicidal or homicidal ideation/intent/plan
No active dangerousness
Pt anergic, complying with tx and rx in the hospital.

## 2018-02-15 NOTE — PROGRESS NOTE BEHAVIORAL HEALTH - NSBHCONSFOLLOWNEEDS_PSY_A_CORE
Patient needs further psychiatric safety assessment prior to discharge
no psychiatric contraindications to discharge
no psychiatric contraindications to discharge
Patient needs further psychiatric safety assessment prior to discharge
no psychiatric contraindications to discharge
no psychiatric contraindications to discharge
Patient needs further psychiatric safety assessment prior to discharge
no psychiatric contraindications to discharge
Patient needs further psychiatric safety assessment prior to discharge

## 2018-02-15 NOTE — PROGRESS NOTE ADULT - PROBLEM SELECTOR PLAN 3
Stable. Chronic anemia   -no evidence of bleeding, no change in vital signs   -monitor CBC and for evidence of bleeding   -will maintain active type and screen  -Hgb stable, no evidence of active bleeding  -will transfuse for Hgb <7

## 2018-02-15 NOTE — PROGRESS NOTE BEHAVIORAL HEALTH - LANGUAGE
No abnormalities noted

## 2018-02-15 NOTE — PROGRESS NOTE BEHAVIORAL HEALTH - NS ED BHA MSE GENERAL APPEARANCE
Well developed

## 2018-02-15 NOTE — PROGRESS NOTE BEHAVIORAL HEALTH - PERCEPTIONS
No abnormalities
Other
Other
No abnormalities
Other
Other
No abnormalities
Other
No abnormalities

## 2018-02-15 NOTE — PROGRESS NOTE ADULT - PROBLEM SELECTOR PLAN 4
Pt w/ no sensation or control of urination with suprapubic catheter changed once a month.   - suprapubic catheter changed 1/12  -will discuss change with urology  - c/w oxybutynin 5mg BID

## 2018-02-15 NOTE — PROGRESS NOTE ADULT - I WAS PHYSICALLY PRESENT FOR THE KEY PORTIONS OF THE EVALUATION AND MANAGEMENT (E/M) SERVICE PROVIDED.  I AGREE WITH THE ABOVE HISTORY, PHYSICAL, AND PLAN WHICH I HAVE REVIEWED AND EDITED WHERE APPROPRIATE

## 2018-02-15 NOTE — PROGRESS NOTE BEHAVIORAL HEALTH - MUSCLE TONE / STRENGTH
Other
Normal muscle tone/strength
Normal muscle tone/strength
Other
Normal muscle tone/strength
Other
Normal muscle tone/strength
Other

## 2018-02-15 NOTE — PROGRESS NOTE ADULT - PROBLEM/PLAN-1
DISPLAY PLAN FREE TEXT

## 2018-02-15 NOTE — PROGRESS NOTE BEHAVIORAL HEALTH - NSBHCONSULTFOLLOW_PSY_A_CORE
yes
Signing off - call with questions…
yes

## 2018-02-15 NOTE — PROGRESS NOTE ADULT - PROBLEM/PLAN-2
DISPLAY PLAN FREE TEXT

## 2018-02-15 NOTE — PROGRESS NOTE BEHAVIORAL HEALTH - JUDGMENT (REGARDING EVERYDAY EVENTS)
Fair
Poor
Poor
Fair
Poor
Fair
Poor
Fair
Poor
Fair
Fair
Poor

## 2018-02-15 NOTE — PROGRESS NOTE ADULT - PROVIDER SPECIALTY LIST ADULT
Gastroenterology
Hospitalist
Infectious Disease
Internal Medicine
MICU
Surgery
Internal Medicine
Surgery
Infectious Disease
MICU
Infectious Disease
Internal Medicine
Internal Medicine
Infectious Disease
Internal Medicine
Internal Medicine
Infectious Disease
Internal Medicine
Hospitalist
Internal Medicine
Hospitalist
Internal Medicine
Hospitalist
Internal Medicine
Hospitalist
Internal Medicine
Internal Medicine
Hospitalist

## 2018-02-15 NOTE — PROGRESS NOTE ADULT - ASSESSMENT
32M paraplegic PMH spinal cord injury (wheelchair bound), sacral pressure ulcer with osteo x2 (outpatient provider said they have records of March osteo s/p daptomycin of unknown length) earlier in 2017,  DM2, indwelling suprapubic catheter who presented w/ septic shock secondary to infected purulent sacral 4th degree pressure ulcer with underlying inadequately treated OM, hemodynamically stable now s/p abx for sacral osteomyelitis and s/p colostomy w/ improving Ileus(post-neostigmine)

## 2018-02-15 NOTE — PROGRESS NOTE BEHAVIORAL HEALTH - NSBHCONSULTFOLLOWAFTERCARE_PSY_A_CORE FT
Pt is safe for discharge home when medically stable and structured and safe discharge plan/dispo in place. Will continue to work with SW to facilitate safe dispo.
Pt requires safe dispo plan prior to discharge home.  If pt does not get into partial hospitalization program, ACT follow-up should be sufficient to provide safe dispo for pt.
As per SW and community organizations.
Will need psychiatric follow up. ACT team application being considered.
As per JUAN PABLO dispo. Pt to receive homecare, will also follow up at [facility] for outpt tx. Pt also reports he will follow up at center in [place] where he gets exercise tx.

## 2018-02-15 NOTE — PROGRESS NOTE BEHAVIORAL HEALTH - NSBHTIMEBASEDBILLING_PSY_A_CORE
yes...

## 2018-02-15 NOTE — PROGRESS NOTE BEHAVIORAL HEALTH - NSBHADMITCOUNSEL_PSY_A_CORE
instructions for management, treatment and follow up/risk factor reduction/importance of adherence to chosen treatment/client/family/caregiver education
importance of adherence to chosen treatment/client/family/caregiver education/instructions for management, treatment and follow up/risk factor reduction
client/family/caregiver education/instructions for management, treatment and follow up/risk factor reduction/importance of adherence to chosen treatment
supportive tx/client/family/caregiver education
instructions for management, treatment and follow up/risk factor reduction/importance of adherence to chosen treatment/client/family/caregiver education
instructions for management, treatment and follow up/client/family/caregiver education/risk factor reduction/importance of adherence to chosen treatment
importance of adherence to chosen treatment/client/family/caregiver education/instructions for management, treatment and follow up/risk factor reduction
client/family/caregiver education/importance of adherence to chosen treatment
risks and benefits of treatment options/client/family/caregiver education/instructions for management, treatment and follow up/risk factor reduction/importance of adherence to chosen treatment
instructions for management, treatment and follow up/client/family/caregiver education/importance of adherence to chosen treatment/risk factor reduction
instructions for management, treatment and follow up/risk factor reduction/importance of adherence to chosen treatment/client/family/caregiver education
importance of adherence to chosen treatment/instructions for management, treatment and follow up/risk factor reduction
importance of adherence to chosen treatment
importance of adherence to chosen treatment/client/family/caregiver education/instructions for management, treatment and follow up/risk factor reduction
client/family/caregiver education/risks and benefits of treatment options/importance of adherence to chosen treatment/instructions for management, treatment and follow up/risk factor reduction
other...
risks and benefits of treatment options/prognosis/instructions for management, treatment and follow up/risk factor reduction/importance of adherence to chosen treatment
client/family/caregiver education/importance of adherence to chosen treatment
instructions for management, treatment and follow up/risk factor reduction/importance of adherence to chosen treatment
client/family/caregiver education/importance of adherence to chosen treatment/risk factor reduction
instructions for management, treatment and follow up/client/family/caregiver education/risk factor reduction/importance of adherence to chosen treatment
importance of adherence to chosen treatment/client/family/caregiver education/risk factor reduction
importance of adherence to chosen treatment/instructions for management, treatment and follow up/risk factor reduction/client/family/caregiver education
instructions for management, treatment and follow up/risk factor reduction/risks and benefits of treatment options/importance of adherence to chosen treatment
client/family/caregiver education

## 2018-02-15 NOTE — PROGRESS NOTE ADULT - NSHPATTENDINGPLANDISCUSS_GEN_ALL_CORE
resident team
Housestaff
Primary team
Dr. La
Primary team
Primary team
Housestaff
Housestaff
house staff
house staff
Housestaff
house staff
housestaff
house staff
housestaff
house staff
housestaff
house staff
Housestaff
house staff
Dr Everett
house staff

## 2018-02-15 NOTE — PROGRESS NOTE BEHAVIORAL HEALTH - BEHAVIOR
Cooperative
Cooperative
Cooperative/Other
Other/Uncooperative
Cooperative/Other
Other/Cooperative
Cooperative/Other
Hostile
Other/Uncooperative
Uncooperative/Other
Cooperative
Hostile/Other
Hostile
Other/Cooperative
Cooperative
Cooperative/Other
Hostile
Other/Hostile
Other/Uncooperative
Hostile
Other/Hostile
Hostile/Other
Cooperative/Other
Hostile
Cooperative/Other
Other/Uncooperative

## 2018-02-15 NOTE — PROGRESS NOTE BEHAVIORAL HEALTH - NS ED BHA MSE SPEECH SPONTANEITY
Normal
Other
Normal
Other
Normal
Other
Normal
Other
Normal
Other

## 2018-02-15 NOTE — PROGRESS NOTE ADULT - PROBLEM SELECTOR PLAN 2
Pt w/ a stage 4 infected sacral ulcer w/ drainage with w/ feculent material. On admission required debridement by plastic surgery.   CT (1/27): Findings consistent with abscess replacing the lower sacrum and coccyx. Osteomyelitis of the adjacent S3 segment cannot be excluded. The collection appears to be communicating with the skin surface in the inferior gluteal cleft.   - General surgery does not believe there is a fistula between wound and rectum causing leakage of stool. Now with colostomy bag.   - Wound cx had citrobacter, enterococcus, klebsiella   - Wound care following  - s/p zosyn (1/29 to 2/12)   - Blood cultures NGTD    #C. diff: Results came back as indeterminate and then positive. Discussed with ID prefers to hold off treating for now with no diarrhea. If developed increasing loose stool would add PO vancomycin. CDIFF WAS SENT IN THE SITUATION PATIENT WAS HYPOTENSIVE AND SEPTIC WITH UNCLEAR ETIOLOGY AND WAS IN LAYING IN STOOL FOR HOURS WITH WOUND VAC ON UNCLEAR OF TRUE CONSISTENCY. Thereafter, patient with more formed stool. Pt w/ a stage 4 infected sacral ulcer w/ drainage with w/ feculent material. On admission required debridement by plastic surgery.   CT (1/27): Findings consistent with abscess replacing the lower sacrum and coccyx. Osteomyelitis of the adjacent S3 segment cannot be excluded. The collection appears to be communicating with the skin surface in the inferior gluteal cleft.   - General surgery does not believe there is a fistula between wound and rectum causing leakage of stool. Now with colostomy bag.   - Wound cx had citrobacter, enterococcus, klebsiella   - Wound care following  - s/p zosyn (1/29 to 2/12)   - Blood cultures NGTD

## 2018-02-15 NOTE — PROGRESS NOTE ADULT - PROBLEM SELECTOR PLAN 1
RESOLVED s/p diverting colostomy 2/1/18 with improving distention s/p neostigmine in MICU  -c/w bowel regimen  -colostomy with adequate stool output

## 2018-02-15 NOTE — PROGRESS NOTE BEHAVIORAL HEALTH - NSBHCONSORIP_PSY_A_CORE
Consult...

## 2018-02-16 VITALS
RESPIRATION RATE: 14 BRPM | TEMPERATURE: 98 F | OXYGEN SATURATION: 100 % | HEART RATE: 102 BPM | DIASTOLIC BLOOD PRESSURE: 72 MMHG | SYSTOLIC BLOOD PRESSURE: 110 MMHG

## 2018-02-16 LAB
GLUCOSE BLDC GLUCOMTR-MCNC: 145 MG/DL — HIGH (ref 70–99)
GLUCOSE BLDC GLUCOMTR-MCNC: 191 MG/DL — HIGH (ref 70–99)

## 2018-02-16 PROCEDURE — 87070 CULTURE OTHR SPECIMN AEROBIC: CPT

## 2018-02-16 PROCEDURE — 87205 SMEAR GRAM STAIN: CPT

## 2018-02-16 PROCEDURE — 36415 COLL VENOUS BLD VENIPUNCTURE: CPT

## 2018-02-16 PROCEDURE — 86850 RBC ANTIBODY SCREEN: CPT

## 2018-02-16 PROCEDURE — 86923 COMPATIBILITY TEST ELECTRIC: CPT

## 2018-02-16 PROCEDURE — 86900 BLOOD TYPING SEROLOGIC ABO: CPT

## 2018-02-16 PROCEDURE — 74176 CT ABD & PELVIS W/O CONTRAST: CPT

## 2018-02-16 PROCEDURE — 87493 C DIFF AMPLIFIED PROBE: CPT

## 2018-02-16 PROCEDURE — 88307 TISSUE EXAM BY PATHOLOGIST: CPT

## 2018-02-16 PROCEDURE — 87324 CLOSTRIDIUM AG IA: CPT

## 2018-02-16 PROCEDURE — 87046 STOOL CULTR AEROBIC BACT EA: CPT

## 2018-02-16 PROCEDURE — P9045: CPT

## 2018-02-16 PROCEDURE — 84100 ASSAY OF PHOSPHORUS: CPT

## 2018-02-16 PROCEDURE — 87106 FUNGI IDENTIFICATION YEAST: CPT

## 2018-02-16 PROCEDURE — 87798 DETECT AGENT NOS DNA AMP: CPT

## 2018-02-16 PROCEDURE — 83690 ASSAY OF LIPASE: CPT

## 2018-02-16 PROCEDURE — 80053 COMPREHEN METABOLIC PANEL: CPT

## 2018-02-16 PROCEDURE — 96374 THER/PROPH/DIAG INJ IV PUSH: CPT

## 2018-02-16 PROCEDURE — 80202 ASSAY OF VANCOMYCIN: CPT

## 2018-02-16 PROCEDURE — 85025 COMPLETE CBC W/AUTO DIFF WBC: CPT

## 2018-02-16 PROCEDURE — 87486 CHLMYD PNEUM DNA AMP PROBE: CPT

## 2018-02-16 PROCEDURE — 83036 HEMOGLOBIN GLYCOSYLATED A1C: CPT

## 2018-02-16 PROCEDURE — 87040 BLOOD CULTURE FOR BACTERIA: CPT

## 2018-02-16 PROCEDURE — 97163 PT EVAL HIGH COMPLEX 45 MIN: CPT

## 2018-02-16 PROCEDURE — 80307 DRUG TEST PRSMV CHEM ANLYZR: CPT

## 2018-02-16 PROCEDURE — P9016: CPT

## 2018-02-16 PROCEDURE — 97530 THERAPEUTIC ACTIVITIES: CPT

## 2018-02-16 PROCEDURE — 74019 RADEX ABDOMEN 2 VIEWS: CPT

## 2018-02-16 PROCEDURE — 36430 TRANSFUSION BLD/BLD COMPNT: CPT

## 2018-02-16 PROCEDURE — 87633 RESP VIRUS 12-25 TARGETS: CPT

## 2018-02-16 PROCEDURE — 99232 SBSQ HOSP IP/OBS MODERATE 35: CPT

## 2018-02-16 PROCEDURE — 83605 ASSAY OF LACTIC ACID: CPT

## 2018-02-16 PROCEDURE — 74018 RADEX ABDOMEN 1 VIEW: CPT

## 2018-02-16 PROCEDURE — 71045 X-RAY EXAM CHEST 1 VIEW: CPT

## 2018-02-16 PROCEDURE — 83735 ASSAY OF MAGNESIUM: CPT

## 2018-02-16 PROCEDURE — 83930 ASSAY OF BLOOD OSMOLALITY: CPT

## 2018-02-16 PROCEDURE — 81001 URINALYSIS AUTO W/SCOPE: CPT

## 2018-02-16 PROCEDURE — 97110 THERAPEUTIC EXERCISES: CPT

## 2018-02-16 PROCEDURE — 87086 URINE CULTURE/COLONY COUNT: CPT

## 2018-02-16 PROCEDURE — 72193 CT PELVIS W/DYE: CPT

## 2018-02-16 PROCEDURE — 82962 GLUCOSE BLOOD TEST: CPT

## 2018-02-16 PROCEDURE — 93005 ELECTROCARDIOGRAM TRACING: CPT

## 2018-02-16 PROCEDURE — 87075 CULTR BACTERIA EXCEPT BLOOD: CPT

## 2018-02-16 PROCEDURE — 87449 NOS EACH ORGANISM AG IA: CPT

## 2018-02-16 PROCEDURE — 84300 ASSAY OF URINE SODIUM: CPT

## 2018-02-16 PROCEDURE — 99285 EMERGENCY DEPT VISIT HI MDM: CPT | Mod: 25

## 2018-02-16 PROCEDURE — 80074 ACUTE HEPATITIS PANEL: CPT

## 2018-02-16 PROCEDURE — 87186 SC STD MICRODIL/AGAR DIL: CPT

## 2018-02-16 PROCEDURE — 96375 TX/PRO/DX INJ NEW DRUG ADDON: CPT

## 2018-02-16 PROCEDURE — 87045 FECES CULTURE AEROBIC BACT: CPT

## 2018-02-16 PROCEDURE — 85610 PROTHROMBIN TIME: CPT

## 2018-02-16 PROCEDURE — 82570 ASSAY OF URINE CREATININE: CPT

## 2018-02-16 PROCEDURE — 85027 COMPLETE CBC AUTOMATED: CPT

## 2018-02-16 PROCEDURE — 85730 THROMBOPLASTIN TIME PARTIAL: CPT

## 2018-02-16 PROCEDURE — 83550 IRON BINDING TEST: CPT

## 2018-02-16 PROCEDURE — 84540 ASSAY OF URINE/UREA-N: CPT

## 2018-02-16 PROCEDURE — 86140 C-REACTIVE PROTEIN: CPT

## 2018-02-16 PROCEDURE — 87581 M.PNEUMON DNA AMP PROBE: CPT

## 2018-02-16 PROCEDURE — 85652 RBC SED RATE AUTOMATED: CPT

## 2018-02-16 PROCEDURE — 83935 ASSAY OF URINE OSMOLALITY: CPT

## 2018-02-16 PROCEDURE — 97164 PT RE-EVAL EST PLAN CARE: CPT

## 2018-02-16 PROCEDURE — 82728 ASSAY OF FERRITIN: CPT

## 2018-02-16 PROCEDURE — 80048 BASIC METABOLIC PNL TOTAL CA: CPT

## 2018-02-16 PROCEDURE — 86901 BLOOD TYPING SEROLOGIC RH(D): CPT

## 2018-02-16 RX ORDER — LEVETIRACETAM 250 MG/1
1 TABLET, FILM COATED ORAL
Qty: 60 | Refills: 0 | OUTPATIENT
Start: 2018-02-16 | End: 2018-03-17

## 2018-02-16 RX ORDER — POLYETHYLENE GLYCOL 3350 17 G/17G
17 POWDER, FOR SOLUTION ORAL
Qty: 1 | Refills: 0 | OUTPATIENT
Start: 2018-02-16 | End: 2018-03-17

## 2018-02-16 RX ORDER — ATORVASTATIN CALCIUM 80 MG/1
1 TABLET, FILM COATED ORAL
Qty: 30 | Refills: 0 | OUTPATIENT
Start: 2018-02-16 | End: 2018-03-17

## 2018-02-16 RX ORDER — HALOPERIDOL DECANOATE 100 MG/ML
1 INJECTION INTRAMUSCULAR
Qty: 30 | Refills: 0 | OUTPATIENT
Start: 2018-02-16 | End: 2018-03-17

## 2018-02-16 RX ORDER — METFORMIN HYDROCHLORIDE 850 MG/1
1 TABLET ORAL
Qty: 60 | Refills: 0 | OUTPATIENT
Start: 2018-02-16 | End: 2018-03-17

## 2018-02-16 RX ORDER — FAMOTIDINE 10 MG/ML
1 INJECTION INTRAVENOUS
Qty: 30 | Refills: 0 | OUTPATIENT
Start: 2018-02-16 | End: 2018-03-17

## 2018-02-16 RX ADMIN — METFORMIN HYDROCHLORIDE 500 MILLIGRAM(S): 850 TABLET ORAL at 09:45

## 2018-02-16 RX ADMIN — FAMOTIDINE 20 MILLIGRAM(S): 10 INJECTION INTRAVENOUS at 12:59

## 2018-02-16 RX ADMIN — POLYETHYLENE GLYCOL 3350 17 GRAM(S): 17 POWDER, FOR SOLUTION ORAL at 06:58

## 2018-02-16 RX ADMIN — Medication 100 MILLIGRAM(S): at 13:17

## 2018-02-16 RX ADMIN — HEPARIN SODIUM 5000 UNIT(S): 5000 INJECTION INTRAVENOUS; SUBCUTANEOUS at 13:17

## 2018-02-16 RX ADMIN — NYSTATIN CREAM 1 APPLICATION(S): 100000 CREAM TOPICAL at 06:00

## 2018-02-16 RX ADMIN — Medication 100 MILLIGRAM(S): at 06:59

## 2018-02-16 RX ADMIN — LEVETIRACETAM 500 MILLIGRAM(S): 250 TABLET, FILM COATED ORAL at 06:58

## 2018-02-16 RX ADMIN — LACTULOSE 20 GRAM(S): 10 SOLUTION ORAL at 06:57

## 2018-02-16 RX ADMIN — Medication 250 MILLIGRAM(S): at 13:00

## 2018-02-16 RX ADMIN — HALOPERIDOL DECANOATE 10 MILLIGRAM(S): 100 INJECTION INTRAMUSCULAR at 00:00

## 2018-02-16 RX ADMIN — Medication 2: at 13:00

## 2018-02-16 RX ADMIN — HEPARIN SODIUM 5000 UNIT(S): 5000 INJECTION INTRAVENOUS; SUBCUTANEOUS at 06:58

## 2018-02-16 NOTE — ADVANCED PRACTICE NURSE CONSULT - ASSESSMENT
Colostomy functioning with thick light brown stool. Patient demonstrated ability to open top of pouch to expel gas. Patient also able to empty pouch. Used ostomy model to reinforce instruction. Patient able to measure stoma, cut skin barrier. apply liquid skin barrier to peristomal skin, attach 2 piece flat Aurora 2/34 inch pouching system, then applied to model with WOCN coaching.

## 2018-02-18 DIAGNOSIS — N31.9 NEUROMUSCULAR DYSFUNCTION OF BLADDER, UNSPECIFIED: ICD-10-CM

## 2018-02-18 DIAGNOSIS — L89.154 PRESSURE ULCER OF SACRAL REGION, STAGE 4: ICD-10-CM

## 2018-02-18 DIAGNOSIS — F20.9 SCHIZOPHRENIA, UNSPECIFIED: ICD-10-CM

## 2018-02-18 DIAGNOSIS — G82.20 PARAPLEGIA, UNSPECIFIED: ICD-10-CM

## 2018-02-18 DIAGNOSIS — D64.9 ANEMIA, UNSPECIFIED: ICD-10-CM

## 2018-02-18 DIAGNOSIS — F25.9 SCHIZOAFFECTIVE DISORDER, UNSPECIFIED: ICD-10-CM

## 2018-02-18 DIAGNOSIS — E78.5 HYPERLIPIDEMIA, UNSPECIFIED: ICD-10-CM

## 2018-02-18 DIAGNOSIS — Z86.73 PERSONAL HISTORY OF TRANSIENT ISCHEMIC ATTACK (TIA), AND CEREBRAL INFARCTION WITHOUT RESIDUAL DEFICITS: ICD-10-CM

## 2018-02-18 DIAGNOSIS — L97.529 NON-PRESSURE CHRONIC ULCER OF OTHER PART OF LEFT FOOT WITH UNSPECIFIED SEVERITY: ICD-10-CM

## 2018-02-18 DIAGNOSIS — Z53.20 PROCEDURE AND TREATMENT NOT CARRIED OUT BECAUSE OF PATIENT'S DECISION FOR UNSPECIFIED REASONS: ICD-10-CM

## 2018-02-18 DIAGNOSIS — E11.65 TYPE 2 DIABETES MELLITUS WITH HYPERGLYCEMIA: ICD-10-CM

## 2018-02-18 DIAGNOSIS — R65.21 SEVERE SEPSIS WITH SEPTIC SHOCK: ICD-10-CM

## 2018-02-18 DIAGNOSIS — K59.00 CONSTIPATION, UNSPECIFIED: ICD-10-CM

## 2018-02-18 DIAGNOSIS — Z72.0 TOBACCO USE: ICD-10-CM

## 2018-02-18 DIAGNOSIS — Z91.11 PATIENT'S NONCOMPLIANCE WITH DIETARY REGIMEN: ICD-10-CM

## 2018-02-18 DIAGNOSIS — N39.0 URINARY TRACT INFECTION, SITE NOT SPECIFIED: ICD-10-CM

## 2018-02-18 DIAGNOSIS — I10 ESSENTIAL (PRIMARY) HYPERTENSION: ICD-10-CM

## 2018-02-18 DIAGNOSIS — F48.9 NONPSYCHOTIC MENTAL DISORDER, UNSPECIFIED: ICD-10-CM

## 2018-02-18 DIAGNOSIS — K56.7 ILEUS, UNSPECIFIED: ICD-10-CM

## 2018-02-18 DIAGNOSIS — F22 DELUSIONAL DISORDERS: ICD-10-CM

## 2018-02-18 DIAGNOSIS — B96.4 PROTEUS (MIRABILIS) (MORGANII) AS THE CAUSE OF DISEASES CLASSIFIED ELSEWHERE: ICD-10-CM

## 2018-02-18 DIAGNOSIS — E11.69 TYPE 2 DIABETES MELLITUS WITH OTHER SPECIFIED COMPLICATION: ICD-10-CM

## 2018-02-18 DIAGNOSIS — Z91.14 PATIENT'S OTHER NONCOMPLIANCE WITH MEDICATION REGIMEN: ICD-10-CM

## 2018-02-18 DIAGNOSIS — L89.214 PRESSURE ULCER OF RIGHT HIP, STAGE 4: ICD-10-CM

## 2018-02-18 DIAGNOSIS — A41.9 SEPSIS, UNSPECIFIED ORGANISM: ICD-10-CM

## 2018-02-18 DIAGNOSIS — E87.1 HYPO-OSMOLALITY AND HYPONATREMIA: ICD-10-CM

## 2018-02-18 DIAGNOSIS — R19.7 DIARRHEA, UNSPECIFIED: ICD-10-CM

## 2018-02-18 DIAGNOSIS — Z28.21 IMMUNIZATION NOT CARRIED OUT BECAUSE OF PATIENT REFUSAL: ICD-10-CM

## 2018-02-18 DIAGNOSIS — G40.909 EPILEPSY, UNSPECIFIED, NOT INTRACTABLE, WITHOUT STATUS EPILEPTICUS: ICD-10-CM

## 2018-02-18 DIAGNOSIS — Z87.820 PERSONAL HISTORY OF TRAUMATIC BRAIN INJURY: ICD-10-CM

## 2018-02-18 DIAGNOSIS — N20.0 CALCULUS OF KIDNEY: ICD-10-CM

## 2018-02-18 DIAGNOSIS — F29 UNSPECIFIED PSYCHOSIS NOT DUE TO A SUBSTANCE OR KNOWN PHYSIOLOGICAL CONDITION: ICD-10-CM

## 2018-02-18 DIAGNOSIS — M46.28 OSTEOMYELITIS OF VERTEBRA, SACRAL AND SACROCOCCYGEAL REGION: ICD-10-CM

## 2018-02-18 DIAGNOSIS — B35.4 TINEA CORPORIS: ICD-10-CM

## 2018-02-18 DIAGNOSIS — R15.9 FULL INCONTINENCE OF FECES: ICD-10-CM

## 2018-02-18 DIAGNOSIS — Z80.8 FAMILY HISTORY OF MALIGNANT NEOPLASM OF OTHER ORGANS OR SYSTEMS: ICD-10-CM

## 2018-02-18 DIAGNOSIS — Z99.3 DEPENDENCE ON WHEELCHAIR: ICD-10-CM

## 2018-02-18 DIAGNOSIS — E66.9 OBESITY, UNSPECIFIED: ICD-10-CM

## 2018-02-18 DIAGNOSIS — N17.9 ACUTE KIDNEY FAILURE, UNSPECIFIED: ICD-10-CM

## 2018-02-18 DIAGNOSIS — B37.9 CANDIDIASIS, UNSPECIFIED: ICD-10-CM

## 2018-02-18 DIAGNOSIS — T83.511A INFECTION AND INFLAMMATORY REACTION DUE TO INDWELLING URETHRAL CATHETER, INITIAL ENCOUNTER: ICD-10-CM

## 2018-02-18 DIAGNOSIS — R00.0 TACHYCARDIA, UNSPECIFIED: ICD-10-CM

## 2018-03-12 NOTE — PROGRESS NOTE BEHAVIORAL HEALTH - NS ED BHA REVIEW OF ED CHART VITAL SIGNS REVIEWED
62 y/o male with PMH of HTN, DM, right BKA and CKD admitted with left foot infection. Pt. with JANY on CKD. Yes

## 2018-03-13 NOTE — PROGRESS NOTE BEHAVIORAL HEALTH - NS ED BHA MED ROS MUSCULOSKELETAL
[facility] ICU Progress Note      Admit Date: 3/9/2018  ICU Day: 5    Resident(s): PATRICIA Melo  Attending: MARY ANN OLMOS/ Dr. Albert    Date & Time:   3/13/2018   12:26 PM       Patient ID:    Name:             August Samuel   YOB: 1992  Age:                 25 y.o.  male   MRN:               [id number]    HPI:    26 yo male   with a hx of esophageal varices and liver cirrhosis 2/2 Alcohol presented to the ED with hematemesis, blood in stool and lightheadedness. Patient had banding of his esophageal varices in 10/2017. Patient states stopped drinking alcohol after procedure in October 2017.  In the ED, he was found to be tachycardic, /46, Hgb was 3.5. Massive transfusion protocol was initiated and he was given 1unit of FFP, platelets  and PRBC. A central line was placed. GI was consulted.   Patient received so many blood products PRBC, Platelets, FFP before emergent EGD as he was having bloody emesis with clots and bloody stools  He had an EGD on 03/ 10 which showed large esophageal varices which was banded x5 and gastric varices. Hgb has been stable ~ 7.  TIPS scheduled on 03/15      Consultants:    GI : Dr Mendoza ,Dr Richards, Dr. Coleman    PMA: Dr Yin, Dr. Albert    Interval Events:  Stable Hgb around 7-8. Some dark stool, but no more N/V.  Octreotide discontinued on 03/13.   Advanced to soft diet.   Change PPI to oral.   Transfer to tele floor.   The patient is planned to have TIPS on 03/15.    Review of Systems   Constitutional: Negative for chills and fever.   HENT: Negative for congestion and sore throat.    Respiratory: Negative for shortness of breath and wheezing.    Cardiovascular: Negative for chest pain, claudication and leg swelling.   Gastrointestinal: Negative for abdominal pain and vomiting.   Genitourinary: Negative for frequency, hematuria and urgency.   Musculoskeletal: Negative for joint pain and neck pain.   Neurological: Negative for seizures and headaches.  "  Psychiatric/Behavioral: The patient does not have insomnia.        PHYSICAL EXAM  Vitals:    03/13/18 0900 03/13/18 1000 03/13/18 1100 03/13/18 1200   BP:       Pulse:       Resp: 13 15 14 17   Temp:    36.5 °C (97.7 °F)   SpO2:    96%   Weight:       Height:         Body mass index is 29.46 kg/m².  /69   Pulse 84   Temp 36.5 °C (97.7 °F)   Resp 17   Ht 1.803 m (5' 11\")   Wt 95.8 kg (211 lb 3.2 oz)   SpO2 96%   BMI 29.46 kg/m²   O2 therapy: Pulse Oximetry: 96 %, O2 Delivery: None (Room Air)    Physical Exam   Constitutional: He is oriented to person, place, and time and well-developed, well-nourished, and in no distress.   HENT:   Head: Normocephalic and atraumatic.   Right Ear: External ear normal.   Left Ear: External ear normal.   Eyes: Pupils are equal, round, and reactive to light.   Conjunctiva color improved.    Neck: Normal range of motion. No JVD present.   Cardiovascular: Regular rhythm, normal heart sounds and intact distal pulses.  Exam reveals no gallop and no friction rub.    No murmur heard.  Pulmonary/Chest: Effort normal and breath sounds normal. No respiratory distress. He has no wheezes. He has no rales.   Abdominal: Soft. Bowel sounds are normal. He exhibits no distension. There is no tenderness. There is no rebound.   Musculoskeletal: He exhibits no edema or tenderness.   Neurological: He is alert and oriented to person, place, and time. No cranial nerve deficit.       Respiratory:     Respiration: 17, Pulse Oximetry: 96 %    Chest Tube Drains:    Recent Labs      03/10/18   1137   ISTATTEMP  36.2 C   ISTATFIO2  21   ISTATSPEC  Venous       HemoDynamics:  Heart Rate (Monitored): 81 NIBP: 124/76          Fluids:    Date 03/13/18 0700 - 03/14/18 0659   Shift 8643-4896 3482-1420 6623-8372 24 Hour Total   I  N  T  A  K  E   P.O. 550   550      P.O. 550   550    I.V. 50   50      Octreotide Volume 50   50      IV Volume (NS maintenance ) 0   0    Shift Total 600   600 "   O  U  T  P  U  T   Urine          Number of Times Voided 2 x   2 x    Shift Total          600        Intake/Output Summary (Last 24 hours) at 03/10/18 1247  Last data filed at 03/10/18 1220   Gross per 24 hour   Intake          1629.92 ml   Output             1825 ml   Net          -195.08 ml       Weight: 95.8 kg (211 lb 3.2 oz)  Body mass index is 29.46 kg/m².    Recent Labs      18   0418  18   1346  18   0247  18   0930  18   0459  18   0900   SODIUM   --   136   --   137   --   138   POTASSIUM   --   3.7   --   3.7   --   3.9   CHLORIDE   --   110   --   110   --   109   CO2   --   21   --   24   --   24   BUN   --   11   --   8   --   6*   CREATININE   --   0.76   --   0.70   --   0.73   MAGNESIUM  1.9   --   1.9   --   1.6   --    CALCIUM   --   7.4*   --   7.5*   --   7.7*       GI/Nutrition:  Recent Labs      18   1346  18   0930  18   0900   ALTSGPT  21  21  18   ASTSGOT  29  26  24   ALKPHOSPHAT  40  45  50   TBILIRUBIN  1.6*  1.5  1.7*   GLUCOSE  168*  125*  104*       Heme:  Recent Labs      18   1346   18   0247   18   2301  18   0302  18   0459  18   0900   RBC  2.78*   < >  2.58*   < >  2.70*  2.53*   --   2.55*   HEMOGLOBIN  8.0*   < >  7.3*   < >  7.9*  7.2*   --   7.5*   HEMATOCRIT  23.8*   < >  22.4*   < >  23.8*  22.4*   --   22.3*   PLATELETCT  113*   < >  97*   < >  116*  91*   --   93*   PROTHROMBTM  16.7*   --   16.7*   --    --    --   17.9*   --    INR  1.38*   --   1.38*   --    --    --   1.51*   --     < > = values in this interval not displayed.       Infectious Disease:  Temp  Av.5 °C (97.7 °F)  Min: 36.4 °C (97.5 °F)  Max: 36.7 °C (98 °F)  Recent Labs      18   1346   18   0930   18   2301  18   0302  18   0900   WBC  8.9   < >  6.5   < >  7.0  6.6  5.8   NEUTSPOLYS  54.10   < >  60.40   < >  57.20  57.20  57.30   LYMPHOCYTES  36.60   < >  31.10   < >   33.90  31.00  33.20   MONOCYTES  5.20   < >  4.30   < >  5.00  6.80  5.50   EOSINOPHILS  2.80   < >  3.20   < >  3.00  3.90  3.10   BASOPHILS  0.60   < >  0.50   < >  0.60  0.80  0.70   ASTSGOT  29   --   26   --    --    --   24   ALTSGPT  21   --   21   --    --    --   18   ALKPHOSPHAT  40   --   45   --    --    --   50   TBILIRUBIN  1.6*   --   1.5   --    --    --   1.7*    < > = values in this interval not displayed.       Meds:  • magnesium sulfate  2 g 2 g (03/13/18 1202)   • MD ALERT...Adult ICU Electrolyte Replacement per Pharmacy Protocol       • insulin regular  1-6 Units      And   • glucose 4 g  16 g      And   • dextrose 50%  25 mL     • omeprazole  20 mg     • ondansetron  4 mg     • fentaNYL  25 mcg     • thiamine  100 mg      And   • folic acid  1 mg      And   • multivitamin  1 Tab     • cefTRIAXone (ROCEPHIN) IV  2 g     • nicotine  14 mg      And   • nicotine polacrilex  2 mg          Procedures:  None     Imaging:  ECHOCARDIOGRAM COMP W/O CONT   Final Result      CT-ABDOMEN-PELVIS WITH   Final Result      1.  Gastroesophageal and splenorenal varices with recanalization of the umbilical vein. There is also prominent splenomegaly consistent with portal hypertension. There is small amount of ascites.      2.  Fatty liver.      3.  Enlarged periportal and portacaval lymph nodes.      4.  No evidence of bowel obstruction.      DX-CHEST-PORTABLE (1 VIEW)   Final Result      1.  No acute cardiopulmonary disease.   2.  Supportive tubing as described above.      IR-TIPS    (Results Pending)   IR-CONSULT AND TREAT    (Results Pending)       Problem and Plan:      * Esophageal varices with bleeding (CMS-HCC)- (present on admission)   Assessment & Plan     Hx of Alcoholic liver disease and possible fatty liver.   S/p esophageal variceal  Banding in 10/2017.  Per patient he stoppped drinking alcohol after diagnosis and procedure in October.  Presented to the ED with hx of hemtemesis, light headedness and  Hgb of ~3  On octreotide , PPI, antibiotics, and EV ligation repeated on March 10  S/p EGD 03/10 : large esophageal varices s/p banding of esophageal varices x 5 and gastric varices.  Octreotide stopped on 03/13  Echo of heart grossly normal. TIPS on 03/15  Transfuse if Hgb <7                           Alcoholic cirrhosis (CMS-HCC)- (present on admission)   Assessment & Plan    Significant drinking history ~ 8years.  Patient states he quit in October 2017 after a diagnosis of liver cirrhosis and esophageal variceal banding .   Ultrasound 10/2017 showed enlarged liver, fatty infiltration and/or cirrhotic change  EGD 10/2017 showed Esophageal Varices, Gastrica varices and portal-hypertensive gastropathy .  EGD 03/2018 EV GV, s/p EV ligation.   No evidence of hepatic encephalopathy.  Planned TIPS, keep close monitor on encephalopathy after TIPS.                     Peripheral neuropathy (CMS-HCC)   Assessment & Plan    Likely due to hx of alcohol , DM  Improved and stable.         Diabetes mellitus (CMS-HCC)- (present on admission)   Assessment & Plan    Type 2, diagnosed in 10/2017, was on metformin.   With neuropathy, likely due to alcohol use.  Hgb A1C:   08/30/17: 8.4, 03/2018: 5.6 (not correct due to recent bleeding and transfusion)  Hypoglycemic protocol and very low dose Sliding if needed.               DISPO: ICU    CODE STATUS: full    Quality Measures:  Larson Catheter: None   DVT Prophylaxis: None  Ulcer Prophylaxis: On PPI for GI bleed   Antibiotics: Ceftriaxone (03/09-->)  Lines: RIJ, PIV            No complaints

## 2018-03-29 NOTE — PROGRESS NOTE BEHAVIORAL HEALTH - HYGIENE
Interval History 3/29/2018:  Patient is seen for follow-up rehab evaluation and recommendations: Declined therapy 2/2 chemo infusion yesterday.      HPI, Past Medical, Family, and Social History remains the same as documented in the initial encounter.    Scheduled Medications:    acetaZOLAMIDE  500 mg Oral BID    amLODIPine  10 mg Oral Daily    collagenase   Topical (Top) Daily    enoxaparin  90 mg Subcutaneous Q12H    famotidine  20 mg Oral BID    folic acid  1 mg Oral Daily    gabapentin  800 mg Oral TID    leucovorin (WELLCOVORIN) infusion  35 mg Intravenous Q6H    polyethylene glycol  17 g Oral Daily    potassium chloride 10%  30 mEq Oral TID WM    potassium, sodium phosphates  2 packet Oral QID (AC & HS)    predniSONE  1 mg/kg/day Oral Daily    senna-docusate 8.6-50 mg  1 tablet Oral Daily       Diagnostic Results: Labs: Reviewed    PRN Medications: acetaminophen, acetaminophen, alteplase, ammonium lactate, custom IV infusion builder, furosemide, furosemide, heparin, porcine (PF), hydrocodone-acetaminophen 5-325mg, HYDROmorphone, lidocaine HCL 10 mg/ml (1%), lorazepam, ondansetron, prochlorperazine, ramelteon, sodium chloride 0.9%, sodium chloride 0.9%, tiZANidine    Review of Systems   Constitutional: Positive for fatigue (improved). Negative for chills and fever.   HENT: Negative for trouble swallowing and voice change.    Eyes: Negative for photophobia and visual disturbance.   Respiratory: Negative for cough, shortness of breath and wheezing.    Cardiovascular: Negative for chest pain and palpitations.   Gastrointestinal: Negative for abdominal distention, nausea and vomiting.   Genitourinary: Negative for difficulty urinating and flank pain.   Musculoskeletal: Positive for arthralgias (r shoulder, r ankle) and gait problem.   Skin: Negative for color change and rash.   Neurological: Positive for weakness and numbness. Negative for facial asymmetry, speech difficulty and headaches.    Psychiatric/Behavioral: Negative for agitation and confusion.     Objective:     Vital Signs (Most Recent):  Temp: 98.4 °F (36.9 °C) (03/29/18 0458)  Pulse: 99 (03/29/18 0924)  Resp: 16 (03/29/18 0924)  BP: (!) 106/51 (03/29/18 0924)  SpO2: 97 % (03/29/18 0924)    Vital Signs (24h Range):  Temp:  [97 °F (36.1 °C)-98.6 °F (37 °C)] 98.4 °F (36.9 °C)  Pulse:  [] 99  Resp:  [16-20] 16  SpO2:  [97 %-100 %] 97 %  BP: (105-123)/(51-84) 106/51     Physical Exam   Constitutional: She is oriented to person, place, and time. She appears well-developed and well-nourished.   Appears fatigued    HENT:   Head: Normocephalic and atraumatic.   Eyes: EOM are normal. Pupils are equal, round, and reactive to light.   Neck: Normal range of motion.   Cardiovascular: Normal rate and regular rhythm.    Pulmonary/Chest: No respiratory distress.   Abdominal: Soft. There is no tenderness.   Musculoskeletal: Normal range of motion. She exhibits no deformity.   Neurological: She is alert and oriented to person, place, and time. A sensory deficit (~T6 sensory deficit ) is present. She exhibits normal muscle tone.   RUE: 4/5.  LUE: 4/5.  RLE: 0/5.  LLE: 0/5.  Facial symmetry noted      Skin: Skin is warm and dry.   Psychiatric: She has a normal mood and affect. Her behavior is normal.   Vitals reviewed.    NEUROLOGICAL EXAMINATION:     MENTAL STATUS   Oriented to person, place, and time.     CRANIAL NERVES     CN III, IV, VI   Pupils are equal, round, and reactive to light.  Extraocular motions are normal.      Good

## 2018-07-07 ENCOUNTER — INPATIENT (INPATIENT)
Facility: HOSPITAL | Age: 33
LOS: 8 days | Discharge: HOME HEALTH | DRG: 698 | End: 2018-07-16
Attending: STUDENT IN AN ORGANIZED HEALTH CARE EDUCATION/TRAINING PROGRAM | Admitting: STUDENT IN AN ORGANIZED HEALTH CARE EDUCATION/TRAINING PROGRAM
Payer: MEDICARE

## 2018-07-07 VITALS
SYSTOLIC BLOOD PRESSURE: 102 MMHG | RESPIRATION RATE: 18 BRPM | HEART RATE: 95 BPM | DIASTOLIC BLOOD PRESSURE: 67 MMHG | OXYGEN SATURATION: 98 % | TEMPERATURE: 99 F

## 2018-07-07 DIAGNOSIS — Z93.3 COLOSTOMY STATUS: Chronic | ICD-10-CM

## 2018-07-07 DIAGNOSIS — E87.1 HYPO-OSMOLALITY AND HYPONATREMIA: ICD-10-CM

## 2018-07-07 DIAGNOSIS — N39.0 URINARY TRACT INFECTION, SITE NOT SPECIFIED: ICD-10-CM

## 2018-07-07 LAB
ALBUMIN SERPL ELPH-MCNC: 2.3 G/DL — LOW (ref 3.3–5)
ALBUMIN SERPL ELPH-MCNC: 2.3 G/DL — LOW (ref 3.3–5)
ALP SERPL-CCNC: 112 U/L — SIGNIFICANT CHANGE UP (ref 40–120)
ALP SERPL-CCNC: 113 U/L — SIGNIFICANT CHANGE UP (ref 40–120)
ALT FLD-CCNC: 6 U/L — LOW (ref 10–45)
ALT FLD-CCNC: 7 U/L — LOW (ref 10–45)
ANION GAP SERPL CALC-SCNC: 13 MMOL/L — SIGNIFICANT CHANGE UP (ref 5–17)
ANION GAP SERPL CALC-SCNC: 14 MMOL/L — SIGNIFICANT CHANGE UP (ref 5–17)
ANION GAP SERPL CALC-SCNC: 14 MMOL/L — SIGNIFICANT CHANGE UP (ref 5–17)
APPEARANCE UR: CLEAR — SIGNIFICANT CHANGE UP
APTT BLD: 30.6 SEC — SIGNIFICANT CHANGE UP (ref 27.5–37.4)
AST SERPL-CCNC: 6 U/L — LOW (ref 10–40)
AST SERPL-CCNC: 8 U/L — LOW (ref 10–40)
BASE EXCESS BLDCOV CALC-SCNC: -2.6 MMOL/L — SIGNIFICANT CHANGE UP (ref -9.3–0.3)
BASOPHILS NFR BLD AUTO: 0.1 % — SIGNIFICANT CHANGE UP (ref 0–2)
BASOPHILS NFR BLD AUTO: 0.1 % — SIGNIFICANT CHANGE UP (ref 0–2)
BILIRUB SERPL-MCNC: 1 MG/DL — SIGNIFICANT CHANGE UP (ref 0.2–1.2)
BILIRUB SERPL-MCNC: 1.1 MG/DL — SIGNIFICANT CHANGE UP (ref 0.2–1.2)
BILIRUB UR-MCNC: NEGATIVE — SIGNIFICANT CHANGE UP
BLD GP AB SCN SERPL QL: NEGATIVE — SIGNIFICANT CHANGE UP
BUN SERPL-MCNC: 7 MG/DL — SIGNIFICANT CHANGE UP (ref 7–23)
BUN SERPL-MCNC: 8 MG/DL — SIGNIFICANT CHANGE UP (ref 7–23)
BUN SERPL-MCNC: 9 MG/DL — SIGNIFICANT CHANGE UP (ref 7–23)
CALCIUM SERPL-MCNC: 7.7 MG/DL — LOW (ref 8.4–10.5)
CALCIUM SERPL-MCNC: 8 MG/DL — LOW (ref 8.4–10.5)
CALCIUM SERPL-MCNC: 8.1 MG/DL — LOW (ref 8.4–10.5)
CHLORIDE SERPL-SCNC: 88 MMOL/L — LOW (ref 96–108)
CHLORIDE SERPL-SCNC: 93 MMOL/L — LOW (ref 96–108)
CHLORIDE SERPL-SCNC: 98 MMOL/L — SIGNIFICANT CHANGE UP (ref 96–108)
CO2 SERPL-SCNC: 19 MMOL/L — LOW (ref 22–31)
CO2 SERPL-SCNC: 21 MMOL/L — LOW (ref 22–31)
CO2 SERPL-SCNC: 22 MMOL/L — SIGNIFICANT CHANGE UP (ref 22–31)
COLOR SPEC: YELLOW — SIGNIFICANT CHANGE UP
CREAT ?TM UR-MCNC: 6 MG/DL — SIGNIFICANT CHANGE UP
CREAT SERPL-MCNC: 0.66 MG/DL — SIGNIFICANT CHANGE UP (ref 0.5–1.3)
CREAT SERPL-MCNC: 0.74 MG/DL — SIGNIFICANT CHANGE UP (ref 0.5–1.3)
CREAT SERPL-MCNC: 0.76 MG/DL — SIGNIFICANT CHANGE UP (ref 0.5–1.3)
DIFF PNL FLD: ABNORMAL
EOSINOPHIL NFR BLD AUTO: 0.1 % — SIGNIFICANT CHANGE UP (ref 0–6)
EOSINOPHIL NFR BLD AUTO: 0.3 % — SIGNIFICANT CHANGE UP (ref 0–6)
GAS PNL BLDCOV: 7.42 — SIGNIFICANT CHANGE UP (ref 7.25–7.45)
GAS PNL BLDCOV: SIGNIFICANT CHANGE UP
GLUCOSE BLDC GLUCOMTR-MCNC: 140 MG/DL — HIGH (ref 70–99)
GLUCOSE BLDC GLUCOMTR-MCNC: 167 MG/DL — HIGH (ref 70–99)
GLUCOSE SERPL-MCNC: 174 MG/DL — HIGH (ref 70–99)
GLUCOSE SERPL-MCNC: 185 MG/DL — HIGH (ref 70–99)
GLUCOSE SERPL-MCNC: 206 MG/DL — HIGH (ref 70–99)
GLUCOSE UR QL: NEGATIVE — SIGNIFICANT CHANGE UP
HCO3 BLDCOV-SCNC: 21.5 MMOL/L — SIGNIFICANT CHANGE UP
HCT VFR BLD CALC: 22.7 % — LOW (ref 39–50)
HCT VFR BLD CALC: 24.8 % — LOW (ref 39–50)
HGB BLD-MCNC: 6.5 G/DL — CRITICAL LOW (ref 13–17)
HGB BLD-MCNC: 7.3 G/DL — LOW (ref 13–17)
INR BLD: 1.77 — HIGH (ref 0.88–1.16)
KETONES UR-MCNC: NEGATIVE — SIGNIFICANT CHANGE UP
LACTATE SERPL-SCNC: 0.9 MMOL/L — SIGNIFICANT CHANGE UP (ref 0.5–2)
LACTATE SERPL-SCNC: 1.9 MMOL/L — SIGNIFICANT CHANGE UP (ref 0.5–2)
LEUKOCYTE ESTERASE UR-ACNC: ABNORMAL
LYMPHOCYTES # BLD AUTO: 11.3 % — LOW (ref 13–44)
LYMPHOCYTES # BLD AUTO: 5.9 % — LOW (ref 13–44)
MCHC RBC-ENTMCNC: 19.6 PG — LOW (ref 27–34)
MCHC RBC-ENTMCNC: 20.7 PG — LOW (ref 27–34)
MCHC RBC-ENTMCNC: 28.6 G/DL — LOW (ref 32–36)
MCHC RBC-ENTMCNC: 29.4 G/DL — LOW (ref 32–36)
MCV RBC AUTO: 68.4 FL — LOW (ref 80–100)
MCV RBC AUTO: 70.3 FL — LOW (ref 80–100)
MONOCYTES NFR BLD AUTO: 13.8 % — SIGNIFICANT CHANGE UP (ref 2–14)
MONOCYTES NFR BLD AUTO: 8.3 % — SIGNIFICANT CHANGE UP (ref 2–14)
NEUTROPHILS NFR BLD AUTO: 74.5 % — SIGNIFICANT CHANGE UP (ref 43–77)
NEUTROPHILS NFR BLD AUTO: 85.6 % — HIGH (ref 43–77)
NITRITE UR-MCNC: POSITIVE
OSMOLALITY SERPL: 254 MOSM/KG — LOW (ref 280–301)
OSMOLALITY SERPL: 273 MOSM/KG — LOW (ref 280–301)
OSMOLALITY UR: 243 MOSMOL/KG — SIGNIFICANT CHANGE UP (ref 100–650)
OSMOLALITY UR: 54 MOSMOL/KG — LOW (ref 100–650)
PCO2 BLDCOV: 34 MMHG — SIGNIFICANT CHANGE UP (ref 27–49)
PH UR: 6 — SIGNIFICANT CHANGE UP (ref 5–8)
PLATELET # BLD AUTO: 319 K/UL — SIGNIFICANT CHANGE UP (ref 150–400)
PLATELET # BLD AUTO: 334 K/UL — SIGNIFICANT CHANGE UP (ref 150–400)
PO2 BLDCOA: 70 MMHG — HIGH (ref 17–41)
POTASSIUM SERPL-MCNC: 3.4 MMOL/L — LOW (ref 3.5–5.3)
POTASSIUM SERPL-MCNC: 3.7 MMOL/L — SIGNIFICANT CHANGE UP (ref 3.5–5.3)
POTASSIUM SERPL-MCNC: 3.8 MMOL/L — SIGNIFICANT CHANGE UP (ref 3.5–5.3)
POTASSIUM SERPL-SCNC: 3.4 MMOL/L — LOW (ref 3.5–5.3)
POTASSIUM SERPL-SCNC: 3.7 MMOL/L — SIGNIFICANT CHANGE UP (ref 3.5–5.3)
POTASSIUM SERPL-SCNC: 3.8 MMOL/L — SIGNIFICANT CHANGE UP (ref 3.5–5.3)
PROT SERPL-MCNC: 6.2 G/DL — SIGNIFICANT CHANGE UP (ref 6–8.3)
PROT SERPL-MCNC: 6.3 G/DL — SIGNIFICANT CHANGE UP (ref 6–8.3)
PROT UR-MCNC: NEGATIVE MG/DL — SIGNIFICANT CHANGE UP
PROTHROM AB SERPL-ACNC: 19.9 SEC — HIGH (ref 9.8–12.7)
RBC # BLD: 3.32 M/UL — LOW (ref 4.2–5.8)
RBC # BLD: 3.32 M/UL — LOW (ref 4.2–5.8)
RBC # BLD: 3.53 M/UL — LOW (ref 4.2–5.8)
RBC # FLD: 18.6 % — HIGH (ref 10.3–16.9)
RBC # FLD: 19 % — HIGH (ref 10.3–16.9)
RETICS/RBC NFR: 0.8 % — SIGNIFICANT CHANGE UP (ref 0.5–2.5)
RH IG SCN BLD-IMP: POSITIVE — SIGNIFICANT CHANGE UP
SAO2 % BLDCOV: 93.5 % — SIGNIFICANT CHANGE UP
SODIUM SERPL-SCNC: 121 MMOL/L — LOW (ref 135–145)
SODIUM SERPL-SCNC: 128 MMOL/L — LOW (ref 135–145)
SODIUM SERPL-SCNC: 133 MMOL/L — LOW (ref 135–145)
SODIUM UR-SCNC: 26 MMOL/L — SIGNIFICANT CHANGE UP
SODIUM UR-SCNC: <20 MMOL/L — SIGNIFICANT CHANGE UP
SP GR SPEC: <=1.005 — SIGNIFICANT CHANGE UP (ref 1–1.03)
UROBILINOGEN FLD QL: 0.2 E.U./DL — SIGNIFICANT CHANGE UP
UUN UR-MCNC: 245 MG/DL — SIGNIFICANT CHANGE UP
WBC # BLD: 11.1 K/UL — HIGH (ref 3.8–10.5)
WBC # BLD: 17.7 K/UL — HIGH (ref 3.8–10.5)
WBC # FLD AUTO: 11.1 K/UL — HIGH (ref 3.8–10.5)
WBC # FLD AUTO: 17.7 K/UL — HIGH (ref 3.8–10.5)

## 2018-07-07 PROCEDURE — 93010 ELECTROCARDIOGRAM REPORT: CPT

## 2018-07-07 PROCEDURE — 99285 EMERGENCY DEPT VISIT HI MDM: CPT | Mod: 25

## 2018-07-07 PROCEDURE — 71045 X-RAY EXAM CHEST 1 VIEW: CPT | Mod: 26

## 2018-07-07 PROCEDURE — 99233 SBSQ HOSP IP/OBS HIGH 50: CPT | Mod: GC

## 2018-07-07 RX ORDER — SODIUM CHLORIDE 9 MG/ML
1000 INJECTION INTRAMUSCULAR; INTRAVENOUS; SUBCUTANEOUS ONCE
Qty: 0 | Refills: 0 | Status: COMPLETED | OUTPATIENT
Start: 2018-07-07 | End: 2018-07-07

## 2018-07-07 RX ORDER — IOHEXOL 300 MG/ML
30 INJECTION, SOLUTION INTRAVENOUS ONCE
Qty: 0 | Refills: 0 | Status: COMPLETED | OUTPATIENT
Start: 2018-07-07 | End: 2018-07-07

## 2018-07-07 RX ORDER — SENNA PLUS 8.6 MG/1
1 TABLET ORAL
Qty: 0 | Refills: 0 | COMMUNITY

## 2018-07-07 RX ORDER — VANCOMYCIN HCL 1 G
VIAL (EA) INTRAVENOUS
Qty: 0 | Refills: 0 | Status: DISCONTINUED | OUTPATIENT
Start: 2018-07-07 | End: 2018-07-07

## 2018-07-07 RX ORDER — SENNA PLUS 8.6 MG/1
2 TABLET ORAL EVERY 12 HOURS
Qty: 0 | Refills: 0 | Status: DISCONTINUED | OUTPATIENT
Start: 2018-07-07 | End: 2018-07-16

## 2018-07-07 RX ORDER — ATORVASTATIN CALCIUM 80 MG/1
20 TABLET, FILM COATED ORAL AT BEDTIME
Qty: 0 | Refills: 0 | Status: DISCONTINUED | OUTPATIENT
Start: 2018-07-07 | End: 2018-07-16

## 2018-07-07 RX ORDER — VANCOMYCIN HCL 1 G
1750 VIAL (EA) INTRAVENOUS EVERY 12 HOURS
Qty: 0 | Refills: 0 | Status: DISCONTINUED | OUTPATIENT
Start: 2018-07-08 | End: 2018-07-09

## 2018-07-07 RX ORDER — VANCOMYCIN HCL 1 G
1650 VIAL (EA) INTRAVENOUS EVERY 12 HOURS
Qty: 0 | Refills: 0 | Status: DISCONTINUED | OUTPATIENT
Start: 2018-07-07 | End: 2018-07-07

## 2018-07-07 RX ORDER — MEROPENEM 1 G/30ML
1000 INJECTION INTRAVENOUS EVERY 8 HOURS
Qty: 0 | Refills: 0 | Status: DISCONTINUED | OUTPATIENT
Start: 2018-07-07 | End: 2018-07-08

## 2018-07-07 RX ORDER — SODIUM CHLORIDE 9 MG/ML
1000 INJECTION, SOLUTION INTRAVENOUS
Qty: 0 | Refills: 0 | Status: DISCONTINUED | OUTPATIENT
Start: 2018-07-07 | End: 2018-07-07

## 2018-07-07 RX ORDER — SODIUM CHLORIDE 9 MG/ML
1000 INJECTION, SOLUTION INTRAVENOUS
Qty: 0 | Refills: 0 | Status: COMPLETED | OUTPATIENT
Start: 2018-07-07 | End: 2018-07-07

## 2018-07-07 RX ORDER — POLYETHYLENE GLYCOL 3350 17 G/17G
17 POWDER, FOR SOLUTION ORAL DAILY
Qty: 0 | Refills: 0 | Status: DISCONTINUED | OUTPATIENT
Start: 2018-07-07 | End: 2018-07-16

## 2018-07-07 RX ORDER — VANCOMYCIN HCL 1 G
1000 VIAL (EA) INTRAVENOUS ONCE
Qty: 0 | Refills: 0 | Status: DISCONTINUED | OUTPATIENT
Start: 2018-07-07 | End: 2018-07-07

## 2018-07-07 RX ORDER — OXYBUTYNIN CHLORIDE 5 MG
15 TABLET ORAL ONCE
Qty: 0 | Refills: 0 | Status: DISCONTINUED | OUTPATIENT
Start: 2018-07-07 | End: 2018-07-07

## 2018-07-07 RX ORDER — LEVETIRACETAM 250 MG/1
500 TABLET, FILM COATED ORAL
Qty: 0 | Refills: 0 | Status: DISCONTINUED | OUTPATIENT
Start: 2018-07-07 | End: 2018-07-16

## 2018-07-07 RX ORDER — HEPARIN SODIUM 5000 [USP'U]/ML
5000 INJECTION INTRAVENOUS; SUBCUTANEOUS EVERY 8 HOURS
Qty: 0 | Refills: 0 | Status: DISCONTINUED | OUTPATIENT
Start: 2018-07-07 | End: 2018-07-16

## 2018-07-07 RX ORDER — ACETAMINOPHEN 500 MG
650 TABLET ORAL ONCE
Qty: 0 | Refills: 0 | Status: COMPLETED | OUTPATIENT
Start: 2018-07-07 | End: 2018-07-07

## 2018-07-07 RX ORDER — LINAGLIPTIN 5 MG/1
1 TABLET, FILM COATED ORAL
Qty: 0 | Refills: 0 | COMMUNITY

## 2018-07-07 RX ORDER — PIPERACILLIN AND TAZOBACTAM 4; .5 G/20ML; G/20ML
4.5 INJECTION, POWDER, LYOPHILIZED, FOR SOLUTION INTRAVENOUS ONCE
Qty: 0 | Refills: 0 | Status: COMPLETED | OUTPATIENT
Start: 2018-07-07 | End: 2018-07-07

## 2018-07-07 RX ORDER — OXYBUTYNIN CHLORIDE 5 MG
5 TABLET ORAL EVERY 8 HOURS
Qty: 0 | Refills: 0 | Status: DISCONTINUED | OUTPATIENT
Start: 2018-07-07 | End: 2018-07-16

## 2018-07-07 RX ORDER — MEROPENEM 1 G/30ML
INJECTION INTRAVENOUS
Qty: 0 | Refills: 0 | Status: DISCONTINUED | OUTPATIENT
Start: 2018-07-07 | End: 2018-07-07

## 2018-07-07 RX ORDER — IOHEXOL 300 MG/ML
30 INJECTION, SOLUTION INTRAVENOUS ONCE
Qty: 0 | Refills: 0 | Status: DISCONTINUED | OUTPATIENT
Start: 2018-07-07 | End: 2018-07-11

## 2018-07-07 RX ORDER — VANCOMYCIN HCL 1 G
1500 VIAL (EA) INTRAVENOUS ONCE
Qty: 0 | Refills: 0 | Status: COMPLETED | OUTPATIENT
Start: 2018-07-07 | End: 2018-07-07

## 2018-07-07 RX ORDER — SODIUM CHLORIDE 9 MG/ML
1000 INJECTION, SOLUTION INTRAVENOUS
Qty: 0 | Refills: 0 | Status: DISCONTINUED | OUTPATIENT
Start: 2018-07-07 | End: 2018-07-08

## 2018-07-07 RX ORDER — FAMOTIDINE 10 MG/ML
20 INJECTION INTRAVENOUS DAILY
Qty: 0 | Refills: 0 | Status: DISCONTINUED | OUTPATIENT
Start: 2018-07-07 | End: 2018-07-16

## 2018-07-07 RX ORDER — INSULIN LISPRO 100/ML
VIAL (ML) SUBCUTANEOUS
Qty: 0 | Refills: 0 | Status: DISCONTINUED | OUTPATIENT
Start: 2018-07-07 | End: 2018-07-16

## 2018-07-07 RX ORDER — PROPRANOLOL HCL 160 MG
40 CAPSULE, EXTENDED RELEASE 24HR ORAL EVERY 8 HOURS
Qty: 0 | Refills: 0 | Status: DISCONTINUED | OUTPATIENT
Start: 2018-07-07 | End: 2018-07-12

## 2018-07-07 RX ORDER — VANCOMYCIN HCL 1 G
1650 VIAL (EA) INTRAVENOUS ONCE
Qty: 0 | Refills: 0 | Status: DISCONTINUED | OUTPATIENT
Start: 2018-07-07 | End: 2018-07-07

## 2018-07-07 RX ORDER — GLIMEPIRIDE 1 MG
1 TABLET ORAL
Qty: 0 | Refills: 0 | COMMUNITY

## 2018-07-07 RX ADMIN — SODIUM CHLORIDE 2000 MILLILITER(S): 9 INJECTION INTRAMUSCULAR; INTRAVENOUS; SUBCUTANEOUS at 06:06

## 2018-07-07 RX ADMIN — HEPARIN SODIUM 5000 UNIT(S): 5000 INJECTION INTRAVENOUS; SUBCUTANEOUS at 13:36

## 2018-07-07 RX ADMIN — Medication 125 MILLIGRAM(S): at 17:46

## 2018-07-07 RX ADMIN — Medication 650 MILLIGRAM(S): at 13:36

## 2018-07-07 RX ADMIN — MEROPENEM 100 MILLIGRAM(S): 1 INJECTION INTRAVENOUS at 10:19

## 2018-07-07 RX ADMIN — IOHEXOL 30 MILLILITER(S): 300 INJECTION, SOLUTION INTRAVENOUS at 16:04

## 2018-07-07 RX ADMIN — FAMOTIDINE 20 MILLIGRAM(S): 10 INJECTION INTRAVENOUS at 16:05

## 2018-07-07 RX ADMIN — POLYETHYLENE GLYCOL 3350 17 GRAM(S): 17 POWDER, FOR SOLUTION ORAL at 16:05

## 2018-07-07 RX ADMIN — LEVETIRACETAM 500 MILLIGRAM(S): 250 TABLET, FILM COATED ORAL at 18:39

## 2018-07-07 RX ADMIN — Medication 300 MILLIGRAM(S): at 07:25

## 2018-07-07 RX ADMIN — Medication 2: at 22:32

## 2018-07-07 RX ADMIN — MEROPENEM 100 MILLIGRAM(S): 1 INJECTION INTRAVENOUS at 19:30

## 2018-07-07 RX ADMIN — ATORVASTATIN CALCIUM 20 MILLIGRAM(S): 80 TABLET, FILM COATED ORAL at 22:33

## 2018-07-07 RX ADMIN — SENNA PLUS 2 TABLET(S): 8.6 TABLET ORAL at 18:39

## 2018-07-07 RX ADMIN — PIPERACILLIN AND TAZOBACTAM 200 GRAM(S): 4; .5 INJECTION, POWDER, LYOPHILIZED, FOR SOLUTION INTRAVENOUS at 07:10

## 2018-07-07 RX ADMIN — Medication 5 MILLIGRAM(S): at 16:04

## 2018-07-07 RX ADMIN — SODIUM CHLORIDE 2000 MILLILITER(S): 9 INJECTION INTRAMUSCULAR; INTRAVENOUS; SUBCUTANEOUS at 06:05

## 2018-07-07 RX ADMIN — Medication 40 MILLIGRAM(S): at 17:32

## 2018-07-07 RX ADMIN — HEPARIN SODIUM 5000 UNIT(S): 5000 INJECTION INTRAVENOUS; SUBCUTANEOUS at 22:33

## 2018-07-07 RX ADMIN — SODIUM CHLORIDE 1000 MILLILITER(S): 9 INJECTION, SOLUTION INTRAVENOUS at 17:33

## 2018-07-07 RX ADMIN — SODIUM CHLORIDE 75 MILLILITER(S): 9 INJECTION, SOLUTION INTRAVENOUS at 23:14
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
## 2018-07-07 NOTE — CONSULT NOTE ADULT - ASSESSMENT
This is 32 year old male with PMH stated above. the renal service is called for over correction of hyponatremia. The patient is high risk for ODS, so the target for his sodium is 127 till 6 am on 7/8 This is 32 year old male with PMH stated above. the renal service is called for over correction of hyponatremia. The patient is extremely low risk  ODS, the sodium is above 120, he is not alcoholic,  so the target for his sodium is 127 till 6 am on 7/8

## 2018-07-07 NOTE — H&P ADULT - NSHPREVIEWOFSYSTEMS_GEN_ALL_CORE

## 2018-07-07 NOTE — H&P ADULT - NSHPLABSRESULTS_GEN_ALL_CORE
.  LABS:                         6.5    17.7  )-----------( 334      ( 07 Jul 2018 06:03 )             22.7     07-07    121<L>  |  88<L>  |  9   ----------------------------<  174<H>  3.7   |  19<L>  |  0.76    Ca    8.1<L>      07 Jul 2018 06:03    TPro  6.2  /  Alb  2.3<L>  /  TBili  1.1  /  DBili  x   /  AST  8<L>  /  ALT  7<L>  /  AlkPhos  112  07-07    PT/INR - ( 07 Jul 2018 06:39 )   PT: 19.9 sec;   INR: 1.77          PTT - ( 07 Jul 2018 06:39 )  PTT:30.6 sec  Urinalysis Basic - ( 07 Jul 2018 07:25 )    Color: Yellow / Appearance: Clear / SG: <=1.005 / pH: x  Gluc: x / Ketone: NEGATIVE  / Bili: Negative / Urobili: 0.2 E.U./dL   Blood: x / Protein: NEGATIVE mg/dL / Nitrite: POSITIVE   Leuk Esterase: Large / RBC: < 5 /HPF / WBC Many /HPF   Sq Epi: x / Non Sq Epi: 0-5 /HPF / Bacteria: Many /HPF            Lactate, Blood: 0.9 mmoL/L (07-07 @ 06:02)      RADIOLOGY, EKG & ADDITIONAL TESTS: Reviewed. LABS:                         6.5    17.7  )-----------( 334      ( 07 Jul 2018 06:03 )             22.7     07-07    121<L>  |  88<L>  |  9   ----------------------------<  174<H>  3.7   |  19<L>  |  0.76    Ca    8.1<L>      07 Jul 2018 06:03    TPro  6.2  /  Alb  2.3<L>  /  TBili  1.1  /  DBili  x   /  AST  8<L>  /  ALT  7<L>  /  AlkPhos  112  07-07    PT/INR - ( 07 Jul 2018 06:39 )   PT: 19.9 sec;   INR: 1.77          PTT - ( 07 Jul 2018 06:39 )  PTT:30.6 sec  Urinalysis Basic - ( 07 Jul 2018 07:25 )    Color: Yellow / Appearance: Clear / SG: <=1.005 / pH: x  Gluc: x / Ketone: NEGATIVE  / Bili: Negative / Urobili: 0.2 E.U./dL   Blood: x / Protein: NEGATIVE mg/dL / Nitrite: POSITIVE   Leuk Esterase: Large / RBC: < 5 /HPF / WBC Many /HPF   Sq Epi: x / Non Sq Epi: 0-5 /HPF / Bacteria: Many /HPF            Lactate, Blood: 0.9 mmoL/L (07-07 @ 06:02)      RADIOLOGY, EKG & ADDITIONAL TESTS: Reviewed.

## 2018-07-07 NOTE — PATIENT PROFILE ADULT. - PRIMARY CARE PHYSICIAN, PROFILE
LOV 1/23/17.  Has appointment 3/20/17.  Medication refilled per protocol.  Last TSH 4/22/16.   Dr. Christianson  ([facility])

## 2018-07-07 NOTE — ED ADULT NURSE REASSESSMENT NOTE - NS ED NURSE REASSESS COMMENT FT1
Blood transfusion initiated with 2 RN verification. VSS. Patient resting comfortably. Will continue to monitor.
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
## 2018-07-07 NOTE — CONSULT NOTE ADULT - ASSESSMENT
32 yr old male with a PMHx of paraplegia w/ indwelling suprapubic catheter, chronic sacral decubitus ulcers w/ recurrent OM currently with wound vac, colostomy, DMT2, hepatitis B and schizoaffective disorder that presented to ED with subjective chills, weakness and tachycardia in the setting of acute microcytic anemia 32 yr old male with a PMHx of paraplegia w/ indwelling suprapubic catheter, chronic sacral decubitus ulcers w/ recurrent OM currently with wound vac, colostomy, DMT2, hepatitis B and schizoaffective disorder that presented to ED with subjective chills, weakness and tachycardia  acute microcytic anemia. ICU consulted to assess for sepsis/anemia requiring telemetry monitoring. 32 yr old male with a PMHx of paraplegia w/ indwelling suprapubic catheter, chronic sacral decubitus ulcers w/ recurrent OM currently with wound vac, colostomy, DMT2, hepatitis B and schizoaffective disorder that presented to ED with subjective chills, weakness and tachycardia  acute microcytic anemia. ICU consulted to assess for sepsis/anemia requiring telemetry monitoring.     #acute microcytic anemia  -patient with chills, palpitations, found to have acute anemia of 6.5 w/ MCV of 68; hx of transfusion 32 yr old male with a PMHx of paraplegia w/ indwelling suprapubic catheter, chronic sacral decubitus ulcers w/ recurrent OM currently with wound vac, colostomy, DMT2, hepatitis B and schizoaffective disorder that presented to ED with subjective chills, weakness and tachycardia  acute microcytic anemia. ICU consulted to assess for sepsis/anemia requiring telemetry monitoring.     #acute microcytic anemia  -patient with chills, palpitations, found to have acute anemia of 6.5 w/ MCV of 68 which is most likely etiology of patient's presenting fatigue and palpitations  - hx of transfusions in the past; no prior workup of note in respect to patient's anemia;   -recommend FOBT testing; consider GI consult for further evaluation of anemia; no risk factors for ulcers (no NSAID use, steroid use)  -s/p 1U PRBCs; check for symptom improvement and also post-tx CBC 32 yr old male with a PMHx of paraplegia w/ indwelling suprapubic catheter, chronic sacral decubitus ulcers w/ recurrent OM currently with wound vac, colostomy, DMT2, hepatitis B and schizoaffective disorder that presented to ED with subjective chills, weakness and tachycardia  acute microcytic anemia. ICU consulted to assess for sepsis/anemia requiring telemetry monitoring.     #acute microcytic anemia  -patient with chills, palpitations, found to have acute anemia of 6.5 w/ MCV of 68 which is most likely etiology of patient's presenting fatigue and palpitations  - hx of transfusions in the past; iron studies from 2017 c/w YRN  -recommend FOBT testing; consider GI consult for further evaluation of anemia; no risk factors for ulcers (no NSAID use, steroid use)  -s/p 1U PRBCs with appropriate Hb response    #r/o sepsis  -patient with admission WBC of 17K; downtrended to 11.7 after IVF; presenting leukocytosis most likely dilutional in the absence of no other phsyical exam or hx findings c/w infection  -no fever since admission and patient hemodynamically stable; UA (+)for LE, nitrites and WBCs in the setting of indwelling suprapubic catheter. Noted to have purulence around bowles insertion site without erythema or fluctuance  -recommend further evaluation of bowles by 32 yr old male with a PMHx of paraplegia w/ indwelling suprapubic catheter, chronic sacral decubitus ulcers w/ recurrent OM currently with wound vac, colostomy, DMT2, hepatitis B and schizoaffective disorder that presented to ED with subjective chills, weakness and tachycardia  acute microcytic anemia. ICU consulted to assess for sepsis/anemia requiring telemetry monitoring.     #acute microcytic anemia  -patient with chills, palpitations, found to have acute anemia of 6.5 w/ MCV of 68 which is most likely etiology of patient's presenting fatigue and palpitations  - hx of transfusions in the past; iron studies from 2017 c/w YRN  -recommend FOBT testing; consider GI consult for further evaluation of anemia; no risk factors for ulcers (no NSAID use, steroid use)  -s/p 1U PRBCs with appropriate Hb response    #r/o sepsis  -patient with admission WBC of 17K; downtrended to 11.7 after IVF and ABX  -no fever since admission and patient hemodynamically stable with lactic acidemia; UA (+)for LE, nitrites and WBCs in the setting of indwelling suprapubic catheter. Noted to have purulence around bowles insertion site without erythema or fluctuance  -recommend further evaluation of bowles by  urology  -initiated on meropenem 1 gm q8 for UTI based on cx data from previous admission  -recommend ID consult for abx management     Patient hemodynamically stable without signs of severe sepsis; concern for acute anemia contributing to patient's presenting fatigue and malaise; s/p 1U PRBCs with appropriate Hgb response; on meropenem for (+) UA; no need for telemetry monitoring at this time

## 2018-07-07 NOTE — H&P ADULT - ASSESSMENT
NEURO  #Paraplegia 2/2 spinal cord injury      #Seizure      CARDIO  #HLD      ID  #Sepsis 2/2 OM vs stage 4 sacral ulcer vs UTI 32 yr old male with a PMHx of paraplegia w/ indwelling suprapubic catheter, chronic sacral decubitus ulcers w/ recurrent OM currently with wound vac, colostomy, DMT2, hepatitis B and schizoaffective disorder that presented to ED with subjective chills, weakness and tachycardia, found to be septic 2/2 OM vs UTI vs pressure ulcers.    NEURO  #Paraplegia 2/2 spinal cord injury  -no sensation or motor activity below T4    #Seizure  -hx of seizures  -c/w keppra 500bid    CARDIO  #HLD: c/w lipitor 20mg (home med)    ID  #Sepsis 2/2 OM vs stage 4 sacral ulcer vs UTI  -hx of ESBL coli UTI and chronic sacral decubitus ulcers w/ recurrent OM (hx of citrobacter, enterococcus, klebsiella)  -rigors, WBC 17.7, tachy to 115, , CRP 24 upon presentation  -c/w vanc and meropenem (7/7 - present)  -f/u UC, CT A/P with contrast, MRI A/P    GI  #Ileus s/p colostomy and chronic constipation 2/2 spinal cord injury  -c/w senna 8.6 bid, miralax  -monitor colostomy output      #Neurogenic bladder 2/2 spinal cord injury  -takes oxybutynin 15qd at home  -c/w oxybutynin 5mg TID    ENDO  -takes glimipiride 1mg qd, ,trajenta 5mg qd and metformin 100bid at home  -f/u A1C  -monitor finger stick  -c/w ISS    PROPHYLAXIS  -DVT: HSQ (IMPROVE 3)  -GI: famotodine (prior hx of c-diff)  -Code: full  -Dispo: RMF 32 yr old male with a PMHx of paraplegia w/ indwelling suprapubic catheter, chronic sacral decubitus ulcers w/ recurrent OM currently with wound vac, colostomy, DMT2, hepatitis B and schizoaffective disorder that presented to ED with subjective chills, weakness and tachycardia, found to be septic 2/2 OM vs UTI vs pressure ulcers.    NEURO  #Paraplegia 2/2 spinal cord injury  -no sensation or motor activity below T4    #Seizure  -hx of seizures  -c/w keppra 500bid    CARDIO  #HLD: c/w lipitor 20mg (home med)    ID  #Sepsis 2/2 OM vs stage 4 sacral ulcer vs UTI  -hx of ESBL coli UTI and chronic sacral decubitus ulcers w/ recurrent OM (hx of citrobacter, enterococcus, klebsiella)  -rigors, WBC 17.7, tachy to 115, , CRP 24 upon presentation  -c/w vanc and meropenem (7/7 - present)  -f/u UC, CT A/P with contrast, MRI A/P    GI  #Ileus s/p colostomy and chronic constipation 2/2 spinal cord injury  -c/w senna 8.6 bid, miralax  -monitor colostomy output      #Neurogenic bladder 2/2 spinal cord injury  -takes oxybutynin 15qd at home  -c/w oxybutynin 5mg TID    HEME  #Chronic microcytic anemia  -likely secondary to malabsorption  -no signs of bleeding  -s/p 1u pRBC in the ED  -monitor H/H  -maintain active type and screen    RENAL  #Hyponatremia  -likely hypotonic hypovolemic  -f/u urine lytes    ENDO  -takes glimipiride 1mg qd, ,trajenta 5mg qd and metformin 100bid at home  -f/u A1C  -monitor finger stick  -c/w ISS    PSYCHIATRY  #Hx of schizoaffective disorders  -takes haldol 10mg PO per previous visit records  -f/u psych recs    PROPHYLAXIS  -DVT: HSQ (IMPROVE 3)  -GI: famotodine (prior hx of c-diff)  -Code: full  -Dispo: RMF
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
## 2018-07-07 NOTE — CONSULT NOTE ADULT - PROBLEM SELECTOR RECOMMENDATION 9
- on the admission with 121   - most likely to dehydration - poor oral intake he reports, also the febrile episode  - received to 2 liters of normal saline into the ED   - 133 sodium now  - the goal is 127 to 128 till 6 am on 7/8  - to get 1 liter of D5% water for 2 hours   - please measure the urine output   - check the BMP after the infusion of this 1 liter - so around 8 pm should have BMP   - check the FS - the patient is with DM

## 2018-07-07 NOTE — CONSULT NOTE ADULT - SUBJECTIVE AND OBJECTIVE BOX
This is 32 year old male with PMH for spinal cord injury with paraplegia, w/ chronic suprapubic catheter, chronic sacral decubitus ulcers w/ recurrent OM (hx of citrobacter, enterococcus, klebsiella), ileus s/p colostomy for fecal incontinence. he came in the hospital for generalized weakness, sweating since this morning and cloudy urine. The patient with possible UTI, received 2 liters of normal saline bolus into the ED, started antibiotic treatment. Found to be hyponatremic  121. Was transferred to the medical floor for further treatment. The repeated labs came for 133 of sodium. The renal service is called for recommendations for over correction of the hyponatremia. The patient seen in his bed, no complains, no shortness of breath, no chest pain, no fever, no diarrhea, no cough.      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  Paraplegia  Type 2 diabetes mellitus with hyperglycemia, without long-term current use of insulin  Colostomy present      MEDICATIONS  (STANDING):  atorvastatin 20 milliGRAM(s) Oral at bedtime  famotidine    Tablet 20 milliGRAM(s) Oral daily  heparin  Injectable 5000 Unit(s) SubCutaneous every 8 hours  insulin lispro (HumaLOG) corrective regimen sliding scale   SubCutaneous Before meals and at bedtime  levETIRAcetam 500 milliGRAM(s) Oral two times a day  meropenem  IVPB 1000 milliGRAM(s) IV Intermittent every 8 hours  oxybutynin 5 milliGRAM(s) Oral every 8 hours  polyethylene glycol 3350 17 Gram(s) Oral daily  propranolol 40 milliGRAM(s) Oral every 8 hours  senna 2 Tablet(s) Oral every 12 hours  vancomycin  IVPB 1650 milliGRAM(s) IV Intermittent every 12 hours    MEDICATIONS  (PRN):      Allergies    Capzasin Back and Body (Unknown)    Intolerances        SOCIAL HISTORY:    FAMILY HISTORY:  Family history of brain cancer (Father)      T(C): , Max: 37.6 (07-07-18 @ 12:35)  T(F): , Max: 99.7 (07-07-18 @ 12:35)  HR: 114 (07-07-18 @ 16:14)  BP: 91/56 (07-07-18 @ 16:14)  BP(mean): --  RR: 18 (07-07-18 @ 16:14)  SpO2: 99% (07-07-18 @ 16:14)  Wt(kg): --    07-07 @ 07:01  -  07-07 @ 17:48  --------------------------------------------------------  IN: 0 mL / OUT: 3625 mL / NET: -3625 mL      Height (cm): 170.18 (07-07 @ 12:35)  Weight (kg): 81.6 (07-07 @ 12:35)  BMI (kg/m2): 28.2 (07-07 @ 12:35)  BSA (m2): 1.93 (07-07 @ 12:35)      Physical exam:   Alert and oriented   NO JVD   Normal air entry into the lungs, no wheezing, no crackles  RRR, normal s1/2, no murmurs, rubs or gallops   Abdomen - soft, not tener, s/p laparotomy, colostomy, suprapubic catheter   Extremities: mild peripheral edema    LABS:                        7.3    11.1  )-----------( 319      ( 07 Jul 2018 15:35 )             24.8     07-07    133<L>  |  98  |  8   ----------------------------<  185<H>  3.8   |  21<L>  |  0.74    Ca    8.0<L>      07 Jul 2018 15:35    TPro  6.3  /  Alb  2.3<L>  /  TBili  1.0  /  DBili  x   /  AST  6<L>  /  ALT  6<L>  /  AlkPhos  113  07-07    Osmolality, Serum: 273 mosm/kg <L> [280 - 301] (07-07 @ 15:35)  Osmolality, Serum: 254 mosm/kg <L> [280 - 301] (07-07 @ 07:39)    PT/INR - ( 07 Jul 2018 06:39 )   PT: 19.9 sec;   INR: 1.77          PTT - ( 07 Jul 2018 06:39 )  PTT:30.6 sec  Urinalysis Basic - ( 07 Jul 2018 07:25 )    Color: Yellow / Appearance: Clear / SG: <=1.005 / pH: x  Gluc: x / Ketone: NEGATIVE  / Bili: Negative / Urobili: 0.2 E.U./dL   Blood: x / Protein: NEGATIVE mg/dL / Nitrite: POSITIVE   Leuk Esterase: Large / RBC: < 5 /HPF / WBC Many /HPF   Sq Epi: x / Non Sq Epi: 0-5 /HPF / Bacteria: Many /HPF      Osmolality, Random Urine: 243 mosmol/kg (07-07 @ 15:00)  Sodium, Random Urine: 26 mmol/L (07-07 @ 15:00)  Osmolality, Random Urine: 54 mosmol/kg (07-07 @ 07:39)  Sodium, Random Urine: <20 mmol/L (07-07 @ 07:38)  Creatinine, Random Urine: 6 mg/dL (07-07 @ 07:38)        RADIOLOGY & ADDITIONAL STUDIES:

## 2018-07-07 NOTE — ED PROVIDER NOTE - PROGRESS NOTE DETAILS
pt has leukocytosis and anemia, H/H 5.6/21.7.  1 unit of PRBC ordered. ICU called for consult. chronic anemia with sepsis suspected. Pt received from night team, resting w/o complaint other than feeling tired and cold.  Labs, vs reviewed.  RBC pending.  Pt s/p abx.  Ua sent and pending.  U lytes and serum osm sent 2/2 low na.  ICU eval pending. Pt evaluated by MICU and recommend urology consult and regional admission

## 2018-07-07 NOTE — ED PROVIDER NOTE - ATTENDING CONTRIBUTION TO CARE
32M hx Hep B, paraplegia, dm, indwelling suprapubic bowles, colostomy, pressure ulcers (with wound vac), c/o generalized weakness.  +fever. chills.  malaise.  no vomiting.   gen- diaphoretic, pale  heent- ncat, clear conj  cv -rrr  lungs -ctab  abd - soft, nt, nd  ext -wwp  neuro -aox3  sacral decub  no blood noted in ostomy, no blood in bowles, given broad spectrum ABX, cultures sent, ivf. will require admission.  will require transfusion 32M hx Hep B, paraplegia, dm, indwelling suprapubic bowles, colostomy, pressure ulcers (with wound vac), c/o generalized weakness.  +fever. chills.  malaise.  no vomiting.   gen- diaphoretic, pale  heent- ncat, clear conj  cv -rrr  lungs -ctab  abd - soft, nt, nd  ext -wwp  neuro -aox3  sacral decub  no blood noted in ostomy, no blood in bowles, given broad spectrum ABX, cultures sent, ivf. will require admission.  will require transfusion. possible decub infection

## 2018-07-07 NOTE — CONSULT NOTE ADULT - SUBJECTIVE AND OBJECTIVE BOX
CHIEF COMPLAINT:  Patient is a 32y old Male who presents with a chief complaint of palpitations and weakness since 3 am on 7/7    HPI:  JONNATHAN COLLAZO is a 32y Male with a PMHx of spinal cord injury with paraplegia (dx in 2011; pt unable to recollect inciting event-states he fell asleep and woke up 2 weeks later at [facility] paralyzed), chronic sacral decubitus ulcers w/ recurrent OM (hx of citrobacter, enterococcus, klebsiella), therapeutic colostomy for fecal incontinence (performed to prevent further fecal contamination of sacral decubitus ulcers; performed 2/2018), ESBL L;ebsoe;     PAST MEDICAL & SURGICAL HISTORY:  Hepatitis B infection without delta agent without hepatic coma, unspecified chronicity  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  Paraplegia  Type 2 diabetes mellitus with hyperglycemia, without long-term current use of insulin    FAMILY HISTORY:  Family history of brain cancer (Father)    SOCIAL HISTORY:    REVIEW OF SYSTEMS:  Constitutional: no malaise/fatigue/fevers  HEENT: no headache/sore throat/rhinorrhea  Respiratory: no cough/SOB/chest pain  Cardiac: no palpitations/chest pain  Vascular: no leg swelling  Gastrointestinal: no nausea/vomiting/diarrhea/constipation  Genitourinary: no dysuria/nocturia/polyuria  MSK: no joint or muscle pain  Skin: no rashes  Neuro: no headaches/focal weakness/numbness  Psych: no depression or anxiety    Vital Signs Last 24 Hrs  T(C): 36.7 (07 Jul 2018 06:59), Max: 37.2 (07 Jul 2018 05:01)  T(F): 98 (07 Jul 2018 06:59), Max: 98.9 (07 Jul 2018 05:01)  HR: 89 (07 Jul 2018 06:59) (89 - 95)  BP: 108/68 (07 Jul 2018 06:59) (102/67 - 108/68)  BP(mean): --  RR: 19 (07 Jul 2018 06:59) (18 - 19)  SpO2: 97% (07 Jul 2018 06:59) (97% - 98%)    Height (cm): 167.64 (02-08 @ 14:15)  Weight (kg): 81.6 (07-07 @ 06:16)  BMI (kg/m2): 29 (07-07 @ 06:16)    CAPILLARY BLOOD GLUCOSE          PHYSICAL EXAM:  Gen:       Well-appearing, NAD  Skin:       Warm, dry, no rash  HEENT:  MMM, oropharynx clear, nares clear, no septal deviation  Neck:     supple, no JVD  Cardiac: RRR, S1/S2 present, no murmur/gallop/rub  Pulm:     CTABL, no wheezes/crackles  Abd:       S/NT/ND, normoactive BS  Lymph:  no anterior/posterior/supraclavicular nodes appreciated  Vasc:      WWP, 2+ DP and radial pulses, no pedal edema  Ext:         Normal ROM, atraumatic  Neuro:   AAOx3, CN II-XII grossly intact, no focal deficits    MEDICATIONS  (STANDING):    MEDICATIONS  (PRN):      Allergies    Capzasin Back and Body (Unknown)    Intolerances      LABS:                        6.5    17.7  )-----------( 334      ( 07 Jul 2018 06:03 )             22.7    Mean Cell Volume: 68.4 fL (07-07 @ 06:03)    07-07    121<L>  |  88<L>  |  9   ----------------------------<  174<H>  3.7   |  19<L>  |  0.76    Ca    8.1<L>      07 Jul 2018 06:03    TPro  6.2  /  Alb  2.3<L>  /  TBili  1.1  /  DBili  x   /  AST  8<L>  /  ALT  7<L>  /  AlkPhos  112  07-07    PT/INR - ( 07 Jul 2018 06:39 )   PT: 19.9 sec;   INR: 1.77          PTT - ( 07 Jul 2018 06:39 )  PTT:30.6 sec  Urinalysis Basic - ( 07 Jul 2018 07:25 )    Color: Yellow / Appearance: Clear / SG: <=1.005 / pH: x  Gluc: x / Ketone: NEGATIVE  / Bili: Negative / Urobili: 0.2 E.U./dL   Blood: x / Protein: NEGATIVE mg/dL / Nitrite: POSITIVE   Leuk Esterase: Large / RBC: < 5 /HPF / WBC Many /HPF   Sq Epi: x / Non Sq Epi: 0-5 /HPF / Bacteria: Many /HPF        MICROBIOLOGY:      RADIOLOGY: CHIEF COMPLAINT:  Patient is a 32y old Male who presents with a chief complaint of palpitations and weakness since 3 am on 7/7    HPI:  JONNATHAN COLLAZO is a 32y Male with a PMHx of spinal cord injury with paraplegia (dx in 2011; pt unable to recollect inciting event-states he fell asleep and woke up 2 weeks later at [facility] paralyzed) w/ chronic suprapubic catheter, chronic sacral decubitus ulcers w/ recurrent OM (hx of citrobacter, enterococcus, klebsiella), therapeutic colostomy for fecal incontinence (performed to prevent further fecal contamination of sacral decubitus ulcers; performed 2/2018), C diff colitis (Jan 2018), DMT2, Hep B, schizoaffective disorder that was BIBEMS for palpitations and weakness     PAST MEDICAL & SURGICAL HISTORY:  Hepatitis B infection without delta agent without hepatic coma, unspecified chronicity  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  Paraplegia  Type 2 diabetes mellitus with hyperglycemia, without long-term current use of insulin    FAMILY HISTORY:  Family history of brain cancer (Father)    SOCIAL HISTORY:    REVIEW OF SYSTEMS:  Constitutional: no malaise/fatigue/fevers  HEENT: no headache/sore throat/rhinorrhea  Respiratory: no cough/SOB/chest pain  Cardiac: no palpitations/chest pain  Vascular: no leg swelling  Gastrointestinal: no nausea/vomiting/diarrhea/constipation  Genitourinary: no dysuria/nocturia/polyuria  MSK: no joint or muscle pain  Skin: no rashes  Neuro: no headaches/focal weakness/numbness  Psych: no depression or anxiety    Vital Signs Last 24 Hrs  T(C): 36.7 (07 Jul 2018 06:59), Max: 37.2 (07 Jul 2018 05:01)  T(F): 98 (07 Jul 2018 06:59), Max: 98.9 (07 Jul 2018 05:01)  HR: 89 (07 Jul 2018 06:59) (89 - 95)  BP: 108/68 (07 Jul 2018 06:59) (102/67 - 108/68)  BP(mean): --  RR: 19 (07 Jul 2018 06:59) (18 - 19)  SpO2: 97% (07 Jul 2018 06:59) (97% - 98%)    Height (cm): 167.64 (02-08 @ 14:15)  Weight (kg): 81.6 (07-07 @ 06:16)  BMI (kg/m2): 29 (07-07 @ 06:16)    CAPILLARY BLOOD GLUCOSE          PHYSICAL EXAM:  Gen:       Well-appearing, NAD  Skin:       Warm, dry, no rash  HEENT:  MMM, oropharynx clear, nares clear, no septal deviation  Neck:     supple, no JVD  Cardiac: RRR, S1/S2 present, no murmur/gallop/rub  Pulm:     CTABL, no wheezes/crackles  Abd:       S/NT/ND, normoactive BS  Lymph:  no anterior/posterior/supraclavicular nodes appreciated  Vasc:      WWP, 2+ DP and radial pulses, no pedal edema  Ext:         Normal ROM, atraumatic  Neuro:   AAOx3, CN II-XII grossly intact, no focal deficits    MEDICATIONS  (STANDING):    MEDICATIONS  (PRN):      Allergies    Capzasin Back and Body (Unknown)    Intolerances      LABS:                        6.5    17.7  )-----------( 334      ( 07 Jul 2018 06:03 )             22.7    Mean Cell Volume: 68.4 fL (07-07 @ 06:03)    07-07    121<L>  |  88<L>  |  9   ----------------------------<  174<H>  3.7   |  19<L>  |  0.76    Ca    8.1<L>      07 Jul 2018 06:03    TPro  6.2  /  Alb  2.3<L>  /  TBili  1.1  /  DBili  x   /  AST  8<L>  /  ALT  7<L>  /  AlkPhos  112  07-07    PT/INR - ( 07 Jul 2018 06:39 )   PT: 19.9 sec;   INR: 1.77          PTT - ( 07 Jul 2018 06:39 )  PTT:30.6 sec  Urinalysis Basic - ( 07 Jul 2018 07:25 )    Color: Yellow / Appearance: Clear / SG: <=1.005 / pH: x  Gluc: x / Ketone: NEGATIVE  / Bili: Negative / Urobili: 0.2 E.U./dL   Blood: x / Protein: NEGATIVE mg/dL / Nitrite: POSITIVE   Leuk Esterase: Large / RBC: < 5 /HPF / WBC Many /HPF   Sq Epi: x / Non Sq Epi: 0-5 /HPF / Bacteria: Many /HPF        MICROBIOLOGY:      RADIOLOGY: CHIEF COMPLAINT:  Patient is a 32y old Male who presents with a chief complaint of palpitations and weakness since 3 am on 7/7    HPI:  JONNATHAN COLLAZO is a 32y Male with a PMHx of spinal cord injury with paraplegia (dx in 2011; pt unable to recollect inciting event-states he fell asleep and woke up 2 weeks later at [facility] paralyzed) w/ chronic suprapubic catheter, chronic sacral decubitus ulcers w/ recurrent OM (hx of citrobacter, enterococcus, klebsiella), therapeutic colostomy for fecal incontinence (performed to prevent further fecal contamination of sacral decubitus ulcers; performed 2/2018), C diff colitis (Jan 2018), DMT2, Hep B, schizoaffective disorder that was BIBEMS for palpitations and weakness since 3 am. Patient can not recollect any inciting event for his symptoms. He denies any recent illness, ABX use, diarrhea, cough, n/v. He has a wound vac over his sacral decubitus ulcer which was placed in June and is functioning without issue. His suprapubic catheter was most recently changed 2 weeks ago. His ostomy output has been stable. He denies any dark, tarry output or blood. He has a hx of anemia which has required tx in the past but is unable to provide any information about prior workup of his anemia. ROS (+) for severe thirst; Patient has been drinking 5-6 L of water a day.    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  Paraplegia  Type 2 diabetes mellitus with hyperglycemia, without long-term current use of insulin    FAMILY HISTORY:  Family history of brain cancer (Father)    SOCIAL HISTORY:  daily smoker 1 ppd  denies drinking or illicit drug use  lives alone in an apt; has a HHA and a VNA    REVIEW OF SYSTEMS:  Constitutional: (+) chills/fevers  HEENT: no headache/sore throat/rhinorrhea  Respiratory: no cough/SOB/chest pain  Cardiac: no palpitations/chest pain  Vascular: no leg swelling  Gastrointestinal: no nausea/vomiting/diarrhea/constipation  Genitourinary: no dysuria/nocturia/polyuria  MSK: no joint or muscle pain  Skin: no rashes  Neuro: no headaches/focal weakness/numbness  Psych: no depression or anxiety    PHYSICAL EXAM:  Gen:       pale male in NAD  Skin:       Warm, dry, no rash  HEENT:  MMM, oropharynx clear, nares clear, no septal deviation  Neck:     supple, no JVD  Cardiac: RRR, S1/S2 present, no murmur/gallop/rub  Pulm:     CTABL, no wheezes/crackles  Abd:       S/NT/ND, normoactive BS  Lymph:  no anterior/posterior/supraclavicular nodes appreciated  Vasc:      WWP, 2+ DP and radial pulses, no pedal edema  Ext:         Normal ROM, atraumatic  Neuro:   AAOx3, CN II-XII grossly intact, no focal deficits    MEDICATIONS  (STANDING):    MEDICATIONS  (PRN):      Allergies    Capzasin Back and Body (Unknown)    Intolerances      LABS:                        6.5    17.7  )-----------( 334      ( 07 Jul 2018 06:03 )             22.7    Mean Cell Volume: 68.4 fL (07-07 @ 06:03)    07-07    121<L>  |  88<L>  |  9   ----------------------------<  174<H>  3.7   |  19<L>  |  0.76    Ca    8.1<L>      07 Jul 2018 06:03    TPro  6.2  /  Alb  2.3<L>  /  TBili  1.1  /  DBili  x   /  AST  8<L>  /  ALT  7<L>  /  AlkPhos  112  07-07    PT/INR - ( 07 Jul 2018 06:39 )   PT: 19.9 sec;   INR: 1.77          PTT - ( 07 Jul 2018 06:39 )  PTT:30.6 sec  Urinalysis Basic - ( 07 Jul 2018 07:25 )    Color: Yellow / Appearance: Clear / SG: <=1.005 / pH: x  Gluc: x / Ketone: NEGATIVE  / Bili: Negative / Urobili: 0.2 E.U./dL   Blood: x / Protein: NEGATIVE mg/dL / Nitrite: POSITIVE   Leuk Esterase: Large / RBC: < 5 /HPF / WBC Many /HPF   Sq Epi: x / Non Sq Epi: 0-5 /HPF / Bacteria: Many /HPF        MICROBIOLOGY:      RADIOLOGY: CHIEF COMPLAINT:  Patient is a 32y old Male who presents with a chief complaint of palpitations and weakness since 3 am on 7/7    HPI:  JONNATHAN COLLAZO is a 32y Male with a PMHx of spinal cord injury with paraplegia (dx in 2011; pt unable to recollect inciting event-states he fell asleep and woke up 2 weeks later at [facility] paralyzed) w/ chronic suprapubic catheter, chronic sacral decubitus ulcers w/ recurrent OM (hx of citrobacter, enterococcus, klebsiella), therapeutic colostomy for fecal incontinence (performed to prevent further fecal contamination of sacral decubitus ulcers; performed 2/2018), C diff colitis (Jan 2018), DMT2, Hep B, schizoaffective disorder that was BIBEMS for palpitations and weakness since 3 am. Patient can not recollect any inciting event for his symptoms. He denies any recent illness, ABX use, diarrhea, cough, n/v. He has a wound vac over his sacral decubitus ulcer which was placed in June and is functioning without issue. His suprapubic catheter was most recently changed 2 weeks ago. His ostomy output has been stable. He denies any dark, tarry output or blood. He has a hx of anemia which has required tx in the past but is unable to provide any information about prior workup of his anemia. ROS (+) for severe thirst; Patient has been drinking 5-6 L of water a day.    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  Paraplegia  Type 2 diabetes mellitus with hyperglycemia, without long-term current use of insulin    FAMILY HISTORY:  Family history of brain cancer (Father)    SOCIAL HISTORY:  daily smoker 1 ppd  denies drinking or illicit drug use  lives alone in an apt; has a HHA and a VNA    REVIEW OF SYSTEMS:  Constitutional: (+) chills/fevers  HEENT: no headache/sore throat/rhinorrhea  Respiratory: no cough/SOB/chest pain  Cardiac: no palpitations/chest pain  Vascular: no leg swelling  Gastrointestinal: no nausea/vomiting/diarrhea/constipation  Genitourinary: no dysuria/nocturia/polyuria  MSK: no joint or muscle pain  Skin: no rashes  Neuro: no headaches/focal weakness/numbness  Psych: no depression or anxiety    PHYSICAL EXAM:  Gen:       pale male in NAD  Skin:       Warm, dry, no rash  HEENT:  dry oral mucosa, oropharynx clear, nares clear, no septal deviation  Neck:     supple, no JVD  Cardiac: tachycardic w/ regular rhythm, S1/S2 present, no murmur/gallop/rub  Pulm:     end expiratory wheezes b/l at the bases  Abd:       distended; ostomy site c/d/i; no output from ostomy noted; (+) BS in all 4 quadrants; no TTP  :         suprapubic catheter with purulence and noxious odor; no erythema, open wounds; urine is clear, no pyuria  Vasc:      WWP, 2+ DP and radial pulses, no pedal edema  Ext:        chronic ulcers on the LLE on the lateral aspect of the 1st metatarsal head without purulence  Neuro:   AAOx3; CN 2-12 intact    Allergies    Capzasin Back and Body (Unknown)    Intolerances      LABS:                        6.5    17.7  )-----------( 334      ( 07 Jul 2018 06:03 )             22.7    Mean Cell Volume: 68.4 fL (07-07 @ 06:03)    07-07    121<L>  |  88<L>  |  9   ----------------------------<  174<H>  3.7   |  19<L>  |  0.76    Ca    8.1<L>      07 Jul 2018 06:03    TPro  6.2  /  Alb  2.3<L>  /  TBili  1.1  /  DBili  x   /  AST  8<L>  /  ALT  7<L>  /  AlkPhos  112  07-07    PT/INR - ( 07 Jul 2018 06:39 )   PT: 19.9 sec;   INR: 1.77          PTT - ( 07 Jul 2018 06:39 )  PTT:30.6 sec  Urinalysis Basic - ( 07 Jul 2018 07:25 )    Color: Yellow / Appearance: Clear / SG: <=1.005 / pH: x  Gluc: x / Ketone: NEGATIVE  / Bili: Negative / Urobili: 0.2 E.U./dL   Blood: x / Protein: NEGATIVE mg/dL / Nitrite: POSITIVE   Leuk Esterase: Large / RBC: < 5 /HPF / WBC Many /HPF   Sq Epi: x / Non Sq Epi: 0-5 /HPF / Bacteria: Many /HPF

## 2018-07-07 NOTE — ED ADULT NURSE NOTE - OBJECTIVE STATEMENT
CC of subjective fever but not evidently  no  at home, left for holidays for [facility]  T4 back injury, paraplegic downwards   bedridden  with colostomy RLQ  with SPC [id number], clear yellow urine CC of subjective fever but not evidently  no  at home, left for holidays for [facility]  T4 back injury, paraplegic downwards   bedridden  with colostomy RLQ  with SPC [id number], clear yellow urine - changed drainage bag

## 2018-07-07 NOTE — H&P ADULT - ATTENDING COMMENTS
pt seen and examined by me. case d/w housestaff. agree with VS, PE, assessment and plan as above with following additives    pt appears well. non toxic. denies pain anywhere. no melena.    1- sepsis- source likely urine given pt reports he noted pus in bag  c/w broad spectrum abx given past cultures  2- Anemia- ACD  received one unit PRBC  no evidence of bleedin  3- quadraplegia

## 2018-07-07 NOTE — H&P ADULT - NSHPSOCIALHISTORY_GEN_ALL_CORE
Lives alone, has 8-hr home health aide. Patient is wheel-chair bound. Denies smoking, EtOH use or recreational drug use.

## 2018-07-07 NOTE — H&P ADULT - NSHPPHYSICALEXAM_GEN_ALL_CORE
.  VITAL SIGNS:  T(C): 36.9 (07-07-18 @ 11:25), Max: 37.2 (07-07-18 @ 05:01)  T(F): 98.4 (07-07-18 @ 11:25), Max: 98.9 (07-07-18 @ 05:01)  HR: 105 (07-07-18 @ 11:25) (89 - 108)  BP: 112/65 (07-07-18 @ 11:25) (98/62 - 112/65)  BP(mean): --  RR: 16 (07-07-18 @ 11:25) (16 - 20)  SpO2: 100% (07-07-18 @ 11:25) (97% - 100%)  Wt(kg): --    PHYSICAL EXAM:    Constitutional: WDWN resting comfortably in bed; NAD  Head: NC/AT  Eyes: PERRL, EOMI, anicteric sclera  ENT: no nasal discharge; uvula midline, no oropharyngeal erythema or exudates; MMM  Neck: supple; no JVD or thyromegaly  Respiratory: CTA B/L; no W/R/R, no retractions  Cardiac: +S1/S2; RRR; no M/R/G; PMI non-displaced  Gastrointestinal: abdomen soft, NT/ND; no rebound or guarding; +BSx4  Genitourinary: normal external genitalia  Back: spine midline, no bony tenderness or step-offs; no CVAT B/L  Extremities: WWP, no clubbing or cyanosis; no peripheral edema  Musculoskeletal: NROM x4; no joint swelling, tenderness or erythema  Vascular: 2+ radial, femoral, DP/PT pulses B/L  Dermatologic: skin warm, dry and intact; no rashes, wounds, or scars  Lymphatic: no submandibular or cervical LAD  Neurologic: AAOx3; CNII-XII grossly intact; no focal deficits  Psychiatric: affect and characteristics of appearance, verbalizations, behaviors are appropriate VITAL SIGNS:  T(C): 36.9 (07-07-18 @ 11:25), Max: 37.2 (07-07-18 @ 05:01)  T(F): 98.4 (07-07-18 @ 11:25), Max: 98.9 (07-07-18 @ 05:01)  HR: 105 (07-07-18 @ 11:25) (89 - 108)  BP: 112/65 (07-07-18 @ 11:25) (98/62 - 112/65)  BP(mean): --  RR: 16 (07-07-18 @ 11:25) (16 - 20)  SpO2: 100% (07-07-18 @ 11:25) (97% - 100%)  Wt(kg): --    PHYSICAL EXAM:  Constitutional: resting comfortably in bed; NAD  HEENT: NC/AT, PERRL, EOMI, anicteric sclera, dry mucosa  Respiratory: CTA B/L  Cardiac: +S1/S2; RRR; no M/R/G  Gastrointestinal: distended abdomen,   Genitourinary: no bowles, has suprapubic catheter  Back: stage 4 sacral ulcer  Extremities: no edema,   Musculoskeletal: no joint swelling, tenderness or erythema  Neurologic: AAOx3; CNII-XII grossly intact; no focal deficits  Psychiatric: hx

## 2018-07-07 NOTE — ED PROVIDER NOTE - MEDICAL DECISION MAKING DETAILS
33 yo male with h/o SCI, paraplegia, DM, colostomy and suprapubic bowles, BIBA from home, c/o fever and chills. Pt pale and diaphoretic, sick appearing. afebrile in the ER. plan : septic work; cultures sent, abx and IVF startted.

## 2018-07-07 NOTE — ED ADULT TRIAGE NOTE - CHIEF COMPLAINT QUOTE
Per EMS "Pt complaining of fever. Pt has colostomy, urostomy, and wound vac to sacral pressure ulcer.  Pt afebrile at triage."

## 2018-07-07 NOTE — ED PROVIDER NOTE - OBJECTIVE STATEMENT
33 yo male with h/o SCI, DM, Hep. B, suprapubic [place], colostomy, multiple pressure ulcers to sacrum, and feet, in the ER c/o fever, chills, generalized weakness. pt BIBA from home. sick appearing.

## 2018-07-08 PROBLEM — B19.10 UNSPECIFIED VIRAL HEPATITIS B WITHOUT HEPATIC COMA: Chronic | Status: ACTIVE | Noted: 2017-12-05

## 2018-07-08 PROBLEM — L98.424: Chronic | Status: ACTIVE | Noted: 2017-12-05

## 2018-07-08 PROBLEM — G82.20 PARAPLEGIA, UNSPECIFIED: Chronic | Status: ACTIVE | Noted: 2017-12-05

## 2018-07-08 PROBLEM — E11.65 TYPE 2 DIABETES MELLITUS WITH HYPERGLYCEMIA: Chronic | Status: ACTIVE | Noted: 2017-12-05

## 2018-07-08 LAB
ANION GAP SERPL CALC-SCNC: 13 MMOL/L — SIGNIFICANT CHANGE UP (ref 5–17)
ANION GAP SERPL CALC-SCNC: 16 MMOL/L — SIGNIFICANT CHANGE UP (ref 5–17)
BASOPHILS NFR BLD AUTO: 0.1 % — SIGNIFICANT CHANGE UP (ref 0–2)
BUN SERPL-MCNC: 6 MG/DL — LOW (ref 7–23)
BUN SERPL-MCNC: 7 MG/DL — SIGNIFICANT CHANGE UP (ref 7–23)
CALCIUM SERPL-MCNC: 8.1 MG/DL — LOW (ref 8.4–10.5)
CALCIUM SERPL-MCNC: 8.2 MG/DL — LOW (ref 8.4–10.5)
CHLORIDE SERPL-SCNC: 101 MMOL/L — SIGNIFICANT CHANGE UP (ref 96–108)
CHLORIDE SERPL-SCNC: 95 MMOL/L — LOW (ref 96–108)
CO2 SERPL-SCNC: 21 MMOL/L — LOW (ref 22–31)
CO2 SERPL-SCNC: 21 MMOL/L — LOW (ref 22–31)
CREAT SERPL-MCNC: 0.63 MG/DL — SIGNIFICANT CHANGE UP (ref 0.5–1.3)
CREAT SERPL-MCNC: 0.66 MG/DL — SIGNIFICANT CHANGE UP (ref 0.5–1.3)
EOSINOPHIL NFR BLD AUTO: 0.5 % — SIGNIFICANT CHANGE UP (ref 0–6)
GLUCOSE BLDC GLUCOMTR-MCNC: 108 MG/DL — HIGH (ref 70–99)
GLUCOSE BLDC GLUCOMTR-MCNC: 148 MG/DL — HIGH (ref 70–99)
GLUCOSE BLDC GLUCOMTR-MCNC: 153 MG/DL — HIGH (ref 70–99)
GLUCOSE BLDC GLUCOMTR-MCNC: 248 MG/DL — HIGH (ref 70–99)
GLUCOSE SERPL-MCNC: 136 MG/DL — HIGH (ref 70–99)
GLUCOSE SERPL-MCNC: 170 MG/DL — HIGH (ref 70–99)
HCT VFR BLD CALC: 25.2 % — LOW (ref 39–50)
HGB BLD-MCNC: 7.1 G/DL — LOW (ref 13–17)
LYMPHOCYTES # BLD AUTO: 9.9 % — LOW (ref 13–44)
MAGNESIUM SERPL-MCNC: 1.6 MG/DL — SIGNIFICANT CHANGE UP (ref 1.6–2.6)
MCHC RBC-ENTMCNC: 20.3 PG — LOW (ref 27–34)
MCHC RBC-ENTMCNC: 28.2 G/DL — LOW (ref 32–36)
MCV RBC AUTO: 72 FL — LOW (ref 80–100)
MONOCYTES NFR BLD AUTO: 6.7 % — SIGNIFICANT CHANGE UP (ref 2–14)
NEUTROPHILS NFR BLD AUTO: 82.8 % — HIGH (ref 43–77)
PLATELET # BLD AUTO: 308 K/UL — SIGNIFICANT CHANGE UP (ref 150–400)
POTASSIUM SERPL-MCNC: 3.6 MMOL/L — SIGNIFICANT CHANGE UP (ref 3.5–5.3)
POTASSIUM SERPL-MCNC: 3.6 MMOL/L — SIGNIFICANT CHANGE UP (ref 3.5–5.3)
POTASSIUM SERPL-SCNC: 3.6 MMOL/L — SIGNIFICANT CHANGE UP (ref 3.5–5.3)
POTASSIUM SERPL-SCNC: 3.6 MMOL/L — SIGNIFICANT CHANGE UP (ref 3.5–5.3)
RBC # BLD: 3.5 M/UL — LOW (ref 4.2–5.8)
RBC # FLD: 19.1 % — HIGH (ref 10.3–16.9)
SODIUM SERPL-SCNC: 132 MMOL/L — LOW (ref 135–145)
SODIUM SERPL-SCNC: 135 MMOL/L — SIGNIFICANT CHANGE UP (ref 135–145)
VANCOMYCIN TROUGH SERPL-MCNC: 19.7 UG/ML — SIGNIFICANT CHANGE UP (ref 10–20)
WBC # BLD: 9.8 K/UL — SIGNIFICANT CHANGE UP (ref 3.8–10.5)
WBC # FLD AUTO: 9.8 K/UL — SIGNIFICANT CHANGE UP (ref 3.8–10.5)

## 2018-07-08 PROCEDURE — 99233 SBSQ HOSP IP/OBS HIGH 50: CPT | Mod: GC

## 2018-07-08 PROCEDURE — 99222 1ST HOSP IP/OBS MODERATE 55: CPT

## 2018-07-08 RX ORDER — SODIUM CHLORIDE 9 MG/ML
1000 INJECTION INTRAMUSCULAR; INTRAVENOUS; SUBCUTANEOUS ONCE
Qty: 0 | Refills: 0 | Status: COMPLETED | OUTPATIENT
Start: 2018-07-08 | End: 2018-07-08

## 2018-07-08 RX ORDER — MEROPENEM 1 G/30ML
1000 INJECTION INTRAVENOUS EVERY 8 HOURS
Qty: 0 | Refills: 0 | Status: DISCONTINUED | OUTPATIENT
Start: 2018-07-08 | End: 2018-07-09

## 2018-07-08 RX ORDER — POTASSIUM CHLORIDE 20 MEQ
20 PACKET (EA) ORAL ONCE
Qty: 0 | Refills: 0 | Status: COMPLETED | OUTPATIENT
Start: 2018-07-08 | End: 2018-07-08

## 2018-07-08 RX ADMIN — Medication 2: at 23:12

## 2018-07-08 RX ADMIN — HEPARIN SODIUM 5000 UNIT(S): 5000 INJECTION INTRAVENOUS; SUBCUTANEOUS at 22:15

## 2018-07-08 RX ADMIN — MEROPENEM 100 MILLIGRAM(S): 1 INJECTION INTRAVENOUS at 01:44

## 2018-07-08 RX ADMIN — SODIUM CHLORIDE 1000 MILLILITER(S): 9 INJECTION INTRAMUSCULAR; INTRAVENOUS; SUBCUTANEOUS at 21:04

## 2018-07-08 RX ADMIN — SODIUM CHLORIDE 1000 MILLILITER(S): 9 INJECTION INTRAMUSCULAR; INTRAVENOUS; SUBCUTANEOUS at 13:01

## 2018-07-08 RX ADMIN — HEPARIN SODIUM 5000 UNIT(S): 5000 INJECTION INTRAVENOUS; SUBCUTANEOUS at 13:01

## 2018-07-08 RX ADMIN — Medication 20 MILLIEQUIVALENT(S): at 22:15

## 2018-07-08 RX ADMIN — Medication 5 MILLIGRAM(S): at 07:30

## 2018-07-08 RX ADMIN — HEPARIN SODIUM 5000 UNIT(S): 5000 INJECTION INTRAVENOUS; SUBCUTANEOUS at 06:20

## 2018-07-08 RX ADMIN — Medication 250 MILLIGRAM(S): at 19:13

## 2018-07-08 RX ADMIN — Medication 40 MILLIGRAM(S): at 01:06

## 2018-07-08 RX ADMIN — FAMOTIDINE 20 MILLIGRAM(S): 10 INJECTION INTRAVENOUS at 12:25

## 2018-07-08 RX ADMIN — SENNA PLUS 2 TABLET(S): 8.6 TABLET ORAL at 18:12

## 2018-07-08 RX ADMIN — Medication 4: at 09:06

## 2018-07-08 RX ADMIN — MEROPENEM 100 MILLIGRAM(S): 1 INJECTION INTRAVENOUS at 18:13

## 2018-07-08 RX ADMIN — POLYETHYLENE GLYCOL 3350 17 GRAM(S): 17 POWDER, FOR SOLUTION ORAL at 12:25

## 2018-07-08 RX ADMIN — Medication 250 MILLIGRAM(S): at 06:19

## 2018-07-08 RX ADMIN — ATORVASTATIN CALCIUM 20 MILLIGRAM(S): 80 TABLET, FILM COATED ORAL at 22:15

## 2018-07-08 RX ADMIN — SODIUM CHLORIDE 1000 MILLILITER(S): 9 INJECTION INTRAMUSCULAR; INTRAVENOUS; SUBCUTANEOUS at 10:14

## 2018-07-08 RX ADMIN — LEVETIRACETAM 500 MILLIGRAM(S): 250 TABLET, FILM COATED ORAL at 06:20

## 2018-07-08 RX ADMIN — SENNA PLUS 2 TABLET(S): 8.6 TABLET ORAL at 06:20

## 2018-07-08 RX ADMIN — MEROPENEM 100 MILLIGRAM(S): 1 INJECTION INTRAVENOUS at 09:08

## 2018-07-08 RX ADMIN — Medication 5 MILLIGRAM(S): at 01:06

## 2018-07-08 RX ADMIN — LEVETIRACETAM 500 MILLIGRAM(S): 250 TABLET, FILM COATED ORAL at 18:12

## 2018-07-08 RX ADMIN — Medication 5 MILLIGRAM(S): at 18:12

## 2018-07-08 NOTE — PROGRESS NOTE ADULT - ASSESSMENT
32 yr old male with a PMHx of paraplegia w/ indwelling suprapubic catheter, chronic sacral decubitus ulcers w/ recurrent OM currently with wound vac, colostomy, DMT2, hepatitis B and schizoaffective disorder that presented to ED with subjective chills, weakness and tachycardia, found to be septic 2/2 OM vs UTI vs pressure ulcers.    NEURO  #Paraplegia 2/2 spinal cord injury  -no sensation or motor activity below T4    #Seizure  -hx of seizures  -c/w keppra 500bid    CARDIO  #HLD: c/w lipitor 20mg (home med)    ID  #Sepsis 2/2 OM vs stage 4 sacral ulcer vs UTI  -hx of ESBL coli UTI and chronic sacral decubitus ulcers w/ recurrent OM (hx of citrobacter, enterococcus, klebsiella)  -rigors, WBC 17.7, tachy to 115, , CRP 24 upon presentation  -c/w vanc and meropenem (7/7 - present)  -f/u UC, CT A/P with contrast, MRI A/P  -f/u with general surgery regarding wound vac.   GI  #Ileus s/p colostomy and chronic constipation 2/2 spinal cord injury  -c/w senna 8.6 bid, miralax  -monitor colostomy output      #Neurogenic bladder 2/2 spinal cord injury  -takes oxybutynin 15qd at home  -c/w oxybutynin 5mg TID    HEME  #Chronic microcytic anemia  -likely secondary to malabsorption  -no signs of bleeding  -s/p 1u pRBC in the ED  -monitor H/H  -maintain active type and screen    RENAL  #Hyponatremia  -likely hypotonic hypovolemic  -f/u urine lytes    ENDO  -takes glimipiride 1mg qd, ,trajenta 5mg qd and metformin 100bid at home  -f/u A1C  -monitor finger stick  -c/w ISS    PSYCHIATRY  #Hx of schizoaffective disorders  -takes haldol 10mg PO per previous visit records  -f/u psych recs    PROPHYLAXIS  -DVT: HSQ (IMPROVE 3)  -GI: famotodine (prior hx of c-diff)  -Code: full  -Dispo: RMF

## 2018-07-08 NOTE — PROGRESS NOTE ADULT - ATTENDING COMMENTS
Patient examined, fellow's hx and PE reviewed and confirmed. I find hyponatremia imrpoved. A/P reviewed and confirmed. Follow SNa. At very low risk for ODS. See full note.

## 2018-07-08 NOTE — PROGRESS NOTE ADULT - ASSESSMENT
This is 32 year old male with PMH stated above. the renal service is called for over correction of hyponatremia. The patient is extremely low risk  ODS, the sodium is above 120, he is not alcoholic,  the sodium was stabilzed overnight around 128 and in the mroning 132, the patient is asymptomatic

## 2018-07-08 NOTE — PROGRESS NOTE ADULT - SUBJECTIVE AND OBJECTIVE BOX
Seen in the morning, no shortness of breath, no fever, no chest pain. Overnight no fever,    The renal service in case for hyponatremia     Patient seen and examined at bedside.     atorvastatin 20 milliGRAM(s) at bedtime  famotidine    Tablet 20 milliGRAM(s) daily  heparin  Injectable 5000 Unit(s) every 8 hours  insulin lispro (HumaLOG) corrective regimen sliding scale   Before meals and at bedtime  iohexol 300 mG (iodine)/mL Oral Solution 30 milliLiter(s) once  levETIRAcetam 500 milliGRAM(s) two times a day  meropenem  IVPB 1000 milliGRAM(s) every 8 hours  oxybutynin 5 milliGRAM(s) every 8 hours  polyethylene glycol 3350 17 Gram(s) daily  propranolol 40 milliGRAM(s) every 8 hours  senna 2 Tablet(s) every 12 hours  vancomycin  IVPB 1750 milliGRAM(s) every 12 hours      Allergies    Capzasin Back and Body (Unknown)    Intolerances        T(C): , Max: 37.6 (07-07-18 @ 12:35)  T(F): , Max: 99.7 (07-07-18 @ 12:35)  HR: 105 (07-08-18 @ 09:09)  BP: 85/52 (07-08-18 @ 09:09)  BP(mean): --  RR: 17 (07-08-18 @ 09:09)  SpO2: 99% (07-08-18 @ 09:09)  Wt(kg): --    07-07 @ 07:01  -  07-08 @ 07:00  --------------------------------------------------------  IN:  Total IN: 0 mL    OUT:    Indwelling Catheter - Suprapubic: 3425 mL    Voided: 3200 mL  Total OUT: 6625 mL    Total NET: -6625 mL      Physical exam:   Alert and oriented   NO JVD   Normal air entry into the lungs, no wheezing, no crackles  RRR, normal s1/2, no murmurs, rubs or gallops   Abdomen - soft, not tener, s/p laparotomy, colostomy, suprapubic catheter   Extremities: mild peripheral edema      Height (cm): 170.18 (07-07 @ 12:35)  Weight (kg): 81.6 (07-07 @ 12:35)  BMI (kg/m2): 28.2 (07-07 @ 12:35)  BSA (m2): 1.93 (07-07 @ 12:35)      LABS:                        7.1    9.8   )-----------( 308      ( 08 Jul 2018 08:28 )             25.2     07-08    132<L>  |  95<L>  |  7   ----------------------------<  170<H>  3.6   |  21<L>  |  0.63    Ca    8.1<L>      08 Jul 2018 08:28  Mg     1.6     07-08    TPro  6.3  /  Alb  2.3<L>  /  TBili  1.0  /  DBili  x   /  AST  6<L>  /  ALT  6<L>  /  AlkPhos  113  07-07    Osmolality, Serum: 273 mosm/kg <L> [280 - 301] (07-07 @ 15:35)    PT/INR - ( 07 Jul 2018 06:39 )   PT: 19.9 sec;   INR: 1.77          PTT - ( 07 Jul 2018 06:39 )  PTT:30.6 sec  Urinalysis Basic - ( 07 Jul 2018 07:25 )    Color: Yellow / Appearance: Clear / SG: <=1.005 / pH: x  Gluc: x / Ketone: NEGATIVE  / Bili: Negative / Urobili: 0.2 E.U./dL   Blood: x / Protein: NEGATIVE mg/dL / Nitrite: POSITIVE   Leuk Esterase: Large / RBC: < 5 /HPF / WBC Many /HPF   Sq Epi: x / Non Sq Epi: 0-5 /HPF / Bacteria: Many /HPF      Osmolality, Random Urine: 243 mosmol/kg [100 - 650] (07-07 @ 15:00)  Sodium, Random Urine: 26 mmol/L (07-07 @ 15:00)        RADIOLOGY & ADDITIONAL STUDIES:

## 2018-07-08 NOTE — CHART NOTE - NSCHARTNOTEFT_GEN_A_CORE
Patient seen and examined with house-staff intern. Patient initially pushing off examination in the morning, but was more amenable. On repeat vital signs, patient's SBP noted to be in the mid-80s. Full set of vitals obtained without fever, mild tachycardia, no respiratory distress saturating 100% on room air. Patient given a 1L NS bolus and monitored. Vital signs rechecked after bolus and patient found to be hypotensive again. Patient reassessed at bedside, remaining asymptomatic and again refusing examination. Eating lunch and comfortable appearing. Likely due to combination of pre-existing dehydration, poor PO intake, and increased insensible loss from infection. Patient ordered for additional 1L NS bolus and continued on current antibiotic regimen. Will continue to monitor.

## 2018-07-08 NOTE — PROGRESS NOTE ADULT - ATTENDING COMMENTS
pt seen and examined by me. is mentating well. alert talking answering all questions. has no acute complaints.    BP has been lower end but all perfusion parameters are normal.    1- sepsis- source urine vs decubitus ulcers  c/w current abx given hx mdr organisms  fu urine cultures  surgery and plastics consulted  2- Anemia- likely acd- responded to transfusion  no s/s bleeding  3- Hyponatremia- likely hypovolemic  Na in acceptable range  monitor q 12  renal input appreciated

## 2018-07-08 NOTE — PROGRESS NOTE ADULT - SUBJECTIVE AND OBJECTIVE BOX
Overnight blood pressure was 90s/60s, in the am his blood pressure was at 80s/50s, will follow after 2 boluses of normal saline. Obtained approval for meropenem and will continue that with vancomycin, appreciate help from infectious disease. Will have general surgery help us with getting wound vac to work again.     REVIEW OF SYSTEMS:  CONSTITUTIONAL: No fever, weight loss, or fatigue  EYES: No eye pain, visual disturbances, or discharge  ENMT:  No difficulty hearing, tinnitus, vertigo; No sinus or throat pain  NECK: No pain or stiffness  BREASTS: No pain, masses, or nipple discharge  RESPIRATORY: No cough, wheezing, chills or hemoptysis; No shortness of breath  CARDIOVASCULAR: No chest pain, palpitations, dizziness, or leg swelling  GASTROINTESTINAL: No abdominal or epigastric pain. No nausea, vomiting, or hematemesis; No diarrhea or constipation. No melena or hematochezia.  GENITOURINARY: No dysuria, frequency, hematuria, or incontinence  NEUROLOGICAL: No headaches, memory loss, loss of strength, numbness, or tremors  SKIN: No itching, burning, rashes, or lesions   LYMPH NODES: No enlarged glands  ENDOCRINE: No heat or cold intolerance; No hair loss  MUSCULOSKELETAL: No joint pain or swelling; No muscle, back, or extremity pain  PSYCHIATRIC: No depression, anxiety, mood swings, or difficulty sleeping  HEME/LYMPH: No easy bruising, or bleeding gums  ALLERY AND IMMUNOLOGIC: No hives or eczema    T(C): 36.7 (07-08-18 @ 12:22), Max: 37.1 (07-07-18 @ 16:14)  HR: 92 (07-08-18 @ 12:22) (90 - 114)  BP: 84/52 (07-08-18 @ 12:22) (84/52 - 101/54)  RR: 18 (07-08-18 @ 12:22) (17 - 18)  SpO2: 100% (07-08-18 @ 12:22) (98% - 100%)  Wt(kg): --Vital Signs Last 24 Hrs  T(C): 36.7 (08 Jul 2018 12:22), Max: 37.1 (07 Jul 2018 16:14)  T(F): 98.1 (08 Jul 2018 12:22), Max: 98.8 (07 Jul 2018 16:14)  HR: 92 (08 Jul 2018 12:22) (90 - 114)  BP: 84/52 (08 Jul 2018 12:22) (84/52 - 101/54)  BP(mean): --  RR: 18 (08 Jul 2018 12:22) (17 - 18)  SpO2: 100% (08 Jul 2018 12:22) (98% - 100%)    PHYSICAL EXAM:  	Constitutional: resting comfortably in bed; NAD  	HEENT: NC/AT, PERRL, EOMI, anicteric sclera, dry mucosa  	Respiratory: CTA B/L  	Cardiac: +S1/S2; RRR; no M/R/G  	Gastrointestinal: distended abdomen,   	Genitourinary: no bowles, has suprapubic catheter  	Back: stage 4 sacral ulcer  	Extremities: no edema,   	Musculoskeletal: no joint swelling, tenderness or erythema  	Neurologic: AAOx3; CNII-XII grossly intact; no focal deficits  Psychiatric: hx    LABS:                        7.1    9.8   )-----------( 308      ( 08 Jul 2018 08:28 )             25.2     07-08    132<L>  |  95<L>  |  7   ----------------------------<  170<H>  3.6   |  21<L>  |  0.63    Ca    8.1<L>      08 Jul 2018 08:28  Mg     1.6     07-08    TPro  6.3  /  Alb  2.3<L>  /  TBili  1.0  /  DBili  x   /  AST  6<L>  /  ALT  6<L>  /  AlkPhos  113  07-07    PT/INR - ( 07 Jul 2018 06:39 )   PT: 19.9 sec;   INR: 1.77          PTT - ( 07 Jul 2018 06:39 )  PTT:30.6 sec  Urinalysis Basic - ( 07 Jul 2018 07:25 )    Color: Yellow / Appearance: Clear / SG: <=1.005 / pH: x  Gluc: x / Ketone: NEGATIVE  / Bili: Negative / Urobili: 0.2 E.U./dL   Blood: x / Protein: NEGATIVE mg/dL / Nitrite: POSITIVE   Leuk Esterase: Large / RBC: < 5 /HPF / WBC Many /HPF   Sq Epi: x / Non Sq Epi: 0-5 /HPF / Bacteria: Many /HPF      CAPILLARY BLOOD GLUCOSE      POCT Blood Glucose.: 108 mg/dL (08 Jul 2018 12:32)  POCT Blood Glucose.: 248 mg/dL (08 Jul 2018 08:18)  POCT Blood Glucose.: 167 mg/dL (07 Jul 2018 21:36)  POCT Blood Glucose.: 140 mg/dL (07 Jul 2018 17:07)        Urinalysis Basic - ( 07 Jul 2018 07:25 )    Color: Yellow / Appearance: Clear / SG: <=1.005 / pH: x  Gluc: x / Ketone: NEGATIVE  / Bili: Negative / Urobili: 0.2 E.U./dL   Blood: x / Protein: NEGATIVE mg/dL / Nitrite: POSITIVE   Leuk Esterase: Large / RBC: < 5 /HPF / WBC Many /HPF   Sq Epi: x / Non Sq Epi: 0-5 /HPF / Bacteria: Many /HPF    will follow up bld cx and urine cx

## 2018-07-08 NOTE — PROGRESS NOTE ADULT - PROBLEM SELECTOR PLAN 1
on the admission with 121   - most likely to dehydration - poor oral intake he reports, also the febrile episode  - received to 2 liters of normal saline into the ED   - after the d5 the sodium stabilized around 128 and in the morning 132 which is acceptable at present - the patient is low risk for ODS as was mention before  - continue to monitor the sodium BMP every 12 hours   - please measure the urine output   - check the FS - the patient is with DM.

## 2018-07-09 LAB
-  AZTREONAM: SIGNIFICANT CHANGE UP
-  CEFTAZIDIME: SIGNIFICANT CHANGE UP
-  CIPROFLOXACIN: SIGNIFICANT CHANGE UP
-  GENTAMICIN: SIGNIFICANT CHANGE UP
-  PIPERACILLIN/TAZOBACTAM: SIGNIFICANT CHANGE UP
-  TOBRAMYCIN: SIGNIFICANT CHANGE UP
ANION GAP SERPL CALC-SCNC: 12 MMOL/L — SIGNIFICANT CHANGE UP (ref 5–17)
BUN SERPL-MCNC: 5 MG/DL — LOW (ref 7–23)
CALCIUM SERPL-MCNC: 8.3 MG/DL — LOW (ref 8.4–10.5)
CHLORIDE SERPL-SCNC: 102 MMOL/L — SIGNIFICANT CHANGE UP (ref 96–108)
CO2 SERPL-SCNC: 23 MMOL/L — SIGNIFICANT CHANGE UP (ref 22–31)
CREAT SERPL-MCNC: 0.62 MG/DL — SIGNIFICANT CHANGE UP (ref 0.5–1.3)
CULTURE RESULTS: SIGNIFICANT CHANGE UP
GLUCOSE BLDC GLUCOMTR-MCNC: 134 MG/DL — HIGH (ref 70–99)
GLUCOSE BLDC GLUCOMTR-MCNC: 154 MG/DL — HIGH (ref 70–99)
GLUCOSE BLDC GLUCOMTR-MCNC: 161 MG/DL — HIGH (ref 70–99)
GLUCOSE BLDC GLUCOMTR-MCNC: 191 MG/DL — HIGH (ref 70–99)
GLUCOSE SERPL-MCNC: 139 MG/DL — HIGH (ref 70–99)
HCT VFR BLD CALC: 25.3 % — LOW (ref 39–50)
HGB BLD-MCNC: 7 G/DL — CRITICAL LOW (ref 13–17)
MAGNESIUM SERPL-MCNC: 1.6 MG/DL — SIGNIFICANT CHANGE UP (ref 1.6–2.6)
MCHC RBC-ENTMCNC: 20.5 PG — LOW (ref 27–34)
MCHC RBC-ENTMCNC: 27.7 G/DL — LOW (ref 32–36)
MCV RBC AUTO: 74.2 FL — LOW (ref 80–100)
METHOD TYPE: SIGNIFICANT CHANGE UP
ORGANISM # SPEC MICROSCOPIC CNT: SIGNIFICANT CHANGE UP
ORGANISM # SPEC MICROSCOPIC CNT: SIGNIFICANT CHANGE UP
PLATELET # BLD AUTO: 294 K/UL — SIGNIFICANT CHANGE UP (ref 150–400)
POTASSIUM SERPL-MCNC: 3.8 MMOL/L — SIGNIFICANT CHANGE UP (ref 3.5–5.3)
POTASSIUM SERPL-SCNC: 3.8 MMOL/L — SIGNIFICANT CHANGE UP (ref 3.5–5.3)
RBC # BLD: 3.41 M/UL — LOW (ref 4.2–5.8)
RBC # FLD: 19.4 % — HIGH (ref 10.3–16.9)
SODIUM SERPL-SCNC: 137 MMOL/L — SIGNIFICANT CHANGE UP (ref 135–145)
SPECIMEN SOURCE: SIGNIFICANT CHANGE UP
WBC # BLD: 6 K/UL — SIGNIFICANT CHANGE UP (ref 3.8–10.5)
WBC # FLD AUTO: 6 K/UL — SIGNIFICANT CHANGE UP (ref 3.8–10.5)

## 2018-07-09 PROCEDURE — 99233 SBSQ HOSP IP/OBS HIGH 50: CPT

## 2018-07-09 PROCEDURE — 99233 SBSQ HOSP IP/OBS HIGH 50: CPT | Mod: GC

## 2018-07-09 RX ORDER — PIPERACILLIN AND TAZOBACTAM 4; .5 G/20ML; G/20ML
4.5 INJECTION, POWDER, LYOPHILIZED, FOR SOLUTION INTRAVENOUS EVERY 6 HOURS
Qty: 0 | Refills: 0 | Status: DISCONTINUED | OUTPATIENT
Start: 2018-07-09 | End: 2018-07-09

## 2018-07-09 RX ORDER — CIPROFLOXACIN LACTATE 400MG/40ML
500 VIAL (ML) INTRAVENOUS EVERY 12 HOURS
Qty: 0 | Refills: 0 | Status: DISCONTINUED | OUTPATIENT
Start: 2018-07-09 | End: 2018-07-16

## 2018-07-09 RX ORDER — CIPROFLOXACIN LACTATE 400MG/40ML
500 VIAL (ML) INTRAVENOUS EVERY 12 HOURS
Qty: 0 | Refills: 0 | Status: DISCONTINUED | OUTPATIENT
Start: 2018-07-09 | End: 2018-07-09

## 2018-07-09 RX ADMIN — FAMOTIDINE 20 MILLIGRAM(S): 10 INJECTION INTRAVENOUS at 12:55

## 2018-07-09 RX ADMIN — MEROPENEM 100 MILLIGRAM(S): 1 INJECTION INTRAVENOUS at 00:30

## 2018-07-09 RX ADMIN — Medication 40 MILLIGRAM(S): at 09:14

## 2018-07-09 RX ADMIN — HEPARIN SODIUM 5000 UNIT(S): 5000 INJECTION INTRAVENOUS; SUBCUTANEOUS at 06:08

## 2018-07-09 RX ADMIN — PIPERACILLIN AND TAZOBACTAM 200 GRAM(S): 4; .5 INJECTION, POWDER, LYOPHILIZED, FOR SOLUTION INTRAVENOUS at 18:15

## 2018-07-09 RX ADMIN — Medication 2: at 18:11

## 2018-07-09 RX ADMIN — HEPARIN SODIUM 5000 UNIT(S): 5000 INJECTION INTRAVENOUS; SUBCUTANEOUS at 14:37

## 2018-07-09 RX ADMIN — Medication 2: at 22:38

## 2018-07-09 RX ADMIN — Medication 250 MILLIGRAM(S): at 06:08

## 2018-07-09 RX ADMIN — LEVETIRACETAM 500 MILLIGRAM(S): 250 TABLET, FILM COATED ORAL at 06:08

## 2018-07-09 RX ADMIN — Medication 5 MILLIGRAM(S): at 16:22

## 2018-07-09 RX ADMIN — LEVETIRACETAM 500 MILLIGRAM(S): 250 TABLET, FILM COATED ORAL at 18:15

## 2018-07-09 RX ADMIN — Medication 5 MILLIGRAM(S): at 00:31

## 2018-07-09 RX ADMIN — SENNA PLUS 2 TABLET(S): 8.6 TABLET ORAL at 06:08

## 2018-07-09 RX ADMIN — Medication 40 MILLIGRAM(S): at 18:15

## 2018-07-09 RX ADMIN — Medication 2: at 12:52

## 2018-07-09 RX ADMIN — POLYETHYLENE GLYCOL 3350 17 GRAM(S): 17 POWDER, FOR SOLUTION ORAL at 12:55

## 2018-07-09 RX ADMIN — ATORVASTATIN CALCIUM 20 MILLIGRAM(S): 80 TABLET, FILM COATED ORAL at 22:38

## 2018-07-09 RX ADMIN — MEROPENEM 100 MILLIGRAM(S): 1 INJECTION INTRAVENOUS at 09:14

## 2018-07-09 RX ADMIN — Medication 500 MILLIGRAM(S): at 22:38

## 2018-07-09 RX ADMIN — Medication 5 MILLIGRAM(S): at 07:17

## 2018-07-09 RX ADMIN — SENNA PLUS 2 TABLET(S): 8.6 TABLET ORAL at 18:15

## 2018-07-09 RX ADMIN — HEPARIN SODIUM 5000 UNIT(S): 5000 INJECTION INTRAVENOUS; SUBCUTANEOUS at 22:38

## 2018-07-09 NOTE — ADVANCED PRACTICE NURSE CONSULT - RECOMMEDATIONS
Sacral and left IT pressure injuries - irrigate with normal saline, apply Cavilon liquid skin barrier to periwound, pack lightly Aquacel Extra, cover with foam dressing every other day or prn if soiled or wet.   Right buttock, right lower buttock/lateral and medial aspect pressure injuries, right achilles tendon, left heel and right lateral malleolus pressure injuries - cleanse with normal saline, apply Cavilon liquid skin barrier to periwound, small amount of Medihoney to wound bed, cover with foam dressing every other day or prn if soiled or wet.   Left lateral foot ulcers - apply foam dressing every 3 days or prn if soiled or saturated.   Discussed assessment with RNShayna and house staff. Sacral and left IT pressure injuries - irrigate with normal saline, apply Cavilon liquid skin barrier to periwound, pack lightly Aquacel Extra, cover with foam dressing every other day or prn if soiled or wet.   Right buttock, right lower buttock/lateral and medial aspect pressure injuries, right achilles tendon, left heel and right lateral malleolus pressure injuries - cleanse with normal saline, apply Cavilon liquid skin barrier to periwound, small amount of Medihoney to wound bed, cover with foam dressing every other day or prn if soiled or wet.   Left lateral foot ulcers - apply foam dressing every 3 days or prn if soiled or saturated.   Discussed assessment with RNShayna and house staff, Dr Ken Bernabe. Also discussed placement of NPWT/VAC dressing on sacral and left IT stage 4 pressure injuries. VAC dressing to be applied 7/10.

## 2018-07-09 NOTE — ADVANCED PRACTICE NURSE CONSULT - ASSESSMENT
Patient presented with the following pressure injuries:   Right ischial tuberosity (IT) unstageable pressure injury with 100% yellowish/gray slough, measuring 4 cm x 3 cm with moderate amount of serosanguinous exudate.   Right lower buttock lateral aspect unstageable pressure injury 100% yellow slough, measuring 2.5 cm x 2 cm with small amount of serosanguinous exudate.  Right lower buttock medial aspect stage 2 pressure injury with pale red wound bed, measuring 0.5 cm x 1 cm x 0.1 cm with small amount of serosanguinous exudate.   Sacral stage 4 pressure injury with pale red, non-granulating wound bed, measuring 4 cm x 4.5 cm x 4.5 cm with 7.8 cm tunneling at 12 o'clock with large amount of serosanguinous exudate.   Right achilles tendon unstageable pressure injury with exposed tendon, wound bed with 60% slough and 20% eschar, measuring 3 cm x 3 cm with small amount serosanguinous exudate.  Left heel unstageable pressure injury with 100% yellow slough measuring 1 cm x 1 cm with serosanguinous exudate.   Left lateral foot 2 unstageable pressure injuries with scab covering measuring 1 cm x 0.7 cm and 1 cm x 1 cm; no exudate noted.   Right lateral malleolus stage 2 pressure injury measuring 1 cm x 0.8 cm x 0.1 with scant serous exudate.   Left IT stage 4 pressure injury with 75% pale red, non-granulating tissue and 25% yellow slough measuring 8 cm x 5 cm x 7 cm with tunneling of 5 cm at 11 o'clock, with large amount of serosanguinous exudate.   Patient able to turn in bed with minimal assist. Wearing heel protectors to offload heels.   Requested [facility] bed for patient.

## 2018-07-09 NOTE — ADVANCED PRACTICE NURSE CONSULT - REASON FOR CONSULT
[facility] nurse consult to assess multiple pressure (ulcers) injuries present on admission. Patient is a 32y Male with a PMHx of spinal cord injury with paraplegia (dx in 2011; pt unable to recollect inciting event- per chart review, patient fell off window and woke up 2 weeks later at [facility] paralyzed) w/ chronic suprapubic catheter, chronic sacral decubitus ulcers w/ recurrent OM (hx of citrobacter, enterococcus, klebsiella), ileus s/p colostomy for fecal incontinence (performed to prevent further fecal contamination of sacral decubitus ulcers; performed 2/2018), C diff colitis (Jan 2018), DMT2, Hep B, schizoaffective disorder that was BIBEMS for palpitations and weakness since 3 am. Patient can not recollect any inciting event for his symptoms. He denies any recent illness, ABX use, diarrhea, cough, n/v. He has a wound vac over his sacral decubitus ulcer which was placed in June and is functioning without issue. Pt's suprapubic catheter is managed by Dr Snell at [facility], last changed two weeks ago. Patient has a hx of anemia which has required tx in the past but is unable to provide any information about prior workup of his anemia. ROS (+) for severe thirst; Patient has not been drinking much. Reported chills. No fever, SOB, chest pain, n/v or diarrhea.

## 2018-07-09 NOTE — PROGRESS NOTE ADULT - SUBJECTIVE AND OBJECTIVE BOX
Overnight blood pressure was 90s/60s, in the am. Patient resting comfortable. Obtained approval for meropenem and will continue that with vancomycin, appreciate help from infectious disease. Plastics believe he is not a good candidate for intervention. Wound vac not in place.     REVIEW OF SYSTEMS:  CONSTITUTIONAL: No fever, weight loss, or fatigue  EYES: No eye pain, visual disturbances, or discharge  ENMT:  No difficulty hearing, tinnitus, vertigo; No sinus or throat pain  NECK: No pain or stiffness  BREASTS: No pain, masses, or nipple discharge  RESPIRATORY: No cough, wheezing, chills or hemoptysis; No shortness of breath  CARDIOVASCULAR: No chest pain, palpitations, dizziness, or leg swelling  GASTROINTESTINAL: Colostomy tube in place, last changed Friday, due for today.   GENITOURINARY: suprapubic catheter in place.   NEUROLOGICAL: No headaches, memory loss, loss of strength, numbness, or tremors  SKIN: No itching, burning, rashes, or lesions   LYMPH NODES: No enlarged glands  ENDOCRINE: No heat or cold intolerance; No hair loss  MUSCULOSKELETAL: No joint pain or swelling;   Large sacral decubitus ulcer, no purulence or drainage, reaches towards bone.   PSYCHIATRIC: hx of psychiatric illness  HEME/LYMPH: No easy bruising, or bleeding gums  ALLERY AND IMMUNOLOGIC: No hives or eczema    Vital Signs Last 24 Hrs  T(C): 36.8 (09 Jul 2018 09:11), Max: 37.3 (08 Jul 2018 17:28)  T(F): 98.3 (09 Jul 2018 09:11), Max: 99.1 (08 Jul 2018 17:28)  HR: 87 (09 Jul 2018 09:11) (83 - 101)  BP: 104/67 (09 Jul 2018 09:11) (84/52 - 104/67)  BP(mean): --  RR: 17 (09 Jul 2018 09:11) (17 - 19)  SpO2: 99% (09 Jul 2018 09:11) (98% - 100%)    PHYSICAL EXAM:  	Constitutional: resting comfortably in bed; NAD  	HEENT: NC/AT, PERRL, EOMI, anicteric sclera, dry mucosa  	Respiratory: CTA B/L  	Cardiac: +S1/S2; RRR; no M/R/G  	Gastrointestinal: distended abdomen,   	Genitourinary: no bowles, has suprapubic catheter  	Back: stage 4 sacral ulcer, no purulence or erythema. reaches bone  	Extremities: no edema,   	Musculoskeletal: no joint swelling, tenderness or erythema  	Neurologic: AAOx3; CNII-XII grossly intact; no focal deficits  Psychiatric: hx schizoaffective disorder    LABS:                       .  LABS:                         7.0    6.0   )-----------( 294      ( 09 Jul 2018 07:58 )             25.3     07-09    137  |  102  |  5<L>  ----------------------------<  139<H>  3.8   |  23  |  0.62    Ca    8.3<L>      09 Jul 2018 07:58  Mg     1.6     07-09    TPro  6.3  /  Alb  2.3<L>  /  TBili  1.0  /  DBili  x   /  AST  6<L>  /  ALT  6<L>  /  AlkPhos  113  07-07   urine culture preliminary 100,000 gram negative rods, awaiting full culture results  blood cultures negative growth to date. Overnight blood pressure was 90s/60s, in the am. Patient resting comfortable. Obtained approval for meropenem and will continue that with vancomycin, appreciate help from infectious disease. Wound care evaluated patient, and noted no purulence or signs of infection on stage 4 sacral decubitus ulcer, or on left ischial tuberosity ulcer. Will plan on wound vac at these two sites tomorrow. Likely source of infection UTI due to pus from catheter site.     REVIEW OF SYSTEMS:  CONSTITUTIONAL: No fever, weight loss, or fatigue  EYES: No eye pain, visual disturbances, or discharge  ENMT:  No difficulty hearing, tinnitus, vertigo; No sinus or throat pain  NECK: No pain or stiffness  BREASTS: No pain, masses, or nipple discharge  RESPIRATORY: No cough, wheezing, chills or hemoptysis; No shortness of breath  CARDIOVASCULAR: No chest pain, palpitations, dizziness, or leg swelling  GASTROINTESTINAL: Colostomy tube in place, last changed Friday, due for today.   GENITOURINARY: suprapubic catheter in place.   NEUROLOGICAL: No headaches, memory loss, loss of strength, numbness, or tremors  SKIN: No itching, burning, rashes, or lesions   LYMPH NODES: No enlarged glands  ENDOCRINE: No heat or cold intolerance; No hair loss  MUSCULOSKELETAL: No joint pain or swelling;   Large sacral decubitus ulcer, no purulence or drainage, reaches towards bone.   PSYCHIATRIC: hx of psychiatric illness  HEME/LYMPH: No easy bruising, or bleeding gums  ALLERY AND IMMUNOLOGIC: No hives or eczema    Vital Signs Last 24 Hrs  T(C): 36.8 (09 Jul 2018 09:11), Max: 37.3 (08 Jul 2018 17:28)  T(F): 98.3 (09 Jul 2018 09:11), Max: 99.1 (08 Jul 2018 17:28)  HR: 87 (09 Jul 2018 09:11) (83 - 101)  BP: 104/67 (09 Jul 2018 09:11) (84/52 - 104/67)  BP(mean): --  RR: 17 (09 Jul 2018 09:11) (17 - 19)  SpO2: 99% (09 Jul 2018 09:11) (98% - 100%)    PHYSICAL EXAM:  	Constitutional: resting comfortably in bed; NAD  	HEENT: NC/AT, PERRL, EOMI, anicteric sclera, dry mucosa  	Respiratory: CTA B/L  	Cardiac: +S1/S2; RRR; no M/R/G  	Gastrointestinal: distended abdomen,   	Genitourinary: no bowles, has suprapubic catheter  	Back: stage 4 sacral ulcer, no purulence or erythema. reaches bone  	Extremities: no edema,   	Musculoskeletal: no joint swelling, tenderness or erythema  	Neurologic: AAOx3; CNII-XII grossly intact; no focal deficits  Psychiatric: hx schizoaffective disorder    LABS:                       .  LABS:                         7.0    6.0   )-----------( 294      ( 09 Jul 2018 07:58 )             25.3     07-09    137  |  102  |  5<L>  ----------------------------<  139<H>  3.8   |  23  |  0.62    Ca    8.3<L>      09 Jul 2018 07:58  Mg     1.6     07-09    TPro  6.3  /  Alb  2.3<L>  /  TBili  1.0  /  DBili  x   /  AST  6<L>  /  ALT  6<L>  /  AlkPhos  113  07-07   urine culture preliminary 100,000 gram negative rods, awaiting full culture results  blood cultures negative growth to date.

## 2018-07-09 NOTE — PROGRESS NOTE ADULT - SUBJECTIVE AND OBJECTIVE BOX
Seen in the morning, no shortness of breath, no fever, no chest pain. Overnight no fever,    patient admitted on the 7/7/18 with na @ 121, today na @ 137  initial Urine Na< 20, urine osmolarity @ 54--->243  serum osmolarity @ 254  BP on admission @ 102/67    Patient seen and examined at bedside.     atorvastatin 20 milliGRAM(s) at bedtime  famotidine    Tablet 20 milliGRAM(s) daily  heparin  Injectable 5000 Unit(s) every 8 hours  insulin lispro (HumaLOG) corrective regimen sliding scale   Before meals and at bedtime  iohexol 300 mG (iodine)/mL Oral Solution 30 milliLiter(s) once  levETIRAcetam 500 milliGRAM(s) two times a day  meropenem  IVPB 1000 milliGRAM(s) every 8 hours  oxybutynin 5 milliGRAM(s) every 8 hours  polyethylene glycol 3350 17 Gram(s) daily  propranolol 40 milliGRAM(s) every 8 hours  senna 2 Tablet(s) every 12 hours  vancomycin  IVPB 1750 milliGRAM(s) every 12 hours      Allergies    Capzasin Back and Body (Unknown)    Intolerances        T(C): , Max: 37.6 (07-07-18 @ 12:35)  T(F): , Max: 99.7 (07-07-18 @ 12:35)  HR: 105 (07-08-18 @ 09:09)  BP: 85/52 (07-08-18 @ 09:09)  BP(mean): --  RR: 17 (07-08-18 @ 09:09)  SpO2: 99% (07-08-18 @ 09:09)  Wt(kg): --    07-07 @ 07:01  -  07-08 @ 07:00  --------------------------------------------------------  IN:  Total IN: 0 mL    OUT:    Indwelling Catheter - Suprapubic: 3425 mL    Voided: 3200 mL  Total OUT: 6625 mL    Total NET: -6625 mL      Physical exam:   Alert and oriented   NO JVD   Normal air entry into the lungs, no wheezing, no crackles  RRR, normal s1/2, no murmurs, rubs or gallops   Abdomen - soft, not tener, s/p laparotomy, colostomy, suprapubic catheter   Extremities: mild peripheral edema      Height (cm): 170.18 (07-07 @ 12:35)  Weight (kg): 81.6 (07-07 @ 12:35)  BMI (kg/m2): 28.2 (07-07 @ 12:35)  BSA (m2): 1.93 (07-07 @ 12:35)      LABS:                        7.1    9.8   )-----------( 308      ( 08 Jul 2018 08:28 )             25.2     07-08    132<L>  |  95<L>  |  7   ----------------------------<  170<H>  3.6   |  21<L>  |  0.63    Ca    8.1<L>      08 Jul 2018 08:28  Mg     1.6     07-08    TPro  6.3  /  Alb  2.3<L>  /  TBili  1.0  /  DBili  x   /  AST  6<L>  /  ALT  6<L>  /  AlkPhos  113  07-07    Osmolality, Serum: 273 mosm/kg <L> [280 - 301] (07-07 @ 15:35)    PT/INR - ( 07 Jul 2018 06:39 )   PT: 19.9 sec;   INR: 1.77          PTT - ( 07 Jul 2018 06:39 )  PTT:30.6 sec  Urinalysis Basic - ( 07 Jul 2018 07:25 )    Color: Yellow / Appearance: Clear / SG: <=1.005 / pH: x  Gluc: x / Ketone: NEGATIVE  / Bili: Negative / Urobili: 0.2 E.U./dL   Blood: x / Protein: NEGATIVE mg/dL / Nitrite: POSITIVE   Leuk Esterase: Large / RBC: < 5 /HPF / WBC Many /HPF   Sq Epi: x / Non Sq Epi: 0-5 /HPF / Bacteria: Many /HPF      Osmolality, Random Urine: 243 mosmol/kg [100 - 650] (07-07 @ 15:00)  Sodium, Random Urine: 26 mmol/L (07-07 @ 15:00)        RADIOLOGY & ADDITIONAL STUDIES:
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
## 2018-07-09 NOTE — PROGRESS NOTE ADULT - PROBLEM SELECTOR PLAN 1
on the admission with 121   - most likely to dehydration - poor oral intake he reports, also the febrile episode  - received to 2 liters of normal saline into the ED   - after the d5 the sodium stabilized around 128 and in the morning 132 which is acceptable at present - the patient is low risk for ODS as was mention before  Na today @ 137  outoput  @2119 cc

## 2018-07-09 NOTE — PROGRESS NOTE ADULT - ASSESSMENT
32 yr old male with a PMHx of paraplegia w/ indwelling suprapubic catheter, chronic sacral decubitus ulcers w/ recurrent OM currently with wound vac, colostomy, DMT2, hepatitis B and schizoaffective disorder that presented to ED with subjective chills, weakness and tachycardia, found to be septic 2/2 OM vs UTI vs pressure ulcers.    NEURO  #Paraplegia 2/2 spinal cord injury  -no sensation or motor activity below T4    #Seizure  -hx of seizures  -c/w keppra 500bid    CARDIO  #HLD: c/w lipitor 20mg (home med)    ID  #Sepsis 2/2 OM vs stage 4 sacral ulcer vs UTI  -urine culture 100,000 gram negative rods, awaiting cultures and sensitivities.   -hx of ESBL coli UTI and chronic sacral decubitus ulcers w/ recurrent OM (hx of citrobacter, enterococcus, klebsiella)  -rigors, WBC 17.7---now 11s, tachy to 115 now to 80s, , CRP 24 upon presentation  -c/w vanc and meropenem (7/7 - present)  -f/u UC, CT A/P with contrast  -general surgery assessed wound vac, and were unable to keep it in place due to size of the ulcer  -will follow wound consult and vascular.   GI  #Ileus s/p colostomy and chronic constipation 2/2 spinal cord injury  -c/w senna 8.6 bid, miralax  -monitor colostomy output, remove stool every 3 days, due for Monday, and again Thursday.       #Neurogenic bladder 2/2 spinal cord injury  -takes oxybutynin 15qd at home  -c/w oxybutynin 5mg TID    HEME  #Chronic microcytic anemia  -likely secondary to malabsorption  -no signs of bleeding  -s/p 1u pRBC in the ED  -monitor H/H  -Hg 7.0 vs 7.1 prior, microcytic anemia.  -maintain active type and screen    RENAL  #Hyponatremia  -likely hypotonic hypovolemic  -f/u urine lytes    ENDO  -takes glimipiride 1mg qd, ,trajenta 5mg qd and metformin 100bid at home  -f/u A1C  -monitor finger stick  -c/w ISS    PSYCHIATRY  #Hx of schizoaffective disorders  -takes haldol 10mg PO per previous visit records  -f/u psych recs    PROPHYLAXIS  -DVT: HSQ (IMPROVE 3)  -GI: famotodine (prior hx of c-diff)  -Code: full  -Dispo: VALERIY 32 yr old male with a PMHx of paraplegia w/ indwelling suprapubic catheter, chronic sacral decubitus ulcers w/ recurrent OM currently with wound vac, colostomy, DMT2, hepatitis B and schizoaffective disorder that presented to ED with subjective chills, weakness and tachycardia, found to be septic 2/2 OM vs UTI vs pressure ulcers.    NEURO  #Paraplegia 2/2 spinal cord injury  -no sensation or motor activity below T4    #Seizure  -hx of seizures  -c/w keppra 500bid    CARDIO  #HLD: c/w lipitor 20mg (home med)    ID  #Sepsis 2/2 OM vs stage 4 sacral ulcer vs UTI  -urine culture 100,000 gram negative rods, awaiting cultures and sensitivities.   -hx of ESBL coli UTI and chronic sacral decubitus ulcers w/ recurrent OM (hx of citrobacter, enterococcus, klebsiella)  -rigors, WBC 17.7---now 11s, tachy to 115 now to 80s, , CRP 24 upon presentation  -c/w and meropenem (7/7 - present) since urine showed gram negative rods, no need for vancomycin given wound site did not look purulent  -general surgery assessed wound vac, and were unable to keep it in place due to size of the ulcer  -will follow wound consult and vascular.   GI  #Ileus s/p colostomy and chronic constipation 2/2 spinal cord injury  -c/w senna 8.6 bid, miralax  -monitor colostomy output, remove stool every 3 days, due for Monday, and again Thursday.       #Neurogenic bladder 2/2 spinal cord injury  -takes oxybutynin 15qd at home  -c/w oxybutynin 5mg TID    HEME  #Chronic microcytic anemia  -likely secondary to malabsorption  -no signs of bleeding  -s/p 1u pRBC in the ED  -monitor H/H  -Hg 7.0 vs 7.1 prior, microcytic anemia.  -maintain active type and screen    RENAL  #Hyponatremia  -likely hypotonic hypovolemic  -f/u urine lytes    ENDO  -takes glimipiride 1mg qd, ,trajenta 5mg qd and metformin 100bid at home  -f/u A1C  -monitor finger stick  -c/w ISS    PSYCHIATRY  #Hx of schizoaffective disorders  -takes haldol 10mg PO per previous visit records  -f/u psych recs    PROPHYLAXIS  -DVT: HSQ (IMPROVE 3)  -GI: famotodine (prior hx of c-diff)  -Code: full  -Dispo: VALERIY 32 yr old male with a PMHx of paraplegia w/ indwelling suprapubic catheter, chronic sacral decubitus ulcers w/ recurrent OM currently with wound vac, colostomy, DMT2, hepatitis B and schizoaffective disorder that presented to ED with subjective chills, weakness and tachycardia, found to be septic 2/2 OM vs UTI vs pressure ulcers.    NEURO  #Paraplegia 2/2 spinal cord injury  -no sensation or motor activity below T4    #Seizure  -hx of seizures  -c/w keppra 500bid    CARDIO  #HLD: c/w lipitor 20mg (home med)    ID  #Sepsis 2/2 UTI  -likely not due to osteomyelitis, wound site not noted to look purulent, and was not odorous. Patient afebrile.   -urine culture 100,000 gram negative rods, sensitive to ciprofloxacin.   -hx of ESBL coli UTI and chronic sacral decubitus ulcers w/ recurrent OM (hx of citrobacter, enterococcus, klebsiella)  -rigors, WBC 17.7---now 11s, tachy to 115 now to 80s, , CRP 24 upon presentation  -c/w and meropenem (7/7 - present) since urine showed gram negative rods, no need for vancomycin given wound site did not look purulent  -general surgery assessed wound vac, and were unable to keep it in place due to size of the ulcer  -will follow wound consult and vascular.   GI  #Ileus s/p colostomy and chronic constipation 2/2 spinal cord injury  -c/w senna 8.6 bid, miralax  -monitor colostomy output, remove stool every 3 days, due for Monday, and again Thursday.       #Neurogenic bladder 2/2 spinal cord injury  -takes oxybutynin 15qd at home  -c/w oxybutynin 5mg TID    HEME  #Chronic microcytic anemia  -likely secondary to malabsorption  -no signs of bleeding  -s/p 1u pRBC in the ED  -monitor H/H  -Hg 7.0 vs 7.1 prior, microcytic anemia.  -maintain active type and screen    RENAL  #Hyponatremia  -likely hypotonic hypovolemic  -f/u urine lytes    ENDO  -takes glimipiride 1mg qd, ,trajenta 5mg qd and metformin 100bid at home  -f/u A1C  -monitor finger stick  -c/w ISS    PSYCHIATRY  #Hx of schizoaffective disorders  -haldol prn if agitate, patient does not take the medication at home per pharmacy records.  -patient denies hallucinations, suicidal ideation, or thoughts of harming others currently.   -appreciate psychiatry notes    PROPHYLAXIS  -DVT: HSQ (IMPROVE 3)  -GI: famotodine (prior hx of c-diff)  -Code: full  -Dispo: [facility]       Goals of Care:  Patient wishes to be placed on care facility, he states he prefers that to 24/7 home care at his current apartment.

## 2018-07-09 NOTE — PROGRESS NOTE ADULT - ATTENDING COMMENTS
pt markedly more responsive and alert-- able to converse, awith sodium improved.nd expresses desire to be discharged.   appears about euvolemic, with sodium improved.   will discuss longer term meds and psych mgt with team . pt  data reviewed and discussed with pt and staff.  vol status improved and ser na has increased with clinical improvement and satisfactory mentation.   antibiotics and wound care continue in progress.

## 2018-07-10 LAB
ANION GAP SERPL CALC-SCNC: 11 MMOL/L — SIGNIFICANT CHANGE UP (ref 5–17)
BLD GP AB SCN SERPL QL: NEGATIVE — SIGNIFICANT CHANGE UP
BUN SERPL-MCNC: 5 MG/DL — LOW (ref 7–23)
CALCIUM SERPL-MCNC: 8.7 MG/DL — SIGNIFICANT CHANGE UP (ref 8.4–10.5)
CHLORIDE SERPL-SCNC: 102 MMOL/L — SIGNIFICANT CHANGE UP (ref 96–108)
CO2 SERPL-SCNC: 26 MMOL/L — SIGNIFICANT CHANGE UP (ref 22–31)
CREAT SERPL-MCNC: 0.68 MG/DL — SIGNIFICANT CHANGE UP (ref 0.5–1.3)
GLUCOSE BLDC GLUCOMTR-MCNC: 115 MG/DL — HIGH (ref 70–99)
GLUCOSE BLDC GLUCOMTR-MCNC: 159 MG/DL — HIGH (ref 70–99)
GLUCOSE BLDC GLUCOMTR-MCNC: 173 MG/DL — HIGH (ref 70–99)
GLUCOSE BLDC GLUCOMTR-MCNC: 176 MG/DL — HIGH (ref 70–99)
GLUCOSE SERPL-MCNC: 105 MG/DL — HIGH (ref 70–99)
HCT VFR BLD CALC: 24.4 % — LOW (ref 39–50)
HCT VFR BLD CALC: 28.3 % — LOW (ref 39–50)
HGB BLD-MCNC: 6.8 G/DL — CRITICAL LOW (ref 13–17)
HGB BLD-MCNC: 8 G/DL — LOW (ref 13–17)
MAGNESIUM SERPL-MCNC: 1.7 MG/DL — SIGNIFICANT CHANGE UP (ref 1.6–2.6)
MCHC RBC-ENTMCNC: 20.7 PG — LOW (ref 27–34)
MCHC RBC-ENTMCNC: 21.3 PG — LOW (ref 27–34)
MCHC RBC-ENTMCNC: 27.9 G/DL — LOW (ref 32–36)
MCHC RBC-ENTMCNC: 28.3 G/DL — LOW (ref 32–36)
MCV RBC AUTO: 74.4 FL — LOW (ref 80–100)
MCV RBC AUTO: 75.3 FL — LOW (ref 80–100)
PLATELET # BLD AUTO: 318 K/UL — SIGNIFICANT CHANGE UP (ref 150–400)
PLATELET # BLD AUTO: 346 K/UL — SIGNIFICANT CHANGE UP (ref 150–400)
POTASSIUM SERPL-MCNC: 4.4 MMOL/L — SIGNIFICANT CHANGE UP (ref 3.5–5.3)
POTASSIUM SERPL-SCNC: 4.4 MMOL/L — SIGNIFICANT CHANGE UP (ref 3.5–5.3)
RBC # BLD: 3.28 M/UL — LOW (ref 4.2–5.8)
RBC # BLD: 3.76 M/UL — LOW (ref 4.2–5.8)
RBC # FLD: 19.1 % — HIGH (ref 10.3–16.9)
RBC # FLD: 19.3 % — HIGH (ref 10.3–16.9)
RH IG SCN BLD-IMP: POSITIVE — SIGNIFICANT CHANGE UP
SODIUM SERPL-SCNC: 139 MMOL/L — SIGNIFICANT CHANGE UP (ref 135–145)
WBC # BLD: 5.4 K/UL — SIGNIFICANT CHANGE UP (ref 3.8–10.5)
WBC # BLD: 6.1 K/UL — SIGNIFICANT CHANGE UP (ref 3.8–10.5)
WBC # FLD AUTO: 5.4 K/UL — SIGNIFICANT CHANGE UP (ref 3.8–10.5)
WBC # FLD AUTO: 6.1 K/UL — SIGNIFICANT CHANGE UP (ref 3.8–10.5)

## 2018-07-10 PROCEDURE — 99233 SBSQ HOSP IP/OBS HIGH 50: CPT | Mod: GC

## 2018-07-10 RX ORDER — MAGNESIUM SULFATE 500 MG/ML
2 VIAL (ML) INJECTION ONCE
Qty: 0 | Refills: 0 | Status: COMPLETED | OUTPATIENT
Start: 2018-07-10 | End: 2018-07-10

## 2018-07-10 RX ADMIN — Medication 5 MILLIGRAM(S): at 10:58

## 2018-07-10 RX ADMIN — POLYETHYLENE GLYCOL 3350 17 GRAM(S): 17 POWDER, FOR SOLUTION ORAL at 13:15

## 2018-07-10 RX ADMIN — Medication 500 MILLIGRAM(S): at 05:49

## 2018-07-10 RX ADMIN — Medication 5 MILLIGRAM(S): at 18:54

## 2018-07-10 RX ADMIN — SENNA PLUS 2 TABLET(S): 8.6 TABLET ORAL at 05:49

## 2018-07-10 RX ADMIN — HEPARIN SODIUM 5000 UNIT(S): 5000 INJECTION INTRAVENOUS; SUBCUTANEOUS at 05:48

## 2018-07-10 RX ADMIN — Medication 500 MILLIGRAM(S): at 18:54

## 2018-07-10 RX ADMIN — LEVETIRACETAM 500 MILLIGRAM(S): 250 TABLET, FILM COATED ORAL at 05:49

## 2018-07-10 RX ADMIN — SENNA PLUS 2 TABLET(S): 8.6 TABLET ORAL at 18:54

## 2018-07-10 RX ADMIN — HEPARIN SODIUM 5000 UNIT(S): 5000 INJECTION INTRAVENOUS; SUBCUTANEOUS at 22:39

## 2018-07-10 RX ADMIN — Medication 40 MILLIGRAM(S): at 18:54

## 2018-07-10 RX ADMIN — LEVETIRACETAM 500 MILLIGRAM(S): 250 TABLET, FILM COATED ORAL at 18:54

## 2018-07-10 RX ADMIN — FAMOTIDINE 20 MILLIGRAM(S): 10 INJECTION INTRAVENOUS at 13:15

## 2018-07-10 RX ADMIN — ATORVASTATIN CALCIUM 20 MILLIGRAM(S): 80 TABLET, FILM COATED ORAL at 22:39

## 2018-07-10 RX ADMIN — Medication 2: at 22:39

## 2018-07-10 RX ADMIN — Medication 2: at 13:15

## 2018-07-10 RX ADMIN — Medication 2: at 18:55

## 2018-07-10 RX ADMIN — Medication 50 GRAM(S): at 07:38

## 2018-07-10 RX ADMIN — HEPARIN SODIUM 5000 UNIT(S): 5000 INJECTION INTRAVENOUS; SUBCUTANEOUS at 14:37

## 2018-07-10 RX ADMIN — Medication 5 MILLIGRAM(S): at 01:04

## 2018-07-10 NOTE — PROGRESS NOTE ADULT - PROBLEM SELECTOR PLAN 1
resolved,   current na @ 139  on the admission with 121   - most likely to dehydration - poor oral intake he reports, also the febrile episode

## 2018-07-10 NOTE — PROVIDER CONTACT NOTE (CRITICAL VALUE NOTIFICATION) - BACKGROUND
PMH paraplegia with indwelling suprapubic catheter, chronic sacral decubitus ulcers, recurrent OM with wound vac, colostomy, DMT2, hep B

## 2018-07-10 NOTE — PROGRESS NOTE ADULT - ASSESSMENT
32 yr old male with a PMHx of paraplegia w/ indwelling suprapubic catheter, chronic sacral decubitus ulcers w/ recurrent OM currently with wound vac, colostomy, DMT2, hepatitis B and schizoaffective disorder that presented to ED with subjective chills, weakness and tachycardia, found to be septic 2/2 OM vs UTI vs pressure ulcers.    NEURO  #Paraplegia 2/2 spinal cord injury  -no sensation or motor activity below T4    #Seizure  -hx of seizures  -c/w keppra 500bid    CARDIO  #HLD: c/w lipitor 20mg (home med)    ID  #Sepsis 2/2 UTI  -likely not due to osteomyelitis, wound site not noted to look purulent, and was not odorous. Patient afebrile.   -urine culture 100,000 gram negative rods, pseudomonas sensitive to ciprofloxacin.   -hx of ESBL coli UTI and chronic sacral decubitus ulcers w/ recurrent OM (hx of citrobacter, enterococcus, klebsiella)  -rigors, WBC 17.7---now 11s, tachy to 115 now to 80s, , upon presentation  -c/w and meropenem (7/7 - present) since urine showed gram negative rods, no need for vancomycin given wound site did not look purulent  -wound care nurse gave recs, will use special bed, wound vac placement on sacral/left IT ulcer and suction 3 times a week,  -patient wants [facility] vs other rehab and not home care anymore.   GI  #Ileus s/p colostomy and chronic constipation 2/2 spinal cord injury  -c/w senna 8.6 bid, miralax  -monitor colostomy output, remove stool every 3 days, due for Monday, and again Thursday.       #Neurogenic bladder 2/2 spinal cord injury  -takes oxybutynin 15qd at home  -c/w oxybutynin 5mg TID    HEME  #Chronic microcytic anemia  -likely secondary to malabsorption  -no signs of bleeding  -s/p 1u pRBC in the ED  -monitor H/H  -Hg 6.8 vs 7.1 prior, microcytic anemia.  -maintain active type and screen, will transfuse 1 unit today.     RENAL  #Hyponatremia  -likely hypotonic hypovolemic  -f/u urine lytes    ENDO  -takes glimipiride 1mg qd, ,trajenta 5mg qd and metformin 100bid at home  -f/u A1C  -monitor finger stick  -c/w ISS    PSYCHIATRY  #Hx of schizoaffective disorders  -haldol prn if agitate, patient does not take the medication at home per pharmacy records.  -patient denies hallucinations, suicidal ideation, or thoughts of harming others currently.   -appreciate psychiatry notes    PROPHYLAXIS  -DVT: HSQ (IMPROVE 3)  -GI: famotodine (prior hx of c-diff)  -Code: full  -Dispo: [facility]       Goals of Care:  Patient wishes to be placed at [facility], he states he prefers that to 24/7 home care at his current apartment. 32 yr old male with a PMHx of paraplegia w/ indwelling suprapubic catheter, chronic sacral decubitus ulcers w/ recurrent OM currently with wound vac, colostomy, DMT2, hepatitis B and schizoaffective disorder that presented to ED with subjective chills, weakness and tachycardia, found to be septic 2/2 OM vs UTI vs pressure ulcers.    NEURO  #Paraplegia 2/2 spinal cord injury  -no sensation or motor activity below T4    #Seizure  -hx of seizures  -c/w keppra 500bid    CARDIO  #HLD: c/w lipitor 20mg (home med)    ID  #Sepsis 2/2 UTI suprapubic catheter  -likely not due to osteomyelitis, wound site not noted to look purulent, and was not odorous. Patient afebrile.   -urine culture 100,000 gram negative rods, pseudomonas sensitive to ciprofloxacin.   -hx of ESBL coli UTI and chronic sacral decubitus ulcers w/ recurrent OM (hx of citrobacter, enterococcus, klebsiella)  -rigors, WBC 17.7---now 11s, tachy to 115 now to 80s, , upon presentation  -c/w and meropenem (7/7 - present) since urine showed gram negative rods, no need for vancomycin given wound site did not look purulent  -wound care nurse gave recs, will use special bed, wound vac placement on sacral/left IT ulcer and suction 3 times a week,  -patient wants [facility] rehab vs other rehab and not home care anymore.   GI  #Ileus s/p colostomy and chronic constipation 2/2 spinal cord injury  -c/w senna 8.6 bid, miralax  -monitor colostomy output, remove stool every 3 days, due for Monday, and again Thursday.       #Neurogenic bladder 2/2 spinal cord injury  -takes oxybutynin 15qd at home  -c/w oxybutynin 5mg TID  -suprapubic catheter placed 2 weeks ago at [facility].     HEME  #Chronic microcytic anemia  -likely secondary to malabsorption  -no signs of bleeding  -s/p 1u pRBC in the ED  -monitor H/H  -Hg 6.8 vs 7.1 prior, microcytic anemia.  -maintain active type and screen, will transfuse 1 unit today.     RENAL  #Hyponatremia  -likely hypotonic hypovolemic  -f/u urine lytes    ENDO  -takes glimipiride 1mg qd, ,trajenta 5mg qd and metformin 100bid at home  -f/u A1C  -monitor finger stick  -c/w ISS    PSYCHIATRY  #Hx of schizoaffective disorders  -haldol prn if agitate, patient does not take the medication at home per pharmacy records.  -patient denies hallucinations, suicidal ideation, or thoughts of harming others currently.   -appreciate psychiatry notes    PROPHYLAXIS  -DVT: HSQ (IMPROVE 3)  -GI: famotodine (prior hx of c-diff)  -Code: full  -Dispo: [facility]       Goals of Care:  Patient wishes to be placed at [facility], he states he prefers that to 24/7 home care at his current apartment. 32 yr old male with a PMHx of paraplegia w/ indwelling suprapubic catheter, chronic sacral decubitus ulcers w/ recurrent OM currently with wound vac, colostomy, DMT2, hepatitis B and schizoaffective disorder that presented to ED with subjective chills, weakness and tachycardia, found to be septic 2/2 OM vs UTI vs pressure ulcers.    NEURO  #Paraplegia 2/2 spinal cord injury  -no sensation or motor activity below T4    #Seizure  -hx of seizures  -c/w keppra 500bid    CARDIO  #HLD: c/w lipitor 20mg (home med)    Decubitus Ulcers:  Patient presented with the following pressure injuries on admission:  Right ischial tuberosity (IT) unstageable pressure injury with 100% yellowish/gray slough, measuring 4 cm x 3 cm with moderate amount of serosanguinous exudate.   Right lower buttock lateral aspect unstageable pressure injury 100% yellow slough, measuring 2.5 cm x 2 cm with small amount of serosanguinous exudate.  Right lower buttock medial aspect stage 2 pressure injury with pale red wound bed, measuring 0.5 cm x 1 cm x 0.1 cm with small amount of serosanguinous exudate.   Sacral stage 4 pressure injury with pale red, non-granulating wound bed, measuring 4 cm x 4.5 cm x 4.5 cm with 7.8 cm tunneling at 12 o'clock with large amount of serosanguinous exudate.   Right achilles tendon unstageable pressure injury with exposed tendon, wound bed with 60% slough and 20% eschar, measuring 3 cm x 3 cm with small amount serosanguinous exudate.  Left heel unstageable pressure injury with 100% yellow slough measuring 1 cm x 1 cm with serosanguinous exudate.   Left lateral foot 2 unstageable pressure injuries with scab covering measuring 1 cm x 0.7 cm and 1 cm x 1 cm; no exudate noted.   Right lateral malleolus stage 2 pressure injury measuring 1 cm x 0.8 cm x 0.1 with scant serous exudate.   Left IT stage 4 pressure injury with 75% pale red, non-granulating tissue and 25% yellow slough measuring 8 cm x 5 cm x 7 cm with tunneling of 5 cm at 11 o'clock, with large amount of serosanguinous exudate.     ID  #Sepsis 2/2 UTI suprapubic catheter  -likely not due to osteomyelitis, wound site not noted to look purulent, and was not odorous. Patient afebrile.   -urine culture 100,000 gram negative rods, pseudomonas sensitive to ciprofloxacin.   -hx of ESBL coli UTI and chronic sacral decubitus ulcers w/ recurrent OM (hx of citrobacter, enterococcus, klebsiella)  -rigors, WBC 17.7---now 11s, tachy to 115 now to 80s, , upon presentation  -will continue ciprofloxacin since UTI pseudomonas susceptible.      GI  #Ileus s/p colostomy and chronic constipation 2/2 spinal cord injury  -c/w senna 8.6 bid, miralax  -monitor colostomy output, remove stool every 3 days, due for Monday, and again Thursday.       #Neurogenic bladder 2/2 spinal cord injury  -takes oxybutynin 15qd at home  -c/w oxybutynin 5mg TID  -suprapubic catheter placed 2 weeks ago at [facility].     HEME  #Chronic microcytic anemia  -likely secondary to malabsorption  -no signs of bleeding  -s/p 1u pRBC in the ED  -monitor H/H  -Hg 6.8 vs 7.1 prior, microcytic anemia.  -maintain active type and screen, will transfuse 1 unit today.     RENAL  #Hyponatremia  -likely hypotonic hypovolemic  -f/u urine lytes    ENDO  -takes glimipiride 1mg qd, ,trajenta 5mg qd and metformin 100bid at home  -f/u A1C  -monitor finger stick  -c/w ISS    PSYCHIATRY  #Hx of schizoaffective disorders  -haldol prn if agitate, patient does not take the medication at home per pharmacy records.  -patient denies hallucinations, suicidal ideation, or thoughts of harming others currently.   -appreciate psychiatry notes    PROPHYLAXIS  -DVT: HSQ (IMPROVE 3)  -GI: famotodine (prior hx of c-diff)  -Code: full  -Dispo: [facility]       Goals of Care:  Patient wishes to be placed at [facility], he states he prefers that to 24/7 home care at his current apartment. Might not be realistic and will discuss options with patient. 32 yr old male with a PMHx of paraplegia w/ indwelling suprapubic catheter, chronic sacral decubitus ulcers w/ recurrent OM currently with wound vac, colostomy, DMT2, hepatitis B and schizoaffective disorder that presented to ED with subjective chills, weakness and tachycardia, found to be septic 2/2 OM vs UTI vs pressure ulcers.    NEURO  #Paraplegia 2/2 spinal cord injury  -no sensation or motor activity below T4    #Seizure  -hx of seizures  -c/w keppra 500bid    CARDIO  #HLD: c/w lipitor 20mg (home med)    Decubitus Ulcers:  Patient presented with the following pressure injuries on admission:  Right ischial tuberosity (IT) unstageable pressure injury   Right lower buttock lateral   Right lower buttock medial aspect stage 2 pressure injury   Sacral stage 4 pressure injury with pale red, non-granulating wound bed,  Right achilles tendon unstageable pressure injury with exposed tendon,   Left heel unstageable pressure injury   Left lateral foot 2 unstageable pressure injuries   Right lateral malleolus stage 2 pressure  Left IT stage 4 pressure injury    ID  #Sepsis 2/2 UTI suprapubic catheter  -likely not due to osteomyelitis, wound site not noted to look purulent, and was not odorous. Patient afebrile.   -urine culture 100,000 gram negative rods, pseudomonas sensitive to ciprofloxacin.   -hx of ESBL coli UTI and chronic sacral decubitus ulcers w/ recurrent OM (hx of citrobacter, enterococcus, klebsiella)  -rigors, WBC 17.7---now 11s, tachy to 115 now to 80s, , upon presentation  -will continue ciprofloxacin since UTI pseudomonas susceptible.      GI  #Ileus s/p colostomy and chronic constipation 2/2 spinal cord injury  -c/w senna 8.6 bid, miralax  -monitor colostomy output, remove stool every 3 days, due for Monday, and again Thursday.       #Neurogenic bladder 2/2 spinal cord injury  -takes oxybutynin 15qd at home  -c/w oxybutynin 5mg TID  -suprapubic catheter placed 2 weeks ago at [facility].     HEME  #Chronic microcytic anemia  -likely secondary to malabsorption  -no signs of bleeding  -s/p 1u pRBC in the ED  -monitor H/H  -Hg 6.8 vs 7.1 prior, microcytic anemia.  -maintain active type and screen, will transfuse 1 unit today.     RENAL  #Hyponatremia  -likely hypotonic hypovolemic  -f/u urine lytes    ENDO  -takes glimipiride 1mg qd, ,trajenta 5mg qd and metformin 100bid at home  -f/u A1C  -monitor finger stick  -c/w ISS    PSYCHIATRY  #Hx of schizoaffective disorders  -haldol prn if agitate, patient does not take the medication at home per pharmacy records.  -patient denies hallucinations, suicidal ideation, or thoughts of harming others currently.   -appreciate psychiatry notes    PROPHYLAXIS  -DVT: HSQ (IMPROVE 3)  -GI: famotodine (prior hx of c-diff)  -Code: full  -Dispo: F       Goals of Care:  Patient wishes to be placed at [facility], he states he prefers that to 24/7 home care at his current apartment. Might not be realistic and will discuss options with patient.

## 2018-07-10 NOTE — DISCHARGE NOTE ADULT - MEDICATION SUMMARY - MEDICATIONS TO TAKE
I will START or STAY ON the medications listed below when I get home from the hospital:    propranolol 40 mg oral tablet  -- 1 tab(s) by mouth 3 times a day  -- Indication: For As per patient - "to keep my HR low to prevent strokes" - no record of administration on past admission    Keppra 500 mg oral tablet  -- 1 tab(s) by mouth 2 times a day  -- Indication: For Seizure prevention    Glucophage 1000 mg oral tablet  -- 1 tab(s) by mouth 2 times a day  -- Indication: For Diabetes    glimepiride 1 mg oral tablet  -- 1 tab(s) by mouth once a day  -- Indication: For Diabetes    Tradjenta 5 mg oral tablet  -- 1 tab(s) by mouth once a day  -- Indication: For Diabetes    Lipitor 20 mg oral tablet  -- 1 tab(s) by mouth once a day (at bedtime)  -- Indication: For HLD (hyperlipidemia)    famotidine 20 mg oral tablet  -- 1 tab(s) by mouth once a day  -- Indication: For GI prophylaxis    polyethylene glycol 3350 oral powder for reconstitution  -- 17 gram(s) by mouth once a day   -- Indication: For Constipation    Senna 8.6 mg oral tablet  -- 1 tab(s) by mouth 2 times a day  -- Indication: For Constipation    oxybutynin 5 mg oral tablet  -- 1 tab(s) by mouth every 8 hours  -- Indication: For Overactive bladder I will START or STAY ON the medications listed below when I get home from the hospital:    Keppra 500 mg oral tablet  -- 1 tab(s) by mouth 2 times a day  -- Indication: For Seizure prevention    Glucophage 1000 mg oral tablet  -- 1 tab(s) by mouth 2 times a day  -- Indication: For Diabetes    glimepiride 1 mg oral tablet  -- 1 tab(s) by mouth once a day  -- Indication: For Diabetes    Tradjenta 5 mg oral tablet  -- 1 tab(s) by mouth once a day  -- Indication: For Diabetes    Lipitor 20 mg oral tablet  -- 1 tab(s) by mouth once a day (at bedtime)  -- Indication: For HLD (hyperlipidemia)    famotidine 20 mg oral tablet  -- 1 tab(s) by mouth once a day  -- Indication: For GI prophylaxis    polyethylene glycol 3350 oral powder for reconstitution  -- 17 gram(s) by mouth once a day   -- Indication: For Constipation    Senna 8.6 mg oral tablet  -- 1 tab(s) by mouth 2 times a day  -- Indication: For Constipation    oxybutynin 5 mg oral tablet  -- 1 tab(s) by mouth every 8 hours  -- Indication: For Overactive bladder

## 2018-07-10 NOTE — PROGRESS NOTE ADULT - SUBJECTIVE AND OBJECTIVE BOX
Overnight blood pressure was 90s/60s, in the am. Hg 6.8, slowly downtrending, but HD stable, will transfuse after type and screen 1 unit.     REVIEW OF SYSTEMS:  CONSTITUTIONAL: No fever, weight loss, or fatigue  EYES: No eye pain, visual disturbances, or discharge  ENMT:  No difficulty hearing, tinnitus, vertigo; No sinus or throat pain  NECK: No pain or stiffness  BREASTS: No pain, masses, or nipple discharge  RESPIRATORY: No cough, wheezing, chills or hemoptysis; No shortness of breath  CARDIOVASCULAR: No chest pain, palpitations, dizziness, or leg swelling  GASTROINTESTINAL: Colostomy tube in place, last changed Friday, due for today.   GENITOURINARY: suprapubic catheter in place.   NEUROLOGICAL: No headaches, memory loss, loss of strength, numbness, or tremors  SKIN: No itching, burning, rashes, or lesions   LYMPH NODES: No enlarged glands  ENDOCRINE: No heat or cold intolerance; No hair loss  MUSCULOSKELETAL: No joint pain or swelling;   Large sacral decubitus ulcer, left IT ulcer, right heel tendon exposed from pressure. no purulence or drainage, reaches towards bone.   PSYCHIATRIC: hx of psychiatric illness  HEME/LYMPH: No easy bruising, or bleeding gums  ALLERY AND IMMUNOLOGIC: No hives or eczema    Vital Signs Last 24 Hrs  T(C): 36.6 (10 Jul 2018 05:16), Max: 37.1 (09 Jul 2018 16:41)  T(F): 97.9 (10 Jul 2018 05:16), Max: 98.7 (09 Jul 2018 16:41)  HR: 73 (10 Jul 2018 05:16) (73 - 89)  BP: 96/62 (10 Jul 2018 05:16) (94/60 - 104/72)  BP(mean): --  RR: 18 (10 Jul 2018 05:16) (17 - 18)  SpO2: 99% (10 Jul 2018 05:16) (98% - 99%)    PHYSICAL EXAM:  	Constitutional: resting comfortably in bed; NAD  	HEENT: NC/AT, PERRL, EOMI, anicteric sclera, dry mucosa  	Respiratory: CTA B/L  	Cardiac: +S1/S2; RRR; no M/R/G  	Gastrointestinal: distended abdomen,   	Genitourinary: no bowles, has suprapubic catheter  	Back: stage 4 sacral ulcer, no purulence or erythema. reaches bone, stage 4 left IT ulcer, right heel ulcer. Non erythematous, no exudate, nontender.   	Extremities: no edema,   	Musculoskeletal: no joint swelling, tenderness or erythema  	Neurologic: AAOx3; CNII-XII grossly intact; no focal deficits  Psychiatric: hx schizoaffective disorder           LABS:                         6.8    5.4   )-----------( 318      ( 10 Jul 2018 06:20 )             24.4     07-10    139  |  102  |  5<L>  ----------------------------<  105<H>  4.4   |  26  |  0.68    Ca    8.7      10 Jul 2018 06:20  Mg     1.7     07-10 Hospital Course:   32y Male with a PMHx of spinal cord injury with paraplegia (dx in 2011; pt unable to recollect inciting event- per chart review, patient fell off window and woke up 2 weeks later at [facility] paralyzed) w/ chronic suprapubic catheter, chronic sacral decubitus ulcers w/ recurrent OM (hx of citrobacter, enterococcus, klebsiella), ileus s/p colostomy for fecal incontinence (performed to prevent further fecal contamination of sacral decubitus ulcers; performed 2/2018), C diff colitis (Jan 2018), DMT2, Hep B, schizoaffective disorder that was BIBEMS for palpitations and weakness since 3 am. Patient can not recollect any inciting event for his symptoms.  He has a wound vac over his sacral decubitus ulcer which was placed in June and is functioning without issue. Pt's suprapubic catheter is managed by Dr Snell at [facility], last changed two weeks ago. His ostomy output has been stable.   During this hospital course, patient was found to be septic in ED (HR 90s, BP systolic 85) due to UTI of pseudomonas. His heart rate and blood pressure improved after vanc/zosyn and fluid boluses. He also had a hemoglobin of 6.5 on admission, which improved to 7.3 after 1 unit pRBC (likely due to malabsorption). Vanc/zosyn was transitioned to oral cipro after urine cultures came back pseudomonas.  He had wound evaluation of sacral and left ischial tuberosity 7/9/18 and the site appeared clean, nonodorous, and without discharge. Acute osteomyelitis was less likely as cause of sepsis. He will have wound vac placed and specialty bed as well per wound nurse. Hemoglobin on 7/10 was 6.8 which patient will receive 1 unit pRBC. He would like [facility] home placement.      Overnight blood pressure was 90s/60s, in the am. Hg 6.8, slowly downtrending, but HD stable, will transfuse after type and screen 1 unit.     REVIEW OF SYSTEMS:  CONSTITUTIONAL: No fever, weight loss, or fatigue  EYES: No eye pain, visual disturbances, or discharge  ENMT:  No difficulty hearing, tinnitus, vertigo; No sinus or throat pain  NECK: No pain or stiffness  BREASTS: No pain, masses, or nipple discharge  RESPIRATORY: No cough, wheezing, chills or hemoptysis; No shortness of breath  CARDIOVASCULAR: No chest pain, palpitations, dizziness, or leg swelling  GASTROINTESTINAL: Colostomy tube in place, last changed Friday, due for today.   GENITOURINARY: suprapubic catheter in place.   NEUROLOGICAL: No headaches, memory loss, loss of strength, numbness, or tremors  SKIN: No itching, burning, rashes, or lesions   LYMPH NODES: No enlarged glands  ENDOCRINE: No heat or cold intolerance; No hair loss  MUSCULOSKELETAL: No joint pain or swelling;   Large sacral decubitus ulcer, left IT ulcer, right heel tendon exposed from pressure. no purulence or drainage, reaches towards bone.   PSYCHIATRIC: hx of psychiatric illness  HEME/LYMPH: No easy bruising, or bleeding gums  ALLERY AND IMMUNOLOGIC: No hives or eczema    Vital Signs Last 24 Hrs  T(C): 36.6 (10 Jul 2018 05:16), Max: 37.1 (09 Jul 2018 16:41)  T(F): 97.9 (10 Jul 2018 05:16), Max: 98.7 (09 Jul 2018 16:41)  HR: 73 (10 Jul 2018 05:16) (73 - 89)  BP: 96/62 (10 Jul 2018 05:16) (94/60 - 104/72)  BP(mean): --  RR: 18 (10 Jul 2018 05:16) (17 - 18)  SpO2: 99% (10 Jul 2018 05:16) (98% - 99%)    PHYSICAL EXAM:  	Constitutional: resting comfortably in bed; NAD  	HEENT: NC/AT, PERRL, EOMI, anicteric sclera, dry mucosa  	Respiratory: CTA B/L  	Cardiac: +S1/S2; RRR; no M/R/G  	Gastrointestinal: distended abdomen,   	Genitourinary: no bowles, has suprapubic catheter  	Back: stage 4 sacral ulcer, no purulence or erythema. reaches bone, stage 4 left IT ulcer, right heel ulcer. Non erythematous, no exudate, nontender.   	Extremities: no edema,   	Musculoskeletal: no joint swelling, tenderness or erythema  	Neurologic: AAOx3; CNII-XII grossly intact; no focal deficits  Psychiatric: hx schizoaffective disorder           LABS:                         6.8    5.4   )-----------( 318      ( 10 Jul 2018 06:20 )             24.4     07-10    139  |  102  |  5<L>  ----------------------------<  105<H>  4.4   |  26  |  0.68    Ca    8.7      10 Jul 2018 06:20  Mg     1.7     07-10

## 2018-07-10 NOTE — DISCHARGE NOTE ADULT - PROVIDER TOKENS
FREE:[LAST:[Solas],FIRST:[Ford],PHONE:[phone]],FAX:[(   )    -],ADDRESS:[[facility]  [address] [place] [address]]],FREE:[LAST:[Channing],FIRST:[Agustin],PHONE:[phone]],FAX:[(   )    -],ADDRESS:[[address] [place] [address]]],FREE:[LAST:[Hola],FIRST:[Shanta],PHONE:[phone]],FAX:[(   )    -],ADDRESS:[Psychiatry    [address] [place] [id number]]] FREE:[LAST:[Solas],FIRST:[Ford],PHONE:[phone]],FAX:[(   )    -],ADDRESS:[[facility]  [address] [place] [address]]],FREE:[LAST:[Snell],FIRST:[Agustin],PHONE:[phone]],FAX:[(   )    -],ADDRESS:[[address] [place] [address]]],FREE:[LAST:[Hola],FIRST:[Shanta (psychiatry)],PHONE:[phone]],FAX:[(   )    -]]

## 2018-07-10 NOTE — DISCHARGE NOTE ADULT - PATIENT PORTAL LINK FT
You can access the [facility] Patient Portal, offered by [facility], by registering with the following website: [email or web address]

## 2018-07-10 NOTE — DISCHARGE NOTE ADULT - ADDITIONAL INSTRUCTIONS
-Sacral and left IT pressure injuries:  - irrigate with normal saline, apply Cavilon liquid skin barrier to periwound, pack lightly Aquacel Extra, cover with foam dressing every other day or prn if soiled or wet.   -Right buttock, right lower buttock/lateral and medial aspect pressure injuries, right achilles tendon, left heel and right lateral malleolus pressure injuries:  -cleanse with normal saline, apply Cavilon liquid skin barrier to periwound, small amount of Medihoney to wound bed, cover with foam dressing every other day or prn if soiled or wet.   Left lateral foot ulcers - apply foam dressing every 3 days or prn if soiled or saturated.   Discussed assessment with RNShayna and house staff, Dr Ken Bernabe. Also discussed placement of NPWT/VAC dressing on sacral and left IT stage 4 pressure injuries. VAC dressing to be applied 7/10. Appointment made with Primary care doctor Dr. Alford on 07/24/2018. Pt will be discharged with home care on Monday 07/16/2018 at 9:30AM.     -Sacral and left IT pressure injuries:  - irrigate with normal saline, apply Cavilon liquid skin barrier to periwound, pack lightly Aquacel Extra, cover with foam dressing every other day or prn if soiled or wet.   -Right buttock, right lower buttock/lateral and medial aspect pressure injuries, right achilles tendon, left heel and right lateral malleolus pressure injuries:  -cleanse with normal saline, apply Cavilon liquid skin barrier to periwound, small amount of Medihoney to wound bed, cover with foam dressing every other day or prn if soiled or wet.   Left lateral foot ulcers - apply foam dressing every 3 days or prn if soiled or saturated.   Discussed assessment with RNShayna and house staff, Dr Ken Bernabe. Also discussed placement of NPWT/VAC dressing on sacral and left IT stage 4 pressure injuries. VAC dressing to be applied 7/10. Appointment made with Primary care doctor Dr. Alford on 07/24/2018. Pt will be discharged with home care on Monday 07/16/2018 at 9:30AM. Follow up with [facility] for Wound care at home.     -Sacral and left IT pressure injuries:  - irrigate with normal saline, apply Cavilon liquid skin barrier to periwound, pack lightly Aquacel Extra, cover with foam dressing every other day or prn if soiled or wet.   -Right buttock, right lower buttock/lateral and medial aspect pressure injuries, right achilles tendon, left heel and right lateral malleolus pressure injuries:  -cleanse with normal saline, apply Cavilon liquid skin barrier to periwound, small amount of Medihoney to wound bed, cover with foam dressing every other day or prn if soiled or wet.   Left lateral foot ulcers - apply foam dressing every 3 days or prn if soiled or saturated.   Discussed assessment with RNShayna and house staff, Dr Ken Bernabe. Also discussed placement of NPWT/VAC dressing on sacral and left IT stage 4 pressure injuries. VAC dressing applied. Appointment made with Primary care doctor Dr. Alford on 07/24/2018. Pt will be discharged with home care on Monday 07/16/2018 at 9:30AM.     -Sacral and left IT pressure injuries:  - irrigate with normal saline, apply Cavilon liquid skin barrier to periwound, pack lightly Aquacel Extra, cover with foam dressing every other day or prn if soiled or wet.   -Right buttock, right lower buttock/lateral and medial aspect pressure injuries, right achilles tendon, left heel and right lateral malleolus pressure injuries:  -cleanse with normal saline, apply Cavilon liquid skin barrier to periwound, small amount of Medihoney to wound bed, cover with foam dressing every other day or prn if soiled or wet.   Left lateral foot ulcers - apply foam dressing every 3 days or prn if soiled or saturated.   Discussed assessment with RNShayna and house staff, Dr Ken Bernabe. Also discussed placement of NPWT/VAC dressing on sacral and left IT stage 4 pressure injuries. VAC dressing applied.

## 2018-07-10 NOTE — DISCHARGE NOTE ADULT - HOSPITAL COURSE
32y Male with a PMHx of spinal cord injury with paraplegia (dx in 2011; pt unable to recollect inciting event- per chart review, patient fell off window and woke up 2 weeks later at [facility] paralyzed) w/ chronic suprapubic catheter, chronic sacral decubitus ulcers w/ recurrent OM (hx of citrobacter, enterococcus, klebsiella), ileus s/p colostomy for fecal incontinence (performed to prevent further fecal contamination of sacral decubitus ulcers; performed 2/2018), C diff colitis (Jan 2018), DMT2, Hep B, schizoaffective disorder that was BIBEMS for palpitations and weakness since 3 am. Patient can not recollect any inciting event for his symptoms.  He has a wound vac over his sacral decubitus ulcer which was placed in June and is functioning without issue. Pt's suprapubic catheter is managed by Dr Snell at [place], last changed two weeks ago. His ostomy output has been stable.   During this hospital course, patient was found to be septic in ED (HR 90s, BP systolic 85) due to UTI of pseudomonas. His heart rate and blood pressure improved after vanc/zosyn and fluid boluses. He also had a hemoglobin of 6.5 on admission, which improved to 7.3 after 1 unit pRBC (likely due to malabsorption). Vanc/zosyn was transitioned to oral cipro after urine cultures came back pseudomonas.  He had wound evaluation of sacral and left ischial tuberosity 7/9/18 and the site appeared clean, nonodorous, and without discharge. Acute osteomyelitis was less likely as cause of sepsis. Hemoglobin on 7/10 was 6.8 which patient will receive 1 unit pRBC. Wound vacs were placed on sacral and left IT wound, will continue with cleaning 32y Male with a PMHx of spinal cord injury with paraplegia (dx in 2011; pt unable to recollect inciting event- per chart review, patient fell off window and woke up 2 weeks later at [facility] paralyzed) w/ chronic suprapubic catheter, chronic sacral decubitus ulcers w/ recurrent OM (hx of citrobacter, enterococcus, klebsiella), ileus s/p colostomy for fecal incontinence (performed to prevent further fecal contamination of sacral decubitus ulcers; performed 2/2018), C diff colitis (Jan 2018), DMT2, Hep B, schizoaffective disorder that was BIBEMS for palpitations and weakness since 3 am. Patient can not recollect any inciting event for his symptoms.  He has a wound vac over his sacral decubitus ulcer which was placed in June and is functioning without issue. Pt's suprapubic catheter is managed by Dr Snell at [place], last changed two weeks ago. His ostomy output has been stable.   During this hospital course, patient was found to be septic in ED (HR 90s, BP systolic 85) due to UTI of pseudomonas. His heart rate and blood pressure improved after vanc/zosyn and fluid boluses. He also had a hemoglobin of 6.5 on admission, which improved to 7.3 after 1 unit pRBC (likely due to malabsorption). Vanc/zosyn was transitioned to oral cipro after urine cultures came back pseudomonas.  He had wound evaluation of sacral and left ischial tuberosity 7/9/18 and the site appeared clean, nonodorous, and without discharge. Acute osteomyelitis was less likely as cause of sepsis. Hemoglobin on 7/10 was 6.8 which patient will receive 1 unit pRBC. Wound vacs were placed on sacral and left IT wound, and was managed by Wound care while in the hospital. 32y Male with a PMHx of spinal cord injury with paraplegia (dx in 2011; pt unable to recollect inciting event- per chart review, patient fell off window and woke up 2 weeks later at [facility] paralyzed) w/ chronic suprapubic catheter, chronic sacral decubitus ulcers w/ recurrent OM (hx of citrobacter, enterococcus, klebsiella), ileus s/p colostomy for fecal incontinence (performed to prevent further fecal contamination of sacral decubitus ulcers; performed 2/2018), C diff colitis (Jan 2018), DMT2, Hep B, schizoaffective disorder that was BIBEMS for palpitations and weakness since 3 am. Patient can not recollect any inciting event for his symptoms.  He has a wound vac over his sacral decubitus ulcer which was placed in June and is functioning without issue. Pt's suprapubic catheter is managed by Dr Snell (urology) at [place], last changed two weeks ago. His ostomy output has been stable.   During this hospital course, patient was found to be septic in ED (HR 90s, BP systolic 85) due to UTI of pseudomonas. His heart rate and blood pressure improved after vanc/zosyn and fluid boluses. He also had a hemoglobin of 6.5 on admission, which improved to 7.3 after 1 unit pRBC (likely due to malabsorption). Vanc/zosyn was transitioned to oral cipro after urine cultures came back pseudomonas.  He had wound evaluation of sacral and left ischial tuberosity 7/9/18 and the site appeared clean, nonodorous, and without discharge. Acute osteomyelitis was less likely as cause of sepsis. Hemoglobin on 7/10 was 6.8 which patient will receive 1 unit pRBC. Wound vacs were placed on sacral and left IT wound, and was managed by Wound care while in the hospital. Pt was intermittently hypotensive throughout the hospital course which could be 2/2 propranolol. Pt was asymptomatic, no signs of hypovolemic, cardiogenic, or septic shock. Propranolol held several times due to hypotension.   Pt is medically stable for discharge. 32y Male with a PMHx of spinal cord injury with paraplegia (dx in 2011; pt unable to recollect inciting event- per chart review, patient fell off window and woke up 2 weeks later at [facility] paralyzed) w/ chronic suprapubic catheter, chronic sacral decubitus ulcers w/ recurrent OM (hx of citrobacter, enterococcus, klebsiella), ileus s/p colostomy for fecal incontinence (performed to prevent further fecal contamination of sacral decubitus ulcers; performed 2/2018), C diff colitis (Jan 2018), DMT2, Hep B, schizoaffective disorder that was BIBEMS for palpitations and weakness since 3 am. Patient can not recollect any inciting event for his symptoms.  He has a wound vac over his sacral decubitus ulcer which was placed in June and is functioning without issue. Pt's suprapubic catheter is managed by Dr Snell (urology) at [place], last changed two weeks ago. His ostomy output has been stable.   During this hospital course, patient was found to be septic in ED (HR 90s, BP systolic 85) due to catheter associated UTI, present on admission, due to pseudomonas. His heart rate and blood pressure improved after vanc/zosyn and fluid boluses. He also had a hemoglobin of 6.5 on admission, which improved to 7.3 after 1 unit pRBC (likely due to malabsorption). Vanc/zosyn was transitioned to oral cipro after urine cultures came back pseudomonas.  He had wound evaluation of sacral and left ischial tuberosity 7/9/18 and the site appeared clean, nonodorous, and without discharge. Acute osteomyelitis was less likely as cause of sepsis. Hemoglobin on 7/10 was 6.8 which patient will receive 1 unit pRBC. Wound vacs were placed on sacral and left IT wound, and was managed by Wound care while in the hospital. Pt was intermittently hypotensive throughout the hospital course which could be 2/2 propranolol which the pt asserts he must take to keep heart rate down for stroke prevention. On last admission, no record of propranolol being administered on admission. Propranolol held several times due to transient hypotension. On day of discharge, patient hemodynamically stable and return precautions discussed extensively. 32y Male with a PMHx of spinal cord injury with paraplegia (dx in 2011; pt unable to recollect inciting event- per chart review, patient fell off window and woke up 2 weeks later at [facility] paralyzed) w/ chronic suprapubic catheter, chronic sacral decubitus ulcers w/ recurrent OM (hx of citrobacter, enterococcus, klebsiella), ileus s/p colostomy for fecal incontinence (performed to prevent further fecal contamination of sacral decubitus ulcers; performed 2/2018), C diff colitis (Jan 2018), DMT2, Hep B, schizoaffective disorder that was BIBEMS for palpitations and weakness since 3 am. Patient can not recollect any inciting event for his symptoms.  He has a wound vac over his sacral decubitus ulcer which was placed in June and is functioning without issue. Pt's suprapubic catheter is managed by Dr Snell (urology) at [place], last changed two weeks ago. His ostomy output has been stable.   During this hospital course, patient was found to be septic in ED (HR 90s, BP systolic 85) due to catheter associated UTI, present on admission, due to pseudomonas. His heart rate and blood pressure improved after vanc/zosyn and fluid boluses. He also had a hemoglobin of 6.5 on admission, which improved to 7.3 after 1 unit pRBC (likely due to malabsorption). Vanc/zosyn was transitioned to oral cipro after urine cultures came back pseudomonas.  He had wound evaluation of sacral and left ischial tuberosity 7/9/18 and the site appeared clean, nonodorous, and without discharge. Acute osteomyelitis was less likely as cause of sepsis. Hemoglobin on 7/10 was 6.8 which patient will receive 1 unit pRBC. Wound vacs were placed on sacral and left IT wound, and was managed by Wound care while in the hospital. Pt was intermittently hypotensive throughout the hospital course which could be 2/2 propranolol which the pt asserts he must take to keep heart rate down for stroke prevention. On last admission, no record of propranolol being administered on admission. Propranolol held multiple times due to transient hypotension - on review of MAR, pt only received it about 1/3 of the time, and so it was discontinued on discharge due to no clear indication of taking it (harms > benefit on this admission). On day of discharge, patient hemodynamically stable and return precautions discussed extensively.

## 2018-07-10 NOTE — ADVANCED PRACTICE NURSE CONSULT - ASSESSMENT
Applied liquid skin barrier to periwound, Adaptic Touch dressing to wound base and exposed bone, white foam to tunnelling in both wounds, then black granufoam; also applied sections of Valente ring to coccyx, buttocks fold and left groin to prevent air leaks. Bridged foam dressing and placed trac pad on left hip. Placed VAC on 125 mm/Hg, low, continuous suction. Patient tolerated dressing change without difficulty. Informed house staff, Dr Ken Bernabe of the same.   Patient refused Envella bed, stating he thought he was receiving an air mattress and could not move in the Envella during previous admission. Reinforced with patient current bed he is on is a pressure redistribution mattress. Heel protectors remain on to offload heels.

## 2018-07-10 NOTE — ADVANCED PRACTICE NURSE CONSULT - REASON FOR CONSULT
WOC nurse follow up to apply NPWT/VAC dressing to sacral and left ischial tuberosity (IT) stage 4 pressure ulcers (injuries).

## 2018-07-10 NOTE — DISCHARGE NOTE ADULT - [id number]_GEN_ALL_CORE
rehabilitation services/observation and assessment/teaching and training/medication teaching and assessment/ostomy care and management/wound care and assessment

## 2018-07-10 NOTE — DISCHARGE NOTE ADULT - SECONDARY DIAGNOSIS.
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 Skin ulcer of buttock, with necrosis of bone Anemia, unspecified type Colostomy present Hyponatremia Paraplegia

## 2018-07-10 NOTE — PROGRESS NOTE ADULT - SUBJECTIVE AND OBJECTIVE BOX
Seen in the morning, no shortness of breath, no fever, no chest pain. Overnight no fever,    Patient seen and examined  Na @ 139      atorvastatin 20 milliGRAM(s) at bedtime  famotidine    Tablet 20 milliGRAM(s) daily  heparin  Injectable 5000 Unit(s) every 8 hours  insulin lispro (HumaLOG) corrective regimen sliding scale   Before meals and at bedtime  iohexol 300 mG (iodine)/mL Oral Solution 30 milliLiter(s) once  levETIRAcetam 500 milliGRAM(s) two times a day  meropenem  IVPB 1000 milliGRAM(s) every 8 hours  oxybutynin 5 milliGRAM(s) every 8 hours  polyethylene glycol 3350 17 Gram(s) daily  propranolol 40 milliGRAM(s) every 8 hours  senna 2 Tablet(s) every 12 hours  vancomycin  IVPB 1750 milliGRAM(s) every 12 hours      Allergies    Capzasin Back and Body (Unknown)    Intolerances        T(C): , Max: 37.6 (07-07-18 @ 12:35)  T(F): , Max: 99.7 (07-07-18 @ 12:35)  HR: 105 (07-08-18 @ 09:09)  BP: 85/52 (07-08-18 @ 09:09)  BP(mean): --  RR: 17 (07-08-18 @ 09:09)  SpO2: 99% (07-08-18 @ 09:09)  Wt(kg): --    07-07 @ 07:01  -  07-08 @ 07:00  --------------------------------------------------------  IN:  Total IN: 0 mL    OUT:    Indwelling Catheter - Suprapubic: 3425 mL    Voided: 3200 mL  Total OUT: 6625 mL    Total NET: -6625 mL      Physical exam:   Alert and oriented   NO JVD   Normal air entry into the lungs, no wheezing, no crackles  RRR, normal s1/2, no murmurs, rubs or gallops   Abdomen - soft, not tener, s/p laparotomy, colostomy, suprapubic catheter   Extremities: mild peripheral edema      Height (cm): 170.18 (07-07 @ 12:35)  Weight (kg): 81.6 (07-07 @ 12:35)  BMI (kg/m2): 28.2 (07-07 @ 12:35)  BSA (m2): 1.93 (07-07 @ 12:35)      LABS:                        7.1    9.8   )-----------( 308      ( 08 Jul 2018 08:28 )             25.2     07-08    132<L>  |  95<L>  |  7   ----------------------------<  170<H>  3.6   |  21<L>  |  0.63    Ca    8.1<L>      08 Jul 2018 08:28  Mg     1.6     07-08    TPro  6.3  /  Alb  2.3<L>  /  TBili  1.0  /  DBili  x   /  AST  6<L>  /  ALT  6<L>  /  AlkPhos  113  07-07    Osmolality, Serum: 273 mosm/kg <L> [280 - 301] (07-07 @ 15:35)    PT/INR - ( 07 Jul 2018 06:39 )   PT: 19.9 sec;   INR: 1.77          PTT - ( 07 Jul 2018 06:39 )  PTT:30.6 sec  Urinalysis Basic - ( 07 Jul 2018 07:25 )    Color: Yellow / Appearance: Clear / SG: <=1.005 / pH: x  Gluc: x / Ketone: NEGATIVE  / Bili: Negative / Urobili: 0.2 E.U./dL   Blood: x / Protein: NEGATIVE mg/dL / Nitrite: POSITIVE   Leuk Esterase: Large / RBC: < 5 /HPF / WBC Many /HPF   Sq Epi: x / Non Sq Epi: 0-5 /HPF / Bacteria: Many /HPF      Osmolality, Random Urine: 243 mosmol/kg [100 - 650] (07-07 @ 15:00)  Sodium, Random Urine: 26 mmol/L (07-07 @ 15:00)        RADIOLOGY & ADDITIONAL STUDIES:

## 2018-07-10 NOTE — DISCHARGE NOTE ADULT - CARE PROVIDER_API CALL
Ford [facility]  [address] [place] [address]  Phone: [phone]  Fax: (   )    -    Agustin Snell  [address] [place] [address]  Phone: [phone]  Fax: (   )    -    Shanta Simental  Psychiatry    [address] [facility] [address]  [place] [address]  Phone: [phone]  Fax: (   )    - [facility]  [address] [place] [address]  Phone: [phone]  Fax: (   )    -    Agustin Snell  [address] [place] [address]  Phone: [phone]  Fax: (   )    -    Shanta Simental (psychiatry)  Phone: [phone]  Fax: (   )    -

## 2018-07-10 NOTE — DISCHARGE NOTE ADULT - MEDICATION SUMMARY - MEDICATIONS TO STOP TAKING
I will STOP taking the medications listed below when I get home from the hospital:    haloperidol 10 mg oral tablet  -- 1 tab(s) by mouth once a day (at bedtime) I will STOP taking the medications listed below when I get home from the hospital:    haloperidol 10 mg oral tablet  -- 1 tab(s) by mouth once a day (at bedtime)    propranolol 40 mg oral tablet  -- 1 tab(s) by mouth 3 times a day

## 2018-07-10 NOTE — DISCHARGE NOTE ADULT - CARE PLAN
Principal Discharge DX:	Urinary tract infection associated with indwelling urethral catheter, sequela  Goal:	Please see as below  Assessment and plan of treatment:	You were diagnosed with pseudomonas aeruginous related to suprapubic catheter placement. You received merropenem and vancomycin for 2 days and was switched to zosyn for 1 day. Will complete ciprofloxacin oral until a total of 7 days of medication is taken. Please continue with Dr. Snell every 2 weeks with catheter changes.  Secondary Diagnosis:	Colostomy present  Goal:	Please see as below  Assessment and plan of treatment:	Please have ostomy tube cleaned every 3 days. Please follow with primary care if there is any problems.  #Gastrointestinal Ileus  You have a diagnosis of gastric ileus. Please continue taking senna 5 mg every 12 hours.  please continue pepcid 20 mg daily.  Secondary Diagnosis:	Hyponatremia  Goal:	Please see as below  Assessment and plan of treatment:	You had decreased sodium, that resolved with fluids and good food intake. Please continue adequate hydration and food input.  Secondary Diagnosis:	Paraplegia  Goal:	Please see as below  Assessment and plan of treatment:	Please continue health care regimen, please continue treatments as written.  #Seizures  please continue taking atorvastatin 20 mg daily, please continue taking propranolol 40 mg three times a day  atorvastatin 20 mg daily.  Secondary Diagnosis:	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  Goal:	Please see as below  Assessment and plan of treatment:	You had a sacral ulcer, please irrigate with normal saline, apply Cavilon liquid skin barrier to periwound, pack lightly Aquacel Extra, cover with foam dressing every other day or prn if soiled or wet.  Secondary Diagnosis:	Skin ulcer of buttock, with necrosis of bone  Goal:	Please see as below  Assessment and plan of treatment:	You had a sacral ulcer, please irrigate with normal saline, apply Cavilon liquid skin barrier to periwound, pack lightly Aquacel Extra, cover with foam dressing every other day or prn if soiled or wet.  Secondary Diagnosis:	Anemia, unspecified type  Goal:	Please see as below  Assessment and plan of treatment:	You have a diagnosis of anemia, please continue with good oral intake and multivitamin.  #Genitourinary  Please continue taking oxybutynin 5 mg three times a day  # Type 2 diabetes please continue glimepiride 1 mg daily and glucophage 1000 mg every 12 hours. Principal Discharge DX:	Urinary tract infection associated with indwelling urethral catheter, sequela  Goal:	Please see as below  Assessment and plan of treatment:	You were diagnosed with pseudomonas aeruginous related to suprapubic catheter placement. You received merropenem and vancomycin for 2 days and was switched to zosyn for 1 day. Will complete ciprofloxacin oral until a total of 7 days of medication is taken until 7/14. Please continue with Dr. Snell every 2 weeks with catheter changes. Please try and make Dr. Christianson appointment  Secondary Diagnosis:	Colostomy present  Goal:	Please see as below  Assessment and plan of treatment:	Please have ostomy tube cleaned every 3 days. Please follow with primary care if there is any problems.  #Gastrointestinal Ileus  You have a diagnosis of gastric ileus. Please continue taking senna 5 mg every 12 hours.  please continue pepcid 20 mg daily.  Secondary Diagnosis:	Hyponatremia  Goal:	Please see as below  Assessment and plan of treatment:	You had decreased sodium, that resolved with fluids and good food intake. Please continue adequate hydration and food input.  Secondary Diagnosis:	Paraplegia  Goal:	Please see as below  Assessment and plan of treatment:	Please continue health care regimen, please continue treatments as written.  #Seizures  please continue taking atorvastatin 20 mg daily, please continue taking propranolol 40 mg three times a day  atorvastatin 20 mg daily.  Secondary Diagnosis:	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  Goal:	Please see as below  Assessment and plan of treatment:	You had a sacral ulcer, please irrigate with normal saline, apply Cavilon liquid skin barrier to periwound, pack lightly Aquacel Extra, cover with foam dressing every other day or prn if soiled or wet.  Secondary Diagnosis:	Skin ulcer of buttock, with necrosis of bone  Goal:	Please see as below  Assessment and plan of treatment:	You had a sacral ulcer, please irrigate with normal saline, apply Cavilon liquid skin barrier to periwound, pack lightly Aquacel Extra, cover with foam dressing every other day or prn if soiled or wet.  Secondary Diagnosis:	Anemia, unspecified type  Goal:	Please see as below  Assessment and plan of treatment:	You have a diagnosis of anemia, please continue with good oral intake and multivitamin.  #Genitourinary  Please continue taking oxybutynin 5 mg three times a day  # Type 2 diabetes please continue glimepiride 1 mg daily and glucophage 1000 mg every 12 hours. Principal Discharge DX:	Urinary tract infection associated with indwelling urethral catheter, sequela  Goal:	Please see as below  Assessment and plan of treatment:	You were diagnosed with pseudomonas aeruginous related to suprapubic catheter placement. You received meropenem and vancomycin for 2 days and was switched to zosyn for 1 day. Will complete ciprofloxacin oral until a total of 7 days of medication is taken until 7/14. Please continue with Dr. Snell every 2 weeks with catheter changes. Please try and make Dr. Christianson appointment  Secondary Diagnosis:	Colostomy present  Goal:	Please see as below  Assessment and plan of treatment:	Please have ostomy tube cleaned every 3 days. Please follow with primary care if there is any problems.  #Gastrointestinal Ileus  You have a diagnosis of gastric ileus. Please continue taking senna 5 mg every 12 hours.  please continue pepcid 20 mg daily.  Secondary Diagnosis:	Hyponatremia  Goal:	Please see as below  Assessment and plan of treatment:	You had decreased sodium, that resolved with fluids and good food intake. Please continue adequate hydration and food input.  Secondary Diagnosis:	Paraplegia  Goal:	Please see as below  Assessment and plan of treatment:	Please continue health care regimen, please continue treatments as written.  #Seizures  please continue taking atorvastatin 20 mg daily, please continue taking propranolol 40 mg three times a day. Please also continue to take atorvastatin 20 mg daily.  Secondary Diagnosis:	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  Goal:	Please see as below  Assessment and plan of treatment:	You had a sacral ulcer, please irrigate with normal saline, apply Cavilon liquid skin barrier to periwound, pack lightly Aquacel Extra, cover with foam dressing every other day or prn if soiled or wet.  Secondary Diagnosis:	Skin ulcer of buttock, with necrosis of bone  Goal:	Please see as below  Assessment and plan of treatment:	You had a sacral ulcer, please irrigate with normal saline, apply Cavilon liquid skin barrier to periwound, pack lightly Aquacel Extra, cover with foam dressing every other day or prn if soiled or wet.  Secondary Diagnosis:	Anemia, unspecified type  Goal:	Please see as below  Assessment and plan of treatment:	You have a diagnosis of anemia, please continue with good oral intake and multivitamin.  #Genitourinary  Please continue taking oxybutynin 5 mg three times a day  # Type 2 diabetes please continue glimepiride 1 mg daily and glucophage 1000 mg every 12 hours. Principal Discharge DX:	Urinary tract infection associated with indwelling urethral catheter, sequela  Goal:	Please see as below  Assessment and plan of treatment:	You were diagnosed with pseudomonas aeruginous related to suprapubic catheter placement. You received meropenem and vancomycin for 2 days and was switched to zosyn for 1 day. Will complete ciprofloxacin oral until a total of 7 days of medication is taken until 7/14. Please continue with Dr. Snell every 2 weeks with catheter changes. Please try and make Dr. Christianson appointment  Secondary Diagnosis:	Colostomy present  Goal:	Please see as below  Assessment and plan of treatment:	Please have ostomy tube cleaned every 3 days. Please follow with primary care if there is any problems.  #Gastrointestinal Ileus  You have a diagnosis of gastric ileus. Please continue taking senna 5 mg every 12 hours.  please continue pepcid 20 mg daily.  Secondary Diagnosis:	Hyponatremia  Goal:	Please see as below  Assessment and plan of treatment:	You had decreased sodium, that resolved with fluids and good food intake. Please continue adequate hydration and food input.  Secondary Diagnosis:	Paraplegia  Goal:	Please see as below  Assessment and plan of treatment:	Please continue health care regimen, please continue treatments as written.  #Seizures  please continue taking atorvastatin 20 mg daily, please continue taking propranolol 40 mg three times a day. Please also continue to take atorvastatin 20 mg daily.  Secondary Diagnosis:	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  Goal:	Please see as below  Assessment and plan of treatment:	You had a sacral ulcer, please irrigate with normal saline, apply Cavilon liquid skin barrier to periwound, pack lightly Aquacel Extra, cover with foam dressing every other day or prn if soiled or wet. Follow up with wound care at home.  Secondary Diagnosis:	Skin ulcer of buttock, with necrosis of bone  Goal:	Please see as below  Assessment and plan of treatment:	You had a sacral ulcer, please irrigate with normal saline, apply Cavilon liquid skin barrier to periwound, pack lightly Aquacel Extra, cover with foam dressing every other day or prn if soiled or wet.  Secondary Diagnosis:	Anemia, unspecified type  Goal:	Please see as below  Assessment and plan of treatment:	You have a diagnosis of anemia, please continue with good oral intake and multivitamin.  #Genitourinary  Please continue taking oxybutynin 5 mg three times a day  # Type 2 diabetes please continue glimepiride 1 mg daily and glucophage 1000 mg every 12 hours. Principal Discharge DX:	Urinary tract infection associated with indwelling urethral catheter, sequela  Goal:	Please see as below  Assessment and plan of treatment:	Present on admission. You were diagnosed with pseudomonas aeruginous related to suprapubic catheter placement.  You completed 10 day course of antibiotics. Please continue with Dr. Snell every 2 weeks with catheter changes. Please try and make Dr. Christianson appointment  Secondary Diagnosis:	Colostomy present  Goal:	Please see as below  Assessment and plan of treatment:	Please have ostomy tube cleaned every 3 days. Please follow with primary care if there is any problems.  #Gastrointestinal Ileus  You have a diagnosis of gastric ileus. Please continue taking senna 5 mg every 12 hours.  please continue pepcid 20 mg daily.  Secondary Diagnosis:	Hyponatremia  Goal:	Please see as below  Assessment and plan of treatment:	You had decreased sodium, that resolved with fluids and good food intake. Please continue adequate hydration and food input.  Secondary Diagnosis:	Paraplegia  Goal:	Please see as below  Assessment and plan of treatment:	Please continue health care regimen, please continue treatments as written.  Secondary Diagnosis:	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  Goal:	Please see as below  Assessment and plan of treatment:	You had a sacral ulcer, please irrigate with normal saline, apply Cavilon liquid skin barrier to periwound, pack lightly Aquacel Extra, cover with foam dressing every other day or prn if soiled or wet. Follow up with wound care at home.  Secondary Diagnosis:	Skin ulcer of buttock, with necrosis of bone  Goal:	Please see as below  Assessment and plan of treatment:	You had a sacral ulcer, please irrigate with normal saline, apply Cavilon liquid skin barrier to periwound, pack lightly Aquacel Extra, cover with foam dressing every other day or prn if soiled or wet.  Secondary Diagnosis:	Anemia, unspecified type  Goal:	Please see as below  Assessment and plan of treatment:	You have a diagnosis of anemia, please continue with good oral intake and multivitamin.  #Genitourinary  Please continue taking oxybutynin 5 mg three times a day  # Type 2 diabetes please continue glimepiride 1 mg daily and glucophage 1000 mg every 12 hours.

## 2018-07-10 NOTE — PROVIDER CONTACT NOTE (CRITICAL VALUE NOTIFICATION) - SITUATION
Pt is 32 year old male presented to ED with chills, weakness and tachycardia, found to be septic. UTI vs pressure ulcers

## 2018-07-10 NOTE — PROGRESS NOTE ADULT - ATTENDING COMMENTS
Pt seen and examined by me at bedside earlier in AM. Agree with housestaff's exam/a/p as noted above   VSS, exam as above  labs reviewed.    1.Sepsis poa 2/2 likely chronic SPC associated UTI: sepsis resolved- c/w cipro (total of 7days).   2. Anemia, know hx of anemia-low suspicion for acute blood loss, transfuse 1uPRBC.  3. hx of schizoaffective d/o: stable, obtain psych consult.   4. Stage IV sacral wound poa: as per wound RN, wound clean, to place on wound vac    Agree with rest of a/p as above  Dispo: d/c planning.

## 2018-07-10 NOTE — DISCHARGE NOTE ADULT - PLAN OF CARE
Please see as below You had a sacral ulcer, please irrigate with normal saline, apply Cavilon liquid skin barrier to periwound, pack lightly Aquacel Extra, cover with foam dressing every other day or prn if soiled or wet. You have a diagnosis of anemia, please continue with good oral intake and multivitamin.  #Genitourinary  Please continue taking oxybutynin 5 mg three times a day  # Type 2 diabetes please continue glimepiride 1 mg daily and glucophage 1000 mg every 12 hours. You were diagnosed with pseudomonas aeruginous related to suprapubic catheter placement. You received merropenem and vancomycin for 2 days and was switched to zosyn for 1 day. Will complete ciprofloxacin oral until a total of 7 days of medication is taken. Please continue with Dr. Snell every 2 weeks with catheter changes. Please have ostomy tube cleaned every 3 days. Please follow with primary care if there is any problems.  #Gastrointestinal Ileus  You have a diagnosis of gastric ileus. Please continue taking senna 5 mg every 12 hours.  please continue pepcid 20 mg daily. You had decreased sodium, that resolved with fluids and good food intake. Please continue adequate hydration and food input. Please continue health care regimen, please continue treatments as written.  #Seizures  please continue taking atorvastatin 20 mg daily, please continue taking propranolol 40 mg three times a day  atorvastatin 20 mg daily. You were diagnosed with pseudomonas aeruginous related to suprapubic catheter placement. You received merropenem and vancomycin for 2 days and was switched to zosyn for 1 day. Will complete ciprofloxacin oral until a total of 7 days of medication is taken until 7/14. Please continue with Dr. Snell every 2 weeks with catheter changes. Please try and make Dr. Christianson appointment You were diagnosed with pseudomonas aeruginous related to suprapubic catheter placement. You received meropenem and vancomycin for 2 days and was switched to zosyn for 1 day. Will complete ciprofloxacin oral until a total of 7 days of medication is taken until 7/14. Please continue with Dr. Snell every 2 weeks with catheter changes. Please try and make Dr. Christianson appointment Please continue health care regimen, please continue treatments as written.  #Seizures  please continue taking atorvastatin 20 mg daily, please continue taking propranolol 40 mg three times a day. Please also continue to take atorvastatin 20 mg daily. You had a sacral ulcer, please irrigate with normal saline, apply Cavilon liquid skin barrier to periwound, pack lightly Aquacel Extra, cover with foam dressing every other day or prn if soiled or wet. Follow up with wound care at home. Present on admission. You were diagnosed with pseudomonas aeruginous related to suprapubic catheter placement.  You completed 10 day course of antibiotics. Please continue with Dr. Snell every 2 weeks with catheter changes. Please try and make Dr. Christianson appointment Please continue health care regimen, please continue treatments as written.

## 2018-07-11 LAB
ANION GAP SERPL CALC-SCNC: 11 MMOL/L — SIGNIFICANT CHANGE UP (ref 5–17)
BUN SERPL-MCNC: 6 MG/DL — LOW (ref 7–23)
CALCIUM SERPL-MCNC: 8.6 MG/DL — SIGNIFICANT CHANGE UP (ref 8.4–10.5)
CHLORIDE SERPL-SCNC: 100 MMOL/L — SIGNIFICANT CHANGE UP (ref 96–108)
CO2 SERPL-SCNC: 27 MMOL/L — SIGNIFICANT CHANGE UP (ref 22–31)
CREAT SERPL-MCNC: 0.55 MG/DL — SIGNIFICANT CHANGE UP (ref 0.5–1.3)
GLUCOSE BLDC GLUCOMTR-MCNC: 108 MG/DL — HIGH (ref 70–99)
GLUCOSE BLDC GLUCOMTR-MCNC: 111 MG/DL — HIGH (ref 70–99)
GLUCOSE BLDC GLUCOMTR-MCNC: 151 MG/DL — HIGH (ref 70–99)
GLUCOSE BLDC GLUCOMTR-MCNC: 199 MG/DL — HIGH (ref 70–99)
GLUCOSE SERPL-MCNC: 163 MG/DL — HIGH (ref 70–99)
HCT VFR BLD CALC: 31.4 % — LOW (ref 39–50)
HGB BLD-MCNC: 8.5 G/DL — LOW (ref 13–17)
MAGNESIUM SERPL-MCNC: 1.9 MG/DL — SIGNIFICANT CHANGE UP (ref 1.6–2.6)
MCHC RBC-ENTMCNC: 20.8 PG — LOW (ref 27–34)
MCHC RBC-ENTMCNC: 27.1 G/DL — LOW (ref 32–36)
MCV RBC AUTO: 77 FL — LOW (ref 80–100)
PLATELET # BLD AUTO: 337 K/UL — SIGNIFICANT CHANGE UP (ref 150–400)
POTASSIUM SERPL-MCNC: 4.4 MMOL/L — SIGNIFICANT CHANGE UP (ref 3.5–5.3)
POTASSIUM SERPL-SCNC: 4.4 MMOL/L — SIGNIFICANT CHANGE UP (ref 3.5–5.3)
RBC # BLD: 4.08 M/UL — LOW (ref 4.2–5.8)
RBC # FLD: 19.7 % — HIGH (ref 10.3–16.9)
SODIUM SERPL-SCNC: 138 MMOL/L — SIGNIFICANT CHANGE UP (ref 135–145)
WBC # BLD: 7 K/UL — SIGNIFICANT CHANGE UP (ref 3.8–10.5)
WBC # FLD AUTO: 7 K/UL — SIGNIFICANT CHANGE UP (ref 3.8–10.5)

## 2018-07-11 PROCEDURE — 99233 SBSQ HOSP IP/OBS HIGH 50: CPT | Mod: GC

## 2018-07-11 PROCEDURE — 99233 SBSQ HOSP IP/OBS HIGH 50: CPT

## 2018-07-11 PROCEDURE — 71045 X-RAY EXAM CHEST 1 VIEW: CPT | Mod: 26

## 2018-07-11 RX ORDER — SODIUM CHLORIDE 9 MG/ML
500 INJECTION INTRAMUSCULAR; INTRAVENOUS; SUBCUTANEOUS ONCE
Qty: 0 | Refills: 0 | Status: COMPLETED | OUTPATIENT
Start: 2018-07-11 | End: 2018-07-11

## 2018-07-11 RX ADMIN — Medication 5 MILLIGRAM(S): at 01:50

## 2018-07-11 RX ADMIN — Medication 40 MILLIGRAM(S): at 17:15

## 2018-07-11 RX ADMIN — HEPARIN SODIUM 5000 UNIT(S): 5000 INJECTION INTRAVENOUS; SUBCUTANEOUS at 13:24

## 2018-07-11 RX ADMIN — ATORVASTATIN CALCIUM 20 MILLIGRAM(S): 80 TABLET, FILM COATED ORAL at 21:42

## 2018-07-11 RX ADMIN — SENNA PLUS 2 TABLET(S): 8.6 TABLET ORAL at 17:15

## 2018-07-11 RX ADMIN — Medication 40 MILLIGRAM(S): at 10:10

## 2018-07-11 RX ADMIN — Medication 2: at 13:24

## 2018-07-11 RX ADMIN — LEVETIRACETAM 500 MILLIGRAM(S): 250 TABLET, FILM COATED ORAL at 07:02

## 2018-07-11 RX ADMIN — HEPARIN SODIUM 5000 UNIT(S): 5000 INJECTION INTRAVENOUS; SUBCUTANEOUS at 07:02

## 2018-07-11 RX ADMIN — LEVETIRACETAM 500 MILLIGRAM(S): 250 TABLET, FILM COATED ORAL at 17:15

## 2018-07-11 RX ADMIN — Medication 5 MILLIGRAM(S): at 17:15

## 2018-07-11 RX ADMIN — SODIUM CHLORIDE 1500 MILLILITER(S): 9 INJECTION INTRAMUSCULAR; INTRAVENOUS; SUBCUTANEOUS at 21:42

## 2018-07-11 RX ADMIN — FAMOTIDINE 20 MILLIGRAM(S): 10 INJECTION INTRAVENOUS at 12:20

## 2018-07-11 RX ADMIN — POLYETHYLENE GLYCOL 3350 17 GRAM(S): 17 POWDER, FOR SOLUTION ORAL at 12:20

## 2018-07-11 RX ADMIN — HEPARIN SODIUM 5000 UNIT(S): 5000 INJECTION INTRAVENOUS; SUBCUTANEOUS at 21:42

## 2018-07-11 RX ADMIN — Medication 500 MILLIGRAM(S): at 07:01

## 2018-07-11 RX ADMIN — Medication 500 MILLIGRAM(S): at 17:15

## 2018-07-11 RX ADMIN — SENNA PLUS 2 TABLET(S): 8.6 TABLET ORAL at 07:02

## 2018-07-11 RX ADMIN — Medication 5 MILLIGRAM(S): at 10:09

## 2018-07-11 RX ADMIN — Medication 2: at 22:14

## 2018-07-11 NOTE — PROGRESS NOTE ADULT - ATTENDING COMMENTS
as above   pt improving clinically and biochemically.   na improved    continue to allow further stabilization with observation of po fluid intake and cont'd nutrition.

## 2018-07-11 NOTE — PROGRESS NOTE ADULT - SUBJECTIVE AND OBJECTIVE BOX
Overnight blood pressure was stable at 100s/60s, patient resting comfortably and eating well. States he does not want to see psychiatrist currently, will continue trying to ask so it can help with [facility] placement. Patient is insistent on placement at [facility], but that is highly unlikely and need to discuss realistic goals.    REVIEW OF SYSTEMS:  CONSTITUTIONAL: No fever, weight loss, or fatigue  EYES: No eye pain, visual disturbances, or discharge  ENMT:  No difficulty hearing, tinnitus, vertigo; No sinus or throat pain  NECK: No pain or stiffness  BREASTS: No pain, masses, or nipple discharge  RESPIRATORY: No cough, wheezing, chills or hemoptysis; No shortness of breath  CARDIOVASCULAR: No chest pain, palpitations, dizziness, or leg swelling  GASTROINTESTINAL: Colostomy tube in place, last changed Friday, due for today.   GENITOURINARY: suprapubic catheter in place.   NEUROLOGICAL: No headaches, memory loss, loss of strength, numbness, or tremors  SKIN: No itching, burning, rashes, or lesions   LYMPH NODES: No enlarged glands  ENDOCRINE: No heat or cold intolerance; No hair loss  MUSCULOSKELETAL: No joint pain or swelling;   Large sacral decubitus ulcer, left IT ulcer, right heel tendon exposed from pressure. no purulence or drainage, reaches towards bone.   PSYCHIATRIC: hx of psychiatric illness  HEME/LYMPH: No easy bruising, or bleeding gums  ALLERY AND IMMUNOLOGIC: No hives or eczema    Vital Signs Last 24 Hrs  T(C): 36.6 (10 Jul 2018 05:16), Max: 37.1 (09 Jul 2018 16:41)  T(F): 97.9 (10 Jul 2018 05:16), Max: 98.7 (09 Jul 2018 16:41)  HR: 73 (10 Jul 2018 05:16) (73 - 89)  BP: 96/62 (10 Jul 2018 05:16) (94/60 - 104/72)  BP(mean): --  RR: 18 (10 Jul 2018 05:16) (17 - 18)  SpO2: 99% (10 Jul 2018 05:16) (98% - 99%)    PHYSICAL EXAM:  	Constitutional: resting comfortably in bed; NAD  	HEENT: NC/AT, PERRL, EOMI, anicteric sclera, dry mucosa  	Respiratory: CTA B/L  	Cardiac: +S1/S2; RRR; no M/R/G  	Gastrointestinal: distended abdomen,   	Genitourinary: no bowles, has suprapubic catheter  	Back: stage 4 sacral ulcer, no purulence or erythema. reaches bone, stage 4 left IT ulcer, right heel ulcer. Non erythematous, no exudate, nontender.   	Extremities: no edema,   	Musculoskeletal: no joint swelling, tenderness or erythema  	Neurologic: AAOx3; CNII-XII grossly intact; no focal deficits  Psychiatric: hx schizoaffective disorder           LABS:               .  LABS:                         8.0    6.1   )-----------( 346      ( 10 Jul 2018 16:24 )             28.3     07-10    139  |  102  |  5<L>  ----------------------------<  105<H>  4.4   |  26  |  0.68    Ca    8.7      10 Jul 2018 06:20  Mg     1.7     07-10                    RADIOLOGY, EKG & ADDITIONAL TESTS: Reviewed. Hospital Course:  32y Male with a PMHx of spinal cord injury with paraplegia (dx in 2011; pt unable to recollect inciting event- per chart review, patient fell off window and woke up 2 weeks later at [facility] paralyzed) w/ chronic suprapubic catheter, chronic sacral decubitus ulcers w/ recurrent OM (hx of citrobacter, enterococcus, klebsiella), ileus s/p colostomy for fecal incontinence (performed to prevent further fecal contamination of sacral decubitus ulcers; performed 2/2018), C diff colitis (Jan 2018), DMT2, Hep B, schizoaffective disorder that was BIBEMS for palpitations and weakness since 3 am. Patient can not recollect any inciting event for his symptoms.  He has a wound vac over his sacral decubitus ulcer which was placed in June and is functioning without issue. Pt's suprapubic catheter is managed by Dr Snell at [facility], last changed two weeks ago. His ostomy output has been stable.   During this hospital course, patient was found to be septic in ED (HR 90s, BP systolic 85) due to UTI of pseudomonas. His heart rate and blood pressure improved after vanc/zosyn and fluid boluses. He also had a hemoglobin of 6.5 on admission, which improved to 7.3 after 1 unit pRBC (likely due to malabsorption). Vanc/zosyn was transitioned to oral cipro after urine cultures came back pseudomonas.  He had wound evaluation of sacral and left ischial tuberosity 7/9/18 and the site appeared clean, nonodorous, and without discharge. Acute osteomyelitis was less likely as cause of sepsis. He will have wound vac placed and specialty bed as well per wound nurse. Hemoglobin on 7/10 was 6.8 which patient will receive 1 unit pRBC. He would like [facility] home placement.        Overnight blood pressure was stable at 100s/60s, patient resting comfortably and eating well. States he does not want to see psychiatrist currently, will continue trying to ask so it can help with MAURIL[facility] placement. Patient is insistent on placement at [facility], but that is highly unlikely and need to discuss realistic goals.    REVIEW OF SYSTEMS:  CONSTITUTIONAL: No fever, weight loss, or fatigue  EYES: No eye pain, visual disturbances, or discharge  ENMT:  No difficulty hearing, tinnitus, vertigo; No sinus or throat pain  NECK: No pain or stiffness  BREASTS: No pain, masses, or nipple discharge  RESPIRATORY: No cough, wheezing, chills or hemoptysis; No shortness of breath  CARDIOVASCULAR: No chest pain, palpitations, dizziness, or leg swelling  GASTROINTESTINAL: Colostomy tube in place, last changed Friday, due for today.   GENITOURINARY: suprapubic catheter in place.   NEUROLOGICAL: No headaches, memory loss, loss of strength, numbness, or tremors  SKIN: No itching, burning, rashes, or lesions   LYMPH NODES: No enlarged glands  ENDOCRINE: No heat or cold intolerance; No hair loss  MUSCULOSKELETAL: No joint pain or swelling;   Large sacral decubitus ulcer, left IT ulcer, right heel tendon exposed from pressure. no purulence or drainage, reaches towards bone.   PSYCHIATRIC: hx of psychiatric illness  HEME/LYMPH: No easy bruising, or bleeding gums  ALLERY AND IMMUNOLOGIC: No hives or eczema    Vital Signs Last 24 Hrs  T(C): 36.6 (10 Jul 2018 05:16), Max: 37.1 (09 Jul 2018 16:41)  T(F): 97.9 (10 Jul 2018 05:16), Max: 98.7 (09 Jul 2018 16:41)  HR: 73 (10 Jul 2018 05:16) (73 - 89)  BP: 96/62 (10 Jul 2018 05:16) (94/60 - 104/72)  BP(mean): --  RR: 18 (10 Jul 2018 05:16) (17 - 18)  SpO2: 99% (10 Jul 2018 05:16) (98% - 99%)    PHYSICAL EXAM:  	Constitutional: resting comfortably in bed; NAD  	HEENT: NC/AT, PERRL, EOMI, anicteric sclera, dry mucosa  	Respiratory: CTA B/L  	Cardiac: +S1/S2; RRR; no M/R/G  	Gastrointestinal: distended abdomen,   	Genitourinary: no bowles, has suprapubic catheter  	Back: stage 4 sacral ulcer, no purulence or erythema. reaches bone, stage 4 left IT ulcer, right heel ulcer. Non erythematous, no exudate, nontender.   	Extremities: no edema,   	Musculoskeletal: no joint swelling, tenderness or erythema  	Neurologic: AAOx3; CNII-XII grossly intact; no focal deficits  Psychiatric: hx schizoaffective disorder           LABS:               .  LABS:                         8.0    6.1   )-----------( 346      ( 10 Jul 2018 16:24 )             28.3     07-10    139  |  102  |  5<L>  ----------------------------<  105<H>  4.4   |  26  |  0.68    Ca    8.7      10 Jul 2018 06:20  Mg     1.7     07-10                    RADIOLOGY, EKG & ADDITIONAL TESTS: Reviewed.

## 2018-07-11 NOTE — PROGRESS NOTE ADULT - ATTENDING COMMENTS
Patient seen and examined at 9 AM.     Agree with exam and plan as above.     Patient prefers placement at [facility], although he has [facility] Medicaid and [facility] is in [place]. Application sent out. Spoke with JUAN PABLO, patient accepted to 5 other facilities, also has 3 new home agencies. Patient is amenable to other sites if he does not get in to [facility]. Also has meeting with  today for JUAN PABLO ZIMMER to call and speak with .     Patient feels well and has no complaints. Would like to be in a place where he can rehab and eventually self-cath.     A/P: 32 M w/ spinal cord injury, suprapubic cath p/w sepsis 2/2 UTI and anemia.     1. Sepsis: resolved, c/w cipro for Pseudomonas, f/u urology recs  2. Anemia: of chronic disease, s/p RBC, trend H/H, Hgb stable at 8 today  3. Schizoaffective: no signs of active schizophrenia, f/u psych  4. Wound care: multiple ulcers and sacral decub: f/u wound care recs    I was physically present for the key portions of the evaluation and management (E/M) service provided.  I agree with the above history, physical, and plan which I have reviewed and edited where appropriate.     45 minutes spent on total encounter; more than 50% of the visit was spent counseling and/or coordinating care by the attending physician.     Plan discussed with Primary Team.

## 2018-07-11 NOTE — PROGRESS NOTE ADULT - SUBJECTIVE AND OBJECTIVE BOX
Seen in the morning, no shortness of breath, no fever, no chest pain. Overnight no fever,    Patient seen and examined  Na @ 138  no acute events      atorvastatin 20 milliGRAM(s) at bedtime  famotidine    Tablet 20 milliGRAM(s) daily  heparin  Injectable 5000 Unit(s) every 8 hours  insulin lispro (HumaLOG) corrective regimen sliding scale   Before meals and at bedtime  iohexol 300 mG (iodine)/mL Oral Solution 30 milliLiter(s) once  levETIRAcetam 500 milliGRAM(s) two times a day  meropenem  IVPB 1000 milliGRAM(s) every 8 hours  oxybutynin 5 milliGRAM(s) every 8 hours  polyethylene glycol 3350 17 Gram(s) daily  propranolol 40 milliGRAM(s) every 8 hours  senna 2 Tablet(s) every 12 hours  vancomycin  IVPB 1750 milliGRAM(s) every 12 hours      Allergies    Capzasin Back and Body (Unknown)    Intolerances        T(C): , Max: 37.6 (07-07-18 @ 12:35)  T(F): , Max: 99.7 (07-07-18 @ 12:35)  HR: 105 (07-08-18 @ 09:09)  BP: 85/52 (07-08-18 @ 09:09)  BP(mean): --  RR: 17 (07-08-18 @ 09:09)  SpO2: 99% (07-08-18 @ 09:09)  Wt(kg): --    07-07 @ 07:01  -  07-08 @ 07:00  --------------------------------------------------------  IN:  Total IN: 0 mL    OUT:    Indwelling Catheter - Suprapubic: 3425 mL    Voided: 3200 mL  Total OUT: 6625 mL    Total NET: -6625 mL      Physical exam:   Alert and oriented   NO JVD   Normal air entry into the lungs, no wheezing, no crackles  RRR, normal s1/2, no murmurs, rubs or gallops   Abdomen - soft, not tener, s/p laparotomy, colostomy, suprapubic catheter   Extremities: mild peripheral edema      Height (cm): 170.18 (07-07 @ 12:35)  Weight (kg): 81.6 (07-07 @ 12:35)  BMI (kg/m2): 28.2 (07-07 @ 12:35)  BSA (m2): 1.93 (07-07 @ 12:35)      LABS:                        7.1    9.8   )-----------( 308      ( 08 Jul 2018 08:28 )             25.2     07-08    132<L>  |  95<L>  |  7   ----------------------------<  170<H>  3.6   |  21<L>  |  0.63    Ca    8.1<L>      08 Jul 2018 08:28  Mg     1.6     07-08    TPro  6.3  /  Alb  2.3<L>  /  TBili  1.0  /  DBili  x   /  AST  6<L>  /  ALT  6<L>  /  AlkPhos  113  07-07    Osmolality, Serum: 273 mosm/kg <L> [280 - 301] (07-07 @ 15:35)    PT/INR - ( 07 Jul 2018 06:39 )   PT: 19.9 sec;   INR: 1.77          PTT - ( 07 Jul 2018 06:39 )  PTT:30.6 sec  Urinalysis Basic - ( 07 Jul 2018 07:25 )    Color: Yellow / Appearance: Clear / SG: <=1.005 / pH: x  Gluc: x / Ketone: NEGATIVE  / Bili: Negative / Urobili: 0.2 E.U./dL   Blood: x / Protein: NEGATIVE mg/dL / Nitrite: POSITIVE   Leuk Esterase: Large / RBC: < 5 /HPF / WBC Many /HPF   Sq Epi: x / Non Sq Epi: 0-5 /HPF / Bacteria: Many /HPF      Osmolality, Random Urine: 243 mosmol/kg [100 - 650] (07-07 @ 15:00)  Sodium, Random Urine: 26 mmol/L (07-07 @ 15:00)        RADIOLOGY & ADDITIONAL STUDIES:

## 2018-07-11 NOTE — PROGRESS NOTE ADULT - PROBLEM SELECTOR PLAN 1
resolved,   current na @ 138  on the admission with 121   - most likely to dehydration - poor oral intake he reports, also the febrile episode

## 2018-07-11 NOTE — DIETITIAN INITIAL EVALUATION ADULT. - OTHER INFO
32M admitted w sepsis d/t UTI + infected sacral pressure ulcer, wound vac in place. Discussed recs w MD to add supplement. No noted n/v/d/c, colostomy in place, stage 4 pressure ulcer to sacrum noted, no issues chewing/ swallowing. Dislikes food per RN but otherwise tolerating ~50-75% at this time. Pt w hx of paraplegia, recurrent OM and pressure ulcers.

## 2018-07-11 NOTE — PROGRESS NOTE ADULT - ASSESSMENT
32 yr old male with a PMHx of paraplegia w/ indwelling suprapubic catheter, chronic sacral decubitus ulcers w/ recurrent OM currently with wound vac, colostomy, DMT2, hepatitis B and schizoaffective disorder that presented to ED with subjective chills, weakness and tachycardia, found to be septic 2/2 OM vs UTI vs pressure ulcers.    NEURO  #Paraplegia 2/2 spinal cord injury  -no sensation or motor activity below T4    #Seizure  -hx of seizures  -c/w keppra 500bid    CARDIO  #HLD: c/w lipitor 20mg (home med)    Decubitus Ulcers:  Patient presented with the following pressure injuries on admission:  Right ischial tuberosity (IT) unstageable pressure injury   Right lower buttock lateral   Right lower buttock medial aspect stage 2 pressure injury   Sacral stage 4 pressure injury with pale red, non-granulating wound bed,  Right achilles tendon unstageable pressure injury with exposed tendon,   Left heel unstageable pressure injury   Left lateral foot 2 unstageable pressure injuries   Right lateral malleolus stage 2 pressure  Left IT stage 4 pressure injury    ID  #Sepsis 2/2 UTI suprapubic catheter  -likely not due to osteomyelitis, wound site not noted to look purulent, and was not odorous. Patient afebrile.   -urine culture 100,000 gram negative rods, pseudomonas sensitive to ciprofloxacin.   -hx of ESBL coli UTI and chronic sacral decubitus ulcers w/ recurrent OM (hx of citrobacter, enterococcus, klebsiella)  -rigors, WBC 17.7---now 11s, tachy to 115 now to 80s, , upon presentation  -will continue ciprofloxacin since UTI pseudomonas susceptible.      GI  #Ileus s/p colostomy and chronic constipation 2/2 spinal cord injury  -c/w senna 8.6 bid, miralax  -monitor colostomy output, remove stool every 3 days, due for Monday, and again Thursday.       #Neurogenic bladder 2/2 spinal cord injury  -takes oxybutynin 15qd at home  -c/w oxybutynin 5mg TID  -suprapubic catheter placed 2 weeks ago at [facility].     HEME  #Chronic microcytic anemia  -likely secondary to malabsorption  -no signs of bleeding  -s/p 1u pRBC in the ED  -monitor H/H  -Hg 6.8 vs 7.1 prior, microcytic anemia.  -maintain active type and screen, will transfuse 1 unit today.     RENAL  #Hyponatremia  -likely hypotonic hypovolemic  -f/u urine lytes    ENDO  -takes glimipiride 1mg qd, ,trajenta 5mg qd and metformin 100bid at home  -f/u A1C  -monitor finger stick  -c/w ISS    PSYCHIATRY  #Hx of schizoaffective disorders  -haldol prn if agitate, patient does not take the medication at home per pharmacy records.  -patient denies hallucinations, suicidal ideation, or thoughts of harming others currently.   -appreciate psychiatry notes    PROPHYLAXIS  -DVT: HSQ (IMPROVE 3)  -GI: famotodine (prior hx of c-diff)  -Code: full  -Dispo: F       Goals of Care:  Patient wishes to be placed at [facility], he states he prefers that to 24/7 home care at his current apartment. Might not be realistic and will discuss options with patient.

## 2018-07-11 NOTE — DIETITIAN INITIAL EVALUATION ADULT. - ENERGY NEEDS
ABW used for calculations as pt between % of IBW.  IBW 67kg, 120% IBW, BMI 28.2  Pressure Ulcer Guidelines Used

## 2018-07-12 DIAGNOSIS — L89.90 PRESSURE ULCER OF UNSPECIFIED SITE, UNSPECIFIED STAGE: ICD-10-CM

## 2018-07-12 DIAGNOSIS — D64.9 ANEMIA, UNSPECIFIED: ICD-10-CM

## 2018-07-12 DIAGNOSIS — T83.511S INFECTION AND INFLAMMATORY REACTION DUE TO INDWELLING URETHRAL CATHETER, SEQUELA: ICD-10-CM

## 2018-07-12 DIAGNOSIS — E78.5 HYPERLIPIDEMIA, UNSPECIFIED: ICD-10-CM

## 2018-07-12 DIAGNOSIS — I95.9 HYPOTENSION, UNSPECIFIED: ICD-10-CM

## 2018-07-12 DIAGNOSIS — Z93.3 COLOSTOMY STATUS: ICD-10-CM

## 2018-07-12 DIAGNOSIS — G82.20 PARAPLEGIA, UNSPECIFIED: ICD-10-CM

## 2018-07-12 DIAGNOSIS — F25.9 SCHIZOAFFECTIVE DISORDER, UNSPECIFIED: ICD-10-CM

## 2018-07-12 DIAGNOSIS — Z29.9 ENCOUNTER FOR PROPHYLACTIC MEASURES, UNSPECIFIED: ICD-10-CM

## 2018-07-12 LAB
CULTURE RESULTS: SIGNIFICANT CHANGE UP
CULTURE RESULTS: SIGNIFICANT CHANGE UP
GLUCOSE BLDC GLUCOMTR-MCNC: 144 MG/DL — HIGH (ref 70–99)
GLUCOSE BLDC GLUCOMTR-MCNC: 164 MG/DL — HIGH (ref 70–99)
GLUCOSE BLDC GLUCOMTR-MCNC: 173 MG/DL — HIGH (ref 70–99)
GLUCOSE BLDC GLUCOMTR-MCNC: 235 MG/DL — HIGH (ref 70–99)
HCT VFR BLD CALC: 31.6 % — LOW (ref 39–50)
HGB BLD-MCNC: 8.9 G/DL — LOW (ref 13–17)
MCHC RBC-ENTMCNC: 21.2 PG — LOW (ref 27–34)
MCHC RBC-ENTMCNC: 28.2 G/DL — LOW (ref 32–36)
MCV RBC AUTO: 75.2 FL — LOW (ref 80–100)
PLATELET # BLD AUTO: 391 K/UL — SIGNIFICANT CHANGE UP (ref 150–400)
RBC # BLD: 4.2 M/UL — SIGNIFICANT CHANGE UP (ref 4.2–5.8)
RBC # FLD: 20.9 % — HIGH (ref 10.3–16.9)
SPECIMEN SOURCE: SIGNIFICANT CHANGE UP
SPECIMEN SOURCE: SIGNIFICANT CHANGE UP
WBC # BLD: 8.2 K/UL — SIGNIFICANT CHANGE UP (ref 3.8–10.5)
WBC # FLD AUTO: 8.2 K/UL — SIGNIFICANT CHANGE UP (ref 3.8–10.5)

## 2018-07-12 PROCEDURE — 99233 SBSQ HOSP IP/OBS HIGH 50: CPT | Mod: GC

## 2018-07-12 RX ORDER — PROPRANOLOL HCL 160 MG
40 CAPSULE, EXTENDED RELEASE 24HR ORAL EVERY 8 HOURS
Qty: 0 | Refills: 0 | Status: DISCONTINUED | OUTPATIENT
Start: 2018-07-12 | End: 2018-07-16

## 2018-07-12 RX ADMIN — Medication 5 MILLIGRAM(S): at 01:47

## 2018-07-12 RX ADMIN — POLYETHYLENE GLYCOL 3350 17 GRAM(S): 17 POWDER, FOR SOLUTION ORAL at 11:43

## 2018-07-12 RX ADMIN — FAMOTIDINE 20 MILLIGRAM(S): 10 INJECTION INTRAVENOUS at 11:43

## 2018-07-12 RX ADMIN — ATORVASTATIN CALCIUM 20 MILLIGRAM(S): 80 TABLET, FILM COATED ORAL at 21:36

## 2018-07-12 RX ADMIN — SENNA PLUS 2 TABLET(S): 8.6 TABLET ORAL at 17:36

## 2018-07-12 RX ADMIN — Medication 500 MILLIGRAM(S): at 17:37

## 2018-07-12 RX ADMIN — LEVETIRACETAM 500 MILLIGRAM(S): 250 TABLET, FILM COATED ORAL at 17:37

## 2018-07-12 RX ADMIN — SENNA PLUS 2 TABLET(S): 8.6 TABLET ORAL at 07:10

## 2018-07-12 RX ADMIN — HEPARIN SODIUM 5000 UNIT(S): 5000 INJECTION INTRAVENOUS; SUBCUTANEOUS at 21:36

## 2018-07-12 RX ADMIN — Medication 2: at 22:23

## 2018-07-12 RX ADMIN — Medication 2: at 12:41

## 2018-07-12 RX ADMIN — HEPARIN SODIUM 5000 UNIT(S): 5000 INJECTION INTRAVENOUS; SUBCUTANEOUS at 14:32

## 2018-07-12 RX ADMIN — Medication 4: at 17:43

## 2018-07-12 RX ADMIN — HEPARIN SODIUM 5000 UNIT(S): 5000 INJECTION INTRAVENOUS; SUBCUTANEOUS at 07:10

## 2018-07-12 RX ADMIN — Medication 500 MILLIGRAM(S): at 07:10

## 2018-07-12 RX ADMIN — LEVETIRACETAM 500 MILLIGRAM(S): 250 TABLET, FILM COATED ORAL at 07:10

## 2018-07-12 RX ADMIN — Medication 5 MILLIGRAM(S): at 09:57

## 2018-07-12 RX ADMIN — Medication 5 MILLIGRAM(S): at 17:36

## 2018-07-12 RX ADMIN — Medication 40 MILLIGRAM(S): at 14:32

## 2018-07-12 NOTE — PROGRESS NOTE ADULT - ASSESSMENT
32 yr old male with a PMHx of paraplegia w/ indwelling suprapubic catheter, chronic sacral decubitus ulcers w/ recurrent OM currently with wound vac, colostomy, DMT2, hepatitis B and schizoaffective disorder that presented to ED with subjective chills, weakness and tachycardia, found to be septic 2/2 OM vs UTI vs pressure ulcers.

## 2018-07-12 NOTE — PROGRESS NOTE ADULT - ATTENDING COMMENTS
Pt seen and examined by me at bedside earlier in AM. Agree with housestaff's exam/a/p as noted above.

## 2018-07-12 NOTE — PROGRESS NOTE ADULT - PROBLEM SELECTOR PLAN 1
Pt's blood pressures have been running low, however pt responded well with 500 cc bolus over night SBP < 90. This is likely 2/2 propranolol. Pt's blood pressures have been running low, however pt responded well with 500 cc bolus over night SBP < 90. This is likely 2/2 propranolol.  - continue to monitor

## 2018-07-12 NOTE — PROGRESS NOTE ADULT - PROBLEM SELECTOR PLAN 4
2/2 spinal cord injury. He has no sensation or motor activity below T4 2/2 spinal cord injury. He has no sensation or motor activity below T4    #Neurogenic Bladder   - likely 2/2 spinal cord injury, on home oxybutynin 5 mg TID  - suprapubic catheter in place.     #Seizures  - continue Keppra 500 BID

## 2018-07-12 NOTE — PROGRESS NOTE ADULT - SUBJECTIVE AND OBJECTIVE BOX
OVERNIGHT EVENTS:    SUBJECTIVE / INTERVAL HPI: Patient seen and examined at bedside.     VITAL SIGNS:  Vital Signs Last 24 Hrs  T(C): 36.6 (12 Jul 2018 05:26), Max: 37.1 (11 Jul 2018 17:01)  T(F): 97.9 (12 Jul 2018 05:26), Max: 98.7 (11 Jul 2018 17:01)  HR: 66 (12 Jul 2018 05:26) (62 - 72)  BP: 97/63 (12 Jul 2018 05:26) (87/52 - 114/66)  BP(mean): --  RR: 18 (12 Jul 2018 05:26) (18 - 20)  SpO2: 100% (12 Jul 2018 05:26) (98% - 100%)    PHYSICAL EXAM:    General: WDWN  HEENT: NC/AT; PERRL, anicteric sclera; MMM  Neck: supple  Cardiovascular: +S1/S2, RRR  Respiratory: CTA B/L; no W/R/R  Gastrointestinal: soft, NT/ND; +BSx4  Extremities: WWP; no edema, clubbing or cyanosis  Vascular: 2+ radial, DP/PT pulses B/L  Neurological: AAOx3; no focal deficits    MEDICATIONS:  MEDICATIONS  (STANDING):  atorvastatin 20 milliGRAM(s) Oral at bedtime  ciprofloxacin     Tablet 500 milliGRAM(s) Oral every 12 hours  famotidine    Tablet 20 milliGRAM(s) Oral daily  heparin  Injectable 5000 Unit(s) SubCutaneous every 8 hours  insulin lispro (HumaLOG) corrective regimen sliding scale   SubCutaneous Before meals and at bedtime  levETIRAcetam 500 milliGRAM(s) Oral two times a day  oxybutynin 5 milliGRAM(s) Oral every 8 hours  polyethylene glycol 3350 17 Gram(s) Oral daily  propranolol 40 milliGRAM(s) Oral every 8 hours  senna 2 Tablet(s) Oral every 12 hours    MEDICATIONS  (PRN):      ALLERGIES:  Allergies    Capzasin Back and Body (Unknown)    Intolerances        LABS:                        8.9    8.2   )-----------( 391      ( 11 Jul 2018 23:38 )             31.6     07-11    138  |  100  |  6<L>  ----------------------------<  163<H>  4.4   |  27  |  0.55    Ca    8.6      11 Jul 2018 11:19  Mg     1.9     07-11          CAPILLARY BLOOD GLUCOSE      POCT Blood Glucose.: 199 mg/dL (11 Jul 2018 21:45)      RADIOLOGY & ADDITIONAL TESTS: Reviewed. OVERNIGHT EVENTS: Patient had an episode of hypotension, BP of 87/52 at 9PM. He remained asymptomatic, and his blood pressure responded well to 500 cc bolus. Repeat BPs were 97/63 and 104/60.     SUBJECTIVE / INTERVAL HPI: Patient seen and examined at bedside. Patient was resting comfortably. No acute complaints at this time, denies any fevers, chills, N/V, lightheadedness, dizziness, chest pain, shortness of breath, or any other complaints.     VITAL SIGNS:  Vital Signs Last 24 Hrs  T(C): 36.6 (12 Jul 2018 05:26), Max: 37.1 (11 Jul 2018 17:01)  T(F): 97.9 (12 Jul 2018 05:26), Max: 98.7 (11 Jul 2018 17:01)  HR: 66 (12 Jul 2018 05:26) (62 - 72)  BP: 97/63 (12 Jul 2018 05:26) (87/52 - 114/66)  BP(mean): --  RR: 18 (12 Jul 2018 05:26) (18 - 20)  SpO2: 100% (12 Jul 2018 05:26) (98% - 100%)    PHYSICAL EXAM:    General: WDWN, resting in bed comfortably   HEENT: NC/AT; PERRL, anicteric sclera; dry mucus membranes  Neck: supple  Cardiovascular: +S1/S2, RRR  Respiratory: CTA B/L; no W/R/R  Gastrointestinal: soft, NT, mildly distended, Colostomy in place, no surrounding erythema; +BSx4  Derm: Stage 4 sacral ulcer, no purulence or erythema, stage 4 left IT ulcer, right heel ulcer, non erythematous, no exudate, and non-tender   Extremities: No edema   Vascular: 2+ radial, DP/PT pulses B/L  Neurological: AAOx3; no focal deficits  Psychiatric: hx of Schizoaffective disorder     MEDICATIONS:  MEDICATIONS  (STANDING):  atorvastatin 20 milliGRAM(s) Oral at bedtime  ciprofloxacin     Tablet 500 milliGRAM(s) Oral every 12 hours  famotidine    Tablet 20 milliGRAM(s) Oral daily  heparin  Injectable 5000 Unit(s) SubCutaneous every 8 hours  insulin lispro (HumaLOG) corrective regimen sliding scale   SubCutaneous Before meals and at bedtime  levETIRAcetam 500 milliGRAM(s) Oral two times a day  oxybutynin 5 milliGRAM(s) Oral every 8 hours  polyethylene glycol 3350 17 Gram(s) Oral daily  propranolol 40 milliGRAM(s) Oral every 8 hours  senna 2 Tablet(s) Oral every 12 hours    MEDICATIONS  (PRN):      ALLERGIES:  Allergies    Capzasin Back and Body (Unknown)    Intolerances        LABS:                        8.9    8.2   )-----------( 391      ( 11 Jul 2018 23:38 )             31.6     07-11    138  |  100  |  6<L>  ----------------------------<  163<H>  4.4   |  27  |  0.55    Ca    8.6      11 Jul 2018 11:19  Mg     1.9     07-11          CAPILLARY BLOOD GLUCOSE      POCT Blood Glucose.: 199 mg/dL (11 Jul 2018 21:45)      RADIOLOGY & ADDITIONAL TESTS: Reviewed.

## 2018-07-13 LAB
GLUCOSE BLDC GLUCOMTR-MCNC: 129 MG/DL — HIGH (ref 70–99)
GLUCOSE BLDC GLUCOMTR-MCNC: 170 MG/DL — HIGH (ref 70–99)
GLUCOSE BLDC GLUCOMTR-MCNC: 180 MG/DL — HIGH (ref 70–99)
GLUCOSE BLDC GLUCOMTR-MCNC: 213 MG/DL — HIGH (ref 70–99)

## 2018-07-13 PROCEDURE — 99233 SBSQ HOSP IP/OBS HIGH 50: CPT | Mod: GC

## 2018-07-13 RX ADMIN — LEVETIRACETAM 500 MILLIGRAM(S): 250 TABLET, FILM COATED ORAL at 06:37

## 2018-07-13 RX ADMIN — SENNA PLUS 2 TABLET(S): 8.6 TABLET ORAL at 17:08

## 2018-07-13 RX ADMIN — Medication 500 MILLIGRAM(S): at 17:08

## 2018-07-13 RX ADMIN — SENNA PLUS 2 TABLET(S): 8.6 TABLET ORAL at 06:37

## 2018-07-13 RX ADMIN — FAMOTIDINE 20 MILLIGRAM(S): 10 INJECTION INTRAVENOUS at 11:58

## 2018-07-13 RX ADMIN — HEPARIN SODIUM 5000 UNIT(S): 5000 INJECTION INTRAVENOUS; SUBCUTANEOUS at 13:07

## 2018-07-13 RX ADMIN — HEPARIN SODIUM 5000 UNIT(S): 5000 INJECTION INTRAVENOUS; SUBCUTANEOUS at 21:44

## 2018-07-13 RX ADMIN — Medication 5 MILLIGRAM(S): at 17:08

## 2018-07-13 RX ADMIN — POLYETHYLENE GLYCOL 3350 17 GRAM(S): 17 POWDER, FOR SOLUTION ORAL at 11:58

## 2018-07-13 RX ADMIN — Medication 2: at 18:04

## 2018-07-13 RX ADMIN — LEVETIRACETAM 500 MILLIGRAM(S): 250 TABLET, FILM COATED ORAL at 17:08

## 2018-07-13 RX ADMIN — Medication 2: at 13:06

## 2018-07-13 RX ADMIN — Medication 500 MILLIGRAM(S): at 06:37

## 2018-07-13 RX ADMIN — Medication 5 MILLIGRAM(S): at 01:09

## 2018-07-13 RX ADMIN — Medication 4: at 21:46

## 2018-07-13 RX ADMIN — HEPARIN SODIUM 5000 UNIT(S): 5000 INJECTION INTRAVENOUS; SUBCUTANEOUS at 06:37

## 2018-07-13 RX ADMIN — Medication 5 MILLIGRAM(S): at 09:26

## 2018-07-13 RX ADMIN — ATORVASTATIN CALCIUM 20 MILLIGRAM(S): 80 TABLET, FILM COATED ORAL at 21:45

## 2018-07-13 RX ADMIN — Medication 40 MILLIGRAM(S): at 13:07

## 2018-07-13 NOTE — PROGRESS NOTE ADULT - PROBLEM SELECTOR PLAN 4
2/2 spinal cord injury. He has no sensation or motor activity below T4    #Neurogenic Bladder   - likely 2/2 spinal cord injury, on home oxybutynin 5 mg TID  - suprapubic catheter in place.     #Seizures  - continue Keppra 500 BID

## 2018-07-13 NOTE — PROGRESS NOTE ADULT - SUBJECTIVE AND OBJECTIVE BOX
CC: Patient is a 32y old  Male who presents with a chief complaint of Sepsis secondary to catheter related UTI and sacral/ischial tuberosity ulcer. (10 Jul 2018 15:07)    OVERNIGHT EVENTS: No acute events overnight. He did have low blood pressure overnight, however his propranolol was held.     SUBJECTIVE / INTERVAL HPI: Patient seen and examined at bedside. Pt denies any fevers, chills, lightheadedness, dizziness, or any other complaints at this time. Pt states he would like to continue his propranolol. He does not believe his low blood pressure is due to propranolol. He states he would like to go home with home care and have a monitor to check with BPs. He states he never gets symptomatic with low BP and would like to have a BP cuff at home. He would like to have his wound vac changed.     ROS: negative unless otherwise stated above.    VITAL SIGNS:  Vital Signs Last 24 Hrs  T(C): 36.3 (13 Jul 2018 09:00), Max: 37.1 (12 Jul 2018 16:03)  T(F): 97.3 (13 Jul 2018 09:00), Max: 98.8 (12 Jul 2018 16:03)  HR: 62 (13 Jul 2018 09:00) (57 - 68)  BP: 102/58 (13 Jul 2018 09:00) (92/60 - 102/58)  BP(mean): --  RR: 18 (13 Jul 2018 09:00) (17 - 18)  SpO2: 100% (13 Jul 2018 09:00) (99% - 100%)    PHYSICAL EXAM:    General: WDWN  HEENT: NC/AT; PERRL, anicteric sclera; MMM  Neck: supple  Cardiovascular: +S1/S2; RRR  Respiratory: CTA B/L; no W/R/R  Gastrointestinal: soft, NT/ND; +BSx4  Extremities: WWP; no edema, clubbing or cyanosis  Vascular: 2+ radial, DP/PT pulses B/L  Neurological: AAOx3; no focal deficits    MEDICATIONS:  MEDICATIONS  (STANDING):  atorvastatin 20 milliGRAM(s) Oral at bedtime  ciprofloxacin     Tablet 500 milliGRAM(s) Oral every 12 hours  famotidine    Tablet 20 milliGRAM(s) Oral daily  heparin  Injectable 5000 Unit(s) SubCutaneous every 8 hours  insulin lispro (HumaLOG) corrective regimen sliding scale   SubCutaneous Before meals and at bedtime  levETIRAcetam 500 milliGRAM(s) Oral two times a day  oxybutynin 5 milliGRAM(s) Oral every 8 hours  polyethylene glycol 3350 17 Gram(s) Oral daily  propranolol 40 milliGRAM(s) Oral every 8 hours  senna 2 Tablet(s) Oral every 12 hours    MEDICATIONS  (PRN):      ALLERGIES:  Allergies    Capzasin Back and Body (Unknown)    Intolerances        LABS:                        8.9    8.2   )-----------( 391      ( 11 Jul 2018 23:38 )             31.6     07-11    138  |  100  |  6<L>  ----------------------------<  163<H>  4.4   |  27  |  0.55    Ca    8.6      11 Jul 2018 11:19  Mg     1.9     07-11          CAPILLARY BLOOD GLUCOSE      POCT Blood Glucose.: 129 mg/dL (13 Jul 2018 08:40)      RADIOLOGY & ADDITIONAL TESTS: Reviewed. CC: Patient is a 32y old  Male who presents with a chief complaint of Sepsis secondary to catheter related UTI and sacral/ischial tuberosity ulcer. (10 Jul 2018 15:07)    OVERNIGHT EVENTS: No acute events overnight. He did have low blood pressure overnight, however his propranolol was held.     SUBJECTIVE / INTERVAL HPI: Patient seen and examined at bedside. Pt denies any fevers, chills, lightheadedness, dizziness, or any other complaints at this time. Pt states he would like to continue his propranolol. He does not believe his low blood pressure is due to propranolol. He states he would like to go home with home care and have a monitor to check with BPs. He states he never gets symptomatic with low BP and would like to have a BP cuff at home. He would like to have his wound vac changed.     ROS: negative unless otherwise stated above.    VITAL SIGNS:  Vital Signs Last 24 Hrs  T(C): 36.3 (13 Jul 2018 09:00), Max: 37.1 (12 Jul 2018 16:03)  T(F): 97.3 (13 Jul 2018 09:00), Max: 98.8 (12 Jul 2018 16:03)  HR: 62 (13 Jul 2018 09:00) (57 - 68)  BP: 102/58 (13 Jul 2018 09:00) (92/60 - 102/58)  BP(mean): --  RR: 18 (13 Jul 2018 09:00) (17 - 18)  SpO2: 100% (13 Jul 2018 09:00) (99% - 100%)    PHYSICAL EXAM:    General: WDWN, resting in bed comfortably   HEENT: NC/AT; PERRL, anicteric sclera; dry mucus membranes  Neck: supple  Cardiovascular: +S1/S2, RRR  Respiratory: CTA B/L; no W/R/R  Gastrointestinal: soft, NT, mildly distended, Colostomy in place, no surrounding erythema; +BSx4  Derm: Stage 4 sacral ulcer, no purulence or erythema, stage 4 left IT ulcer, right heel ulcer, non erythematous, no exudate, and non-tender   Extremities: No edema   Vascular: 2+ radial, DP/PT pulses B/L  Neurological: AAOx3; no focal deficits  Psychiatric: hx of Schizoaffective disorder    MEDICATIONS:  MEDICATIONS  (STANDING):  atorvastatin 20 milliGRAM(s) Oral at bedtime  ciprofloxacin     Tablet 500 milliGRAM(s) Oral every 12 hours  famotidine    Tablet 20 milliGRAM(s) Oral daily  heparin  Injectable 5000 Unit(s) SubCutaneous every 8 hours  insulin lispro (HumaLOG) corrective regimen sliding scale   SubCutaneous Before meals and at bedtime  levETIRAcetam 500 milliGRAM(s) Oral two times a day  oxybutynin 5 milliGRAM(s) Oral every 8 hours  polyethylene glycol 3350 17 Gram(s) Oral daily  propranolol 40 milliGRAM(s) Oral every 8 hours  senna 2 Tablet(s) Oral every 12 hours    MEDICATIONS  (PRN):      ALLERGIES:  Allergies    Capzasin Back and Body (Unknown)    Intolerances        LABS:                        8.9    8.2   )-----------( 391      ( 11 Jul 2018 23:38 )             31.6     07-11    138  |  100  |  6<L>  ----------------------------<  163<H>  4.4   |  27  |  0.55    Ca    8.6      11 Jul 2018 11:19  Mg     1.9     07-11          CAPILLARY BLOOD GLUCOSE      POCT Blood Glucose.: 129 mg/dL (13 Jul 2018 08:40)      RADIOLOGY & ADDITIONAL TESTS: Reviewed.

## 2018-07-13 NOTE — PROGRESS NOTE ADULT - PROBLEM SELECTOR PLAN 1
Pt's blood pressures have been running low, likely 2/2 propranolol. Continues to be asymptomatic and stable.   - continue to monitor

## 2018-07-13 NOTE — ADVANCED PRACTICE NURSE CONSULT - ASSESSMENT
Wound vac dressing change to sacral and ischial tuberosity stage 4 wounds. Periwounds protected with skin barrier wipes, 1 piece of white foam placed in undermining areas in both wounds then granufoam cut to fit and placed in wounds. Granufoam bridged to left upper leg, vac at 125 mm/Hg negative pressure. Z-tyler boots on bilaterally.

## 2018-07-13 NOTE — PROGRESS NOTE ADULT - PROBLEM SELECTOR PLAN 9
DVT: Hep SQ (IMPROVE 3)  GI: Famotidine 20 mg daily  Code: FULL CODE  DIspo: MAURILIO DVT: Hep SQ (IMPROVE 3)  GI: Famotidine 20 mg daily  Code: FULL CODE  DIspo: Home with SVC

## 2018-07-13 NOTE — PROGRESS NOTE ADULT - ATTENDING COMMENTS
Pt seen and examined by me at bedside earlier in AM. Agree with housestaff'se exam/a/p as noted above,   VSS, exam as above  no labs ordered today    a/p:  1. suprapubic associated UTI-pseudomonas-c/w cipro for total of 10days  2. Schizoaffective d/o: mood stable, compliant with meds    Agree with rest of a/p as above  Dispo: pending home services setup.

## 2018-07-13 NOTE — PROGRESS NOTE ADULT - SUBJECTIVE AND OBJECTIVE BOX
OVERNIGHT EVENTS:    SUBJECTIVE / INTERVAL HPI: Patient seen and examined at bedside.     VITAL SIGNS:  Vital Signs Last 24 Hrs  T(C): 36.6 (13 Jul 2018 05:22), Max: 37.1 (12 Jul 2018 16:03)  T(F): 97.8 (13 Jul 2018 05:22), Max: 98.8 (12 Jul 2018 16:03)  HR: 57 (13 Jul 2018 05:22) (57 - 68)  BP: 92/60 (13 Jul 2018 05:22) (92/60 - 104/60)  BP(mean): --  RR: 18 (13 Jul 2018 05:22) (17 - 18)  SpO2: 99% (13 Jul 2018 05:22) (99% - 100%)    PHYSICAL EXAM:    General: WDWN  HEENT: NC/AT; PERRL, anicteric sclera; MMM  Neck: supple  Cardiovascular: +S1/S2, RRR  Respiratory: CTA B/L; no W/R/R  Gastrointestinal: soft, NT/ND; +BSx4  Extremities: WWP; no edema, clubbing or cyanosis  Vascular: 2+ radial, DP/PT pulses B/L  Neurological: AAOx3; no focal deficits    MEDICATIONS:  MEDICATIONS  (STANDING):  atorvastatin 20 milliGRAM(s) Oral at bedtime  ciprofloxacin     Tablet 500 milliGRAM(s) Oral every 12 hours  famotidine    Tablet 20 milliGRAM(s) Oral daily  heparin  Injectable 5000 Unit(s) SubCutaneous every 8 hours  insulin lispro (HumaLOG) corrective regimen sliding scale   SubCutaneous Before meals and at bedtime  levETIRAcetam 500 milliGRAM(s) Oral two times a day  oxybutynin 5 milliGRAM(s) Oral every 8 hours  polyethylene glycol 3350 17 Gram(s) Oral daily  propranolol 40 milliGRAM(s) Oral every 8 hours  senna 2 Tablet(s) Oral every 12 hours    MEDICATIONS  (PRN):      ALLERGIES:  Allergies    Capzasin Back and Body (Unknown)    Intolerances        LABS:                        8.9    8.2   )-----------( 391      ( 11 Jul 2018 23:38 )             31.6     07-11    138  |  100  |  6<L>  ----------------------------<  163<H>  4.4   |  27  |  0.55    Ca    8.6      11 Jul 2018 11:19  Mg     1.9     07-11          CAPILLARY BLOOD GLUCOSE      POCT Blood Glucose.: 164 mg/dL (12 Jul 2018 22:14)      RADIOLOGY & ADDITIONAL TESTS: Reviewed.

## 2018-07-14 LAB
GLUCOSE BLDC GLUCOMTR-MCNC: 136 MG/DL — HIGH (ref 70–99)
GLUCOSE BLDC GLUCOMTR-MCNC: 173 MG/DL — HIGH (ref 70–99)
GLUCOSE BLDC GLUCOMTR-MCNC: 178 MG/DL — HIGH (ref 70–99)
GLUCOSE BLDC GLUCOMTR-MCNC: 272 MG/DL — HIGH (ref 70–99)

## 2018-07-14 PROCEDURE — 99232 SBSQ HOSP IP/OBS MODERATE 35: CPT | Mod: GC

## 2018-07-14 RX ADMIN — SENNA PLUS 2 TABLET(S): 8.6 TABLET ORAL at 17:57

## 2018-07-14 RX ADMIN — SENNA PLUS 2 TABLET(S): 8.6 TABLET ORAL at 06:27

## 2018-07-14 RX ADMIN — HEPARIN SODIUM 5000 UNIT(S): 5000 INJECTION INTRAVENOUS; SUBCUTANEOUS at 13:03

## 2018-07-14 RX ADMIN — Medication 5 MILLIGRAM(S): at 09:19

## 2018-07-14 RX ADMIN — Medication 5 MILLIGRAM(S): at 01:43

## 2018-07-14 RX ADMIN — LEVETIRACETAM 500 MILLIGRAM(S): 250 TABLET, FILM COATED ORAL at 17:57

## 2018-07-14 RX ADMIN — HEPARIN SODIUM 5000 UNIT(S): 5000 INJECTION INTRAVENOUS; SUBCUTANEOUS at 23:12

## 2018-07-14 RX ADMIN — HEPARIN SODIUM 5000 UNIT(S): 5000 INJECTION INTRAVENOUS; SUBCUTANEOUS at 06:28

## 2018-07-14 RX ADMIN — Medication 40 MILLIGRAM(S): at 23:11

## 2018-07-14 RX ADMIN — Medication 2: at 13:03

## 2018-07-14 RX ADMIN — Medication 6: at 17:53

## 2018-07-14 RX ADMIN — Medication 500 MILLIGRAM(S): at 17:57

## 2018-07-14 RX ADMIN — Medication 5 MILLIGRAM(S): at 17:57

## 2018-07-14 RX ADMIN — Medication 2: at 23:11

## 2018-07-14 RX ADMIN — LEVETIRACETAM 500 MILLIGRAM(S): 250 TABLET, FILM COATED ORAL at 06:27

## 2018-07-14 RX ADMIN — Medication 500 MILLIGRAM(S): at 06:27

## 2018-07-14 RX ADMIN — ATORVASTATIN CALCIUM 20 MILLIGRAM(S): 80 TABLET, FILM COATED ORAL at 23:12

## 2018-07-14 NOTE — PROGRESS NOTE ADULT - SUBJECTIVE AND OBJECTIVE BOX
Patient is a 32y old  Male who presents with a chief complaint of Sepsis secondary to catheter related UTI and sacral/ischial tuberosity ulcer. (10 Jul 2018 15:07)      INTERVAL HPI/OVERNIGHT EVENTS:   no complaints,   agreeable to go home on Monday    Review of Systems: 12 point review of systems otherwise negative    MEDICATIONS  (STANDING):  atorvastatin 20 milliGRAM(s) Oral at bedtime  ciprofloxacin     Tablet 500 milliGRAM(s) Oral every 12 hours  famotidine    Tablet 20 milliGRAM(s) Oral daily  heparin  Injectable 5000 Unit(s) SubCutaneous every 8 hours  insulin lispro (HumaLOG) corrective regimen sliding scale   SubCutaneous Before meals and at bedtime  levETIRAcetam 500 milliGRAM(s) Oral two times a day  oxybutynin 5 milliGRAM(s) Oral every 8 hours  polyethylene glycol 3350 17 Gram(s) Oral daily  propranolol 40 milliGRAM(s) Oral every 8 hours  senna 2 Tablet(s) Oral every 12 hours    MEDICATIONS  (PRN):      Allergies    Capzasin Back and Body (Unknown)    Intolerances          Vital Signs Last 24 Hrs  T(C): 36.8 (14 Jul 2018 08:38), Max: 36.9 (13 Jul 2018 16:33)  T(F): 98.3 (14 Jul 2018 08:38), Max: 98.5 (13 Jul 2018 16:33)  HR: 78 (14 Jul 2018 08:38) (67 - 78)  BP: 94/54 (14 Jul 2018 08:38) (92/58 - 97/62)  BP(mean): --  RR: 16 (14 Jul 2018 08:38) (16 - 18)  SpO2: 98% (14 Jul 2018 08:38) (97% - 100%)  CAPILLARY BLOOD GLUCOSE      POCT Blood Glucose.: 178 mg/dL (14 Jul 2018 12:36)  POCT Blood Glucose.: 136 mg/dL (14 Jul 2018 08:43)  POCT Blood Glucose.: 213 mg/dL (13 Jul 2018 21:27)  POCT Blood Glucose.: 180 mg/dL (13 Jul 2018 17:41)      07-13 @ 07:01  -  07-14 @ 07:00  --------------------------------------------------------  IN: 0 mL / OUT: 2800 mL / NET: -2800 mL        Physical Exam:    Daily     Daily   General:  NAD  CV:  RRR,   Abdomen:  Soft, non-tender, no distended, positive BS, +colostomy bag, +suprapubic catheter      LABS:

## 2018-07-15 LAB
GLUCOSE BLDC GLUCOMTR-MCNC: 130 MG/DL — HIGH (ref 70–99)
GLUCOSE BLDC GLUCOMTR-MCNC: 177 MG/DL — HIGH (ref 70–99)
GLUCOSE BLDC GLUCOMTR-MCNC: 201 MG/DL — HIGH (ref 70–99)
GLUCOSE BLDC GLUCOMTR-MCNC: 216 MG/DL — HIGH (ref 70–99)

## 2018-07-15 PROCEDURE — 99232 SBSQ HOSP IP/OBS MODERATE 35: CPT | Mod: GC

## 2018-07-15 RX ADMIN — Medication 4: at 19:17

## 2018-07-15 RX ADMIN — SENNA PLUS 2 TABLET(S): 8.6 TABLET ORAL at 06:32

## 2018-07-15 RX ADMIN — LEVETIRACETAM 500 MILLIGRAM(S): 250 TABLET, FILM COATED ORAL at 06:32

## 2018-07-15 RX ADMIN — Medication 5 MILLIGRAM(S): at 11:40

## 2018-07-15 RX ADMIN — Medication 500 MILLIGRAM(S): at 18:02

## 2018-07-15 RX ADMIN — Medication 40 MILLIGRAM(S): at 06:33

## 2018-07-15 RX ADMIN — HEPARIN SODIUM 5000 UNIT(S): 5000 INJECTION INTRAVENOUS; SUBCUTANEOUS at 15:04

## 2018-07-15 RX ADMIN — SENNA PLUS 2 TABLET(S): 8.6 TABLET ORAL at 18:02

## 2018-07-15 RX ADMIN — Medication 4: at 22:57

## 2018-07-15 RX ADMIN — FAMOTIDINE 20 MILLIGRAM(S): 10 INJECTION INTRAVENOUS at 11:40

## 2018-07-15 RX ADMIN — Medication 500 MILLIGRAM(S): at 06:32

## 2018-07-15 RX ADMIN — HEPARIN SODIUM 5000 UNIT(S): 5000 INJECTION INTRAVENOUS; SUBCUTANEOUS at 06:32

## 2018-07-15 RX ADMIN — HEPARIN SODIUM 5000 UNIT(S): 5000 INJECTION INTRAVENOUS; SUBCUTANEOUS at 22:57

## 2018-07-15 RX ADMIN — Medication 5 MILLIGRAM(S): at 18:02

## 2018-07-15 RX ADMIN — ATORVASTATIN CALCIUM 20 MILLIGRAM(S): 80 TABLET, FILM COATED ORAL at 22:57

## 2018-07-15 RX ADMIN — LEVETIRACETAM 500 MILLIGRAM(S): 250 TABLET, FILM COATED ORAL at 18:01

## 2018-07-15 NOTE — PROGRESS NOTE ADULT - PROBLEM SELECTOR PROBLEM 2
Chronic anemia
UTI (urinary tract infection)
Urinary tract infection associated with indwelling urethral catheter, sequela

## 2018-07-15 NOTE — PROGRESS NOTE ADULT - PROBLEM SELECTOR PLAN 2
hgb stable   no acute blood loss
- on meropenem - prior h/o ESBL - now with GNR - please follow up the final result for the sensitivity  - also on Vanco for decubitus - please check the vanco trough.
Sepsis likely 2/2 UTI suprapubic catheter. Urine cx 100,000 GNR, growing pseudomonas, sensitive to Ciprofloxacin. Pt has hx of ESBL UTI and chronic sacral decubitus ulcers with recurrent OM (Hx of citrobacter, Enterococcus, Klebsiella). Continue Ciprofloxacin
Sepsis likely 2/2 UTI suprapubic catheter. Urine cx 100,000 GNR, growing pseudomonas, sensitive to Ciprofloxacin. Pt has hx of ESBL UTI and chronic sacral decubitus ulcers with recurrent OM (Hx of citrobacter, Enterococcus, Klebsiella). Continue Ciprofloxacin and complete 7 day course. To complete course of antibiotics on Monday.
as per primary team
Sepsis likely 2/2 UTI suprapubic catheter. Urine cx 100,000 GNR, growing pseudomonas, sensitive to Ciprofloxacin. Pt has hx of ESBL UTI and chronic sacral decubitus ulcers with recurrent OM (Hx of citrobacter, Enterococcus, Klebsiella). Continue Ciprofloxacin

## 2018-07-15 NOTE — PROGRESS NOTE ADULT - SUBJECTIVE AND OBJECTIVE BOX
CC: Patient is a 32y old  Male who presents with a chief complaint of Sepsis secondary to catheter related UTI and sacral/ischial tuberosity ulcer. (10 Jul 2018 15:07)      OVERNIGHT EVENTS: No acute events     SUBJECTIVE / INTERVAL HPI: Patient seen and examined at bedside. Agreeable to be discharged on Monday. No complaints at this time.     ROS: negative unless otherwise stated above.    VITAL SIGNS:  Vital Signs Last 24 Hrs  T(C): 36.7 (15 Jul 2018 08:57), Max: 37 (14 Jul 2018 15:50)  T(F): 98 (15 Jul 2018 08:57), Max: 98.6 (14 Jul 2018 15:50)  HR: 75 (15 Jul 2018 08:57) (72 - 83)  BP: 88/57 (15 Jul 2018 08:57) (86/54 - 97/62)  BP(mean): --  RR: 17 (15 Jul 2018 08:57) (17 - 18)  SpO2: 100% (15 Jul 2018 08:57) (98% - 100%)    PHYSICAL EXAM:    General: WDWN  HEENT: NC/AT; PERRL, anicteric sclera; MMM  Neck: supple  Cardiovascular: +S1/S2; RRR  Respiratory: CTA B/L; no W/R/R  Gastrointestinal: soft, NT/ND; +BSx4  Extremities: WWP; no edema, clubbing or cyanosis  Vascular: 2+ radial, DP/PT pulses B/L  Neurological: AAOx3; no focal deficits    MEDICATIONS:  MEDICATIONS  (STANDING):  atorvastatin 20 milliGRAM(s) Oral at bedtime  ciprofloxacin     Tablet 500 milliGRAM(s) Oral every 12 hours  famotidine    Tablet 20 milliGRAM(s) Oral daily  heparin  Injectable 5000 Unit(s) SubCutaneous every 8 hours  insulin lispro (HumaLOG) corrective regimen sliding scale   SubCutaneous Before meals and at bedtime  levETIRAcetam 500 milliGRAM(s) Oral two times a day  oxybutynin 5 milliGRAM(s) Oral every 8 hours  polyethylene glycol 3350 17 Gram(s) Oral daily  propranolol 40 milliGRAM(s) Oral every 8 hours  senna 2 Tablet(s) Oral every 12 hours    MEDICATIONS  (PRN):      ALLERGIES:  Allergies    Capzasin Back and Body (Unknown)    Intolerances        LABS:              CAPILLARY BLOOD GLUCOSE      POCT Blood Glucose.: 130 mg/dL (15 Jul 2018 08:29)      RADIOLOGY & ADDITIONAL TESTS: Reviewed.

## 2018-07-15 NOTE — PROGRESS NOTE ADULT - PROBLEM SELECTOR PLAN 6
- Continue Lipitor 20 mg     #Type 2 DM (at home takes Glimepiride 1 mg qd, trajenta 5 mg qd, Metformin 100 mg)  - Continue to monitor finger sticks  - continue ISS

## 2018-07-15 NOTE — PROGRESS NOTE ADULT - PROBLEM SELECTOR PLAN 5
Ileus s/p colostomy and history of chronic constipation 2/2 spinal cord injury.   continue Senna 8.6 BID, Miralax. Monitor colostomy output, remove stool q3 days.

## 2018-07-15 NOTE — PROGRESS NOTE ADULT - PROBLEM SELECTOR PLAN 7
Likely 2/2 malabsorption. No active signs of bleeding. S/p 2U pRBCs. Maintain active type and screen.

## 2018-07-15 NOTE — PROGRESS NOTE ADULT - PROBLEM SELECTOR PLAN 8
Pt is not on meds at home per records. Continue haldol prn for agitation. Appreciate Psych recs.

## 2018-07-15 NOTE — PROGRESS NOTE ADULT - PROBLEM SELECTOR PROBLEM 1
Urinary tract infection associated with indwelling urethral catheter, sequela
Hyponatremia
Hypotension

## 2018-07-15 NOTE — PROGRESS NOTE ADULT - PROBLEM SELECTOR PLAN 3
Patient presented with the following pressure injuries on admission:  Right ischial tuberosity (IT) unstageable pressure injury   Right lower buttock lateral   Right lower buttock medial aspect stage 2 pressure injury   Sacral stage 4 pressure injury with pale red, non-granulating wound bed,  Right achilles tendon unstageable pressure injury with exposed tendon,   Left heel unstageable pressure injury   Left lateral foot 2 unstageable pressure injuries   Right lateral malleolus stage 2 pressure  Left IT stage 4 pressure injury

## 2018-07-16 VITALS
SYSTOLIC BLOOD PRESSURE: 95 MMHG | DIASTOLIC BLOOD PRESSURE: 60 MMHG | RESPIRATION RATE: 20 BRPM | OXYGEN SATURATION: 98 % | HEART RATE: 93 BPM | TEMPERATURE: 99 F

## 2018-07-16 LAB
GLUCOSE BLDC GLUCOMTR-MCNC: 159 MG/DL — HIGH (ref 70–99)
GLUCOSE BLDC GLUCOMTR-MCNC: 188 MG/DL — HIGH (ref 70–99)

## 2018-07-16 PROCEDURE — 80053 COMPREHEN METABOLIC PANEL: CPT

## 2018-07-16 PROCEDURE — 85027 COMPLETE CBC AUTOMATED: CPT

## 2018-07-16 PROCEDURE — 80202 ASSAY OF VANCOMYCIN: CPT

## 2018-07-16 PROCEDURE — P9016: CPT

## 2018-07-16 PROCEDURE — 93005 ELECTROCARDIOGRAM TRACING: CPT

## 2018-07-16 PROCEDURE — 82570 ASSAY OF URINE CREATININE: CPT

## 2018-07-16 PROCEDURE — 87086 URINE CULTURE/COLONY COUNT: CPT

## 2018-07-16 PROCEDURE — 85025 COMPLETE CBC W/AUTO DIFF WBC: CPT

## 2018-07-16 PROCEDURE — 83605 ASSAY OF LACTIC ACID: CPT

## 2018-07-16 PROCEDURE — 84540 ASSAY OF URINE/UREA-N: CPT

## 2018-07-16 PROCEDURE — 81001 URINALYSIS AUTO W/SCOPE: CPT

## 2018-07-16 PROCEDURE — 96375 TX/PRO/DX INJ NEW DRUG ADDON: CPT

## 2018-07-16 PROCEDURE — 83615 LACTATE (LD) (LDH) ENZYME: CPT

## 2018-07-16 PROCEDURE — 71045 X-RAY EXAM CHEST 1 VIEW: CPT

## 2018-07-16 PROCEDURE — 86923 COMPATIBILITY TEST ELECTRIC: CPT

## 2018-07-16 PROCEDURE — 84300 ASSAY OF URINE SODIUM: CPT

## 2018-07-16 PROCEDURE — 86140 C-REACTIVE PROTEIN: CPT

## 2018-07-16 PROCEDURE — 83930 ASSAY OF BLOOD OSMOLALITY: CPT

## 2018-07-16 PROCEDURE — 86901 BLOOD TYPING SEROLOGIC RH(D): CPT

## 2018-07-16 PROCEDURE — 82962 GLUCOSE BLOOD TEST: CPT

## 2018-07-16 PROCEDURE — 87186 SC STD MICRODIL/AGAR DIL: CPT

## 2018-07-16 PROCEDURE — 86850 RBC ANTIBODY SCREEN: CPT

## 2018-07-16 PROCEDURE — 36430 TRANSFUSION BLD/BLD COMPNT: CPT

## 2018-07-16 PROCEDURE — 83735 ASSAY OF MAGNESIUM: CPT

## 2018-07-16 PROCEDURE — 82803 BLOOD GASES ANY COMBINATION: CPT

## 2018-07-16 PROCEDURE — 99239 HOSP IP/OBS DSCHRG MGMT >30: CPT

## 2018-07-16 PROCEDURE — 85045 AUTOMATED RETICULOCYTE COUNT: CPT

## 2018-07-16 PROCEDURE — 85730 THROMBOPLASTIN TIME PARTIAL: CPT

## 2018-07-16 PROCEDURE — 99285 EMERGENCY DEPT VISIT HI MDM: CPT | Mod: 25

## 2018-07-16 PROCEDURE — 83935 ASSAY OF URINE OSMOLALITY: CPT

## 2018-07-16 PROCEDURE — 85652 RBC SED RATE AUTOMATED: CPT

## 2018-07-16 PROCEDURE — 86900 BLOOD TYPING SEROLOGIC ABO: CPT

## 2018-07-16 PROCEDURE — 85610 PROTHROMBIN TIME: CPT

## 2018-07-16 PROCEDURE — 87040 BLOOD CULTURE FOR BACTERIA: CPT

## 2018-07-16 PROCEDURE — 96374 THER/PROPH/DIAG INJ IV PUSH: CPT

## 2018-07-16 PROCEDURE — 80048 BASIC METABOLIC PNL TOTAL CA: CPT

## 2018-07-16 PROCEDURE — 83010 ASSAY OF HAPTOGLOBIN QUANT: CPT

## 2018-07-16 PROCEDURE — 36415 COLL VENOUS BLD VENIPUNCTURE: CPT

## 2018-07-16 RX ORDER — PROPRANOLOL HCL 160 MG
1 CAPSULE, EXTENDED RELEASE 24HR ORAL
Qty: 0 | Refills: 0 | COMMUNITY

## 2018-07-16 RX ORDER — OXYBUTYNIN CHLORIDE 5 MG
1 TABLET ORAL
Qty: 0 | Refills: 0 | COMMUNITY

## 2018-07-16 RX ORDER — OXYBUTYNIN CHLORIDE 5 MG
1 TABLET ORAL
Qty: 90 | Refills: 0 | OUTPATIENT
Start: 2018-07-16 | End: 2018-08-14

## 2018-07-16 RX ADMIN — SENNA PLUS 2 TABLET(S): 8.6 TABLET ORAL at 06:53

## 2018-07-16 RX ADMIN — Medication 2: at 12:41

## 2018-07-16 RX ADMIN — LEVETIRACETAM 500 MILLIGRAM(S): 250 TABLET, FILM COATED ORAL at 06:52

## 2018-07-16 RX ADMIN — FAMOTIDINE 20 MILLIGRAM(S): 10 INJECTION INTRAVENOUS at 12:41

## 2018-07-16 RX ADMIN — Medication 500 MILLIGRAM(S): at 06:53

## 2018-07-16 RX ADMIN — Medication 5 MILLIGRAM(S): at 12:41

## 2018-07-16 RX ADMIN — Medication 5 MILLIGRAM(S): at 01:19

## 2018-07-16 RX ADMIN — HEPARIN SODIUM 5000 UNIT(S): 5000 INJECTION INTRAVENOUS; SUBCUTANEOUS at 06:53

## 2018-07-16 NOTE — CHART NOTE - NSCHARTNOTEFT_GEN_A_CORE
Admitting Diagnosis:   Patient is a 32y old  Male who presents with a chief complaint of Sepsis secondary to catheter related UTI and sacral/ischial tuberosity ulcer. 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  Paraplegia  Type 2 diabetes mellitus with hyperglycemia, without long-term current use of insulin  Colostomy present      Current Nutrition Order: Regular, consistent carb, no snacks    PO Intake: Good (%) [   ]  Fair (50-75%) [ x  ] Poor (<25%) [   ]    GI Issues: none noted, no noted n/v/d/c    Pain: none noted    Skin Integrity: impaired, pressure ulcers noted per EMR    Labs:       CAPILLARY BLOOD GLUCOSE      POCT Blood Glucose.: 159 mg/dL (16 Jul 2018 12:29)  POCT Blood Glucose.: 188 mg/dL (16 Jul 2018 08:42)  POCT Blood Glucose.: 201 mg/dL (15 Jul 2018 22:15)  POCT Blood Glucose.: 216 mg/dL (15 Jul 2018 18:38)      Medications:  MEDICATIONS  (STANDING):  atorvastatin 20 milliGRAM(s) Oral at bedtime  ciprofloxacin     Tablet 500 milliGRAM(s) Oral every 12 hours  famotidine    Tablet 20 milliGRAM(s) Oral daily  heparin  Injectable 5000 Unit(s) SubCutaneous every 8 hours  insulin lispro (HumaLOG) corrective regimen sliding scale   SubCutaneous Before meals and at bedtime  levETIRAcetam 500 milliGRAM(s) Oral two times a day  oxybutynin 5 milliGRAM(s) Oral every 8 hours  polyethylene glycol 3350 17 Gram(s) Oral daily  senna 2 Tablet(s) Oral every 12 hours    MEDICATIONS  (PRN):      Weight: 81.6kg    Weight Change: none noted at this time     Estimated energy needs:   ABW used for calculations as pt between % of IBW.  IBW 67kg, 120% IBW, BMI 28.2  Pressure Ulcer Guidelines Used    2430-2835kcal (30-35kcal/kg)  145-162g pro (1.8-2g/kg pro)  2430-2835ml (30-35ml/kg)     Subjective: 32M admitted w sepsis d/t UTI + infected sacral pressure ulcer, wound vac in place. DC orders in place for this afternoon, wound care performed this AM, no new complaints at this time. Encourage PO intake/ intake of high protein food items/ supplements after DC. Please reconsult prn.     Previous Nutrition Diagnosis: Increased nutrient needs r/t multiple pressure ulcers aeb increased needs for healing    Active [  x ]  Resolved [   ]    If resolved, new PES:     Goal: consume >75% PO, supplements    Recommendations: Ensure Enlive TID (1050 kcal, 60g protein, 540mL free H2O)     Education: n/a    Risk Level: High [ x  ] Moderate [   ] Low [   ]

## 2018-07-19 ENCOUNTER — EMERGENCY (EMERGENCY)
Facility: HOSPITAL | Age: 33
LOS: 1 days | Discharge: HOME | End: 2018-07-19
Attending: EMERGENCY MEDICINE | Admitting: EMERGENCY MEDICINE
Payer: MEDICARE

## 2018-07-19 VITALS
RESPIRATION RATE: 18 BRPM | SYSTOLIC BLOOD PRESSURE: 94 MMHG | OXYGEN SATURATION: 99 % | TEMPERATURE: 99 F | HEART RATE: 95 BPM | DIASTOLIC BLOOD PRESSURE: 55 MMHG

## 2018-07-19 VITALS
RESPIRATION RATE: 18 BRPM | HEART RATE: 69 BPM | TEMPERATURE: 97 F | DIASTOLIC BLOOD PRESSURE: 74 MMHG | OXYGEN SATURATION: 99 % | SYSTOLIC BLOOD PRESSURE: 108 MMHG

## 2018-07-19 DIAGNOSIS — F25.9 SCHIZOAFFECTIVE DISORDER, UNSPECIFIED: ICD-10-CM

## 2018-07-19 DIAGNOSIS — L89.214 PRESSURE ULCER OF RIGHT HIP, STAGE 4: ICD-10-CM

## 2018-07-19 DIAGNOSIS — G82.20 PARAPLEGIA, UNSPECIFIED: ICD-10-CM

## 2018-07-19 DIAGNOSIS — T83.598A INFECTION AND INFLAMMATORY REACTION DUE TO OTHER PROSTHETIC DEVICE, IMPLANT AND GRAFT IN URINARY SYSTEM, INITIAL ENCOUNTER: ICD-10-CM

## 2018-07-19 DIAGNOSIS — L89.312 PRESSURE ULCER OF RIGHT BUTTOCK, STAGE 2: ICD-10-CM

## 2018-07-19 DIAGNOSIS — L89.154 PRESSURE ULCER OF SACRAL REGION, STAGE 4: ICD-10-CM

## 2018-07-19 DIAGNOSIS — E87.1 HYPO-OSMOLALITY AND HYPONATREMIA: ICD-10-CM

## 2018-07-19 DIAGNOSIS — Z93.3 COLOSTOMY STATUS: Chronic | ICD-10-CM

## 2018-07-19 DIAGNOSIS — E86.0 DEHYDRATION: ICD-10-CM

## 2018-07-19 DIAGNOSIS — E11.65 TYPE 2 DIABETES MELLITUS WITH HYPERGLYCEMIA: ICD-10-CM

## 2018-07-19 DIAGNOSIS — E11.9 TYPE 2 DIABETES MELLITUS WITHOUT COMPLICATIONS: ICD-10-CM

## 2018-07-19 DIAGNOSIS — Z99.3 DEPENDENCE ON WHEELCHAIR: ICD-10-CM

## 2018-07-19 DIAGNOSIS — I95.9 HYPOTENSION, UNSPECIFIED: ICD-10-CM

## 2018-07-19 DIAGNOSIS — R56.9 UNSPECIFIED CONVULSIONS: ICD-10-CM

## 2018-07-19 DIAGNOSIS — K90.9 INTESTINAL MALABSORPTION, UNSPECIFIED: ICD-10-CM

## 2018-07-19 DIAGNOSIS — Z79.899 OTHER LONG TERM (CURRENT) DRUG THERAPY: ICD-10-CM

## 2018-07-19 DIAGNOSIS — D50.9 IRON DEFICIENCY ANEMIA, UNSPECIFIED: ICD-10-CM

## 2018-07-19 DIAGNOSIS — B96.5 PSEUDOMONAS (AERUGINOSA) (MALLEI) (PSEUDOMALLEI) AS THE CAUSE OF DISEASES CLASSIFIED ELSEWHERE: ICD-10-CM
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
## 2018-07-19 DIAGNOSIS — L89.622 PRESSURE ULCER OF LEFT HEEL, STAGE 2: ICD-10-CM

## 2018-07-19 DIAGNOSIS — B19.10 UNSPECIFIED VIRAL HEPATITIS B WITHOUT HEPATIC COMA: ICD-10-CM

## 2018-07-19 DIAGNOSIS — A41.9 SEPSIS, UNSPECIFIED ORGANISM: ICD-10-CM

## 2018-07-19 DIAGNOSIS — M86.60 OTHER CHRONIC OSTEOMYELITIS, UNSPECIFIED SITE: ICD-10-CM

## 2018-07-19 DIAGNOSIS — Z93.3 COLOSTOMY STATUS: ICD-10-CM

## 2018-07-19 DIAGNOSIS — D63.8 ANEMIA IN OTHER CHRONIC DISEASES CLASSIFIED ELSEWHERE: ICD-10-CM

## 2018-07-19 DIAGNOSIS — N31.9 NEUROMUSCULAR DYSFUNCTION OF BLADDER, UNSPECIFIED: ICD-10-CM

## 2018-07-19 DIAGNOSIS — L89.613 PRESSURE ULCER OF RIGHT HEEL, STAGE 3: ICD-10-CM

## 2018-07-19 DIAGNOSIS — Z88.8 ALLERGY STATUS TO OTHER DRUGS, MEDICAMENTS AND BIOLOGICAL SUBSTANCES STATUS: ICD-10-CM
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
## 2018-07-19 DIAGNOSIS — N39.0 URINARY TRACT INFECTION, SITE NOT SPECIFIED: ICD-10-CM

## 2018-07-19 LAB
ALBUMIN SERPL ELPH-MCNC: 2.2 G/DL — LOW (ref 3.4–5)
ALP SERPL-CCNC: 135 U/L — HIGH (ref 40–120)
ALT FLD-CCNC: 16 U/L — SIGNIFICANT CHANGE UP (ref 12–42)
ANION GAP SERPL CALC-SCNC: 8 MMOL/L — LOW (ref 9–16)
AST SERPL-CCNC: 21 U/L — SIGNIFICANT CHANGE UP (ref 15–37)
BASOPHILS NFR BLD AUTO: 0.4 % — SIGNIFICANT CHANGE UP (ref 0–2)
BILIRUB SERPL-MCNC: 0.6 MG/DL — SIGNIFICANT CHANGE UP (ref 0.2–1.2)
BUN SERPL-MCNC: 8 MG/DL — SIGNIFICANT CHANGE UP (ref 7–23)
CALCIUM SERPL-MCNC: 9.2 MG/DL — SIGNIFICANT CHANGE UP (ref 8.5–10.5)
CHLORIDE SERPL-SCNC: 100 MMOL/L — SIGNIFICANT CHANGE UP (ref 96–108)
CO2 SERPL-SCNC: 25 MMOL/L — SIGNIFICANT CHANGE UP (ref 22–31)
CREAT SERPL-MCNC: 0.78 MG/DL — SIGNIFICANT CHANGE UP (ref 0.5–1.3)
EOSINOPHIL NFR BLD AUTO: 1.6 % — SIGNIFICANT CHANGE UP (ref 0–6)
GLUCOSE SERPL-MCNC: 71 MG/DL — SIGNIFICANT CHANGE UP (ref 70–99)
HCT VFR BLD CALC: 37.3 % — LOW (ref 39–50)
HGB BLD-MCNC: 10.8 G/DL — LOW (ref 13–17)
IMM GRANULOCYTES NFR BLD AUTO: 1.4 % — SIGNIFICANT CHANGE UP (ref 0–1.5)
LYMPHOCYTES # BLD AUTO: 16.2 % — SIGNIFICANT CHANGE UP (ref 13–44)
MCHC RBC-ENTMCNC: 22.1 PG — LOW (ref 27–34)
MCHC RBC-ENTMCNC: 29 G/DL — LOW (ref 32–36)
MCV RBC AUTO: 76.3 FL — LOW (ref 80–100)
MONOCYTES NFR BLD AUTO: 8.9 % — SIGNIFICANT CHANGE UP (ref 2–14)
NEUTROPHILS NFR BLD AUTO: 71.5 % — SIGNIFICANT CHANGE UP (ref 43–77)
PLATELET # BLD AUTO: 370 K/UL — SIGNIFICANT CHANGE UP (ref 150–400)
POTASSIUM SERPL-MCNC: 4.6 MMOL/L — SIGNIFICANT CHANGE UP (ref 3.5–5.3)
POTASSIUM SERPL-SCNC: 4.6 MMOL/L — SIGNIFICANT CHANGE UP (ref 3.5–5.3)
PROT SERPL-MCNC: 8 G/DL — SIGNIFICANT CHANGE UP (ref 6.4–8.2)
RBC # BLD: 4.89 M/UL — SIGNIFICANT CHANGE UP (ref 4.2–5.8)
RBC # FLD: 22.6 % — HIGH (ref 10.3–16.9)
SODIUM SERPL-SCNC: 133 MMOL/L — SIGNIFICANT CHANGE UP (ref 132–145)
WBC # BLD: 14 K/UL — HIGH (ref 3.8–10.5)
WBC # FLD AUTO: 14 K/UL — HIGH (ref 3.8–10.5)

## 2018-07-19 PROCEDURE — 99284 EMERGENCY DEPT VISIT MOD MDM: CPT

## 2018-07-19 RX ORDER — AZTREONAM 2 G
1 VIAL (EA) INJECTION
Qty: 20 | Refills: 0 | OUTPATIENT
Start: 2018-07-19 | End: 2018-07-28

## 2018-07-19 RX ADMIN — Medication 1 TABLET(S): at 17:38

## 2018-07-19 NOTE — ED PROVIDER NOTE - MUSCULOSKELETAL NEGATIVE STATEMENT, MLM
no back pain, no gout, no musculoskeletal pain, no neck pain, and no weakness. no neck pain, and no weakness.

## 2018-07-19 NOTE — ED PROVIDER NOTE - MEDICAL DECISION MAKING DETAILS
32 y o male was brought in by EMS after APS called 911 for medical non compliance. Denies any sx. Exam unremarkable. Pt is upset and requesting to be discharged. 32 y o male was brought in by EMS after APS called 911 for non adherence.  Well known to [facility] and multiple admissions for urosepsis, discharged about 6 days ago.  Reviewed previous records and labs.  On arrival vitals stable.  Does not appear acutely ill.  Patient states "I don't know why they brought me here but I want to go home"  Patient appears lucid, follows basic commands.  Patient short and verbally combative with staff.  ON exam - non stagable ulcers without surrounding erythema or drainage but with foul odor.  Suprapubic cath site also does not appear infected.  Ostomy bag filled with normal appearing stool.  Non tender abdomen.  Lungs clear.  Wound care, basic labs, SW evaluation.  Also reviewed APS paperwork and had long discussion with SW in ED.

## 2018-07-19 NOTE — ED BEHAVIORAL HEALTH NOTE - BEHAVIORAL HEALTH NOTE
Sw was consulthed to the ED to speak with the patient. He is a 32 y o male with PMHX of Type 2 DM, paraplegia (stage 4 sacral ulcer), Hepatitis B, and colostomy bag use was bib EMS for medical evaluation. As per triage, adult protective services called 911 because the pt was non compliant with his medication or wound care. Pt is denying any physiological or psychological complaints and states that he does not want to be here.  Per the APS worker She is stating that they patient requires skilled nursing care and he uses a colostomy and catheter which he refuses to let VNA of any one assist him. She also states that his current mental health has become a detriment to himself by refusing to left anybody assist him at home. She also stated that the patient verbally abuses his home care aide, the APS worker and the EMS team that came to pick him up. She also stated that she would like him to be admitted to the hospital for further care which the patient is against at this time.     The patient was recently at [facility] for care and was released  from there on 7/16/18. This worker spoke to the senior social work staff up at [facility] which they stated that they worked for this patient for over a week and that it was a complicated case. The patient been dropped from multiple home care agencies and [facility] do to his behavior, being non compliant and want ting no assistance. They were working on getting the patient into a [facility] which he kept going back and forth with but declined and demanded to go home. The senior staff was able to arrange aide assistance for him and he was able to go home with [facility] care services. [facility] has now refused to go in and assist the patient due to him being non compliant and due to his behavior per the APS worker.    The patient stated that he does not know why he was brought here and that he just wants to go home. He is A & 0 X3 and can not be held against his will. The patient stated that his aide is suppose to be with him but that she is not good and did not show up this morning and that he is in the process of switching home care agencies to get further assistance. The patient signed out AMA as the discharge is not in agreement with what the medical team in recommending. the patient stated that he did not care and again he demanded to go home. [place] home care was contacted and this worker spoke to the patients team to try and get his aide reinstated for him. The agency declined to reinstate his aide as his CM stated that he was told that he needed to be admitted and that he would not get another aide until he is released from the hospital. The patient was informed that  if he was to return home that he would have no assistance at all to help him with anything. The patient expressed understanding and stated that he did not care, that he was going to switch companies any way and that he has already asked for a switch in companies. The patient still demanded to go home. Them team including psychiatry was consulted and it was demeaned that no psychiatry intervention was needed at this time. The patient signed AMA paperwork after the team again tried to speak to the patient about being admitted to the hospital for proper care. The patients home care agency was notified about the patients decision and a message as well as an email was left for the patients APS worker Nicolle. The patient requested transportation home only because he did not have his wheelchair. He stated that if he had his wheelchair that he would have used that to leave. [facility]  ([phone]) was called for transportation. It was arranged for a 7pm  with trip number [id number]. The patient is in agreement with the transportation company as well as time. The team has been notified about the transportation. Worker made available for any further assistance needed.        APS: Nicolle Morrison [phone]  [email or web address]    Home Care Agency: [facility] : [phone] ext [phone] : [place]

## 2018-07-19 NOTE — ED PROVIDER NOTE - MUSCULOSKELETAL, MLM
Spine appears normal, no limited ROM apart from paraplegia, no muscle or joint tenderness Contracted lower extremities.  Unstagable multiple sacral and ischial decubiti.  Foul odor, no drainage present.

## 2018-07-19 NOTE — ED PROVIDER NOTE - PSYCHIATRIC, MLM
Alert and oriented to person, place, time/situation. short with staff and interrupts conversation often.  Keeps saying "please send me home, I don't want to be admitted to the hospital"

## 2018-07-19 NOTE — ED PROVIDER NOTE - CONSTITUTIONAL, MLM
Well appearing, well nourished, awake, alert, oriented to person, place, time/situation and in no apparent distress. normal... awake, alert, oriented to person, place, time/situation and in no apparent distress.

## 2018-07-19 NOTE — ED PROVIDER NOTE - NEUROLOGICAL, MLM
Alert and oriented, no focal deficits, no motor or sensory deficits. Alert and oriented x 3.  Clear speech.  Follows basic commands.

## 2018-07-19 NOTE — ED ADULT NURSE REASSESSMENT NOTE - NS ED NURSE REASSESS COMMENT FT1
pt states he does not want to be seen and does not know why the ambulate was called .pt has no complaints . pt has multiple pressure ulcers to sacrum and buttocks folds and scrotum with tunneling. pt also has 2 pressure ulcers to left posterior ankle with yellow drainage. pt has stage 2 pressure ulcer to right lateral ankle. all ulcers cleansed with nss, and apply Medihoney and abd pads as per dr. vila order.  aware of pt condition and at bedside.
pt is refusing placement to a subacute rehab, admission. does not want to be admitted. pt states he wants to leave ama. md and  at bedside
pt refused last set of vitals- is picked up by senior care back to home
pt turned and repositioned. given meal.
TOKEN:'[email or web address]',TOKEN:'[email or web address]'

## 2018-07-19 NOTE — ED PROVIDER NOTE - OBJECTIVE STATEMENT
32 y o male with PMHX of Type 2 DM, paraplegia (stage 4 sacral ulcer), Hepatitis B, and colostomy bag use was bib EMS for medical evaluation. As per triage, adult protective services called 911 because the pt was non compliant with his medication or wound care. Pt is denying any physiological or psychological complaints and states that he does not want to be here. Denies fever, chills, N/V/D, chest pain, SOB, or any other sx.     APS: Nicolle Morrison [phone]  [email or web address] 32 y o male with PMHX of Type 2 DM, paraplegia (stage 4 sacral ulcer), Hepatitis B, and colostomy bag use was bib EMS for medical evaluation. As per triage, adult protective services called 911 because the pt was non compliant with his medication or wound care. Pt is denying any physiological or psychological complaints and states that he does not want to be here. Denies fever, chills, N/V/D, chest pain, SOB, or any other sx.  Admitted to [facility] on 7/7 for urosepsis and kept in the hospital for one week.  As per discharge note admitted for catheter associated UTI.  Given fluids, abx with improvement of vitals.  His wounds were management by wound care team while in the hospital.      APS: Nicolle Morrison [phone]  [email or web address]

## 2018-07-19 NOTE — ED PROVIDER NOTE - GASTROINTESTINAL, MLM
Abdomen soft, non-tender, no guarding. Abdomen soft, non-tender, no guarding.  Suprapubic catheter in place with granulation tissue present at the site

## 2018-07-19 NOTE — ED ADULT TRIAGE NOTE - CHIEF COMPLAINT QUOTE
Pt. brought in by EMS, as per EMS Adult protective services called 911 because pt. is non compliant with his medications or wound care. Pt. is a paraplegic with stage 4 sacral ulcer and left ankle ulcer, and colostomy bag. On assessment pt. reports not wanting to be here, has no complaints.

## 2018-07-19 NOTE — ED PROVIDER NOTE - CHPI ED SYMPTOMS NEG
no chills/no nausea/no vomiting/no numbness/no decreased eating/drinking/no pain/no weakness/no dizziness/no fever/no tingling

## 2019-01-12 ENCOUNTER — EMERGENCY (EMERGENCY)
Facility: HOSPITAL | Age: 34
LOS: 1 days | Discharge: HOME | End: 2019-01-12
Admitting: EMERGENCY MEDICINE
Payer: MEDICARE

## 2019-01-12 VITALS
TEMPERATURE: 98 F | SYSTOLIC BLOOD PRESSURE: 119 MMHG | RESPIRATION RATE: 18 BRPM | HEART RATE: 94 BPM | OXYGEN SATURATION: 100 % | DIASTOLIC BLOOD PRESSURE: 51 MMHG

## 2019-01-12 DIAGNOSIS — G47.00 INSOMNIA, UNSPECIFIED: ICD-10-CM

## 2019-01-12 DIAGNOSIS — Z93.3 COLOSTOMY STATUS: Chronic | ICD-10-CM

## 2019-01-12 DIAGNOSIS — Z79.2 LONG TERM (CURRENT) USE OF ANTIBIOTICS: ICD-10-CM

## 2019-01-12 DIAGNOSIS — G82.20 PARAPLEGIA, UNSPECIFIED: ICD-10-CM

## 2019-01-12 DIAGNOSIS — Z88.8 ALLERGY STATUS TO OTHER DRUGS, MEDICAMENTS AND BIOLOGICAL SUBSTANCES: ICD-10-CM

## 2019-01-12 DIAGNOSIS — Z79.899 OTHER LONG TERM (CURRENT) DRUG THERAPY: ICD-10-CM

## 2019-01-12 PROCEDURE — 99284 EMERGENCY DEPT VISIT MOD MDM: CPT | Mod: 25

## 2019-01-12 PROCEDURE — 99283 EMERGENCY DEPT VISIT LOW MDM: CPT

## 2019-01-12 PROCEDURE — 99053 MED SERV 10PM-8AM 24 HR FAC: CPT

## 2019-01-12 NOTE — ED ADULT NURSE NOTE - NSIMPLEMENTINTERV_GEN_ALL_ED
Implemented All Universal Safety Interventions:  [facility] to call system. Call bell, personal items and telephone within reach. Instruct patient to call for assistance. Room bathroom lighting operational. Non-slip footwear when patient is off stretcher. Physically safe environment: no spills, clutter or unnecessary equipment. Stretcher in lowest position, wheels locked, appropriate side rails in place.

## 2019-01-12 NOTE — ED PROVIDER NOTE - OBJECTIVE STATEMENT
The pt is a 34 y/o paraplegic M, BIBA, c/o insomnia x 3 -4 mon, no acute changes in symptoms tonight. Pt states that was Rx'd ambien and ativan by pmd, but still unable to sleep, here requesting "to be put to sleep for few hrs". Denies any actual physical c/o, no si/hi/ah/vh.

## 2019-01-12 NOTE — ED ADULT NURSE NOTE - OBJECTIVE STATEMENT
33Y male states he has not been able to sleep, with no other complaints. states he just wants something to allow him to sleep. pt is awake and alert, no sob, skin warm and dry, colostomy noted to abd. with liquid stool, obwles cath in place. denies pain.z flec boot in place to jose lower extremity

## 2019-01-12 NOTE — ED PROVIDER NOTE - NSFOLLOWUPINSTRUCTIONS_ED_ALL_ED_FT
Insomnia  Insomnia is a sleep disorder that makes it difficult to fall asleep or to stay asleep. Insomnia can cause tiredness (fatigue), low energy, difficulty concentrating, mood swings, and poor performance at work or school.  There are three different ways to classify insomnia:  Difficulty falling asleep.  Difficulty staying asleep.  Waking up too early in the morning.  Any type of insomnia can be long-term (chronic) or short-term (acute). Both are common. Short-term insomnia usually lasts for three months or less. Chronic insomnia occurs at least three times a week for longer than three months.  What are the causes?  Insomnia may be caused by another condition, situation, or substance, such as:  Anxiety.  Certain medicines.  Gastroesophageal reflux disease (GERD) or other gastrointestinal conditions.  Asthma or other breathing conditions.  Restless legs syndrome, sleep apnea, or other sleep disorders.  Chronic pain.  Menopause. This may include hot flashes.  Stroke.  Abuse of alcohol, tobacco, or illegal drugs.  Depression.  Caffeine.  Neurological disorders, such as Alzheimer disease.  An overactive thyroid (hyperthyroidism).  The cause of insomnia may not be known.  What increases the risk?  Risk factors for insomnia include:  Gender. Women are more commonly affected than men.  Age. Insomnia is more common as you get older.  Stress. This may involve your professional or personal life.  Income. Insomnia is more common in people with lower income.  Lack of exercise.  Irregular work schedule or night shifts.  Traveling between different time zones.  What are the signs or symptoms?  If you have insomnia, trouble falling asleep or trouble staying asleep is the main symptom. This may lead to other symptoms, such as:  Feeling fatigued.  Feeling nervous about going to sleep.  Not feeling rested in the morning.  Having trouble concentrating.  Feeling irritable, anxious, or depressed.  How is this treated?  Treatment for insomnia depends on the cause. If your insomnia is caused by an underlying condition, treatment will focus on addressing the condition. Treatment may also include:  Medicines to help you sleep.  Counseling or therapy.  Lifestyle adjustments.  Follow these instructions at home:  Take medicines only as directed by your health care provider.  Keep regular sleeping and waking hours. Avoid naps.  Keep a sleep diary to help you and your health care provider figure out what could be causing your insomnia. Include:  When you sleep.  When you wake up during the night.  How well you sleep.  How rested you feel the next day.  Any side effects of medicines you are taking.  What you eat and drink.  Make your bedroom a comfortable place where it is easy to fall asleep:  Put up shades or special blackout curtains to block light from outside.  Use a white noise machine to block noise.  Keep the temperature cool.  Exercise regularly as directed by your health care provider. Avoid exercising right before bedtime.  Use relaxation techniques to manage stress. Ask your health care provider to suggest some techniques that may work well for you. These may include:  Breathing exercises.  Routines to release muscle tension.  Visualizing peaceful scenes.  Cut back on alcohol, caffeinated beverages, and cigarettes, especially close to bedtime. These can disrupt your sleep.  Do not overeat or eat spicy foods right before bedtime. This can lead to digestive discomfort that can make it hard for you to sleep.  Limit screen use before bedtime. This includes:  Watching TV.  Using your smartphone, tablet, and computer.  Stick to a routine. This can help you fall asleep faster. Try to do a quiet activity, brush your teeth, and go to bed at the same time each night.  Get out of bed if you are still awake after 15 minutes of trying to sleep. Keep the lights down, but try reading or doing a quiet activity. When you feel sleepy, go back to bed.  Make sure that you drive carefully. Avoid driving if you feel very sleepy.  Keep all follow-up appointments as directed by your health care provider. This is important.  Contact a health care provider if:  You are tired throughout the day or have trouble in your daily routine due to sleepiness.  You continue to have sleep problems or your sleep problems get worse.

## 2019-01-12 NOTE — ED PROVIDER NOTE - MEDICAL DECISION MAKING DETAILS
pt wanting to get meds and "be put to sleep for few hrs", claims insomnia x 3-4 mon, no acute changes in any symptoms tonight, no psych c/o, pt explained that needs to f/u w/his pmd for meds rx, advised to try melatonin in meantime, stable for dc

## 2019-01-12 NOTE — ED ADULT NURSE NOTE - NSHISCREENINGQ1_ED_A_ED
SUBJECTIVE:   Polly Gonzalez is a 78 year old female who presents for Preventive Visit:   Healthy Habits:    Do you get at least three servings of calcium containing foods daily (dairy, green leafy vegetables, etc.)? yes    Amount of exercise or daily activities:: nothing regular - very stationary     Problems taking medications regularly Yes  - Az helps    Medication side effects: No    Have you had an eye exam in the past two years? yes    Do you see a dentist twice per year? yes    Do you have sleep apnea, excessive snoring or daytime drowsiness? no  Concerns:    Sold home in [place] and purchased a home [place] of [place] [[place] acres and a single floor house]    Dementia on Aricept: follows with neurologist. [long term care insurance]. Can do her own ADL's. Verbal communication is less.     Doing more picking of skin/face. She has a number of facial excoriations.    Getting support in the home: ADRC: [phone].  Getting a couple hours a week    Az is her primary support. She says he will do everything to keep her from going to a nursing home    They have not identified a physician closer to their home in [place]   - Hypothyroidism on levothyroxine 100 mcg  - Neck pain persists- uses neck brace in car.   Past Medical History:   Diagnosis Date     Asthma      GERD (gastroesophageal reflux disease)      HX: breast cancer      Hyperlipidemia LDL goal < 130      Hypothyroidism      Mild dementia      Mild major depression (H)      Past Surgical History:   Procedure Laterality Date     BLEPHAROPLASTY  09     BUNIONECTOMY DANICA BILATERAL  1993     face life  09     HERNIA REPAIR, INCISIONAL  1998    ?   LLQ     MASTECTOMY, FLAP TRAM PEDICLE, COMBINED  1992     ROTATOR CUFF REPAIR RT/LT  1990/2001     Social History   Substance Use Topics     Smoking status: Never Smoker     Smokeless tobacco: Never Used     Alcohol use 0.0 oz/week     0 Standard drinks or equivalent  per week      Comment: rare     The patient does not drink >3 drinks per day nor >7 drinks per week.  Today's PHQ-2 Score:   PHQ-2 ( 1999 Pfizer) 9/6/2016 1/7/2016   Q1: Little interest or pleasure in doing things - -   Q2: Feeling down, depressed or hopeless - -   PHQ-2 Score - -   Q1: Little interest or pleasure in doing things Several days Several days   Q2: Feeling down, depressed or hopeless Several days Several days   PHQ-2 Score 2 2   Do you feel safe in your environment - Yes  Do you have a Health Care Directive?: Advance care planning reviewed with patient; information given to patient to review.   Current providers sharing in care for this patient include: Patient Care Team:  Ghazal Lee MD as PCP - General  Fidelia Wharton  Care Manager  Keith Ferraro MD as MD (Internal Medicine)  Yanick Cat MD as Resident  Sabino Hunter MD as MD (Allergy & Immunology)  Alejandro Connolly MD as MD (Ophthalmology)    Hearing impairment: No    Ability to successfully perform activities of daily living: needs to be watched closely.  Fall risk:  Home safety: no throw rugs in the hallway. Az states he does not leave her alone.   The following health maintenance items are reviewed in Epic and correct as of today:  Health Maintenance   Topic Date Due     DANIEL QUESTIONNAIRE 1 YEAR  07/25/1958     DEPRESSION ACTION PLAN  07/25/1958     ADVANCE DIRECTIVE PLANNING Q5 YRS  07/25/1994     FALL RISK ASSESSMENT  07/25/2004     PNEUMOCOCCAL (1 of 2 - PCV13) 07/25/2004     ASTHMA CONTROL TEST Q6 MOS  03/23/2015     ASTHMA ACTION PLAN Q1 YR  09/04/2015     PHQ-9 Q3 MONTHS  09/30/2015     INFLUENZA VACCINE (1) 09/01/2018     TETANUS IMMUNIZATION (SYSTEM ASSIGNED)  03/16/2020     LIPID SCREEN Q5 YR FEMALE (SYSTEM ASSIGNED)  10/11/2022     DEXA SCAN SCREENING (SYSTEM ASSIGNED)  Completed   ROS:  C: NEGATIVE for fever, chills, change in weight  E/M: NEGATIVE for ear, mouth and throat problems  R:  "NEGATIVE for significant cough or SOB  CV: NEGATIVE for chest pain, palpitations or peripheral edema  OBJECTIVE:   /75  Pulse 65  Ht 1.575 m (5' 2\")  Wt 79.8 kg (176 lb)  BMI 32.19 kg/m2 Estimated body mass index is 32.03 kg/(m^2) as calculated from the following:    Height as of 10/11/17: 1.575 m (5' 2\").    Weight as of 10/11/17: 79.4 kg (175 lb 1.6 oz).  EXAM:   Well dressed - presents with male partner -  RANJAN. Flat affect and sits quietly through the visit.   Word finding difficulty and slow in responses.  Limited interaction.Tearful when discussing the lack of Cat in the home [jorge l is allergic]  Psychological: appropriate mood.Quiet.  Slow recall on speech  Facial excoriations     Neck: No thyroidmegaly. .  Cardiovascular: regular rate and rhythm, normal S1 and S2, no murmurs, rubs or gallops, peripheral pulses full and symmetric   Gastrointestinal: positive bowel sounds, nontender, no hepatosplenomegaly, no masses. No guarding or rebound.  Musculoskeletal: full range of motion    Skin: facial excoriation from picking.   Neurological: normal gait, no tremor.   ASSESSMENT / PLAN:   Diagnoses and associated orders for this visit:  Annual physical exam      - Advised finding a MD in [place] or [place]      - Mammogram up to date       - Labs today         - Encouraged advanced directive [wish is to stay out of the nursing home]  Skin excoriation  -     mupirocin (BACTROBAN) 2 % external ointment; Apply topically 3 times daily  Acquired hypothyroidism      -  Continue with Synthroid 100 mg      -  TSH labs today   Mild persistent asthma without complication      -  Advair and Albuterol - rarely needed without cats in the home   Memory loss/dementia with more decline over the past year        - Will see Neurologist at [facility] [January 15, 2019].        - Continue with Aricept       - Continue to look at county support and use of long term insurance  End of Life Planning:  Patient currently " "has an advanced directive: Az is her advocate  Estimated body mass index is 32.03 kg/(m^2) as calculated from the following:    Height as of 10/11/17: 1.575 m (5' 2\").    Weight as of 10/11/17: 79.4 kg (175 lb 1.6 oz).     reports that she has never smoked. She has never used smokeless tobacco.  Appropriate preventive services were discussed with this patient, including applicable screening as appropriate for cardiovascular disease, diabetes, osteopenia/osteoporosis, and glaucoma.  As appropriate for age/gender, discussed screening for colorectal cancer, prostate cancer, breast cancer, and cervical cancer. Checklist reviewing preventive services available has been given to the patient.  Reviewed patients plan of care and provided an AVS. The Basic Care Plan (routine screening as documented in Health Maintenance) for Polly meets the Care Plan requirement. This Care Plan has been established and reviewed with the Patient.  Counseling Resources:  ATP IV Guidelines  Pooled Cohorts Equation Calculator  Breast Cancer Risk Calculator  FRAX Risk Assessment  ICSI Preventive Guidelines  Dietary Guidelines for Americans, 2010  USDA's MyPlate  ASA Prophylaxis  Lung CA Screening    Ghazal Lee MD  [facility]  " No

## 2019-01-13 VITALS
SYSTOLIC BLOOD PRESSURE: 114 MMHG | TEMPERATURE: 98 F | OXYGEN SATURATION: 98 % | HEART RATE: 56 BPM | DIASTOLIC BLOOD PRESSURE: 74 MMHG | RESPIRATION RATE: 18 BRPM

## 2019-02-15 NOTE — PROGRESS NOTE BEHAVIORAL HEALTH - NSBHCONSULTMEDS_PSY_A_CORE FT
Please call Charito about her questions about fluconazole.  Taking it  will affect tacrolimus level,  Check with her how long she will be taking? If taking longer than 3 days, let her know that we would have her check her tacrolimus level , and will   adjust dose while she is on it.   It is fine to take it.    Continue Haldol 10 mg po qhs (court ordered).

## 2019-02-27 NOTE — ADVANCED PRACTICE NURSE CONSULT - RECOMMEDATIONS
Re-consult if needed.
Will continue to follow for ostomy teaching
Will visit 2/16 before discharge to reinforce ostomy care  teaching.  Left ischial and sacral pressure injury- irrigate with normal saline, pack lightly with Aquacel Extra, cover with foam dressing every other day or prn if saturated.  Discussed assessment and recommendations with patient, RN, Lucin and house staff.
Continue VAC dressing twice weekly.   Will follow patient to change VAC dressing.
Cut hydrofiber into spiral and loosely pack into both wounds, cover with foam dressing. Spoke with RN Beata and house staff.
Discontinue VAC dressing.   Irrigated wounds with normal saline, packed lightly with Aquacel Extra, and foam dressing.   Discussed assessment and recommendations with RNAyla and house staff.
Home care nurse to reinforce ostomy instruction and change dressings to left ischial and sacral pressure injuries.
Recommend Collagenase to site, will reassess for possible vac placement. Spoke with DIXON Torres and house staff.
Recommend VAC dressing, packing white foam lightly to tunneling, then black foam at 125 mm/hg continuously twice weekly.  Discussed assessment and recommendations with RNSulaiman and house staff. House staff to discuss recommendations and notify [facility] of decision.
Sacral and ischial tuberosity pressure injuries - irrigate with wound cleanser, pack lightly with wet to damp Dakin's 1/4 strength solution kerlix and cover with foam dressing daily x 7 days.  Left foot lateral aspect ulcer - cleanse with wound cleanser, apply foam dressing every 3 days or prn if soiled or wet.  Fungal rash on bilateral buttocks - apply nystatin powder, seal with liquid skin barrier.  Fungal rash on bilateral inguinal area - apply nystatin powder.   Discussed assessment and recommendations with Kaila and house staff.
Will continue to follow for ostomy teaching.
Will continue to follow.
Will continue to follow. Spoke with DIXON Chino
no

## 2019-05-23 NOTE — ED ADULT NURSE NOTE - NSFALLRSKASSESASSIST_ED_ALL_ED
no Rhomboid Transposition Flap Text: The defect edges were debeveled with a #15 scalpel blade.  Given the location of the defect and the proximity to free margins a rhomboid transposition flap was deemed most appropriate.  Using a sterile surgical marker, an appropriate rhomboid flap was drawn incorporating the defect.    The area thus outlined was incised deep to adipose tissue with a #15 scalpel blade.  The skin margins were undermined to an appropriate distance in all directions utilizing iris scissors.

## 2019-09-03 NOTE — PATIENT PROFILE ADULT. - PT NEEDS ASSIST
Referral to Derm pended for your review and approval, please add dx code and referral provider. Thank you.
yes

## 2019-11-15 NOTE — PROGRESS NOTE ADULT - PROBLEM SELECTOR PLAN 6
Chief complaint:   Chief Complaint   Patient presents with   • Derm Problem     sores on stomach X 2 weeks     Vitals:  Visit Vitals  /86   Pulse 72   Resp 14   Ht 5' 7\" (1.702 m)   Wt 95.3 kg   LMP 04/01/2019 (Approximate)   BMI 32.92 kg/m²     210.2#  HISTORY OF PRESENT ILLNESS     HPI 29 year old female here for skin lesions on lower abdomen 2 weeks ago. Last episode was in 2/2019; pt was tx w/ po Doxycycline for 1 week and Hibiclens wash for a month, helped.  has no sx; no correlation or skin lesions s/p sexual intimacy. Pt was having skin lesions constantly prior to tx.     No f/c, no HENT sx. no n/v, no diarrhea or constipation, no heart or lung problems. No urinary sx. No easy bruising. No more than usual joint or muscle pains.  No slurred speech, weakness, dropping items, gait imbalance, visual changes.     She is having higher BP readings; SBP normally is at 120s. She is having \"jittery\" sensation since starting rx from neurology service.    My supervising physician is Dr. Rhea Akers. PHQ2 is zero. She has 3 yo dtr and 16 month old son.    ASSESSMENT:  1. Cellulitis and abscess of trunk      PLAN:  No orders of the defined types were placed in this encounter.  consider dermatology referral prn    Continue to push water intake  Keep all other appt as scheduled  Continue all other rx as dir; wash Hibiclens 1x/week to skin  Call back for refills.  Return if symptoms worsen or fail to improve.    Other significant problems:  Patient Active Problem List    Diagnosis Date Noted   • Obesity (BMI 30-39.9) 10/06/2016     Priority: Low       PAST MEDICAL, FAMILY AND SOCIAL HISTORY     Medications:  Current Outpatient Medications   Medication   • sertraline (ZOLOFT) 50 MG tablet   • gabapentin (NEURONTIN) 800 MG tablet   • ibuprofen (MOTRIN) 600 MG tablet   • levonorgestrel (MIRENA, 52 MG,) (52 MG) 20 MCG/DAY intrauterine device   • Multiple Vitamins-Minerals (DAILY VITAMIN PLUS PO)   • protriptyline  (VIVACTIL) 5 MG tablet     No current facility-administered medications for this visit.        Allergies:  ALLERGIES:  No Known Allergies    Past Medical  History/Surgeries:  Past Medical History:   Diagnosis Date   • Abnormal Pap smear of cervix     HPV   • Acne    • Atypical pneumonia 10/2014   • Chicken pox     age 4   • Costochondritis    • Depression with anxiety 2015   • Occipital neuralgia 2019   • RAD (reactive airway disease)    • Seasonal allergies        Past Surgical History:   Procedure Laterality Date   •  section, low transverse  2015   •  section, low transverse  2018    Repeat  Section    • Cryotherapy      cervix:     • Intrauterine device insertion  2018    NDC: 50448-112-96;  lot: [id number] exp: 2020  to be removed 2023   • Laparoscopy,lysis of adhesions  2018    Lysis of Omental Adhesions to Bladder Flap   • Pap smear  07/15/2014   • [place] tooth extraction         Family History:  Family History   Problem Relation Age of Onset   • High blood pressure Father    • High cholesterol Father    • Diabetes Father         type I diabetes   • Heart Father         2 stents in heart, two stents in leg   • Other Father         part of colon removed due to benign growth   • Hypertension Father    • Systemic Lupus Erythematosus Sister    • Other Mother         Diverticulitis   • Diabetes Maternal Grandmother    • Myocardial Infarction Maternal Grandmother    • Cerebrovascular Accident Maternal Grandmother    • Diabetes Maternal Grandfather    • Myocardial Infarction Maternal Grandfather    • Cerebrovascular Accident Maternal Grandfather    • Stroke Maternal Grandfather    • Diabetes Paternal Grandmother    • Dementia/Alzheimers Paternal Grandmother    • Cancer Paternal Grandfather         Bone       Social History:  Social History     Tobacco Use   • Smoking status: Never Smoker   • Smokeless tobacco: Never Used   Substance Use  Topics   • Alcohol use: No     Alcohol/week: 0.0 standard drinks     Frequency: Never     Drinks per session: 1 or 2     Binge frequency: Never       REVIEW OF SYSTEMS     Review of Systems As above.    PHYSICAL EXAM     Physical Exam     Constitutional: She appears well-developed and well-nourished. No distress. Pleasant, friendly demeanor. Nervous.   HENT:   Head: Normocephalic and atraumatic. Short dark blonde hair, styled.     Eyes: Conjunctivae are normal.   BL ears: External ear normal. No decreased hearing is noted.   Neck: Normal range of motion. Neck supple. Thyroid normal. No adenopathy. No tenderness is present.   Cardiovascular: Normal rate, regular rhythm, S1 normal and S2 normal.  no BL carotid bruit.  Pulmonary/Chest: Effort normal and breath sounds normal. No respiratory distress. She has no wheezes. She has no rhonchi. She has no rales.   Lymphadenopathy: No anterior cervical adenopathy, posterior cervical adenopathy, anterior occipital adenopathy or posterior occipital adenopathy.   Neurological: She is alert. Gait normal.  Skin: She is not diaphoretic + crusted lesion on L lower abdominal area.    BL LEs: Soft. No redness, pain with palpation. No edema.  Abdomen: no CVAT BL.  Abdomen: + central obesity.  Spine: NTTP of CS-LS paravertebral ms and spinous processes.    ASSESSMENT/PLAN     As above.     Pt w/ a Hgb of 9.1 and MCV of 74 on admission. Hgb has been slowly downtrending but no active signs of bleeding. Iron studies showing iron deficiency.   -will maintain active type and screen  -Hgb stable, no evidence of active bleeding  -will transfuse for Hgb <7

## 2020-01-18 NOTE — PROGRESS NOTE BEHAVIORAL HEALTH - NSBHADMITCOORDWITH_PSY_A_CORE
Ortho PA Note    Informed by Ana Cristina- Patient noncompliant with his diet as he is eating outside food. Fingerstick after lunch greater   than 300-->patient to receive insulin per ss.      RAFAEL Hicks  Orthopedic Surgery  [phone] Ortho PA Note    Informed by RNLisa- Patient noncompliant with his diet as he is eating outside food.   Fingerstick after lunch [phone]->patient to receive insulin per ss.      RAFAEL Hicks  Orthopedic Surgery  [phone] family/Caregiver/social work

## 2020-04-21 NOTE — PRE-OP CHECKLIST - BP NONINVASIVE SYSTOLIC (MM HG)
Pt BIB EMS for SOB and bilateral pneumonia.  Pt on zosyn and zpack over past week.  Pt conditions worsened, with increased respiratory rate and pneumonia on x-ray.
98

## 2020-10-29 NOTE — PATIENT PROFILE ADULT. - HEALTHCARE QUESTIONS, PROFILE
Bed: T1  Expected date:   Expected time:   Means of arrival:   Comments:  Hand burn   Pts left hand cleansed for wounds, bacitracin applied, and nonadherent dressing. Pt has full ROM of left hand, denies pain. Pt verbalized understanding of d/c instructions. Pt is a&ox4/4, calm and cooperative.   Atrial flutter, unspecified type None

## 2020-12-10 NOTE — PROGRESS NOTE ADULT - PROBLEM SELECTOR PROBLEM 2
Sacral osteomyelitis Debridement Text: The wound edges were debrided prior to proceeding with the closure to facilitate wound healing.

## 2021-01-20 NOTE — PROGRESS NOTE BEHAVIORAL HEALTH - NSBHADMITTIME_PSY_A_CORE
35 Rhofade Counseling: Rhofade is a topical medication which can decrease superficial blood flow where applied. Side effects are uncommon and include stinging, redness and allergic reactions.

## 2021-06-08 NOTE — PROGRESS NOTE BEHAVIORAL HEALTH - NS ED BHA MED ROS HEMATOLOGIC LYMPHATIC
Patient informed of results from [id number]    Test result for Chromosomes 21, 18, & 13  negative    Patient wants to know sex of baby?   YES      male fetus    Original result sent for scanning  Problem list updated with result information at Provider request     No complaints

## 2021-09-16 NOTE — DIETITIAN INITIAL EVALUATION ADULT. - PERTINENT MEDS FT
Chief Complaint   Patient presents with    Nail Problem     states L great toenail stopped growing    Foot Pain     states L foot hurts when she sits down and gets back up states pain is coming from the heel     1. Have you been to the ER, urgent care clinic since your last visit? Hospitalized since your last visit? No    2. Have you seen or consulted any other health care providers outside of the [address] [place] [address] since your last visit? Include any pap smears or colon screening.  No  PCP-Dr Ronald Luu Humalog,Vitamin C,Lipitor,Vanco,lantus,nystatin

## 2022-08-20 NOTE — PROGRESS NOTE ADULT - PROBLEM SELECTOR PLAN 5
Pseudogout  - Tapped by Ortho, fluid reviewed: does not appear to be gout or septic arthritis, likely pseudogout (calcium pyrophosphate crystals on previous LE aspiration)  - PRN analgesics provided  -improved after steroid injection  8/19- resolved   s/p suprapubic catheter

## 2022-08-22 NOTE — PROGRESS NOTE BEHAVIORAL HEALTH - AFFECT QUALITY
Airway  Performed by: Constantine Melo CRNA  Authorized by: Evens Gaona MD     Final Airway Type:  Endotracheal airway  Final Endotracheal Airway*:  ETT  ETT Size (mm)*:  7.5  Cuff*:  Regular  Technique Used for Successful ETT Placement:  Direct laryngoscopy  Devices/Methods Used in Placement*:  Mask  Intubation Procedure*:  Preoxygenation, ETCO2, Atraumatic, Dentition Unchanged and Pharynx Clear  Insertion Site:  Oral  Blade Type*:  MAC  Blade Size*:  4  Placement Verified by: auscultation and capnometry    Glottic View*:  2 - partial view of glottis  Attempts*:  1   Patient Identified, Procedure confirmed, Emergency equipment available and Safety protocols followed  Location:  OR  Urgency:  Elective  Difficult Airway: No    Indications for Airway Management:  Anesthesia  Mask Difficulty Assessment:  1 - vent by mask  Performed By:  CRNA  CRNA:  Constantine Mleo CRNA  Start Time: 8/22/2022 7:36 AM Other

## 2022-09-21 NOTE — PROGRESS NOTE BEHAVIORAL HEALTH - NSBHFUPINTERVALHXFT_PSY_A_CORE
From: Nora Brown  To: Dr. Hieu Palomino  Sent: 9/21/2022 6:47 AM EDT  Subject: Loreatha Pencil and Manual Andrey Dr. Markus Bishop, emailing to see if my son Jill Kruse can get the same prescription you just gave my Wife Gatito Mariscal for her hand because Jill Kruse has the same rash but much worse than Mechells. He came to [place] with a hydrocortisone cream prescription from [facility] 2 yrs ago and Dr. Dary Buckley continued it for him here in [place] that you told him to use last year when his hands broke out afar but it wasnt very effective it did take away the rash eventually. I was hoping Jill Kruse could try the cream you just prescribed for Mechell yesterday? I will tell him to contact you even though these young adults procrastinate way too much, lol Thank you Both of his hands got really bad last fall/ winter and its back again! Thanks for all you do for Rosa [facility] Lorenzo and myself, we know you are the best! Take care and stay safe. Jill Kruse uses like I do , [facility] on [address] in [facility]. Pt seen with aunt, Ayla Morton, and Ms. Slaughter, to discuss dispo plans.  Possible partial hospitalization and/or ACT follow-up discussed.  Pt's aunt expressed concern regarding pt leaving his home if he is accepted to partial hospitalization program, especially during the cold winter weather.  Ms. Slaughter informed pt and his aunt that he would be eligible for transportation given that he has Medicaid.    Pt remains prepared to have colostomy procedure tomorrow.  Pt stated that he would prefer to use public transportation to go to any program.    Pt and aunt in agreement regarding proposed plan(s).

## 2022-10-05 NOTE — PROGRESS NOTE BEHAVIORAL HEALTH - NS ED BHA MED ROS NEUROLOGICAL
Patient seen today for a targeted Level II for suboptimal cardiac views on office scan. Possible ASD. Report + fetal movement, denies bleeding, leaking of fluid or pain. No complaints

## 2022-10-18 NOTE — PROGRESS NOTE ADULT - PROBLEM SELECTOR PROBLEM 3
PRIOR AUTHORIZATION DENIED    Medication: Saxenda 18mg/3ml Inj    Denial Date: 10/18/2022    Denial Rational: Medication is excluded           Type 2 diabetes mellitus

## 2022-11-01 NOTE — PHYSICAL THERAPY INITIAL EVALUATION ADULT - WORK/LEISURE ACTIVITY, REHAB EVAL
[address]  Department of Emergency Medicine   ED  Encounter Note  Admit Date/RoomTime: 2022 10:30 AM  ED Room: [place] [address]    NAME: Leda Mcardle  : 1992  MRN: [id number]     Chief Complaint:  Dental Pain (Right side dental pain for 4 days)    History of Present Illness        Leda Mcardle is a 31 y.o. old female who presents to the emergency department by private vehicle, for non-traumatic left upper, left lower tooth pain, which occured 4 day(s) prior to arrival.  Since onset the symptoms have been persistent and mild in severity. Worsened by  cold liquids and improved by heat. Associated Signs & Symptoms:  facial pain left. Patient has radiation of pain up into her ear into her jaw. Patient can open and close mouth ease. Patient denies any trauma. Patient states she has not called her dentist.  Patient states \"this is worse than childbirth. \"  Patient denies any fevers or chills. Patient still tolerating food and drink           Onset:       Spontaneous:   yes. Following Trauma:   No.     Previous Caries:   yes. Recent Dental Procedure:   No.     ROS   Pertinent positives and negatives are stated within HPI, all other systems reviewed and are negative. Past Medical History:  has a past medical history of Back pain, Gave birth to child recently, and Scoliosis. Surgical History:  has a past surgical history that includes Dilation and curettage of uterus (2010). Social History:  reports that she has been smoking cigarettes. She has been smoking an average of .25 packs per day. She has never used smokeless tobacco. She reports that she does not drink alcohol and does not use drugs. Family History: family history includes Anxiety Disorder in her mother; Cancer in her maternal grandfather; Diabetes in her father; Heart Disease in her maternal grandfather. Allergies: Patient has no known allergies.     Physical Exam   Oxygen Saturation Interpretation: Normal.        ED Triage Vitals   BP Temp Temp Source Heart Rate Resp SpO2 Height Weight   11/01/22 1027 11/01/22 1017 11/01/22 1017 11/01/22 1017 11/01/22 1027 11/01/22 1017 11/01/22 1027 11/01/22 1027   (!) 166/106 97.5 °F (36.4 °C) Oral 82 16 100 % 5' 7\" (1.702 m) 246 lb (111.6 kg)         Constitutional:  Alert, development consistent with age. HEENT:  NC/NT. Airway patent. Neck:  Supple. Normal ROM. Lips:  upper and lower normal.  Mouth:  normal tongue and buccal mucosa. Dental:  tooth 18/19/20 tender to touch but no apical abscess. No evidence of Speedy's angina, mastoiditis, parotiditis, retropharyngeal or peritonsillar abscess. Physical Exam                  Trismus: No.         Drooling: No.           Airway stridor: No.  Facial skin: left swelling. Respiratory:  Clear to auscultation and breath sounds equal.    CV: Regular rate and rhythm, normal heart sounds, without pathological murmurs, ectopy, gallops, or rubs. Skin:  No rashes, erythema or lesions present, unless noted elsewhere. .  Lymphatics: No lymphangitis or adenopathy noted. Neurological:  Oriented. Motor functions intact. Lab / Imaging Results   (All laboratory and radiology results have been personally reviewed by myself)  Labs:  Results for orders placed or performed during the hospital encounter of 11/01/22   POC Pregnancy Urine Qual   Result Value Ref Range    HCG, Urine, POC Negative Negative    Lot Number [id number]     Positive QC Pass/Fail Pass     Negative QC Pass/Fail Pass      Imaging: All Radiology results interpreted by Radiologist unless otherwise noted.   No orders to display     ED Course / Medical Decision Making     Medications   amoxicillin-clavulanate (AUGMENTIN) 875-125 MG per tablet 1 tablet (1 tablet Oral Given 11/1/22 1045)   oxyCODONE-acetaminophen (PERCOCET) 5-325 MG per tablet 1 tablet (1 tablet Oral Given 11/1/22 1045)   ketorolac (TORADOL) injection 30 mg (30 mg IntraMUSCular Given 11/1/22 1446)   ondansetron (ZOFRAN-ODT) disintegrating tablet 4 mg (4 mg Oral Given 11/1/22 1045)        Consult(s):   Dental Resident was not consulted to see patient regarding complaint. Procedure(s):   None    MDM: Patient is a 48-year-old presenting with left-sided dental pain on the bottom that is radiating to the top for the last 4 days. Patient has additional swelling. Patient states she tried the lidocaine swish and spit without relief. Patient states he makes it better cold makes it worse. Patient has not seen a dentist.  Patient denies any trauma. Patient still tolerating food and drink. No evidence of an abscess after thorough examination. Patient had head, ears, nose, throat examination. Airways patent. Patient was given first dose of amoxicillin here Zofran and pain medication. POC was found to be negative. Patient will be referred to the dental clinic and told that she needs to see a dentist soon as possible. Patient was advised the risk and benefits side effects of the medications and told to take her medications with food due to causing nausea and abdominal pain. She voiced understanding and agreed with the plan and management. Patient was told alternate Tylenol and ibuprofen every 6-8 hours with food for discomfort. Patient was told in regards to the lidocaine swish and spit she has at home she completed on a cottonball and a packet in the area which will observe into the nerve root and help with the discomfort. Patient was advised if worsening signs symptoms and when to return back to the emergency department. Patient voiced understanding and agreed with the plan management vitally stable and ready for outpatient follow-up. Patient was explicitly instructed on specific signs and symptoms on which to return to the emergency room for. Patient was instructed to return to the ER for any new or worsening symptoms. Additional discharge instructions were given verbally.  All questions were answered. Patient is comfortable and agreeable with discharge plan. Patient in no acute distress and non-toxic in appearance. Plan of Care/Counseling:  Brodie Ortiz PA-C reviewed today's visit with the patient in addition to providing specific details for the plan of care and counseling regarding the diagnosis and prognosis. Questions are answered at this time and are agreeable with the plan . Assessment      1. Cracked tooth    2. Pain, dental    3. Infection of tooth socket      Plan   Discharged home. Patient condition is stable    New Medications     New Prescriptions    AMOXICILLIN-CLAVULANATE (AUGMENTIN) 875-125 MG PER TABLET    Take 1 tablet by mouth 2 times daily for 7 days    IBUPROFEN (ADVIL;MOTRIN) 800 MG TABLET    Take 1 tablet by mouth 2 times daily as needed for Pain    PREDNISONE (DELTASONE) 20 MG TABLET    Take 2 tablets by mouth 2 times daily for 5 days     Electronically signed by Brodie Ortiz PA-C   DD: 11/1/22  **This report was transcribed using voice recognition software. Every effort was made to ensure accuracy; however, inadvertent computerized transcription errors may be present.   END OF ED PROVIDER NOTE      Brodie Ortiz PA-C  11/01/22 5373 unable to perform

## 2022-12-27 NOTE — ED ADULT NURSE NOTE - NS ED NURSE LEVEL OF CONSCIOUSNESS ORIENTATION
Consent: Written consent obtained.  The risks were reviewed with the patient including but not limited to: pigmentary changes, pain, blistering, scabbing, redness, and the remote possibility of scarring. Incubation Time: 02:00:00 Detail Level: Zone Show Anesthesia In Plan?: Yes Who Performed The Pdt?: Performed by Nurse, MA or Aesthetician ([id number]) Light Source: Yogi-U Total Number Of Aks Treated (Optional To Report): 0 History Of Hsv?: No Number Of Kerasticks/Tubes Billed For: 1 Post-Care Instructions: I reviewed with the patient in detail post-care instructions. Patient is to avoid sunlight for the next 2 days, and wear sun protection. Patients may expect sunburn like redness, discomfort and scabbing. Who Performed The Pdt (Staff): ARACELIS Berry Anesthesia Type: 1% lidocaine with epinephrine Which Photosensitizer Was Used: Levulan Incubation Start Time: 9:36 Incubation End Time: 11:36 Lot # (Optional): [id number] Medical Necessity: Precancerous Lesions Show Inventory Tab: Hide Debridement Text (Will Only Render In Visit Note If You Select Debridement Option Under Who Performed The Pdt Field): Prior to application of the photodynamic medication the hyperkeratotic lesions were curetted to make them more amenable to therapy. Illumination Time: 00:16:40 Expiration Date (Optional): 02/2026 Ndc# (Optional): 69262-408-88 Pre-Procedure Text: The treatment areas were cleaned and prepped in the usual fashion. Oriented - self; Oriented - place; Oriented - time

## 2023-06-06 NOTE — PROGRESS NOTE ADULT - PROBLEM SELECTOR PLAN 4
Lab Facility: 0 Patient on metformin and glimeperide at home. HgA1C of 6.0.   - patient continues to refuse insulin  -ISS+Lantus 17U+metformin 500mg BID  -monitor FSG

## 2023-07-06 NOTE — PROGRESS NOTE ADULT - ASSESSMENT
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 Odomzo Pregnancy And Lactation Text: This medication is Pregnancy Category X and is absolutely contraindicated during pregnancy. It is unknown if it is excreted in breast milk.

## 2024-01-05 NOTE — PROGRESS NOTE BEHAVIORAL HEALTH - INSIGHT (REGARDING PSYCHIATRIC ILLNESS)
Fair
Poor
Poor
done
Fair
Poor
Fair
Poor
Fair
Poor
Fair
Fair
Poor

## 2024-07-31 NOTE — PROGRESS NOTE ADULT - PROBLEM SELECTOR PLAN 1
Now resolved. 2/2 sacral infected ulcer and OM. For information on Fall & Injury Prevention, visit: [email or web address] OR  [email or web address] OR  https://www.cdc.gov/steadi/patient.html

## 2024-10-07 NOTE — CONSULT NOTE ADULT - ASSESSMENT
Patient mobility status  with no difficulty. Provider aware     I have reviewed discharge instructions with the patient.  The patient verbalized understanding.    Patient left ED via Discharge Method: ambulatory to Home with Child.    Opportunity for questions and clarification provided.     Patient given 0 scripts.           Loretta Douglas RN  10/06/24 3143     33 yo M paraplegic with h/o SCI, DM, multiple stage 4 pressure with sacral decubitus ulcer  -fecal soilage from close proximity of ulcer to  anus  -pt now amenable to diverting colostomy to avoid fecal contamination of ulcer  -continue wound care per wound rn  -will add on for diverting colostomy on Thursday  -discussed with Dr La, will follow

## 2025-05-21 NOTE — PROGRESS NOTE BEHAVIORAL HEALTH - THOUGHT PROCESS
Health Maintenance       COVID-19 Vaccine (4 - 2024-25 season)  Overdue since 9/1/2024    Medicare Advantage- Medicare Wellness Visit (Yearly - January to December)  Overdue since 1/1/2025    Colorectal Cancer Screening  Never done           Following review of the above:  Patient is not proceeding with: Medicare Wellness.    Note: Refer to final orders and clinician documentation.       Illogical/Linear